# Patient Record
Sex: MALE | Race: WHITE | NOT HISPANIC OR LATINO
[De-identification: names, ages, dates, MRNs, and addresses within clinical notes are randomized per-mention and may not be internally consistent; named-entity substitution may affect disease eponyms.]

---

## 2017-03-15 ENCOUNTER — APPOINTMENT (OUTPATIENT)
Dept: HEMATOLOGY ONCOLOGY | Facility: CLINIC | Age: 67
End: 2017-03-15

## 2017-03-15 LAB
APPEARANCE UR: CLEAR
BILIRUB UR QL STRIP: NEGATIVE
COLOR UR: YELLOW
GLUCOSE UR STRIP-MCNC: 250 MG/DL
HGB UR QL STRIP: NEGATIVE
KETONES UR STRIP-MCNC: NEGATIVE MG/DL
NITRITE UR QL STRIP: NEGATIVE
PH UR STRIP: 6
PROT UR STRIP-MCNC: NEGATIVE MG/DL
SP GR UR STRIP: 1.02
UROBILINOGEN UR STRIP-MCNC: 1 MG/DL
WBC URNS QL MICRO: NEGATIVE

## 2017-03-16 ENCOUNTER — RESULT REVIEW (OUTPATIENT)
Age: 67
End: 2017-03-16

## 2017-03-20 LAB — BACTERIA UR CULT: NORMAL

## 2017-03-29 ENCOUNTER — OUTPATIENT (OUTPATIENT)
Dept: OUTPATIENT SERVICES | Facility: HOSPITAL | Age: 67
LOS: 1 days | Discharge: HOME | End: 2017-03-29

## 2017-03-29 ENCOUNTER — APPOINTMENT (OUTPATIENT)
Dept: HEMATOLOGY ONCOLOGY | Facility: CLINIC | Age: 67
End: 2017-03-29

## 2017-03-29 VITALS
HEIGHT: 69 IN | BODY MASS INDEX: 26.36 KG/M2 | SYSTOLIC BLOOD PRESSURE: 154 MMHG | WEIGHT: 178 LBS | DIASTOLIC BLOOD PRESSURE: 82 MMHG | RESPIRATION RATE: 12 BRPM | HEART RATE: 103 BPM | TEMPERATURE: 95.6 F

## 2017-06-27 DIAGNOSIS — C67.9 MALIGNANT NEOPLASM OF BLADDER, UNSPECIFIED: ICD-10-CM

## 2017-10-04 ENCOUNTER — OUTPATIENT (OUTPATIENT)
Dept: OUTPATIENT SERVICES | Facility: HOSPITAL | Age: 67
LOS: 1 days | Discharge: HOME | End: 2017-10-04

## 2017-10-04 ENCOUNTER — APPOINTMENT (OUTPATIENT)
Dept: HEMATOLOGY ONCOLOGY | Facility: CLINIC | Age: 67
End: 2017-10-04

## 2017-10-05 ENCOUNTER — RESULT REVIEW (OUTPATIENT)
Age: 67
End: 2017-10-05

## 2017-10-05 DIAGNOSIS — C67.9 MALIGNANT NEOPLASM OF BLADDER, UNSPECIFIED: ICD-10-CM

## 2017-10-05 LAB
APPEARANCE UR: CLEAR
BILIRUB UR QL STRIP: NEGATIVE
COLOR UR: YELLOW
GLUCOSE UR STRIP-MCNC: NEGATIVE MG/DL
HGB UR QL STRIP: NEGATIVE
KETONES UR STRIP-MCNC: NEGATIVE MG/DL
NITRITE UR QL STRIP: NEGATIVE
PH UR STRIP: 6.5
PROT UR STRIP-MCNC: NEGATIVE MG/DL
PSA FREE MFR FLD: 0.94 NG/ML
SP GR UR STRIP: 1.01
UROBILINOGEN UR STRIP-MCNC: 1 MG/DL
WBC URNS QL MICRO: NEGATIVE

## 2017-10-11 LAB — BACTERIA UR CULT: NORMAL

## 2017-10-18 ENCOUNTER — OUTPATIENT (OUTPATIENT)
Dept: OUTPATIENT SERVICES | Facility: HOSPITAL | Age: 67
LOS: 1 days | Discharge: HOME | End: 2017-10-18

## 2017-10-18 ENCOUNTER — APPOINTMENT (OUTPATIENT)
Dept: HEMATOLOGY ONCOLOGY | Facility: CLINIC | Age: 67
End: 2017-10-18
Payer: MEDICARE

## 2017-10-18 VITALS
DIASTOLIC BLOOD PRESSURE: 74 MMHG | RESPIRATION RATE: 12 BRPM | TEMPERATURE: 96.6 F | HEART RATE: 94 BPM | HEIGHT: 69 IN | BODY MASS INDEX: 26.36 KG/M2 | SYSTOLIC BLOOD PRESSURE: 160 MMHG | WEIGHT: 178 LBS

## 2017-10-18 PROCEDURE — 99213 OFFICE O/P EST LOW 20 MIN: CPT

## 2018-04-04 ENCOUNTER — OUTPATIENT (OUTPATIENT)
Dept: OUTPATIENT SERVICES | Facility: HOSPITAL | Age: 68
LOS: 1 days | Discharge: HOME | End: 2018-04-04

## 2018-04-04 ENCOUNTER — APPOINTMENT (OUTPATIENT)
Dept: HEMATOLOGY ONCOLOGY | Facility: CLINIC | Age: 68
End: 2018-04-04

## 2018-04-04 ENCOUNTER — LABORATORY RESULT (OUTPATIENT)
Age: 68
End: 2018-04-04

## 2018-04-05 DIAGNOSIS — C67.9 MALIGNANT NEOPLASM OF BLADDER, UNSPECIFIED: ICD-10-CM

## 2018-04-11 LAB
APPEARANCE: CLEAR
BACTERIA UR CULT: NORMAL
BILIRUBIN URINE: NEGATIVE
BLOOD URINE: NEGATIVE
COLOR: YELLOW
GLUCOSE QUALITATIVE U: 100 MG/DL
KETONES URINE: NEGATIVE
LEUKOCYTE ESTERASE URINE: NEGATIVE
NITRITE URINE: NEGATIVE
PH URINE: 6.5
PROTEIN URINE: NEGATIVE MG/DL
SPECIFIC GRAVITY URINE: 1.01
UROBILINOGEN URINE: 1 MG/DL (ref 0.2–?)

## 2018-04-18 ENCOUNTER — RESULT REVIEW (OUTPATIENT)
Age: 68
End: 2018-04-18

## 2018-04-18 ENCOUNTER — APPOINTMENT (OUTPATIENT)
Dept: HEMATOLOGY ONCOLOGY | Facility: CLINIC | Age: 68
End: 2018-04-18
Payer: MEDICARE

## 2018-04-18 ENCOUNTER — OUTPATIENT (OUTPATIENT)
Dept: OUTPATIENT SERVICES | Facility: HOSPITAL | Age: 68
LOS: 1 days | Discharge: HOME | End: 2018-04-18

## 2018-04-18 VITALS
RESPIRATION RATE: 16 BRPM | HEART RATE: 81 BPM | TEMPERATURE: 95.7 F | SYSTOLIC BLOOD PRESSURE: 129 MMHG | DIASTOLIC BLOOD PRESSURE: 74 MMHG

## 2018-04-18 DIAGNOSIS — C67.9 MALIGNANT NEOPLASM OF BLADDER, UNSPECIFIED: ICD-10-CM

## 2018-04-18 PROCEDURE — 52000 CYSTOURETHROSCOPY: CPT

## 2018-04-18 PROCEDURE — 99213 OFFICE O/P EST LOW 20 MIN: CPT | Mod: 25

## 2018-04-19 PROBLEM — C67.9 BLADDER CANCER: Status: ACTIVE | Noted: 2017-03-29

## 2018-04-19 LAB
APPEARANCE UR: CLEAR — SIGNIFICANT CHANGE UP
BACTERIA # UR AUTO: NEGATIVE — SIGNIFICANT CHANGE UP
BILIRUB UR-MCNC: NEGATIVE — SIGNIFICANT CHANGE UP
COLOR SPEC: YELLOW — SIGNIFICANT CHANGE UP
CULTURE RESULTS: NO GROWTH — SIGNIFICANT CHANGE UP
DIFF PNL FLD: (no result)
EPI CELLS # UR: 2 /HPF — SIGNIFICANT CHANGE UP (ref 0–5)
GLUCOSE UR QL: 1000 MG/DL
HYALINE CASTS # UR AUTO: 2 /LPF — SIGNIFICANT CHANGE UP (ref 0–7)
KETONES UR-MCNC: NEGATIVE — SIGNIFICANT CHANGE UP
LEUKOCYTE ESTERASE UR-ACNC: NEGATIVE — SIGNIFICANT CHANGE UP
NITRITE UR-MCNC: NEGATIVE — SIGNIFICANT CHANGE UP
PH UR: 6 — SIGNIFICANT CHANGE UP (ref 5–8)
PROT UR-MCNC: NEGATIVE MG/DL — SIGNIFICANT CHANGE UP
RBC CASTS # UR COMP ASSIST: 1 /HPF — SIGNIFICANT CHANGE UP (ref 0–4)
SP GR SPEC: 1.01 — SIGNIFICANT CHANGE UP (ref 1.01–1.02)
SPECIMEN SOURCE: SIGNIFICANT CHANGE UP
UROBILINOGEN FLD QL: NEGATIVE MG/DL — SIGNIFICANT CHANGE UP
WBC UR QL: 3 /HPF — SIGNIFICANT CHANGE UP (ref 0–5)

## 2018-04-20 DIAGNOSIS — C67.9 MALIGNANT NEOPLASM OF BLADDER, UNSPECIFIED: ICD-10-CM

## 2018-04-20 LAB — NON-GYNECOLOGICAL CYTOLOGY STUDY: SIGNIFICANT CHANGE UP

## 2018-10-03 ENCOUNTER — LABORATORY RESULT (OUTPATIENT)
Age: 68
End: 2018-10-03

## 2018-10-03 ENCOUNTER — OUTPATIENT (OUTPATIENT)
Dept: OUTPATIENT SERVICES | Facility: HOSPITAL | Age: 68
LOS: 1 days | Discharge: HOME | End: 2018-10-03

## 2018-10-03 ENCOUNTER — APPOINTMENT (OUTPATIENT)
Dept: HEMATOLOGY ONCOLOGY | Facility: CLINIC | Age: 68
End: 2018-10-03

## 2018-10-09 LAB
APPEARANCE: CLEAR
BACTERIA UR CULT: NORMAL
BILIRUBIN URINE: NEGATIVE
BLOOD URINE: NEGATIVE
COLOR: YELLOW
GLUCOSE QUALITATIVE U: 100 MG/DL
KETONES URINE: NEGATIVE
LEUKOCYTE ESTERASE URINE: NEGATIVE
NITRITE URINE: NEGATIVE
PH URINE: 6
PROTEIN URINE: NEGATIVE
SPECIFIC GRAVITY URINE: 1.01
UROBILINOGEN URINE: 0.2 (ref 0.2–?)

## 2018-10-17 ENCOUNTER — APPOINTMENT (OUTPATIENT)
Dept: HEMATOLOGY ONCOLOGY | Facility: CLINIC | Age: 68
End: 2018-10-17

## 2018-10-24 DIAGNOSIS — C67.9 MALIGNANT NEOPLASM OF BLADDER, UNSPECIFIED: ICD-10-CM

## 2018-10-29 ENCOUNTER — APPOINTMENT (OUTPATIENT)
Dept: UROLOGY | Facility: CLINIC | Age: 68
End: 2018-10-29

## 2019-04-29 ENCOUNTER — APPOINTMENT (OUTPATIENT)
Dept: CARDIOLOGY | Facility: CLINIC | Age: 69
End: 2019-04-29
Payer: MEDICARE

## 2019-04-29 PROCEDURE — 99204 OFFICE O/P NEW MOD 45 MIN: CPT

## 2019-04-29 PROCEDURE — 93000 ELECTROCARDIOGRAM COMPLETE: CPT

## 2019-05-28 ENCOUNTER — APPOINTMENT (OUTPATIENT)
Dept: CARDIOLOGY | Facility: CLINIC | Age: 69
End: 2019-05-28

## 2019-05-29 ENCOUNTER — APPOINTMENT (OUTPATIENT)
Dept: CARDIOLOGY | Facility: CLINIC | Age: 69
End: 2019-05-29
Payer: MEDICARE

## 2019-05-29 PROCEDURE — 93351 STRESS TTE COMPLETE: CPT

## 2019-05-29 PROCEDURE — 93320 DOPPLER ECHO COMPLETE: CPT

## 2019-05-29 PROCEDURE — 93325 DOPPLER ECHO COLOR FLOW MAPG: CPT | Mod: 59

## 2019-06-06 ENCOUNTER — APPOINTMENT (OUTPATIENT)
Dept: CARDIOLOGY | Facility: CLINIC | Age: 69
End: 2019-06-06
Payer: MEDICARE

## 2019-06-06 PROCEDURE — 93880 EXTRACRANIAL BILAT STUDY: CPT

## 2019-06-06 PROCEDURE — 93000 ELECTROCARDIOGRAM COMPLETE: CPT | Mod: 59

## 2019-06-06 PROCEDURE — 99214 OFFICE O/P EST MOD 30 MIN: CPT | Mod: 25

## 2019-06-07 ENCOUNTER — OUTPATIENT (OUTPATIENT)
Dept: OUTPATIENT SERVICES | Facility: HOSPITAL | Age: 69
LOS: 1 days | Discharge: HOME | End: 2019-06-07

## 2019-06-07 ENCOUNTER — APPOINTMENT (OUTPATIENT)
Dept: VASCULAR SURGERY | Facility: CLINIC | Age: 69
End: 2019-06-07
Payer: MEDICARE

## 2019-06-07 VITALS
BODY MASS INDEX: 25.05 KG/M2 | WEIGHT: 175 LBS | DIASTOLIC BLOOD PRESSURE: 80 MMHG | HEIGHT: 70 IN | SYSTOLIC BLOOD PRESSURE: 120 MMHG

## 2019-06-07 DIAGNOSIS — I65.23 OCCLUSION AND STENOSIS OF BILATERAL CAROTID ARTERIES: ICD-10-CM

## 2019-06-07 DIAGNOSIS — E11.9 TYPE 2 DIABETES MELLITUS W/OUT COMPLICATIONS: ICD-10-CM

## 2019-06-07 DIAGNOSIS — Z72.0 TOBACCO USE: ICD-10-CM

## 2019-06-07 PROCEDURE — 99203 OFFICE O/P NEW LOW 30 MIN: CPT

## 2019-06-07 PROCEDURE — 93880 EXTRACRANIAL BILAT STUDY: CPT

## 2019-06-07 NOTE — DATA REVIEWED
[FreeTextEntry1] : I performed a carotid duplex which was medically necessary to evaluate for critical ICA stenosis. It showed right ICA occlusion and left ICA with 90% stenosis with PSV of 612 c/s and EDV of 220 cm/s and ICA CCA ratio of 9.2.\par

## 2019-06-07 NOTE — HISTORY OF PRESENT ILLNESS
[FreeTextEntry1] : 67 y/o gentleman sent in by Cardiology for carotid artery disease. He has h/o smoking, was recently diagnosed with atrial fibrillation and is on Xarelto, was found to have right ICA occlusion and left ICA with >70% stenosis, denies any h/o CVA or TIA, c/o left buttock pain on ambulation which he was told was due to sciatica, can ambulate half a mile before any lower extremity discomfort.

## 2019-06-07 NOTE — CONSULT LETTER
[Dear  ___] : Dear  [unfilled], [Consult Letter:] : I had the pleasure of evaluating your patient, [unfilled]. [Please see my note below.] : Please see my note below. [FreeTextEntry2] : Dear Dr. Antelmo Baltazar,

## 2019-06-07 NOTE — ASSESSMENT
[FreeTextEntry1] : 67 y/o gentleman sent in by Cardiology for carotid artery disease. He has h/o smoking, was recently diagnosed with atrial fibrillation and is on Xarelto, was found to have right ICA occlusion and left ICA with >70% stenosis, denies any h/o CVA or TIA, c/o left buttock pain on ambulation which he was told was due to sciatica, can ambulate half a mile before any lower extremity discomfort. He has pulse deficit on physical exam on the left lower extremity which may be related to PVD from smoking.\par I performed a carotid duplex which was medically necessary to evaluate for critical ICA stenosis. It showed right ICA occlusion and left ICA with 90% stenosis with PSV of 612 c/s and EDV of 220 cm/s and ICA CCA ratio of 9.2.\par I have offered him left carotid endarterectomy for stroke prevention and explained the surgical procedure with him, including the risks and benefits. He should continue on Aspirin and stop Xarelto 2 days prior to surgery. I will obtain a CT angiogram of the neck prior to surgery.\par He was strongly urged to stop smoking and is scheduled for left carotid endarterectomy on 6/19/19.

## 2019-06-09 ENCOUNTER — FORM ENCOUNTER (OUTPATIENT)
Age: 69
End: 2019-06-09

## 2019-06-10 ENCOUNTER — OUTPATIENT (OUTPATIENT)
Dept: OUTPATIENT SERVICES | Facility: HOSPITAL | Age: 69
LOS: 1 days | Discharge: HOME | End: 2019-06-10
Payer: COMMERCIAL

## 2019-06-10 ENCOUNTER — OUTPATIENT (OUTPATIENT)
Dept: OUTPATIENT SERVICES | Facility: HOSPITAL | Age: 69
LOS: 1 days | Discharge: HOME | End: 2019-06-10
Payer: MEDICARE

## 2019-06-10 VITALS
DIASTOLIC BLOOD PRESSURE: 86 MMHG | RESPIRATION RATE: 16 BRPM | OXYGEN SATURATION: 96 % | TEMPERATURE: 96 F | HEART RATE: 68 BPM | HEIGHT: 70 IN | SYSTOLIC BLOOD PRESSURE: 140 MMHG | WEIGHT: 170.86 LBS

## 2019-06-10 DIAGNOSIS — I65.23 OCCLUSION AND STENOSIS OF BILATERAL CAROTID ARTERIES: ICD-10-CM

## 2019-06-10 DIAGNOSIS — Z01.818 ENCOUNTER FOR OTHER PREPROCEDURAL EXAMINATION: ICD-10-CM

## 2019-06-10 DIAGNOSIS — Z98.890 OTHER SPECIFIED POSTPROCEDURAL STATES: Chronic | ICD-10-CM

## 2019-06-10 LAB
ALBUMIN SERPL ELPH-MCNC: 4.5 G/DL — SIGNIFICANT CHANGE UP (ref 3.5–5.2)
ALP SERPL-CCNC: 103 U/L — SIGNIFICANT CHANGE UP (ref 30–115)
ALT FLD-CCNC: 82 U/L — HIGH (ref 0–41)
ANION GAP SERPL CALC-SCNC: 15 MMOL/L — HIGH (ref 7–14)
APPEARANCE UR: CLEAR — SIGNIFICANT CHANGE UP
APTT BLD: 48.1 SEC — HIGH (ref 27–39.2)
AST SERPL-CCNC: 32 U/L — SIGNIFICANT CHANGE UP (ref 0–41)
BASOPHILS # BLD AUTO: 0.07 K/UL — SIGNIFICANT CHANGE UP (ref 0–0.2)
BASOPHILS NFR BLD AUTO: 0.8 % — SIGNIFICANT CHANGE UP (ref 0–1)
BILIRUB SERPL-MCNC: 0.6 MG/DL — SIGNIFICANT CHANGE UP (ref 0.2–1.2)
BILIRUB UR-MCNC: NEGATIVE — SIGNIFICANT CHANGE UP
BLD GP AB SCN SERPL QL: SIGNIFICANT CHANGE UP
BUN SERPL-MCNC: 15 MG/DL
BUN SERPL-MCNC: 21 MG/DL — HIGH (ref 10–20)
CALCIUM SERPL-MCNC: 9.8 MG/DL — SIGNIFICANT CHANGE UP (ref 8.5–10.1)
CHLORIDE SERPL-SCNC: 101 MMOL/L — SIGNIFICANT CHANGE UP (ref 98–110)
CO2 SERPL-SCNC: 25 MMOL/L — SIGNIFICANT CHANGE UP (ref 17–32)
COLOR SPEC: YELLOW — SIGNIFICANT CHANGE UP
CREAT SERPL-MCNC: 0.8 MG/DL
CREAT SERPL-MCNC: 0.8 MG/DL — SIGNIFICANT CHANGE UP (ref 0.7–1.5)
DIFF PNL FLD: NEGATIVE — SIGNIFICANT CHANGE UP
EOSINOPHIL # BLD AUTO: 0.21 K/UL — SIGNIFICANT CHANGE UP (ref 0–0.7)
EOSINOPHIL NFR BLD AUTO: 2.5 % — SIGNIFICANT CHANGE UP (ref 0–8)
GLUCOSE SERPL-MCNC: 104 MG/DL — HIGH (ref 70–99)
GLUCOSE UR QL: NEGATIVE MG/DL — SIGNIFICANT CHANGE UP
HCT VFR BLD CALC: 47.4 % — SIGNIFICANT CHANGE UP (ref 42–52)
HGB BLD-MCNC: 15.9 G/DL — SIGNIFICANT CHANGE UP (ref 14–18)
IMM GRANULOCYTES NFR BLD AUTO: 0.2 % — SIGNIFICANT CHANGE UP (ref 0.1–0.3)
INR BLD: 1.64 RATIO — HIGH (ref 0.65–1.3)
KETONES UR-MCNC: NEGATIVE — SIGNIFICANT CHANGE UP
LEUKOCYTE ESTERASE UR-ACNC: NEGATIVE — SIGNIFICANT CHANGE UP
LYMPHOCYTES # BLD AUTO: 2.52 K/UL — SIGNIFICANT CHANGE UP (ref 1.2–3.4)
LYMPHOCYTES # BLD AUTO: 30.5 % — SIGNIFICANT CHANGE UP (ref 20.5–51.1)
MCHC RBC-ENTMCNC: 29.8 PG — SIGNIFICANT CHANGE UP (ref 27–31)
MCHC RBC-ENTMCNC: 33.5 G/DL — SIGNIFICANT CHANGE UP (ref 32–37)
MCV RBC AUTO: 88.8 FL — SIGNIFICANT CHANGE UP (ref 80–94)
MONOCYTES # BLD AUTO: 0.71 K/UL — HIGH (ref 0.1–0.6)
MONOCYTES NFR BLD AUTO: 8.6 % — SIGNIFICANT CHANGE UP (ref 1.7–9.3)
NEUTROPHILS # BLD AUTO: 4.74 K/UL — SIGNIFICANT CHANGE UP (ref 1.4–6.5)
NEUTROPHILS NFR BLD AUTO: 57.4 % — SIGNIFICANT CHANGE UP (ref 42.2–75.2)
NITRITE UR-MCNC: NEGATIVE — SIGNIFICANT CHANGE UP
NRBC # BLD: 0 /100 WBCS — SIGNIFICANT CHANGE UP (ref 0–0)
PH UR: 5.5 — SIGNIFICANT CHANGE UP (ref 5–8)
PLATELET # BLD AUTO: 226 K/UL — SIGNIFICANT CHANGE UP (ref 130–400)
POTASSIUM SERPL-MCNC: 4.3 MMOL/L — SIGNIFICANT CHANGE UP (ref 3.5–5)
POTASSIUM SERPL-SCNC: 4.3 MMOL/L — SIGNIFICANT CHANGE UP (ref 3.5–5)
PROT SERPL-MCNC: 7.4 G/DL — SIGNIFICANT CHANGE UP (ref 6–8)
PROT UR-MCNC: NEGATIVE MG/DL — SIGNIFICANT CHANGE UP
PROTHROM AB SERPL-ACNC: 18.8 SEC — HIGH (ref 9.95–12.87)
RBC # BLD: 5.34 M/UL — SIGNIFICANT CHANGE UP (ref 4.7–6.1)
RBC # FLD: 12.6 % — SIGNIFICANT CHANGE UP (ref 11.5–14.5)
SODIUM SERPL-SCNC: 141 MMOL/L — SIGNIFICANT CHANGE UP (ref 135–146)
SP GR SPEC: 1.01 — SIGNIFICANT CHANGE UP (ref 1.01–1.03)
UROBILINOGEN FLD QL: 0.2 MG/DL — SIGNIFICANT CHANGE UP (ref 0.2–0.2)
WBC # BLD: 8.27 K/UL — SIGNIFICANT CHANGE UP (ref 4.8–10.8)
WBC # FLD AUTO: 8.27 K/UL — SIGNIFICANT CHANGE UP (ref 4.8–10.8)

## 2019-06-10 PROCEDURE — 93010 ELECTROCARDIOGRAM REPORT: CPT

## 2019-06-10 PROCEDURE — 71046 X-RAY EXAM CHEST 2 VIEWS: CPT | Mod: 26

## 2019-06-10 PROCEDURE — 70498 CT ANGIOGRAPHY NECK: CPT | Mod: 26

## 2019-06-10 NOTE — H&P PST ADULT - NSANTHOSAYNRD_GEN_A_CORE
No. MARILUZ screening performed.  STOP BANG Legend: 0-2 = LOW Risk; 3-4 = INTERMEDIATE Risk; 5-8 = HIGH Risk

## 2019-06-10 NOTE — H&P PST ADULT - NSICDXPASTMEDICALHX_GEN_ALL_CORE_FT
PAST MEDICAL HISTORY:  Afib     Bladder cancer     DM (diabetes mellitus)     H/O cystoscopy resection of bladder tumor    H/O sciatica CHR PAIN LT LE    HTN (hypertension)     Hypercholesteremia

## 2019-06-10 NOTE — H&P PST ADULT - HISTORY OF PRESENT ILLNESS
68yr old male states " the DR found blockage in my left neck caotid"- presents for LEFT CAROTID ENDARTERECTOMY WITH PATCH. Denies c/o CP, PALP, SOB, URI, FEVER, RASH OR UTI SYMPTOMS. Exercise nacho 2 FOS.

## 2019-06-11 PROBLEM — E78.00 PURE HYPERCHOLESTEROLEMIA, UNSPECIFIED: Chronic | Status: ACTIVE | Noted: 2019-06-10

## 2019-06-11 PROBLEM — I48.91 UNSPECIFIED ATRIAL FIBRILLATION: Chronic | Status: ACTIVE | Noted: 2019-06-10

## 2019-06-11 PROBLEM — I10 ESSENTIAL (PRIMARY) HYPERTENSION: Chronic | Status: ACTIVE | Noted: 2019-06-10

## 2019-06-11 PROBLEM — Z98.890 OTHER SPECIFIED POSTPROCEDURAL STATES: Chronic | Status: ACTIVE | Noted: 2019-06-10

## 2019-06-11 PROBLEM — E11.9 TYPE 2 DIABETES MELLITUS WITHOUT COMPLICATIONS: Chronic | Status: ACTIVE | Noted: 2019-06-10

## 2019-06-11 PROBLEM — C67.9 MALIGNANT NEOPLASM OF BLADDER, UNSPECIFIED: Chronic | Status: ACTIVE | Noted: 2019-06-10

## 2019-06-11 PROBLEM — Z86.69 PERSONAL HISTORY OF OTHER DISEASES OF THE NERVOUS SYSTEM AND SENSE ORGANS: Chronic | Status: ACTIVE | Noted: 2019-06-10

## 2019-06-13 ENCOUNTER — INPATIENT (INPATIENT)
Facility: HOSPITAL | Age: 69
LOS: 0 days | Discharge: HOME HEALTH | End: 2019-06-14
Attending: SURGERY | Admitting: SURGERY
Payer: MEDICARE

## 2019-06-13 ENCOUNTER — APPOINTMENT (OUTPATIENT)
Dept: VASCULAR SURGERY | Facility: HOSPITAL | Age: 69
End: 2019-06-13

## 2019-06-13 ENCOUNTER — RESULT REVIEW (OUTPATIENT)
Age: 69
End: 2019-06-13

## 2019-06-13 VITALS
SYSTOLIC BLOOD PRESSURE: 180 MMHG | HEIGHT: 70 IN | RESPIRATION RATE: 18 BRPM | TEMPERATURE: 98 F | DIASTOLIC BLOOD PRESSURE: 86 MMHG | HEART RATE: 66 BPM | WEIGHT: 175.05 LBS

## 2019-06-13 DIAGNOSIS — Z98.890 OTHER SPECIFIED POSTPROCEDURAL STATES: Chronic | ICD-10-CM

## 2019-06-13 LAB
ANION GAP SERPL CALC-SCNC: 11 MMOL/L — SIGNIFICANT CHANGE UP (ref 7–14)
ANION GAP SERPL CALC-SCNC: 12 MMOL/L — SIGNIFICANT CHANGE UP (ref 7–14)
BUN SERPL-MCNC: 15 MG/DL — SIGNIFICANT CHANGE UP (ref 10–20)
BUN SERPL-MCNC: 19 MG/DL — SIGNIFICANT CHANGE UP (ref 10–20)
CALCIUM SERPL-MCNC: 8.6 MG/DL — SIGNIFICANT CHANGE UP (ref 8.5–10.1)
CALCIUM SERPL-MCNC: 8.9 MG/DL — SIGNIFICANT CHANGE UP (ref 8.5–10.1)
CHLORIDE SERPL-SCNC: 102 MMOL/L — SIGNIFICANT CHANGE UP (ref 98–110)
CHLORIDE SERPL-SCNC: 105 MMOL/L — SIGNIFICANT CHANGE UP (ref 98–110)
CK MB CFR SERPL CALC: <1 NG/ML — SIGNIFICANT CHANGE UP (ref 0.6–6.3)
CK SERPL-CCNC: 47 U/L — SIGNIFICANT CHANGE UP (ref 0–225)
CO2 SERPL-SCNC: 23 MMOL/L — SIGNIFICANT CHANGE UP (ref 17–32)
CO2 SERPL-SCNC: 23 MMOL/L — SIGNIFICANT CHANGE UP (ref 17–32)
CREAT SERPL-MCNC: 0.7 MG/DL — SIGNIFICANT CHANGE UP (ref 0.7–1.5)
CREAT SERPL-MCNC: 0.8 MG/DL — SIGNIFICANT CHANGE UP (ref 0.7–1.5)
GLUCOSE BLDC GLUCOMTR-MCNC: 124 MG/DL — HIGH (ref 70–99)
GLUCOSE BLDC GLUCOMTR-MCNC: 142 MG/DL — HIGH (ref 70–99)
GLUCOSE SERPL-MCNC: 134 MG/DL — HIGH (ref 70–99)
GLUCOSE SERPL-MCNC: 158 MG/DL — HIGH (ref 70–99)
HCT VFR BLD CALC: 41.3 % — LOW (ref 42–52)
HCT VFR BLD CALC: 41.9 % — LOW (ref 42–52)
HGB BLD-MCNC: 14.1 G/DL — SIGNIFICANT CHANGE UP (ref 14–18)
HGB BLD-MCNC: 14.4 G/DL — SIGNIFICANT CHANGE UP (ref 14–18)
MAGNESIUM SERPL-MCNC: 1.9 MG/DL — SIGNIFICANT CHANGE UP (ref 1.8–2.4)
MAGNESIUM SERPL-MCNC: 2 MG/DL — SIGNIFICANT CHANGE UP (ref 1.8–2.4)
MCHC RBC-ENTMCNC: 29.7 PG — SIGNIFICANT CHANGE UP (ref 27–31)
MCHC RBC-ENTMCNC: 29.8 PG — SIGNIFICANT CHANGE UP (ref 27–31)
MCHC RBC-ENTMCNC: 34.1 G/DL — SIGNIFICANT CHANGE UP (ref 32–37)
MCHC RBC-ENTMCNC: 34.4 G/DL — SIGNIFICANT CHANGE UP (ref 32–37)
MCV RBC AUTO: 86.6 FL — SIGNIFICANT CHANGE UP (ref 80–94)
MCV RBC AUTO: 86.9 FL — SIGNIFICANT CHANGE UP (ref 80–94)
NRBC # BLD: 0 /100 WBCS — SIGNIFICANT CHANGE UP (ref 0–0)
NRBC # BLD: 0 /100 WBCS — SIGNIFICANT CHANGE UP (ref 0–0)
PHOSPHATE SERPL-MCNC: 3.3 MG/DL — SIGNIFICANT CHANGE UP (ref 2.1–4.9)
PHOSPHATE SERPL-MCNC: 3.4 MG/DL — SIGNIFICANT CHANGE UP (ref 2.1–4.9)
PLATELET # BLD AUTO: 174 K/UL — SIGNIFICANT CHANGE UP (ref 130–400)
PLATELET # BLD AUTO: 178 K/UL — SIGNIFICANT CHANGE UP (ref 130–400)
POTASSIUM SERPL-MCNC: 4.3 MMOL/L — SIGNIFICANT CHANGE UP (ref 3.5–5)
POTASSIUM SERPL-MCNC: 4.4 MMOL/L — SIGNIFICANT CHANGE UP (ref 3.5–5)
POTASSIUM SERPL-SCNC: 4.3 MMOL/L — SIGNIFICANT CHANGE UP (ref 3.5–5)
POTASSIUM SERPL-SCNC: 4.4 MMOL/L — SIGNIFICANT CHANGE UP (ref 3.5–5)
RBC # BLD: 4.75 M/UL — SIGNIFICANT CHANGE UP (ref 4.7–6.1)
RBC # BLD: 4.84 M/UL — SIGNIFICANT CHANGE UP (ref 4.7–6.1)
RBC # FLD: 12.5 % — SIGNIFICANT CHANGE UP (ref 11.5–14.5)
RBC # FLD: 12.6 % — SIGNIFICANT CHANGE UP (ref 11.5–14.5)
SODIUM SERPL-SCNC: 136 MMOL/L — SIGNIFICANT CHANGE UP (ref 135–146)
SODIUM SERPL-SCNC: 140 MMOL/L — SIGNIFICANT CHANGE UP (ref 135–146)
TROPONIN T SERPL-MCNC: <0.01 NG/ML — SIGNIFICANT CHANGE UP
TROPONIN T SERPL-MCNC: <0.01 NG/ML — SIGNIFICANT CHANGE UP
WBC # BLD: 12.54 K/UL — HIGH (ref 4.8–10.8)
WBC # BLD: 8.66 K/UL — SIGNIFICANT CHANGE UP (ref 4.8–10.8)
WBC # FLD AUTO: 12.54 K/UL — HIGH (ref 4.8–10.8)
WBC # FLD AUTO: 8.66 K/UL — SIGNIFICANT CHANGE UP (ref 4.8–10.8)

## 2019-06-13 PROCEDURE — 99291 CRITICAL CARE FIRST HOUR: CPT

## 2019-06-13 PROCEDURE — 93010 ELECTROCARDIOGRAM REPORT: CPT

## 2019-06-13 PROCEDURE — 71045 X-RAY EXAM CHEST 1 VIEW: CPT | Mod: 26

## 2019-06-13 PROCEDURE — 35301 RECHANNELING OF ARTERY: CPT

## 2019-06-13 PROCEDURE — 88311 DECALCIFY TISSUE: CPT | Mod: 26

## 2019-06-13 PROCEDURE — 88304 TISSUE EXAM BY PATHOLOGIST: CPT | Mod: 26

## 2019-06-13 RX ORDER — INSULIN LISPRO 100/ML
VIAL (ML) SUBCUTANEOUS
Refills: 0 | Status: DISCONTINUED | OUTPATIENT
Start: 2019-06-13 | End: 2019-06-14

## 2019-06-13 RX ORDER — ASPIRIN/CALCIUM CARB/MAGNESIUM 324 MG
81 TABLET ORAL DAILY
Refills: 0 | Status: DISCONTINUED | OUTPATIENT
Start: 2019-06-14 | End: 2019-06-14

## 2019-06-13 RX ORDER — SODIUM CHLORIDE 9 MG/ML
1000 INJECTION INTRAMUSCULAR; INTRAVENOUS; SUBCUTANEOUS
Refills: 0 | Status: DISCONTINUED | OUTPATIENT
Start: 2019-06-13 | End: 2019-06-13

## 2019-06-13 RX ORDER — MORPHINE SULFATE 50 MG/1
2 CAPSULE, EXTENDED RELEASE ORAL
Refills: 0 | Status: DISCONTINUED | OUTPATIENT
Start: 2019-06-13 | End: 2019-06-13

## 2019-06-13 RX ORDER — PANTOPRAZOLE SODIUM 20 MG/1
40 TABLET, DELAYED RELEASE ORAL
Refills: 0 | Status: DISCONTINUED | OUTPATIENT
Start: 2019-06-13 | End: 2019-06-14

## 2019-06-13 RX ORDER — CARVEDILOL PHOSPHATE 80 MG/1
6.25 CAPSULE, EXTENDED RELEASE ORAL ONCE
Refills: 0 | Status: COMPLETED | OUTPATIENT
Start: 2019-06-13 | End: 2019-06-13

## 2019-06-13 RX ORDER — MEPERIDINE HYDROCHLORIDE 50 MG/ML
12.5 INJECTION INTRAMUSCULAR; INTRAVENOUS; SUBCUTANEOUS
Refills: 0 | Status: DISCONTINUED | OUTPATIENT
Start: 2019-06-13 | End: 2019-06-13

## 2019-06-13 RX ORDER — ONDANSETRON 8 MG/1
4 TABLET, FILM COATED ORAL ONCE
Refills: 0 | Status: DISCONTINUED | OUTPATIENT
Start: 2019-06-13 | End: 2019-06-13

## 2019-06-13 RX ORDER — OXYCODONE HYDROCHLORIDE 5 MG/1
5 TABLET ORAL EVERY 6 HOURS
Refills: 0 | Status: DISCONTINUED | OUTPATIENT
Start: 2019-06-13 | End: 2019-06-14

## 2019-06-13 RX ORDER — DEXTROSE 50 % IN WATER 50 %
15 SYRINGE (ML) INTRAVENOUS ONCE
Refills: 0 | Status: DISCONTINUED | OUTPATIENT
Start: 2019-06-13 | End: 2019-06-14

## 2019-06-13 RX ORDER — SIMVASTATIN 20 MG/1
20 TABLET, FILM COATED ORAL AT BEDTIME
Refills: 0 | Status: DISCONTINUED | OUTPATIENT
Start: 2019-06-13 | End: 2019-06-14

## 2019-06-13 RX ORDER — CLEVIDIPINE BUTYRATE 50MG/100ML
0.5 VIAL (ML) INTRAVENOUS
Qty: 25 | Refills: 0 | Status: DISCONTINUED | OUTPATIENT
Start: 2019-06-13 | End: 2019-06-13

## 2019-06-13 RX ORDER — SENNA PLUS 8.6 MG/1
2 TABLET ORAL DAILY
Refills: 0 | Status: DISCONTINUED | OUTPATIENT
Start: 2019-06-13 | End: 2019-06-14

## 2019-06-13 RX ORDER — CARVEDILOL PHOSPHATE 80 MG/1
6.25 CAPSULE, EXTENDED RELEASE ORAL EVERY 12 HOURS
Refills: 0 | Status: DISCONTINUED | OUTPATIENT
Start: 2019-06-13 | End: 2019-06-14

## 2019-06-13 RX ORDER — DEXTROSE 50 % IN WATER 50 %
25 SYRINGE (ML) INTRAVENOUS ONCE
Refills: 0 | Status: DISCONTINUED | OUTPATIENT
Start: 2019-06-13 | End: 2019-06-13

## 2019-06-13 RX ORDER — HEPARIN SODIUM 5000 [USP'U]/ML
5000 INJECTION INTRAVENOUS; SUBCUTANEOUS EVERY 8 HOURS
Refills: 0 | Status: DISCONTINUED | OUTPATIENT
Start: 2019-06-14 | End: 2019-06-14

## 2019-06-13 RX ORDER — ACETAMINOPHEN 500 MG
650 TABLET ORAL EVERY 6 HOURS
Refills: 0 | Status: DISCONTINUED | OUTPATIENT
Start: 2019-06-13 | End: 2019-06-14

## 2019-06-13 RX ORDER — SODIUM CHLORIDE 9 MG/ML
1000 INJECTION, SOLUTION INTRAVENOUS
Refills: 0 | Status: DISCONTINUED | OUTPATIENT
Start: 2019-06-13 | End: 2019-06-13

## 2019-06-13 RX ORDER — LOSARTAN POTASSIUM 100 MG/1
50 TABLET, FILM COATED ORAL DAILY
Refills: 0 | Status: DISCONTINUED | OUTPATIENT
Start: 2019-06-13 | End: 2019-06-14

## 2019-06-13 RX ORDER — SODIUM CHLORIDE 9 MG/ML
1000 INJECTION, SOLUTION INTRAVENOUS
Refills: 0 | Status: DISCONTINUED | OUTPATIENT
Start: 2019-06-13 | End: 2019-06-14

## 2019-06-13 RX ORDER — METOPROLOL TARTRATE 50 MG
5 TABLET ORAL ONCE
Refills: 0 | Status: COMPLETED | OUTPATIENT
Start: 2019-06-13 | End: 2019-06-13

## 2019-06-13 RX ORDER — GLUCAGON INJECTION, SOLUTION 0.5 MG/.1ML
1 INJECTION, SOLUTION SUBCUTANEOUS ONCE
Refills: 0 | Status: DISCONTINUED | OUTPATIENT
Start: 2019-06-13 | End: 2019-06-14

## 2019-06-13 RX ORDER — DEXTROSE 50 % IN WATER 50 %
12.5 SYRINGE (ML) INTRAVENOUS ONCE
Refills: 0 | Status: DISCONTINUED | OUTPATIENT
Start: 2019-06-13 | End: 2019-06-13

## 2019-06-13 RX ORDER — MORPHINE SULFATE 50 MG/1
4 CAPSULE, EXTENDED RELEASE ORAL
Refills: 0 | Status: DISCONTINUED | OUTPATIENT
Start: 2019-06-13 | End: 2019-06-13

## 2019-06-13 RX ORDER — OXYCODONE AND ACETAMINOPHEN 5; 325 MG/1; MG/1
1 TABLET ORAL EVERY 4 HOURS
Refills: 0 | Status: DISCONTINUED | OUTPATIENT
Start: 2019-06-13 | End: 2019-06-13

## 2019-06-13 RX ORDER — CLEVIDIPINE BUTYRATE 50MG/100ML
0.5 VIAL (ML) INTRAVENOUS
Qty: 25 | Refills: 0 | Status: DISCONTINUED | OUTPATIENT
Start: 2019-06-13 | End: 2019-06-14

## 2019-06-13 RX ORDER — DOCUSATE SODIUM 100 MG
100 CAPSULE ORAL THREE TIMES A DAY
Refills: 0 | Status: DISCONTINUED | OUTPATIENT
Start: 2019-06-13 | End: 2019-06-14

## 2019-06-13 RX ORDER — LOSARTAN POTASSIUM 100 MG/1
50 TABLET, FILM COATED ORAL DAILY
Refills: 0 | Status: DISCONTINUED | OUTPATIENT
Start: 2019-06-13 | End: 2019-06-13

## 2019-06-13 RX ORDER — DEXTROSE 50 % IN WATER 50 %
25 SYRINGE (ML) INTRAVENOUS ONCE
Refills: 0 | Status: DISCONTINUED | OUTPATIENT
Start: 2019-06-13 | End: 2019-06-14

## 2019-06-13 RX ORDER — CARVEDILOL PHOSPHATE 80 MG/1
6.25 CAPSULE, EXTENDED RELEASE ORAL EVERY 12 HOURS
Refills: 0 | Status: DISCONTINUED | OUTPATIENT
Start: 2019-06-13 | End: 2019-06-13

## 2019-06-13 RX ADMIN — Medication 650 MILLIGRAM(S): at 18:07

## 2019-06-13 RX ADMIN — Medication 0: at 16:55

## 2019-06-13 RX ADMIN — SIMVASTATIN 20 MILLIGRAM(S): 20 TABLET, FILM COATED ORAL at 23:47

## 2019-06-13 RX ADMIN — Medication 5 MILLIGRAM(S): at 16:49

## 2019-06-13 RX ADMIN — SODIUM CHLORIDE 120 MILLILITER(S): 9 INJECTION, SOLUTION INTRAVENOUS at 11:52

## 2019-06-13 RX ADMIN — CARVEDILOL PHOSPHATE 6.25 MILLIGRAM(S): 80 CAPSULE, EXTENDED RELEASE ORAL at 12:56

## 2019-06-13 RX ADMIN — LOSARTAN POTASSIUM 50 MILLIGRAM(S): 100 TABLET, FILM COATED ORAL at 12:24

## 2019-06-13 RX ADMIN — SODIUM CHLORIDE 75 MILLILITER(S): 9 INJECTION, SOLUTION INTRAVENOUS at 13:00

## 2019-06-13 RX ADMIN — Medication 100 MILLIGRAM(S): at 23:47

## 2019-06-13 RX ADMIN — CARVEDILOL PHOSPHATE 6.25 MILLIGRAM(S): 80 CAPSULE, EXTENDED RELEASE ORAL at 19:45

## 2019-06-13 RX ADMIN — Medication 650 MILLIGRAM(S): at 17:34

## 2019-06-13 RX ADMIN — Medication 1 MG/HR: at 19:15

## 2019-06-13 RX ADMIN — Medication 1 MG/HR: at 13:15

## 2019-06-13 RX ADMIN — OXYCODONE HYDROCHLORIDE 5 MILLIGRAM(S): 5 TABLET ORAL at 13:26

## 2019-06-13 NOTE — CONSULT NOTE ADULT - ASSESSMENT
ASSESSMENT/PLAN: 68yMale POD 0 s/p asymptomatic LCEA      Neurologic:  Pain control:   - Tylenol/oxy prn        Respiratory:  - No acute issues  - NC as needed, maintain o2 sat >94%  - IS/PT    Cardiovascular: Hx of HTN, HLD, Afib  - Restarted on home coreg, losartan, Statin   - Continue to hold Xarelto, restart per team recs  - F/u EKG    S/p L CEA:   - neurovascular checks, monitor surgical site for hematoma  - Maintain SBP <140  - F/u CE  - ASA/Hep starting 6/14  - SCD    Gastrointestinal/Nutrition:  Diet:  - Clears     Prophylaxis:  - PPI  - SCD    Genitourinary/Renal:  - No acute issues  - No santana, monitor UO   - IVF, will IVL once tolerating diet   - F/u labs     Hematologic:  - Hep SQ/ASA 6/14  - Hold Xarelto  - SCD  - F/u labs    Infectious Disease:  - No acute issues    Endocrine:  - ISS, f/u FS    MSK:  - Bedrest     Disposition: SICU ASSESSMENT/PLAN: 68yMale POD 0 s/p asymptomatic LCEA      Neurologic:  Pain control:   - Tylenol/oxy prn    Respiratory:  - No acute issues  - NC as needed, maintain o2 sat >94%  - IS/PT    Cardiovascular: Hx of HTN, HLD, Afib  - Restarted on home coreg, losartan, Statin   - Continue to hold Xarelto, restart per team recs  - F/u EKG    S/p L CEA:   - neurovascular checks, monitor surgical site for hematoma  - Maintain SBP <140  - F/u CE  - ASA/Hep starting 6/14  - SCD    Gastrointestinal/Nutrition:  Diet:  - Clears     Prophylaxis:  - PPI  - SCD    Genitourinary/Renal:  - No acute issues  - No santana, monitor UO   - IVF, will IVL once tolerating diet   - F/u labs     Hematologic:  - Hep SQ/ASA 6/14  - Hold Xarelto  - SCD  - F/u labs    Infectious Disease:  - No acute issues    Endocrine:  - ISS, f/u FS    MSK:  - Bedrest     Disposition: SICU

## 2019-06-13 NOTE — CHART NOTE - NSCHARTNOTEFT_GEN_A_CORE
Post Operative Note  Patient: MAGGIE SAHNI 68y (1950) Male   MRN: [id number]  Location: [place] [address] [address]  Visit: 06-13-19 Inpatient  Date: 06-13-19 @ 21:58    Procedure: S/P L CEA    Subjective:   Nausea: no, Vomiting: no, Ambulating: no, Flatus: no  Pain Assessment: yes, Location: L neck, Pain Intensity: 2/10 , Quality: Sore    Objective:  Vitals: T(F): 98.3 (06-13-19 @ 19:35), Max: 98.3 (06-13-19 @ 16:40)  HR: 74 (06-13-19 @ 21:15)  BP: 123/79 (06-13-19 @ 19:35) (90/64 - 180/86)  RR: 19 (06-13-19 @ 21:15)  SpO2: 98% (06-13-19 @ 21:15)  Vent Settings:     In:   06-13-19 @ 07:01  -  06-13-19 @ 21:58  --------------------------------------------------------  IN: 590.6 mL      IV Fluids: dextrose 5%. 1000 milliLiter(s) (50 mL/Hr) IV Continuous <Continuous>      Out:   06-13-19 @ 07:01  -  06-13-19 @ 21:58  --------------------------------------------------------  OUT: 475 mL        Voided Urine:   06-13-19 @ 07:01  -  06-13-19 @ 21:58  --------------------------------------------------------  OUT: 475 mL        Physical Examination:  General Appearance: NAD  HEENT: EOMI, sclera non-icteric. Face symmetric. Dressing c/d/i  Heart: RRR  Lungs: CTABL  Abdomen:  Soft, nontender, nondistended.   MSK/Extremities: Warm & well-perfused. Peripheral pulses intact.  Skin: Warm, dry. No jaundice.   Incisions/Wounds: Dressings in place, clean, dry and intact, no signs of infection/active bleeding/drainage    Medications: [Standing]  acetaminophen   Tablet .. 650 milliGRAM(s) Oral every 6 hours PRN  carvedilol 6.25 milliGRAM(s) Oral every 12 hours  clevidipine Infusion 0.5 mG/Hr IV Continuous <Continuous>  dextrose 40% Gel 15 Gram(s) Oral once PRN  dextrose 5%. 1000 milliLiter(s) IV Continuous <Continuous>  dextrose 50% Injectable 25 Gram(s) IV Push once  docusate sodium 100 milliGRAM(s) Oral three times a day  glucagon  Injectable 1 milliGRAM(s) IntraMuscular once PRN  insulin lispro (HumaLOG) corrective regimen sliding scale   SubCutaneous three times a day before meals  losartan 50 milliGRAM(s) Oral daily  oxyCODONE    IR 5 milliGRAM(s) Oral every 6 hours PRN  pantoprazole    Tablet 40 milliGRAM(s) Oral before breakfast  senna 2 Tablet(s) Oral daily PRN  simvastatin 20 milliGRAM(s) Oral at bedtime    Medications: [PRN]  acetaminophen   Tablet .. 650 milliGRAM(s) Oral every 6 hours PRN  carvedilol 6.25 milliGRAM(s) Oral every 12 hours  clevidipine Infusion 0.5 mG/Hr IV Continuous <Continuous>  dextrose 40% Gel 15 Gram(s) Oral once PRN  dextrose 5%. 1000 milliLiter(s) IV Continuous <Continuous>  dextrose 50% Injectable 25 Gram(s) IV Push once  docusate sodium 100 milliGRAM(s) Oral three times a day  glucagon  Injectable 1 milliGRAM(s) IntraMuscular once PRN  insulin lispro (HumaLOG) corrective regimen sliding scale   SubCutaneous three times a day before meals  losartan 50 milliGRAM(s) Oral daily  oxyCODONE    IR 5 milliGRAM(s) Oral every 6 hours PRN  pantoprazole    Tablet 40 milliGRAM(s) Oral before breakfast  senna 2 Tablet(s) Oral daily PRN  simvastatin 20 milliGRAM(s) Oral at bedtime    Labs:                        14.1   8.66  )-----------( 178      ( 13 Jun 2019 12:37 )             41.3     06-13    140  |  105  |  19  ----------------------------<  134<H>  4.3   |  23  |  0.8    Ca    8.6      13 Jun 2019 12:37  Phos  3.4     06-13  Mg     2.0     06-13        CARDIAC MARKERS ( 13 Jun 2019 12:37 )  x     / <0.01 ng/mL / 47 U/L / x     / <1.0 ng/mL        Assessment:  68yMale patient S/P L CEA    Plan:    100-140 SBP goal  Neuro checks  Pain control  CLD  SSI    Date/Time: 06-13-19 @ 21:58

## 2019-06-13 NOTE — CHART NOTE - NSCHARTNOTEFT_GEN_A_CORE
PACU ANESTHESIA ADMISSION NOTE      Procedure: CEA (carotid endarterectomy)    Post op diagnosis:  Left carotid stenosis      ____  Intubated  TV:______       Rate: ______      FiO2: ______    _x___  Patent Airway    _x___  Full return of protective reflexes    _x___  Full recovery from anesthesia / back to baseline status    Vitals  SPO2:-98% on 2l nc  HR:-84  RR:-12  B.P:-177/78  TEMP:-98.6    Mental Status:  _x___ Awake   ___x_ Alert   _____ Drowsy   _____ Sedated    Nausea/Vomiting:  _x___  NO       ______Yes,   See Post - Op Orders         Pain Scale (0-10):  __0___    Treatment: _x___ None    __x__ See Post - Op/PCA Orders    Post - Operative Fluids:   ___ Oral   ____x See Post - Op Orders    Plan: Discharge:   ____Home       ___Floor     __x___Critical Care    _____  Other:_________________    Comments:  Report endorsed to RN in PACU  Vitals stable  No anesthesia issues or complications noted.  Discharge to patient to ICU when criteria met. ICU report endorsed to Lelo Estevez

## 2019-06-13 NOTE — ASU PATIENT PROFILE, ADULT - PMH
Afib    Bladder cancer    DM (diabetes mellitus)    H/O cystoscopy  resection of bladder tumor  H/O sciatica  CHR PAIN LT LE  HTN (hypertension)    Hypercholesteremia

## 2019-06-13 NOTE — CONSULT NOTE ADULT - SUBJECTIVE AND OBJECTIVE BOX
MAGGIE SAHNI  [id number]  68y Male with a pertinent pmhx of smoking, HTN, HLD, Afib (home xarelto which was stopped 3 days prior to procedure), presents s/p L CEA for asymptomatic high grade stenosis.   Patient was seeing his cardiologist for a routine visit and upon workup was found to have complete occlusion of THUAN and 90% occlusion of LICA,  which prompted todays procedure. Intraop course was c/b intermittent episodes of afib which resolved with no pharmacological intervention. Intra-op, he received 5000 u heparin extubated and transferred to PACU in stable but guarded condition. SICU consulted for close neurological/HD monitoring.     OR time: 1.5 hr  EBL: 50  IVF: 2L  UO: N/A    Indication for ICU admission: Neurological/HD monitoring.   Admit Date: 6/13  ICU Date: 6/13  OR Date: 6/13    No Known Allergies    PAST MEDICAL & SURGICAL HISTORY:  H/O sciatica: CHR PAIN LT LE  H/O cystoscopy: resection of bladder tumor  Bladder cancer  DM (diabetes mellitus)  Hypercholesteremia  Afib  HTN (hypertension)  History of hernia repair    Home Medications:  Aspir 81 oral delayed release tablet: 1 tab(s) orally once a day (13 Jun 2019 07:44)  carvedilol 6.25 mg oral tablet: 1 tab(s) orally 2 times a day (13 Jun 2019 07:44)  Janumet 50 mg-1000 mg oral tablet: orally once a day (13 Jun 2019 07:44)  losartan 50 mg oral tablet: 1 tab(s) orally once a day (13 Jun 2019 07:44)  simvastatin 20 mg oral tablet: 1 tab(s) orally once a day (at bedtime) (13 Jun 2019 07:44)  Xarelto 20 mg oral tablet: 1 tab(s) orally once a day (in the evening) (13 Jun 2019 07:44)    DVT PTX: Hep SQ 6/14    GI PTX: PPI    ***Tubes/Lines/Drains  ***  Peripheral IV  Tiffany        REVIEW OF SYSTEMS    [x ] A ten-point review of systems was otherwise negative except as noted.  [ ] Due to altered mental status/intubation, subjective information were not able to be obtained from the patient. History was obtained, to the extent possible, from review of the chart and collateral sources of information.    Daily Height in cm: 177.8 (13 Jun 2019 08:02)    Daily     Diet, Clear Liquid (06-13-19 @ 12:14)      CURRENT MEDS:  Cardiovascular Medications  carvedilol 6.25 milliGRAM(s) Oral every 12 hours  losartan 50 milliGRAM(s) Oral daily  Gastrointestinal Medications  dextrose 5%. 1000 milliLiter(s) IV Continuous <Continuous>  docusate sodium 100 milliGRAM(s) Oral three times a day  lactated ringers. 1000 milliLiter(s) IV Continuous <Continuous>  pantoprazole    Tablet 40 milliGRAM(s) Oral before breakfast  senna 2 Tablet(s) Oral daily PRN Constipation  Endocrine/Metabolic Medications  dextrose 40% Gel 15 Gram(s) Oral once PRN Blood Glucose LESS THAN 70 milliGRAM(s)/deciliter  dextrose 50% Injectable 12.5 Gram(s) IV Push once  glucagon  Injectable 1 milliGRAM(s) IntraMuscular once PRN Glucose LESS THAN 70 milligrams/deciliter  insulin lispro (HumaLOG) corrective regimen sliding scale   SubCutaneous three times a day before meals  simvastatin 20 milliGRAM(s) Oral at bedtime    Topical/Other Medications      ICU Vital Signs Last 24 Hrs  T(C): 36.4 (13 Jun 2019 08:02), Max: 36.4 (13 Jun 2019 07:45)  T(F): 97.5 (13 Jun 2019 07:45), Max: 97.5 (13 Jun 2019 07:45)  HR: 66 (13 Jun 2019 08:02) (66 - 66)  BP: 180/86 (13 Jun 2019 08:02) (180/86 - 180/86)    I&O's Summary    I&O's Detail      PHYSICAL EXAM:    General/Neuro  RASS:             GCS:     = E   / V   / M      Deficits:                             alert & oriented x 3, no focal deficits  Pupils:    Lungs:      clear to auscultation, Normal expansion/effort.     Cardiovascular : S1, S2.  Regular rate and rhythm.  Peripheral edema   Cardiac Rhythm: Normal Sinus Rhythm    GI: Abdomen soft, Non-tender, Non-distended.    Gastrostomy / Jejunostomy tube in place.  Nasogastric tube in place.  Colostomy / Ileostomy.    Wound:    Extremities: Extremities warm, pink, well-perfused. Pulses:Rt     Lt    Derm: Good skin turgor, no skin breakdown.      :       Araujo catheter in place.      CXR:     LABS:  CAPILLARY BLOOD GLUCOSE      POCT Blood Glucose.: 142 mg/dL (13 Jun 2019 07:46) MAGGIE SAHNI  [id number]  68y Male with a pertinent pmhx of smoking (25 pack year), HTN, HLD, Afib (home Xarelto which was stopped 3 days prior to procedure), presents s/p L CEA for asymptomatic high grade stenosis.   Patient was seeing his cardiologist for a routine visit and upon workup was found to have complete occlusion of THUAN and 90% occlusion of LICA, which prompted todays procedure. Intraop course was c/b intermittent episodes of afib which resolved with no pharmacological intervention. 5000u heparin was given during the case, pt extubated and transferred to PACU in stable but guarded condition. SICU consulted for close neurological/HD monitoring.     OR time: 1.5 hr  EBL: 50  IVF: 2L  UO: N/A    Indication for ICU admission: Neurological/HD monitoring.   Admit Date: 6/13  ICU Date: 6/13  OR Date: 6/13    No Known Allergies    PAST MEDICAL & SURGICAL HISTORY:  H/O sciatica: CHR PAIN LT LE  H/O cystoscopy: resection of bladder tumor  Bladder cancer  DM (diabetes mellitus)  Hypercholesteremia  Afib  HTN (hypertension)  History of hernia repair    Home Medications:  Aspir 81 oral delayed release tablet: 1 tab(s) orally once a day (13 Jun 2019 07:44)  carvedilol 6.25 mg oral tablet: 1 tab(s) orally 2 times a day (13 Jun 2019 07:44)  Janumet 50 mg-1000 mg oral tablet: orally once a day (13 Jun 2019 07:44)  losartan 50 mg oral tablet: 1 tab(s) orally once a day (13 Jun 2019 07:44)  simvastatin 20 mg oral tablet: 1 tab(s) orally once a day (at bedtime) (13 Jun 2019 07:44)  Xarelto 20 mg oral tablet: 1 tab(s) orally once a day (in the evening) (13 Jun 2019 07:44)    DVT PTX: Hep SQ 6/14    GI PTX: PPI    ***Tubes/Lines/Drains  ***  Peripheral IV  [place]        REVIEW OF SYSTEMS    [x ] A ten-point review of systems was otherwise negative except as noted.  [ ] Due to altered mental status/intubation, subjective information were not able to be obtained from the patient. History was obtained, to the extent possible, from review of the chart and collateral sources of information.    Daily Height in cm: 177.8 (13 Jun 2019 08:02)    Daily     Diet, Clear Liquid (06-13-19 @ 12:14)      CURRENT MEDS:  Cardiovascular Medications  carvedilol 6.25 milliGRAM(s) Oral every 12 hours  losartan 50 milliGRAM(s) Oral daily  Gastrointestinal Medications  dextrose 5%. 1000 milliLiter(s) IV Continuous <Continuous>  docusate sodium 100 milliGRAM(s) Oral three times a day  lactated ringers. 1000 milliLiter(s) IV Continuous <Continuous>  pantoprazole    Tablet 40 milliGRAM(s) Oral before breakfast  senna 2 Tablet(s) Oral daily PRN Constipation  Endocrine/Metabolic Medications  dextrose 40% Gel 15 Gram(s) Oral once PRN Blood Glucose LESS THAN 70 milliGRAM(s)/deciliter  dextrose 50% Injectable 12.5 Gram(s) IV Push once  glucagon  Injectable 1 milliGRAM(s) IntraMuscular once PRN Glucose LESS THAN 70 milligrams/deciliter  insulin lispro (HumaLOG) corrective regimen sliding scale   SubCutaneous three times a day before meals  simvastatin 20 milliGRAM(s) Oral at bedtime    Topical/Other Medications      ICU Vital Signs Last 24 Hrs  T(C): 36.4 (13 Jun 2019 08:02), Max: 36.4 (13 Jun 2019 07:45)  T(F): 97.5 (13 Jun 2019 07:45), Max: 97.5 (13 Jun 2019 07:45)  HR: 66 (13 Jun 2019 08:02) (66 - 66)  BP: 180/86 (13 Jun 2019 08:02) (180/86 - 180/86)    I&O's Summary    I&O's Detail      PHYSICAL EXAM:    General/Neuro  No neurological deficits, CN intact, surgical site w/ dressing in place c/d/i     Lungs:      clear to auscultation, Normal expansion/effort.     Cardiovascular : S1, S2.  Regular rate and rhythm.    Cardiac Rhythm: Normal Sinus Rhythm    GI: Abdomen soft, Non-tender, Non-distended.      Extremities: Extremities warm, pink, well-perfused. Pulses palpable    Derm: Good skin turgor, no skin breakdown.        LABS:  CAPILLARY BLOOD GLUCOSE      POCT Blood Glucose.: 142 mg/dL (13 Jun 2019 07:46)

## 2019-06-14 ENCOUNTER — TRANSCRIPTION ENCOUNTER (OUTPATIENT)
Age: 69
End: 2019-06-14

## 2019-06-14 VITALS — RESPIRATION RATE: 11 BRPM | TEMPERATURE: 98 F | HEART RATE: 90 BPM | OXYGEN SATURATION: 97 %

## 2019-06-14 LAB
CK MB CFR SERPL CALC: <1 NG/ML — SIGNIFICANT CHANGE UP (ref 0.6–6.3)
CK SERPL-CCNC: 70 U/L — SIGNIFICANT CHANGE UP (ref 0–225)
ESTIMATED AVERAGE GLUCOSE: 148 MG/DL — HIGH (ref 68–114)
GLUCOSE BLDC GLUCOMTR-MCNC: 180 MG/DL — HIGH (ref 70–99)
GLUCOSE BLDC GLUCOMTR-MCNC: 186 MG/DL — HIGH (ref 70–99)
HBA1C BLD-MCNC: 6.8 % — HIGH (ref 4–5.6)
TROPONIN T SERPL-MCNC: <0.01 NG/ML — SIGNIFICANT CHANGE UP

## 2019-06-14 PROCEDURE — 71045 X-RAY EXAM CHEST 1 VIEW: CPT | Mod: 26

## 2019-06-14 PROCEDURE — 99233 SBSQ HOSP IP/OBS HIGH 50: CPT

## 2019-06-14 RX ORDER — SODIUM CHLORIDE 9 MG/ML
250 INJECTION, SOLUTION INTRAVENOUS ONCE
Refills: 0 | Status: COMPLETED | OUTPATIENT
Start: 2019-06-14 | End: 2019-06-14

## 2019-06-14 RX ORDER — INSULIN LISPRO 100/ML
VIAL (ML) SUBCUTANEOUS
Refills: 0 | Status: DISCONTINUED | OUTPATIENT
Start: 2019-06-14 | End: 2019-06-14

## 2019-06-14 RX ADMIN — Medication 4: at 11:27

## 2019-06-14 RX ADMIN — SODIUM CHLORIDE 500 MILLILITER(S): 9 INJECTION, SOLUTION INTRAVENOUS at 08:45

## 2019-06-14 RX ADMIN — HEPARIN SODIUM 5000 UNIT(S): 5000 INJECTION INTRAVENOUS; SUBCUTANEOUS at 05:09

## 2019-06-14 RX ADMIN — LOSARTAN POTASSIUM 50 MILLIGRAM(S): 100 TABLET, FILM COATED ORAL at 05:08

## 2019-06-14 RX ADMIN — SODIUM CHLORIDE 750 MILLILITER(S): 9 INJECTION, SOLUTION INTRAVENOUS at 08:15

## 2019-06-14 RX ADMIN — Medication 81 MILLIGRAM(S): at 11:29

## 2019-06-14 RX ADMIN — Medication 2: at 07:24

## 2019-06-14 RX ADMIN — CARVEDILOL PHOSPHATE 6.25 MILLIGRAM(S): 80 CAPSULE, EXTENDED RELEASE ORAL at 05:09

## 2019-06-14 RX ADMIN — PANTOPRAZOLE SODIUM 40 MILLIGRAM(S): 20 TABLET, DELAYED RELEASE ORAL at 07:23

## 2019-06-14 RX ADMIN — Medication 100 MILLIGRAM(S): at 05:09

## 2019-06-14 NOTE — PROGRESS NOTE ADULT - SUBJECTIVE AND OBJECTIVE BOX
GENERAL SURGERY PROGRESS NOTE     MAGGIE SAHNI  68y  Male  Hospital day :1d  POD: 1  Procedure: CEA (carotid endarterectomy)    OVERNIGHT EVENTS:  No acute events overnight. Patient is neurovascularly intact. bp controlled 120-130. Voiding.    T(F): 98.4 (06-14-19 @ 04:00), Max: 98.4 (06-13-19 @ 20:30)  HR: 80 (06-14-19 @ 05:30) (62 - 146)  BP: 123/79 (06-13-19 @ 19:35) (90/64 - 180/86)  ABP: 122/46 (06-14-19 @ 05:30) (104/52 - 180/74)  ABP(mean): 70 (06-14-19 @ 05:30) (60 - 116)  RR: 13 (06-14-19 @ 05:30) (9 - 20)  SpO2: 97% (06-14-19 @ 05:30) (94% - 99%)    DIET/FLUIDS: dextrose 5%. 1000 milliLiter(s) IV Continuous <Continuous>    GI proph:  pantoprazole    Tablet 40 milliGRAM(s) Oral before breakfast    AC/ proph: aspirin enteric coated 81 milliGRAM(s) Oral daily  heparin  Injectable 5000 Unit(s) SubCutaneous every 8 hours    ABx:     PHYSICAL EXAM:  GENERAL: NAD, well-appearing  NECK: L neck with sterile bandage in place c/d/i, no swelling or erythema noted.   CHEST/LUNG: on NC  ABDOMEN: Soft, Nontender, Nondistended;   EXTREMITIES:  No clubbing, cyanosis, or edema, palpable pules  CN: II-XII grossly intact       LABS  POCT Blood Glucose.: 180 mg/dL (14 Jun 2019 05:23)  POCT Blood Glucose.: 124 mg/dL (13 Jun 2019 16:44)  POCT Blood Glucose.: 142 mg/dL (13 Jun 2019 07:46)                          14.4   12.54 )-----------( 174      ( 13 Jun 2019 23:10 )             41.9         06-13    136  |  102  |  15  ----------------------------<  158<H>  4.4   |  23  |  0.7      Calcium, Total Serum: 8.9 mg/dL (06-13-19 @ 23:10)      LFTs:         Coags:    CARDIAC MARKERS ( 13 Jun 2019 23:10 )  x     / <0.01 ng/mL / x     / x     / x      CARDIAC MARKERS ( 13 Jun 2019 12:37 )  x     / <0.01 ng/mL / 47 U/L / x     / <1.0 ng/mL

## 2019-06-14 NOTE — PROGRESS NOTE ADULT - ASSESSMENT
POD 1 L CEA    PLAN:    - pain control    - Ambulate today    - wean from NC    - blood pressure control (currently good in 120-130's)    - dispo: home

## 2019-06-14 NOTE — PATIENT PROFILE ADULT - NSASFUNCLEVELADLTRANSFER_GEN_A_NUR
0 = independent
no abdominal pain, no bloating, no constipation, no diarrhea, no nausea and no vomiting.

## 2019-06-14 NOTE — DISCHARGE NOTE PROVIDER - HOSPITAL COURSE
68y Male with a pertinent pmhx of smoking (25 pack year), HTN, HLD, Afib (home Xarelto which was stopped 3 days prior to procedure), presents s/p L CEA for asymptomatic high grade stenosis. Patient was seeing his cardiologist for a routine visit and upon workup was found to have complete occlusion of THUAN and 90% occlusion of LICA, which prompted todays procedure. Intraop course was c/b intermittent episodes of afib which resolved with no pharmacological intervention. 5000u heparin was given during the case, pt extubated and transferred to PACU in stable but guarded condition. SICU consulted for close neurological/HD monitoring. 68y Male with a pertinent pmhx of smoking (25 pack year), HTN, HLD, Afib (home Xarelto which was stopped 3 days prior to procedure), presents s/p L CEA for asymptomatic high grade stenosis. Patient was seeing his cardiologist for a routine visit and upon workup was found to have complete occlusion of THUAN and 90% occlusion of LICA, which prompted todays procedure. Intraop course was c/b intermittent episodes of afib which resolved with no pharmacological intervention. 5000u heparin was given during the case, pt extubated and transferred to PACU in stable but guarded condition. Admitted to SICU for neurovascular monitoring. POD1, patient doing well postoperatively with no active complaints, stable and ready for discharge.

## 2019-06-14 NOTE — DISCHARGE NOTE NURSING/CASE MANAGEMENT/SOCIAL WORK - NSDCDPATPORTLINK_GEN_ALL_CORE
You can access the [facility] Patient Portal, offered by [facility], by registering with the following website: [email or web address]

## 2019-06-14 NOTE — DISCHARGE NOTE PROVIDER - CARE PROVIDER_API CALL
Ash Anand)  Vascular Surgery  [address] [place] [address]  Phone: [phone]  Fax: [phone]  Follow Up Time:

## 2019-06-14 NOTE — PROGRESS NOTE ADULT - ASSESSMENT
Neurologic:    Pain control-Tylenol/oxy prn    s/p left CEA-no hematoma, no cranial defecits appreciated    Respiratory:    No acute diagnosis, maintain oxygen sat >95%, NC prn, wean to RA    Cardiovascular: Hx of HTN, HLD, Afib    HTN-restarted on home coreg, losartan, Statin     Afib-continue to hold Xarelto, restart per team recs    s/p left CEA-q1 neurochecks, maintain SBP<140    CE neg x2,f/u final 3rd set    Gastrointestinal/Nutrition:    Diet-clear, IVL, PPI    Genitourinary/Renal:    Cr 0.7, no santana, freely voiding    Lytes replete PRN    Hematologic:   DVT prophylaxis-Hep SQ/ASA 6/14 restart   Holding xarelto, restart as per primary team recs    Infectious Disease:    No acute issues    Endocrine:  - ISS, fsg achs once tolerating diet    MSK:  - Bedrest as per primary team    Disposition:Downgrade vs discharge, d/w team in AM rounds

## 2019-06-14 NOTE — PROGRESS NOTE ADULT - ATTENDING COMMENTS
Assessment and plan above were modified and discussed with residents, physician assistants, and nurses.  I have provided 28  min of Critical Care to this patient.

## 2019-06-14 NOTE — PROGRESS NOTE ADULT - SUBJECTIVE AND OBJECTIVE BOX
MAGGIE SAHNI  [id number]  68y Male    Indication for ICU admission: s/p L CEA     Admit Date:06-13-19  ICU Date: 6/13/19  OR Date: 6/13/19    No Known Allergies    PAST MEDICAL & SURGICAL HISTORY:  H/O sciatica: CHR PAIN LT LE  H/O cystoscopy: resection of bladder tumor  Bladder cancer  DM (diabetes mellitus)  Hypercholesteremia  Afib  HTN (hypertension)  History of hernia repair    Home Medications:  Aspir 81 oral delayed release tablet: 1 tab(s) orally once a day (13 Jun 2019 07:44)  carvedilol 6.25 mg oral tablet: 1 tab(s) orally 2 times a day (13 Jun 2019 07:44)  Janumet 50 mg-1000 mg oral tablet: orally once a day (13 Jun 2019 07:44)  losartan 50 mg oral tablet: 1 tab(s) orally once a day (13 Jun 2019 07:44)  simvastatin 20 mg oral tablet: 1 tab(s) orally once a day (at bedtime) (13 Jun 2019 07:44)  Xarelto 20 mg oral tablet: 1 tab(s) orally once a day (in the evening) (13 Jun 2019 07:44)        24HRS EVENT:    No acute events overnight, off cleviprex gtt at 2000      Neurologic:  Pain control:   - Tylenol/oxy prn    Respiratory:  - No acute issues  - NC as needed, maintain o2 sat >94%  - IS/PT    Cardiovascular: Hx of HTN, HLD, Afib  - Restarted on home coreg, losartan, Statin   - Continue to hold Xarelto, restart per team recs  -EKG sinus colt HR 59  -S/p L CEA- q1 neurochecks, SBP <140  -CE neg x2    Gastrointestinal/Nutrition:  Diet- Clears  PPI    Genitourinary/Renal:  - IVL,, voiding  - F/u labs     Hematologic:  - Hep SQ/ASA 6/14  -Hold xarelto as per primary team    Infectious Disease:  - No acute dx    Endocrine:  - ISS, f/u FS    MSK:  - Bedrest     Disposition: SICU      DVT PTX: Hold hep sub q and ASA as per vasuclar team. Restart 6/14     GI PTX: PPI     ***Tubes/Lines/Drains  ***  Peripheral IV  Arterial Line		                       REVIEW OF SYSTEMS    [ x] A ten-point review of systems was otherwise negative except as noted.  [ ] Due to altered mental status/intubation, subjective information were not able to be obtained from the patient. History was obtained, to the extent possible, from review of the chart and collateral sources of information. MAGGIE SAHNI  [id number]  68y Male    Indication for ICU admission: s/p L CEA     Admit Date:19  ICU Date: 19  OR Date: 19    No Known Allergies    PAST MEDICAL & SURGICAL HISTORY:  H/O sciatica: CHR PAIN LT LE  H/O cystoscopy: resection of bladder tumor  Bladder cancer  DM (diabetes mellitus)  Hypercholesteremia  Afib  HTN (hypertension)  History of hernia repair    Home Medications:  Aspir 81 oral delayed release tablet: 1 tab(s) orally once a day (2019 07:44)  carvedilol 6.25 mg oral tablet: 1 tab(s) orally 2 times a day (2019 07:44)  Janumet 50 mg-1000 mg oral tablet: orally once a day (2019 07:44)  losartan 50 mg oral tablet: 1 tab(s) orally once a day (2019 07:44)  simvastatin 20 mg oral tablet: 1 tab(s) orally once a day (at bedtime) (2019 07:44)  Xarelto 20 mg oral tablet: 1 tab(s) orally once a day (in the evening) (2019 07:44)        24HRS EVENT:    No acute events overnight, off cleviprex gtt at 2000      Neurologic:  Pain control:   - Tylenol/oxy prn    Respiratory:  - No acute issues  - NC as needed, maintain o2 sat >94%  - IS/PT    Cardiovascular: Hx of HTN, HLD, Afib  - Restarted on home coreg, losartan, Statin   - Continue to hold Xarelto, restart per team recs  -EKG sinus colt HR 59  -S/p L CEA- q1 neurochecks, SBP <140  -CE neg x2    Gastrointestinal/Nutrition:  Diet- Clears  PPI    Genitourinary/Renal:  - IVL,, voiding  - F/u labs     Hematologic:  - Hep SQ/ASA   -Hold xarelto as per primary team    Infectious Disease:  - No acute dx    Endocrine:  - ISS, f/u FS    MSK:  - Bedrest     Disposition: SICU      DVT PTX: Hold hep sub q and ASA as per vasuclar team. Restart      GI PTX: PPI     ***Tubes/Lines/Drains  ***  Peripheral IV  Arterial Line		                       REVIEW OF SYSTEMS    [ x] A ten-point review of systems was otherwise negative except as noted.  [ ] Due to altered mental status/intubation, subjective information were not able to be obtained from the patient. History was obtained, to the extent possible, from review of the chart and collateral sources of information.    Daily Height in cm: 177.8 (2019 20:30)    Daily Weight in k.6 (2019 20:30)    Diet, Clear Liquid (19 @ 12:14)      CURRENT MEDS:  Neurologic Medications  acetaminophen   Tablet .. 650 milliGRAM(s) Oral every 6 hours PRN Mild Pain (1 - 3)  oxyCODONE    IR 5 milliGRAM(s) Oral every 6 hours PRN Moderate Pain (4 - 6)    Respiratory Medications    Cardiovascular Medications  carvedilol 6.25 milliGRAM(s) Oral every 12 hours  clevidipine Infusion 0.5 mG/Hr IV Continuous <Continuous>  losartan 50 milliGRAM(s) Oral daily    Gastrointestinal Medications  dextrose 5%. 1000 milliLiter(s) IV Continuous <Continuous>  docusate sodium 100 milliGRAM(s) Oral three times a day  pantoprazole    Tablet 40 milliGRAM(s) Oral before breakfast  senna 2 Tablet(s) Oral daily PRN Constipation    Genitourinary Medications    Hematologic/Oncologic Medications  aspirin enteric coated 81 milliGRAM(s) Oral daily  heparin  Injectable 5000 Unit(s) SubCutaneous every 8 hours    Antimicrobial/Immunologic Medications    Endocrine/Metabolic Medications  dextrose 40% Gel 15 Gram(s) Oral once PRN Blood Glucose LESS THAN 70 milliGRAM(s)/deciliter  dextrose 50% Injectable 25 Gram(s) IV Push once  glucagon  Injectable 1 milliGRAM(s) IntraMuscular once PRN Glucose LESS THAN 70 milligrams/deciliter  insulin lispro (HumaLOG) corrective regimen sliding scale   SubCutaneous three times a day before meals  insulin lispro (HumaLOG) corrective regimen sliding scale   SubCutaneous three times a day before meals  simvastatin 20 milliGRAM(s) Oral at bedtime    Topical/Other Medications      ICU Vital Signs Last 24 Hrs  T(C): 36.9 (2019 04:00), Max: 36.9 (2019 20:30)  T(F): 98.4 (2019 04:00), Max: 98.4 (2019 20:30)  HR: 82 (2019 07:00) (62 - 146)  BP: 123/79 (2019 19:35) (90/64 - 180/86)  BP(mean): 96 (2019 14:45) (96 - 103)  ABP: 102/46 (2019 07:00) (98/42 - 180/74)  ABP(mean): 64 (2019 07:00) (60 - 116)  RR: 9 (2019 07:00) (9 - 20)  SpO2: 96% (2019 07:00) (94% - 99%)      Adult Advanced Hemodynamics Last 24 Hrs  CVP(mm Hg): --  CVP(cm H2O): --  CO: --  CI: --  PA: --  PA(mean): --  PCWP: --  SVR: --  SVRI: --  PVR: --  PVRI: --          I&O's Summary    2019 07:  -  2019 07:00  --------------------------------------------------------  IN: 590.6 mL / OUT: 1175 mL / NET: -584.4 mL      I&O's Detail    2019 07:  -  2019 07:00  --------------------------------------------------------  IN:    clevidipine Infusion: 10.6 mL    lactated ringers.: 300 mL    lactated ringers.: 120 mL    Oral Fluid: 160 mL  Total IN: 590.6 mL    OUT:    Voided: 1175 mL  Total OUT: 1175 mL    Total NET: -584.4 mL          PHYSICAL EXAM:    General/Neuro  Neurologically intact     Lungs:      clear to auscultation, Normal expansion/effort.     Cardiovascular : S1, S2.  Regular rate and rhythm.  Peripheral edema   Cardiac Rhythm: Normal Sinus Rhythm    GI: Abdomen soft, Non-tender, Non-distended.      Extremities: Extremities warm, pink, well-perfused. Pulses: palpable     Derm: Good skin turgor, no skin breakdown.      :          CXR:     LABS:  CAPILLARY BLOOD GLUCOSE      POCT Blood Glucose.: 180 mg/dL (2019 05:23)  POCT Blood Glucose.: 124 mg/dL (2019 16:44)                          14.4   12.54 )-----------( 174      ( 2019 23:10 )             41.9       06-13    136  |  102  |  15  ----------------------------<  158<H>  4.4   |  23  |  0.7    Ca    8.9      2019 23:10  Phos  3.3     06-13  Mg     1.9     06-13          CARDIAC MARKERS ( 2019 23:10 )  x     / <0.01 ng/mL / x     / x     / x      CARDIAC MARKERS ( 2019 12:37 )  x     / <0.01 ng/mL / 47 U/L / x     / <1.0 ng/mL

## 2019-06-14 NOTE — DISCHARGE NOTE PROVIDER - NSDCFUADDINST_GEN_ALL_CORE_FT
Please follow up with Dr. Anand in clinic, call office for appointment [phone]. Can remove dressing on Tuesday, do not remove steri strips. Resume Xarelto on Sunday.

## 2019-06-16 ENCOUNTER — EMERGENCY (EMERGENCY)
Facility: HOSPITAL | Age: 69
LOS: 0 days | Discharge: HOME | End: 2019-06-16
Attending: EMERGENCY MEDICINE | Admitting: EMERGENCY MEDICINE

## 2019-06-16 VITALS
DIASTOLIC BLOOD PRESSURE: 72 MMHG | SYSTOLIC BLOOD PRESSURE: 149 MMHG | OXYGEN SATURATION: 99 % | WEIGHT: 175.05 LBS | HEART RATE: 89 BPM | RESPIRATION RATE: 18 BRPM | TEMPERATURE: 98 F | HEIGHT: 70 IN

## 2019-06-16 DIAGNOSIS — I10 ESSENTIAL (PRIMARY) HYPERTENSION: ICD-10-CM

## 2019-06-16 DIAGNOSIS — Z48.89 ENCOUNTER FOR OTHER SPECIFIED SURGICAL AFTERCARE: ICD-10-CM

## 2019-06-16 DIAGNOSIS — E78.5 HYPERLIPIDEMIA, UNSPECIFIED: ICD-10-CM

## 2019-06-16 DIAGNOSIS — Z79.82 LONG TERM (CURRENT) USE OF ASPIRIN: ICD-10-CM

## 2019-06-16 DIAGNOSIS — Z79.899 OTHER LONG TERM (CURRENT) DRUG THERAPY: ICD-10-CM

## 2019-06-16 DIAGNOSIS — Z98.890 OTHER SPECIFIED POSTPROCEDURAL STATES: Chronic | ICD-10-CM

## 2019-06-16 NOTE — ED PROVIDER NOTE - NS ED ROS FT
Review of Systems    Constitutional: (-) fever  Musculoskeletal: (-) neck pain, (-) back pain, (-) joint pain  Integumentary: see hpi  Neurological: (-) headache,

## 2019-06-16 NOTE — ED PROVIDER NOTE - PRO INTERPRETER NEED 2
English
Breathing spontaneous and unlabored. Breath sounds clear and equal bilaterally with regular rhythm.

## 2019-06-16 NOTE — ED ADULT NURSE NOTE - NSIMPLEMENTINTERV_GEN_ALL_ED
Implemented All Universal Safety Interventions:  [place] to call system. Call bell, personal items and telephone within reach. Instruct patient to call for assistance. Room bathroom lighting operational. Non-slip footwear when patient is off stretcher. Physically safe environment: no spills, clutter or unnecessary equipment. Stretcher in lowest position, wheels locked, appropriate side rails in place.

## 2019-06-16 NOTE — ED PROVIDER NOTE - OBJECTIVE STATEMENT
pt here with bleeding from site of left carotid endarectomy done by Dr. Anand on Jun 13th (4 days ago). pt today started asa, and xarelto and today noticed bleeding.pt feels bleeding has stopped. pt has no feeling of dizzyness or lightheadness.

## 2019-06-16 NOTE — ED PROVIDER NOTE - NSFOLLOWUPINSTRUCTIONS_ED_ALL_ED_FT
follow up with your surgeon as regularly scheduled. return to ED or vascular surgeon immediately if bleeding returns.

## 2019-06-16 NOTE — CONSULT NOTE ADULT - SUBJECTIVE AND OBJECTIVE BOX
Surgery Consultation Note  =====================================================  HPI: 68y Male  HPI: 68M POD 3 s/p L. CEA presents to the ED with concerns about his dressing which was found to be stained.   No active bleeding noted. No fresh blood. No hematoma noted. No difficulty swallowing or voice changes.           PAST MEDICAL & SURGICAL HISTORY:  H/O sciatica: CHR PAIN LT LE  H/O cystoscopy: resection of bladder tumor  Bladder cancer  DM (diabetes mellitus)  Hypercholesteremia  Afib  HTN (hypertension)  History of hernia repair    Home Meds: Home Medications:  Aspir 81 oral delayed release tablet: 1 tab(s) orally once a day (13 Jun 2019 07:44)  carvedilol 6.25 mg oral tablet: 1 tab(s) orally 2 times a day (13 Jun 2019 07:44)  Janumet 50 mg-1000 mg oral tablet: orally once a day (13 Jun 2019 07:44)  losartan 50 mg oral tablet: 1 tab(s) orally once a day (13 Jun 2019 07:44)  simvastatin 20 mg oral tablet: 1 tab(s) orally once a day (at bedtime) (13 Jun 2019 07:44)  Xarelto 20 mg oral tablet: 1 tab(s) orally once a day (in the evening) (13 Jun 2019 07:44)    Allergies: Allergies    No Known Allergies    Intolerances      Soc:   Advanced Directives: Presumed Full Code     ROS:    REVIEW OF SYSTEMS    [x] A ten-point review of systems was otherwise negative except as noted.  [ ] Due to altered mental status/intubation, subjective information were not able to be obtained from the patient. History was obtained, to the extent possible, from review of the chart and collateral sources of information.      CURRENT MEDICATIONS:   --------------------------------------------------------------------------------------  Neurologic Medications    Respiratory Medications    Cardiovascular Medications    Gastrointestinal Medications    Genitourinary Medications    Hematologic/Oncologic Medications    Antimicrobial/Immunologic Medications    Endocrine/Metabolic Medications    Topical/Other Medications    --------------------------------------------------------------------------------------    VITAL SIGNS, INS/OUTS (last 24 hours):  --------------------------------------------------------------------------------------  ICU Vital Signs Last 24 Hrs  T(C): 36.7 (16 Jun 2019 09:58), Max: 36.7 (16 Jun 2019 09:58)  T(F): 98 (16 Jun 2019 09:58), Max: 98 (16 Jun 2019 09:58)  HR: 89 (16 Jun 2019 09:58) (89 - 89)  BP: 149/72 (16 Jun 2019 09:58) (149/72 - 149/72)  BP(mean): --  ABP: --  ABP(mean): --  RR: 18 (16 Jun 2019 09:58) (18 - 18)  SpO2: 99% (16 Jun 2019 09:58) (99% - 99%)    I&O's Summary    --------------------------------------------------------------------------------------    EXAM:  General/Neuro    Exam: Normal, NAD, alert, oriented x 3, no focal deficits. PERRLA     Respiratory  Exam: Lungs clear to auscultation, Normal expansion/effort.    [] Tracheostomy   [] Intubated  Mechanical Ventilation:     Neck: left CEA incision noted with blood stained dressing. No hematoma noted. Slight tenderness.       Cardiovascular  Exam: S1, S2.  Regular rate and rhythm.  Peripheral edema    Cardiac Rhythm: Normal Sinus Rhythm  ECHO:     GI  Exam: Abdomen soft, Non-tender, Non-distended.          Tubes/Lines/Drains    [x] Peripheral IV  [] Central Venous Line     	[] R	[] L	[] IJ	[] Fem	[] SC        Type:	    Date Placed:   [] Arterial Line		[] R	[] L	[] Fem	[] Rad	[] Ax	Date Placed:   [] PICC:         	[] Midline		[] Mediport           [] Urinary Catheter		Date Placed:     Extremities  Exam: Extremities warm, pink, well-perfused.        Derm:  Exam: Good skin turgor, no skin breakdown.        LABS  --------------------------------------------------------------------------------------  Labs:  CAPILLARY BLOOD GLUCOSE  LFTs:    Coags:    --------------------------------------------------------------------------------------    OTHER LABS    IMAGING RESULTS      ASSESSMENT:  68y Male with POD 3 s/p L. CEA.     PLAN:   - Dressing changed  - Follow post op care instructions and follow up in clinic as originally scheduled  - Return to ED if condition worsens in any way

## 2019-06-16 NOTE — ED PROVIDER NOTE - PHYSICAL EXAMINATION
HENMT:  neck supple,  CV: rrr, equal pulses b/l, RESP: cta b/l,  NEURO: no gross motor or sensory deficit Skin: guaze filled dressing, no active bleeding from left neck/CEA site.

## 2019-06-16 NOTE — ED ADULT NURSE NOTE - OBJECTIVE STATEMENT
pt presents s/p left carotid endarterectomy thursday 6/13/19 with minimal bleeding at site which started yesterday 6/15. pt states he started aspirin and plavix today. pt denies bleeding from anywhere else, VSS, pt able to VERGARA, no acute distress noted, a&ox4.

## 2019-06-16 NOTE — ED PROVIDER NOTE - CARE PROVIDER_API CALL
Ash Anand)  Vascular Surgery  [address] [place] [address]  Phone: [phone]  Fax: [phone]  Follow Up Time: 1-3 Days

## 2019-06-18 LAB — SURGICAL PATHOLOGY STUDY: SIGNIFICANT CHANGE UP

## 2019-06-19 DIAGNOSIS — E11.9 TYPE 2 DIABETES MELLITUS WITHOUT COMPLICATIONS: ICD-10-CM

## 2019-06-19 DIAGNOSIS — I10 ESSENTIAL (PRIMARY) HYPERTENSION: ICD-10-CM

## 2019-06-19 DIAGNOSIS — F17.210 NICOTINE DEPENDENCE, CIGARETTES, UNCOMPLICATED: ICD-10-CM

## 2019-06-19 DIAGNOSIS — I48.91 UNSPECIFIED ATRIAL FIBRILLATION: ICD-10-CM

## 2019-06-19 DIAGNOSIS — Z85.51 PERSONAL HISTORY OF MALIGNANT NEOPLASM OF BLADDER: ICD-10-CM

## 2019-06-19 DIAGNOSIS — I65.22 OCCLUSION AND STENOSIS OF LEFT CAROTID ARTERY: ICD-10-CM

## 2019-06-19 DIAGNOSIS — E78.5 HYPERLIPIDEMIA, UNSPECIFIED: ICD-10-CM

## 2019-06-19 DIAGNOSIS — Z79.01 LONG TERM (CURRENT) USE OF ANTICOAGULANTS: ICD-10-CM

## 2019-06-28 ENCOUNTER — APPOINTMENT (OUTPATIENT)
Dept: VASCULAR SURGERY | Facility: CLINIC | Age: 69
End: 2019-06-28
Payer: MEDICARE

## 2019-06-28 VITALS
WEIGHT: 175 LBS | BODY MASS INDEX: 25.05 KG/M2 | HEIGHT: 70 IN | SYSTOLIC BLOOD PRESSURE: 118 MMHG | DIASTOLIC BLOOD PRESSURE: 78 MMHG

## 2019-06-28 PROCEDURE — 99024 POSTOP FOLLOW-UP VISIT: CPT

## 2019-06-28 NOTE — HISTORY OF PRESENT ILLNESS
[FreeTextEntry1] : 69 y/o gentleman with asymptomatic high-grade left carotid artery disease and right ICA occlusion and underwent left carotid endarterectomy on 6/13/19, presents for post-operative follow up, denies any complaints.

## 2019-06-28 NOTE — ASSESSMENT
[FreeTextEntry1] : 67 y/o gentleman with asymptomatic high-grade left carotid artery disease and right ICA occlusion and underwent left carotid endarterectomy on 6/13/19, presents for post-operative follow up, denies any complaints.\par The surgical incision is healing well, no hematoma or drainage noted. He is on Xarelto. I will see him back in 4 weeks for first post-operative duplex.

## 2019-06-28 NOTE — CONSULT LETTER
[Dear  ___] : Dear  [unfilled], [Please see my note below.] : Please see my note below. [Courtesy Letter:] : I had the pleasure of seeing your patient, [unfilled], in my office today. [FreeTextEntry2] : Dear Dr. Antelmo Baltazar,

## 2019-07-05 ENCOUNTER — APPOINTMENT (OUTPATIENT)
Dept: CARDIOLOGY | Facility: CLINIC | Age: 69
End: 2019-07-05
Payer: MEDICARE

## 2019-07-05 PROCEDURE — 93000 ELECTROCARDIOGRAM COMPLETE: CPT

## 2019-07-05 PROCEDURE — 99214 OFFICE O/P EST MOD 30 MIN: CPT | Mod: 25

## 2019-07-11 ENCOUNTER — APPOINTMENT (OUTPATIENT)
Dept: CARDIOLOGY | Facility: CLINIC | Age: 69
End: 2019-07-11
Payer: MEDICARE

## 2019-07-11 PROCEDURE — 93978 VASCULAR STUDY: CPT

## 2019-07-26 ENCOUNTER — OTHER (OUTPATIENT)
Age: 69
End: 2019-07-26

## 2019-07-26 ENCOUNTER — APPOINTMENT (OUTPATIENT)
Dept: VASCULAR SURGERY | Facility: CLINIC | Age: 69
End: 2019-07-26
Payer: MEDICARE

## 2019-07-26 VITALS
DIASTOLIC BLOOD PRESSURE: 80 MMHG | BODY MASS INDEX: 25.05 KG/M2 | SYSTOLIC BLOOD PRESSURE: 126 MMHG | HEIGHT: 70 IN | WEIGHT: 175 LBS

## 2019-07-26 PROCEDURE — 93880 EXTRACRANIAL BILAT STUDY: CPT

## 2019-07-26 PROCEDURE — 99024 POSTOP FOLLOW-UP VISIT: CPT

## 2019-07-26 NOTE — ADDENDUM
[FreeTextEntry1] : Patient is doing well 6 weeks status post left carotid endarterectomy. He reports no symptoms or problems. He is back to his normal level of activity. He is taking daily aspirin. His incision is clean and healing nicely. He is no erythema or abnormal pulsations. His first postoperative duplex shows elevated velocities at 200 cm/s at the endarterectomy site. We will see him back in 3 months time for repeat duplex.

## 2019-07-26 NOTE — DISCUSSION/SUMMARY
[FreeTextEntry1] : Duplex ultrasound today shows elevated velocity is 200 cm/s at endarterectomy site.

## 2019-10-18 ENCOUNTER — APPOINTMENT (OUTPATIENT)
Dept: CARDIOLOGY | Facility: CLINIC | Age: 69
End: 2019-10-18
Payer: MEDICARE

## 2019-10-18 PROCEDURE — 99214 OFFICE O/P EST MOD 30 MIN: CPT

## 2019-10-18 PROCEDURE — 93000 ELECTROCARDIOGRAM COMPLETE: CPT

## 2019-10-25 ENCOUNTER — OTHER (OUTPATIENT)
Age: 69
End: 2019-10-25

## 2019-10-25 ENCOUNTER — APPOINTMENT (OUTPATIENT)
Dept: VASCULAR SURGERY | Facility: CLINIC | Age: 69
End: 2019-10-25
Payer: MEDICARE

## 2019-10-25 PROCEDURE — 93880 EXTRACRANIAL BILAT STUDY: CPT

## 2019-10-25 PROCEDURE — 99213 OFFICE O/P EST LOW 20 MIN: CPT

## 2019-10-25 NOTE — DATA REVIEWED
[FreeTextEntry1] : Duplex ultrasound today shows right carotid occlusion. Left carotid widely patent status post CEA. Moderate distal endpoint stenosis with peak systolic velocity is 147 cm/s improved from prior exam.

## 2019-10-25 NOTE — ASSESSMENT
[FreeTextEntry1] : Patient doing well status post left CEA. Offers no new complaints. His incision is healed nicely. His duplex shows that his left carotid has mild to moderate restenosis at its distal one point which has improved since prior exam. His right carotid artery is occluded. We will repeat his test in 6 months time.

## 2019-10-25 NOTE — CONSULT LETTER
[Dear  ___] : Dear  [unfilled], [Courtesy Letter:] : I had the pleasure of seeing your patient, [unfilled], in my office today. [Please see my note below.] : Please see my note below. [Consult Closing:] : Thank you very much for allowing me to participate in the care of this patient.  If you have any questions, please do not hesitate to contact me. [Sincerely,] : Sincerely, [FreeTextEntry2] : Dr Radha Mcrae\par Dr Antelmo Baltazar

## 2020-02-20 ENCOUNTER — APPOINTMENT (OUTPATIENT)
Dept: CARDIOLOGY | Facility: CLINIC | Age: 70
End: 2020-02-20
Payer: MEDICARE

## 2020-02-20 PROCEDURE — 99214 OFFICE O/P EST MOD 30 MIN: CPT

## 2020-02-20 PROCEDURE — 93000 ELECTROCARDIOGRAM COMPLETE: CPT

## 2020-06-17 ENCOUNTER — APPOINTMENT (OUTPATIENT)
Dept: CARDIOLOGY | Facility: CLINIC | Age: 70
End: 2020-06-17
Payer: MEDICARE

## 2020-06-17 PROCEDURE — 93306 TTE W/DOPPLER COMPLETE: CPT

## 2020-08-18 ENCOUNTER — RECORD ABSTRACTING (OUTPATIENT)
Age: 70
End: 2020-08-18

## 2020-08-18 DIAGNOSIS — Z86.39 PERSONAL HISTORY OF OTHER ENDOCRINE, NUTRITIONAL AND METABOLIC DISEASE: ICD-10-CM

## 2020-08-18 DIAGNOSIS — Z83.3 FAMILY HISTORY OF DIABETES MELLITUS: ICD-10-CM

## 2020-08-18 DIAGNOSIS — Z81.1 FAMILY HISTORY OF ALCOHOL ABUSE AND DEPENDENCE: ICD-10-CM

## 2020-08-18 DIAGNOSIS — Z87.898 PERSONAL HISTORY OF OTHER SPECIFIED CONDITIONS: ICD-10-CM

## 2020-08-18 DIAGNOSIS — Z86.79 PERSONAL HISTORY OF OTHER DISEASES OF THE CIRCULATORY SYSTEM: ICD-10-CM

## 2020-09-01 ENCOUNTER — APPOINTMENT (OUTPATIENT)
Dept: CARDIOLOGY | Facility: CLINIC | Age: 70
End: 2020-09-01
Payer: MEDICARE

## 2020-09-01 VITALS
WEIGHT: 183 LBS | HEIGHT: 70 IN | DIASTOLIC BLOOD PRESSURE: 66 MMHG | SYSTOLIC BLOOD PRESSURE: 120 MMHG | BODY MASS INDEX: 26.2 KG/M2

## 2020-09-01 PROCEDURE — 93000 ELECTROCARDIOGRAM COMPLETE: CPT

## 2020-09-01 PROCEDURE — 99213 OFFICE O/P EST LOW 20 MIN: CPT

## 2020-09-01 NOTE — ASSESSMENT
[FreeTextEntry1] : ECG: SR 76/min, no ST changes.\par \par 2D ECHO 06/17/20:\par Mild LV hypokinesis, EF 55%.

## 2020-09-01 NOTE — REASON FOR VISIT
[Follow-Up - Clinic] : a clinic follow-up of [Atrial Fibrillation] : atrial fibrillation [Carotid Artery Stenosis] : carotid stenosis

## 2020-09-01 NOTE — REVIEW OF SYSTEMS
[Blurry Vision] : no blurred vision [Seeing Double (Diplopia)] : no diplopia [Skin: A Rash] : no rash: [Anxiety] : no anxiety [Easy Bleeding] : no tendency for easy bleeding [Easy Bruising] : no tendency for easy bruising [Negative] : Heme/Lymph

## 2020-09-01 NOTE — PHYSICAL EXAM
[General Appearance - Well Developed] : well developed [Normal Appearance] : normal appearance [Well Groomed] : well groomed [General Appearance - Well Nourished] : well nourished [No Deformities] : no deformities [General Appearance - In No Acute Distress] : no acute distress [Normal Conjunctiva] : the conjunctiva exhibited no abnormalities [Eyelids - No Xanthelasma] : the eyelids demonstrated no xanthelasmas [] : no respiratory distress [Respiration, Rhythm And Depth] : normal respiratory rhythm and effort [Exaggerated Use Of Accessory Muscles For Inspiration] : no accessory muscle use [Auscultation Breath Sounds / Voice Sounds] : lungs were clear to auscultation bilaterally [Normal Rate] : normal [Rhythm Regular] : regular [Normal S1] : normal S1 [Normal S2] : normal S2 [S3] : no S3 [S4] : an S4 was heard [No Murmur] : no murmurs heard [2+] : left 2+ [No Pitting Edema] : no pitting edema present

## 2020-09-01 NOTE — DISCUSSION/SUMMARY
[FreeTextEntry1] : HTN well controlled.\par S/p left CEA.\par PAF, [id number] Vasc score 2.\par \par No cardiac contraindications for colonoscopy.\par Can stop ASA for 7 days, Xarelto for 3 days before colonoscopy, resume both after the procedure (unless contraindicated).\par Blood work scheduled by PMD.\par F/u in 4 months.\par \par Antelmo Baltazar MD\par

## 2020-09-01 NOTE — HISTORY OF PRESENT ILLNESS
[FreeTextEntry1] : 70-yo male with h/o DM, HTN, hyperlipidemia. Patient is referred for evaluation of PAF. He was found to have severe carotid disease (occluded right ICA and severe left ICA stenosis). S/p left CEA, uncomplicated. Quit smoking.\par \par Denies CP, SOB, palpitations.

## 2020-11-27 ENCOUNTER — INPATIENT (INPATIENT)
Facility: HOSPITAL | Age: 70
LOS: 11 days | Discharge: ANOTHER HOSPITAL | End: 2020-12-09
Attending: INTERNAL MEDICINE | Admitting: INTERNAL MEDICINE
Payer: MEDICARE

## 2020-11-27 VITALS
DIASTOLIC BLOOD PRESSURE: 55 MMHG | HEART RATE: 150 BPM | OXYGEN SATURATION: 87 % | TEMPERATURE: 100 F | RESPIRATION RATE: 24 BRPM | SYSTOLIC BLOOD PRESSURE: 95 MMHG | HEIGHT: 70 IN

## 2020-11-27 DIAGNOSIS — Z98.890 OTHER SPECIFIED POSTPROCEDURAL STATES: Chronic | ICD-10-CM

## 2020-11-27 LAB
ALBUMIN SERPL ELPH-MCNC: 3.8 G/DL — SIGNIFICANT CHANGE UP (ref 3.5–5.2)
ALP SERPL-CCNC: 71 U/L — SIGNIFICANT CHANGE UP (ref 30–115)
ALT FLD-CCNC: 45 U/L — HIGH (ref 0–41)
ANION GAP SERPL CALC-SCNC: 18 MMOL/L — HIGH (ref 7–14)
AST SERPL-CCNC: 65 U/L — HIGH (ref 0–41)
BASE EXCESS BLDV CALC-SCNC: 0.4 MMOL/L — SIGNIFICANT CHANGE UP (ref -2–2)
BASOPHILS # BLD AUTO: 0.01 K/UL — SIGNIFICANT CHANGE UP (ref 0–0.2)
BASOPHILS NFR BLD AUTO: 0.2 % — SIGNIFICANT CHANGE UP (ref 0–1)
BILIRUB SERPL-MCNC: 0.5 MG/DL — SIGNIFICANT CHANGE UP (ref 0.2–1.2)
BUN SERPL-MCNC: 57 MG/DL — HIGH (ref 10–20)
CA-I SERPL-SCNC: 1.07 MMOL/L — LOW (ref 1.12–1.3)
CALCIUM SERPL-MCNC: 8.2 MG/DL — LOW (ref 8.5–10.1)
CHLORIDE SERPL-SCNC: 93 MMOL/L — LOW (ref 98–110)
CO2 SERPL-SCNC: 21 MMOL/L — SIGNIFICANT CHANGE UP (ref 17–32)
CREAT SERPL-MCNC: 1.6 MG/DL — HIGH (ref 0.7–1.5)
D DIMER BLD IA.RAPID-MCNC: 105 NG/ML DDU — SIGNIFICANT CHANGE UP (ref 0–230)
EOSINOPHIL # BLD AUTO: 0 K/UL — SIGNIFICANT CHANGE UP (ref 0–0.7)
EOSINOPHIL NFR BLD AUTO: 0 % — SIGNIFICANT CHANGE UP (ref 0–8)
GAS PNL BLDV: 133 MMOL/L — LOW (ref 136–145)
GAS PNL BLDV: SIGNIFICANT CHANGE UP
GLUCOSE SERPL-MCNC: 146 MG/DL — HIGH (ref 70–99)
HCO3 BLDV-SCNC: 25 MMOL/L — SIGNIFICANT CHANGE UP (ref 22–29)
HCT VFR BLD CALC: 42.3 % — SIGNIFICANT CHANGE UP (ref 42–52)
HCT VFR BLDA CALC: 40.2 % — SIGNIFICANT CHANGE UP (ref 34–44)
HGB BLD CALC-MCNC: 13.1 G/DL — LOW (ref 14–18)
HGB BLD-MCNC: 12.9 G/DL — LOW (ref 14–18)
IMM GRANULOCYTES NFR BLD AUTO: 0.6 % — HIGH (ref 0.1–0.3)
LACTATE BLDV-MCNC: 2.1 MMOL/L — HIGH (ref 0.5–1.6)
LYMPHOCYTES # BLD AUTO: 0.75 K/UL — LOW (ref 1.2–3.4)
LYMPHOCYTES # BLD AUTO: 14.6 % — LOW (ref 20.5–51.1)
MCHC RBC-ENTMCNC: 22.4 PG — LOW (ref 27–31)
MCHC RBC-ENTMCNC: 30.5 G/DL — LOW (ref 32–37)
MCV RBC AUTO: 73.4 FL — LOW (ref 80–94)
MONOCYTES # BLD AUTO: 0.49 K/UL — SIGNIFICANT CHANGE UP (ref 0.1–0.6)
MONOCYTES NFR BLD AUTO: 9.5 % — HIGH (ref 1.7–9.3)
NEUTROPHILS # BLD AUTO: 3.87 K/UL — SIGNIFICANT CHANGE UP (ref 1.4–6.5)
NEUTROPHILS NFR BLD AUTO: 75.1 % — SIGNIFICANT CHANGE UP (ref 42.2–75.2)
NRBC # BLD: 0 /100 WBCS — SIGNIFICANT CHANGE UP (ref 0–0)
PCO2 BLDV: 40 MMHG — LOW (ref 41–51)
PH BLDV: 7.41 — SIGNIFICANT CHANGE UP (ref 7.26–7.43)
PLATELET # BLD AUTO: 157 K/UL — SIGNIFICANT CHANGE UP (ref 130–400)
PO2 BLDV: 24 MMHG — SIGNIFICANT CHANGE UP (ref 20–40)
POTASSIUM BLDV-SCNC: 3.3 MMOL/L — SIGNIFICANT CHANGE UP (ref 3.3–5.6)
POTASSIUM SERPL-MCNC: 3.5 MMOL/L — SIGNIFICANT CHANGE UP (ref 3.5–5)
POTASSIUM SERPL-SCNC: 3.5 MMOL/L — SIGNIFICANT CHANGE UP (ref 3.5–5)
PROT SERPL-MCNC: 6.9 G/DL — SIGNIFICANT CHANGE UP (ref 6–8)
RAPID RVP RESULT: DETECTED
RBC # BLD: 5.76 M/UL — SIGNIFICANT CHANGE UP (ref 4.7–6.1)
RBC # FLD: 17 % — HIGH (ref 11.5–14.5)
SAO2 % BLDV: 36 % — SIGNIFICANT CHANGE UP
SARS-COV-2 RNA SPEC QL NAA+PROBE: DETECTED
SODIUM SERPL-SCNC: 132 MMOL/L — LOW (ref 135–146)
WBC # BLD: 5.15 K/UL — SIGNIFICANT CHANGE UP (ref 4.8–10.8)
WBC # FLD AUTO: 5.15 K/UL — SIGNIFICANT CHANGE UP (ref 4.8–10.8)

## 2020-11-27 PROCEDURE — 93010 ELECTROCARDIOGRAM REPORT: CPT

## 2020-11-27 PROCEDURE — 71045 X-RAY EXAM CHEST 1 VIEW: CPT | Mod: 26

## 2020-11-27 PROCEDURE — 99285 EMERGENCY DEPT VISIT HI MDM: CPT

## 2020-11-27 RX ORDER — ACETAMINOPHEN 500 MG
975 TABLET ORAL ONCE
Refills: 0 | Status: COMPLETED | OUTPATIENT
Start: 2020-11-27 | End: 2020-11-27

## 2020-11-27 RX ADMIN — Medication 975 MILLIGRAM(S): at 23:37

## 2020-11-27 RX ADMIN — Medication 975 MILLIGRAM(S): at 22:37

## 2020-11-27 NOTE — ED PROVIDER NOTE - PROGRESS NOTE DETAILS
pt's vitals and overall status improved, sts he is feeling better, discussed case with ICU Fellow Boy Conway, will admit to stepdown unit

## 2020-11-27 NOTE — ED PROVIDER NOTE - CLINICAL SUMMARY MEDICAL DECISION MAKING FREE TEXT BOX
I personally evaluated the patient. I reviewed the Resident’s or Physician Assistant’s note (as assigned above), and agree with the findings and plan except as documented in my note. Patient evaluated for sob. Started on high flow NC upon arrival with improvement in respiratory status and oxygen saturation. Labs, ekg, cxr performed in ED. Consulted ICU/pulm fellow. Admitted to stepdown unit for further evaluation and treatment.

## 2020-11-27 NOTE — ED ADULT NURSE NOTE - OBJECTIVE STATEMENT
Pt is a 70 yr old male c/o worsening sob after being dx with covid 19 5 days ago. Pt BIBA desating to 87% on nonrebreather. NKDA.

## 2020-11-27 NOTE — ED PROVIDER NOTE - NS ED ROS FT
Eyes:  No visual changes, eye pain or discharge.  ENMT:  No hearing changes, pain, discharge or infections. No neck pain or stiffness.  Cardiac:  + SOB No chest pain, edema. No chest pain with exertion.  Respiratory:  No cough or respiratory distress. No hemoptysis. No history of asthma or RAD.  GI:  No nausea, vomiting, diarrhea or abdominal pain.  :  No dysuria, frequency or burning.  MS:  No myalgia, muscle weakness, joint pain or back pain.  Neuro:  No headache or weakness.  No LOC.  Skin:  No skin rash.   Endocrine: + dm No history of thyroid disease   Except as documented in the HPI,  all other systems are negative.

## 2020-11-27 NOTE — ED PROVIDER NOTE - PHYSICAL EXAMINATION
VITAL SIGNS: I have reviewed nursing notes and confirm.  CONSTITUTIONAL: tachypneic  SKIN: skin exam is warm and dry, no acute rash.    HEAD: Normocephalic; atraumatic.  EYES:  conjunctiva and sclera clear.  ENT: No nasal discharge; airway clear.  NECK: Supple; non tender.  CARD: S1, S2 normal; no murmurs, gallops, or rubs. tachycardic  RESP: No wheezes, rales or rhonchi.  ABD: Normal bowel sounds; soft; non-distended; non-tender  EXT: Normal ROM.  No clubbing, cyanosis or edema.   LYMPH: No acute cervical adenopathy.  NEURO: Alert, oriented, grossly unremarkable  PSYCH: Cooperative, appropriate.

## 2020-11-27 NOTE — ED PROVIDER NOTE - OBJECTIVE STATEMENT
Pt is a 71y/o male with a pmhx of DM, Paroxysmal Afib on xarelto, HTN, HLD, Presents today for eval of SOB that has been progressively worsening since being dx with COVID 5 days ago. Pt sts SOB has been constant, progressively worsening, worsened on exertion. Pt admits to associated subjective fever and myalgias. Pt denies CP, N/V/D, Abd pain,

## 2020-11-27 NOTE — ED PROVIDER NOTE - ATTENDING CONTRIBUTION TO CARE
69 yo M pmh dm, htn, hld, afib on xarelto pw sob. SOB progressively worsening for the past few days. Tested + for COVID 5 days ago. Associated with subjective fever, body aches. No cp, no abd pain, no back pain, no palpitations, no n/v, no diarrhea, no rash.     Patient o2 saturation low 90's on nonrebreather, started on high flow nasal cannula upon arrival with improvement.     CONSTITUTIONAL: Mild respiratory distress. Sitting up and providing appropriate history and physical examination  SKIN: skin exam is warm and dry, no acute rash.  HEAD: Normocephalic; atraumatic.  EYES: PERRL, 3 mm bilateral, no nystagmus, EOM intact; conjunctiva and sclera clear.  ENT: No nasal discharge; airway clear.  NECK: Supple; non tender. + full passive ROM in all directions. No JVD  CARD: S1, S2 normal; no murmurs, gallops, or rubs. Regular rate and rhythm. + Symmetric Strong Pulses  RESP: Crackles bilaterally, hypoxic on nonrebreather. Started on high flow NC with improvement.   ABD: soft; non-distended; non-tender. No Rebound, No Guarding, No signs of peritonitis, No CVA tenderness. No pulsatile abdominal mass. + Strong and Symmetric Pulses  EXT: Normal ROM. No clubbing, cyanosis or edema. Dp and Pt Pulses intact. Cap refill less than 3 seconds  NEURO: CN 2-12 intact, normal finger to nose, normal romberg, no sensory or motor deficits, Alert, oriented, grossly unremarkable. No Focal deficits. GCS 15. NIH 0  PSYCH: Cooperative, appropriate.

## 2020-11-28 LAB
ALBUMIN SERPL ELPH-MCNC: 3.5 G/DL — SIGNIFICANT CHANGE UP (ref 3.5–5.2)
ALP SERPL-CCNC: 69 U/L — SIGNIFICANT CHANGE UP (ref 30–115)
ALT FLD-CCNC: 41 U/L — SIGNIFICANT CHANGE UP (ref 0–41)
ANION GAP SERPL CALC-SCNC: 20 MMOL/L — HIGH (ref 7–14)
APTT BLD: 44.1 SEC — HIGH (ref 27–39.2)
AST SERPL-CCNC: 62 U/L — HIGH (ref 0–41)
BASOPHILS # BLD AUTO: 0.02 K/UL — SIGNIFICANT CHANGE UP (ref 0–0.2)
BASOPHILS NFR BLD AUTO: 0.5 % — SIGNIFICANT CHANGE UP (ref 0–1)
BILIRUB SERPL-MCNC: 0.6 MG/DL — SIGNIFICANT CHANGE UP (ref 0.2–1.2)
BLD GP AB SCN SERPL QL: SIGNIFICANT CHANGE UP
BUN SERPL-MCNC: 61 MG/DL — CRITICAL HIGH (ref 10–20)
CALCIUM SERPL-MCNC: 8.1 MG/DL — LOW (ref 8.5–10.1)
CHLORIDE SERPL-SCNC: 95 MMOL/L — LOW (ref 98–110)
CK SERPL-CCNC: 166 U/L — SIGNIFICANT CHANGE UP (ref 0–225)
CO2 SERPL-SCNC: 18 MMOL/L — SIGNIFICANT CHANGE UP (ref 17–32)
CREAT SERPL-MCNC: 1.5 MG/DL — SIGNIFICANT CHANGE UP (ref 0.7–1.5)
CRP SERPL-MCNC: 3.57 MG/DL — HIGH (ref 0–0.4)
EOSINOPHIL # BLD AUTO: 0.01 K/UL — SIGNIFICANT CHANGE UP (ref 0–0.7)
EOSINOPHIL NFR BLD AUTO: 0.3 % — SIGNIFICANT CHANGE UP (ref 0–8)
FERRITIN SERPL-MCNC: 719 NG/ML — HIGH (ref 30–400)
GGT SERPL-CCNC: 56 U/L — HIGH (ref 1–40)
GLUCOSE SERPL-MCNC: 129 MG/DL — HIGH (ref 70–99)
HCT VFR BLD CALC: 42.1 % — SIGNIFICANT CHANGE UP (ref 42–52)
HCV AB S/CO SERPL IA: 0.04 COI — SIGNIFICANT CHANGE UP
HCV AB SERPL-IMP: SIGNIFICANT CHANGE UP
HGB BLD-MCNC: 12.9 G/DL — LOW (ref 14–18)
IMM GRANULOCYTES NFR BLD AUTO: 0.3 % — SIGNIFICANT CHANGE UP (ref 0.1–0.3)
INR BLD: 1.43 RATIO — HIGH (ref 0.65–1.3)
LDH SERPL L TO P-CCNC: 553 U/L — HIGH (ref 50–242)
LYMPHOCYTES # BLD AUTO: 0.88 K/UL — LOW (ref 1.2–3.4)
LYMPHOCYTES # BLD AUTO: 23 % — SIGNIFICANT CHANGE UP (ref 20.5–51.1)
MCHC RBC-ENTMCNC: 22.2 PG — LOW (ref 27–31)
MCHC RBC-ENTMCNC: 30.6 G/DL — LOW (ref 32–37)
MCV RBC AUTO: 72.6 FL — LOW (ref 80–94)
MONOCYTES # BLD AUTO: 0.29 K/UL — SIGNIFICANT CHANGE UP (ref 0.1–0.6)
MONOCYTES NFR BLD AUTO: 7.6 % — SIGNIFICANT CHANGE UP (ref 1.7–9.3)
NEUTROPHILS # BLD AUTO: 2.61 K/UL — SIGNIFICANT CHANGE UP (ref 1.4–6.5)
NEUTROPHILS NFR BLD AUTO: 68.3 % — SIGNIFICANT CHANGE UP (ref 42.2–75.2)
NRBC # BLD: 0 /100 WBCS — SIGNIFICANT CHANGE UP (ref 0–0)
PLATELET # BLD AUTO: 144 K/UL — SIGNIFICANT CHANGE UP (ref 130–400)
POTASSIUM SERPL-MCNC: 3.5 MMOL/L — SIGNIFICANT CHANGE UP (ref 3.5–5)
POTASSIUM SERPL-SCNC: 3.5 MMOL/L — SIGNIFICANT CHANGE UP (ref 3.5–5)
PROCALCITONIN SERPL-MCNC: 0.59 NG/ML — HIGH (ref 0.02–0.1)
PROT SERPL-MCNC: 6.6 G/DL — SIGNIFICANT CHANGE UP (ref 6–8)
PROTHROM AB SERPL-ACNC: 16.4 SEC — HIGH (ref 9.95–12.87)
RBC # BLD: 5.8 M/UL — SIGNIFICANT CHANGE UP (ref 4.7–6.1)
RBC # FLD: 17.2 % — HIGH (ref 11.5–14.5)
SODIUM SERPL-SCNC: 133 MMOL/L — LOW (ref 135–146)
TROPONIN T SERPL-MCNC: <0.01 NG/ML — SIGNIFICANT CHANGE UP
WBC # BLD: 3.82 K/UL — LOW (ref 4.8–10.8)
WBC # FLD AUTO: 3.82 K/UL — LOW (ref 4.8–10.8)

## 2020-11-28 PROCEDURE — 76705 ECHO EXAM OF ABDOMEN: CPT | Mod: 26

## 2020-11-28 PROCEDURE — 99223 1ST HOSP IP/OBS HIGH 75: CPT

## 2020-11-28 RX ORDER — CARVEDILOL PHOSPHATE 80 MG/1
6.25 CAPSULE, EXTENDED RELEASE ORAL
Refills: 0 | Status: DISCONTINUED | OUTPATIENT
Start: 2020-11-28 | End: 2020-11-30

## 2020-11-28 RX ORDER — ASPIRIN/CALCIUM CARB/MAGNESIUM 324 MG
81 TABLET ORAL DAILY
Refills: 0 | Status: DISCONTINUED | OUTPATIENT
Start: 2020-11-28 | End: 2020-12-02

## 2020-11-28 RX ORDER — SIMVASTATIN 20 MG/1
20 TABLET, FILM COATED ORAL AT BEDTIME
Refills: 0 | Status: DISCONTINUED | OUTPATIENT
Start: 2020-11-28 | End: 2020-12-09

## 2020-11-28 RX ORDER — ASCORBIC ACID 60 MG
1000 TABLET,CHEWABLE ORAL DAILY
Refills: 0 | Status: DISCONTINUED | OUTPATIENT
Start: 2020-11-28 | End: 2020-11-30

## 2020-11-28 RX ORDER — REMDESIVIR 5 MG/ML
INJECTION INTRAVENOUS
Refills: 0 | Status: COMPLETED | OUTPATIENT
Start: 2020-11-28 | End: 2020-12-02

## 2020-11-28 RX ORDER — CHOLECALCIFEROL (VITAMIN D3) 125 MCG
2000 CAPSULE ORAL EVERY 24 HOURS
Refills: 0 | Status: DISCONTINUED | OUTPATIENT
Start: 2020-11-28 | End: 2020-11-30

## 2020-11-28 RX ORDER — RIVAROXABAN 15 MG-20MG
15 KIT ORAL
Refills: 0 | Status: DISCONTINUED | OUTPATIENT
Start: 2020-11-28 | End: 2020-11-29

## 2020-11-28 RX ORDER — PANTOPRAZOLE SODIUM 20 MG/1
40 TABLET, DELAYED RELEASE ORAL
Refills: 0 | Status: DISCONTINUED | OUTPATIENT
Start: 2020-11-28 | End: 2020-11-28

## 2020-11-28 RX ORDER — ZINC SULFATE TAB 220 MG (50 MG ZINC EQUIVALENT) 220 (50 ZN) MG
220 TAB ORAL EVERY 24 HOURS
Refills: 0 | Status: DISCONTINUED | OUTPATIENT
Start: 2020-11-28 | End: 2020-11-30

## 2020-11-28 RX ORDER — CHLORHEXIDINE GLUCONATE 213 G/1000ML
1 SOLUTION TOPICAL
Refills: 0 | Status: DISCONTINUED | OUTPATIENT
Start: 2020-11-28 | End: 2020-12-09

## 2020-11-28 RX ORDER — REMDESIVIR 5 MG/ML
100 INJECTION INTRAVENOUS EVERY 24 HOURS
Refills: 0 | Status: COMPLETED | OUTPATIENT
Start: 2020-11-29 | End: 2020-12-02

## 2020-11-28 RX ORDER — LOSARTAN POTASSIUM 100 MG/1
50 TABLET, FILM COATED ORAL DAILY
Refills: 0 | Status: DISCONTINUED | OUTPATIENT
Start: 2020-11-28 | End: 2020-11-28

## 2020-11-28 RX ORDER — FAMOTIDINE 10 MG/ML
40 INJECTION INTRAVENOUS
Refills: 0 | Status: DISCONTINUED | OUTPATIENT
Start: 2020-11-28 | End: 2020-11-30

## 2020-11-28 RX ORDER — DEXAMETHASONE 0.5 MG/5ML
6 ELIXIR ORAL DAILY
Refills: 0 | Status: DISCONTINUED | OUTPATIENT
Start: 2020-11-28 | End: 2020-12-09

## 2020-11-28 RX ORDER — REMDESIVIR 5 MG/ML
200 INJECTION INTRAVENOUS EVERY 24 HOURS
Refills: 0 | Status: COMPLETED | OUTPATIENT
Start: 2020-11-28 | End: 2020-11-28

## 2020-11-28 RX ORDER — MONTELUKAST 4 MG/1
10 TABLET, CHEWABLE ORAL EVERY 24 HOURS
Refills: 0 | Status: DISCONTINUED | OUTPATIENT
Start: 2020-11-28 | End: 2020-11-30

## 2020-11-28 RX ADMIN — Medication 6 MILLIGRAM(S): at 06:04

## 2020-11-28 RX ADMIN — Medication 81 MILLIGRAM(S): at 12:38

## 2020-11-28 RX ADMIN — PANTOPRAZOLE SODIUM 40 MILLIGRAM(S): 20 TABLET, DELAYED RELEASE ORAL at 06:04

## 2020-11-28 RX ADMIN — REMDESIVIR 200 MILLIGRAM(S): 5 INJECTION INTRAVENOUS at 04:40

## 2020-11-28 RX ADMIN — CARVEDILOL PHOSPHATE 6.25 MILLIGRAM(S): 80 CAPSULE, EXTENDED RELEASE ORAL at 18:11

## 2020-11-28 RX ADMIN — SIMVASTATIN 20 MILLIGRAM(S): 20 TABLET, FILM COATED ORAL at 22:47

## 2020-11-28 RX ADMIN — ZINC SULFATE TAB 220 MG (50 MG ZINC EQUIVALENT) 220 MILLIGRAM(S): 220 (50 ZN) TAB at 17:58

## 2020-11-28 RX ADMIN — Medication 2000 UNIT(S): at 17:57

## 2020-11-28 RX ADMIN — Medication 1000 MILLIGRAM(S): at 18:01

## 2020-11-28 RX ADMIN — FAMOTIDINE 40 MILLIGRAM(S): 10 INJECTION INTRAVENOUS at 15:48

## 2020-11-28 RX ADMIN — RIVAROXABAN 15 MILLIGRAM(S): KIT at 18:19

## 2020-11-28 RX ADMIN — MONTELUKAST 10 MILLIGRAM(S): 4 TABLET, CHEWABLE ORAL at 15:48

## 2020-11-28 RX ADMIN — CARVEDILOL PHOSPHATE 6.25 MILLIGRAM(S): 80 CAPSULE, EXTENDED RELEASE ORAL at 06:04

## 2020-11-28 NOTE — CONSULT NOTE ADULT - ASSESSMENT
Patient is a 70y old  Male with pmh of DM, Paroxysmal Afib on Xarelto , HTN, L carotid endarterectomy, presents to the ER for evaluation of  progressively worsening shortness of breath more on exertion than at rest after testing positive for covid19 outpatient 5 days ago. The patient notes that he has been having symptoms of weakness, decreased po intake, and feeling sleepy more than usual. Upon arrival to the ER.  he found to have fever, tachycardia. tachypnea and  desaturated on 15 L non rebreather to 87%  and was placed on high flow oxygen with improvement in saturation to 97% and subjective improvement in quality of breathing. The CXR shows Diffuse bilateral pulmonary opacities, right greater than left. He has started on Dexamethasone, Remdesivir, and the  ID consult requested to assist with further evaluation and antibiotic management.     # Sepsis ( Fever + tachycardia + Tachypnea)   # COVID pneumonia  # Cytokine Release Syndrome    would recommend:    1. Continue Remdesivir and  Dexamethasone  2. Please change Protonix to Famotidine ( Pepcid) and add Montelucast   3. Add Vit- D, Vit-C, ASA  and Zinc sulfate'  4. Supplemental oxygenation and bronchodilator as needed  5. COVID precaution    will follow the patient with you and make further recommendation based on the clinical course and Lab results  Thank you for the opportunity to participate in Mr. SANHI's care      Attending Attestation:    Spent more than 65 minutes on total encounter, more than 50 % of the visit was spent counseling and/or coordinating care by the Attending physician.

## 2020-11-28 NOTE — H&P ADULT - HISTORY OF PRESENT ILLNESS
69 yo male with pmh of DM, Paroxysmal Afib on xarelto, HTN, L carotid endarterectomy presents to the hospital with progressively worsening shortness of berath more on exertion than at rest after testing positive for covid19 outpatient.    Vital Signs (24 Hrs): T(C): 39.2,  HR: 95 , BP: 130/80 (11-27-20 @ 22:19) (95/55 - 130/80), RR: 20 , SpO2: 92%   Labs: Lymphopenia , Cr 1.6 , Transaminitis (AST65, ALT 45)  CXR: Bilateral lower lobe opacities, more prominent when compared with the prior study.       69 yo male with pmh of DM, Paroxysmal Afib on Xarelto , HTN, L carotid endarterectomy presents to the hospital with progressively worsening shortness of breath more on exertion than at rest after testing positive for covid19 outpatient 5 days ago. The patient notes that he has been having symptoms of weakness, decreased po intake, and feeling sleepy more than usual. Upon arrival to the ED the patient desaturated on 15 L non rebreather to 87% with tachypnea and was placed on high flow with improvement in saturation to 97% and subjective improvement in quality of breathing. The patient denies symptoms of dizziness/ chest pain/ short of breath/ abdominal pain/constipation/ diarrhea/loss of taste/ loss of smell. All other ROS negative except as noted above.     Vital Signs (24 Hrs): T(C): 39.2,  HR: 95 , BP: 130/80 (11-27-20 @ 22:19) (95/55 - 130/80), RR: 20 , SpO2: 92%   Labs: Lymphopenia , Cr 1.6 , Transaminitis (AST65, ALT 45) , D- DIMER 105  CXR: b/l patchy opacities

## 2020-11-28 NOTE — H&P ADULT - NSHPLABSRESULTS_GEN_ALL_CORE
12.9   5.15  )-----------( 157      ( 27 Nov 2020 19:15 )             42.3     11-27-20 @ 19:15    132<L>  |  93<L>  |  57<H>             --------------------------< 146<H>     3.5  |  21  | 1.6<H>    eGFR AA: 51<L>  eGFR N-AA: 44<L>    Calcium: 8.2<L>  Phosphorus: --  Magnesium: --    AST: 65<H>    ALT: 45<H>  AlkPhos: 71  Protein: 6.9  Albumin: 3.8    D-Dimer Assay, Quantitative: 105: Manufacturers recommended Cut off for VTE is 230 ng/ml D-DU ng/mL DDU (11.27.20 @ 19:15) 12.9   5.15  )-----------( 157      ( 27 Nov 2020 19:15 )             42.3     11-27-20 @ 19:15    132<L>  |  93<L>  |  57<H>             --------------------------< 146<H>     3.5  |  21  | 1.6<H>    eGFR AA: 51<L>  eGFR N-AA: 44<L>    Calcium: 8.2<L>  Phosphorus: --  Magnesium: --    AST: 65<H>    ALT: 45<H>  AlkPhos: 71  Protein: 6.9  Albumin: 3.8    D-Dimer Assay, Quantitative: 105: Manufacturers recommended Cut off for VTE is 230 ng/ml D-DU ng/mL DDU (11.27.20 @ 19:15)    < from: 12 Lead ECG (11.27.20 @ 18:43) >    Atrial flutter with variable A-V block  Nonspecific T wave abnormality  Abnormal ECG    CXR: Bilateral opacities (wet read)

## 2020-11-28 NOTE — PATIENT PROFILE ADULT - NSPROHMDIABETMGMTSTRAT_GEN_A_NUR
blood glucose testing none/diet modification/exercise/insulin therapy/routine screenings/blood glucose testing

## 2020-11-28 NOTE — GOALS OF CARE CONVERSATION - ADVANCED CARE PLANNING - CONVERSATION DETAILS
Goals of care conversation were discussed with the patient and the patient desires to remain full code.

## 2020-11-28 NOTE — H&P ADULT - ATTENDING COMMENTS
I have personally seen and examined this patient.  I have fully participated in the care of this patient.  I have reviewed pertinent clinical information, including history, physical exam, plan and note.   I have reviewed relevant imaging and diagnostic studies personally. I agree with resident note above and plan of care, edited and corrected where applicable.     Patient remains with poor prognosis overall despite all care.

## 2020-11-28 NOTE — H&P ADULT - ASSESSMENT
69 yo male with pmh of DM, Paroxysmal Afib on Xarelto , HTN, L carotid endarterectomy presents to the hospital with progressively worsening shortness of breath more on exertion than at rest after testing positive for covid19 outpatient 5 days ago. The patient notes that he has been having symptoms of weakness, decreased po intake, and feeling sleepy more than usual. Upon arrival to the ED the patient desaturated on 15 L non rebreather to 87% with tachypnea and was placed on high flow with improvement in saturation to 97% and subjective improvement in quality of breathing. The patient denies symptoms of dizziness/ chest pain/ short of breath/ abdominal pain/constipation/ diarrhea/loss of taste/ loss of smell. All other ROS negative except as noted above.     `#Acute hypoxemic respiratory failure secondary to COVID-19 pneumonia  - COVID-19 PCR positive  - Isolation precautions (contact, droplet, airborne)  - Chest X-ray: b/l patchy opacities, f/u official read  - Patient is saturating 97% on high flow nasal canula  - Baseline CBC with diff, CMP, LDH, procalcitonin, creatine kinase, troponin, CRP, ferritin, D-dimer, PTT/PT/INR, ECG, and chest X-ray  - Repeat inflammatory markers (D-dimer, CRP, ferritin) Q48-72H if clinically worsening  - ID evaluation for possible plasma  - Active type and screen in case of plasma  - Start remdesivir taper   - Start dexamethasone 6mg IV QD for 10 days  - DVT prophylaxis  - Titrate supplemental O2 to maintain SpO2 92-96%    # Transaminitis   - likely related to covid19 r/o liver disease  - AST 65, ALT 45  - patient asymptomatic, physical exam normal  - f/u GGT   - RUQ U/S     # h/o A-fib on xarelto , rate controlled now   - EKG on admission shows A flutter , f/u AM EKG  - c/w Xarelto 20 mg qd    # h/o HTN   - c/w carvedilol   - hold losartan due to impaired renal function for now     # h/o DM   - hold po home medications  - fs ac qhs , IF  > 180 , start insulin protocol  - carb consistent diet   -f/u HbA1c level     # DVT PPX: Xarelto  # GI PPX: PPI  # Activity: as tolerated  # Diet: carb consistent    full code    71 yo male with pmh of DM, Paroxysmal Afib on Xarelto , HTN, L carotid endarterectomy presents to the hospital with progressively worsening shortness of breath more on exertion than at rest after testing positive for covid19 outpatient 5 days ago. The patient notes that he has been having symptoms of weakness, decreased po intake, and feeling sleepy more than usual. Upon arrival to the ED the patient desaturated on 15 L non rebreather to 87% with tachypnea and was placed on high flow with improvement in saturation to 97% and subjective improvement in quality of breathing. The patient denies symptoms of dizziness/ chest pain/ short of breath/ abdominal pain/constipation/ diarrhea/loss of taste/ loss of smell. All other ROS negative except as noted above.     `#Acute hypoxemic respiratory failure secondary to COVID-19 pneumonia  - COVID-19 PCR positive  - Isolation precautions (contact, droplet, airborne)  - Chest X-ray: b/l patchy opacities, f/u official read  - Patient is saturating 97% on high flow nasal canula  - Baseline CBC with diff, CMP, LDH, procalcitonin, creatine kinase, troponin, CRP, ferritin, D-dimer, PTT/PT/INR, ECG, and chest X-ray  - Repeat inflammatory markers (D-dimer, CRP, ferritin) Q48-72H if clinically worsening  - ID evaluation for possible plasma  - Active type and screen in case of plasma  - Start remdesivir taper   - Start dexamethasone 6mg IV QD for 10 days  - DVT prophylaxis  - Titrate supplemental O2 to maintain SpO2 92-96%    # Transaminitis   - likely related to covid19 r/o liver disease  - AST 65, ALT 45  - patient asymptomatic, physical exam normal  - f/u GGT   - RUQ U/S     # PARAM , likely secondary to decreased po intake  - Cr 1.6 on admission , monitor  - if not improving obtain ruq u/s and urine lytes  - will hold off on ivf for now    # h/o A-fib on xarelto , rate controlled now   - EKG on admission shows A flutter , f/u AM EKG  - c/w Xarelto 20 mg qd    # h/o HTN   - c/w carvedilol   - hold losartan due to impaired renal function for now     # h/o DM   - hold po home medications  - fs ac qhs , IF  > 180 , start insulin protocol  - carb consistent diet   -f/u HbA1c level     # DVT PPX: Xarelto  # GI PPX: PPI  # Activity: as tolerated  # Diet: carb consistent    full code

## 2020-11-28 NOTE — CONSULT NOTE ADULT - SUBJECTIVE AND OBJECTIVE BOX
Patient is a 70y old  Male with pmh of DM, Paroxysmal Afib on Xarelto , HTN, L carotid endarterectomy, presents to the ER for evaluation of  progressively worsening shortness of breath more on exertion than at rest after testing positive for covid19 outpatient 5 days ago. The patient notes that he has been having symptoms of weakness, decreased po intake, and feeling sleepy more than usual. Upon arrival to the ER.  he found to have fever, tachycardia. tachypnea and  desaturated on 15 L non rebreather to 87%  and was placed on high flow oxygen with improvement in saturation to 97% and subjective improvement in quality of breathing. The CXR shows Diffuse bilateral pulmonary opacities, right greater than left. He has started on Dexamethasone, Remdesivir, and the  ID consult requested to assist with further evaluation and antibiotic management.       REVIEW OF SYSTEMS: Total of twelve systems have been reviewed and found to be negative unless mentioned in HPI      PAST MEDICAL & SURGICAL HISTORY:  H/O sciatica  CHR PAIN LT LE  H/O cystoscopy  resection of bladder tumor  Bladder cancer  DM (diabetes mellitus)  Hypercholesteremia  Afib  HTN (hypertension)  History of hernia repair        SOCIAL HISTORY  Alcohol: Does not drink  Tobacco: Does not smoke  Illicit substance use: None      FAMILY HISTORY: Non contributory to the present illness      ALLERGIES: No Known Allergies      Physical Exam:   Vital Signs Last 24 Hrs  T(C): 37 (28 Nov 2020 12:00), Max: 39.2 (27 Nov 2020 18:48)  T(F): 98.6 (28 Nov 2020 12:00), Max: 102.6 (27 Nov 2020 18:48)  HR: 85 (28 Nov 2020 12:00) (82 - 150)  BP: 115/62 (28 Nov 2020 12:00) (95/55 - 130/80)  BP(mean): 83 (28 Nov 2020 12:00) (74 - 83)  RR: 22 (28 Nov 2020 08:45) (20 - 24)  SpO2: 94% (28 Nov 2020 12:00) (87% - 100%)        LABS:                        12.9   3.82  )-----------( 144      ( 28 Nov 2020 05:33 )             42.1         11-28    133<L>  |  95<L>  |  61<HH>  ----------------------------<  129<H>  3.5   |  18  |  1.5    Ca    8.1<L>      28 Nov 2020 05:33    TPro  6.6  /  Alb  3.5  /  TBili  0.6  /  DBili  x   /  AST  62<H>  /  ALT  41  /  AlkPhos  69  11-28  PT/INR - ( 28 Nov 2020 05:33 )   PT: 16.40 sec;   INR: 1.43 ratio    PTT - ( 28 Nov 2020 05:33 )  PTT:44.1 sec        MEDICATIONS  (STANDING):  aspirin enteric coated 81 milliGRAM(s) Oral daily  carvedilol Oral Tab/Cap - Peds 6.25 milliGRAM(s) Oral two times a day  chlorhexidine 4% Liquid 1 Application(s) Topical <User Schedule>  dexAMETHasone  Injectable 6 milliGRAM(s) IV Push daily  pantoprazole    Tablet 40 milliGRAM(s) Oral before breakfast  remdesivir  IVPB   IV Intermittent   rivaroxaban 15 milliGRAM(s) Oral with dinner  simvastatin 20 milliGRAM(s) Oral at bedtime    MEDICATIONS  (PRN):      RADIOLOGY & ADDITIONAL TESTS:    rad< from: Xray Chest 1 View-PORTABLE IMMEDIATE (11.27.20 @ 22:10) >  Diffuse bilateral pulmonary opacities, right greater than left. Possible bilateral pleural effusions.      MICROBIOLOGY DATA:    Respiratory Viral Panel with COVID-19 by KENDRICK (11.27.20 @ 19:30)   Rapid RVP Result: Detected   SARS-CoV-2: Detected:

## 2020-11-29 LAB
ANION GAP SERPL CALC-SCNC: 14 MMOL/L — SIGNIFICANT CHANGE UP (ref 7–14)
BUN SERPL-MCNC: 45 MG/DL — HIGH (ref 10–20)
CALCIUM SERPL-MCNC: 8.4 MG/DL — LOW (ref 8.5–10.1)
CHLORIDE SERPL-SCNC: 104 MMOL/L — SIGNIFICANT CHANGE UP (ref 98–110)
CO2 SERPL-SCNC: 23 MMOL/L — SIGNIFICANT CHANGE UP (ref 17–32)
CREAT SERPL-MCNC: 0.9 MG/DL — SIGNIFICANT CHANGE UP (ref 0.7–1.5)
GLUCOSE SERPL-MCNC: 171 MG/DL — HIGH (ref 70–99)
POTASSIUM SERPL-MCNC: 3.8 MMOL/L — SIGNIFICANT CHANGE UP (ref 3.5–5)
POTASSIUM SERPL-SCNC: 3.8 MMOL/L — SIGNIFICANT CHANGE UP (ref 3.5–5)
SODIUM SERPL-SCNC: 141 MMOL/L — SIGNIFICANT CHANGE UP (ref 135–146)

## 2020-11-29 PROCEDURE — 99291 CRITICAL CARE FIRST HOUR: CPT

## 2020-11-29 PROCEDURE — 71045 X-RAY EXAM CHEST 1 VIEW: CPT | Mod: 26

## 2020-11-29 PROCEDURE — 99233 SBSQ HOSP IP/OBS HIGH 50: CPT

## 2020-11-29 RX ORDER — SODIUM CHLORIDE 9 MG/ML
1000 INJECTION INTRAMUSCULAR; INTRAVENOUS; SUBCUTANEOUS
Refills: 0 | Status: DISCONTINUED | OUTPATIENT
Start: 2020-11-29 | End: 2020-12-02

## 2020-11-29 RX ORDER — RIVAROXABAN 15 MG-20MG
20 KIT ORAL
Refills: 0 | Status: DISCONTINUED | OUTPATIENT
Start: 2020-11-29 | End: 2020-12-02

## 2020-11-29 RX ORDER — CEFTRIAXONE 500 MG/1
1000 INJECTION, POWDER, FOR SOLUTION INTRAMUSCULAR; INTRAVENOUS EVERY 24 HOURS
Refills: 0 | Status: DISCONTINUED | OUTPATIENT
Start: 2020-11-29 | End: 2020-12-02

## 2020-11-29 RX ADMIN — CARVEDILOL PHOSPHATE 6.25 MILLIGRAM(S): 80 CAPSULE, EXTENDED RELEASE ORAL at 17:05

## 2020-11-29 RX ADMIN — CEFTRIAXONE 100 MILLIGRAM(S): 500 INJECTION, POWDER, FOR SOLUTION INTRAMUSCULAR; INTRAVENOUS at 10:19

## 2020-11-29 RX ADMIN — REMDESIVIR 500 MILLIGRAM(S): 5 INJECTION INTRAVENOUS at 03:02

## 2020-11-29 RX ADMIN — RIVAROXABAN 20 MILLIGRAM(S): KIT at 17:05

## 2020-11-29 RX ADMIN — Medication 6 MILLIGRAM(S): at 06:56

## 2020-11-29 RX ADMIN — SODIUM CHLORIDE 50 MILLILITER(S): 9 INJECTION INTRAMUSCULAR; INTRAVENOUS; SUBCUTANEOUS at 10:35

## 2020-11-29 RX ADMIN — CHLORHEXIDINE GLUCONATE 1 APPLICATION(S): 213 SOLUTION TOPICAL at 06:55

## 2020-11-29 NOTE — PROGRESS NOTE ADULT - SUBJECTIVE AND OBJECTIVE BOX
SUBJECTIVE:    Patient is a 70y old Male who presents with a chief complaint of   Currently admitted to medicine with the primary diagnosis of:    Today is hospital day 2d.     Overnight Events:     No acute overnight events.     PAST MEDICAL & SURGICAL HISTORY  H/O sciatica  CHR PAIN LT LE    H/O cystoscopy  resection of bladder tumor    Bladder cancer    DM (diabetes mellitus)    Hypercholesteremia    Afib    HTN (hypertension)    History of hernia repair      ALLERGIES:  No Known Allergies    MEDICATIONS:  STANDING MEDICATIONS  ascorbic acid 1000 milliGRAM(s) Oral daily  aspirin enteric coated 81 milliGRAM(s) Oral daily  carvedilol Oral Tab/Cap - Peds 6.25 milliGRAM(s) Oral two times a day  cefTRIAXone   IVPB 1000 milliGRAM(s) IV Intermittent every 24 hours  chlorhexidine 4% Liquid 1 Application(s) Topical <User Schedule>  cholecalciferol 2000 Unit(s) Oral every 24 hours  dexAMETHasone  Injectable 6 milliGRAM(s) IV Push daily  famotidine    Tablet 40 milliGRAM(s) Oral two times a day  montelukast 10 milliGRAM(s) Oral every 24 hours  remdesivir  IVPB   IV Intermittent   remdesivir  IVPB 100 milliGRAM(s) IV Intermittent every 24 hours  rivaroxaban 20 milliGRAM(s) Oral with dinner  simvastatin 20 milliGRAM(s) Oral at bedtime  sodium chloride 0.9%. 1000 milliLiter(s) IV Continuous <Continuous>  zinc sulfate 220 milliGRAM(s) Oral every 24 hours    PRN MEDICATIONS    VITALS:   ICU Vital Signs Last 24 Hrs  T(C): 35.8 (29 Nov 2020 08:06), Max: 37.1 (28 Nov 2020 23:54)  T(F): 96.5 (29 Nov 2020 08:06), Max: 98.7 (28 Nov 2020 23:54)  HR: 101 (29 Nov 2020 08:06) (74 - 110)  BP: 119/68 (29 Nov 2020 08:06) (100/56 - 149/73)  BP(mean): 87 (29 Nov 2020 08:06) (70 - 104)  ABP: --  ABP(mean): --  RR: 20 (29 Nov 2020 08:06) (20 - 39)  SpO2: 91% (29 Nov 2020 08:05) (82% - 96%)      LABS:                        12.9   3.82  )-----------( 144      ( 28 Nov 2020 05:33 )             42.1     11-28    133<L>  |  95<L>  |  61<HH>  ----------------------------<  129<H>  3.5   |  18  |  1.5    Ca    8.1<L>      28 Nov 2020 05:33    TPro  6.6  /  Alb  3.5  /  TBili  0.6  /  DBili  x   /  AST  62<H>  /  ALT  41  /  AlkPhos  69  11-28    PT/INR - ( 28 Nov 2020 05:33 )   PT: 16.40 sec;   INR: 1.43 ratio         PTT - ( 28 Nov 2020 05:33 )  PTT:44.1 sec    CARDIAC MARKERS ( 28 Nov 2020 05:33 )  x     / <0.01 ng/mL / 166 U/L / x     / x          Procalcitonin, Serum: 0.59 (11-27)    C-Reactive Protein, Serum: 3.57 (11-27)    Ferritin, Serum: 719 (11-27)      D-Dimer Assay, Quantitative: 105 (11-27)      RADIOLOGY:    < from: Xray Chest 1 View-PORTABLE IMMEDIATE (11.27.20 @ 22:10) >  Impression:    Diffuse bilateral pulmonary opacities, right greater than left. Possible bilateral pleural effusions.    < end of copied text >      PHYSICAL EXAM:    GENERAL: NAD, lying in bed comfortably  CHEST/LUNG: b/l air entry. b/l crackles  HEART: tachycardic iregular  ABDOMEN: Bowel sounds present; Soft, Nontender, Nondistended.   EXTREMITIES:  no edema  NERVOUS SYSTEM:  Alert & Oriented X3, speech clear.

## 2020-11-29 NOTE — CONSULT NOTE ADULT - ASSESSMENT
IMPRESSION:    Acute hypoxemic resp failure  Covid pneumonia  P A FIB on xarelto  Renal failure  possible bacterial superinfection      PLAN:    CNS: avoid CNS depressant    HEENT:  Oral care    PULMONARY:  HOB @ 45 degrees, incentive spirometer, alternate BIPAP/ HHFNC keep Sao2 92 to 96%, repeat CXR    CARDIOVASCULAR: Gentle hydration    GI: GI prophylaxis                                          Feeding po    RENAL:  F/u  lytes.  Correct as needed. accurate I/O    INFECTIOUS DISEASE: per ID, trend infl markers    HEMATOLOGICAL:  DVT prophylaxis. xarelto    ENDOCRINE:  Follow up FS.  Insulin protocol if needed.    MUSCULOSKELETAL:    CODE STATUS: FULL CODE  CEU  poor prognosis     IMPRESSION:    Acute hypoxemic resp failure  Covid pneumonia  P A FIB on xarelto  Renal failure  possible bacterial superinfection      PLAN:    CNS: avoid CNS depressant    HEENT:  Oral care    PULMONARY:  HOB @ 45 degrees, incentive spirometer, alternate BIPAP/ HHFNC keep Sao2 92 to 96%, repeat CXR    CARDIOVASCULAR: Gentle hydration/ LR 60 cc/h    GI: GI prophylaxis                                          Feeding po    RENAL:  F/u  lytes.  Correct as needed. accurate I/O    INFECTIOUS DISEASE: per ID, trend infl markers    HEMATOLOGICAL:  DVT prophylaxis. xarelto    ENDOCRINE:  Follow up FS.  Insulin protocol if needed.    MUSCULOSKELETAL:    CODE STATUS: FULL CODE  CEU  poor prognosis

## 2020-11-29 NOTE — PROGRESS NOTE ADULT - CONVERSATION DETAILS
Patient informed the team that he would like that his nephew is a physician and he would like us to give him updates. His name is Dr. Menkes #[phone]. I called this number and updated them on his uncles status. He is aware of the patient high oxygen demand and was informed that the next step would be intubation if needed. Patient at this time is still full code.

## 2020-11-29 NOTE — CONSULT NOTE ADULT - SUBJECTIVE AND OBJECTIVE BOX
Patient is a 70y old  Male who presents with a chief complaint of SOB    HPI:  71 yo male with pmh of DM, Paroxysmal Afib on Xarelto , HTN, L carotid endarterectomy presents to the hospital with progressively worsening shortness of breath more on exertion than at rest after testing positive for covid19 outpatient 5 days ago. The patient notes that he has been having symptoms of weakness, decreased po intake, and feeling sleepy more than usual. Upon arrival to the ED the patient desaturated on 15 L non rebreather to 87% with tachypnea and was placed on high flow with improvement in saturation to 97% and subjective improvement in quality of breathing. The patient denies symptoms of dizziness/ chest pain/ short of breath/ abdominal pain/constipation/ diarrhea/loss of taste/ loss of smell. All other ROS negative except as noted above.     Vital Signs (24 Hrs): T(C): 39.2,  HR: 95 , BP: 130/80 (11-27-20 @ 22:19) (95/55 - 130/80), RR: 20 , SpO2: 92%   Labs: Lymphopenia , Cr 1.6 , Transaminitis (AST65, ALT 45) , D- DIMER 105  CXR: b/l patchy opacities admitted to CEU, started on RZD, decadron     (28 Nov 2020 00:13)      PAST MEDICAL & SURGICAL HISTORY:  H/O sciatica  [facility] PAIN LT LE    H/O cystoscopy  resection of bladder tumor    Bladder cancer    DM (diabetes mellitus)    Hypercholesteremia    Afib    HTN (hypertension)    History of hernia repair        SOCIAL HX:   Smoking       -    FAMILY HISTORY: uto      REVIEW OF SYSTEMS uto    Allergies    No Known Allergies    Intolerances        ascorbic acid 1000 milliGRAM(s) Oral daily  aspirin enteric coated 81 milliGRAM(s) Oral daily  carvedilol Oral Tab/Cap - Peds 6.25 milliGRAM(s) Oral two times a day  chlorhexidine 4% Liquid 1 Application(s) Topical <User Schedule>  cholecalciferol 2000 Unit(s) Oral every 24 hours  dexAMETHasone  Injectable 6 milliGRAM(s) IV Push daily  famotidine    Tablet 40 milliGRAM(s) Oral two times a day  montelukast 10 milliGRAM(s) Oral every 24 hours  remdesivir  IVPB   IV Intermittent   remdesivir  IVPB 100 milliGRAM(s) IV Intermittent every 24 hours  rivaroxaban 15 milliGRAM(s) Oral with dinner  simvastatin 20 milliGRAM(s) Oral at bedtime  zinc sulfate 220 milliGRAM(s) Oral every 24 hours  : Home Meds:      PHYSICAL EXAM    ICU Vital Signs Last 24 Hrs  T(C): 37.1 (28 Nov 2020 23:54), Max: 37.1 (28 Nov 2020 23:54)  T(F): 98.7 (28 Nov 2020 23:54), Max: 98.7 (28 Nov 2020 23:54)  HR: 99 (29 Nov 2020 07:00) (74 - 110)  BP: 116/65 (29 Nov 2020 07:00) (100/56 - 149/73)  BP(mean): 82 (29 Nov 2020 07:00) (70 - 104)  RR: 23 (29 Nov 2020 07:00) (20 - 39)  SpO2: 90% (29 Nov 2020 07:00) (82% - 100%)      General: Ill looking  HEENT:  JL              Lymph Nodes: No cervical LN   Lungs: Bilateral BS, bl crackles  Cardiovascular: HILDA 3/6  Abdomen: Soft, Positive BS  Extremities: No clubbing  Skin: Warm  Neurological: Non focal       11-28-20 @ 07:01  -  11-29-20 @ 07:00  --------------------------------------------------------  IN:    IV PiggyBack: 250 mL  Total IN: 250 mL    OUT:    Voided (mL): 1400 mL  Total OUT: 1400 mL    Total NET: -1150 mL          LABS:                          12.9   3.82  )-----------( 144      ( 28 Nov 2020 05:33 )             42.1                                               11-28    133<L>  |  95<L>  |  61<HH>  ----------------------------<  129<H>  3.5   |  18  |  1.5    Ca    8.1<L>      28 Nov 2020 05:33    TPro  6.6  /  Alb  3.5  /  TBili  0.6  /  DBili  x   /  AST  62<H>  /  ALT  41  /  AlkPhos  69  11-28      PT/INR - ( 28 Nov 2020 05:33 )   PT: 16.40 sec;   INR: 1.43 ratio         PTT - ( 28 Nov 2020 05:33 )  PTT:44.1 sec                                           CARDIAC MARKERS ( 28 Nov 2020 05:33 )  x     / <0.01 ng/mL / 166 U/L / x     / x                                                LIVER FUNCTIONS - ( 28 Nov 2020 05:33 )  Alb: 3.5 g/dL / Pro: 6.6 g/dL / ALK PHOS: 69 U/L / ALT: 41 U/L / AST: 62 U/L / GGT: 56 U/L                                                                                                                                   X-Rays   REVIEWED    MEDICATIONS  (STANDING):  ascorbic acid 1000 milliGRAM(s) Oral daily  aspirin enteric coated 81 milliGRAM(s) Oral daily  carvedilol Oral Tab/Cap - Peds 6.25 milliGRAM(s) Oral two times a day  chlorhexidine 4% Liquid 1 Application(s) Topical <User Schedule>  cholecalciferol 2000 Unit(s) Oral every 24 hours  dexAMETHasone  Injectable 6 milliGRAM(s) IV Push daily  famotidine    Tablet 40 milliGRAM(s) Oral two times a day  montelukast 10 milliGRAM(s) Oral every 24 hours  remdesivir  IVPB   IV Intermittent   remdesivir  IVPB 100 milliGRAM(s) IV Intermittent every 24 hours  rivaroxaban 15 milliGRAM(s) Oral with dinner  simvastatin 20 milliGRAM(s) Oral at bedtime  zinc sulfate 220 milliGRAM(s) Oral every 24 hours    MEDICATIONS  (PRN):         Patient is a 70y old  Male who presents with a chief complaint of SOB    HPI:  69 yo male with pmh of DM, Paroxysmal Afib on Xarelto , HTN, L carotid endarterectomy presents to the hospital with progressively worsening shortness of breath more on exertion than at rest after testing positive for covid19 outpatient 5 days ago. The patient notes that he has been having symptoms of weakness, decreased po intake, and feeling sleepy more than usual. Upon arrival to the ED the patient desaturated on 15 L non rebreather to 87% with tachypnea and was placed on high flow with improvement in saturation to 97% and subjective improvement in quality of breathing. The patient denies symptoms of dizziness/ chest pain/ short of breath/ abdominal pain/constipation/ diarrhea/loss of taste/ loss of smell. All other ROS negative except as noted above.     Vital Signs (24 Hrs): T(C): 39.2,  HR: 95 , BP: 130/80 (11-27-20 @ 22:19) (95/55 - 130/80), RR: 20 , SpO2: 92%   Labs: Lymphopenia , Cr 1.6 , Transaminitis (AST65, ALT 45) , D- DIMER 105  CXR: b/l patchy opacities admitted to CEU, started on RZD, decadron, seen this am on 100 % BIPAP 14/8     (28 Nov 2020 00:13)      PAST MEDICAL & SURGICAL HISTORY:  H/O sciatica  [facility] PAIN LT LE    H/O cystoscopy  resection of bladder tumor    Bladder cancer    DM (diabetes mellitus)    Hypercholesteremia    Afib    HTN (hypertension)    History of hernia repair        SOCIAL HX:   Smoking       -    FAMILY HISTORY: uto      REVIEW OF SYSTEMS uto    Allergies    No Known Allergies    Intolerances        ascorbic acid 1000 milliGRAM(s) Oral daily  aspirin enteric coated 81 milliGRAM(s) Oral daily  carvedilol Oral Tab/Cap - Peds 6.25 milliGRAM(s) Oral two times a day  chlorhexidine 4% Liquid 1 Application(s) Topical <User Schedule>  cholecalciferol 2000 Unit(s) Oral every 24 hours  dexAMETHasone  Injectable 6 milliGRAM(s) IV Push daily  famotidine    Tablet 40 milliGRAM(s) Oral two times a day  montelukast 10 milliGRAM(s) Oral every 24 hours  remdesivir  IVPB   IV Intermittent   remdesivir  IVPB 100 milliGRAM(s) IV Intermittent every 24 hours  rivaroxaban 15 milliGRAM(s) Oral with dinner  simvastatin 20 milliGRAM(s) Oral at bedtime  zinc sulfate 220 milliGRAM(s) Oral every 24 hours  : Home Meds:      PHYSICAL EXAM    ICU Vital Signs Last 24 Hrs  T(C): 37.1 (28 Nov 2020 23:54), Max: 37.1 (28 Nov 2020 23:54)  T(F): 98.7 (28 Nov 2020 23:54), Max: 98.7 (28 Nov 2020 23:54)  HR: 99 (29 Nov 2020 07:00) (74 - 110)  BP: 116/65 (29 Nov 2020 07:00) (100/56 - 149/73)  BP(mean): 82 (29 Nov 2020 07:00) (70 - 104)  RR: 23 (29 Nov 2020 07:00) (20 - 39)  SpO2: 90% (29 Nov 2020 07:00) (82% - 100%)      General: Ill looking  HEENT:  JL              Lymph Nodes: No cervical LN   Lungs: Bilateral BS, bl crackles  Cardiovascular: HILDA 3/6  Abdomen: Soft, Positive BS  Extremities: No clubbing  Skin: Warm  Neurological: Non focal       11-28-20 @ 07:01  -  11-29-20 @ 07:00  --------------------------------------------------------  IN:    IV PiggyBack: 250 mL  Total IN: 250 mL    OUT:    Voided (mL): 1400 mL  Total OUT: 1400 mL    Total NET: -1150 mL          LABS:                          12.9   3.82  )-----------( 144      ( 28 Nov 2020 05:33 )             42.1                                               11-28    133<L>  |  95<L>  |  61<HH>  ----------------------------<  129<H>  3.5   |  18  |  1.5    Ca    8.1<L>      28 Nov 2020 05:33    TPro  6.6  /  Alb  3.5  /  TBili  0.6  /  DBili  x   /  AST  62<H>  /  ALT  41  /  AlkPhos  69  11-28      PT/INR - ( 28 Nov 2020 05:33 )   PT: 16.40 sec;   INR: 1.43 ratio         PTT - ( 28 Nov 2020 05:33 )  PTT:44.1 sec                                           CARDIAC MARKERS ( 28 Nov 2020 05:33 )  x     / <0.01 ng/mL / 166 U/L / x     / x                                                LIVER FUNCTIONS - ( 28 Nov 2020 05:33 )  Alb: 3.5 g/dL / Pro: 6.6 g/dL / ALK PHOS: 69 U/L / ALT: 41 U/L / AST: 62 U/L / GGT: 56 U/L                                                                                                                                   X-Rays   REVIEWED    MEDICATIONS  (STANDING):  ascorbic acid 1000 milliGRAM(s) Oral daily  aspirin enteric coated 81 milliGRAM(s) Oral daily  carvedilol Oral Tab/Cap - Peds 6.25 milliGRAM(s) Oral two times a day  chlorhexidine 4% Liquid 1 Application(s) Topical <User Schedule>  cholecalciferol 2000 Unit(s) Oral every 24 hours  dexAMETHasone  Injectable 6 milliGRAM(s) IV Push daily  famotidine    Tablet 40 milliGRAM(s) Oral two times a day  montelukast 10 milliGRAM(s) Oral every 24 hours  remdesivir  IVPB   IV Intermittent   remdesivir  IVPB 100 milliGRAM(s) IV Intermittent every 24 hours  rivaroxaban 15 milliGRAM(s) Oral with dinner  simvastatin 20 milliGRAM(s) Oral at bedtime  zinc sulfate 220 milliGRAM(s) Oral every 24 hours    MEDICATIONS  (PRN):

## 2020-11-29 NOTE — PROGRESS NOTE ADULT - ASSESSMENT
71 yo male with pmh of DM, Paroxysmal Afib on Xarelto , HTN, L carotid endarterectomy presents to the hospital with progressively worsening shortness of breath more on exertion than at rest after testing positive for covid19 outpatient 5 days ago.     `#Acute hypoxemic respiratory failure secondary to COVID-19 pneumonia  - HFNC 60L 100% with intermittant Bipap 100%- low threshold for intubation  - Chest X-ray: b/l patchy opacities  - LDH- 553 11/28  - Procal- .59 11/27  - CRP - 3.57 11/27  - d-dimer- 105 11/27  - ferritin - 719 11/27  - FU ID - > follow up on plasma   - continue remdesivir taper   - continue dexamethasone 6mg IV QD for 10 days`    # Transaminitis - stable  - likely related to covid19 r/o liver disease  - AST 65, ALT 45, GGT 56  - RUQ U/S     # PARAM , likely secondary to decreased po intake  - Cr 1.6 on admission , monitor  - started on gentle hydration NS @ 50cc/hr  - follow CMP    # h/o A-fib on xarelto , rate controlled now   - EKG on admission shows A flutter , f/u AM EKG  - c/w Xarelto 20 mg qd    # h/o HTN   - c/w carvedilol   - hold losartan due to impaired renal function for now     # h/o DM   - hold po home medications  - fs ac qhs , IF  > 180 , start insulin protocol  - carb consistent diet   -f/u HbA1c level     # DVT PPX: Xarelto  # GI PPX: famotadine  # Activity: as tolerated  # Diet: carb consistent     69 yo male with pmh of DM, Paroxysmal Afib on Xarelto , HTN, L carotid endarterectomy presents to the hospital with progressively worsening shortness of breath more on exertion than at rest after testing positive for covid19 outpatient 5 days ago.     `#Acute hypoxemic respiratory failure secondary to COVID-19 pneumonia  - HFNC 60L 100% with intermittant Bipap 100%- low threshold for intubation  - Chest X-ray: b/l patchy opacities  - LDH- 553 11/28  - Procal- .59 11/27  - CRP - 3.57 11/27  - d-dimer- 105 11/27  - ferritin - 719 11/27  - FU ID - > follow up on plasma   - Started on ceftriaxone   - continue remdesivir taper   - continue dexamethasone 6mg IV QD for 10 days`    # Transaminitis - stable  - likely related to covid19 r/o liver disease  - AST 65, ALT 45, GGT 56  - RUQ U/S     # PARAM , likely secondary to decreased po intake  - Cr 1.6 on admission , monitor  - started on gentle hydration NS @ 50cc/hr  - follow CMP    # h/o A-fib on xarelto , rate controlled now   - EKG on admission shows A flutter , f/u AM EKG  - c/w Xarelto 20 mg qd    # h/o HTN   - c/w carvedilol   - hold losartan due to impaired renal function for now     # h/o DM   - hold po home medications  - fs ac qhs , IF  > 180 , start insulin protocol  - carb consistent diet   -f/u HbA1c level     # DVT PPX: Xarelto  # GI PPX: famotadine  # Activity: as tolerated  # Diet: carb consistent

## 2020-11-30 LAB
ALBUMIN SERPL ELPH-MCNC: 3.6 G/DL — SIGNIFICANT CHANGE UP (ref 3.5–5.2)
ALP SERPL-CCNC: 77 U/L — SIGNIFICANT CHANGE UP (ref 30–115)
ALT FLD-CCNC: 33 U/L — SIGNIFICANT CHANGE UP (ref 0–41)
ANION GAP SERPL CALC-SCNC: 15 MMOL/L — HIGH (ref 7–14)
AST SERPL-CCNC: 45 U/L — HIGH (ref 0–41)
BASOPHILS # BLD AUTO: 0.01 K/UL — SIGNIFICANT CHANGE UP (ref 0–0.2)
BASOPHILS NFR BLD AUTO: 0.1 % — SIGNIFICANT CHANGE UP (ref 0–1)
BILIRUB SERPL-MCNC: 0.8 MG/DL — SIGNIFICANT CHANGE UP (ref 0.2–1.2)
BUN SERPL-MCNC: 37 MG/DL — HIGH (ref 10–20)
CALCIUM SERPL-MCNC: 8.1 MG/DL — LOW (ref 8.5–10.1)
CHLORIDE SERPL-SCNC: 106 MMOL/L — SIGNIFICANT CHANGE UP (ref 98–110)
CO2 SERPL-SCNC: 22 MMOL/L — SIGNIFICANT CHANGE UP (ref 17–32)
CREAT SERPL-MCNC: 0.9 MG/DL — SIGNIFICANT CHANGE UP (ref 0.7–1.5)
CRP SERPL-MCNC: 4.68 MG/DL — HIGH (ref 0–0.4)
D DIMER BLD IA.RAPID-MCNC: 170 NG/ML DDU — SIGNIFICANT CHANGE UP (ref 0–230)
D DIMER BLD IA.RAPID-MCNC: 210 NG/ML DDU — SIGNIFICANT CHANGE UP (ref 0–230)
EOSINOPHIL # BLD AUTO: 0 K/UL — SIGNIFICANT CHANGE UP (ref 0–0.7)
EOSINOPHIL NFR BLD AUTO: 0 % — SIGNIFICANT CHANGE UP (ref 0–8)
GLUCOSE SERPL-MCNC: 205 MG/DL — HIGH (ref 70–99)
HCT VFR BLD CALC: 40.8 % — LOW (ref 42–52)
HGB BLD-MCNC: 12.7 G/DL — LOW (ref 14–18)
IMM GRANULOCYTES NFR BLD AUTO: 0.6 % — HIGH (ref 0.1–0.3)
LYMPHOCYTES # BLD AUTO: 0.42 K/UL — LOW (ref 1.2–3.4)
LYMPHOCYTES # BLD AUTO: 5 % — LOW (ref 20.5–51.1)
MCHC RBC-ENTMCNC: 22.9 PG — LOW (ref 27–31)
MCHC RBC-ENTMCNC: 31.1 G/DL — LOW (ref 32–37)
MCV RBC AUTO: 73.5 FL — LOW (ref 80–94)
MONOCYTES # BLD AUTO: 0.23 K/UL — SIGNIFICANT CHANGE UP (ref 0.1–0.6)
MONOCYTES NFR BLD AUTO: 2.7 % — SIGNIFICANT CHANGE UP (ref 1.7–9.3)
NEUTROPHILS # BLD AUTO: 7.69 K/UL — HIGH (ref 1.4–6.5)
NEUTROPHILS NFR BLD AUTO: 91.6 % — HIGH (ref 42.2–75.2)
NRBC # BLD: 0 /100 WBCS — SIGNIFICANT CHANGE UP (ref 0–0)
PLATELET # BLD AUTO: 213 K/UL — SIGNIFICANT CHANGE UP (ref 130–400)
POTASSIUM SERPL-MCNC: 3.8 MMOL/L — SIGNIFICANT CHANGE UP (ref 3.5–5)
POTASSIUM SERPL-SCNC: 3.8 MMOL/L — SIGNIFICANT CHANGE UP (ref 3.5–5)
PROCALCITONIN SERPL-MCNC: 0.61 NG/ML — HIGH (ref 0.02–0.1)
PROT SERPL-MCNC: 6.5 G/DL — SIGNIFICANT CHANGE UP (ref 6–8)
RBC # BLD: 5.55 M/UL — SIGNIFICANT CHANGE UP (ref 4.7–6.1)
RBC # FLD: 17.8 % — HIGH (ref 11.5–14.5)
SODIUM SERPL-SCNC: 143 MMOL/L — SIGNIFICANT CHANGE UP (ref 135–146)
WBC # BLD: 8.4 K/UL — SIGNIFICANT CHANGE UP (ref 4.8–10.8)
WBC # FLD AUTO: 8.4 K/UL — SIGNIFICANT CHANGE UP (ref 4.8–10.8)

## 2020-11-30 PROCEDURE — 99232 SBSQ HOSP IP/OBS MODERATE 35: CPT

## 2020-11-30 PROCEDURE — 99233 SBSQ HOSP IP/OBS HIGH 50: CPT

## 2020-11-30 PROCEDURE — 71045 X-RAY EXAM CHEST 1 VIEW: CPT | Mod: 26

## 2020-11-30 RX ORDER — DILTIAZEM HCL 120 MG
5 CAPSULE, EXT RELEASE 24 HR ORAL
Qty: 125 | Refills: 0 | Status: DISCONTINUED | OUTPATIENT
Start: 2020-11-30 | End: 2020-11-30

## 2020-11-30 RX ORDER — ALPRAZOLAM 0.25 MG
0.25 TABLET ORAL EVERY 8 HOURS
Refills: 0 | Status: DISCONTINUED | OUTPATIENT
Start: 2020-11-30 | End: 2020-12-01

## 2020-11-30 RX ORDER — METOPROLOL TARTRATE 50 MG
5 TABLET ORAL ONCE
Refills: 0 | Status: COMPLETED | OUTPATIENT
Start: 2020-11-30 | End: 2020-11-30

## 2020-11-30 RX ORDER — DILTIAZEM HCL 120 MG
10 CAPSULE, EXT RELEASE 24 HR ORAL ONCE
Refills: 0 | Status: COMPLETED | OUTPATIENT
Start: 2020-11-30 | End: 2020-11-30

## 2020-11-30 RX ORDER — DILTIAZEM HCL 120 MG
60 CAPSULE, EXT RELEASE 24 HR ORAL THREE TIMES A DAY
Refills: 0 | Status: DISCONTINUED | OUTPATIENT
Start: 2020-11-30 | End: 2020-12-02

## 2020-11-30 RX ADMIN — Medication 0.25 MILLIGRAM(S): at 09:30

## 2020-11-30 RX ADMIN — CEFTRIAXONE 100 MILLIGRAM(S): 500 INJECTION, POWDER, FOR SOLUTION INTRAMUSCULAR; INTRAVENOUS at 09:31

## 2020-11-30 RX ADMIN — SIMVASTATIN 20 MILLIGRAM(S): 20 TABLET, FILM COATED ORAL at 21:18

## 2020-11-30 RX ADMIN — Medication 5 MG/HR: at 01:52

## 2020-11-30 RX ADMIN — Medication 10 MILLIGRAM(S): at 01:53

## 2020-11-30 RX ADMIN — Medication 81 MILLIGRAM(S): at 11:58

## 2020-11-30 RX ADMIN — Medication 5 MILLIGRAM(S): at 00:49

## 2020-11-30 RX ADMIN — SODIUM CHLORIDE 50 MILLILITER(S): 9 INJECTION INTRAMUSCULAR; INTRAVENOUS; SUBCUTANEOUS at 19:41

## 2020-11-30 RX ADMIN — RIVAROXABAN 20 MILLIGRAM(S): KIT at 16:25

## 2020-11-30 RX ADMIN — CHLORHEXIDINE GLUCONATE 1 APPLICATION(S): 213 SOLUTION TOPICAL at 05:53

## 2020-11-30 RX ADMIN — REMDESIVIR 500 MILLIGRAM(S): 5 INJECTION INTRAVENOUS at 02:07

## 2020-11-30 RX ADMIN — Medication 1000 MILLIGRAM(S): at 11:59

## 2020-11-30 RX ADMIN — Medication 60 MILLIGRAM(S): at 11:58

## 2020-11-30 RX ADMIN — Medication 6 MILLIGRAM(S): at 05:54

## 2020-11-30 RX ADMIN — Medication 0.25 MILLIGRAM(S): at 23:56

## 2020-11-30 RX ADMIN — Medication 60 MILLIGRAM(S): at 21:18

## 2020-11-30 NOTE — PROGRESS NOTE ADULT - ASSESSMENT
70y old  Male with pmh of DM, Paroxysmal Afib on Xarelto , HTN, L carotid endarterectomy, presents to the ER for evaluation of  progressively worsening shortness of breath more on exertion than at rest after testing positive for covid19 outpatient 5 days ago. The patient notes that he has been having symptoms of weakness, decreased po intake, and feeling sleepy more than usual. Upon arrival to the ER.  he found to have fever, tachycardia. tachypnea and  desaturated on 15 L non rebreather to 87%  and was placed on high flow oxygen with improvement in saturation to 97%    IMPRESSION;  COVID 19 with severe illness. SpO2 < 94% on RA and need for supplemental O2.  Steroids beneficial.  Pt is in the early viral replicative phase based on the timeline/onset of symptoms.  Inflammatory markers are elevated which is unusual this early on in the replicative phase  procalcitonin 0.59  ,  suggestive of a bacterial coinfection  Ferritiin 719  CRP 3.57  Ddimers 105    RECOMMENDATIONS;  BCx  Urine for strep pneumonia antigen  Target SpO2 92 % to 96 %  Rocephin 2 gm iv q24h  Type and screen.  Convalescent plasma 2 units. Each unit over 2h  Adverse events to watch out for generally within 4 hours of completion of infusion  TACO: transfusion associated circulatory overload  TRALI :Transfusion related acute lung injury  Severe allergic transfusion reactions  Day 1 11/29 : Single  mg IV loading dose  Day 2-5 : single  mg IV once daily maintenance dose  Daily CBC,CMP.  Dexamethasone 6 mg iv q24h for 10 days 11/29( or until discharge ) or equivalent glucocorticoid dose may be substituted. Equivalent total dosis of alternative steroids are Methylprednisolone 32 mg and prednisone 40 mg q24h.  Monitor for side effects: hyperglycemia, neurological ( agitation/confusion), adrenal suppression, bacterial and fungal infections  Anticoagulation as per team  Proning for 16h/day if pt can tolerate it.

## 2020-11-30 NOTE — CDI QUERY NOTE - NSCDIOTHERTXTBX_GEN_ALL_CORE_HH
Documentation:  70 YOM presented with SOB. Found to have acute respiratory failure 2/2 COVID pneumonia  ** ID consult 11/28: Sepsis ( Fever + tachycardia + Tachypnea)     Vital Signs on Admission: T 102.6, , RR 24, BP 95/55    Lab shows:  WBC Count: 5.15 K/uL (11-27-20 @ 19:15)  Blood Gas Venous - Lactate: 2.1 mmoL/L (11-27-20 @ 19:18)  Procalcitonin: 0.59 (11-27-20 @ 19:15)    Treatment:   cefTRIAXone   IVPB  100 mL/Hr IV Intermittent (11-30-20 @ 09:31)  remdesivir  IVPB  200 milliGRAM(s) IV Intermittent (11-28-20 @ 04:40)    Based on your clinical evaluation of the patient, can you please clarify:   (  ) After study, sepsis is ruled out  (  ) Patient is been treated for sepsis during this admission. (Please indicate POA status)  (  ) Other (please specify)  (  ) Unable to determine.

## 2020-11-30 NOTE — PROGRESS NOTE ADULT - SUBJECTIVE AND OBJECTIVE BOX
MAGGIE SAHNI  70y, Male    All available historical data reviewed    OVERNIGHT EVENTS:  no fevers  93% HFNC 60Lit, 100%    ROS:  General: Denies rigors, nightsweats  HEENT: Denies headache, rhinorrhea, sore throat, eye pain  CV: Denies CP, palpitations  PULM: Denies wheezing, hemoptysis  GI: Denies hematemesis, hematochezia, melena  : Denies discharge, hematuria  MSK: Denies arthralgias, myalgias  SKIN: Denies rash, lesions  NEURO: Denies paresthesias, weakness  PSYCH: Denies depression, anxiety    VITALS:  T(F): 97.4, Max: 98.4 (11-30-20 @ 06:00)  HR: 98  BP: 122/79  RR: 20Vital Signs Last 24 Hrs  T(C): 36.3 (30 Nov 2020 09:44), Max: 36.9 (30 Nov 2020 06:00)  T(F): 97.4 (30 Nov 2020 09:44), Max: 98.4 (30 Nov 2020 06:00)  HR: 98 (30 Nov 2020 12:00) (88 - 151)  BP: 122/79 (30 Nov 2020 12:00) (90/68 - 157/84)  BP(mean): 95 (30 Nov 2020 12:00) (75 - 114)  RR: 20 (30 Nov 2020 09:44) (20 - 42)  SpO2: 91% (30 Nov 2020 12:00) (90% - 99%)    TESTS & MEASUREMENTS:                        12.7   8.40  )-----------( 213      ( 30 Nov 2020 04:30 )             40.8     11-30    143  |  106  |  37<H>  ----------------------------<  205<H>  3.8   |  22  |  0.9    Ca    8.1<L>      30 Nov 2020 04:30    TPro  6.5  /  Alb  3.6  /  TBili  0.8  /  DBili  x   /  AST  45<H>  /  ALT  33  /  AlkPhos  77  11-30    LIVER FUNCTIONS - ( 30 Nov 2020 04:30 )  Alb: 3.6 g/dL / Pro: 6.5 g/dL / ALK PHOS: 77 U/L / ALT: 33 U/L / AST: 45 U/L / GGT: x                   RADIOLOGY & ADDITIONAL TESTS:  Personal review of radiological diagnostics performed  Echo and EKG results noted when applicable.     MEDICATIONS:  ALPRAZolam 0.25 milliGRAM(s) Oral every 8 hours PRN  aspirin enteric coated 81 milliGRAM(s) Oral daily  cefTRIAXone   IVPB 1000 milliGRAM(s) IV Intermittent every 24 hours  chlorhexidine 4% Liquid 1 Application(s) Topical <User Schedule>  dexAMETHasone  Injectable 6 milliGRAM(s) IV Push daily  diltiazem    Tablet 60 milliGRAM(s) Oral three times a day  remdesivir  IVPB   IV Intermittent   remdesivir  IVPB 100 milliGRAM(s) IV Intermittent every 24 hours  rivaroxaban 20 milliGRAM(s) Oral with dinner  simvastatin 20 milliGRAM(s) Oral at bedtime  sodium chloride 0.9%. 1000 milliLiter(s) IV Continuous <Continuous>      ANTIBIOTICS:  cefTRIAXone   IVPB 1000 milliGRAM(s) IV Intermittent every 24 hours  remdesivir  IVPB   IV Intermittent   remdesivir  IVPB 100 milliGRAM(s) IV Intermittent every 24 hours

## 2020-11-30 NOTE — PROGRESS NOTE ADULT - SUBJECTIVE AND OBJECTIVE BOX
MAGGIE SAHNI  70y, Male  Allergy: No Known Allergies    Hospital Day: 3d    Patient seen and examined earlier today.     LAST 24-Hr EVENTS:  Less anxious when off the BiPAP    VITALS:  T(F): 97.5 (11-30-20 @ 16:00), Max: 98.4 (11-30-20 @ 06:00)  HR: 84 (11-30-20 @ 16:00)  BP: 123/64 (11-30-20 @ 16:00) (90/68 - 157/84)  RR: 20 (11-30-20 @ 09:44)  SpO2: 86% (11-30-20 @ 16:00)         TESTS & MEASUREMENTS:  Weight (Kg):   BMI (kg/m2): 24.6 (11-28)    11-28-20 @ 07:01  -  11-29-20 @ 07:00  --------------------------------------------------------  IN: 250 mL / OUT: 1400 mL / NET: -1150 mL    11-29-20 @ 07:01  -  11-30-20 @ 07:00  --------------------------------------------------------  IN: 730 mL / OUT: 950 mL / NET: -220 mL    11-30-20 @ 07:01  -  11-30-20 @ 17:11  --------------------------------------------------------  IN: 740 mL / OUT: 825 mL / NET: -85 mL                            12.7   8.40  )-----------( 213      ( 30 Nov 2020 04:30 )             40.8       11-30    143  |  106  |  37<H>  ----------------------------<  205<H>  3.8   |  22  |  0.9    Ca    8.1<L>      30 Nov 2020 04:30    TPro  6.5  /  Alb  3.6  /  TBili  0.8  /  DBili  x   /  AST  45<H>  /  ALT  33  /  AlkPhos  77  11-30    LIVER FUNCTIONS - ( 30 Nov 2020 04:30 )  Alb: 3.6 g/dL / Pro: 6.5 g/dL / ALK PHOS: 77 U/L / ALT: 33 U/L / AST: 45 U/L / GGT: x                   Procalcitonin, Serum: 0.59 ng/mL (11-27-20 @ 19:15)    D-Dimer Assay, Quantitative: 170 ng/mL DDU (11-30-20 @ 04:30)  D-Dimer Assay, Quantitative: 105 ng/mL DDU (11-27-20 @ 19:15)    Ferritin, Serum: 719 ng/mL (11-27-20 @ 19:15)          RADIOLOGY, ECG, & ADDITIONAL TESTS:      RECENT DIAGNOSTIC ORDERS:  D-Dimer Assay, Quantitative:  Start Date:30-Nov-2020. 16:00 (11-30-20 @ 11:17)  Creatinine, Serum: Routine  Addl Info: Repeat Order: Daily for 10 occurences (11-30-20 @ 00:02)  Hepatic Function Panel: Routine  Addl Info: Repeat Order: Daily for 10 occurences (11-30-20 @ 00:01)      MEDICATIONS:  MEDICATIONS  (STANDING):  aspirin enteric coated 81 milliGRAM(s) Oral daily  cefTRIAXone   IVPB 1000 milliGRAM(s) IV Intermittent every 24 hours  chlorhexidine 4% Liquid 1 Application(s) Topical <User Schedule>  dexAMETHasone  Injectable 6 milliGRAM(s) IV Push daily  diltiazem    Tablet 60 milliGRAM(s) Oral three times a day  remdesivir  IVPB   IV Intermittent   remdesivir  IVPB 100 milliGRAM(s) IV Intermittent every 24 hours  rivaroxaban 20 milliGRAM(s) Oral with dinner  simvastatin 20 milliGRAM(s) Oral at bedtime  sodium chloride 0.9%. 1000 milliLiter(s) (50 mL/Hr) IV Continuous <Continuous>    MEDICATIONS  (PRN):  ALPRAZolam 0.25 milliGRAM(s) Oral every 8 hours PRN anxiety      HOME MEDICATIONS:  Aspir 81 oral delayed release tablet (11-28)  carvedilol 6.25 mg oral tablet (11-28)  Janumet 50 mg-1000 mg oral tablet (11-28)  losartan 50 mg oral tablet (11-28)  simvastatin 20 mg oral tablet (11-28)  Xarelto 20 mg oral tablet (11-28)      PHYSICAL EXAM:  GENERAL:   NECK:   CHEST/LUNG:   HEART:   ABDOMEN:   EXTREMITIES:           MAGGIE SAHNI  70y, Male  Allergy: No Known Allergies    Hospital Day: 3d    Patient seen and examined earlier today.     LAST 24-Hr EVENTS:  Less anxious when off the BiPAP    VITALS:  T(F): 97.5 (11-30-20 @ 16:00), Max: 98.4 (11-30-20 @ 06:00)  HR: 84 (11-30-20 @ 16:00)  BP: 123/64 (11-30-20 @ 16:00) (90/68 - 157/84)  RR: 20 (11-30-20 @ 09:44)  SpO2: 86% (11-30-20 @ 16:00) on Fio2 100% via Humidified High Flow Nasal Cannula (HHFNC)         TESTS & MEASUREMENTS:  Weight (Kg):   BMI (kg/m2): 24.6 (11-28)    11-28-20 @ 07:01  -  11-29-20 @ 07:00  --------------------------------------------------------  IN: 250 mL / OUT: 1400 mL / NET: -1150 mL    11-29-20 @ 07:01  -  11-30-20 @ 07:00  --------------------------------------------------------  IN: 730 mL / OUT: 950 mL / NET: -220 mL    11-30-20 @ 07:01  -  11-30-20 @ 17:11  --------------------------------------------------------  IN: 740 mL / OUT: 825 mL / NET: -85 mL                            12.7   8.40  )-----------( 213      ( 30 Nov 2020 04:30 )             40.8       11-30    143  |  106  |  37<H>  ----------------------------<  205<H>  3.8   |  22  |  0.9    Ca    8.1<L>      30 Nov 2020 04:30    TPro  6.5  /  Alb  3.6  /  TBili  0.8  /  DBili  x   /  AST  45<H>  /  ALT  33  /  AlkPhos  77  11-30    LIVER FUNCTIONS - ( 30 Nov 2020 04:30 )  Alb: 3.6 g/dL / Pro: 6.5 g/dL / ALK PHOS: 77 U/L / ALT: 33 U/L / AST: 45 U/L / GGT: x                   Procalcitonin, Serum: 0.59 ng/mL (11-27-20 @ 19:15)    D-Dimer Assay, Quantitative: 170 ng/mL DDU (11-30-20 @ 04:30)  D-Dimer Assay, Quantitative: 105 ng/mL DDU (11-27-20 @ 19:15)    Ferritin, Serum: 719 ng/mL (11-27-20 @ 19:15)      RADIOLOGY, ECG, & ADDITIONAL TESTS:  < from: Xray Chest 1 View- PORTABLE-Routine (Xray Chest 1 View- PORTABLE-Routine in AM.) (11.30.20 @ 06:30) >  IMPRESSION:    Slightly increased bibasilar opacities.    MEDICATIONS:  MEDICATIONS  (STANDING):  aspirin enteric coated 81 milliGRAM(s) Oral daily  cefTRIAXone   IVPB 1000 milliGRAM(s) IV Intermittent every 24 hours  chlorhexidine 4% Liquid 1 Application(s) Topical <User Schedule>  dexAMETHasone  Injectable 6 milliGRAM(s) IV Push daily  diltiazem    Tablet 60 milliGRAM(s) Oral three times a day  remdesivir  IVPB   IV Intermittent   remdesivir  IVPB 100 milliGRAM(s) IV Intermittent every 24 hours  rivaroxaban 20 milliGRAM(s) Oral with dinner  simvastatin 20 milliGRAM(s) Oral at bedtime  sodium chloride 0.9%. 1000 milliLiter(s) (50 mL/Hr) IV Continuous <Continuous>    MEDICATIONS  (PRN):  ALPRAZolam 0.25 milliGRAM(s) Oral every 8 hours PRN anxiety      HOME MEDICATIONS:  Aspir 81 oral delayed release tablet (11-28)  carvedilol 6.25 mg oral tablet (11-28)  Janumet 50 mg-1000 mg oral tablet (11-28)  losartan 50 mg oral tablet (11-28)  simvastatin 20 mg oral tablet (11-28)  Xarelto 20 mg oral tablet (11-28)      PHYSICAL EXAM:  GENERAL: in NAD/P while on Humidified High Flow Nasal Cannula (HHFNC) at rest   CHEST/LUNG: Rales (ant ausc)  HEART: S1S2  ABDOMEN: Soft  EXTREMITIES:  No edema/ chronic skin changes

## 2020-11-30 NOTE — PROGRESS NOTE ADULT - ASSESSMENT
.. Acute Hypoxic Respiratory Failure secondary to COVID19 pneumonia   .. Severe COVID19 infection with evidence of underlying inflammatory cytokine storm   .. Uncontrolled anxiety; worse while on Non-invasive positive pressure ventilation (NIPPV)   .. AFib (chronic) on Novel Oral Anti-Coagulant (NOAC) - Xarelto  .. PARAM likely prerenal but at high risk for ATN; on intravenous volume repletion  .. Possible baterial superimposed infection; ID team following up    PLAN  ·	Alternate Non-invasive positive pressure ventilation (NIPPV) with Humidified High Flow Nasal Cannula (HHFNC) opten  ·	Low dose BZD on PRN basis only  ·	Continue with broad-spectrum intravenous antibiotics as per ID tea,  ·	Remains on Remdesivir and Dexamethasone intravenously  ·	on Novel Oral Anti-Coagulant (NOAC) as scheduled   ·	Will DC intravenous fuid after improved PO intake    Patient remains with poor prognosis overall despite all care and at high risk for intubation and cardiopulmonary complications.  Case discussed at length with multidisciplinary team on rounds.

## 2020-11-30 NOTE — PROGRESS NOTE ADULT - SUBJECTIVE AND OBJECTIVE BOX
Patient is a 70y old  Male who presents with a chief complaint of       Over Night Events:  On BiPAP 14/8 100% O2.  On Cardizem drip 5 mg / hour         ROS:     All ROS are negative except HPI         PHYSICAL EXAM    ICU Vital Signs Last 24 Hrs  T(C): 36.4 (30 Nov 2020 16:00), Max: 36.9 (30 Nov 2020 06:00)  T(F): 97.5 (30 Nov 2020 16:00), Max: 98.4 (30 Nov 2020 06:00)  HR: 84 (30 Nov 2020 16:00) (84 - 151)  BP: 123/64 (30 Nov 2020 16:00) (90/68 - 157/84)  BP(mean): 89 (30 Nov 2020 16:00) (74 - 114)  ABP: --  ABP(mean): --  RR: 20 (30 Nov 2020 09:44) (20 - 42)  SpO2: 86% (30 Nov 2020 16:00) (85% - 99%)      CONSTITUTIONAL:  Well nourished.  NAD    ENT:   Airway patent,   Mouth with normal mucosa.   No thrush    EYES:   Pupils equal,   Round and reactive to light.    CARDIAC:   Normal rate,   Irregular rhythm.     edema      Vascular:  Normal systolic impulse  No Carotid bruits    RESPIRATORY:   No wheezing  Bilateral BS  Normal chest expansion  Not tachypneic,  No use of accessory muscles    GASTROINTESTINAL:  Abdomen soft,   Non-tender,   No guarding,   + BS    MUSCULOSKELETAL:   Range of motion is not limited,  No clubbing, cyanosis    NEUROLOGICAL:   Alert and oriented   No motor  deficits.    SKIN:   Skin normal color for race,   Warm and dry  No evidence of rash.    PSYCHIATRIC:   Normal mood and affect.   No apparent risk to self or others.    HEMATOLOGICAL:  No cervical  lymphadenopathy.  no inguinal lymphadenopathy      11-29-20 @ 07:01  -  11-30-20 @ 07:00  --------------------------------------------------------  IN:    Diltiazem: 30 mL    IV PiggyBack: 50 mL    sodium chloride 0.9%: 650 mL  Total IN: 730 mL    OUT:    Voided (mL): 950 mL  Total OUT: 950 mL    Total NET: -220 mL      11-30-20 @ 07:01  -  11-30-20 @ 19:58  --------------------------------------------------------  IN:    IV PiggyBack: 50 mL    Oral Fluid: 480 mL    sodium chloride 0.9%: 600 mL  Total IN: 1130 mL    OUT:    Voided (mL): 825 mL  Total OUT: 825 mL    Total NET: 305 mL          LABS:                            12.7   8.40  )-----------( 213      ( 30 Nov 2020 04:30 )             40.8                                               11-30    143  |  106  |  37<H>  ----------------------------<  205<H>  3.8   |  22  |  0.9    Ca    8.1<L>      30 Nov 2020 04:30    TPro  6.5  /  Alb  3.6  /  TBili  0.8  /  DBili  x   /  AST  45<H>  /  ALT  33  /  AlkPhos  77  11-30                                                                                           LIVER FUNCTIONS - ( 30 Nov 2020 04:30 )  Alb: 3.6 g/dL / Pro: 6.5 g/dL / ALK PHOS: 77 U/L / ALT: 33 U/L / AST: 45 U/L / GGT: x                                                                                                                                       MEDICATIONS  (STANDING):  aspirin enteric coated 81 milliGRAM(s) Oral daily  cefTRIAXone   IVPB 1000 milliGRAM(s) IV Intermittent every 24 hours  chlorhexidine 4% Liquid 1 Application(s) Topical <User Schedule>  dexAMETHasone  Injectable 6 milliGRAM(s) IV Push daily  diltiazem    Tablet 60 milliGRAM(s) Oral three times a day  remdesivir  IVPB   IV Intermittent   remdesivir  IVPB 100 milliGRAM(s) IV Intermittent every 24 hours  rivaroxaban 20 milliGRAM(s) Oral with dinner  simvastatin 20 milliGRAM(s) Oral at bedtime  sodium chloride 0.9%. 1000 milliLiter(s) (50 mL/Hr) IV Continuous <Continuous>    MEDICATIONS  (PRN):  ALPRAZolam 0.25 milliGRAM(s) Oral every 8 hours PRN anxiety      New X-rays reviewed:                                                                                  ECHO    CXR interpreted by me:  Bilateral infiltrates

## 2020-11-30 NOTE — PROGRESS NOTE ADULT - ASSESSMENT
IMPRESSION:    Acute hypoxemic resp failure  Covid pneumonia  P A FIB on xarelto  Renal failure  possible bacterial superinfection      PLAN:    CNS: avoid CNS depressant    HEENT:  Oral care    PULMONARY:  HOB @ 45 degrees, incentive spirometer, alternate BIPAP/ HHFNC keep Sao2 92 to 96%,     CARDIOVASCULAR: Gentle hydration while NPO>  Rate control.  Transition to oral Cardizem     GI: GI prophylaxis                                          Feeding po if tolerates off BiPAP     RENAL:  F/u  lytes.  Correct as needed. accurate I/O    INFECTIOUS DISEASE: per ID, trend infl markers    HEMATOLOGICAL:  DVT prophylaxis. xarelto    ENDOCRINE:  Follow up FS.  Insulin protocol if needed.    MUSCULOSKELETAL:    CODE STATUS: FULL CODE  CEU  poor prognosis

## 2020-12-01 LAB
ALBUMIN SERPL ELPH-MCNC: 3.2 G/DL — LOW (ref 3.5–5.2)
ALP SERPL-CCNC: 73 U/L — SIGNIFICANT CHANGE UP (ref 30–115)
ALT FLD-CCNC: 27 U/L — SIGNIFICANT CHANGE UP (ref 0–41)
ANION GAP SERPL CALC-SCNC: 16 MMOL/L — HIGH (ref 7–14)
AST SERPL-CCNC: 45 U/L — HIGH (ref 0–41)
BASOPHILS # BLD AUTO: 0.01 K/UL — SIGNIFICANT CHANGE UP (ref 0–0.2)
BASOPHILS NFR BLD AUTO: 0.2 % — SIGNIFICANT CHANGE UP (ref 0–1)
BILIRUB SERPL-MCNC: 1 MG/DL — SIGNIFICANT CHANGE UP (ref 0.2–1.2)
BUN SERPL-MCNC: 31 MG/DL — HIGH (ref 10–20)
CALCIUM SERPL-MCNC: 8.1 MG/DL — LOW (ref 8.5–10.1)
CHLORIDE SERPL-SCNC: 106 MMOL/L — SIGNIFICANT CHANGE UP (ref 98–110)
CO2 SERPL-SCNC: 18 MMOL/L — SIGNIFICANT CHANGE UP (ref 17–32)
CREAT SERPL-MCNC: 0.8 MG/DL — SIGNIFICANT CHANGE UP (ref 0.7–1.5)
EOSINOPHIL # BLD AUTO: 0 K/UL — SIGNIFICANT CHANGE UP (ref 0–0.7)
EOSINOPHIL NFR BLD AUTO: 0 % — SIGNIFICANT CHANGE UP (ref 0–8)
GLUCOSE SERPL-MCNC: 220 MG/DL — HIGH (ref 70–99)
HCT VFR BLD CALC: 39.5 % — LOW (ref 42–52)
HGB BLD-MCNC: 12 G/DL — LOW (ref 14–18)
IMM GRANULOCYTES NFR BLD AUTO: 0.5 % — HIGH (ref 0.1–0.3)
LYMPHOCYTES # BLD AUTO: 0.3 K/UL — LOW (ref 1.2–3.4)
LYMPHOCYTES # BLD AUTO: 5.2 % — LOW (ref 20.5–51.1)
MAGNESIUM SERPL-MCNC: 2.6 MG/DL — HIGH (ref 1.8–2.4)
MCHC RBC-ENTMCNC: 22.6 PG — LOW (ref 27–31)
MCHC RBC-ENTMCNC: 30.4 G/DL — LOW (ref 32–37)
MCV RBC AUTO: 74.2 FL — LOW (ref 80–94)
MONOCYTES # BLD AUTO: 0.13 K/UL — SIGNIFICANT CHANGE UP (ref 0.1–0.6)
MONOCYTES NFR BLD AUTO: 2.2 % — SIGNIFICANT CHANGE UP (ref 1.7–9.3)
MRSA PCR RESULT.: NEGATIVE — SIGNIFICANT CHANGE UP
NEUTROPHILS # BLD AUTO: 5.35 K/UL — SIGNIFICANT CHANGE UP (ref 1.4–6.5)
NEUTROPHILS NFR BLD AUTO: 91.9 % — HIGH (ref 42.2–75.2)
NRBC # BLD: 0 /100 WBCS — SIGNIFICANT CHANGE UP (ref 0–0)
PLATELET # BLD AUTO: 253 K/UL — SIGNIFICANT CHANGE UP (ref 130–400)
POTASSIUM SERPL-MCNC: 4.4 MMOL/L — SIGNIFICANT CHANGE UP (ref 3.5–5)
POTASSIUM SERPL-SCNC: 4.4 MMOL/L — SIGNIFICANT CHANGE UP (ref 3.5–5)
PROT SERPL-MCNC: 6 G/DL — SIGNIFICANT CHANGE UP (ref 6–8)
RBC # BLD: 5.32 M/UL — SIGNIFICANT CHANGE UP (ref 4.7–6.1)
RBC # FLD: 18.1 % — HIGH (ref 11.5–14.5)
SODIUM SERPL-SCNC: 140 MMOL/L — SIGNIFICANT CHANGE UP (ref 135–146)
WBC # BLD: 5.82 K/UL — SIGNIFICANT CHANGE UP (ref 4.8–10.8)
WBC # FLD AUTO: 5.82 K/UL — SIGNIFICANT CHANGE UP (ref 4.8–10.8)

## 2020-12-01 PROCEDURE — 99232 SBSQ HOSP IP/OBS MODERATE 35: CPT

## 2020-12-01 PROCEDURE — 99233 SBSQ HOSP IP/OBS HIGH 50: CPT

## 2020-12-01 RX ORDER — ALPRAZOLAM 0.25 MG
0.25 TABLET ORAL EVERY 8 HOURS
Refills: 0 | Status: DISCONTINUED | OUTPATIENT
Start: 2020-12-01 | End: 2020-12-04

## 2020-12-01 RX ADMIN — Medication 60 MILLIGRAM(S): at 06:30

## 2020-12-01 RX ADMIN — SIMVASTATIN 20 MILLIGRAM(S): 20 TABLET, FILM COATED ORAL at 21:48

## 2020-12-01 RX ADMIN — Medication 60 MILLIGRAM(S): at 21:49

## 2020-12-01 RX ADMIN — Medication 60 MILLIGRAM(S): at 13:38

## 2020-12-01 RX ADMIN — Medication 0.25 MILLIGRAM(S): at 16:42

## 2020-12-01 RX ADMIN — Medication 81 MILLIGRAM(S): at 11:17

## 2020-12-01 RX ADMIN — CHLORHEXIDINE GLUCONATE 1 APPLICATION(S): 213 SOLUTION TOPICAL at 05:13

## 2020-12-01 RX ADMIN — CEFTRIAXONE 100 MILLIGRAM(S): 500 INJECTION, POWDER, FOR SOLUTION INTRAMUSCULAR; INTRAVENOUS at 06:29

## 2020-12-01 RX ADMIN — REMDESIVIR 500 MILLIGRAM(S): 5 INJECTION INTRAVENOUS at 02:37

## 2020-12-01 RX ADMIN — SODIUM CHLORIDE 50 MILLILITER(S): 9 INJECTION INTRAMUSCULAR; INTRAVENOUS; SUBCUTANEOUS at 11:19

## 2020-12-01 RX ADMIN — Medication 6 MILLIGRAM(S): at 06:29

## 2020-12-01 RX ADMIN — RIVAROXABAN 20 MILLIGRAM(S): KIT at 16:42

## 2020-12-01 NOTE — PROGRESS NOTE ADULT - ASSESSMENT
69 yo male with pmh of DM, Paroxysmal Afib on Xarelto , HTN, L carotid endarterectomy presents to the hospital with progressively worsening shortness of breath more on exertion than at rest after testing positive for covid19 outpatient 5 days ago.     #Acute hypoxemic respiratory failure secondary to COVID-19 pneumonia  - HFNC 60L 100% with intermittent BiPAP 100% - low threshold for intubation  - Chest X-ray: Slightly increased bibasilar opacities (11/30)  - LDH - 553, Procal - 0.61, CRP - 4.68, d-dimer - 210, Ferritin - 719, Rocephin 2g IV q24   - s/p Convalescent plasma 2 U  - c/w Remdesivir taper   - c/w Dexamethasone 6mg IV QD for 10 days  - ID consult appreciated  - f/u Urine for Strep pneumo, BCx  - MRSA Nares    # Transaminitis - stable  - likely related to COVID-19 r/o liver disease  - AST 77, ALT 33, GGT 56  - RUQ U/S; Hepatomegaly with diffuse hepatic steatosis. Nodular liver contour suggesting early cirrhotic changes. Gallbladder wall thickening, nonspecific.    # PARAM , likely secondary to decreased po intake - Resolved  - Cr 1.6 on admission , monitor, currently 0.8    # h/o A-fib on xarelto , rate controlled now   - rate control w/ Cardizam  - EKG on admission shows A flutter , f/u AM EKG  - c/w Xarelto 20 mg qd    # h/o HTN   - c/w carvedilol   - hold losartan due to impaired renal function for now     # h/o DM   - hold po home medications  - fs ac qhs , IF  > 180 , start insulin protocol  - carb consistent diet   -f/u HbA1c level     # DVT ppx: Xarelto  # GI ppx: Famotidine  # Activity: As Tolerated  # Diet: Carb Consistent

## 2020-12-01 NOTE — PROGRESS NOTE ADULT - SUBJECTIVE AND OBJECTIVE BOX
SUBJECTIVE:      PAST MEDICAL & SURGICAL HISTORY  PAST MEDICAL & SURGICAL HISTORY:  H/O sciatica  [facility] PAIN LT LE    H/O cystoscopy  resection of bladder tumor    Bladder cancer    DM (diabetes mellitus)    Hypercholesteremia    Afib    HTN (hypertension)    History of hernia repair      SOCIAL HISTORY:    ALLERGIES:  No Known Allergies    MEDICATIONS:  STANDING MEDICATIONS  aspirin enteric coated 81 milliGRAM(s) Oral daily  cefTRIAXone   IVPB 1000 milliGRAM(s) IV Intermittent every 24 hours  chlorhexidine 4% Liquid 1 Application(s) Topical <User Schedule>  dexAMETHasone  Injectable 6 milliGRAM(s) IV Push daily  diltiazem    Tablet 60 milliGRAM(s) Oral three times a day  LORazepam   Injectable 0.25 milliGRAM(s) IV Push once  remdesivir  IVPB   IV Intermittent   remdesivir  IVPB 100 milliGRAM(s) IV Intermittent every 24 hours  rivaroxaban 20 milliGRAM(s) Oral with dinner  simvastatin 20 milliGRAM(s) Oral at bedtime  sodium chloride 0.9%. 1000 milliLiter(s) IV Continuous <Continuous>    PRN MEDICATIONS  ALPRAZolam 0.25 milliGRAM(s) Oral every 8 hours PRN    VITALS:   T(F): 96  HR: 90  BP: 139/69  RR: 30  SpO2: 89%    LABS:                        12.0   5.82  )-----------( 253      ( 01 Dec 2020 11:54 )             39.5     12-01    140  |  106  |  31<H>  ----------------------------<  220<H>  4.4   |  18  |  0.8    Ca    8.1<L>      01 Dec 2020 11:54  Mg     2.6     12-01    TPro  6.0  /  Alb  3.2<L>  /  TBili  1.0  /  DBili  x   /  AST  45<H>  /  ALT  27  /  AlkPhos  73  12-01                  RADIOLOGY:    PHYSICAL EXAM:  GEN: No acute distress  HEENT: AT/NC PEERLA, EOMI  LUNGS: Clear to auscultation bilaterally,   HEART: S1/S2 present. RRR. no rubs, murmurs or gallops  ABD: Soft, non-tender, non-distended. Bowel sounds present in all 4 quadrants  EXT: No edema, no rashes, no cyanosis  NEURO: AAOX3, CN 2-12 intact       ALLERGIES:  No Known Allergies    MEDICATIONS:  STANDING MEDICATIONS  aspirin enteric coated 81 milliGRAM(s) Oral daily  cefTRIAXone   IVPB 1000 milliGRAM(s) IV Intermittent every 24 hours  chlorhexidine 4% Liquid 1 Application(s) Topical <User Schedule>  dexAMETHasone  Injectable 6 milliGRAM(s) IV Push daily  diltiazem    Tablet 60 milliGRAM(s) Oral three times a day  LORazepam   Injectable 0.25 milliGRAM(s) IV Push once  remdesivir  IVPB   IV Intermittent   remdesivir  IVPB 100 milliGRAM(s) IV Intermittent every 24 hours  rivaroxaban 20 milliGRAM(s) Oral with dinner  simvastatin 20 milliGRAM(s) Oral at bedtime  sodium chloride 0.9%. 1000 milliLiter(s) IV Continuous <Continuous>    PRN MEDICATIONS  ALPRAZolam 0.25 milliGRAM(s) Oral every 8 hours PRN    VITALS:   T(F): 96  HR: 90  BP: 139/69  RR: 30  SpO2: 89%    LABS:                        12.0   5.82  )-----------( 253      ( 01 Dec 2020 11:54 )             39.5     12-01    140  |  106  |  31<H>  ----------------------------<  220<H>  4.4   |  18  |  0.8    Ca    8.1<L>      01 Dec 2020 11:54  Mg     2.6     12-01    TPro  6.0  /  Alb  3.2<L>  /  TBili  1.0  /  DBili  x   /  AST  45<H>  /  ALT  27  /  AlkPhos  73  12-01      RADIOLOGY:  < from: Xray Chest 1 View- PORTABLE-Routine (Xray Chest 1 View- PORTABLE-Routine in AM.) (11.30.20 @ 06:30) >  IMPRESSION:    Slightly increased bibasilar opacities.    < end of copied text >    PHYSICAL EXAM:  GEN: No acute distress  HEENT: AT/NC PEERLA, EOMI  LUNGS: Clear to auscultation bilaterally,   HEART: S1/S2 present. RRR. no rubs, murmurs or gallops  ABD: Soft, non-tender, non-distended. Bowel sounds present in all 4 quadrants  EXT: No edema, no rashes, no cyanosis  NEURO: AAOX3, CN 2-12 intact

## 2020-12-01 NOTE — PROGRESS NOTE ADULT - ASSESSMENT
IMPRESSION:    Acute hypoxemic resp failure  Covid pneumonia  P A FIB on xarelto  Possible bacterial superinfection      PLAN:    CNS: avoid CNS depressant    HEENT:  Oral care    PULMONARY:  HOB @ 45 degrees, incentive spirometer, alternate BIPAP/ HHFNC keep Sao2 92 to 96%,     CARDIOVASCULAR:  Continue Rate control.  DC IVF     GI: GI prophylaxis                                          Feeding po     RENAL:  F/u  lytes.  Correct as needed. accurate I/O    INFECTIOUS DISEASE: per ID, trend infl markers.  Nasal MRSA.  Dexa D# 4    HEMATOLOGICAL:  DVT prophylaxis. Xarelto    ENDOCRINE:  Follow up FS.  Insulin protocol if needed.    MUSCULOSKELETAL:    CODE STATUS: FULL CODE  CEU  poor prognosis

## 2020-12-01 NOTE — CHART NOTE - NSCHARTNOTEFT_GEN_A_CORE
Patient desaturating to 75% on HFNC. Patient anxious about using the Bipap and was counselling on the consequences. Agrees to alternate between Bipap and HFNC to correct the oxygenation.

## 2020-12-01 NOTE — PROGRESS NOTE ADULT - SUBJECTIVE AND OBJECTIVE BOX
SUBJECTIVE:    69 yo male with pmh of DM, Paroxysmal Afib on Xarelto , HTN, L carotid endarterectomy presents to the hospital with progressively worsening shortness of breath more on exertion than at rest after testing positive for covid19 outpatient 5 days ago. The patient notes that he has been having symptoms of weakness, decreased po intake, and feeling sleepy more than usual. Upon arrival to the ED the patient desaturated on 15 L non rebreather to 87% with tachypnea and was placed on high flow with improvement in saturation to 97% and subjective improvement in quality of breathing. The patient denies symptoms of dizziness/ chest pain/ short of breath/ abdominal pain/constipation/ diarrhea/loss of taste/ loss of smell. All other ROS negative except as noted above.     Vital Signs (24 Hrs): T(C): 39.2,  HR: 95 , BP: 130/80 (11-27-20 @ 22:19) (95/55 - 130/80), RR: 20 , SpO2: 92%   Labs: Lymphopenia , Cr 1.6 , Transaminitis (AST65, ALT 45) , D- DIMER 105  CXR: b/l patchy opacities      (28 Nov 2020 00:13)    PAST MEDICAL & SURGICAL HISTORY  PAST MEDICAL & SURGICAL HISTORY:  H/O sciatica  [facility] PAIN LT LE    H/O cystoscopy  resection of bladder tumor    Bladder cancer    DM (diabetes mellitus)    Hypercholesteremia    Afib    HTN (hypertension)    History of hernia repair      SOCIAL HISTORY:    ALLERGIES:  No Known Allergies    MEDICATIONS:  STANDING MEDICATIONS  aspirin enteric coated 81 milliGRAM(s) Oral daily  cefTRIAXone   IVPB 1000 milliGRAM(s) IV Intermittent every 24 hours  chlorhexidine 4% Liquid 1 Application(s) Topical <User Schedule>  dexAMETHasone  Injectable 6 milliGRAM(s) IV Push daily  diltiazem    Tablet 60 milliGRAM(s) Oral three times a day  remdesivir  IVPB   IV Intermittent   remdesivir  IVPB 100 milliGRAM(s) IV Intermittent every 24 hours  rivaroxaban 20 milliGRAM(s) Oral with dinner  simvastatin 20 milliGRAM(s) Oral at bedtime  sodium chloride 0.9%. 1000 milliLiter(s) IV Continuous <Continuous>    PRN MEDICATIONS  ALPRAZolam 0.25 milliGRAM(s) Oral every 8 hours PRN    VITALS:   T(F): 97  HR: 79  BP: 116/62  RR: 33  SpO2: 84%    LABS:                        12.7   8.40  )-----------( 213      ( 30 Nov 2020 04:30 )             40.8     11-30    143  |  106  |  37<H>  ----------------------------<  205<H>  3.8   |  22  |  0.9    Ca    8.1<L>      30 Nov 2020 04:30    TPro  6.5  /  Alb  3.6  /  TBili  0.8  /  DBili  x   /  AST  45<H>  /  ALT  33  /  AlkPhos  77  11-30                  RADIOLOGY:    PHYSICAL EXAM:  GEN: No acute distress  HEENT: AT/NC PEERLA, EOMI  LUNGS: Rales  HEART: S1/S2 present  ABD: Soft, non-tender, non-distended. Bowel sounds present in all 4 quadrants  EXT: No edema, no rashes, no cyanosis  NEURO: AAOX3, CN 2-12 intact

## 2020-12-01 NOTE — PROGRESS NOTE ADULT - ASSESSMENT
70y old  Male with pmh of DM, Paroxysmal Afib on Xarelto , HTN, L carotid endarterectomy, presents to the ER for evaluation of  progressively worsening shortness of breath more on exertion than at rest after testing positive for covid19 outpatient 5 days ago. The patient notes that he has been having symptoms of weakness, decreased po intake, and feeling sleepy more than usual. Upon arrival to the ER.  he found to have fever, tachycardia. tachypnea and  desaturated on 15 L non rebreather to 87%  and was placed on high flow oxygen with improvement in saturation to 97%    IMPRESSION;  Clinically much improved and wants to go home  COVID 19 with severe illness. SpO2 < 94% on RA and need for supplemental O2. HFNC  Pt is in the early viral replicative phase based on the timeline/onset of symptoms.  Inflammatory markers are elevated which is unusual this early on in the replicative phase  procalcitonin 0.59    Ferritiin 719  CRP 3.57  Ddimers 105    RECOMMENDATIONS;  BCx  Urine for strep pneumonia antigen  Target SpO2 92 % to 96 %  Rocephin 2 gm iv q24h  Type and screen.  S/p Convalescent plasma 2 units. Each unit over 2h 11/30  Day 1 11/29 : Single  mg IV loading dose  Day 2-5 : single  mg IV once daily maintenance dose  Daily CBC,CMP.  Dexamethasone 6 mg iv q24h for 10 days 11/29( or until discharge ) or equivalent glucocorticoid dose may be substituted. Equivalent total dosis of alternative steroids are Methylprednisolone 32 mg and prednisone 40 mg q24h.  Monitor for side effects: hyperglycemia, neurological ( agitation/confusion), adrenal suppression, bacterial and fungal infections  Anticoagulation as per team  Proning for 16h/day if pt can tolerate it. 56 M PMH HTN, CKD, gout, here w/ newly dx DM2. hBA1C 15.2

## 2020-12-01 NOTE — PROGRESS NOTE ADULT - SUBJECTIVE AND OBJECTIVE BOX
MAGGIE SAHNI  70y, Male    All available historical data reviewed    OVERNIGHT EVENTS:  no fevers  feels well and has no complaints  HFNC    ROS:  General: Denies rigors, nightsweats  HEENT: Denies headache, rhinorrhea, sore throat, eye pain  CV: Denies CP, palpitations  PULM: Denies wheezing, hemoptysis  GI: Denies hematemesis, hematochezia, melena  : Denies discharge, hematuria  MSK: Denies arthralgias, myalgias  SKIN: Denies rash, lesions  NEURO: Denies paresthesias, weakness  PSYCH: Denies depression, anxiety    VITALS:  T(F): 96, Max: 97.5 (11-30-20 @ 16:00)  HR: 90  BP: 139/69  RR: 30Vital Signs Last 24 Hrs  T(C): 35.6 (01 Dec 2020 12:27), Max: 36.4 (30 Nov 2020 16:00)  T(F): 96 (01 Dec 2020 12:27), Max: 97.5 (30 Nov 2020 16:00)  HR: 90 (01 Dec 2020 12:27) (73 - 111)  BP: 139/69 (01 Dec 2020 12:27) (101/58 - 166/68)  BP(mean): 89 (01 Dec 2020 06:00) (60 - 89)  RR: 30 (01 Dec 2020 12:27) (22 - 38)  SpO2: 89% (01 Dec 2020 12:27) (79% - 92%)    TESTS & MEASUREMENTS:                        12.0   5.82  )-----------( 253      ( 01 Dec 2020 11:54 )             39.5     11-30    143  |  106  |  37<H>  ----------------------------<  205<H>  3.8   |  22  |  0.9    Ca    8.1<L>      30 Nov 2020 04:30    TPro  6.5  /  Alb  3.6  /  TBili  0.8  /  DBili  x   /  AST  45<H>  /  ALT  33  /  AlkPhos  77  11-30    LIVER FUNCTIONS - ( 30 Nov 2020 04:30 )  Alb: 3.6 g/dL / Pro: 6.5 g/dL / ALK PHOS: 77 U/L / ALT: 33 U/L / AST: 45 U/L / GGT: x                   RADIOLOGY & ADDITIONAL TESTS:  Personal review of radiological diagnostics performed  Echo and EKG results noted when applicable.     MEDICATIONS:  ALPRAZolam 0.25 milliGRAM(s) Oral every 8 hours PRN  aspirin enteric coated 81 milliGRAM(s) Oral daily  cefTRIAXone   IVPB 1000 milliGRAM(s) IV Intermittent every 24 hours  chlorhexidine 4% Liquid 1 Application(s) Topical <User Schedule>  dexAMETHasone  Injectable 6 milliGRAM(s) IV Push daily  diltiazem    Tablet 60 milliGRAM(s) Oral three times a day  remdesivir  IVPB   IV Intermittent   remdesivir  IVPB 100 milliGRAM(s) IV Intermittent every 24 hours  rivaroxaban 20 milliGRAM(s) Oral with dinner  simvastatin 20 milliGRAM(s) Oral at bedtime  sodium chloride 0.9%. 1000 milliLiter(s) IV Continuous <Continuous>      ANTIBIOTICS:  cefTRIAXone   IVPB 1000 milliGRAM(s) IV Intermittent every 24 hours  remdesivir  IVPB   IV Intermittent   remdesivir  IVPB 100 milliGRAM(s) IV Intermittent every 24 hours

## 2020-12-01 NOTE — PROGRESS NOTE ADULT - ATTENDING COMMENTS
I have personally seen and examined this patient.  I have fully participated in the care of this patient.  I have reviewed pertinent clinical information, including history, physical exam, plan and note.   I have reviewed relevant imaging and diagnostic studies personally. I agree with resident note above and plan of care, edited and corrected where applicable.     .. Acute Hypoxic Respiratory Failure secondary to COVID19 pneumonia   .. Severe COVID19 infection with evidence of underlying inflammatory cytokine storm   .. Uncontrolled anxiety; better today   .. AFib (chronic) on Novel Oral Anti-Coagulant (NOAC) - Xarelto  .. PARAM likely prerenal but at high risk for ATN; on intravenous volume repletion  .. Possible baterial superimposed infection; ID team following up    PLAN  ·Alternate Non-invasive positive pressure ventilation (NIPPV) with Humidified High Flow Nasal Cannula (HHFNC) opten  ·Low dose BZD on PRN basis only  ·Continue with broad-spectrum intravenous antibiotics as per ID tea,  ·Remains on Remdesivir and Dexamethasone intravenously  ·on Novel Oral Anti-Coagulant (NOAC) as scheduled   ·    Patient remains with poor prognosis overall despite all care and at high risk for intubation and cardiopulmonary complications.  Case discussed at length with multidisciplinary team on rounds.
I have personally seen and examined this patient.  I have fully participated in the care of this patient.  I have reviewed pertinent clinical information, including history, physical exam, plan and note.   I have reviewed relevant imaging and diagnostic studies personally. I agree with resident note above and plan of care, edited and corrected where applicable.     .. Acute Hypoxic Respiratory Failure secondary to COVID19 pneumonia   .. Severe COVID19 infection with evidence of underlying inflammatory cytokine storm   .. AFib (chronic) on Novel Oral Anti-Coagulant (NOAC) - Xarelto  .. PARAM likely prerenal but at high risk for ATN; on intravenous volume repletion  .. Possible baterial superimposed infection; ID team following up    Patient remains with poor prognosis overall despite all care and at high risk for intubation and cardiopulm complications.  Please refer to Goals of Care Conversation updates above.

## 2020-12-01 NOTE — PROGRESS NOTE ADULT - ASSESSMENT
71 yo male with pmh of DM, Paroxysmal Afib on Xarelto , HTN, L carotid endarterectomy presents to the hospital with progressively worsening shortness of breath more on exertion than at rest after testing positive for covid19 outpatient 5 days ago.     #Acute hypoxemic respiratory failure secondary to COVID-19 pneumonia  - HFNC 60L 100% with intermittent BiPAP 100% - low threshold for intubation  - Chest X-ray: b/l patchy opacities  - LDH - 553, Procal - 0.61, CRP - 4.68, d-dimer - 210, Ferritin - 719, Rocephin 2g IV q24 , Convalescent plasma 2 J  - c/w Remdesivir taper   - c/w Dexamethasone 6mg IV QD for 10 days    # Transaminitis - stable  - likely related to covid19 r/o liver disease  - AST 77, ALT 33, GGT 56  - RUQ U/S; Hepatomegaly with diffuse hepatic steatosis. Nodular liver contour suggesting early cirrhotic changes. Gallbladder wall thickening, nonspecific.    # PARAM , likely secondary to decreased po intake - Resolved  - Cr 1.6 on admission , monitor, currently 0/9/11    # h/o A-fib on xarelto , rate controlled now   - rate control w/ Cardizam  - EKG on admission shows A flutter , f/u AM EKG  - c/w Xarelto 20 mg qd    # h/o HTN   - c/w carvedilol   - hold losartan due to impaired renal function for now     # h/o DM   - hold po home medications  - fs ac qhs , IF  > 180 , start insulin protocol  - carb consistent diet   -f/u HbA1c level     # DVT ppx: Xarelto  # GI ppx: Famotidine  # Activity: As Tolerated  # Diet: Carb Consistent

## 2020-12-01 NOTE — PROGRESS NOTE ADULT - SUBJECTIVE AND OBJECTIVE BOX
Patient is a 70y old  Male who presents with a chief complaint of       Over Night Events:  On HFNCo2.  Off pressors         ROS:     All ROS are negative except HPI         PHYSICAL EXAM    ICU Vital Signs Last 24 Hrs  T(C): 36.1 (01 Dec 2020 00:00), Max: 36.4 (30 Nov 2020 16:00)  T(F): 97 (01 Dec 2020 00:00), Max: 97.5 (30 Nov 2020 16:00)  HR: 99 (01 Dec 2020 06:00) (73 - 130)  BP: 122/72 (01 Dec 2020 06:00) (101/58 - 157/84)  BP(mean): 89 (01 Dec 2020 06:00) (60 - 114)  ABP: --  ABP(mean): --  RR: 30 (01 Dec 2020 06:00) (20 - 38)  SpO2: 88% (01 Dec 2020 06:00) (79% - 93%)      CONSTITUTIONAL:  Well nourished.  NAD    ENT:   Airway patent,   Mouth with normal mucosa.   No thrush    EYES:   Pupils equal,   Round and reactive to light.    CARDIAC:   Normal rate,   Irregular rhythm.    No edema      Vascular:  Normal systolic impulse  No Carotid bruits    RESPIRATORY:   No wheezing  Bilateral crackles   Normal chest expansion  Not tachypneic,  No use of accessory muscles    GASTROINTESTINAL:  Abdomen soft,   Non-tender,   No guarding,   + BS    MUSCULOSKELETAL:   Range of motion is not limited,  No clubbing, cyanosis    NEUROLOGICAL:   Alert and oriented   No motor  deficits.    SKIN:   Skin normal color for race,   Warm and dry and intact.   No evidence of rash.    PSYCHIATRIC:   Normal mood and affect.   No apparent risk to self or others.    HEMATOLOGICAL:  No cervical  lymphadenopathy.  no inguinal lymphadenopathy      11-30-20 @ 07:01  -  12-01-20 @ 07:00  --------------------------------------------------------  IN:    IV PiggyBack: 300 mL    Oral Fluid: 540 mL    sodium chloride 0.9%: 1150 mL  Total IN: 1990 mL    OUT:    Voided (mL): 1875 mL  Total OUT: 1875 mL    Total NET: 115 mL          LABS:                            12.7   8.40  )-----------( 213      ( 30 Nov 2020 04:30 )             40.8                                               11-30    143  |  106  |  37<H>  ----------------------------<  205<H>  3.8   |  22  |  0.9    Ca    8.1<L>      30 Nov 2020 04:30    TPro  6.5  /  Alb  3.6  /  TBili  0.8  /  DBili  x   /  AST  45<H>  /  ALT  33  /  AlkPhos  77  11-30                                                                                           LIVER FUNCTIONS - ( 30 Nov 2020 04:30 )  Alb: 3.6 g/dL / Pro: 6.5 g/dL / ALK PHOS: 77 U/L / ALT: 33 U/L / AST: 45 U/L / GGT: x                                                                                                                                       MEDICATIONS  (STANDING):  aspirin enteric coated 81 milliGRAM(s) Oral daily  cefTRIAXone   IVPB 1000 milliGRAM(s) IV Intermittent every 24 hours  chlorhexidine 4% Liquid 1 Application(s) Topical <User Schedule>  dexAMETHasone  Injectable 6 milliGRAM(s) IV Push daily  diltiazem    Tablet 60 milliGRAM(s) Oral three times a day  remdesivir  IVPB   IV Intermittent   remdesivir  IVPB 100 milliGRAM(s) IV Intermittent every 24 hours  rivaroxaban 20 milliGRAM(s) Oral with dinner  simvastatin 20 milliGRAM(s) Oral at bedtime  sodium chloride 0.9%. 1000 milliLiter(s) (50 mL/Hr) IV Continuous <Continuous>    MEDICATIONS  (PRN):  ALPRAZolam 0.25 milliGRAM(s) Oral every 8 hours PRN anxiety      New X-rays reviewed:                                                                                  ECHO    CXR interpreted by me:

## 2020-12-02 LAB
ALBUMIN SERPL ELPH-MCNC: 3.3 G/DL — LOW (ref 3.5–5.2)
ALP SERPL-CCNC: 85 U/L — SIGNIFICANT CHANGE UP (ref 30–115)
ALT FLD-CCNC: 29 U/L — SIGNIFICANT CHANGE UP (ref 0–41)
ANION GAP SERPL CALC-SCNC: 18 MMOL/L — HIGH (ref 7–14)
AST SERPL-CCNC: 41 U/L — SIGNIFICANT CHANGE UP (ref 0–41)
BASOPHILS # BLD AUTO: 0.01 K/UL — SIGNIFICANT CHANGE UP (ref 0–0.2)
BASOPHILS NFR BLD AUTO: 0.1 % — SIGNIFICANT CHANGE UP (ref 0–1)
BILIRUB SERPL-MCNC: 1.6 MG/DL — HIGH (ref 0.2–1.2)
BUN SERPL-MCNC: 29 MG/DL — HIGH (ref 10–20)
CALCIUM SERPL-MCNC: 7.8 MG/DL — LOW (ref 8.5–10.1)
CHLORIDE SERPL-SCNC: 106 MMOL/L — SIGNIFICANT CHANGE UP (ref 98–110)
CO2 SERPL-SCNC: 17 MMOL/L — SIGNIFICANT CHANGE UP (ref 17–32)
CREAT SERPL-MCNC: 0.7 MG/DL — SIGNIFICANT CHANGE UP (ref 0.7–1.5)
D DIMER BLD IA.RAPID-MCNC: 1103 NG/ML DDU — HIGH (ref 0–230)
D DIMER BLD IA.RAPID-MCNC: 1897 NG/ML DDU — HIGH (ref 0–230)
EOSINOPHIL # BLD AUTO: 0.01 K/UL — SIGNIFICANT CHANGE UP (ref 0–0.7)
EOSINOPHIL NFR BLD AUTO: 0.1 % — SIGNIFICANT CHANGE UP (ref 0–8)
ERYTHROCYTE [SEDIMENTATION RATE] IN BLOOD: 29 MM/HR — HIGH (ref 0–10)
GAS PNL BLDA: SIGNIFICANT CHANGE UP
GAS PNL BLDA: SIGNIFICANT CHANGE UP
GLUCOSE SERPL-MCNC: 258 MG/DL — HIGH (ref 70–99)
HCT VFR BLD CALC: 41.3 % — LOW (ref 42–52)
HGB BLD-MCNC: 12.4 G/DL — LOW (ref 14–18)
IMM GRANULOCYTES NFR BLD AUTO: 0.9 % — HIGH (ref 0.1–0.3)
LYMPHOCYTES # BLD AUTO: 0.41 K/UL — LOW (ref 1.2–3.4)
LYMPHOCYTES # BLD AUTO: 3.1 % — LOW (ref 20.5–51.1)
MAGNESIUM SERPL-MCNC: 2.2 MG/DL — SIGNIFICANT CHANGE UP (ref 1.8–2.4)
MCHC RBC-ENTMCNC: 22.8 PG — LOW (ref 27–31)
MCHC RBC-ENTMCNC: 30 G/DL — LOW (ref 32–37)
MCV RBC AUTO: 76.1 FL — LOW (ref 80–94)
MONOCYTES # BLD AUTO: 0.43 K/UL — SIGNIFICANT CHANGE UP (ref 0.1–0.6)
MONOCYTES NFR BLD AUTO: 3.3 % — SIGNIFICANT CHANGE UP (ref 1.7–9.3)
NEUTROPHILS # BLD AUTO: 12.09 K/UL — HIGH (ref 1.4–6.5)
NEUTROPHILS NFR BLD AUTO: 92.5 % — HIGH (ref 42.2–75.2)
NRBC # BLD: 0 /100 WBCS — SIGNIFICANT CHANGE UP (ref 0–0)
PLATELET # BLD AUTO: 377 K/UL — SIGNIFICANT CHANGE UP (ref 130–400)
POTASSIUM SERPL-MCNC: 4.3 MMOL/L — SIGNIFICANT CHANGE UP (ref 3.5–5)
POTASSIUM SERPL-SCNC: 4.3 MMOL/L — SIGNIFICANT CHANGE UP (ref 3.5–5)
PROT SERPL-MCNC: 6.3 G/DL — SIGNIFICANT CHANGE UP (ref 6–8)
RBC # BLD: 5.43 M/UL — SIGNIFICANT CHANGE UP (ref 4.7–6.1)
RBC # FLD: 19.2 % — HIGH (ref 11.5–14.5)
SODIUM SERPL-SCNC: 141 MMOL/L — SIGNIFICANT CHANGE UP (ref 135–146)
WBC # BLD: 13.07 K/UL — HIGH (ref 4.8–10.8)
WBC # FLD AUTO: 13.07 K/UL — HIGH (ref 4.8–10.8)

## 2020-12-02 PROCEDURE — 71045 X-RAY EXAM CHEST 1 VIEW: CPT | Mod: 26

## 2020-12-02 PROCEDURE — 99233 SBSQ HOSP IP/OBS HIGH 50: CPT

## 2020-12-02 PROCEDURE — 99291 CRITICAL CARE FIRST HOUR: CPT

## 2020-12-02 RX ORDER — FENTANYL CITRATE 50 UG/ML
0.5 INJECTION INTRAVENOUS
Qty: 2500 | Refills: 0 | Status: DISCONTINUED | OUTPATIENT
Start: 2020-12-02 | End: 2020-12-07

## 2020-12-02 RX ORDER — ASPIRIN/CALCIUM CARB/MAGNESIUM 324 MG
81 TABLET ORAL DAILY
Refills: 0 | Status: DISCONTINUED | OUTPATIENT
Start: 2020-12-02 | End: 2020-12-09

## 2020-12-02 RX ORDER — PROPOFOL 10 MG/ML
10 INJECTION, EMULSION INTRAVENOUS ONCE
Refills: 0 | Status: COMPLETED | OUTPATIENT
Start: 2020-12-02 | End: 2020-12-02

## 2020-12-02 RX ORDER — ENOXAPARIN SODIUM 100 MG/ML
80 INJECTION SUBCUTANEOUS
Refills: 0 | Status: DISCONTINUED | OUTPATIENT
Start: 2020-12-03 | End: 2020-12-09

## 2020-12-02 RX ORDER — CISATRACURIUM BESYLATE 2 MG/ML
10 INJECTION INTRAVENOUS ONCE
Refills: 0 | Status: COMPLETED | OUTPATIENT
Start: 2020-12-02 | End: 2020-12-02

## 2020-12-02 RX ORDER — AMPICILLIN SODIUM AND SULBACTAM SODIUM 250; 125 MG/ML; MG/ML
3 INJECTION, POWDER, FOR SUSPENSION INTRAMUSCULAR; INTRAVENOUS EVERY 6 HOURS
Refills: 0 | Status: DISCONTINUED | OUTPATIENT
Start: 2020-12-02 | End: 2020-12-02

## 2020-12-02 RX ORDER — SODIUM CHLORIDE 9 MG/ML
500 INJECTION INTRAMUSCULAR; INTRAVENOUS; SUBCUTANEOUS ONCE
Refills: 0 | Status: COMPLETED | OUTPATIENT
Start: 2020-12-02 | End: 2020-12-02

## 2020-12-02 RX ORDER — FUROSEMIDE 40 MG
60 TABLET ORAL ONCE
Refills: 0 | Status: COMPLETED | OUTPATIENT
Start: 2020-12-02 | End: 2020-12-02

## 2020-12-02 RX ORDER — DILTIAZEM HCL 120 MG
5 CAPSULE, EXT RELEASE 24 HR ORAL
Qty: 125 | Refills: 0 | Status: DISCONTINUED | OUTPATIENT
Start: 2020-12-02 | End: 2020-12-02

## 2020-12-02 RX ORDER — AMPICILLIN SODIUM AND SULBACTAM SODIUM 250; 125 MG/ML; MG/ML
INJECTION, POWDER, FOR SUSPENSION INTRAMUSCULAR; INTRAVENOUS
Refills: 0 | Status: DISCONTINUED | OUTPATIENT
Start: 2020-12-02 | End: 2020-12-02

## 2020-12-02 RX ORDER — SODIUM CHLORIDE 9 MG/ML
1000 INJECTION INTRAMUSCULAR; INTRAVENOUS; SUBCUTANEOUS
Refills: 0 | Status: DISCONTINUED | OUTPATIENT
Start: 2020-12-02 | End: 2020-12-04

## 2020-12-02 RX ORDER — TOCILIZUMAB 20 MG/ML
400 INJECTION, SOLUTION, CONCENTRATE INTRAVENOUS ONCE
Refills: 0 | Status: DISCONTINUED | OUTPATIENT
Start: 2020-12-02 | End: 2020-12-02

## 2020-12-02 RX ORDER — DILTIAZEM HCL 120 MG
10 CAPSULE, EXT RELEASE 24 HR ORAL ONCE
Refills: 0 | Status: DISCONTINUED | OUTPATIENT
Start: 2020-12-02 | End: 2020-12-02

## 2020-12-02 RX ORDER — MIDAZOLAM HYDROCHLORIDE 1 MG/ML
0.02 INJECTION, SOLUTION INTRAMUSCULAR; INTRAVENOUS
Qty: 100 | Refills: 0 | Status: DISCONTINUED | OUTPATIENT
Start: 2020-12-02 | End: 2020-12-05

## 2020-12-02 RX ORDER — FENTANYL CITRATE 50 UG/ML
4 INJECTION INTRAVENOUS ONCE
Refills: 0 | Status: DISCONTINUED | OUTPATIENT
Start: 2020-12-02 | End: 2020-12-02

## 2020-12-02 RX ORDER — NOREPINEPHRINE BITARTRATE/D5W 8 MG/250ML
0.05 PLASTIC BAG, INJECTION (ML) INTRAVENOUS
Qty: 8 | Refills: 0 | Status: DISCONTINUED | OUTPATIENT
Start: 2020-12-02 | End: 2020-12-03

## 2020-12-02 RX ORDER — AMPICILLIN SODIUM AND SULBACTAM SODIUM 250; 125 MG/ML; MG/ML
3 INJECTION, POWDER, FOR SUSPENSION INTRAMUSCULAR; INTRAVENOUS ONCE
Refills: 0 | Status: DISCONTINUED | OUTPATIENT
Start: 2020-12-02 | End: 2020-12-02

## 2020-12-02 RX ORDER — PIPERACILLIN AND TAZOBACTAM 4; .5 G/20ML; G/20ML
4.5 INJECTION, POWDER, LYOPHILIZED, FOR SOLUTION INTRAVENOUS EVERY 6 HOURS
Refills: 0 | Status: DISCONTINUED | OUTPATIENT
Start: 2020-12-02 | End: 2020-12-09

## 2020-12-02 RX ORDER — CHLORHEXIDINE GLUCONATE 213 G/1000ML
15 SOLUTION TOPICAL EVERY 12 HOURS
Refills: 0 | Status: DISCONTINUED | OUTPATIENT
Start: 2020-12-02 | End: 2020-12-09

## 2020-12-02 RX ORDER — TOCILIZUMAB 20 MG/ML
400 INJECTION, SOLUTION, CONCENTRATE INTRAVENOUS ONCE
Refills: 0 | Status: COMPLETED | OUTPATIENT
Start: 2020-12-02 | End: 2020-12-02

## 2020-12-02 RX ADMIN — Medication 6 MILLIGRAM(S): at 05:04

## 2020-12-02 RX ADMIN — REMDESIVIR 500 MILLIGRAM(S): 5 INJECTION INTRAVENOUS at 05:01

## 2020-12-02 RX ADMIN — PIPERACILLIN AND TAZOBACTAM 25 GRAM(S): 4; .5 INJECTION, POWDER, LYOPHILIZED, FOR SOLUTION INTRAVENOUS at 18:18

## 2020-12-02 RX ADMIN — TOCILIZUMAB 100 MILLIGRAM(S): 20 INJECTION, SOLUTION, CONCENTRATE INTRAVENOUS at 14:31

## 2020-12-02 RX ADMIN — Medication 81 MILLIGRAM(S): at 21:01

## 2020-12-02 RX ADMIN — PIPERACILLIN AND TAZOBACTAM 25 GRAM(S): 4; .5 INJECTION, POWDER, LYOPHILIZED, FOR SOLUTION INTRAVENOUS at 23:05

## 2020-12-02 RX ADMIN — CHLORHEXIDINE GLUCONATE 15 MILLILITER(S): 213 SOLUTION TOPICAL at 18:19

## 2020-12-02 RX ADMIN — SODIUM CHLORIDE 50 MILLILITER(S): 9 INJECTION INTRAMUSCULAR; INTRAVENOUS; SUBCUTANEOUS at 05:05

## 2020-12-02 RX ADMIN — CISATRACURIUM BESYLATE 10 MILLIGRAM(S): 2 INJECTION INTRAVENOUS at 09:30

## 2020-12-02 RX ADMIN — SODIUM CHLORIDE 70 MILLILITER(S): 9 INJECTION INTRAMUSCULAR; INTRAVENOUS; SUBCUTANEOUS at 17:13

## 2020-12-02 RX ADMIN — FENTANYL CITRATE 4 MICROGRAM(S): 50 INJECTION INTRAVENOUS at 09:05

## 2020-12-02 RX ADMIN — FENTANYL CITRATE 4 MICROGRAM(S): 50 INJECTION INTRAVENOUS at 09:53

## 2020-12-02 RX ADMIN — CHLORHEXIDINE GLUCONATE 1 APPLICATION(S): 213 SOLUTION TOPICAL at 05:04

## 2020-12-02 RX ADMIN — Medication 60 MILLIGRAM(S): at 05:04

## 2020-12-02 RX ADMIN — Medication 60 MILLIGRAM(S): at 07:50

## 2020-12-02 RX ADMIN — SODIUM CHLORIDE 500 MILLILITER(S): 9 INJECTION INTRAMUSCULAR; INTRAVENOUS; SUBCUTANEOUS at 09:40

## 2020-12-02 RX ADMIN — SIMVASTATIN 20 MILLIGRAM(S): 20 TABLET, FILM COATED ORAL at 21:00

## 2020-12-02 RX ADMIN — Medication 7.29 MICROGRAM(S)/KG/MIN: at 11:37

## 2020-12-02 RX ADMIN — PROPOFOL 10 MILLIGRAM(S): 10 INJECTION, EMULSION INTRAVENOUS at 09:15

## 2020-12-02 NOTE — PROGRESS NOTE ADULT - ASSESSMENT
71 yo male with pmh of DM, Paroxysmal Afib on Xarelto , HTN, L carotid endarterectomy presents to the hospital with progressively worsening shortness of breath more on exertion than at rest after testing positive for covid19 outpatient 5 days ago.     #Acute hypoxemic respiratory failure secondary to COVID-19 pneumonia  - intubated 2/12  - Chest X-ray: Airspace and interstitial opacifications, stable.procal  - LDH - 553, Procal - 0.61, CRP - 4.68, d-dimer - 210, Ferritin - 719, Rocephin 2g IV q24   - s/p Convalescent plasma 2 U  - c/w Remdesivir taper   - c/w Dexamethasone 6mg IV QD for 10 days  - ID consult appreciated  - f/u Urine for Strep pneumo, BCx  - MRSA Nares (-)    # Transaminitis - stable  - likely related to COVID-19 r/o liver disease  - AST 77, ALT 33, GGT 56  - RUQ U/S; Hepatomegaly with diffuse hepatic steatosis. Nodular liver contour suggesting early cirrhotic changes. Gallbladder wall thickening, nonspecific.    # PARAM , likely secondary to decreased po intake - Resolved  - Cr 1.6 on admission , monitor, currently 0.7    # h/o A-fib on xarelto , rate controlled now   - rate control w/ Cardizem  - EKG on admission shows A flutter, AM EKG: AFib w/ variable AV block  - c/w Xarelto 20 mg qd    # h/o HTN   - c/w carvedilol   - hold losartan due to impaired renal function for now     # h/o DM   - hold po home medications  - fs ac qhs , IF  > 180 , start insulin protocol  - carb consistent diet   - f/u HbA1c level     # DVT ppx: Xarelto  # GI ppx: Famotidine  # Activity: As Tolerated  # Diet: Carb Consistent    Called family and updated on patient condition

## 2020-12-02 NOTE — PROGRESS NOTE ADULT - SUBJECTIVE AND OBJECTIVE BOX
SUBJECTIVE:    HPI:  71 yo male with pmh of DM, Paroxysmal Afib on Xarelto , HTN, L carotid endarterectomy presents to the hospital with progressively worsening shortness of breath more on exertion than at rest after testing positive for covid19 outpatient 5 days ago. The patient notes that he has been having symptoms of weakness, decreased po intake, and feeling sleepy more than usual. Upon arrival to the ED the patient desaturated on 15 L non rebreather to 87% with tachypnea and was placed on high flow with improvement in saturation to 97% and subjective improvement in quality of breathing. The patient denies symptoms of dizziness/ chest pain/ short of breath/ abdominal pain/constipation/ diarrhea/loss of taste/ loss of smell. All other ROS negative except as noted above.     Vital Signs (24 Hrs): T(C): 39.2,  HR: 95 , BP: 130/80 (11-27-20 @ 22:19) (95/55 - 130/80), RR: 20 , SpO2: 92%   Labs: Lymphopenia , Cr 1.6 , Transaminitis (AST65, ALT 45) , D- DIMER 105  CXR: b/l patchy opacities      (28 Nov 2020 00:13)      PAST MEDICAL & SURGICAL HISTORY  PAST MEDICAL & SURGICAL HISTORY:  H/O sciatica  [facility] PAIN LT LE    H/O cystoscopy  resection of bladder tumor    Bladder cancer    DM (diabetes mellitus)    Hypercholesteremia    Afib    HTN (hypertension)    History of hernia repair      SOCIAL HISTORY:    ALLERGIES:  No Known Allergies    MEDICATIONS:  STANDING MEDICATIONS  aspirin  chewable 81 milliGRAM(s) Oral daily  chlorhexidine 0.12% Liquid 15 milliLiter(s) Oral Mucosa every 12 hours  chlorhexidine 4% Liquid 1 Application(s) Topical <User Schedule>  dexAMETHasone  Injectable 6 milliGRAM(s) IV Push daily  fentaNYL   Infusion. 0.5 MICROgram(s)/kG/Hr IV Continuous <Continuous>  levoFLOXacin IVPB      midazolam Infusion 0.02 mG/kG/Hr IV Continuous <Continuous>  norepinephrine Infusion 0.05 MICROgram(s)/kG/Min IV Continuous <Continuous>  piperacillin/tazobactam IVPB.. 4.5 Gram(s) IV Intermittent every 6 hours  simvastatin 20 milliGRAM(s) Oral at bedtime  sodium chloride 0.9%. 1000 milliLiter(s) IV Continuous <Continuous>    PRN MEDICATIONS  ALPRAZolam 0.25 milliGRAM(s) Oral every 8 hours PRN    VITALS:   T(F): 96.5  HR: 75  BP: 121/56  RR: 20  SpO2: 94%    LABS:                        12.4   13.07 )-----------( 377      ( 02 Dec 2020 09:00 )             41.3     12-02    141  |  106  |  29<H>  ----------------------------<  258<H>  4.3   |  17  |  0.7    Ca    7.8<L>      02 Dec 2020 09:00  Mg     2.2     12-02    TPro  6.3  /  Alb  3.3<L>  /  TBili  1.6<H>  /  DBili  x   /  AST  41  /  ALT  29  /  AlkPhos  85  12-02        ABG - ( 02 Dec 2020 16:51 )  pH, Arterial: 7.29  pH, Blood: x     /  pCO2: 46    /  pO2: 61    / HCO3: 22    / Base Excess: ?-4.6 /  SaO2: ?88.7             Sedimentation Rate, Erythrocyte: 29 mm/Hr <H> (12-02-20 @ 11:40)          RADIOLOGY:    PHYSICAL EXAM:  GEN: No acute distress, sedated, intubated  LUNGS: dec BS b/l  HEART: S1/S2 present  ABD: Soft, non-tender, non-distended  EXT: No edema, no rashes, no cyanosis

## 2020-12-02 NOTE — PROGRESS NOTE ADULT - SUBJECTIVE AND OBJECTIVE BOX
Patient is a 70y old  Male who presents with a chief complaint of   HPI:  71 yo male with pmh of DM, Paroxysmal Afib on Xarelto , HTN, L carotid endarterectomy presents to the hospital with progressively worsening shortness of breath more on exertion than at rest after testing positive for covid19 outpatient 5 days ago. The patient notes that he has been having symptoms of weakness, decreased po intake, and feeling sleepy more than usual. Upon arrival to the ED the patient desaturated on 15 L non rebreather to 87% with tachypnea and was placed on high flow with improvement in saturation to 97% and subjective improvement in quality of breathing. The patient denies symptoms of dizziness/ chest pain/ short of breath/ abdominal pain/constipation/ diarrhea/loss of taste/ loss of smell. All other ROS negative except as noted above.   Vital Signs (24 Hrs): T(C): 39.2,  HR: 95 , BP: 130/80 (11-27-20 @ 22:19) (95/55 - 130/80), RR: 20 , SpO2: 92%   Labs: Lymphopenia , Cr 1.6 , Transaminitis (AST65, ALT 45) , D- DIMER 105  CXR: b/l patchy opacities    (28 Nov 2020 00:13)      PAST MEDICAL & SURGICAL HISTORY:  H/O sciatica  [facility] PAIN LT LE  H/O cystoscopy  resection of bladder tumor  Bladder cancer  DM (diabetes mellitus)  Hypercholesteremia  Afib  HTN (hypertension)  History of hernia repair      Vital Signs Last 24 Hrs  T(C): 36.4 (02 Dec 2020 12:00), Max: 36.4 (02 Dec 2020 00:15)  T(F): 97.6 (02 Dec 2020 12:00), Max: 97.6 (02 Dec 2020 12:00)  HR: 76 (02 Dec 2020 12:00) (69 - 138)  BP: 92/59 (02 Dec 2020 12:00) (90/58 - 178/79)  BP(mean): 72 (02 Dec 2020 12:00) (69 - 125)  RR: 18 (02 Dec 2020 12:00) (18 - 44)  SpO2: 95% (02 Dec 2020 12:00) (84% - 97%)                        12.4   13.07 )-----------( 377      ( 02 Dec 2020 09:00 )             41.3     12-02    141  |  106  |  29<H>  ----------------------------<  258<H>  4.3   |  17  |  0.7    Ca    7.8<L>      02 Dec 2020 09:00  Mg     2.2     12-02    TPro  6.3  /  Alb  3.3<L>  /  TBili  1.6<H>  /  DBili  x   /  AST  41  /  ALT  29  /  AlkPhos  85  12-02              MEDICATIONS  (STANDING):  aspirin enteric coated 81 milliGRAM(s) Oral daily  chlorhexidine 0.12% Liquid 15 milliLiter(s) Oral Mucosa every 12 hours  chlorhexidine 4% Liquid 1 Application(s) Topical <User Schedule>  dexAMETHasone  Injectable 6 milliGRAM(s) IV Push daily  fentaNYL   Infusion. 0.5 MICROgram(s)/kG/Hr (3.89 mL/Hr) IV Continuous <Continuous>  levoFLOXacin IVPB      levoFLOXacin IVPB 750 milliGRAM(s) IV Intermittent once  midazolam Infusion 0.02 mG/kG/Hr (1.56 mL/Hr) IV Continuous <Continuous>  norepinephrine Infusion 0.05 MICROgram(s)/kG/Min (7.29 mL/Hr) IV Continuous <Continuous>  piperacillin/tazobactam IVPB.. 4.5 Gram(s) IV Intermittent every 6 hours  simvastatin 20 milliGRAM(s) Oral at bedtime  sodium chloride 0.9%. 1000 milliLiter(s) (50 mL/Hr) IV Continuous <Continuous>  tocilizumab IVPB 400 milliGRAM(s) IV Intermittent once     Patient is a 70y old  Male who presents with a chief complaint of   HPI:  69 yo male with pmh of DM, Paroxysmal Afib on Xarelto , HTN, L carotid endarterectomy presents to the hospital with progressively worsening shortness of breath more on exertion than at rest after testing positive for covid19 outpatient 5 days ago. The patient notes that he has been having symptoms of weakness, decreased po intake, and feeling sleepy more than usual. Upon arrival to the ED the patient desaturated on 15 L non rebreather to 87% with tachypnea and was placed on high flow with improvement in saturation to 97% and subjective improvement in quality of breathing. The patient denies symptoms of dizziness/ chest pain/ short of breath/ abdominal pain/constipation/ diarrhea/loss of taste/ loss of smell. All other ROS negative except as noted above.   Vital Signs (24 Hrs): T(C): 39.2,  HR: 95 , BP: 130/80 (11-27-20 @ 22:19) (95/55 - 130/80), RR: 20 , SpO2: 92%   Labs: Lymphopenia , Cr 1.6 , Transaminitis (AST65, ALT 45) , D- DIMER 105  CXR: b/l patchy opacities   (28 Nov 2020 00:13)      PAST MEDICAL & SURGICAL HISTORY:  H/O sciatica  [facility] PAIN LT LE  H/O cystoscopy  resection of bladder tumor  Bladder cancer  DM (diabetes mellitus)  Hypercholesteremia  Afib  HTN (hypertension)  History of hernia repair    patient seen and examined independently on morning rounds for the first time in CEU today, chart reviewed and discussed with the medicine resident and on interdisciplinary rounds    overnight events noted--intubated this morning for acute hypoxic respiratory distress- [facility]fent[facility]/versed) and on dose pressors (levophed)  spoke with nephew Leo Menkes ([phone]) who is ED MD in [place]    Vital Signs Last 24 Hrs  T(C): 36.4 (02 Dec 2020 12:00), Max: 36.4 (02 Dec 2020 00:15)  T(F): 97.6 (02 Dec 2020 12:00), Max: 97.6 (02 Dec 2020 12:00)  HR: 76 (02 Dec 2020 12:00) (69 - 138)  BP: 92/59 (02 Dec 2020 12:00) (90/58 - 178/79)  BP(mean): 72 (02 Dec 2020 12:00) (69 - 125)  RR: 18 (02 Dec 2020 12:00) (18 - 44)  SpO2: 95% (02 Dec 2020 12:00) (84% - 97%)            PE:  GEN-NAD, sedated, intubated on MV (Fi02 100%)  PULM- fair air entry decreased bs bilat  CVS- +s1/s2 RRR  GI- soft NT ND +bs, no rebound, no guarding  EXT- no edema               12.4   13.07 )-----------( 377      ( 02 Dec 2020 09:00 )             41.3     12-02    141  |  106  |  29<H>  ----------------------------<  258<H>  4.3   |  17  |  0.7    Ca    7.8<L>      02 Dec 2020 09:00  Mg     2.2     12-02    TPro  6.3  /  Alb  3.3<L>  /  TBili  1.6<H>  /  DBili  x   /  AST  41  /  ALT  29  /  AlkPhos  85  12-02              MEDICATIONS  (STANDING):  aspirin enteric coated 81 milliGRAM(s) Oral daily  chlorhexidine 0.12% Liquid 15 milliLiter(s) Oral Mucosa every 12 hours  chlorhexidine 4% Liquid 1 Application(s) Topical <User Schedule>  dexAMETHasone  Injectable 6 milliGRAM(s) IV Push daily  fentaNYL   Infusion. 0.5 MICROgram(s)/kG/Hr (3.89 mL/Hr) IV Continuous <Continuous>  levoFLOXacin IVPB      levoFLOXacin IVPB 750 milliGRAM(s) IV Intermittent once  midazolam Infusion 0.02 mG/kG/Hr (1.56 mL/Hr) IV Continuous <Continuous>  norepinephrine Infusion 0.05 MICROgram(s)/kG/Min (7.29 mL/Hr) IV Continuous <Continuous>  piperacillin/tazobactam IVPB.. 4.5 Gram(s) IV Intermittent every 6 hours  simvastatin 20 milliGRAM(s) Oral at bedtime  sodium chloride 0.9%. 1000 milliLiter(s) (50 mL/Hr) IV Continuous <Continuous>  tocilizumab IVPB 400 milliGRAM(s) IV Intermittent once

## 2020-12-02 NOTE — AIRWAY PLACEMENT NOTE ADULT - POST AIRWAY PLACEMENT ASSESSMENT:
CXR pending/chest excursion noted/breath sounds bilateral/breath sounds equal/skin color improved/positive end tidal CO2 noted

## 2020-12-02 NOTE — PROGRESS NOTE ADULT - SUBJECTIVE AND OBJECTIVE BOX
MAGGIE SAHNI  70y, Male    All available historical data reviewed    OVERNIGHT EVENTS:  no fevers  intubated   Fio2 100 %    ROS:  unable to obtain history secondary to patient's mental status and/or sedation    VITALS:  T(F): 97.5, Max: 97.5 (12-02-20 @ 00:15)  HR: 97  BP: 100/59  RR: 22Vital Signs Last 24 Hrs  T(C): 36.4 (02 Dec 2020 00:15), Max: 36.4 (02 Dec 2020 00:15)  T(F): 97.5 (02 Dec 2020 00:15), Max: 97.5 (02 Dec 2020 00:15)  HR: 97 (02 Dec 2020 10:00) (69 - 138)  BP: 100/59 (02 Dec 2020 10:00) (99/57 - 178/79)  BP(mean): 76 (02 Dec 2020 10:00) (76 - 125)  RR: 22 (02 Dec 2020 10:00) (20 - 44)  SpO2: 97% (02 Dec 2020 10:00) (84% - 97%)    TESTS & MEASUREMENTS:                        12.4   13.07 )-----------( 377      ( 02 Dec 2020 09:00 )             41.3     12-01    140  |  106  |  31<H>  ----------------------------<  220<H>  4.4   |  18  |  0.8    Ca    8.1<L>      01 Dec 2020 11:54  Mg     2.6     12-01    TPro  6.0  /  Alb  3.2<L>  /  TBili  1.0  /  DBili  x   /  AST  45<H>  /  ALT  27  /  AlkPhos  73  12-01    LIVER FUNCTIONS - ( 01 Dec 2020 11:54 )  Alb: 3.2 g/dL / Pro: 6.0 g/dL / ALK PHOS: 73 U/L / ALT: 27 U/L / AST: 45 U/L / GGT: x                   RADIOLOGY & ADDITIONAL TESTS:  Personal review of radiological diagnostics performed  Echo and EKG results noted when applicable.     MEDICATIONS:  ALPRAZolam 0.25 milliGRAM(s) Oral every 8 hours PRN  ampicillin/sulbactam  IVPB      aspirin enteric coated 81 milliGRAM(s) Oral daily  chlorhexidine 4% Liquid 1 Application(s) Topical <User Schedule>  dexAMETHasone  Injectable 6 milliGRAM(s) IV Push daily  fentaNYL   Infusion. 0.5 MICROgram(s)/kG/Hr IV Continuous <Continuous>  levoFLOXacin IVPB      midazolam Infusion 0.02 mG/kG/Hr IV Continuous <Continuous>  rivaroxaban 20 milliGRAM(s) Oral with dinner  simvastatin 20 milliGRAM(s) Oral at bedtime  sodium chloride 0.9%. 1000 milliLiter(s) IV Continuous <Continuous>      ANTIBIOTICS:  ampicillin/sulbactam  IVPB      levoFLOXacin IVPB

## 2020-12-02 NOTE — PROGRESS NOTE ADULT - SUBJECTIVE AND OBJECTIVE BOX
Patient is a 70y old  Male who presents with a chief complaint of       Over Night Events:  Worsening respiratory status this am.  Rapid Afib.          ROS:     All ROS are negative except HPI         PHYSICAL EXAM    ICU Vital Signs Last 24 Hrs  T(C): 36.4 (02 Dec 2020 00:15), Max: 36.4 (02 Dec 2020 00:15)  T(F): 97.5 (02 Dec 2020 00:15), Max: 97.5 (02 Dec 2020 00:15)  HR: 88 (02 Dec 2020 07:01) (69 - 96)  BP: 134/64 (02 Dec 2020 07:01) (126/68 - 156/70)  BP(mean): --  ABP: --  ABP(mean): --  RR: 20 (02 Dec 2020 07:01) (20 - 30)  SpO2: 96% (02 Dec 2020 07:01) (86% - 97%)      CONSTITUTIONAL:  Ill appearing  NAD    ENT:   Airway patent,   Mouth with normal mucosa.   No thrush    EYES:   Pupils equal,   Round and reactive to light.    CARDIAC:   Normal rate,   Regular rhythm.    No edema      Vascular:  Normal systolic impulse  No Carotid bruits    RESPIRATORY:   No wheezing  Bilateral crackles   Normal chest expansion  Tachypneic,  Use of accessory muscles    GASTROINTESTINAL:  Abdomen soft,   Non-tender,   No guarding,   + BS    MUSCULOSKELETAL:   Range of motion is not limited,  No clubbing, cyanosis    NEUROLOGICAL:   Alert and oriented   No motor  deficits.    SKIN:   Skin normal color for race,   Warm and dry and intact.   No evidence of rash.    PSYCHIATRIC:   Normal mood and affect.   No apparent risk to self or others.    HEMATOLOGICAL:  No cervical  lymphadenopathy.  no inguinal lymphadenopathy      12-01-20 @ 07:01  -  12-02-20 @ 07:00  --------------------------------------------------------  IN:    Oral Fluid: 480 mL    sodium chloride 0.9%: 1150 mL  Total IN: 1630 mL    OUT:    Voided (mL): 2050 mL  Total OUT: 2050 mL    Total NET: -420 mL          LABS:                            12.0   5.82  )-----------( 253      ( 01 Dec 2020 11:54 )             39.5                                               12-01    140  |  106  |  31<H>  ----------------------------<  220<H>  4.4   |  18  |  0.8    Ca    8.1<L>      01 Dec 2020 11:54  Mg     2.6     12-01    TPro  6.0  /  Alb  3.2<L>  /  TBili  1.0  /  DBili  x   /  AST  45<H>  /  ALT  27  /  AlkPhos  73  12-01                                                                                           LIVER FUNCTIONS - ( 01 Dec 2020 11:54 )  Alb: 3.2 g/dL / Pro: 6.0 g/dL / ALK PHOS: 73 U/L / ALT: 27 U/L / AST: 45 U/L / GGT: x                                                                                                                                       MEDICATIONS  (STANDING):  aspirin enteric coated 81 milliGRAM(s) Oral daily  cefTRIAXone   IVPB 1000 milliGRAM(s) IV Intermittent every 24 hours  chlorhexidine 4% Liquid 1 Application(s) Topical <User Schedule>  dexAMETHasone  Injectable 6 milliGRAM(s) IV Push daily  fentaNYL   Infusion. 0.5 MICROgram(s)/kG/Hr (3.89 mL/Hr) IV Continuous <Continuous>  midazolam Infusion 0.02 mG/kG/Hr (1.56 mL/Hr) IV Continuous <Continuous>  rivaroxaban 20 milliGRAM(s) Oral with dinner  simvastatin 20 milliGRAM(s) Oral at bedtime  sodium chloride 0.9%. 1000 milliLiter(s) (50 mL/Hr) IV Continuous <Continuous>    MEDICATIONS  (PRN):  ALPRAZolam 0.25 milliGRAM(s) Oral every 8 hours PRN anxiety      New X-rays reviewed:                                                                                  ECHO      CXR interpreted by me:   Worsening LLL infiltrates

## 2020-12-02 NOTE — PROGRESS NOTE ADULT - ASSESSMENT
IMPRESSION:    Acute hypoxemic resp failure  Covid pneumonia  P A FIB on xarelto  Possible bacterial superinfection      PLAN:    CNS: Needs intubation and MV.      HEENT:  Oral care    PULMONARY:  HOB @ 45 degrees, ARDS network MV.  Monitor PPL adn Driving pressures.  DTA,     CARDIOVASCULAR:  Continue Rate control. BNP>  Avoid volume overload      GI: GI prophylaxis                                          OG feeding      RENAL:  F/u  lytes.  Correct as needed. accurate I/O    INFECTIOUS DISEASE: per ID, trend infl markers.  Might need Toci.  DW ID.  Dexa D# 5.  ABX to Unasyn and Levaquin.  Check Urine legionella dn Strep AG.      HEMATOLOGICAL:  DVT prophylaxis. DC Xarelto.  Start LMWH am     ENDOCRINE:  Follow up FS.  Insulin protocol if needed.    MUSCULOSKELETAL:  bed rest     CODE STATUS: FULL CODE  CEU / ICU   poor prognosis

## 2020-12-02 NOTE — PROGRESS NOTE ADULT - ASSESSMENT
#A/P:  #Acute hypoxic respiratory failure 2/2 severe SARS-COV2 viral pneumonia/Sepsis with underlying inflammatory cytokine storm  -HD#5  -worsening respiratory status---requiring intubation and MV today (12/2/20)  -sedation and paralysis  -continue close monitoring in CEU SDU  -cont covid contact/airbourne isolation precautions  -pulmonary/crit care following  -ID following  -s/p toci x1 (second dose today) Remdesevir and conv plasma x 2units  -cont iv abx (levaquin/zosyn)---esr up to 29--monitor wbc and fever curve  -monitor inflamm markers (crp/ferritin/procal)  -cont dexamethasone 6 mg iv [id number] days or until discharge  -daily CXR  -monitor wbc and fever curve  -ABG and CXR post intubation  -low dose pressors (levophed)--taper off as tolerated for MAP >65    #DVT/GI ppx    FULL CODE    critical condition---continue monitoring in SDU  team spoke with son Jim and I also spoke with cortez who is MD in [place]

## 2020-12-02 NOTE — PROGRESS NOTE ADULT - ASSESSMENT
70y old  Male with pmh of DM, Paroxysmal Afib on Xarelto , HTN, L carotid endarterectomy, presents to the ER for evaluation of  progressively worsening shortness of breath more on exertion than at rest after testing positive for covid19 outpatient 5 days ago. The patient notes that he has been having symptoms of weakness, decreased po intake, and feeling sleepy more than usual. Upon arrival to the ER.  he found to have fever, tachycardia. tachypnea and  desaturated on 15 L non rebreather to 87%  and was placed on high flow oxygen with improvement in saturation to 97%    IMPRESSION;  COVID 19 with severe illness requiring MV 12/2 with Fio2 100%  ARDS secondary to cytokine storm ?  Doubt progression of bacterial PNA but will recommend expanding coverage  Pt was in the early viral replicative phase based on the timeline/onset of symptoms.  procalcitonin 0.59    Ferritiin 719  CRP 3.57  Ddimers 105  Marin MCCOY    RECOMMENDATIONS;  BCx  Urine for strep pneumonia antigen  D/c Rocephin 2 gm iv q24h  Zosyn 4.5 gm iv q6h  Repeat Ferritin/CRP/ddimers/CRP  Tocilizumab 400 mg iv x once.   S/p Convalescent plasma 2 units. 11/30  Day 1 11/ : Single  mg IV loading dose  Day 2-10 : single  mg IV once daily maintenance dose  Daily CBC,CMP.  Dexamethasone 6 mg iv q24h for 10 days 11/29 ( or until discharge ) or equivalent glucocorticoid dose may be substituted. Equivalent total dosis of alternative steroids are Methylprednisolone 32 mg and prednisone 40 mg q24h.  Monitor for side effects: hyperglycemia, neurological ( agitation/confusion), adrenal suppression, bacterial and fungal infections  Anticoagulation as per team  Proning for 16h/day if pt can tolerate it.

## 2020-12-03 LAB
ALBUMIN SERPL ELPH-MCNC: 2.9 G/DL — LOW (ref 3.5–5.2)
ALP SERPL-CCNC: 71 U/L — SIGNIFICANT CHANGE UP (ref 30–115)
ALT FLD-CCNC: 23 U/L — SIGNIFICANT CHANGE UP (ref 0–41)
ANION GAP SERPL CALC-SCNC: 12 MMOL/L — SIGNIFICANT CHANGE UP (ref 7–14)
AST SERPL-CCNC: 25 U/L — SIGNIFICANT CHANGE UP (ref 0–41)
BASE EXCESS BLDA CALC-SCNC: -2 MMOL/L — SIGNIFICANT CHANGE UP (ref -2–2)
BASE EXCESS BLDA CALC-SCNC: 0.5 MMOL/L — SIGNIFICANT CHANGE UP (ref -2–2)
BASOPHILS # BLD AUTO: 0 K/UL — SIGNIFICANT CHANGE UP (ref 0–0.2)
BASOPHILS NFR BLD AUTO: 0 % — SIGNIFICANT CHANGE UP (ref 0–1)
BILIRUB SERPL-MCNC: 1.5 MG/DL — HIGH (ref 0.2–1.2)
BUN SERPL-MCNC: 41 MG/DL — HIGH (ref 10–20)
CALCIUM SERPL-MCNC: 8.1 MG/DL — LOW (ref 8.5–10.1)
CHLORIDE SERPL-SCNC: 111 MMOL/L — HIGH (ref 98–110)
CO2 SERPL-SCNC: 21 MMOL/L — SIGNIFICANT CHANGE UP (ref 17–32)
CREAT SERPL-MCNC: 1 MG/DL — SIGNIFICANT CHANGE UP (ref 0.7–1.5)
CRP SERPL-MCNC: 2.16 MG/DL — HIGH (ref 0–0.4)
CRP SERPL-MCNC: 2.85 MG/DL — HIGH (ref 0–0.4)
CRP SERPL-MCNC: 3.07 MG/DL — HIGH (ref 0–0.4)
D DIMER BLD IA.RAPID-MCNC: 2126 NG/ML DDU — HIGH (ref 0–230)
EOSINOPHIL # BLD AUTO: 0 K/UL — SIGNIFICANT CHANGE UP (ref 0–0.7)
EOSINOPHIL NFR BLD AUTO: 0 % — SIGNIFICANT CHANGE UP (ref 0–8)
FERRITIN SERPL-MCNC: 325 NG/ML — SIGNIFICANT CHANGE UP (ref 30–400)
FERRITIN SERPL-MCNC: 368 NG/ML — SIGNIFICANT CHANGE UP (ref 30–400)
GAS PNL BLDA: SIGNIFICANT CHANGE UP
GLUCOSE BLDC GLUCOMTR-MCNC: 323 MG/DL — HIGH (ref 70–99)
GLUCOSE BLDC GLUCOMTR-MCNC: 339 MG/DL — HIGH (ref 70–99)
GLUCOSE BLDC GLUCOMTR-MCNC: 344 MG/DL — HIGH (ref 70–99)
GLUCOSE BLDC GLUCOMTR-MCNC: 368 MG/DL — HIGH (ref 70–99)
GLUCOSE SERPL-MCNC: 372 MG/DL — HIGH (ref 70–99)
HCO3 BLDA-SCNC: 24 MMOL/L — SIGNIFICANT CHANGE UP (ref 23–27)
HCO3 BLDA-SCNC: 26 MMOL/L — SIGNIFICANT CHANGE UP (ref 23–27)
HCT VFR BLD CALC: 35.5 % — LOW (ref 42–52)
HGB BLD-MCNC: 10.4 G/DL — LOW (ref 14–18)
HOROWITZ INDEX BLDA+IHG-RTO: 70 — SIGNIFICANT CHANGE UP
IMM GRANULOCYTES NFR BLD AUTO: 0.5 % — HIGH (ref 0.1–0.3)
LYMPHOCYTES # BLD AUTO: 0.28 K/UL — LOW (ref 1.2–3.4)
LYMPHOCYTES # BLD AUTO: 3 % — LOW (ref 20.5–51.1)
MAGNESIUM SERPL-MCNC: 2.8 MG/DL — HIGH (ref 1.8–2.4)
MCHC RBC-ENTMCNC: 22.4 PG — LOW (ref 27–31)
MCHC RBC-ENTMCNC: 29.3 G/DL — LOW (ref 32–37)
MCV RBC AUTO: 76.3 FL — LOW (ref 80–94)
MONOCYTES # BLD AUTO: 0.4 K/UL — SIGNIFICANT CHANGE UP (ref 0.1–0.6)
MONOCYTES NFR BLD AUTO: 4.3 % — SIGNIFICANT CHANGE UP (ref 1.7–9.3)
NEUTROPHILS # BLD AUTO: 8.5 K/UL — HIGH (ref 1.4–6.5)
NEUTROPHILS NFR BLD AUTO: 92.2 % — HIGH (ref 42.2–75.2)
NRBC # BLD: 0 /100 WBCS — SIGNIFICANT CHANGE UP (ref 0–0)
NT-PROBNP SERPL-SCNC: 452 PG/ML — HIGH (ref 0–300)
PCO2 BLDA: 42 MMHG — SIGNIFICANT CHANGE UP (ref 38–42)
PCO2 BLDA: 43 MMHG — HIGH (ref 38–42)
PH BLDA: 7.36 — LOW (ref 7.38–7.42)
PH BLDA: 7.39 — SIGNIFICANT CHANGE UP (ref 7.38–7.42)
PLATELET # BLD AUTO: 332 K/UL — SIGNIFICANT CHANGE UP (ref 130–400)
PO2 BLDA: 66 MMHG — LOW (ref 78–95)
PO2 BLDA: 97 MMHG — HIGH (ref 78–95)
POTASSIUM SERPL-MCNC: 5 MMOL/L — SIGNIFICANT CHANGE UP (ref 3.5–5)
POTASSIUM SERPL-SCNC: 5 MMOL/L — SIGNIFICANT CHANGE UP (ref 3.5–5)
PROCALCITONIN SERPL-MCNC: 0.26 NG/ML — HIGH (ref 0.02–0.1)
PROCALCITONIN SERPL-MCNC: 0.52 NG/ML — HIGH (ref 0.02–0.1)
PROT SERPL-MCNC: 5.8 G/DL — LOW (ref 6–8)
RBC # BLD: 4.65 M/UL — LOW (ref 4.7–6.1)
RBC # FLD: 18.9 % — HIGH (ref 11.5–14.5)
SAO2 % BLDA: 92 % — LOW (ref 94–98)
SAO2 % BLDA: 96 % — SIGNIFICANT CHANGE UP (ref 94–98)
SODIUM SERPL-SCNC: 144 MMOL/L — SIGNIFICANT CHANGE UP (ref 135–146)
WBC # BLD: 9.23 K/UL — SIGNIFICANT CHANGE UP (ref 4.8–10.8)
WBC # FLD AUTO: 9.23 K/UL — SIGNIFICANT CHANGE UP (ref 4.8–10.8)

## 2020-12-03 PROCEDURE — 71045 X-RAY EXAM CHEST 1 VIEW: CPT | Mod: 26

## 2020-12-03 PROCEDURE — 71045 X-RAY EXAM CHEST 1 VIEW: CPT | Mod: 26,77

## 2020-12-03 PROCEDURE — 99233 SBSQ HOSP IP/OBS HIGH 50: CPT

## 2020-12-03 RX ORDER — SODIUM CHLORIDE 9 MG/ML
1000 INJECTION, SOLUTION INTRAVENOUS
Refills: 0 | Status: DISCONTINUED | OUTPATIENT
Start: 2020-12-03 | End: 2020-12-09

## 2020-12-03 RX ORDER — DEXTROSE 50 % IN WATER 50 %
25 SYRINGE (ML) INTRAVENOUS ONCE
Refills: 0 | Status: DISCONTINUED | OUTPATIENT
Start: 2020-12-03 | End: 2020-12-06

## 2020-12-03 RX ORDER — GLUCAGON INJECTION, SOLUTION 0.5 MG/.1ML
1 INJECTION, SOLUTION SUBCUTANEOUS ONCE
Refills: 0 | Status: DISCONTINUED | OUTPATIENT
Start: 2020-12-03 | End: 2020-12-09

## 2020-12-03 RX ORDER — SENNA PLUS 8.6 MG/1
2 TABLET ORAL AT BEDTIME
Refills: 0 | Status: DISCONTINUED | OUTPATIENT
Start: 2020-12-03 | End: 2020-12-09

## 2020-12-03 RX ORDER — NOREPINEPHRINE BITARTRATE/D5W 8 MG/250ML
0.05 PLASTIC BAG, INJECTION (ML) INTRAVENOUS
Qty: 8 | Refills: 0 | Status: DISCONTINUED | OUTPATIENT
Start: 2020-12-03 | End: 2020-12-06

## 2020-12-03 RX ORDER — POLYETHYLENE GLYCOL 3350 17 G/17G
17 POWDER, FOR SOLUTION ORAL AT BEDTIME
Refills: 0 | Status: DISCONTINUED | OUTPATIENT
Start: 2020-12-03 | End: 2020-12-09

## 2020-12-03 RX ORDER — INSULIN LISPRO 100/ML
VIAL (ML) SUBCUTANEOUS
Refills: 0 | Status: DISCONTINUED | OUTPATIENT
Start: 2020-12-03 | End: 2020-12-04

## 2020-12-03 RX ORDER — DEXTROSE 50 % IN WATER 50 %
15 SYRINGE (ML) INTRAVENOUS ONCE
Refills: 0 | Status: DISCONTINUED | OUTPATIENT
Start: 2020-12-03 | End: 2020-12-06

## 2020-12-03 RX ORDER — DEXTROSE 50 % IN WATER 50 %
12.5 SYRINGE (ML) INTRAVENOUS ONCE
Refills: 0 | Status: DISCONTINUED | OUTPATIENT
Start: 2020-12-03 | End: 2020-12-06

## 2020-12-03 RX ADMIN — PIPERACILLIN AND TAZOBACTAM 25 GRAM(S): 4; .5 INJECTION, POWDER, LYOPHILIZED, FOR SOLUTION INTRAVENOUS at 05:25

## 2020-12-03 RX ADMIN — MIDAZOLAM HYDROCHLORIDE 1.56 MG/KG/HR: 1 INJECTION, SOLUTION INTRAMUSCULAR; INTRAVENOUS at 00:20

## 2020-12-03 RX ADMIN — Medication 8: at 15:12

## 2020-12-03 RX ADMIN — Medication 7.29 MICROGRAM(S)/KG/MIN: at 08:30

## 2020-12-03 RX ADMIN — CHLORHEXIDINE GLUCONATE 15 MILLILITER(S): 213 SOLUTION TOPICAL at 05:14

## 2020-12-03 RX ADMIN — Medication 6 MILLIGRAM(S): at 05:14

## 2020-12-03 RX ADMIN — ENOXAPARIN SODIUM 80 MILLIGRAM(S): 100 INJECTION SUBCUTANEOUS at 17:30

## 2020-12-03 RX ADMIN — CHLORHEXIDINE GLUCONATE 1 APPLICATION(S): 213 SOLUTION TOPICAL at 05:14

## 2020-12-03 RX ADMIN — PIPERACILLIN AND TAZOBACTAM 25 GRAM(S): 4; .5 INJECTION, POWDER, LYOPHILIZED, FOR SOLUTION INTRAVENOUS at 11:16

## 2020-12-03 RX ADMIN — Medication 7.29 MICROGRAM(S)/KG/MIN: at 09:02

## 2020-12-03 RX ADMIN — PIPERACILLIN AND TAZOBACTAM 25 GRAM(S): 4; .5 INJECTION, POWDER, LYOPHILIZED, FOR SOLUTION INTRAVENOUS at 23:41

## 2020-12-03 RX ADMIN — POLYETHYLENE GLYCOL 3350 17 GRAM(S): 17 POWDER, FOR SOLUTION ORAL at 21:01

## 2020-12-03 RX ADMIN — SODIUM CHLORIDE 70 MILLILITER(S): 9 INJECTION INTRAMUSCULAR; INTRAVENOUS; SUBCUTANEOUS at 09:01

## 2020-12-03 RX ADMIN — ENOXAPARIN SODIUM 80 MILLIGRAM(S): 100 INJECTION SUBCUTANEOUS at 05:14

## 2020-12-03 RX ADMIN — MIDAZOLAM HYDROCHLORIDE 1.56 MG/KG/HR: 1 INJECTION, SOLUTION INTRAMUSCULAR; INTRAVENOUS at 11:20

## 2020-12-03 RX ADMIN — Medication 81 MILLIGRAM(S): at 11:16

## 2020-12-03 RX ADMIN — SENNA PLUS 2 TABLET(S): 8.6 TABLET ORAL at 21:01

## 2020-12-03 RX ADMIN — SIMVASTATIN 20 MILLIGRAM(S): 20 TABLET, FILM COATED ORAL at 21:01

## 2020-12-03 RX ADMIN — FENTANYL CITRATE 3.89 MICROGRAM(S)/KG/HR: 50 INJECTION INTRAVENOUS at 09:02

## 2020-12-03 RX ADMIN — PIPERACILLIN AND TAZOBACTAM 25 GRAM(S): 4; .5 INJECTION, POWDER, LYOPHILIZED, FOR SOLUTION INTRAVENOUS at 17:51

## 2020-12-03 RX ADMIN — CHLORHEXIDINE GLUCONATE 15 MILLILITER(S): 213 SOLUTION TOPICAL at 17:29

## 2020-12-03 NOTE — PROGRESS NOTE ADULT - SUBJECTIVE AND OBJECTIVE BOX
SUBJECTIVE:    HPI:  71 yo male with pmh of DM, Paroxysmal Afib on Xarelto , HTN, L carotid endarterectomy presents to the hospital with progressively worsening shortness of breath more on exertion than at rest after testing positive for covid19 outpatient 5 days ago. The patient notes that he has been having symptoms of weakness, decreased po intake, and feeling sleepy more than usual. Upon arrival to the ED the patient desaturated on 15 L non rebreather to 87% with tachypnea and was placed on high flow with improvement in saturation to 97% and subjective improvement in quality of breathing. The patient denies symptoms of dizziness/ chest pain/ short of breath/ abdominal pain/constipation/ diarrhea/loss of taste/ loss of smell. All other ROS negative except as noted above.     Vital Signs (24 Hrs): T(C): 39.2,  HR: 95 , BP: 130/80 (11-27-20 @ 22:19) (95/55 - 130/80), RR: 20 , SpO2: 92%   Labs: Lymphopenia , Cr 1.6 , Transaminitis (AST65, ALT 45) , D- DIMER 105  CXR: b/l patchy opacities      (28 Nov 2020 00:13)      PAST MEDICAL & SURGICAL HISTORY  PAST MEDICAL & SURGICAL HISTORY:  H/O sciatica  [facility] PAIN LT LE    H/O cystoscopy  resection of bladder tumor    Bladder cancer    DM (diabetes mellitus)    Hypercholesteremia    Afib    HTN (hypertension)    History of hernia repair      SOCIAL HISTORY:    ALLERGIES:  No Known Allergies    MEDICATIONS:  STANDING MEDICATIONS  aspirin  chewable 81 milliGRAM(s) Oral daily  chlorhexidine 0.12% Liquid 15 milliLiter(s) Oral Mucosa every 12 hours  chlorhexidine 4% Liquid 1 Application(s) Topical <User Schedule>  dexAMETHasone  Injectable 6 milliGRAM(s) IV Push daily  dextrose 40% Gel 15 Gram(s) Oral once  dextrose 5%. 1000 milliLiter(s) IV Continuous <Continuous>  dextrose 5%. 1000 milliLiter(s) IV Continuous <Continuous>  dextrose 50% Injectable 25 Gram(s) IV Push once  dextrose 50% Injectable 12.5 Gram(s) IV Push once  dextrose 50% Injectable 25 Gram(s) IV Push once  enoxaparin Injectable 80 milliGRAM(s) SubCutaneous two times a day  fentaNYL   Infusion. 0.5 MICROgram(s)/kG/Hr IV Continuous <Continuous>  glucagon  Injectable 1 milliGRAM(s) IntraMuscular once  insulin lispro (ADMELOG) corrective regimen sliding scale   SubCutaneous three times a day before meals  levoFLOXacin IVPB      levoFLOXacin IVPB 750 milliGRAM(s) IV Intermittent every 24 hours  midazolam Infusion 0.02 mG/kG/Hr IV Continuous <Continuous>  norepinephrine Infusion 0.05 MICROgram(s)/kG/Min IV Continuous <Continuous>  piperacillin/tazobactam IVPB.. 4.5 Gram(s) IV Intermittent every 6 hours  polyethylene glycol 3350 17 Gram(s) Oral at bedtime  senna 2 Tablet(s) Oral at bedtime  simvastatin 20 milliGRAM(s) Oral at bedtime  sodium chloride 0.9%. 1000 milliLiter(s) IV Continuous <Continuous>    PRN MEDICATIONS  ALPRAZolam 0.25 milliGRAM(s) Oral every 8 hours PRN    VITALS:   T(F): 98  HR: 82  BP: 119/60  RR: 19  SpO2: 95%    LABS:                        10.4   9.23  )-----------( 332      ( 03 Dec 2020 07:32 )             35.5     12-03    144  |  111<H>  |  41<H>  ----------------------------<  372<H>  5.0   |  21  |  1.0    Ca    8.1<L>      03 Dec 2020 07:32  Mg     2.8     12-03    TPro  5.8<L>  /  Alb  2.9<L>  /  TBili  1.5<H>  /  DBili  x   /  AST  25  /  ALT  23  /  AlkPhos  71  12-03        ABG - ( 03 Dec 2020 17:12 )  pH, Arterial: 7.39  pH, Blood: x     /  pCO2: 43    /  pO2: 66    / HCO3: 26    / Base Excess: 0.5   /  SaO2: 92                    Culture - Blood (collected 01 Dec 2020 23:15)  Source: .Blood Blood-Peripheral  Preliminary Report (03 Dec 2020 07:01):    No growth to date.          RADIOLOGY:    PHYSICAL EXAM:  GEN: No acute distress, intubated, sedated  HEENT: AT/NC PEERLA, EOMI  LUNGS: dec bs b/l  HEART: S1/S2 present.   ABD: Soft, non-tender, non-distended.  EXT: No edema, no rashes, no cyanosis

## 2020-12-03 NOTE — DIETITIAN INITIAL EVALUATION ADULT. - ENERGY INTAKE
PO diet DASH/TLC CHO Consistent provided from 11/28-12/2 w.0-75% intake per EMR. Receiving tube feeding and tolerating @ goal rate

## 2020-12-03 NOTE — DIETITIAN INITIAL EVALUATION ADULT. - OTHER INFO
Pt p/w worsening SOB after testing +COVID x5d PTA. Pt w. episode of desaturation on HFNC alt w. BiPAP on 12/1 requiring intubation morning of 12/2. Acute hypoxic respiratory failure 2/2 COVID PNA; ARDS--intubated; sedated & low dose levophed in place. Cytokine Release Syndrome. Possible bacterial superinfection. s/p toci x2 Remdesevir and conv plasma x 2units.

## 2020-12-03 NOTE — PROGRESS NOTE ADULT - SUBJECTIVE AND OBJECTIVE BOX
Patient is a 70y old  Male who presents with a chief complaint of acute hypoxic respiratory failure- COVID-19 pneumonia (02 Dec 2020 13:55)        Over Night Events:  intubated yesterday.  On Low dose levophed.  Sedated.          ROS:     All ROS are negative except HPI         PHYSICAL EXAM    ICU Vital Signs Last 24 Hrs  T(C): 37.2 (03 Dec 2020 06:00), Max: 37.2 (03 Dec 2020 06:00)  T(F): 99 (03 Dec 2020 06:00), Max: 99 (03 Dec 2020 06:00)  HR: 86 (03 Dec 2020 06:00) (73 - 138)  BP: 134/60 (03 Dec 2020 06:00) (89/54 - 178/79)  BP(mean): 87 (03 Dec 2020 06:00) (69 - 125)  ABP: --  ABP(mean): --  RR: 13 (03 Dec 2020 06:00) (11 - 44)  SpO2: 98% (03 Dec 2020 06:00) (84% - 99%)      CONSTITUTIONAL:  Well nourished.  NAD    ENT:   Airway patent,   Mouth with normal mucosa.   No thrush    EYES:   Pupils equal,   Round and reactive to light.    CARDIAC:   Normal rate,   Regular rhythm.    No edema      Vascular:  Normal systolic impulse  No Carotid bruits    RESPIRATORY:   No wheezing  Bilateral BS  Normal chest expansion  Not tachypneic,  No use of accessory muscles    GASTROINTESTINAL:  Abdomen soft,   Non-tender,   No guarding,   + BS    MUSCULOSKELETAL:   Range of motion is not limited,  No clubbing, cyanosis    NEUROLOGICAL:   Sedated   No motor  deficits.    SKIN:   Skin normal color for race,   Warm and dry and intact.   No evidence of rash.    PSYCHIATRIC:   Sedated   No apparent risk to self or others.    HEMATOLOGICAL:  No cervical  lymphadenopathy.  no inguinal lymphadenopathy      12-02-20 @ 07:01  -  12-03-20 @ 07:00  --------------------------------------------------------  IN:    FentaNYL: 405.5 mL    IV PiggyBack: 350 mL    Midazolam: 71.6 mL    Norepinephrine: 124.1 mL    Peptamen A.F.: 540 mL    sodium chloride 0.9%: 910 mL    Sodium Chloride 0.9% Bolus: 500 mL  Total IN: 2901.2 mL    OUT:    Indwelling Catheter - Urethral (mL): 1517 mL    Voided (mL): 550 mL  Total OUT: 2067 mL    Total NET: 834.2 mL          LABS:                            12.4   13.07 )-----------( 377      ( 02 Dec 2020 09:00 )             41.3                                               12-02    141  |  106  |  29<H>  ----------------------------<  258<H>  4.3   |  17  |  0.7    Ca    7.8<L>      02 Dec 2020 09:00  Mg     2.2     12-02    TPro  6.3  /  Alb  3.3<L>  /  TBili  1.6<H>  /  DBili  x   /  AST  41  /  ALT  29  /  AlkPhos  85  12-02                                                                                           LIVER FUNCTIONS - ( 02 Dec 2020 09:00 )  Alb: 3.3 g/dL / Pro: 6.3 g/dL / ALK PHOS: 85 U/L / ALT: 29 U/L / AST: 41 U/L / GGT: x                                                  Culture - Blood (collected 01 Dec 2020 23:15)  Source: .Blood Blood-Peripheral  Preliminary Report (03 Dec 2020 07:01):    No growth to date.                                                   Mode: AC/ CMV (Assist Control/ Continuous Mandatory Ventilation)  RR (machine): 20  TV (machine): 450  FiO2: 100  PEEP: 12  ITime: 1  MAP: 17  PIP: 30                                      ABG - ( 03 Dec 2020 04:35 )  pH, Arterial: 7.36  pH, Blood: x     /  pCO2: 42    /  pO2: 97    / HCO3: 24    / Base Excess: -2.0  /  SaO2: 96                  MEDICATIONS  (STANDING):  aspirin  chewable 81 milliGRAM(s) Oral daily  chlorhexidine 0.12% Liquid 15 milliLiter(s) Oral Mucosa every 12 hours  chlorhexidine 4% Liquid 1 Application(s) Topical <User Schedule>  dexAMETHasone  Injectable 6 milliGRAM(s) IV Push daily  enoxaparin Injectable 80 milliGRAM(s) SubCutaneous two times a day  fentaNYL   Infusion. 0.5 MICROgram(s)/kG/Hr (3.89 mL/Hr) IV Continuous <Continuous>  levoFLOXacin IVPB      levoFLOXacin IVPB 750 milliGRAM(s) IV Intermittent every 24 hours  midazolam Infusion 0.02 mG/kG/Hr (1.56 mL/Hr) IV Continuous <Continuous>  norepinephrine Infusion 0.05 MICROgram(s)/kG/Min (7.29 mL/Hr) IV Continuous <Continuous>  piperacillin/tazobactam IVPB.. 4.5 Gram(s) IV Intermittent every 6 hours  simvastatin 20 milliGRAM(s) Oral at bedtime  sodium chloride 0.9%. 1000 milliLiter(s) (70 mL/Hr) IV Continuous <Continuous>    MEDICATIONS  (PRN):  ALPRAZolam 0.25 milliGRAM(s) Oral every 8 hours PRN anxiety      New X-rays reviewed:                                                                                  ECHO    CXR interpreted by me:  ET high.  OG OK.  Improved infiltrates

## 2020-12-03 NOTE — DIETITIAN INITIAL EVALUATION ADULT. - ENTERAL
3. Add Beneprotein 3packs/day. Can bundle & provide all 3 at once. Mix w. 75-100cc NS and provide via syringe. Continue Peptamen AF @50mL/hr x24H. TF w. Beneprotein will provide 1515kcal 108g protein 972mL free H2O & 96% RDIs. Flushes per CEU. 4. Consider stronger bowel regimen as no BM x7 days.

## 2020-12-03 NOTE — DIETITIAN INITIAL EVALUATION ADULT. - ORAL INTAKE PTA/DIET HISTORY
Unable to conduct face to face assessment d/t limited clinical contact precautions a/w COVID-19. Unable to obtain nutrition hx as pt intubated. Decreased PO intake PTA noted in h&p.

## 2020-12-03 NOTE — PROGRESS NOTE ADULT - ASSESSMENT
#A/P:  #Acute hypoxic respiratory failure 2/2 severe SARS-COV2 viral pneumonia/Sepsis with underlying inflammatory cytokine storm  -HD#6  -worsening respiratory status---requiring intubation and MV 12/2/20  -continue with sedation and paralysis  -continue close monitoring in CEU SDU  -covid contact/air[place] isolation precautions  -pulmonary/crit care following  -ID following  -s/p toci x2, conv plasma x 2 and remdesivir   -cont iv abx (levaquin/zosyn) for secondary bacterial infection---esr up to 29--monitor wbc and fever curve  -monitor inflamm markers (crp/ferritin/procal)  -cont dexamethasone 6 mg iv daily   -daily CXR  -monitor wbc and fever curve  -ABG   -cont low dose pressors (levophed)--taper off as tolerated for MAP >60  -hyperglycemia 2/2 steroids--monitor fs and insulin coverage  -NGT in place    #DVT/GI ppx    FULL CODE    critical condition---continue monitoring in CEU/SDU

## 2020-12-03 NOTE — PROGRESS NOTE ADULT - SUBJECTIVE AND OBJECTIVE BOX
Patient is a 70y old  Male who presents with a chief complaint of   HPI:  71 yo male with pmh of DM, Paroxysmal Afib on Xarelto , HTN, L carotid endarterectomy presents to the hospital with progressively worsening shortness of breath more on exertion than at rest after testing positive for covid19 outpatient 5 days ago. The patient notes that he has been having symptoms of weakness, decreased po intake, and feeling sleepy more than usual. Upon arrival to the ED the patient desaturated on 15 L non rebreather to 87% with tachypnea and was placed on high flow with improvement in saturation to 97% and subjective improvement in quality of breathing. The patient denies symptoms of dizziness/ chest pain/ short of breath/ abdominal pain/constipation/ diarrhea/loss of taste/ loss of smell. All other ROS negative except as noted above.   Vital Signs (24 Hrs): T(C): 39.2,  HR: 95 , BP: 130/80 (11-27-20 @ 22:19) (95/55 - 130/80), RR: 20 , SpO2: 92%   Labs: Lymphopenia , Cr 1.6 , Transaminitis (AST65, ALT 45) , D- DIMER 105  CXR: b/l patchy opacities   (28 Nov 2020 00:13)      PAST MEDICAL & SURGICAL HISTORY:  H/O sciatica  [facility] PAIN LT LE  H/O cystoscopy  resection of bladder tumor  Bladder cancer  DM (diabetes mellitus)  Hypercholesteremia  Afib  HTN (hypertension)  History of hernia repair    patient seen and examined independently on morning rounds, chart reviewed and discussed with the medicine resident and on interdisciplinary rounds    no overnight events- remains intubated, sedated and on low dose pressors  on MV 20/450/100% PEEP 14            PE:  GEN-NAD, sedated, intubated on MV (Fi02 100%)  PULM- fair air entry decreased bs bilat  CVS- +s1/s2 RRR  GI- soft NT ND +bs, no rebound, no guarding  EXT- no edema        Labs:                        10.4   9.23  )-----------( 332      ( 03 Dec 2020 07:32 )             35.5     CBC Full  -  ( 03 Dec 2020 07:32 )  WBC Count : 9.23 K/uL  RBC Count : 4.65 M/uL  Hemoglobin : 10.4 g/dL  Hematocrit : 35.5 %  Platelet Count - Automated : 332 K/uL  Mean Cell Volume : 76.3 fL  Mean Cell Hemoglobin : 22.4 pg  Mean Cell Hemoglobin Concentration : 29.3 g/dL  Auto Neutrophil # : 8.50 K/uL  Auto Lymphocyte # : 0.28 K/uL  Auto Monocyte # : 0.40 K/uL  Auto Eosinophil # : 0.00 K/uL  Auto Basophil # : 0.00 K/uL  Auto Neutrophil % : 92.2 %  Auto Lymphocyte % : 3.0 %  Auto Monocyte % : 4.3 %  Auto Eosinophil % : 0.0 %  Auto Basophil % : 0.0 %      12-03    144  |  111<H>  |  41<H>  ----------------------------<  372<H>  5.0   |  21  |  1.0    Ca    8.1<L>      03 Dec 2020 07:32  Mg     2.8     12-03    TPro  5.8<L>  /  Alb  2.9<L>  /  TBili  1.5<H>  /  DBili  x   /  AST  25  /  ALT  23  /  AlkPhos  71  12-03      Microbiology:    Culture - Blood (collected 01 Dec 2020 23:15)  Source: .Blood Blood-Peripheral  Preliminary Report (03 Dec 2020 07:01):    No growth to date.        Vital Signs Last 24 Hrs  T(C): 37.3 (03 Dec 2020 10:00), Max: 37.9 (03 Dec 2020 08:00)  T(F): 99.1 (03 Dec 2020 10:00), Max: 100.3 (03 Dec 2020 08:00)  HR: 82 (03 Dec 2020 11:00) (73 - 87)  BP: 114/63 (03 Dec 2020 11:00) (89/54 - 145/69)  BP(mean): 82 (03 Dec 2020 11:00) (69 - 99)  RR: 24 (03 Dec 2020 11:00) (11 - 24)  SpO2: 97% (03 Dec 2020 11:00) (94% - 99%)    I&O's Summary    02 Dec 2020 07:01  -  03 Dec 2020 07:00  --------------------------------------------------------  IN: 3021.2 mL / OUT: 2067 mL / NET: 954.2 mL    03 Dec 2020 07:01  -  03 Dec 2020 13:00  --------------------------------------------------------  IN: 772.2 mL / OUT: 500 mL / NET: 272.2 mL

## 2020-12-03 NOTE — PROGRESS NOTE ADULT - ASSESSMENT
IMPRESSION:    Acute hypoxemic resp failure  Covid pneumonia  CRS   P A FIB on xarelto  Possible bacterial superinfection      PLAN:    CNS: SAT.  Continue adequate sedation     HEENT:  Oral care    PULMONARY:  HOB @ 45 degrees, ARDS network MV.  Monitor PPL andDriving pressures.      CARDIOVASCULAR:  Continue Rate control. BNP. Avoid volume overload      GI: GI prophylaxis                                          OG feeding.  Bowel regimen     RENAL:  F/u  lytes.  Correct as needed. accurate I/O    INFECTIOUS DISEASE: per ID, trend infl markers.  Dexa D# 6  Finish ABX course.     HEMATOLOGICAL:  DVT prophylaxis. Start LMWH therapeutic     ENDOCRINE:  Follow up FS.  Insulin protocol if needed.    MUSCULOSKELETAL:  bed rest     CODE STATUS: FULL CODE  CEU / ICU   poor prognosis

## 2020-12-03 NOTE — DIETITIAN INITIAL EVALUATION ADULT. - ADD RECOMMEND
RD Will monitor diet order energy intake nutrition related labs body composition NFPF (TF tolerance).

## 2020-12-03 NOTE — DIETITIAN INITIAL EVALUATION ADULT. - OTHER CALCULATIONS
est calorie needs = 1320-1509kcal/day (70-80% PSE 2003b (1886) Per ASPEN guidelines for critically ill COVID pts  Ve: 8.8 Tmax x24H: 37.9); est protein needs = 93-117g/day (1.2-1.5 g/kg CBW); est flude needs = per CEU

## 2020-12-03 NOTE — PROGRESS NOTE ADULT - ASSESSMENT
71 yo male with pmh of DM, Paroxysmal Afib on Xarelto , HTN, L carotid endarterectomy presents to the hospital with progressively worsening shortness of breath more on exertion than at rest after testing positive for covid19 outpatient 5 days ago.     #Acute hypoxemic respiratory failure secondary to COVID-19 pneumonia  - intubated 2/12 450/20/100/14  - Chest X-ray: Airspace and interstitial opacifications, stable.procal  - LDH - 553, Procal - 0.61, CRP - 4.68, d-dimer - 210, Ferritin - 719, Rocephin 2g IV q24   - s/p Convalescent plasma 2 U  - c/w Remdesivir taper   - c/w Dexamethasone 6mg IV QD for 10 days  - ID consult appreciated  - f/u Urine for Strep pneumo, BCx  - MRSA Nares (-)  - Levophed titrated to MAP of 60    # Transaminitis - stable  - likely related to COVID-19 r/o liver disease  - AST 77, ALT 33, GGT 56  - RUQ U/S; Hepatomegaly with diffuse hepatic steatosis. Nodular liver contour suggesting early cirrhotic changes. Gallbladder wall thickening, nonspecific.    # PARAM , likely secondary to decreased po intake - Resolved  - Cr 1.6 on admission , monitor    # h/o A-fib on xarelto , rate controlled now   - rate control w/ Cardizem  - EKG on admission shows A flutter, AM EKG: AFib w/ variable AV block  - c/w Xarelto 20 mg qd    # h/o HTN   - c/w carvedilol   - hold losartan due to impaired renal function for now     # h/o DM   - hold po home medications  - fs ac qhs , IF  > 180 , start insulin protocol  - carb consistent diet   - f/u HbA1c level     # DVT ppx: Xarelto  # GI ppx: Famotidine  # Activity: As Tolerated  # Diet: Carb Consistent    Called family and updated on patient condition 71 yo male with pmh of DM, Paroxysmal Afib on Xarelto , HTN, L carotid endarterectomy presents to the hospital with progressively worsening shortness of breath more on exertion than at rest after testing positive for covid19 outpatient 5 days ago.     #Acute hypoxemic respiratory failure secondary to COVID-19 pneumonia  - intubated 2/12 450/20/100/14  - Chest X-ray: Airspace and interstitial opacifications, stable  - LDH - 553, Procal - 0.61, CRP - 4.68, d-dimer - 210, Ferritin - 719  - Levaquin 750 mg IV q24  - s/p Convalescent plasma 2 U  - c/w Remdesivir taper   - c/w Dexamethasone 6mg IV QD for 10 days  - ID consult appreciated  - f/u Urine for Strep pneumo, BCx  - MRSA Nares (-)  - Levophed titrated to MAP of 60    # Transaminitis - stable  - likely related to COVID-19 r/o liver disease  - AST 77, ALT 33, GGT 56  - RUQ U/S; Hepatomegaly with diffuse hepatic steatosis. Nodular liver contour suggesting early cirrhotic changes. Gallbladder wall thickening, nonspecific.    # PARAM , likely secondary to decreased po intake - Resolved  - Cr 1.6 on admission , monitor    # h/o A-fib on xarelto , rate controlled now   - rate control w/ Cardizem  - EKG on admission shows A flutter, AM EKG: AFib w/ variable AV block  - c/w Xarelto 20 mg qd    # h/o HTN   - c/w carvedilol   - hold losartan due to impaired renal function for now     # h/o DM   - hold po home medications  - fs ac qhs , IF  > 180 , start insulin protocol  - carb consistent diet   - f/u HbA1c level     # DVT ppx: Xarelto  # GI ppx: Famotidine  # Activity: As Tolerated  # Diet: Carb Consistent    Called family and updated on patient condition 69 yo male with pmh of DM, Paroxysmal Afib on Xarelto , HTN, L carotid endarterectomy presents to the hospital with progressively worsening shortness of breath more on exertion than at rest after testing positive for covid19 outpatient 5 days ago.     #Acute hypoxemic respiratory failure secondary to COVID-19 pneumonia  - intubated 2/12 450/20/100/14  - Chest X-ray: Airspace and interstitial opacifications, stable  - LDH - 553, Procal - 0.61, CRP - 4.68, d-dimer - 210, Ferritin - 719  - Levaquin 750 mg IV q24, Zusyn 4.5 G IB q6  - s/p Convalescent plasma 2 U  - off Remdesivir   - c/w Dexamethasone 6mg IV QD for 10 days  - ID consult appreciated  - f/u Urine for Strep pneumo, BCx  - MRSA Nares (-)  - Levophed titrated to MAP of 60    # Transaminitis - stable  - likely related to COVID-19 r/o liver disease  - AST 77, ALT 33, GGT 56  - RUQ U/S; Hepatomegaly with diffuse hepatic steatosis. Nodular liver contour suggesting early cirrhotic changes. Gallbladder wall thickening, nonspecific.    # PARAM , likely secondary to decreased po intake - Resolved  - Cr 1.6 on admission , monitor    # h/o A-fib on xarelto , rate controlled now   - rate control w/ Cardizem  - EKG on admission shows A flutter, AM EKG: AFib w/ variable AV block  - c/w Xarelto 20 mg qd    # h/o HTN   - c/w carvedilol   - hold losartan due to impaired renal function for now     # h/o DM   - hold po home medications  - fs ac qhs , IF  > 180 , start insulin protocol  - carb consistent diet   - f/u HbA1c level     # DVT ppx: Xarelto  # GI ppx: Famotidine  # Activity: As Tolerated  # Diet: Carb Consistent    Called family and updated on patient condition

## 2020-12-04 LAB
A1C WITH ESTIMATED AVERAGE GLUCOSE RESULT: 8.1 % — HIGH (ref 4–5.6)
ALBUMIN SERPL ELPH-MCNC: 3.1 G/DL — LOW (ref 3.5–5.2)
ALP SERPL-CCNC: 60 U/L — SIGNIFICANT CHANGE UP (ref 30–115)
ALT FLD-CCNC: 21 U/L — SIGNIFICANT CHANGE UP (ref 0–41)
ANION GAP SERPL CALC-SCNC: 7 MMOL/L — SIGNIFICANT CHANGE UP (ref 7–14)
AST SERPL-CCNC: 26 U/L — SIGNIFICANT CHANGE UP (ref 0–41)
BASE EXCESS BLDA CALC-SCNC: 2 MMOL/L — SIGNIFICANT CHANGE UP (ref -2–2)
BASOPHILS # BLD AUTO: 0 K/UL — SIGNIFICANT CHANGE UP (ref 0–0.2)
BASOPHILS NFR BLD AUTO: 0 % — SIGNIFICANT CHANGE UP (ref 0–1)
BILIRUB SERPL-MCNC: 0.6 MG/DL — SIGNIFICANT CHANGE UP (ref 0.2–1.2)
BUN SERPL-MCNC: 41 MG/DL — HIGH (ref 10–20)
CALCIUM SERPL-MCNC: 8.2 MG/DL — LOW (ref 8.5–10.1)
CHLORIDE SERPL-SCNC: 119 MMOL/L — HIGH (ref 98–110)
CO2 SERPL-SCNC: 26 MMOL/L — SIGNIFICANT CHANGE UP (ref 17–32)
CREAT SERPL-MCNC: 0.7 MG/DL — SIGNIFICANT CHANGE UP (ref 0.7–1.5)
EOSINOPHIL # BLD AUTO: 0 K/UL — SIGNIFICANT CHANGE UP (ref 0–0.7)
EOSINOPHIL NFR BLD AUTO: 0 % — SIGNIFICANT CHANGE UP (ref 0–8)
ESTIMATED AVERAGE GLUCOSE: 186 MG/DL — HIGH (ref 68–114)
GLUCOSE BLDC GLUCOMTR-MCNC: 133 MG/DL — HIGH (ref 70–99)
GLUCOSE BLDC GLUCOMTR-MCNC: 181 MG/DL — HIGH (ref 70–99)
GLUCOSE BLDC GLUCOMTR-MCNC: 185 MG/DL — HIGH (ref 70–99)
GLUCOSE BLDC GLUCOMTR-MCNC: 197 MG/DL — HIGH (ref 70–99)
GLUCOSE BLDC GLUCOMTR-MCNC: 211 MG/DL — HIGH (ref 70–99)
GLUCOSE BLDC GLUCOMTR-MCNC: 222 MG/DL — HIGH (ref 70–99)
GLUCOSE BLDC GLUCOMTR-MCNC: 243 MG/DL — HIGH (ref 70–99)
GLUCOSE BLDC GLUCOMTR-MCNC: 264 MG/DL — HIGH (ref 70–99)
GLUCOSE BLDC GLUCOMTR-MCNC: 273 MG/DL — HIGH (ref 70–99)
GLUCOSE BLDC GLUCOMTR-MCNC: 305 MG/DL — HIGH (ref 70–99)
GLUCOSE BLDC GLUCOMTR-MCNC: 306 MG/DL — HIGH (ref 70–99)
GLUCOSE BLDC GLUCOMTR-MCNC: 93 MG/DL — SIGNIFICANT CHANGE UP (ref 70–99)
GLUCOSE SERPL-MCNC: 273 MG/DL — HIGH (ref 70–99)
HCO3 BLDA-SCNC: 28 MMOL/L — HIGH (ref 23–27)
HCT VFR BLD CALC: 35.6 % — LOW (ref 42–52)
HGB BLD-MCNC: 10.3 G/DL — LOW (ref 14–18)
IMM GRANULOCYTES NFR BLD AUTO: 0.5 % — HIGH (ref 0.1–0.3)
LYMPHOCYTES # BLD AUTO: 0.3 K/UL — LOW (ref 1.2–3.4)
LYMPHOCYTES # BLD AUTO: 3.8 % — LOW (ref 20.5–51.1)
MAGNESIUM SERPL-MCNC: 3 MG/DL — HIGH (ref 1.8–2.4)
MCHC RBC-ENTMCNC: 22.8 PG — LOW (ref 27–31)
MCHC RBC-ENTMCNC: 28.9 G/DL — LOW (ref 32–37)
MCV RBC AUTO: 78.8 FL — LOW (ref 80–94)
MONOCYTES # BLD AUTO: 0.43 K/UL — SIGNIFICANT CHANGE UP (ref 0.1–0.6)
MONOCYTES NFR BLD AUTO: 5.4 % — SIGNIFICANT CHANGE UP (ref 1.7–9.3)
NEUTROPHILS # BLD AUTO: 7.22 K/UL — HIGH (ref 1.4–6.5)
NEUTROPHILS NFR BLD AUTO: 90.3 % — HIGH (ref 42.2–75.2)
NRBC # BLD: 0 /100 WBCS — SIGNIFICANT CHANGE UP (ref 0–0)
PCO2 BLDA: 50 MMHG — HIGH (ref 38–42)
PH BLDA: 7.36 — LOW (ref 7.38–7.42)
PLATELET # BLD AUTO: 349 K/UL — SIGNIFICANT CHANGE UP (ref 130–400)
PO2 BLDA: 93 MMHG — SIGNIFICANT CHANGE UP (ref 78–95)
POTASSIUM SERPL-MCNC: 5.2 MMOL/L — HIGH (ref 3.5–5)
POTASSIUM SERPL-SCNC: 5.2 MMOL/L — HIGH (ref 3.5–5)
PROT SERPL-MCNC: 5.7 G/DL — LOW (ref 6–8)
RBC # BLD: 4.52 M/UL — LOW (ref 4.7–6.1)
RBC # FLD: 19.5 % — HIGH (ref 11.5–14.5)
SAO2 % BLDA: 97 % — SIGNIFICANT CHANGE UP (ref 94–98)
SODIUM SERPL-SCNC: 152 MMOL/L — HIGH (ref 135–146)
WBC # BLD: 7.99 K/UL — SIGNIFICANT CHANGE UP (ref 4.8–10.8)
WBC # FLD AUTO: 7.99 K/UL — SIGNIFICANT CHANGE UP (ref 4.8–10.8)

## 2020-12-04 PROCEDURE — 71045 X-RAY EXAM CHEST 1 VIEW: CPT | Mod: 26

## 2020-12-04 PROCEDURE — 93010 ELECTROCARDIOGRAM REPORT: CPT

## 2020-12-04 PROCEDURE — 99232 SBSQ HOSP IP/OBS MODERATE 35: CPT

## 2020-12-04 PROCEDURE — 99233 SBSQ HOSP IP/OBS HIGH 50: CPT

## 2020-12-04 RX ORDER — TOCILIZUMAB 20 MG/ML
400 INJECTION, SOLUTION, CONCENTRATE INTRAVENOUS ONCE
Refills: 0 | Status: COMPLETED | OUTPATIENT
Start: 2020-12-04 | End: 2020-12-04

## 2020-12-04 RX ORDER — INSULIN HUMAN 100 [IU]/ML
4 INJECTION, SOLUTION SUBCUTANEOUS
Qty: 100 | Refills: 0 | Status: DISCONTINUED | OUTPATIENT
Start: 2020-12-04 | End: 2020-12-06

## 2020-12-04 RX ORDER — INSULIN HUMAN 100 [IU]/ML
18 INJECTION, SOLUTION SUBCUTANEOUS
Qty: 100 | Refills: 0 | Status: DISCONTINUED | OUTPATIENT
Start: 2020-12-04 | End: 2020-12-04

## 2020-12-04 RX ADMIN — CHLORHEXIDINE GLUCONATE 1 APPLICATION(S): 213 SOLUTION TOPICAL at 05:42

## 2020-12-04 RX ADMIN — ENOXAPARIN SODIUM 80 MILLIGRAM(S): 100 INJECTION SUBCUTANEOUS at 17:17

## 2020-12-04 RX ADMIN — CHLORHEXIDINE GLUCONATE 15 MILLILITER(S): 213 SOLUTION TOPICAL at 05:42

## 2020-12-04 RX ADMIN — TOCILIZUMAB 100 MILLIGRAM(S): 20 INJECTION, SOLUTION, CONCENTRATE INTRAVENOUS at 14:45

## 2020-12-04 RX ADMIN — PIPERACILLIN AND TAZOBACTAM 25 GRAM(S): 4; .5 INJECTION, POWDER, LYOPHILIZED, FOR SOLUTION INTRAVENOUS at 11:34

## 2020-12-04 RX ADMIN — FENTANYL CITRATE 3.89 MICROGRAM(S)/KG/HR: 50 INJECTION INTRAVENOUS at 04:50

## 2020-12-04 RX ADMIN — CHLORHEXIDINE GLUCONATE 15 MILLILITER(S): 213 SOLUTION TOPICAL at 17:17

## 2020-12-04 RX ADMIN — PIPERACILLIN AND TAZOBACTAM 25 GRAM(S): 4; .5 INJECTION, POWDER, LYOPHILIZED, FOR SOLUTION INTRAVENOUS at 17:18

## 2020-12-04 RX ADMIN — FENTANYL CITRATE 3.89 MICROGRAM(S)/KG/HR: 50 INJECTION INTRAVENOUS at 11:36

## 2020-12-04 RX ADMIN — SODIUM CHLORIDE 70 MILLILITER(S): 9 INJECTION INTRAMUSCULAR; INTRAVENOUS; SUBCUTANEOUS at 08:00

## 2020-12-04 RX ADMIN — Medication 81 MILLIGRAM(S): at 11:32

## 2020-12-04 RX ADMIN — ENOXAPARIN SODIUM 80 MILLIGRAM(S): 100 INJECTION SUBCUTANEOUS at 05:44

## 2020-12-04 RX ADMIN — INSULIN HUMAN 4 UNIT(S)/HR: 100 INJECTION, SOLUTION SUBCUTANEOUS at 09:16

## 2020-12-04 RX ADMIN — INSULIN HUMAN 18 UNIT(S)/HR: 100 INJECTION, SOLUTION SUBCUTANEOUS at 15:15

## 2020-12-04 RX ADMIN — Medication 6: at 08:30

## 2020-12-04 RX ADMIN — Medication 6 MILLIGRAM(S): at 05:44

## 2020-12-04 RX ADMIN — PIPERACILLIN AND TAZOBACTAM 25 GRAM(S): 4; .5 INJECTION, POWDER, LYOPHILIZED, FOR SOLUTION INTRAVENOUS at 07:10

## 2020-12-04 RX ADMIN — MIDAZOLAM HYDROCHLORIDE 1.56 MG/KG/HR: 1 INJECTION, SOLUTION INTRAMUSCULAR; INTRAVENOUS at 11:36

## 2020-12-04 NOTE — PROGRESS NOTE ADULT - ASSESSMENT
· Assessment	   70y old  Male with pmh of DM, Paroxysmal Afib on Xarelto , HTN, L carotid endarterectomy, presents to the ER for evaluation of  progressively worsening shortness of breath more on exertion than at rest after testing positive for covid19 outpatient 5 days ago. The patient notes that he has been having symptoms of weakness, decreased po intake, and feeling sleepy more than usual. Upon arrival to the ER.  he found to have fever, tachycardia. tachypnea and  desaturated on 15 L non rebreather to 87%  and was placed on high flow oxygen with improvement in saturation to 97%    IMPRESSION;  COVID 19 with severe illness requiring MV 12/2 with Fio2 100%  ARDS secondary to cytokine storm ?  Doubt progression of bacterial PNA but will recommend expanding coverage  Pt was in the early viral replicative phase based on the timeline/onset of symptoms.  procalcitonin 0.59    Ferritiin 719  CRP 3.57  Ddimers 105>2126  Nares ORSA NG  BCx 12/1,2 NGTD    RECOMMENDATIONS;  Urine for strep pneumonia antigen  Zosyn 4.5 gm iv q6h  Tocilizumab 400 mg iv x once.12/2  repeat Tocilizumab 400 mg iv x once. 12/4 ( given the significant increase in Ddimers )  S/p Convalescent plasma 2 units. 11/30  Day 1 11/ : Single  mg IV loading dose  Day 2-10 : single  mg IV once daily maintenance dose  Daily CBC,CMP.  Dexamethasone 6 mg iv q24h for 10 days 11/29 ( or until discharge ) or equivalent glucocorticoid dose may be substituted. Equivalent total dosis of alternative steroids are Methylprednisolone 32 mg and prednisone 40 mg q24h.  Monitor for side effects: hyperglycemia, neurological ( agitation/confusion), adrenal suppression, bacterial and fungal infections  Anticoagulation as per team  Proning for 16h/day if pt can tolerate it.

## 2020-12-04 NOTE — PROGRESS NOTE ADULT - REASON FOR ADMISSION
acute hypoxic respiratory failure- COVID-19 pneumonia

## 2020-12-04 NOTE — PROGRESS NOTE ADULT - ASSESSMENT
#A/P:  #Acute hypoxic respiratory failure 2/2 severe SARS-COV2 viral pneumonia/Sepsis with underlying inflammatory cytokine storm  -HD#7  -s./p intubation and MV 12/2/20  -continue with sedation and paralysis--off pressors  -continue close monitoring in CEU SDU  -covid contact/air[place] isolation precautions  -pulmonary/crit care following  -ID following  -s/p toci x2, conv plasma x 2 and remdesivir   -cont iv abx (levaquin/zosyn) for secondary bacterial infection---esr up to 29--monitor wbc and fever curve  -monitor inflamm markers (crp/ferritin/procal)  -cont dexamethasone 6 mg iv daily   -daily CXR  -monitor wbc and fever curve  -ABG   -hyperglycemia 2/2 steroids--monitor fs and insulin coverage  -NGT in place    #DVT/GI ppx    FULL CODE    critical condition---continue monitoring in CEU/SDU

## 2020-12-04 NOTE — PROGRESS NOTE ADULT - SUBJECTIVE AND OBJECTIVE BOX
Patient is a 70y old  Male who presents with a chief complaint of   HPI:  71 yo male with pmh of DM, Paroxysmal Afib on Xarelto , HTN, L carotid endarterectomy presents to the hospital with progressively worsening shortness of breath more on exertion than at rest after testing positive for covid19 outpatient 5 days ago. The patient notes that he has been having symptoms of weakness, decreased po intake, and feeling sleepy more than usual. Upon arrival to the ED the patient desaturated on 15 L non rebreather to 87% with tachypnea and was placed on high flow with improvement in saturation to 97% and subjective improvement in quality of breathing. The patient denies symptoms of dizziness/ chest pain/ short of breath/ abdominal pain/constipation/ diarrhea/loss of taste/ loss of smell. All other ROS negative except as noted above.   Vital Signs (24 Hrs): T(C): 39.2,  HR: 95 , BP: 130/80 (11-27-20 @ 22:19) (95/55 - 130/80), RR: 20 , SpO2: 92%   Labs: Lymphopenia , Cr 1.6 , Transaminitis (AST65, ALT 45) , D- DIMER 105  CXR: b/l patchy opacities   (28 Nov 2020 00:13)      PAST MEDICAL & SURGICAL HISTORY:  H/O sciatica  [facility] PAIN LT LE  H/O cystoscopy  resection of bladder tumor  Bladder cancer  DM (diabetes mellitus)  Hypercholesteremia  Afib  HTN (hypertension)  History of hernia repair    patient seen and examined independently on morning rounds, chart reviewed and discussed with the medicine resident and on interdisciplinary rounds    no overnight events- remains intubated, sedated --off pressors   on MV 20/450/60% PEEP 14            PE:  GEN-NAD, sedated, intubated on MV (Fi02 60%)  PULM- fair air entry decreased bs bilat  CVS- +s1/s2 RRR  GI- soft NT ND +bs, no rebound, no guarding  EXT- no edema        Labs:                        10.3   7.99  )-----------( 349      ( 04 Dec 2020 07:38 )             35.6     CBC Full  -  ( 04 Dec 2020 07:38 )  WBC Count : 7.99 K/uL  RBC Count : 4.52 M/uL  Hemoglobin : 10.3 g/dL  Hematocrit : 35.6 %  Platelet Count - Automated : 349 K/uL  Mean Cell Volume : 78.8 fL  Mean Cell Hemoglobin : 22.8 pg  Mean Cell Hemoglobin Concentration : 28.9 g/dL  Auto Neutrophil # : 7.22 K/uL  Auto Lymphocyte # : 0.30 K/uL  Auto Monocyte # : 0.43 K/uL  Auto Eosinophil # : 0.00 K/uL  Auto Basophil # : 0.00 K/uL  Auto Neutrophil % : 90.3 %  Auto Lymphocyte % : 3.8 %  Auto Monocyte % : 5.4 %  Auto Eosinophil % : 0.0 %  Auto Basophil % : 0.0 %      12-04    152<H>  |  119<H>  |  41<H>  ----------------------------<  273<H>  5.2<H>   |  26  |  0.7    Ca    8.2<L>      04 Dec 2020 07:38  Mg     3.0     12-04    TPro  5.7<L>  /  Alb  3.1<L>  /  TBili  0.6  /  DBili  x   /  AST  26  /  ALT  21  /  AlkPhos  60  12-04      Microbiology:    Culture - Blood (collected 02 Dec 2020 17:35)  Source: .Blood None  Preliminary Report (04 Dec 2020 02:16):    No growth to date.    Culture - Blood (collected 02 Dec 2020 17:35)  Source: .Blood None  Preliminary Report (04 Dec 2020 02:16):    No growth to date.    Culture - Blood (collected 01 Dec 2020 23:15)  Source: .Blood Blood-Peripheral  Preliminary Report (03 Dec 2020 07:01):    No growth to date.        Vital Signs Last 24 Hrs  T(C): 37.9 (04 Dec 2020 08:00), Max: 37.9 (04 Dec 2020 08:00)  T(F): 100.2 (04 Dec 2020 08:00), Max: 100.2 (04 Dec 2020 08:00)  HR: 88 (04 Dec 2020 11:00) (80 - 93)  BP: 123/63 (04 Dec 2020 11:00) (116/64 - 149/70)  BP(mean): 87 (04 Dec 2020 11:00) (84 - 101)  RR: 24 (04 Dec 2020 11:00) (13 - 25)  SpO2: 98% (04 Dec 2020 11:00) (94% - 100%)    I&O's Summary    03 Dec 2020 07:01  -  04 Dec 2020 07:00  --------------------------------------------------------  IN: 3297.9 mL / OUT: 2370 mL / NET: 927.9 mL    04 Dec 2020 07:01  -  04 Dec 2020 12:07  --------------------------------------------------------  IN: 204.4 mL / OUT: 80 mL / NET: 124.4 mL

## 2020-12-04 NOTE — PROGRESS NOTE ADULT - SUBJECTIVE AND OBJECTIVE BOX
MAGGIE SAHNI  70y, Male    All available historical data reviewed    OVERNIGHT EVENTS:  low grade fevers  Fio2 70%  no pressors  opens eyes    ROS:  unable to obtain history secondary to patient's mental status and/or sedation    VITALS:  T(F): 100.2, Max: 100.2 (12-04-20 @ 08:00)  HR: 86  BP: 146/65  RR: 24Vital Signs Last 24 Hrs  T(C): 37.9 (04 Dec 2020 08:00), Max: 37.9 (04 Dec 2020 08:00)  T(F): 100.2 (04 Dec 2020 08:00), Max: 100.2 (04 Dec 2020 08:00)  HR: 86 (04 Dec 2020 09:00) (80 - 93)  BP: 146/65 (04 Dec 2020 09:00) (114/63 - 149/70)  BP(mean): 93 (04 Dec 2020 09:00) (82 - 109)  RR: 24 (04 Dec 2020 09:00) (13 - 25)  SpO2: 98% (04 Dec 2020 09:00) (94% - 100%)    TESTS & MEASUREMENTS:                        10.3   7.99  )-----------( 349      ( 04 Dec 2020 07:38 )             35.6     12-04    152<H>  |  119<H>  |  41<H>  ----------------------------<  273<H>  5.2<H>   |  26  |  0.7    Ca    8.2<L>      04 Dec 2020 07:38  Mg     3.0     12-04    TPro  5.7<L>  /  Alb  3.1<L>  /  TBili  0.6  /  DBili  x   /  AST  26  /  ALT  21  /  AlkPhos  60  12-04    LIVER FUNCTIONS - ( 04 Dec 2020 07:38 )  Alb: 3.1 g/dL / Pro: 5.7 g/dL / ALK PHOS: 60 U/L / ALT: 21 U/L / AST: 26 U/L / GGT: x             Culture - Blood (collected 12-02-20 @ 17:35)  Source: .Blood None  Preliminary Report (12-04-20 @ 02:16):    No growth to date.    Culture - Blood (collected 12-02-20 @ 17:35)  Source: .Blood None  Preliminary Report (12-04-20 @ 02:16):    No growth to date.    Culture - Blood (collected 12-01-20 @ 23:15)  Source: .Blood Blood-Peripheral  Preliminary Report (12-03-20 @ 07:01):    No growth to date.            RADIOLOGY & ADDITIONAL TESTS:  Personal review of radiological diagnostics performed  Echo and EKG results noted when applicable.     MEDICATIONS:  aspirin  chewable 81 milliGRAM(s) Oral daily  chlorhexidine 0.12% Liquid 15 milliLiter(s) Oral Mucosa every 12 hours  chlorhexidine 4% Liquid 1 Application(s) Topical <User Schedule>  dexAMETHasone  Injectable 6 milliGRAM(s) IV Push daily  dextrose 40% Gel 15 Gram(s) Oral once  dextrose 5%. 1000 milliLiter(s) IV Continuous <Continuous>  dextrose 5%. 1000 milliLiter(s) IV Continuous <Continuous>  dextrose 50% Injectable 25 Gram(s) IV Push once  dextrose 50% Injectable 12.5 Gram(s) IV Push once  dextrose 50% Injectable 25 Gram(s) IV Push once  enoxaparin Injectable 80 milliGRAM(s) SubCutaneous two times a day  fentaNYL   Infusion. 0.5 MICROgram(s)/kG/Hr IV Continuous <Continuous>  glucagon  Injectable 1 milliGRAM(s) IntraMuscular once  insulin regular Infusion 4 Unit(s)/Hr IV Continuous <Continuous>  levoFLOXacin IVPB      levoFLOXacin IVPB 750 milliGRAM(s) IV Intermittent every 24 hours  midazolam Infusion 0.02 mG/kG/Hr IV Continuous <Continuous>  norepinephrine Infusion 0.05 MICROgram(s)/kG/Min IV Continuous <Continuous>  piperacillin/tazobactam IVPB.. 4.5 Gram(s) IV Intermittent every 6 hours  polyethylene glycol 3350 17 Gram(s) Oral at bedtime  senna 2 Tablet(s) Oral at bedtime  simvastatin 20 milliGRAM(s) Oral at bedtime      ANTIBIOTICS:  levoFLOXacin IVPB      levoFLOXacin IVPB 750 milliGRAM(s) IV Intermittent every 24 hours  piperacillin/tazobactam IVPB.. 4.5 Gram(s) IV Intermittent every 6 hours

## 2020-12-04 NOTE — PROGRESS NOTE ADULT - SUBJECTIVE AND OBJECTIVE BOX
SUBJECTIVE:    HPI:  69 yo male with pmh of DM, Paroxysmal Afib on Xarelto , HTN, L carotid endarterectomy presents to the hospital with progressively worsening shortness of breath more on exertion than at rest after testing positive for covid19 outpatient 5 days ago. The patient notes that he has been having symptoms of weakness, decreased po intake, and feeling sleepy more than usual. Upon arrival to the ED the patient desaturated on 15 L non rebreather to 87% with tachypnea and was placed on high flow with improvement in saturation to 97% and subjective improvement in quality of breathing. The patient denies symptoms of dizziness/ chest pain/ short of breath/ abdominal pain/constipation/ diarrhea/loss of taste/ loss of smell. All other ROS negative except as noted above.     Vital Signs (24 Hrs): T(C): 39.2,  HR: 95 , BP: 130/80 (11-27-20 @ 22:19) (95/55 - 130/80), RR: 20 , SpO2: 92%   Labs: Lymphopenia , Cr 1.6 , Transaminitis (AST65, ALT 45) , D- DIMER 105  CXR: b/l patchy opacities      (28 Nov 2020 00:13)    PAST MEDICAL & SURGICAL HISTORY  PAST MEDICAL & SURGICAL HISTORY:  H/O sciatica  [facility] PAIN LT LE    H/O cystoscopy  resection of bladder tumor    Bladder cancer    DM (diabetes mellitus)    Hypercholesteremia    Afib    HTN (hypertension)    History of hernia repair      SOCIAL HISTORY:    ALLERGIES:  No Known Allergies    MEDICATIONS:  STANDING MEDICATIONS  aspirin  chewable 81 milliGRAM(s) Oral daily  chlorhexidine 0.12% Liquid 15 milliLiter(s) Oral Mucosa every 12 hours  chlorhexidine 4% Liquid 1 Application(s) Topical <User Schedule>  dexAMETHasone  Injectable 6 milliGRAM(s) IV Push daily  dextrose 40% Gel 15 Gram(s) Oral once  dextrose 5%. 1000 milliLiter(s) IV Continuous <Continuous>  dextrose 5%. 1000 milliLiter(s) IV Continuous <Continuous>  dextrose 50% Injectable 25 Gram(s) IV Push once  dextrose 50% Injectable 12.5 Gram(s) IV Push once  dextrose 50% Injectable 25 Gram(s) IV Push once  enoxaparin Injectable 80 milliGRAM(s) SubCutaneous two times a day  fentaNYL   Infusion. 0.5 MICROgram(s)/kG/Hr IV Continuous <Continuous>  glucagon  Injectable 1 milliGRAM(s) IntraMuscular once  insulin regular Infusion 4 Unit(s)/Hr IV Continuous <Continuous>  levoFLOXacin IVPB      levoFLOXacin IVPB 750 milliGRAM(s) IV Intermittent every 24 hours  midazolam Infusion 0.02 mG/kG/Hr IV Continuous   norepinephrine Infusion 0.05 MICROgram(s)/kG/Min IV Continuous <Continuous>  piperacillin/tazobactam IVPB.. 4.5 Gram(s) IV Intermittent every 6 hours  polyethylene glycol 3350 17 Gram(s) Oral at bedtime  senna 2 Tablet(s) Oral at bedtime  simvastatin 20 milliGRAM(s) Oral at bedtime    PRN MEDICATIONS    VITALS:   T(F): 100.2  HR: 86  BP: 146/65  RR: 24  SpO2: 98%    LABS:                        10.3   7.99  )-----------( 349      ( 04 Dec 2020 07:38 )             35.6     12-04    152<H>  |  119<H>  |  41<H>  ----------------------------<  273<H>  5.2<H>   |  26  |  0.7    Ca    8.2<L>      04 Dec 2020 07:38  Mg     3.0     12-04    TPro  5.7<L>  /  Alb  3.1<L>  /  TBili  0.6  /  DBili  x   /  AST  26  /  ALT  21  /  AlkPhos  60  12-04        ABG - ( 04 Dec 2020 03:35 )  pH, Arterial: 7.36  pH, Blood: x     /  pCO2: 50    /  pO2: 93    / HCO3: 28    / Base Excess: 2.0   /  SaO2: 97                    Culture - Blood (collected 02 Dec 2020 17:35)  Source: .Blood None  Preliminary Report (04 Dec 2020 02:16):    No growth to date.    Culture - Blood (collected 02 Dec 2020 17:35)  Source: .Blood None  Preliminary Report (04 Dec 2020 02:16):    No growth to date.    Culture - Blood (collected 01 Dec 2020 23:15)  Source: .Blood Blood-Peripheral  Preliminary Report (03 Dec 2020 07:01):    No growth to date.          RADIOLOGY:    PHYSICAL EXAM:  GEN: No acute distress, intubated, sedated  LUNGS: Clear to auscultation bilaterally  HEART: S1/S2 present.   ABD: Soft, non-tender, non-distended.   EXT: No edema, no rashes, no cyanosis

## 2020-12-04 NOTE — PROGRESS NOTE ADULT - ASSESSMENT
69 yo male with pmh of DM, Paroxysmal Afib on Xarelto , HTN, L carotid endarterectomy presents to the hospital with progressively worsening shortness of breath more on exertion than at rest after testing positive for covid19 outpatient 5 days ago.     #Acute hypoxemic respiratory failure secondary to COVID-19 pneumonia  - intubated 2/12 450/20/50/14 at time, continue to ween FiO2, dropped from 100 to 50  - Chest X-ray: Airspace and interstitial opacifications, stable  - LDH - 478, Procal - 0.61, CRP - 7.69, d-dimer - 4972, Ferritin - 2823  - Levaquin 750 mg IV q24, Zosyn 4.5 G IB q6  - s/p Convalescent plasma 2 U  - off Remdesivir   - c/w Dexamethasone 6mg IV QD for 10 days  - ID consult appreciated  - f/u Urine for Strep pneumo, BCx  - MRSA Nares (-)  - Levophed titrated to MAP of 60  - 1 dose of Toci 400 mg IV x 1    # Transaminitis - stable  - likely related to COVID-19 r/o liver disease  - AST 77, ALT 33, GGT 56  - RUQ U/S; Hepatomegaly with diffuse hepatic steatosis. Nodular liver contour suggesting early cirrhotic changes. Gallbladder wall thickening, nonspecific.    # PARAM , likely secondary to decreased po intake - Resolved  - Cr 1.6 on admission , monitor    # h/o A-fib on xarelto , rate controlled now   - rate control w/ Cardizem  - EKG on admission shows A flutter, AM EKG: AFib w/ variable AV block  - c/w Xarelto 20 mg qd    # h/o HTN   - c/w carvedilol   - hold losartan due to impaired renal function for now     # h/o DM   - hold po home medications  - fs ac qhs , IF  > 180 , start insulin protocol  - carb consistent diet   - f/u HbA1c level     # DVT ppx: Xarelto  # GI ppx: Famotidine  # Activity: As Tolerated  # Diet: Carb Consistent    Called family and updated on patient condition

## 2020-12-04 NOTE — PROGRESS NOTE ADULT - ASSESSMENT
IMPRESSION:    Acute hypoxemic resp failure  Covid pneumonia  CRS   P A FIB Was on xarelto  Possible bacterial superinfection      PLAN:    CNS: SAT.  Continue adequate sedation     HEENT:  Oral care    PULMONARY:  HOB @ 45 degrees, ARDS network MV.  Monitor PPL andDriving pressures.  Wean o2 as tolerated     CARDIOVASCULAR:  Continue Rate control.  Avoid volume overload      GI: GI prophylaxis                                          OG feeding.  Bowel regimen     RENAL:  F/u  lytes.  Correct as needed. accurate I/O    INFECTIOUS DISEASE: per ID, trend infl markers.  Dexa D# 7  Finish ABX course.     HEMATOLOGICAL:  DVT prophylaxis.  LMWH therapeutic     ENDOCRINE:  Follow up FS.  Insulin protocol if needed.    MUSCULOSKELETAL:  bed rest     CODE STATUS: FULL CODE  CEU / ICU   poor prognosis

## 2020-12-04 NOTE — PROGRESS NOTE ADULT - SUBJECTIVE AND OBJECTIVE BOX
Patient is a 70y old  Male who presents with a chief complaint of acute hypoxic respiratory failure- COVID-19 pneumonia (03 Dec 2020 12:39)        Over Night Events:  On MV.  Sedated         ROS:     All ROS are negative except HPI         PHYSICAL EXAM    ICU Vital Signs Last 24 Hrs  T(C): 36.7 (04 Dec 2020 03:00), Max: 37.9 (03 Dec 2020 08:00)  T(F): 98 (04 Dec 2020 03:00), Max: 100.3 (03 Dec 2020 08:00)  HR: 86 (04 Dec 2020 06:00) (80 - 93)  BP: 130/62 (04 Dec 2020 06:00) (114/63 - 149/70)  BP(mean): 89 (04 Dec 2020 06:00) (82 - 109)  ABP: --  ABP(mean): --  RR: 25 (04 Dec 2020 06:00) (13 - 25)  SpO2: 99% (04 Dec 2020 06:00) (94% - 100%)      CONSTITUTIONAL:  Well nourished.  NAD    ENT:   Airway patent,   Mouth with normal mucosa.   No thrush    EYES:   Pupils equal,   Round and reactive to light.    CARDIAC:   Normal rate,   Regular rhythm.    No edema      Vascular:  Normal systolic impulse  No Carotid bruits    RESPIRATORY:   No wheezing  Bilateral BS  Normal chest expansion  Not tachypneic,  No use of accessory muscles    GASTROINTESTINAL:  Abdomen soft,   Non-tender,   No guarding,   + BS    MUSCULOSKELETAL:   Range of motion is not limited,  No clubbing, cyanosis    NEUROLOGICAL:   Sedated     SKIN:   Skin normal color for race,   Warm and dry and intact.   No evidence of rash.    PSYCHIATRIC:   Sedated   No apparent risk to self or others.    HEMATOLOGICAL:  No cervical  lymphadenopathy.  no inguinal lymphadenopathy      12-03-20 @ 07:01  -  12-04-20 @ 07:00  --------------------------------------------------------  IN:    Enteral Tube Flush: 30 mL    FentaNYL: 310.4 mL    IV PiggyBack: 500 mL    Midazolam: 29.6 mL    Norepinephrine: 7.3 mL    Norepinephrine: 19 mL    Peptamen A.F.: 950 mL    sodium chloride 0.9%: 1330 mL  Total IN: 3176.3 mL    OUT:    Indwelling Catheter - Urethral (mL): 2290 mL  Total OUT: 2290 mL    Total NET: 886.3 mL          LABS:                            10.4   9.23  )-----------( 332      ( 03 Dec 2020 07:32 )             35.5                                               12-03    144  |  111<H>  |  41<H>  ----------------------------<  372<H>  5.0   |  21  |  1.0    Ca    8.1<L>      03 Dec 2020 07:32  Mg     2.8     12-03    TPro  5.8<L>  /  Alb  2.9<L>  /  TBili  1.5<H>  /  DBili  x   /  AST  25  /  ALT  23  /  AlkPhos  71  12-03                                                                                           LIVER FUNCTIONS - ( 03 Dec 2020 07:32 )  Alb: 2.9 g/dL / Pro: 5.8 g/dL / ALK PHOS: 71 U/L / ALT: 23 U/L / AST: 25 U/L / GGT: x                                                  Culture - Blood (collected 02 Dec 2020 17:35)  Source: .Blood None  Preliminary Report (04 Dec 2020 02:16):    No growth to date.    Culture - Blood (collected 02 Dec 2020 17:35)  Source: .Blood None  Preliminary Report (04 Dec 2020 02:16):    No growth to date.    Culture - Blood (collected 01 Dec 2020 23:15)  Source: .Blood Blood-Peripheral  Preliminary Report (03 Dec 2020 07:01):    No growth to date.                                                   Mode: AC/ CMV (Assist Control/ Continuous Mandatory Ventilation)  RR (machine): 20  TV (machine): 450  FiO2: 70  PEEP: 14  ITime: 1  MAP: 19  PIP: 27                                      ABG - ( 04 Dec 2020 03:35 )  pH, Arterial: 7.36  pH, Blood: x     /  pCO2: 50    /  pO2: 93    / HCO3: 28    / Base Excess: 2.0   /  SaO2: 97                  MEDICATIONS  (STANDING):  aspirin  chewable 81 milliGRAM(s) Oral daily  chlorhexidine 0.12% Liquid 15 milliLiter(s) Oral Mucosa every 12 hours  chlorhexidine 4% Liquid 1 Application(s) Topical <User Schedule>  dexAMETHasone  Injectable 6 milliGRAM(s) IV Push daily  dextrose 40% Gel 15 Gram(s) Oral once  dextrose 5%. 1000 milliLiter(s) (50 mL/Hr) IV Continuous <Continuous>  dextrose 5%. 1000 milliLiter(s) (100 mL/Hr) IV Continuous <Continuous>  dextrose 50% Injectable 25 Gram(s) IV Push once  dextrose 50% Injectable 12.5 Gram(s) IV Push once  dextrose 50% Injectable 25 Gram(s) IV Push once  enoxaparin Injectable 80 milliGRAM(s) SubCutaneous two times a day  fentaNYL   Infusion. 0.5 MICROgram(s)/kG/Hr (3.89 mL/Hr) IV Continuous <Continuous>  glucagon  Injectable 1 milliGRAM(s) IntraMuscular once  insulin lispro (ADMELOG) corrective regimen sliding scale   SubCutaneous three times a day before meals  levoFLOXacin IVPB      levoFLOXacin IVPB 750 milliGRAM(s) IV Intermittent every 24 hours  midazolam Infusion 0.02 mG/kG/Hr (1.56 mL/Hr) IV Continuous <Continuous>  norepinephrine Infusion 0.05 MICROgram(s)/kG/Min (7.29 mL/Hr) IV Continuous <Continuous>  piperacillin/tazobactam IVPB.. 4.5 Gram(s) IV Intermittent every 6 hours  polyethylene glycol 3350 17 Gram(s) Oral at bedtime  senna 2 Tablet(s) Oral at bedtime  simvastatin 20 milliGRAM(s) Oral at bedtime  sodium chloride 0.9%. 1000 milliLiter(s) (70 mL/Hr) IV Continuous <Continuous>    MEDICATIONS  (PRN):  ALPRAZolam 0.25 milliGRAM(s) Oral every 8 hours PRN anxiety      New X-rays reviewed:                                                                                  ECHO    CXR interpreted by me:  Not done

## 2020-12-05 LAB
ALBUMIN SERPL ELPH-MCNC: 3.1 G/DL — LOW (ref 3.5–5.2)
ALP SERPL-CCNC: 54 U/L — SIGNIFICANT CHANGE UP (ref 30–115)
ALT FLD-CCNC: 30 U/L — SIGNIFICANT CHANGE UP (ref 0–41)
ANION GAP SERPL CALC-SCNC: 10 MMOL/L — SIGNIFICANT CHANGE UP (ref 7–14)
ANION GAP SERPL CALC-SCNC: 7 MMOL/L — SIGNIFICANT CHANGE UP (ref 7–14)
AST SERPL-CCNC: 35 U/L — SIGNIFICANT CHANGE UP (ref 0–41)
BASOPHILS # BLD AUTO: 0 K/UL — SIGNIFICANT CHANGE UP (ref 0–0.2)
BASOPHILS NFR BLD AUTO: 0 % — SIGNIFICANT CHANGE UP (ref 0–1)
BILIRUB SERPL-MCNC: 1 MG/DL — SIGNIFICANT CHANGE UP (ref 0.2–1.2)
BUN SERPL-MCNC: 47 MG/DL — HIGH (ref 10–20)
BUN SERPL-MCNC: 50 MG/DL — HIGH (ref 10–20)
CALCIUM SERPL-MCNC: 8.6 MG/DL — SIGNIFICANT CHANGE UP (ref 8.5–10.1)
CALCIUM SERPL-MCNC: 8.7 MG/DL — SIGNIFICANT CHANGE UP (ref 8.5–10.1)
CHLORIDE SERPL-SCNC: 116 MMOL/L — HIGH (ref 98–110)
CHLORIDE SERPL-SCNC: 119 MMOL/L — HIGH (ref 98–110)
CO2 SERPL-SCNC: 22 MMOL/L — SIGNIFICANT CHANGE UP (ref 17–32)
CO2 SERPL-SCNC: 25 MMOL/L — SIGNIFICANT CHANGE UP (ref 17–32)
CREAT SERPL-MCNC: 0.8 MG/DL — SIGNIFICANT CHANGE UP (ref 0.7–1.5)
CREAT SERPL-MCNC: 0.9 MG/DL — SIGNIFICANT CHANGE UP (ref 0.7–1.5)
CRP SERPL-MCNC: 0.46 MG/DL — HIGH (ref 0–0.4)
EOSINOPHIL # BLD AUTO: 0.01 K/UL — SIGNIFICANT CHANGE UP (ref 0–0.7)
EOSINOPHIL NFR BLD AUTO: 0.1 % — SIGNIFICANT CHANGE UP (ref 0–8)
GLUCOSE BLDC GLUCOMTR-MCNC: 110 MG/DL — HIGH (ref 70–99)
GLUCOSE BLDC GLUCOMTR-MCNC: 185 MG/DL — HIGH (ref 70–99)
GLUCOSE BLDC GLUCOMTR-MCNC: 198 MG/DL — HIGH (ref 70–99)
GLUCOSE BLDC GLUCOMTR-MCNC: 210 MG/DL — HIGH (ref 70–99)
GLUCOSE BLDC GLUCOMTR-MCNC: 213 MG/DL — HIGH (ref 70–99)
GLUCOSE BLDC GLUCOMTR-MCNC: 239 MG/DL — HIGH (ref 70–99)
GLUCOSE BLDC GLUCOMTR-MCNC: 243 MG/DL — HIGH (ref 70–99)
GLUCOSE BLDC GLUCOMTR-MCNC: 249 MG/DL — HIGH (ref 70–99)
GLUCOSE BLDC GLUCOMTR-MCNC: 256 MG/DL — HIGH (ref 70–99)
GLUCOSE BLDC GLUCOMTR-MCNC: 257 MG/DL — HIGH (ref 70–99)
GLUCOSE BLDC GLUCOMTR-MCNC: 270 MG/DL — HIGH (ref 70–99)
GLUCOSE BLDC GLUCOMTR-MCNC: 275 MG/DL — HIGH (ref 70–99)
GLUCOSE BLDC GLUCOMTR-MCNC: 300 MG/DL — HIGH (ref 70–99)
GLUCOSE SERPL-MCNC: 274 MG/DL — HIGH (ref 70–99)
GLUCOSE SERPL-MCNC: 279 MG/DL — HIGH (ref 70–99)
HCT VFR BLD CALC: 36.4 % — LOW (ref 42–52)
HGB BLD-MCNC: 10.1 G/DL — LOW (ref 14–18)
IMM GRANULOCYTES NFR BLD AUTO: 0.6 % — HIGH (ref 0.1–0.3)
LDH SERPL L TO P-CCNC: 560 U/L — HIGH (ref 50–242)
LYMPHOCYTES # BLD AUTO: 0.46 K/UL — LOW (ref 1.2–3.4)
LYMPHOCYTES # BLD AUTO: 5.7 % — LOW (ref 20.5–51.1)
MAGNESIUM SERPL-MCNC: 2.8 MG/DL — HIGH (ref 1.8–2.4)
MCHC RBC-ENTMCNC: 22.6 PG — LOW (ref 27–31)
MCHC RBC-ENTMCNC: 27.7 G/DL — LOW (ref 32–37)
MCV RBC AUTO: 81.6 FL — SIGNIFICANT CHANGE UP (ref 80–94)
MONOCYTES # BLD AUTO: 0.47 K/UL — SIGNIFICANT CHANGE UP (ref 0.1–0.6)
MONOCYTES NFR BLD AUTO: 5.8 % — SIGNIFICANT CHANGE UP (ref 1.7–9.3)
NEUTROPHILS # BLD AUTO: 7.07 K/UL — HIGH (ref 1.4–6.5)
NEUTROPHILS NFR BLD AUTO: 87.8 % — HIGH (ref 42.2–75.2)
NRBC # BLD: 0 /100 WBCS — SIGNIFICANT CHANGE UP (ref 0–0)
PLATELET # BLD AUTO: 299 K/UL — SIGNIFICANT CHANGE UP (ref 130–400)
POTASSIUM SERPL-MCNC: 5.5 MMOL/L — HIGH (ref 3.5–5)
POTASSIUM SERPL-MCNC: 5.5 MMOL/L — HIGH (ref 3.5–5)
POTASSIUM SERPL-SCNC: 5.5 MMOL/L — HIGH (ref 3.5–5)
POTASSIUM SERPL-SCNC: 5.5 MMOL/L — HIGH (ref 3.5–5)
PROCALCITONIN SERPL-MCNC: 0.07 NG/ML — SIGNIFICANT CHANGE UP (ref 0.02–0.1)
PROT SERPL-MCNC: 5.4 G/DL — LOW (ref 6–8)
RBC # BLD: 4.46 M/UL — LOW (ref 4.7–6.1)
RBC # FLD: 19.8 % — HIGH (ref 11.5–14.5)
SODIUM SERPL-SCNC: 148 MMOL/L — HIGH (ref 135–146)
SODIUM SERPL-SCNC: 151 MMOL/L — HIGH (ref 135–146)
WBC # BLD: 8.06 K/UL — SIGNIFICANT CHANGE UP (ref 4.8–10.8)
WBC # FLD AUTO: 8.06 K/UL — SIGNIFICANT CHANGE UP (ref 4.8–10.8)

## 2020-12-05 PROCEDURE — 71045 X-RAY EXAM CHEST 1 VIEW: CPT | Mod: 26,76

## 2020-12-05 PROCEDURE — 93010 ELECTROCARDIOGRAM REPORT: CPT

## 2020-12-05 RX ORDER — FENTANYL CITRATE 50 UG/ML
50 INJECTION INTRAVENOUS ONCE
Refills: 0 | Status: DISCONTINUED | OUTPATIENT
Start: 2020-12-05 | End: 2020-12-05

## 2020-12-05 RX ORDER — SODIUM ZIRCONIUM CYCLOSILICATE 10 G/10G
10 POWDER, FOR SUSPENSION ORAL EVERY 8 HOURS
Refills: 0 | Status: COMPLETED | OUTPATIENT
Start: 2020-12-05 | End: 2020-12-06

## 2020-12-05 RX ORDER — PANTOPRAZOLE SODIUM 20 MG/1
40 TABLET, DELAYED RELEASE ORAL DAILY
Refills: 0 | Status: DISCONTINUED | OUTPATIENT
Start: 2020-12-05 | End: 2020-12-05

## 2020-12-05 RX ORDER — METOPROLOL TARTRATE 50 MG
5 TABLET ORAL ONCE
Refills: 0 | Status: COMPLETED | OUTPATIENT
Start: 2020-12-05 | End: 2020-12-05

## 2020-12-05 RX ORDER — ACETAMINOPHEN 500 MG
650 TABLET ORAL EVERY 6 HOURS
Refills: 0 | Status: DISCONTINUED | OUTPATIENT
Start: 2020-12-05 | End: 2020-12-08

## 2020-12-05 RX ORDER — SODIUM CHLORIDE 9 MG/ML
1000 INJECTION, SOLUTION INTRAVENOUS
Refills: 0 | Status: DISCONTINUED | OUTPATIENT
Start: 2020-12-05 | End: 2020-12-06

## 2020-12-05 RX ORDER — DEXMEDETOMIDINE HYDROCHLORIDE IN 0.9% SODIUM CHLORIDE 4 UG/ML
0.6 INJECTION INTRAVENOUS
Qty: 400 | Refills: 0 | Status: DISCONTINUED | OUTPATIENT
Start: 2020-12-05 | End: 2020-12-07

## 2020-12-05 RX ORDER — PANTOPRAZOLE SODIUM 20 MG/1
40 TABLET, DELAYED RELEASE ORAL DAILY
Refills: 0 | Status: DISCONTINUED | OUTPATIENT
Start: 2020-12-06 | End: 2020-12-09

## 2020-12-05 RX ADMIN — POLYETHYLENE GLYCOL 3350 17 GRAM(S): 17 POWDER, FOR SOLUTION ORAL at 00:05

## 2020-12-05 RX ADMIN — Medication 6 MILLIGRAM(S): at 06:05

## 2020-12-05 RX ADMIN — DEXMEDETOMIDINE HYDROCHLORIDE IN 0.9% SODIUM CHLORIDE 11.7 MICROGRAM(S)/KG/HR: 4 INJECTION INTRAVENOUS at 12:52

## 2020-12-05 RX ADMIN — PIPERACILLIN AND TAZOBACTAM 25 GRAM(S): 4; .5 INJECTION, POWDER, LYOPHILIZED, FOR SOLUTION INTRAVENOUS at 00:05

## 2020-12-05 RX ADMIN — SIMVASTATIN 20 MILLIGRAM(S): 20 TABLET, FILM COATED ORAL at 00:05

## 2020-12-05 RX ADMIN — CHLORHEXIDINE GLUCONATE 15 MILLILITER(S): 213 SOLUTION TOPICAL at 17:40

## 2020-12-05 RX ADMIN — SODIUM ZIRCONIUM CYCLOSILICATE 10 GRAM(S): 10 POWDER, FOR SUSPENSION ORAL at 18:31

## 2020-12-05 RX ADMIN — PANTOPRAZOLE SODIUM 40 MILLIGRAM(S): 20 TABLET, DELAYED RELEASE ORAL at 11:57

## 2020-12-05 RX ADMIN — CHLORHEXIDINE GLUCONATE 1 APPLICATION(S): 213 SOLUTION TOPICAL at 06:05

## 2020-12-05 RX ADMIN — ENOXAPARIN SODIUM 80 MILLIGRAM(S): 100 INJECTION SUBCUTANEOUS at 06:05

## 2020-12-05 RX ADMIN — PIPERACILLIN AND TAZOBACTAM 25 GRAM(S): 4; .5 INJECTION, POWDER, LYOPHILIZED, FOR SOLUTION INTRAVENOUS at 13:40

## 2020-12-05 RX ADMIN — CHLORHEXIDINE GLUCONATE 15 MILLILITER(S): 213 SOLUTION TOPICAL at 06:05

## 2020-12-05 RX ADMIN — SIMVASTATIN 20 MILLIGRAM(S): 20 TABLET, FILM COATED ORAL at 21:45

## 2020-12-05 RX ADMIN — Medication 81 MILLIGRAM(S): at 11:56

## 2020-12-05 RX ADMIN — Medication 650 MILLIGRAM(S): at 16:44

## 2020-12-05 RX ADMIN — POLYETHYLENE GLYCOL 3350 17 GRAM(S): 17 POWDER, FOR SOLUTION ORAL at 21:45

## 2020-12-05 RX ADMIN — Medication 650 MILLIGRAM(S): at 15:14

## 2020-12-05 RX ADMIN — PIPERACILLIN AND TAZOBACTAM 25 GRAM(S): 4; .5 INJECTION, POWDER, LYOPHILIZED, FOR SOLUTION INTRAVENOUS at 17:40

## 2020-12-05 RX ADMIN — ENOXAPARIN SODIUM 80 MILLIGRAM(S): 100 INJECTION SUBCUTANEOUS at 19:25

## 2020-12-05 RX ADMIN — PIPERACILLIN AND TAZOBACTAM 25 GRAM(S): 4; .5 INJECTION, POWDER, LYOPHILIZED, FOR SOLUTION INTRAVENOUS at 06:05

## 2020-12-05 RX ADMIN — SENNA PLUS 2 TABLET(S): 8.6 TABLET ORAL at 00:05

## 2020-12-05 RX ADMIN — SENNA PLUS 2 TABLET(S): 8.6 TABLET ORAL at 21:45

## 2020-12-05 RX ADMIN — SODIUM ZIRCONIUM CYCLOSILICATE 10 GRAM(S): 10 POWDER, FOR SUSPENSION ORAL at 21:45

## 2020-12-05 NOTE — PROGRESS NOTE ADULT - SUBJECTIVE AND OBJECTIVE BOX
Today is hospital day 8d.     INTERVAL HPI / OVERNIGHT EVENTS  Patient was examined and seen at bedside.   No OVNE      PAST MEDICAL & SURGICAL HISTORY  H/O sciatica  [facility] PAIN LT LE    H/O cystoscopy  resection of bladder tumor    Bladder cancer    DM (diabetes mellitus)    Hypercholesteremia    Afib    HTN (hypertension)    History of hernia repair      ALLERGIES  No Known Allergies    MEDICATIONS  STANDING MEDICATIONS  aspirin  chewable 81 milliGRAM(s) Oral daily  chlorhexidine 0.12% Liquid 15 milliLiter(s) Oral Mucosa every 12 hours  chlorhexidine 4% Liquid 1 Application(s) Topical <User Schedule>  dexAMETHasone  Injectable 6 milliGRAM(s) IV Push daily  dextrose 40% Gel 15 Gram(s) Oral once  dextrose 5%. 1000 milliLiter(s) IV Continuous <Continuous>  dextrose 5%. 1000 milliLiter(s) IV Continuous <Continuous>  dextrose 5%. 1000 milliLiter(s) IV Continuous <Continuous>  dextrose 50% Injectable 25 Gram(s) IV Push once  dextrose 50% Injectable 12.5 Gram(s) IV Push once  dextrose 50% Injectable 25 Gram(s) IV Push once  enoxaparin Injectable 80 milliGRAM(s) SubCutaneous two times a day  fentaNYL   Infusion. 0.5 MICROgram(s)/kG/Hr IV Continuous <Continuous>  glucagon  Injectable 1 milliGRAM(s) IntraMuscular once  insulin regular Infusion 4 Unit(s)/Hr IV Continuous <Continuous>  levoFLOXacin IVPB      levoFLOXacin IVPB 750 milliGRAM(s) IV Intermittent every 24 hours  norepinephrine Infusion 0.05 MICROgram(s)/kG/Min IV Continuous <Continuous>  pantoprazole  Injectable 40 milliGRAM(s) IV Push daily  piperacillin/tazobactam IVPB.. 4.5 Gram(s) IV Intermittent every 6 hours  polyethylene glycol 3350 17 Gram(s) Oral at bedtime  senna 2 Tablet(s) Oral at bedtime  simvastatin 20 milliGRAM(s) Oral at bedtime    PRN MEDICATIONS    VITALS:  T(F): 99.7  HR: 86  BP: 110/60  RR: 18  SpO2: 98%    PHYSICAL EXAM  GEN: No acute distress, intubated, sedated  LUNGS: Clear to auscultation bilaterally  HEART: S1/S2 present.   ABD: Soft, non-tender, non-distended.   EXT: No edema, no rashes, no cyanosis      LABS                        10.1   8.06  )-----------( 299      ( 05 Dec 2020 04:58 )             36.4     12-05    151<H>  |  119<H>  |  50<H>  ----------------------------<  279<H>  5.5<H>   |  22  |  0.9    Ca    8.7      05 Dec 2020 04:58  Mg     2.8     12-05    TPro  5.4<L>  /  Alb  3.1<L>  /  TBili  1.0  /  DBili  x   /  AST  35  /  ALT  30  /  AlkPhos  54  12-05        ABG - ( 05 Dec 2020 02:41 )  pH, Arterial: 7.39  pH, Blood: x     /  pCO2: 48    /  pO2: 64    / HCO3: 29    / Base Excess: 3.2   /  SaO2: 92          Culture - Blood (collected 02 Dec 2020 17:35)  Source: .Blood None  Preliminary Report (04 Dec 2020 02:16):    No growth to date.    Culture - Blood (collected 02 Dec 2020 17:35)  Source: .Blood None  Preliminary Report (04 Dec 2020 02:16):    No growth to date.      RADIOLOGY    < from: Xray Chest 1 View- PORTABLE-Routine (Xray Chest 1 View- PORTABLE-Routine in AM.) (12.05.20 @ 04:40) >  Right opacity,decreased. Left opacity, stable.    < end of copied text >

## 2020-12-05 NOTE — PROGRESS NOTE ADULT - SUBJECTIVE AND OBJECTIVE BOX
Patient is a 70y old  Male who presents with a chief complaint of acute hypoxic respiratory failure- COVID-19 pneumonia (04 Dec 2020 12:02)        Over Night Events:  on MV.  Off pressors.  Sedated.         ROS:     All ROS are negative except HPI         PHYSICAL EXAM    ICU Vital Signs Last 24 Hrs  T(C): 37.6 (05 Dec 2020 08:00), Max: 37.9 (04 Dec 2020 20:00)  T(F): 99.7 (05 Dec 2020 08:00), Max: 100.3 (04 Dec 2020 20:00)  HR: 86 (05 Dec 2020 08:00) (84 - 168)  BP: 110/60 (05 Dec 2020 08:00) (83/54 - 147/67)  BP(mean): 79 (05 Dec 2020 08:00) (62 - 101)  ABP: --  ABP(mean): --  RR: 18 (05 Dec 2020 08:00) (18 - 30)  SpO2: 98% (05 Dec 2020 08:00) (94% - 99%)      CONSTITUTIONAL:  Well nourished.  NAD    ENT:   Airway patent,   Mouth with normal mucosa.   No thrush    EYES:   Pupils equal,   Round and reactive to light.    CARDIAC:   Normal rate,   Regular rhythm.    No edema      Vascular:  Normal systolic impulse  No Carotid bruits    RESPIRATORY:   No wheezing  Bilateral BS  Normal chest expansion  Not tachypneic,  No use of accessory muscles    GASTROINTESTINAL:  Abdomen soft,   Non-tender,   No guarding,   + BS    MUSCULOSKELETAL:   Range of motion is not limited,  No clubbing, cyanosis    NEUROLOGICAL:   Sedated   No motor  deficits.    SKIN:   Skin normal color for race,   Warm and dry  No evidence of rash.    PSYCHIATRIC:   Sedated   No apparent risk to self or others.    HEMATOLOGICAL:  No cervical  lymphadenopathy.  no inguinal lymphadenopathy      12-04-20 @ 07:01  -  12-05-20 @ 07:00  --------------------------------------------------------  IN:    Enteral Tube Flush: 115 mL    FentaNYL: 370.5 mL    Insulin: 106 mL    IV PiggyBack: 250 mL    IV PiggyBack: 325 mL    Midazolam: 59.4 mL    Peptamen A.F.: 1000 mL    sodium chloride 0.9%: 70 mL  Total IN: 2295.9 mL    OUT:    Indwelling Catheter - Urethral (mL): 1600 mL  Total OUT: 1600 mL    Total NET: 695.9 mL      12-05-20 @ 07:01  -  12-05-20 @ 08:52  --------------------------------------------------------  IN:    FentaNYL: 15.6 mL    Insulin: 8 mL    Midazolam: 3.1 mL    Peptamen A.F.: 50 mL  Total IN: 76.7 mL    OUT:    Indwelling Catheter - Urethral (mL): 50 mL    Norepinephrine: 0 mL  Total OUT: 50 mL    Total NET: 26.7 mL          LABS:                            10.1   8.06  )-----------( 299      ( 05 Dec 2020 04:58 )             36.4                                               12-05    151<H>  |  119<H>  |  50<H>  ----------------------------<  279<H>  5.5<H>   |  22  |  0.9    Ca    8.7      05 Dec 2020 04:58  Mg     2.8     12-05    TPro  5.4<L>  /  Alb  3.1<L>  /  TBili  1.0  /  DBili  x   /  AST  35  /  ALT  30  /  AlkPhos  54  12-05                                                                                           LIVER FUNCTIONS - ( 05 Dec 2020 04:58 )  Alb: 3.1 g/dL / Pro: 5.4 g/dL / ALK PHOS: 54 U/L / ALT: 30 U/L / AST: 35 U/L / GGT: x                                                  Culture - Blood (collected 02 Dec 2020 17:35)  Source: .Blood None  Preliminary Report (04 Dec 2020 02:16):    No growth to date.    Culture - Blood (collected 02 Dec 2020 17:35)  Source: .Blood None  Preliminary Report (04 Dec 2020 02:16):    No growth to date.                                                   Mode: AC/ CMV (Assist Control/ Continuous Mandatory Ventilation)  RR (machine): 20  TV (machine): 450  FiO2: 70  PEEP: 14  MAP: 19  PIP: 38                                      ABG - ( 05 Dec 2020 02:41 )  pH, Arterial: 7.39  pH, Blood: x     /  pCO2: 48    /  pO2: 64    / HCO3: 29    / Base Excess: 3.2   /  SaO2: 92                  MEDICATIONS  (STANDING):  aspirin  chewable 81 milliGRAM(s) Oral daily  chlorhexidine 0.12% Liquid 15 milliLiter(s) Oral Mucosa every 12 hours  chlorhexidine 4% Liquid 1 Application(s) Topical <User Schedule>  dexAMETHasone  Injectable 6 milliGRAM(s) IV Push daily  dextrose 40% Gel 15 Gram(s) Oral once  dextrose 5%. 1000 milliLiter(s) (50 mL/Hr) IV Continuous <Continuous>  dextrose 5%. 1000 milliLiter(s) (100 mL/Hr) IV Continuous <Continuous>  dextrose 50% Injectable 25 Gram(s) IV Push once  dextrose 50% Injectable 12.5 Gram(s) IV Push once  dextrose 50% Injectable 25 Gram(s) IV Push once  enoxaparin Injectable 80 milliGRAM(s) SubCutaneous two times a day  fentaNYL   Infusion. 0.5 MICROgram(s)/kG/Hr (3.89 mL/Hr) IV Continuous <Continuous>  glucagon  Injectable 1 milliGRAM(s) IntraMuscular once  insulin regular Infusion 4 Unit(s)/Hr (4 mL/Hr) IV Continuous <Continuous>  levoFLOXacin IVPB      levoFLOXacin IVPB 750 milliGRAM(s) IV Intermittent every 24 hours  midazolam Infusion 0.02 mG/kG/Hr (1.56 mL/Hr) IV Continuous <Continuous>  norepinephrine Infusion 0.05 MICROgram(s)/kG/Min (7.29 mL/Hr) IV Continuous <Continuous>  pantoprazole  Injectable 40 milliGRAM(s) IV Push daily  piperacillin/tazobactam IVPB.. 4.5 Gram(s) IV Intermittent every 6 hours  polyethylene glycol 3350 17 Gram(s) Oral at bedtime  senna 2 Tablet(s) Oral at bedtime  simvastatin 20 milliGRAM(s) Oral at bedtime    MEDICATIONS  (PRN):      New X-rays reviewed:                                                                                  ECHO    CXR interpreted by me:

## 2020-12-05 NOTE — PROGRESS NOTE ADULT - ASSESSMENT
69 yo male with pmh of DM, Paroxysmal Afib on Xarelto , HTN, L carotid endarterectomy presents to the hospital with progressively worsening shortness of breath more on exertion than at rest after testing positive for covid19 outpatient 5 days ago.     #Acute hypoxemic respiratory failure secondary to COVID-19 pneumonia  - Intubated 12/2;   - Chest X-ray: Airspace and interstitial opacifications, stable  - LDH - 560 -> 553, Procal - 0.59->0.26, CRP - 3.57->2.16, d-dimer - 105->2126, Ferritin - 719->325  - s/p Convalescent plasma 2 U; off Remdesivir; S/p 1 dose of Toci 400 mg IV x 1 (12/5)  - BCx NGTD (12/2)  - MRSA Nares (-)  > Vent Settings: 20/450/70%/14   > Advance ET tube 4 cm   > Change sedation to fentanyl 1 and precedex  > Wean O2, monitor PPL and driving pressure  > C/w Levaquin 750 mg IV q24, Zosyn 4.5 G IB q6 (day 4)  > c/w Dexamethasone 6mg IV QD for 10 days (Day 8/10)  > Trend inflammatory markers  > ID Following  > F/u BCx  > Levophed titrated to MAP of 60    # Transaminitis - stable  - likely related to COVID-19 r/o liver disease  - Liver enzymes stable  - RUQ U/S; Hepatomegaly with diffuse hepatic steatosis. Nodular liver contour suggesting early cirrhotic changes. Gallbladder wall thickening, nonspecific.    # PARAM , likely secondary to decreased po intake - Resolved  - Cr 1.6 on admission   - Currently 0.7  > I/Os  > F/u Lytes and correct    # Hypernatremia  - Na 151  > D5W @ 75cc  > Free Water    # h/o A-fib on xarelto , rate controlled now   - rate control w/ Cardizem  - EKG on admission shows A flutter, AM EKG: AFib w/ variable AV block  > c/w Xarelto 20 mg qd    # h/o HTN (controlled)  > c/w carvedilol   > Patient borderline hypotensive, holding losartan for now  > BP monitoring    # h/o DM   - A1c 8.1%  > hold po home medications  > Insulin gtt  > OG tube feeds    # DVT ppx: Lovenox  # GI ppx: Pantoprazole  # Activity: Bedrest  # Diet: OGT feeds

## 2020-12-05 NOTE — PROGRESS NOTE ADULT - ASSESSMENT
IMPRESSION:    Acute hypoxemic resp failure  Covid pneumonia  CRS   P A FIB Was on xarelto, Now on LMWH   Possible bacterial superinfection o nABX       PLAN:    CNS: SAT.  Continue adequate sedation.  Precedex and Fentanyl      HEENT:  Oral care    PULMONARY:  HOB @ 45 degrees, ARDS network MV.  Monitor PPL and Driving pressures.  Wean o2 as tolerated.  Advance ET 4 cms     CARDIOVASCULAR:  Continue Rate control.  Avoid volume overload .  D5W 75 cc     GI: GI prophylaxis                                          OG feeding.  Bowel regimen.  Free h2O      RENAL:  F/u  lytes.  Correct as needed. accurate I/O    INFECTIOUS DISEASE: per ID, trend infl markers.  Dexa D# 8  Finish ABX course.     HEMATOLOGICAL:  DVT prophylaxis.  LMWH therapeutic     ENDOCRINE:  Follow up FS.  Insulin protocol if needed.    MUSCULOSKELETAL:  bed rest     CODE STATUS: FULL CODE  CEU / ICU   poor prognosis

## 2020-12-06 LAB
ALBUMIN SERPL ELPH-MCNC: 3 G/DL — LOW (ref 3.5–5.2)
ALP SERPL-CCNC: 49 U/L — SIGNIFICANT CHANGE UP (ref 30–115)
ALT FLD-CCNC: 58 U/L — HIGH (ref 0–41)
ANION GAP SERPL CALC-SCNC: 12 MMOL/L — SIGNIFICANT CHANGE UP (ref 7–14)
AST SERPL-CCNC: 47 U/L — HIGH (ref 0–41)
BASE EXCESS BLDA CALC-SCNC: 1.9 MMOL/L — SIGNIFICANT CHANGE UP (ref -2–2)
BASOPHILS # BLD AUTO: 0.01 K/UL — SIGNIFICANT CHANGE UP (ref 0–0.2)
BASOPHILS NFR BLD AUTO: 0.2 % — SIGNIFICANT CHANGE UP (ref 0–1)
BILIRUB SERPL-MCNC: 0.8 MG/DL — SIGNIFICANT CHANGE UP (ref 0.2–1.2)
BUN SERPL-MCNC: 33 MG/DL — HIGH (ref 10–20)
CALCIUM SERPL-MCNC: 8.8 MG/DL — SIGNIFICANT CHANGE UP (ref 8.5–10.1)
CHLORIDE SERPL-SCNC: 115 MMOL/L — HIGH (ref 98–110)
CO2 SERPL-SCNC: 22 MMOL/L — SIGNIFICANT CHANGE UP (ref 17–32)
CREAT SERPL-MCNC: 0.7 MG/DL — SIGNIFICANT CHANGE UP (ref 0.7–1.5)
D DIMER BLD IA.RAPID-MCNC: 584 NG/ML DDU — HIGH (ref 0–230)
EOSINOPHIL # BLD AUTO: 0.08 K/UL — SIGNIFICANT CHANGE UP (ref 0–0.7)
EOSINOPHIL NFR BLD AUTO: 1.4 % — SIGNIFICANT CHANGE UP (ref 0–8)
GLUCOSE BLDC GLUCOMTR-MCNC: 106 MG/DL — HIGH (ref 70–99)
GLUCOSE BLDC GLUCOMTR-MCNC: 127 MG/DL — HIGH (ref 70–99)
GLUCOSE BLDC GLUCOMTR-MCNC: 168 MG/DL — HIGH (ref 70–99)
GLUCOSE BLDC GLUCOMTR-MCNC: 169 MG/DL — HIGH (ref 70–99)
GLUCOSE BLDC GLUCOMTR-MCNC: 171 MG/DL — HIGH (ref 70–99)
GLUCOSE BLDC GLUCOMTR-MCNC: 184 MG/DL — HIGH (ref 70–99)
GLUCOSE BLDC GLUCOMTR-MCNC: 202 MG/DL — HIGH (ref 70–99)
GLUCOSE BLDC GLUCOMTR-MCNC: 211 MG/DL — HIGH (ref 70–99)
GLUCOSE BLDC GLUCOMTR-MCNC: 235 MG/DL — HIGH (ref 70–99)
GLUCOSE BLDC GLUCOMTR-MCNC: 272 MG/DL — HIGH (ref 70–99)
GLUCOSE BLDC GLUCOMTR-MCNC: 320 MG/DL — HIGH (ref 70–99)
GLUCOSE SERPL-MCNC: 108 MG/DL — HIGH (ref 70–99)
HCO3 BLDA-SCNC: 27 MMOL/L — SIGNIFICANT CHANGE UP (ref 21–29)
HCT VFR BLD CALC: 36.5 % — LOW (ref 42–52)
HGB BLD-MCNC: 10.5 G/DL — LOW (ref 14–18)
IMM GRANULOCYTES NFR BLD AUTO: 1 % — HIGH (ref 0.1–0.3)
LDH SERPL L TO P-CCNC: 455 U/L — HIGH (ref 50–242)
LYMPHOCYTES # BLD AUTO: 0.6 K/UL — LOW (ref 1.2–3.4)
LYMPHOCYTES # BLD AUTO: 10.5 % — LOW (ref 20.5–51.1)
MAGNESIUM SERPL-MCNC: 2.1 MG/DL — SIGNIFICANT CHANGE UP (ref 1.8–2.4)
MCHC RBC-ENTMCNC: 22.9 PG — LOW (ref 27–31)
MCHC RBC-ENTMCNC: 28.8 G/DL — LOW (ref 32–37)
MCV RBC AUTO: 79.7 FL — LOW (ref 80–94)
MONOCYTES # BLD AUTO: 0.39 K/UL — SIGNIFICANT CHANGE UP (ref 0.1–0.6)
MONOCYTES NFR BLD AUTO: 6.8 % — SIGNIFICANT CHANGE UP (ref 1.7–9.3)
NEUTROPHILS # BLD AUTO: 4.6 K/UL — SIGNIFICANT CHANGE UP (ref 1.4–6.5)
NEUTROPHILS NFR BLD AUTO: 80.1 % — HIGH (ref 42.2–75.2)
NRBC # BLD: 0 /100 WBCS — SIGNIFICANT CHANGE UP (ref 0–0)
PCO2 BLDA: 42 MMHG — SIGNIFICANT CHANGE UP (ref 38–42)
PH BLDA: 7.41 — SIGNIFICANT CHANGE UP (ref 7.38–7.42)
PHOSPHATE SERPL-MCNC: 3.1 MG/DL — SIGNIFICANT CHANGE UP (ref 2.1–4.9)
PLATELET # BLD AUTO: 308 K/UL — SIGNIFICANT CHANGE UP (ref 130–400)
PO2 BLDA: 85 MMHG — SIGNIFICANT CHANGE UP (ref 78–95)
POTASSIUM SERPL-MCNC: 4.5 MMOL/L — SIGNIFICANT CHANGE UP (ref 3.5–5)
POTASSIUM SERPL-SCNC: 4.5 MMOL/L — SIGNIFICANT CHANGE UP (ref 3.5–5)
PROT SERPL-MCNC: 5.3 G/DL — LOW (ref 6–8)
RBC # BLD: 4.58 M/UL — LOW (ref 4.7–6.1)
RBC # FLD: 19.7 % — HIGH (ref 11.5–14.5)
SAO2 % BLDA: 96 % — SIGNIFICANT CHANGE UP (ref 92–96)
SODIUM SERPL-SCNC: 149 MMOL/L — HIGH (ref 135–146)
WBC # BLD: 5.74 K/UL — SIGNIFICANT CHANGE UP (ref 4.8–10.8)
WBC # FLD AUTO: 5.74 K/UL — SIGNIFICANT CHANGE UP (ref 4.8–10.8)

## 2020-12-06 PROCEDURE — 71045 X-RAY EXAM CHEST 1 VIEW: CPT | Mod: 26,77

## 2020-12-06 PROCEDURE — 71045 X-RAY EXAM CHEST 1 VIEW: CPT | Mod: 26

## 2020-12-06 RX ORDER — INSULIN LISPRO 100/ML
VIAL (ML) SUBCUTANEOUS
Refills: 0 | Status: DISCONTINUED | OUTPATIENT
Start: 2020-12-06 | End: 2020-12-09

## 2020-12-06 RX ORDER — DEXTROSE 50 % IN WATER 50 %
25 SYRINGE (ML) INTRAVENOUS ONCE
Refills: 0 | Status: DISCONTINUED | OUTPATIENT
Start: 2020-12-06 | End: 2020-12-09

## 2020-12-06 RX ORDER — DEXTROSE 50 % IN WATER 50 %
12.5 SYRINGE (ML) INTRAVENOUS ONCE
Refills: 0 | Status: DISCONTINUED | OUTPATIENT
Start: 2020-12-06 | End: 2020-12-09

## 2020-12-06 RX ORDER — QUETIAPINE FUMARATE 200 MG/1
50 TABLET, FILM COATED ORAL ONCE
Refills: 0 | Status: COMPLETED | OUTPATIENT
Start: 2020-12-06 | End: 2020-12-06

## 2020-12-06 RX ORDER — INSULIN GLARGINE 100 [IU]/ML
15 INJECTION, SOLUTION SUBCUTANEOUS AT BEDTIME
Refills: 0 | Status: DISCONTINUED | OUTPATIENT
Start: 2020-12-06 | End: 2020-12-09

## 2020-12-06 RX ORDER — METOPROLOL TARTRATE 50 MG
5 TABLET ORAL ONCE
Refills: 0 | Status: COMPLETED | OUTPATIENT
Start: 2020-12-06 | End: 2020-12-06

## 2020-12-06 RX ORDER — DEXTROSE 50 % IN WATER 50 %
15 SYRINGE (ML) INTRAVENOUS ONCE
Refills: 0 | Status: DISCONTINUED | OUTPATIENT
Start: 2020-12-06 | End: 2020-12-09

## 2020-12-06 RX ORDER — INSULIN LISPRO 100/ML
5 VIAL (ML) SUBCUTANEOUS
Refills: 0 | Status: DISCONTINUED | OUTPATIENT
Start: 2020-12-06 | End: 2020-12-09

## 2020-12-06 RX ADMIN — POLYETHYLENE GLYCOL 3350 17 GRAM(S): 17 POWDER, FOR SOLUTION ORAL at 21:24

## 2020-12-06 RX ADMIN — Medication 5 UNIT(S): at 12:03

## 2020-12-06 RX ADMIN — Medication 8: at 12:04

## 2020-12-06 RX ADMIN — CHLORHEXIDINE GLUCONATE 15 MILLILITER(S): 213 SOLUTION TOPICAL at 05:00

## 2020-12-06 RX ADMIN — SIMVASTATIN 20 MILLIGRAM(S): 20 TABLET, FILM COATED ORAL at 21:24

## 2020-12-06 RX ADMIN — SENNA PLUS 2 TABLET(S): 8.6 TABLET ORAL at 21:24

## 2020-12-06 RX ADMIN — ENOXAPARIN SODIUM 80 MILLIGRAM(S): 100 INJECTION SUBCUTANEOUS at 17:00

## 2020-12-06 RX ADMIN — CHLORHEXIDINE GLUCONATE 15 MILLILITER(S): 213 SOLUTION TOPICAL at 17:00

## 2020-12-06 RX ADMIN — QUETIAPINE FUMARATE 50 MILLIGRAM(S): 200 TABLET, FILM COATED ORAL at 17:00

## 2020-12-06 RX ADMIN — Medication 5 UNIT(S): at 15:10

## 2020-12-06 RX ADMIN — SODIUM ZIRCONIUM CYCLOSILICATE 10 GRAM(S): 10 POWDER, FOR SUSPENSION ORAL at 05:01

## 2020-12-06 RX ADMIN — INSULIN GLARGINE 15 UNIT(S): 100 INJECTION, SOLUTION SUBCUTANEOUS at 21:24

## 2020-12-06 RX ADMIN — PIPERACILLIN AND TAZOBACTAM 25 GRAM(S): 4; .5 INJECTION, POWDER, LYOPHILIZED, FOR SOLUTION INTRAVENOUS at 00:10

## 2020-12-06 RX ADMIN — PANTOPRAZOLE SODIUM 40 MILLIGRAM(S): 20 TABLET, DELAYED RELEASE ORAL at 11:30

## 2020-12-06 RX ADMIN — ENOXAPARIN SODIUM 80 MILLIGRAM(S): 100 INJECTION SUBCUTANEOUS at 05:02

## 2020-12-06 RX ADMIN — Medication 6: at 15:10

## 2020-12-06 RX ADMIN — PIPERACILLIN AND TAZOBACTAM 25 GRAM(S): 4; .5 INJECTION, POWDER, LYOPHILIZED, FOR SOLUTION INTRAVENOUS at 17:00

## 2020-12-06 RX ADMIN — CHLORHEXIDINE GLUCONATE 1 APPLICATION(S): 213 SOLUTION TOPICAL at 05:00

## 2020-12-06 RX ADMIN — Medication 5 MILLIGRAM(S): at 12:18

## 2020-12-06 RX ADMIN — Medication 6 MILLIGRAM(S): at 05:01

## 2020-12-06 RX ADMIN — PIPERACILLIN AND TAZOBACTAM 25 GRAM(S): 4; .5 INJECTION, POWDER, LYOPHILIZED, FOR SOLUTION INTRAVENOUS at 05:01

## 2020-12-06 RX ADMIN — Medication 81 MILLIGRAM(S): at 11:30

## 2020-12-06 RX ADMIN — PIPERACILLIN AND TAZOBACTAM 25 GRAM(S): 4; .5 INJECTION, POWDER, LYOPHILIZED, FOR SOLUTION INTRAVENOUS at 11:30

## 2020-12-06 NOTE — PROGRESS NOTE ADULT - ASSESSMENT
IMPRESSION:    Acute hypoxemic resp failure  Covid pneumonia  CRS   P A FIB Was on xarelto, Now on LMWH   Possible bacterial superinfection on ABX       PLAN:    CNS: SAT.  Continue adequate sedation.  Precedex and Fentanyl.  Seroquel 50 mg daily     HEENT:  Oral care    PULMONARY:  HOB @ 45 degrees, ARDS network MV.  Monitor PPL and Driving pressures.  Wean o2 as tolerated.   RR 16.  PEEP 12.  FiO2 40     CARDIOVASCULAR:  Continue Rate control.  Avoid volume overload .      GI: GI prophylaxis                                          OG feeding.  Bowel regimen.  Free h2O      RENAL:  F/u  lytes.  Correct as needed. accurate I/O    INFECTIOUS DISEASE: per ID, trend infl markers.  Dexa D# 9  Finish ABX course.     HEMATOLOGICAL:  DVT prophylaxis.  LMWH therapeutic     ENDOCRINE:  Follow up FS.  Insulin protocol if needed.    MUSCULOSKELETAL:  bed rest     CODE STATUS: FULL CODE  CEU / ICU   poor prognosis

## 2020-12-06 NOTE — CHART NOTE - NSCHARTNOTEFT_GEN_A_CORE
Spoke to Dr.Mekes Jarrett(niece of the patient), updated regarding patient's vent settings, lab work. Answered all questions and concerns adrressed.    Phone num: [phone]

## 2020-12-06 NOTE — PROGRESS NOTE ADULT - SUBJECTIVE AND OBJECTIVE BOX
Patient is a 70y old  Male who presents with a chief complaint of acute hypoxic respiratory failure- COVID-19 pneumonia (04 Dec 2020 12:02)        Over Night Events:  On MV.  Sedated.  off pressors.          ROS:     All ROS are negative except HPI         PHYSICAL EXAM    ICU Vital Signs Last 24 Hrs  T(C): 36.6 (06 Dec 2020 08:00), Max: 38 (05 Dec 2020 14:00)  T(F): 97.9 (06 Dec 2020 08:00), Max: 100.4 (05 Dec 2020 14:00)  HR: 108 (06 Dec 2020 09:00) (90 - 156)  BP: 118/63 (06 Dec 2020 09:00) (94/67 - 147/78)  BP(mean): 76 (06 Dec 2020 09:00) (68 - 108)  ABP: --  ABP(mean): --  RR: 18 (06 Dec 2020 09:00) (16 - 23)  SpO2: 100% (06 Dec 2020 09:00) (98% - 100%)      CONSTITUTIONAL:  Well nourished.  NAD    ENT:   Airway patent,   Mouth with normal mucosa.   No thrush    EYES:   Pupils equal,   Round and reactive to light.    CARDIAC:   Normal rate,   Regular rhythm.    No edema      Vascular:  Normal systolic impulse  No Carotid bruits    RESPIRATORY:   No wheezing  Bilateral BS  Normal chest expansion  Not tachypneic,  No use of accessory muscles    GASTROINTESTINAL:  Abdomen soft,   Non-tender,   No guarding,   + BS    MUSCULOSKELETAL:   Range of motion is not limited,  No clubbing, cyanosis    NEUROLOGICAL:   Sedated   No motor  deficits.    SKIN:   Skin normal color for race,   Warm and dry and intact.   No evidence of rash.    PSYCHIATRIC:   Sedated   No apparent risk to self or others.    HEMATOLOGICAL:  No cervical  lymphadenopathy.  no inguinal lymphadenopathy      12-05-20 @ 07:01  -  12-06-20 @ 07:00  --------------------------------------------------------  IN:    Dexmedetomidine: 306 mL    dextrose 5%: 1275 mL    Enteral Tube Flush: 550 mL    FentaNYL: 501 mL    Free Water: 750 mL    Insulin: 8 mL    Insulin: 28 mL    Insulin: 176 mL    IV PiggyBack: 100 mL    IV PiggyBack: 450 mL    Midazolam: 3.1 mL    Peptamen A.F.: 1200 mL  Total IN: 5347.1 mL    OUT:    Indwelling Catheter - Urethral (mL): 1505 mL    Norepinephrine: 0 mL  Total OUT: 1505 mL    Total NET: 3842.1 mL      12-06-20 @ 07:01  -  12-06-20 @ 09:55  --------------------------------------------------------  IN:    Dexmedetomidine: 32.8 mL    FentaNYL: 32.8 mL    Peptamen A.F.: 100 mL  Total IN: 165.6 mL    OUT:    Indwelling Catheter - Urethral (mL): 165 mL    Insulin: 0 mL  Total OUT: 165 mL    Total NET: 0.6 mL          LABS:                            10.5   5.74  )-----------( 308      ( 06 Dec 2020 05:01 )             36.5                                               12-06    149<H>  |  115<H>  |  33<H>  ----------------------------<  108<H>  4.5   |  22  |  0.7    Ca    8.8      06 Dec 2020 05:01  Phos  3.1     12-06  Mg     2.1     12-06    TPro  5.3<L>  /  Alb  3.0<L>  /  TBili  0.8  /  DBili  x   /  AST  47<H>  /  ALT  58<H>  /  AlkPhos  49  12-06                                                                                           LIVER FUNCTIONS - ( 06 Dec 2020 05:01 )  Alb: 3.0 g/dL / Pro: 5.3 g/dL / ALK PHOS: 49 U/L / ALT: 58 U/L / AST: 47 U/L / GGT: x                                                                                               Mode: AC/ CMV (Assist Control/ Continuous Mandatory Ventilation)  RR (machine): 20  TV (machine): 450  FiO2: 50  PEEP: 14  MAP: 19  PIP: 30                                      ABG - ( 06 Dec 2020 01:12 )  pH, Arterial: 7.41  pH, Blood: x     /  pCO2: 43    /  pO2: 121   / HCO3: 27    / Base Excess: 2.3   /  SaO2: 98                  MEDICATIONS  (STANDING):  aspirin  chewable 81 milliGRAM(s) Oral daily  chlorhexidine 0.12% Liquid 15 milliLiter(s) Oral Mucosa every 12 hours  chlorhexidine 4% Liquid 1 Application(s) Topical <User Schedule>  dexAMETHasone  Injectable 6 milliGRAM(s) IV Push daily  dexMEDEtomidine Infusion 0.6 MICROgram(s)/kG/Hr (11.7 mL/Hr) IV Continuous <Continuous>  dextrose 40% Gel 15 Gram(s) Oral once  dextrose 5%. 1000 milliLiter(s) (50 mL/Hr) IV Continuous <Continuous>  dextrose 5%. 1000 milliLiter(s) (100 mL/Hr) IV Continuous <Continuous>  dextrose 50% Injectable 25 Gram(s) IV Push once  dextrose 50% Injectable 12.5 Gram(s) IV Push once  dextrose 50% Injectable 25 Gram(s) IV Push once  enoxaparin Injectable 80 milliGRAM(s) SubCutaneous two times a day  fentaNYL   Infusion. 0.5 MICROgram(s)/kG/Hr (3.89 mL/Hr) IV Continuous <Continuous>  glucagon  Injectable 1 milliGRAM(s) IntraMuscular once  insulin glargine Injectable (LANTUS) 15 Unit(s) SubCutaneous at bedtime  insulin lispro (ADMELOG) corrective regimen sliding scale   SubCutaneous three times a day before meals  insulin lispro Injectable (ADMELOG) 5 Unit(s) SubCutaneous three times a day before meals  levoFLOXacin IVPB 750 milliGRAM(s) IV Intermittent every 24 hours  levoFLOXacin IVPB      pantoprazole   Suspension 40 milliGRAM(s) Oral daily  piperacillin/tazobactam IVPB.. 4.5 Gram(s) IV Intermittent every 6 hours  polyethylene glycol 3350 17 Gram(s) Oral at bedtime  senna 2 Tablet(s) Oral at bedtime  simvastatin 20 milliGRAM(s) Oral at bedtime    MEDICATIONS  (PRN):  acetaminophen   Tablet .. 650 milliGRAM(s) Oral every 6 hours PRN Temp greater or equal to 38C (100.4F)      New X-rays reviewed:                                                                                  ECHO    CXR interpreted by me:

## 2020-12-07 LAB
ALBUMIN SERPL ELPH-MCNC: 3 G/DL — LOW (ref 3.5–5.2)
ALP SERPL-CCNC: 44 U/L — SIGNIFICANT CHANGE UP (ref 30–115)
ALT FLD-CCNC: 90 U/L — HIGH (ref 0–41)
ANION GAP SERPL CALC-SCNC: 10 MMOL/L — SIGNIFICANT CHANGE UP (ref 7–14)
AST SERPL-CCNC: 52 U/L — HIGH (ref 0–41)
BASOPHILS # BLD AUTO: 0.01 K/UL — SIGNIFICANT CHANGE UP (ref 0–0.2)
BASOPHILS NFR BLD AUTO: 0.1 % — SIGNIFICANT CHANGE UP (ref 0–1)
BILIRUB SERPL-MCNC: 1 MG/DL — SIGNIFICANT CHANGE UP (ref 0.2–1.2)
BUN SERPL-MCNC: 28 MG/DL — HIGH (ref 10–20)
CALCIUM SERPL-MCNC: 8.4 MG/DL — LOW (ref 8.5–10.1)
CHLORIDE SERPL-SCNC: 106 MMOL/L — SIGNIFICANT CHANGE UP (ref 98–110)
CO2 SERPL-SCNC: 23 MMOL/L — SIGNIFICANT CHANGE UP (ref 17–32)
CREAT SERPL-MCNC: 0.6 MG/DL — LOW (ref 0.7–1.5)
CRP SERPL-MCNC: 0.13 MG/DL — SIGNIFICANT CHANGE UP (ref 0–0.4)
CRP SERPL-MCNC: 0.22 MG/DL — SIGNIFICANT CHANGE UP (ref 0–0.4)
CULTURE RESULTS: SIGNIFICANT CHANGE UP
D DIMER BLD IA.RAPID-MCNC: 839 NG/ML DDU — HIGH (ref 0–230)
EOSINOPHIL # BLD AUTO: 0.13 K/UL — SIGNIFICANT CHANGE UP (ref 0–0.7)
EOSINOPHIL NFR BLD AUTO: 1.8 % — SIGNIFICANT CHANGE UP (ref 0–8)
GLUCOSE BLDC GLUCOMTR-MCNC: 236 MG/DL — HIGH (ref 70–99)
GLUCOSE BLDC GLUCOMTR-MCNC: 275 MG/DL — HIGH (ref 70–99)
GLUCOSE BLDC GLUCOMTR-MCNC: 314 MG/DL — HIGH (ref 70–99)
GLUCOSE BLDC GLUCOMTR-MCNC: 335 MG/DL — HIGH (ref 70–99)
GLUCOSE SERPL-MCNC: 299 MG/DL — HIGH (ref 70–99)
HCT VFR BLD CALC: 37.4 % — LOW (ref 42–52)
HGB BLD-MCNC: 10.9 G/DL — LOW (ref 14–18)
IMM GRANULOCYTES NFR BLD AUTO: 1.5 % — HIGH (ref 0.1–0.3)
LYMPHOCYTES # BLD AUTO: 0.9 K/UL — LOW (ref 1.2–3.4)
LYMPHOCYTES # BLD AUTO: 12.4 % — LOW (ref 20.5–51.1)
MAGNESIUM SERPL-MCNC: 1.7 MG/DL — LOW (ref 1.8–2.4)
MCHC RBC-ENTMCNC: 22.9 PG — LOW (ref 27–31)
MCHC RBC-ENTMCNC: 29.1 G/DL — LOW (ref 32–37)
MCV RBC AUTO: 78.4 FL — LOW (ref 80–94)
MONOCYTES # BLD AUTO: 0.5 K/UL — SIGNIFICANT CHANGE UP (ref 0.1–0.6)
MONOCYTES NFR BLD AUTO: 6.9 % — SIGNIFICANT CHANGE UP (ref 1.7–9.3)
NEUTROPHILS # BLD AUTO: 5.58 K/UL — SIGNIFICANT CHANGE UP (ref 1.4–6.5)
NEUTROPHILS NFR BLD AUTO: 77.3 % — HIGH (ref 42.2–75.2)
NRBC # BLD: 0 /100 WBCS — SIGNIFICANT CHANGE UP (ref 0–0)
PLATELET # BLD AUTO: 285 K/UL — SIGNIFICANT CHANGE UP (ref 130–400)
POTASSIUM SERPL-MCNC: 4.9 MMOL/L — SIGNIFICANT CHANGE UP (ref 3.5–5)
POTASSIUM SERPL-SCNC: 4.9 MMOL/L — SIGNIFICANT CHANGE UP (ref 3.5–5)
PROCALCITONIN SERPL-MCNC: 0.06 NG/ML — SIGNIFICANT CHANGE UP (ref 0.02–0.1)
PROCALCITONIN SERPL-MCNC: 0.07 NG/ML — SIGNIFICANT CHANGE UP (ref 0.02–0.1)
PROT SERPL-MCNC: 5.2 G/DL — LOW (ref 6–8)
RBC # BLD: 4.77 M/UL — SIGNIFICANT CHANGE UP (ref 4.7–6.1)
RBC # FLD: 19.6 % — HIGH (ref 11.5–14.5)
SODIUM SERPL-SCNC: 139 MMOL/L — SIGNIFICANT CHANGE UP (ref 135–146)
SPECIMEN SOURCE: SIGNIFICANT CHANGE UP
WBC # BLD: 7.23 K/UL — SIGNIFICANT CHANGE UP (ref 4.8–10.8)
WBC # FLD AUTO: 7.23 K/UL — SIGNIFICANT CHANGE UP (ref 4.8–10.8)

## 2020-12-07 PROCEDURE — 93010 ELECTROCARDIOGRAM REPORT: CPT

## 2020-12-07 PROCEDURE — 71045 X-RAY EXAM CHEST 1 VIEW: CPT | Mod: 26

## 2020-12-07 PROCEDURE — 70450 CT HEAD/BRAIN W/O DYE: CPT | Mod: 26

## 2020-12-07 RX ORDER — DEXMEDETOMIDINE HYDROCHLORIDE IN 0.9% SODIUM CHLORIDE 4 UG/ML
0.57 INJECTION INTRAVENOUS
Qty: 400 | Refills: 0 | Status: DISCONTINUED | OUTPATIENT
Start: 2020-12-07 | End: 2020-12-09

## 2020-12-07 RX ORDER — QUETIAPINE FUMARATE 200 MG/1
50 TABLET, FILM COATED ORAL EVERY 12 HOURS
Refills: 0 | Status: DISCONTINUED | OUTPATIENT
Start: 2020-12-07 | End: 2020-12-09

## 2020-12-07 RX ORDER — FENTANYL CITRATE 50 UG/ML
0.48 INJECTION INTRAVENOUS
Qty: 2500 | Refills: 0 | Status: DISCONTINUED | OUTPATIENT
Start: 2020-12-07 | End: 2020-12-09

## 2020-12-07 RX ORDER — ACETAMINOPHEN 500 MG
650 TABLET ORAL ONCE
Refills: 0 | Status: COMPLETED | OUTPATIENT
Start: 2020-12-07 | End: 2020-12-07

## 2020-12-07 RX ADMIN — PIPERACILLIN AND TAZOBACTAM 25 GRAM(S): 4; .5 INJECTION, POWDER, LYOPHILIZED, FOR SOLUTION INTRAVENOUS at 00:13

## 2020-12-07 RX ADMIN — QUETIAPINE FUMARATE 50 MILLIGRAM(S): 200 TABLET, FILM COATED ORAL at 09:22

## 2020-12-07 RX ADMIN — Medication 650 MILLIGRAM(S): at 12:24

## 2020-12-07 RX ADMIN — PIPERACILLIN AND TAZOBACTAM 25 GRAM(S): 4; .5 INJECTION, POWDER, LYOPHILIZED, FOR SOLUTION INTRAVENOUS at 12:14

## 2020-12-07 RX ADMIN — CHLORHEXIDINE GLUCONATE 1 APPLICATION(S): 213 SOLUTION TOPICAL at 05:17

## 2020-12-07 RX ADMIN — Medication 8: at 06:13

## 2020-12-07 RX ADMIN — POLYETHYLENE GLYCOL 3350 17 GRAM(S): 17 POWDER, FOR SOLUTION ORAL at 21:22

## 2020-12-07 RX ADMIN — SIMVASTATIN 20 MILLIGRAM(S): 20 TABLET, FILM COATED ORAL at 21:10

## 2020-12-07 RX ADMIN — Medication 6 MILLIGRAM(S): at 06:14

## 2020-12-07 RX ADMIN — QUETIAPINE FUMARATE 50 MILLIGRAM(S): 200 TABLET, FILM COATED ORAL at 17:52

## 2020-12-07 RX ADMIN — Medication 8: at 16:30

## 2020-12-07 RX ADMIN — CHLORHEXIDINE GLUCONATE 15 MILLILITER(S): 213 SOLUTION TOPICAL at 17:52

## 2020-12-07 RX ADMIN — Medication 5 UNIT(S): at 16:30

## 2020-12-07 RX ADMIN — Medication 650 MILLIGRAM(S): at 05:17

## 2020-12-07 RX ADMIN — INSULIN GLARGINE 15 UNIT(S): 100 INJECTION, SOLUTION SUBCUTANEOUS at 21:22

## 2020-12-07 RX ADMIN — Medication 6: at 11:13

## 2020-12-07 RX ADMIN — ENOXAPARIN SODIUM 80 MILLIGRAM(S): 100 INJECTION SUBCUTANEOUS at 17:52

## 2020-12-07 RX ADMIN — Medication 5 UNIT(S): at 06:13

## 2020-12-07 RX ADMIN — SENNA PLUS 2 TABLET(S): 8.6 TABLET ORAL at 21:10

## 2020-12-07 RX ADMIN — PIPERACILLIN AND TAZOBACTAM 25 GRAM(S): 4; .5 INJECTION, POWDER, LYOPHILIZED, FOR SOLUTION INTRAVENOUS at 05:16

## 2020-12-07 RX ADMIN — Medication 650 MILLIGRAM(S): at 05:42

## 2020-12-07 RX ADMIN — PIPERACILLIN AND TAZOBACTAM 25 GRAM(S): 4; .5 INJECTION, POWDER, LYOPHILIZED, FOR SOLUTION INTRAVENOUS at 17:52

## 2020-12-07 RX ADMIN — Medication 650 MILLIGRAM(S): at 20:54

## 2020-12-07 RX ADMIN — Medication 5 UNIT(S): at 11:12

## 2020-12-07 RX ADMIN — Medication 650 MILLIGRAM(S): at 21:24

## 2020-12-07 RX ADMIN — CHLORHEXIDINE GLUCONATE 15 MILLILITER(S): 213 SOLUTION TOPICAL at 05:17

## 2020-12-07 RX ADMIN — ENOXAPARIN SODIUM 80 MILLIGRAM(S): 100 INJECTION SUBCUTANEOUS at 05:17

## 2020-12-07 RX ADMIN — PANTOPRAZOLE SODIUM 40 MILLIGRAM(S): 20 TABLET, DELAYED RELEASE ORAL at 12:24

## 2020-12-07 RX ADMIN — Medication 81 MILLIGRAM(S): at 12:24

## 2020-12-07 NOTE — PROGRESS NOTE ADULT - SUBJECTIVE AND OBJECTIVE BOX
Patient is a 70y old  Male who presents with a chief complaint of acute hypoxic respiratory failure- COVID-19 pneumonia (04 Dec 2020 12:02)        Over Night Events:  On MV.  Off pressors.  Sedated.          ROS:     All ROS are negative except HPI         PHYSICAL EXAM    ICU Vital Signs Last 24 Hrs  T(C): 38 (07 Dec 2020 04:00), Max: 38 (07 Dec 2020 04:00)  T(F): 100.4 (07 Dec 2020 04:00), Max: 100.4 (07 Dec 2020 04:00)  HR: 76 (07 Dec 2020 07:00) (70 - 114)  BP: 99/54 (07 Dec 2020 07:00) (91/52 - 155/77)  BP(mean): 82 (07 Dec 2020 07:00) (64 - 118)  ABP: --  ABP(mean): --  RR: 14 (07 Dec 2020 07:00) (13 - 18)  SpO2: 94% (07 Dec 2020 07:00) (90% - 100%)      CONSTITUTIONAL:  Well nourished. Ill appearing.  NAD    ENT:   Airway patent,   Mouth with normal mucosa.   No thrush    EYES:   Pupils equal,   Round and reactive to light.    CARDIAC:   Normal rate,   Regular rhythm.    No edema      Vascular:  Normal systolic impulse  No Carotid bruits    RESPIRATORY:   No wheezing  Bilateral BS  Normal chest expansion  Not tachypneic,  No use of accessory muscles    GASTROINTESTINAL:  Abdomen soft,   Non-tender,   No guarding,   + BS    MUSCULOSKELETAL:   Range of motion is not limited,  No clubbing, cyanosis    NEUROLOGICAL:   Sedated   No motor  deficits.    SKIN:   Skin normal color for race,   Warm and dry   No evidence of rash.    PSYCHIATRIC:   Sedated   No apparent risk to self or others.    HEMATOLOGICAL:  No cervical  lymphadenopathy.  no inguinal lymphadenopathy      12-06-20 @ 07:01  -  12-07-20 @ 07:00  --------------------------------------------------------  IN:    Dexmedetomidine: 479.7 mL    Dexmedetomidine: 49.2 mL    Enteral Tube Flush: 50 mL    FentaNYL: 307.5 mL    FentaNYL: 41 mL    Free Water: 1800 mL    IV PiggyBack: 550 mL    Peptamen A.F.: 1200 mL  Total IN: 4477.4 mL    OUT:    Indwelling Catheter - Urethral (mL): 505 mL    Insulin: 0 mL    Voided (mL): 1100 mL  Total OUT: 1605 mL    Total NET: 2872.4 mL          LABS:                            10.9   7.23  )-----------( 285      ( 07 Dec 2020 04:53 )             37.4                                               12-07    139  |  106  |  28<H>  ----------------------------<  299<H>  4.9   |  23  |  0.6<L>    Ca    8.4<L>      07 Dec 2020 04:53  Phos  3.1     12-06  Mg     1.7     12-07    TPro  5.2<L>  /  Alb  3.0<L>  /  TBili  1.0  /  DBili  x   /  AST  52<H>  /  ALT  90<H>  /  AlkPhos  44  12-07                                                                                           LIVER FUNCTIONS - ( 07 Dec 2020 04:53 )  Alb: 3.0 g/dL / Pro: 5.2 g/dL / ALK PHOS: 44 U/L / ALT: 90 U/L / AST: 52 U/L / GGT: x                                                                                               Mode: AC/ CMV (Assist Control/ Continuous Mandatory Ventilation)  RR (machine): 16  TV (machine): 400  FiO2: 40  PEEP: 12  ITime: 1  MAP: 15  PIP: 23                                      ABG - ( 07 Dec 2020 01:17 )  pH, Arterial: 7.44  pH, Blood: x     /  pCO2: 42    /  pO2: 75    / HCO3: 28    / Base Excess: 3.4   /  SaO2: 95                  MEDICATIONS  (STANDING):  aspirin  chewable 81 milliGRAM(s) Oral daily  chlorhexidine 0.12% Liquid 15 milliLiter(s) Oral Mucosa every 12 hours  chlorhexidine 4% Liquid 1 Application(s) Topical <User Schedule>  dexAMETHasone  Injectable 6 milliGRAM(s) IV Push daily  dexMEDEtomidine Infusion 0.572 MICROgram(s)/kG/Hr (11.7 mL/Hr) IV Continuous <Continuous>  dextrose 40% Gel 15 Gram(s) Oral once  dextrose 5%. 1000 milliLiter(s) (50 mL/Hr) IV Continuous <Continuous>  dextrose 5%. 1000 milliLiter(s) (100 mL/Hr) IV Continuous <Continuous>  dextrose 50% Injectable 25 Gram(s) IV Push once  dextrose 50% Injectable 12.5 Gram(s) IV Push once  dextrose 50% Injectable 25 Gram(s) IV Push once  enoxaparin Injectable 80 milliGRAM(s) SubCutaneous two times a day  fentaNYL   Infusion. 0.476 MICROgram(s)/kG/Hr (3.89 mL/Hr) IV Continuous <Continuous>  glucagon  Injectable 1 milliGRAM(s) IntraMuscular once  insulin glargine Injectable (LANTUS) 15 Unit(s) SubCutaneous at bedtime  insulin lispro (ADMELOG) corrective regimen sliding scale   SubCutaneous three times a day before meals  insulin lispro Injectable (ADMELOG) 5 Unit(s) SubCutaneous three times a day before meals  levoFLOXacin IVPB 750 milliGRAM(s) IV Intermittent every 24 hours  levoFLOXacin IVPB      pantoprazole   Suspension 40 milliGRAM(s) Oral daily  piperacillin/tazobactam IVPB.. 4.5 Gram(s) IV Intermittent every 6 hours  polyethylene glycol 3350 17 Gram(s) Oral at bedtime  senna 2 Tablet(s) Oral at bedtime  simvastatin 20 milliGRAM(s) Oral at bedtime    MEDICATIONS  (PRN):  acetaminophen   Tablet .. 650 milliGRAM(s) Oral every 6 hours PRN Temp greater or equal to 38C (100.4F)      New X-rays reviewed:                                                                                  ECHO    CXR interpreted by me:  ET OG OK.  Improving infiltrates

## 2020-12-07 NOTE — PROGRESS NOTE ADULT - SUBJECTIVE AND OBJECTIVE BOX
MAGGIE SAHNI  70y, Male    All available historical data reviewed    OVERNIGHT EVENTS:  fevers  on vent  Fio2/peep 40% / 8  no pressors  no diarrhea  does not follow commands    ROS:  unable to obtain history secondary to patient's mental status and/or sedation    VITALS:  T(F): 101.2, Max: 101.2 (12-07-20 @ 12:00)  HR: 84  BP: 107/58  RR: 8Vital Signs Last 24 Hrs  T(C): 38.4 (07 Dec 2020 12:00), Max: 38.4 (07 Dec 2020 12:00)  T(F): 101.2 (07 Dec 2020 12:00), Max: 101.2 (07 Dec 2020 12:00)  HR: 84 (07 Dec 2020 13:00) (70 - 110)  BP: 107/58 (07 Dec 2020 13:00) (91/52 - 155/77)  BP(mean): 74 (07 Dec 2020 13:00) (64 - 118)  RR: 8 (07 Dec 2020 13:00) (7 - 18)  SpO2: 97% (07 Dec 2020 13:00) (90% - 100%)    TESTS & MEASUREMENTS:                        10.9   7.23  )-----------( 285      ( 07 Dec 2020 04:53 )             37.4     12-07    139  |  106  |  28<H>  ----------------------------<  299<H>  4.9   |  23  |  0.6<L>    Ca    8.4<L>      07 Dec 2020 04:53  Phos  3.1     12-06  Mg     1.7     12-07    TPro  5.2<L>  /  Alb  3.0<L>  /  TBili  1.0  /  DBili  x   /  AST  52<H>  /  ALT  90<H>  /  AlkPhos  44  12-07    LIVER FUNCTIONS - ( 07 Dec 2020 04:53 )  Alb: 3.0 g/dL / Pro: 5.2 g/dL / ALK PHOS: 44 U/L / ALT: 90 U/L / AST: 52 U/L / GGT: x             Culture - Blood (collected 12-02-20 @ 17:35)  Source: .Blood None  Preliminary Report (12-04-20 @ 02:16):    No growth to date.    Culture - Blood (collected 12-02-20 @ 17:35)  Source: .Blood None  Preliminary Report (12-04-20 @ 02:16):    No growth to date.    Culture - Blood (collected 12-01-20 @ 23:15)  Source: .Blood Blood-Peripheral  Final Report (12-07-20 @ 07:00):    No Growth Final            RADIOLOGY & ADDITIONAL TESTS:  Personal review of radiological diagnostics performed  Echo and EKG results noted when applicable.     MEDICATIONS:  acetaminophen   Tablet .. 650 milliGRAM(s) Oral every 6 hours PRN  aspirin  chewable 81 milliGRAM(s) Oral daily  chlorhexidine 0.12% Liquid 15 milliLiter(s) Oral Mucosa every 12 hours  chlorhexidine 4% Liquid 1 Application(s) Topical <User Schedule>  dexAMETHasone  Injectable 6 milliGRAM(s) IV Push daily  dexMEDEtomidine Infusion 0.572 MICROgram(s)/kG/Hr IV Continuous <Continuous>  dextrose 40% Gel 15 Gram(s) Oral once  dextrose 5%. 1000 milliLiter(s) IV Continuous <Continuous>  dextrose 5%. 1000 milliLiter(s) IV Continuous <Continuous>  dextrose 50% Injectable 25 Gram(s) IV Push once  dextrose 50% Injectable 12.5 Gram(s) IV Push once  dextrose 50% Injectable 25 Gram(s) IV Push once  enoxaparin Injectable 80 milliGRAM(s) SubCutaneous two times a day  fentaNYL   Infusion. 0.476 MICROgram(s)/kG/Hr IV Continuous <Continuous>  glucagon  Injectable 1 milliGRAM(s) IntraMuscular once  insulin glargine Injectable (LANTUS) 15 Unit(s) SubCutaneous at bedtime  insulin lispro (ADMELOG) corrective regimen sliding scale   SubCutaneous three times a day before meals  insulin lispro Injectable (ADMELOG) 5 Unit(s) SubCutaneous three times a day before meals  levoFLOXacin IVPB      levoFLOXacin IVPB 750 milliGRAM(s) IV Intermittent every 24 hours  pantoprazole   Suspension 40 milliGRAM(s) Oral daily  piperacillin/tazobactam IVPB.. 4.5 Gram(s) IV Intermittent every 6 hours  polyethylene glycol 3350 17 Gram(s) Oral at bedtime  QUEtiapine 50 milliGRAM(s) Oral every 12 hours  senna 2 Tablet(s) Oral at bedtime  simvastatin 20 milliGRAM(s) Oral at bedtime      ANTIBIOTICS:  levoFLOXacin IVPB 750 milliGRAM(s) IV Intermittent every 24 hours  levoFLOXacin IVPB      piperacillin/tazobactam IVPB.. 4.5 Gram(s) IV Intermittent every 6 hours

## 2020-12-07 NOTE — PROGRESS NOTE ADULT - ASSESSMENT
IMPRESSION:    Acute hypoxemic resp failure  Covid pneumonia  CRS   P A FIB Was on xarelto, Now on LMWH   Possible bacterial superinfection on ABX       PLAN:    CNS: SAT.  Continue adequate sedation.  Precedex and Fentanyl.  Seroquel 100 mg daily.  CTH NC     HEENT:  Oral care    PULMONARY:  HOB @ 45 degrees, ARDS network MV.  Monitor PPL and Driving pressures.  PEEP 10.       CARDIOVASCULAR:  Continue Rate control.  Avoid volume overload .      GI: GI prophylaxis                                          OG feeding.  Bowel regimen. DC free h2O      RENAL:  F/u  lytes.  Correct as needed. accurate I/O    INFECTIOUS DISEASE: per ID, trend infl markers.  Dexa D# 10.   Finish ABX course 1 more day .     HEMATOLOGICAL:  DVT prophylaxis.  LMWH therapeutic     ENDOCRINE:  Follow up FS.  Insulin protocol if needed.    MUSCULOSKELETAL:  bed rest     CODE STATUS: FULL CODE  CEU / ICU   poor prognosis

## 2020-12-07 NOTE — PROGRESS NOTE ADULT - ASSESSMENT
71 yo male with pmh of DM, Paroxysmal Afib on Xarelto , HTN, L carotid endarterectomy presents to the hospital with progressively worsening shortness of breath more on exertion than at rest after testing positive for covid19 outpatient 5 days ago.     #Acute hypoxemic respiratory failure secondary to COVID-19 pneumonia  #Cytokine release syndrome  # Possible bacterial superinfection  - Intubated 12/2;   - Chest X-ray: Increased bilateral lower lung predominant opacities. No pneumothorax.  -  -> 455, Procal 0.59->0.07, CRP - 3.57->0.46, d-dimer - 105 -> 839, Ferritin - 719->325  - s/p Convalescent plasma 2 U; off Remdesivir; S/p 1 dose of Toci 400 mg IV x 1 (12/5)  - BCx NGTD (12/2), Ucx neg  - MRSA Nares (-)  - Dexa D#10  - Off pressors  - Vent settings: RR 16, , Fio2 40, PEEP 12  > Vent changes: PEEP 10  > SAT, c/w fentanyl and precedex  > Wean O2, monitor PPL and driving pressure  > CTH NC  > 1 more day of abx: Levofloxacin 750 mg IV q24, Zosyn 4.5 G IB q6 (day 4)  > c/w Dexamethasone 6mg IV QD for 10 days (Day 8/10)  > Trend inflammatory markers  > ID Following    # Transaminitis - stable  - likely related to COVID-19 r/o liver disease  - Liver enzymes stable  - RUQ U/S; Hepatomegaly with diffuse hepatic steatosis. Nodular liver contour suggesting early cirrhotic changes. Gallbladder wall thickening, nonspecific.    # PARAM , likely secondary to decreased po intake - Resolved  - Cr 1.6 on admission   - Currently 0.6  > I/Os  > F/u Lytes and correct    # Hypernatremia (resolved)  - Na 139  > D/C Free Water  # h/o A-fib on xarelto , rate controlled now   - rate control w/ Cardizem  - EKG on admission shows A flutter, AM EKG: AFib w/ variable AV block  > c/w Xarelto 20 mg qd    # h/o HTN (controlled)  > c/w carvedilol   > Patient borderline hypotensive, holding losartan for now  > BP monitoring    # h/o DM   - A1c 8.1%  > hold po home medications  > Insulin gtt  > OG tube feeds    # DVT ppx: Lovenox  # GI ppx: Pantoprazole  # Activity: Bedrest  # Diet: OGT feeds

## 2020-12-07 NOTE — PROGRESS NOTE ADULT - ASSESSMENT
· Assessment	   70y old  Male with pmh of DM, Paroxysmal Afib on Xarelto , HTN, L carotid endarterectomy, presents to the ER for evaluation of  progressively worsening shortness of breath more on exertion than at rest after testing positive for covid19 outpatient 5 days ago. The patient notes that he has been having symptoms of weakness, decreased po intake, and feeling sleepy more than usual. Upon arrival to the ER.  he found to have fever, tachycardia. tachypnea and  desaturated on 15 L non rebreather to 87%  and was placed on high flow oxygen with improvement in saturation to 97%    IMPRESSION;  COVID 19 with severe illness requiring MV 12/2 with Fio2 100%>40%  ARDS secondary to cytokine storm although markers not that significantly elevated 9 besides Ddimers )  Inflammatory markers decreasing  CXR with reduced opacities  procalcitonin 0.59  >0/07  Ferritiin 719>325  CRP 3.57>0.46  Ddimers 105>2126  Marin ALBARADOA NG  BCx 12/1,2 NGTD    RECOMMENDATIONS;  Zosyn 4.5 gm iv q6h  Tocilizumab 400 mg iv x once.12/2,12/4  S/p Convalescent plasma 2 units. 11/30  Day 1 11/29-12/10 : Single  mg IV loading dose  Day 2-10 : single  mg IV once daily maintenance dose  Daily CBC,CMP.  Dexamethasone 6 mg iv q24h for 10 days 11/29- ( or until discharge ) or equivalent glucocorticoid dose may be substituted. Equivalent total dosis of alternative steroids are Methylprednisolone 32 mg and prednisone 40 mg q24h.  Monitor for side effects: hyperglycemia, neurological ( agitation/confusion), adrenal suppression, bacterial and fungal infections  Anticoagulation as per team  Proning for 16h/day if pt can tolerate it.

## 2020-12-07 NOTE — CHART NOTE - NSCHARTNOTEFT_GEN_A_CORE
Registered Dietitian Follow-Up    ***Scroll to the bottom for RD recommendation***    Patient Profile Reviewed                           Yes [x]   No []  Nutrition History Previously Obtained        Yes []  No [x]          PERTINENT SUBJECTIVE INFORMATION:  - pt is intubated to ventilator,  - on IV precedex and fentanyl.  - feeding with Peptamen AF at 50cc/hr (OG) - running, and RN reported good tolerance.   - Ve 7.9, Temp 38c, BP 99/54, MAP 82.   - pt transferred from [facility]U.           PERTINENT MEDICAL INFORMATIONS:  (1) P/w acute hypoxic resp failure COVID19. CRS  (2) A fib on meds. Possible bacterial superinfection on ABX per IOD.  (3) Avoid volume overload. f/u electrolytes.           DIET ORDER:   PEPTAMEN AF at 50cc/hr (OG) = 1500 kcal/ 96g protein        ANTHROPOMETRICS:  - Ht.  177.8cm  - Wt.   (12/5): 81.8kg  (12/7): 84.4kg  - pt has 1+ edema, will monitor trend.   - BMI. 25.8  - IBW       PERTINENT LAB DATA: 12/7: h/h 10.9/37.4, BUN 28, Cr 0.6, Glucose 299, Ca 8.4, Albumin 3.0, LFT elevated, Mag 1.7, HgbA1c 8.1,   PERTINENT MEDS:  enoxaparin, aspirin, d5w, insulin, precedex, fentanyl, miralax, senna, decadron, simvastatin      PHYSICAL FINDINGS  - APPEARANCE:        intubated to ventilator, 1+ generalized edema. sedated  - GI FUNCTION:        LBM 12/6 per EMR  - TUBES:                     OG  - ORAL/MOUTH:      NPO  - SKIN:                        Ecchymosis         NUTRITION REQUIREMENTS  WEIGHT USED:                          ABW: 81.8kg from 12/5.  ESTIMATED ENERGY NEEDS:       CONTINUE [  ]      ADJUST [ x ] - as of 12/7    ESTIMATED ENERGY NEEDS:         1909 kcal/day ([id number])  ESTIMATED PROTEIN NEEDS:        98-115g/day (1.2-1.4 g/kg of ABW)  ESTIMATED FLUID NEEDS:             Fluid per ICU team.     CURRENT NUTRIENT NEEDS:     1500 kcal/ 96g protein  = MEETING PROTEIN, not meeting kcal.          [x  ] PREVIOUS NUTRITION DIAGNOSIS:   (1) Inadequate protein energy intake            [x  ] ONGOING        [  ] RESOLVED          PATIENT INTERVENTION:    [  ] ORAL        [ x] EN/TF     GOAL/EXPECTED OUTCOME:     pt to meet and tolerate >85% of estimated kcal/protein via TF upon f/u in 3 days.   INDICATOR/MONITORING:       RD to monitor diet order, energy intake, body composition, nutrition focused physical findings (EN tolerance, glucose profile)  NUTRITION INTERVENTION:        Enteral nutrition.       RECS: (1) If medically feasible, with consistent MAP>65, please change current tube feed to GLUCERNA 1.2 at 40cc/hr to start, increase by 10cc q4hr to GOAL of 60cc/hr with BENEPROTEIN x4/day. THis regimen at GOAL gives a total of 1820 kcal/ 109g protein/ 1166mL free water, additional flushes per ICU team. (2) Continue insulin prn.

## 2020-12-07 NOTE — PROGRESS NOTE ADULT - SUBJECTIVE AND OBJECTIVE BOX
Currently admitted to medicine with the primary diagnosis of COVID-19    Today is hospital day 10d.     INTERVAL HPI / OVERNIGHT EVENTS  Patient was examined and seen at bedside.   Desatting to 40%, precedex increased.       PAST MEDICAL & SURGICAL HISTORY  H/O sciatica  [facility] PAIN LT LE    H/O cystoscopy  resection of bladder tumor    Bladder cancer    DM (diabetes mellitus)    Hypercholesteremia    Afib    HTN (hypertension)    History of hernia repair      ALLERGIES  No Known Allergies    MEDICATIONS  STANDING MEDICATIONS  aspirin  chewable 81 milliGRAM(s) Oral daily  chlorhexidine 0.12% Liquid 15 milliLiter(s) Oral Mucosa every 12 hours  chlorhexidine 4% Liquid 1 Application(s) Topical <User Schedule>  dexAMETHasone  Injectable 6 milliGRAM(s) IV Push daily  dexMEDEtomidine Infusion 0.572 MICROgram(s)/kG/Hr IV Continuous <Continuous>  dextrose 40% Gel 15 Gram(s) Oral once  dextrose 5%. 1000 milliLiter(s) IV Continuous <Continuous>  dextrose 5%. 1000 milliLiter(s) IV Continuous <Continuous>  dextrose 50% Injectable 25 Gram(s) IV Push once  dextrose 50% Injectable 12.5 Gram(s) IV Push once  dextrose 50% Injectable 25 Gram(s) IV Push once  enoxaparin Injectable 80 milliGRAM(s) SubCutaneous two times a day  fentaNYL   Infusion. 0.476 MICROgram(s)/kG/Hr IV Continuous <Continuous>  glucagon  Injectable 1 milliGRAM(s) IntraMuscular once  insulin glargine Injectable (LANTUS) 15 Unit(s) SubCutaneous at bedtime  insulin lispro (ADMELOG) corrective regimen sliding scale   SubCutaneous three times a day before meals  insulin lispro Injectable (ADMELOG) 5 Unit(s) SubCutaneous three times a day before meals  levoFLOXacin IVPB      levoFLOXacin IVPB 750 milliGRAM(s) IV Intermittent every 24 hours  pantoprazole   Suspension 40 milliGRAM(s) Oral daily  piperacillin/tazobactam IVPB.. 4.5 Gram(s) IV Intermittent every 6 hours  polyethylene glycol 3350 17 Gram(s) Oral at bedtime  QUEtiapine 50 milliGRAM(s) Oral every 12 hours  senna 2 Tablet(s) Oral at bedtime  simvastatin 20 milliGRAM(s) Oral at bedtime    PRN MEDICATIONS  acetaminophen   Tablet .. 650 milliGRAM(s) Oral every 6 hours PRN    VITALS:  T(F): 101.2  HR: 84  BP: 107/58  RR: 8  SpO2: 97%    PHYSICAL EXAM  GEN: sedated/intubated  PULM: Clear to auscultation bilaterally, No wheezes/rales/rhonchi  CVS: Regular rate and rhythm, S1-S2, no murmurs/rubs/gallops, +2 pulses  ABD: Soft, distended/tense, no guarding  EXT: No edema    LABS                        10.9   7.23  )-----------( 285      ( 07 Dec 2020 04:53 )             37.4     12-07    139  |  106  |  28<H>  ----------------------------<  299<H>  4.9   |  23  |  0.6<L>    Ca    8.4<L>      07 Dec 2020 04:53  Phos  3.1     12-06  Mg     1.7     12-07    TPro  5.2<L>  /  Alb  3.0<L>  /  TBili  1.0  /  DBili  x   /  AST  52<H>  /  ALT  90<H>  /  AlkPhos  44  12-07        ABG - ( 07 Dec 2020 01:17 )  pH, Arterial: 7.44  pH, Blood: x     /  pCO2: 42    /  pO2: 75    / HCO3: 28    / Base Excess: 3.4   /  SaO2: 95          RADIOLOGY    < from: Xray Chest 1 View- PORTABLE-Routine (Xray Chest 1 View- PORTABLE-Routine in AM.) (12.07.20 @ 06:19) >  Increased bilateral lower lung predominant opacities. No pneumothorax.    < end of copied text >

## 2020-12-07 NOTE — PROGRESS NOTE ADULT - SUBJECTIVE AND OBJECTIVE BOX
HPI:  69 yo male with pmh of DM, Paroxysmal Afib on Xarelto , HTN, L carotid endarterectomy presents to the hospital with progressively worsening shortness of breath more on exertion than at rest after testing positive for covid19 outpatient 5 days ago. The patient notes that he has been having symptoms of weakness, decreased po intake, and feeling sleepy more than usual. Upon arrival to the ED the patient desaturated on 15 L non rebreather to 87% with tachypnea and was placed on high flow with improvement in saturation to 97% and subjective improvement in quality of breathing. The patient denies symptoms of dizziness/ chest pain/ short of breath/ abdominal pain/constipation/ diarrhea/loss of taste/ loss of smell. All other ROS negative except as noted above.     Vital Signs (24 Hrs): T(C): 39.2,  HR: 95 , BP: 130/80 (11-27-20 @ 22:19) (95/55 - 130/80), RR: 20 , SpO2: 92%   Labs: Lymphopenia , Cr 1.6 , Transaminitis (AST65, ALT 45) , D- DIMER 105  CXR: b/l patchy opacities      (28 Nov 2020 00:13)    Currently admitted to medicine with the primary diagnosis of COVID-19         Today is hospital day 10d.     INTERVAL HPI / OVERNIGHT EVENTS  Patient was examined and seen at bedside.   OVNE:  Eating:  BM  Urination:  Ambulating:  Sleep:  +ROS:  -ROS:      PAST MEDICAL & SURGICAL HISTORY  H/O sciatica  [facility] PAIN LT LE    H/O cystoscopy  resection of bladder tumor    Bladder cancer    DM (diabetes mellitus)    Hypercholesteremia    Afib    HTN (hypertension)    History of hernia repair      ALLERGIES  No Known Allergies    MEDICATIONS  STANDING MEDICATIONS  aspirin  chewable 81 milliGRAM(s) Oral daily  chlorhexidine 0.12% Liquid 15 milliLiter(s) Oral Mucosa every 12 hours  chlorhexidine 4% Liquid 1 Application(s) Topical <User Schedule>  dexAMETHasone  Injectable 6 milliGRAM(s) IV Push daily  dexMEDEtomidine Infusion 0.572 MICROgram(s)/kG/Hr IV Continuous <Continuous>  dextrose 40% Gel 15 Gram(s) Oral once  dextrose 5%. 1000 milliLiter(s) IV Continuous <Continuous>  dextrose 5%. 1000 milliLiter(s) IV Continuous <Continuous>  dextrose 50% Injectable 25 Gram(s) IV Push once  dextrose 50% Injectable 12.5 Gram(s) IV Push once  dextrose 50% Injectable 25 Gram(s) IV Push once  enoxaparin Injectable 80 milliGRAM(s) SubCutaneous two times a day  fentaNYL   Infusion. 0.476 MICROgram(s)/kG/Hr IV Continuous <Continuous>  glucagon  Injectable 1 milliGRAM(s) IntraMuscular once  insulin glargine Injectable (LANTUS) 15 Unit(s) SubCutaneous at bedtime  insulin lispro (ADMELOG) corrective regimen sliding scale   SubCutaneous three times a day before meals  insulin lispro Injectable (ADMELOG) 5 Unit(s) SubCutaneous three times a day before meals  levoFLOXacin IVPB      levoFLOXacin IVPB 750 milliGRAM(s) IV Intermittent every 24 hours  pantoprazole   Suspension 40 milliGRAM(s) Oral daily  piperacillin/tazobactam IVPB.. 4.5 Gram(s) IV Intermittent every 6 hours  polyethylene glycol 3350 17 Gram(s) Oral at bedtime  QUEtiapine 50 milliGRAM(s) Oral every 12 hours  senna 2 Tablet(s) Oral at bedtime  simvastatin 20 milliGRAM(s) Oral at bedtime    PRN MEDICATIONS  acetaminophen   Tablet .. 650 milliGRAM(s) Oral every 6 hours PRN    VITALS:  T(F): 101.2  HR: 84  BP: 107/58  RR: 8  SpO2: 97%    PHYSICAL EXAM  GEN: NAD, Resting comfortably in bed  PULM: Clear to auscultation bilaterally, No wheezes/rales/rhonchi  CVS: Regular rate and rhythm, S1-S2, no murmurs/rubs/gallops, +2 pulses  ABD: Soft, non-tender, non-distended, no guarding  EXT: No edema  NEURO: A&Ox3, no focal deficits    LABS                        10.9   7.23  )-----------( 285      ( 07 Dec 2020 04:53 )             37.4     12-07    139  |  106  |  28<H>  ----------------------------<  299<H>  4.9   |  23  |  0.6<L>    Ca    8.4<L>      07 Dec 2020 04:53  Phos  3.1     12-06  Mg     1.7     12-07    TPro  5.2<L>  /  Alb  3.0<L>  /  TBili  1.0  /  DBili  x   /  AST  52<H>  /  ALT  90<H>  /  AlkPhos  44  12-07        ABG - ( 07 Dec 2020 01:17 )  pH, Arterial: 7.44  pH, Blood: x     /  pCO2: 42    /  pO2: 75    / HCO3: 28    / Base Excess: 3.4   /  SaO2: 95                        RADIOLOGY

## 2020-12-08 VITALS
SYSTOLIC BLOOD PRESSURE: 146 MMHG | RESPIRATION RATE: 15 BRPM | DIASTOLIC BLOOD PRESSURE: 63 MMHG | HEART RATE: 86 BPM | OXYGEN SATURATION: 92 %

## 2020-12-08 LAB
ALBUMIN SERPL ELPH-MCNC: 2.8 G/DL — LOW (ref 3.5–5.2)
ALBUMIN SERPL ELPH-MCNC: 3 G/DL — LOW (ref 3.5–5.2)
ALBUMIN SERPL ELPH-MCNC: 3.5 G/DL — SIGNIFICANT CHANGE UP (ref 3.5–5.2)
ALP SERPL-CCNC: 40 U/L — SIGNIFICANT CHANGE UP (ref 30–115)
ALP SERPL-CCNC: 40 U/L — SIGNIFICANT CHANGE UP (ref 30–115)
ALP SERPL-CCNC: 47 U/L — SIGNIFICANT CHANGE UP (ref 30–115)
ALT FLD-CCNC: 71 U/L — HIGH (ref 0–41)
ALT FLD-CCNC: 72 U/L — HIGH (ref 0–41)
ALT FLD-CCNC: 78 U/L — HIGH (ref 0–41)
AMYLASE P1 CFR SERPL: 55 U/L — SIGNIFICANT CHANGE UP (ref 25–115)
AMYLASE P1 CFR SERPL: 70 U/L — SIGNIFICANT CHANGE UP (ref 25–115)
ANION GAP SERPL CALC-SCNC: 8 MMOL/L — SIGNIFICANT CHANGE UP (ref 7–14)
AST SERPL-CCNC: 27 U/L — SIGNIFICANT CHANGE UP (ref 0–41)
AST SERPL-CCNC: 29 U/L — SIGNIFICANT CHANGE UP (ref 0–41)
AST SERPL-CCNC: 33 U/L — SIGNIFICANT CHANGE UP (ref 0–41)
BASOPHILS # BLD AUTO: 0.01 K/UL — SIGNIFICANT CHANGE UP (ref 0–0.2)
BASOPHILS NFR BLD AUTO: 0.1 % — SIGNIFICANT CHANGE UP (ref 0–1)
BILIRUB DIRECT SERPL-MCNC: 0.3 MG/DL — HIGH (ref 0–0.2)
BILIRUB DIRECT SERPL-MCNC: 0.3 MG/DL — HIGH (ref 0–0.2)
BILIRUB INDIRECT FLD-MCNC: 0.7 MG/DL — SIGNIFICANT CHANGE UP (ref 0.2–1.2)
BILIRUB INDIRECT FLD-MCNC: 0.8 MG/DL — SIGNIFICANT CHANGE UP (ref 0.2–1.2)
BILIRUB SERPL-MCNC: 1 MG/DL — SIGNIFICANT CHANGE UP (ref 0.2–1.2)
BILIRUB SERPL-MCNC: 1.1 MG/DL — SIGNIFICANT CHANGE UP (ref 0.2–1.2)
BILIRUB SERPL-MCNC: 1.6 MG/DL — HIGH (ref 0.2–1.2)
BUN SERPL-MCNC: 27 MG/DL — HIGH (ref 10–20)
CALCIUM SERPL-MCNC: 8.7 MG/DL — SIGNIFICANT CHANGE UP (ref 8.5–10.1)
CHLORIDE SERPL-SCNC: 103 MMOL/L — SIGNIFICANT CHANGE UP (ref 98–110)
CO2 SERPL-SCNC: 26 MMOL/L — SIGNIFICANT CHANGE UP (ref 17–32)
CREAT SERPL-MCNC: 0.7 MG/DL — SIGNIFICANT CHANGE UP (ref 0.7–1.5)
CRP SERPL-MCNC: <0.1 MG/DL — SIGNIFICANT CHANGE UP (ref 0–0.4)
CULTURE RESULTS: SIGNIFICANT CHANGE UP
CULTURE RESULTS: SIGNIFICANT CHANGE UP
D DIMER BLD IA.RAPID-MCNC: 587 NG/ML DDU — HIGH (ref 0–230)
D DIMER BLD IA.RAPID-MCNC: 736 NG/ML DDU — HIGH (ref 0–230)
EOSINOPHIL # BLD AUTO: 0.1 K/UL — SIGNIFICANT CHANGE UP (ref 0–0.7)
EOSINOPHIL NFR BLD AUTO: 1.3 % — SIGNIFICANT CHANGE UP (ref 0–8)
ERYTHROCYTE [SEDIMENTATION RATE] IN BLOOD: 9 MM/HR — SIGNIFICANT CHANGE UP (ref 0–10)
GLUCOSE BLDC GLUCOMTR-MCNC: 250 MG/DL — HIGH (ref 70–99)
GLUCOSE BLDC GLUCOMTR-MCNC: 284 MG/DL — HIGH (ref 70–99)
GLUCOSE BLDC GLUCOMTR-MCNC: 301 MG/DL — HIGH (ref 70–99)
GLUCOSE BLDC GLUCOMTR-MCNC: 303 MG/DL — HIGH (ref 70–99)
GLUCOSE SERPL-MCNC: 234 MG/DL — HIGH (ref 70–99)
HCT VFR BLD CALC: 36.1 % — LOW (ref 42–52)
HGB BLD-MCNC: 10.8 G/DL — LOW (ref 14–18)
IMM GRANULOCYTES NFR BLD AUTO: 0.9 % — HIGH (ref 0.1–0.3)
LDH SERPL L TO P-CCNC: 440 U/L — HIGH (ref 50–242)
LIDOCAIN IGE QN: 30 U/L — SIGNIFICANT CHANGE UP (ref 7–60)
LYMPHOCYTES # BLD AUTO: 0.98 K/UL — LOW (ref 1.2–3.4)
LYMPHOCYTES # BLD AUTO: 13.1 % — LOW (ref 20.5–51.1)
MAGNESIUM SERPL-MCNC: 1.9 MG/DL — SIGNIFICANT CHANGE UP (ref 1.8–2.4)
MCHC RBC-ENTMCNC: 23.2 PG — LOW (ref 27–31)
MCHC RBC-ENTMCNC: 29.9 G/DL — LOW (ref 32–37)
MCV RBC AUTO: 77.5 FL — LOW (ref 80–94)
MONOCYTES # BLD AUTO: 0.44 K/UL — SIGNIFICANT CHANGE UP (ref 0.1–0.6)
MONOCYTES NFR BLD AUTO: 5.9 % — SIGNIFICANT CHANGE UP (ref 1.7–9.3)
MRSA PCR RESULT.: NEGATIVE — SIGNIFICANT CHANGE UP
NEUTROPHILS # BLD AUTO: 5.89 K/UL — SIGNIFICANT CHANGE UP (ref 1.4–6.5)
NEUTROPHILS NFR BLD AUTO: 78.7 % — HIGH (ref 42.2–75.2)
NRBC # BLD: 0 /100 WBCS — SIGNIFICANT CHANGE UP (ref 0–0)
PHOSPHATE SERPL-MCNC: 3.7 MG/DL — SIGNIFICANT CHANGE UP (ref 2.1–4.9)
PLATELET # BLD AUTO: 253 K/UL — SIGNIFICANT CHANGE UP (ref 130–400)
POTASSIUM SERPL-MCNC: 5.3 MMOL/L — HIGH (ref 3.5–5)
POTASSIUM SERPL-SCNC: 5.3 MMOL/L — HIGH (ref 3.5–5)
PROCALCITONIN SERPL-MCNC: 0.09 NG/ML — SIGNIFICANT CHANGE UP (ref 0.02–0.1)
PROT SERPL-MCNC: 5.1 G/DL — LOW (ref 6–8)
PROT SERPL-MCNC: 5.3 G/DL — LOW (ref 6–8)
PROT SERPL-MCNC: 5.7 G/DL — LOW (ref 6–8)
RBC # BLD: 4.66 M/UL — LOW (ref 4.7–6.1)
RBC # FLD: 20 % — HIGH (ref 11.5–14.5)
SODIUM SERPL-SCNC: 137 MMOL/L — SIGNIFICANT CHANGE UP (ref 135–146)
SPECIMEN SOURCE: SIGNIFICANT CHANGE UP
SPECIMEN SOURCE: SIGNIFICANT CHANGE UP
WBC # BLD: 7.49 K/UL — SIGNIFICANT CHANGE UP (ref 4.8–10.8)
WBC # FLD AUTO: 7.49 K/UL — SIGNIFICANT CHANGE UP (ref 4.8–10.8)

## 2020-12-08 PROCEDURE — 76705 ECHO EXAM OF ABDOMEN: CPT | Mod: 26

## 2020-12-08 PROCEDURE — 71045 X-RAY EXAM CHEST 1 VIEW: CPT | Mod: 26

## 2020-12-08 RX ORDER — CASPOFUNGIN ACETATE 7 MG/ML
70 INJECTION, POWDER, LYOPHILIZED, FOR SOLUTION INTRAVENOUS ONCE
Refills: 0 | Status: COMPLETED | OUTPATIENT
Start: 2020-12-08 | End: 2020-12-08

## 2020-12-08 RX ORDER — INSULIN LISPRO 100/ML
5 VIAL (ML) SUBCUTANEOUS ONCE
Refills: 0 | Status: COMPLETED | OUTPATIENT
Start: 2020-12-08 | End: 2020-12-08

## 2020-12-08 RX ORDER — LINEZOLID 600 MG/300ML
600 INJECTION, SOLUTION INTRAVENOUS EVERY 12 HOURS
Refills: 0 | Status: DISCONTINUED | OUTPATIENT
Start: 2020-12-08 | End: 2020-12-08

## 2020-12-08 RX ORDER — LINEZOLID 600 MG/300ML
600 INJECTION, SOLUTION INTRAVENOUS EVERY 12 HOURS
Refills: 0 | Status: DISCONTINUED | OUTPATIENT
Start: 2020-12-08 | End: 2020-12-09

## 2020-12-08 RX ORDER — CASPOFUNGIN ACETATE 7 MG/ML
50 INJECTION, POWDER, LYOPHILIZED, FOR SOLUTION INTRAVENOUS EVERY 24 HOURS
Refills: 0 | Status: DISCONTINUED | OUTPATIENT
Start: 2020-12-09 | End: 2020-12-09

## 2020-12-08 RX ORDER — SODIUM CHLORIDE 9 MG/ML
500 INJECTION INTRAMUSCULAR; INTRAVENOUS; SUBCUTANEOUS ONCE
Refills: 0 | Status: COMPLETED | OUTPATIENT
Start: 2020-12-08 | End: 2020-12-08

## 2020-12-08 RX ORDER — ACETAMINOPHEN 500 MG
650 TABLET ORAL EVERY 4 HOURS
Refills: 0 | Status: DISCONTINUED | OUTPATIENT
Start: 2020-12-08 | End: 2020-12-09

## 2020-12-08 RX ORDER — CASPOFUNGIN ACETATE 7 MG/ML
INJECTION, POWDER, LYOPHILIZED, FOR SOLUTION INTRAVENOUS
Refills: 0 | Status: DISCONTINUED | OUTPATIENT
Start: 2020-12-08 | End: 2020-12-09

## 2020-12-08 RX ADMIN — POLYETHYLENE GLYCOL 3350 17 GRAM(S): 17 POWDER, FOR SOLUTION ORAL at 21:57

## 2020-12-08 RX ADMIN — Medication 6 MILLIGRAM(S): at 06:57

## 2020-12-08 RX ADMIN — LINEZOLID 300 MILLIGRAM(S): 600 INJECTION, SOLUTION INTRAVENOUS at 12:51

## 2020-12-08 RX ADMIN — CASPOFUNGIN ACETATE 260 MILLIGRAM(S): 7 INJECTION, POWDER, LYOPHILIZED, FOR SOLUTION INTRAVENOUS at 09:49

## 2020-12-08 RX ADMIN — ENOXAPARIN SODIUM 80 MILLIGRAM(S): 100 INJECTION SUBCUTANEOUS at 06:57

## 2020-12-08 RX ADMIN — CHLORHEXIDINE GLUCONATE 15 MILLILITER(S): 213 SOLUTION TOPICAL at 05:38

## 2020-12-08 RX ADMIN — Medication 5 UNIT(S): at 10:34

## 2020-12-08 RX ADMIN — CHLORHEXIDINE GLUCONATE 15 MILLILITER(S): 213 SOLUTION TOPICAL at 18:16

## 2020-12-08 RX ADMIN — CHLORHEXIDINE GLUCONATE 1 APPLICATION(S): 213 SOLUTION TOPICAL at 05:38

## 2020-12-08 RX ADMIN — LINEZOLID 300 MILLIGRAM(S): 600 INJECTION, SOLUTION INTRAVENOUS at 19:00

## 2020-12-08 RX ADMIN — SIMVASTATIN 20 MILLIGRAM(S): 20 TABLET, FILM COATED ORAL at 21:58

## 2020-12-08 RX ADMIN — SENNA PLUS 2 TABLET(S): 8.6 TABLET ORAL at 21:57

## 2020-12-08 RX ADMIN — Medication 650 MILLIGRAM(S): at 19:51

## 2020-12-08 RX ADMIN — Medication 6: at 10:33

## 2020-12-08 RX ADMIN — QUETIAPINE FUMARATE 50 MILLIGRAM(S): 200 TABLET, FILM COATED ORAL at 18:16

## 2020-12-08 RX ADMIN — ENOXAPARIN SODIUM 80 MILLIGRAM(S): 100 INJECTION SUBCUTANEOUS at 18:17

## 2020-12-08 RX ADMIN — Medication 5 UNIT(S): at 23:03

## 2020-12-08 RX ADMIN — Medication 650 MILLIGRAM(S): at 08:24

## 2020-12-08 RX ADMIN — PANTOPRAZOLE SODIUM 40 MILLIGRAM(S): 20 TABLET, DELAYED RELEASE ORAL at 12:51

## 2020-12-08 RX ADMIN — Medication 650 MILLIGRAM(S): at 18:19

## 2020-12-08 RX ADMIN — PIPERACILLIN AND TAZOBACTAM 25 GRAM(S): 4; .5 INJECTION, POWDER, LYOPHILIZED, FOR SOLUTION INTRAVENOUS at 18:16

## 2020-12-08 RX ADMIN — QUETIAPINE FUMARATE 50 MILLIGRAM(S): 200 TABLET, FILM COATED ORAL at 06:58

## 2020-12-08 RX ADMIN — PIPERACILLIN AND TAZOBACTAM 25 GRAM(S): 4; .5 INJECTION, POWDER, LYOPHILIZED, FOR SOLUTION INTRAVENOUS at 12:51

## 2020-12-08 RX ADMIN — PIPERACILLIN AND TAZOBACTAM 25 GRAM(S): 4; .5 INJECTION, POWDER, LYOPHILIZED, FOR SOLUTION INTRAVENOUS at 06:58

## 2020-12-08 RX ADMIN — PIPERACILLIN AND TAZOBACTAM 25 GRAM(S): 4; .5 INJECTION, POWDER, LYOPHILIZED, FOR SOLUTION INTRAVENOUS at 00:39

## 2020-12-08 RX ADMIN — Medication 650 MILLIGRAM(S): at 22:00

## 2020-12-08 RX ADMIN — Medication 81 MILLIGRAM(S): at 12:51

## 2020-12-08 RX ADMIN — Medication 4: at 06:57

## 2020-12-08 RX ADMIN — Medication 5 UNIT(S): at 06:58

## 2020-12-08 RX ADMIN — Medication 650 MILLIGRAM(S): at 15:11

## 2020-12-08 RX ADMIN — Medication 8: at 17:10

## 2020-12-08 RX ADMIN — INSULIN GLARGINE 15 UNIT(S): 100 INJECTION, SOLUTION SUBCUTANEOUS at 22:25

## 2020-12-08 RX ADMIN — Medication 650 MILLIGRAM(S): at 14:45

## 2020-12-08 RX ADMIN — Medication 5 UNIT(S): at 17:10

## 2020-12-08 RX ADMIN — SODIUM CHLORIDE 500 MILLILITER(S): 9 INJECTION INTRAMUSCULAR; INTRAVENOUS; SUBCUTANEOUS at 23:03

## 2020-12-08 NOTE — PROGRESS NOTE ADULT - ASSESSMENT
69 yo male with pmh of DM, Paroxysmal Afib on Xarelto , HTN, L carotid endarterectomy presents to the hospital with progressively worsening shortness of breath more on exertion than at rest after testing positive for covid19 outpatient 5 days ago.     #Acute hypoxemic respiratory failure secondary to COVID-19 pneumonia  #Cytokine release syndrome  - Chest X-ray: Increased bilateral lower lung predominant opacities. No pneumothorax.  - D-Dimer increasing 2126...839 >736>587  -  -> 455 > 440  - CRP 3.57 -> 0.46 >0.13  - Ferritin 719 -> 325  - s/p Convalescent plasma 2 U; off Remdesivir; S/p 1 dose of Toci 400 mg IV x 1 (12/5)  - Dexa D#11  - On precedex and fentanyl   - Vent settings: RR 16, , Fio2 50, PEEP 10  - CTH: Opacification of mastoid air cells and middle ear canals consistent with inflammation or infection.  > Vent changes: No changes  > SAT, c/w fentanyl and precedex  > Wean O2, monitor PPL and driving pressure  > c/w Dexamethasone 6mg IV QD  > Trend inflammatory markers  > ID Following    # Possible bacterial superinfection  # Febrile (infectious vs non-infectious)  - Procal .59 -> .07  - BCx NGTD (12/2), Ucx neg, MRSA Nares (-)  - Febrile (12/8)  - LFTs, amylase, lipase wnl   > C/w Zosyn  > Start Zyvox and Caspofungin  > D/C levaquin  > Send Fungitell, galactomanan    # Transaminitis - stable  - likely related to COVID-19 r/o liver disease  - RUQ U/S; Hepatomegaly with diffuse hepatic steatosis. Nodular liver contour suggesting early cirrhotic changes. Gallbladder wall thickening, nonspecific.  - LFTs, amylase, lipase wnl -> no need for repeat RUQ U/S    # PARAM , likely secondary to decreased po intake - Resolved  - Cr 1.6 on admission   - Currently 0.7  > I/Os  > F/u Lytes and correct    # Hypernatremia (resolved)  - Na 137    # h/o A-fib on xarelto , rate controlled now   - rate control w/ Cardizem  - EKG on admission shows A flutter, AM EKG: AFib w/ variable AV block  > c/w Xarelto 20 mg qd    # h/o HTN (controlled)  > c/w carvedilol   > BP monitoring    # h/o DM   - A1c 8.1%  > hold po home medications  > Insulin B/B: 15/5/5/5  > OG tube feeds    # DVT ppx: Lovenox  # GI ppx: Pantoprazole  # Activity: Bedrest  # Diet: OGT feeds     71 yo male with pmh of DM, Paroxysmal Afib on Xarelto , HTN, L carotid endarterectomy presents to the hospital with progressively worsening shortness of breath more on exertion than at rest after testing positive for covid19 outpatient 5 days ago.     #Acute hypoxemic respiratory failure secondary to COVID-19 pneumonia  #Cytokine release syndrome  - Chest X-ray: Increased bilateral lower lung predominant opacities. No pneumothorax.  - D-Dimer increasing 2126...839 >736>587  -  -> 455 > 440  - CRP 3.57 -> 0.46 >0.13  - Ferritin 719 -> 325  - s/p Convalescent plasma 2 U; off Remdesivir; S/p 1 dose of Toci 400 mg IV x 1 (12/5)  - Dexa D#11  - On precedex and fentanyl   - Vent settings: RR 16, , Fio2 50, PEEP 10  - CTH: Opacification of mastoid air cells and middle ear canals consistent with inflammation or infection.  > Vent changes: No changes  > SAT, c/w fentanyl and precedex  > Wean O2, monitor PPL and driving pressure  > c/w Dexamethasone 6mg IV QD  > Trend inflammatory markers  > ID Following    # Possible bacterial superinfection  # Febrile (infectious vs non-infectious)  - Procal .59 -> .07  - BCx NGTD (12/2), Ucx neg, MRSA Nares (-)  - Febrile (12/8)  - LFTs, amylase, lipase wnl   > RUQ U/S  > C/w Zosyn  > Start Zyvox and Caspofungin  > D/C levaquin  > Send Fungitell, galactomanan    # Transaminitis - stable  - likely related to COVID-19 r/o liver disease  - RUQ U/S; Hepatomegaly with diffuse hepatic steatosis. Nodular liver contour suggesting early cirrhotic changes. Gallbladder wall thickening, nonspecific.  - LFTs, amylase, lipase wnl     # PARAM , likely secondary to decreased po intake - Resolved  - Cr 1.6 on admission   - Currently 0.7  > I/Os  > F/u Lytes and correct    # Hypernatremia (resolved)  - Na 137    # h/o A-fib on xarelto , rate controlled now   - rate control w/ Cardizem  - EKG on admission shows A flutter, AM EKG: AFib w/ variable AV block  > c/w Xarelto 20 mg qd    # h/o HTN (controlled)  > c/w carvedilol   > BP monitoring    # h/o DM   - A1c 8.1%  > hold po home medications  > Insulin B/B: 15/5/5/5  > OG tube feeds    # DVT ppx: Lovenox  # GI ppx: Pantoprazole  # Activity: Bedrest  # Diet: OGT feeds

## 2020-12-08 NOTE — PHARMACOTHERAPY INTERVENTION NOTE - COMMENTS
s/w dr ro [id number] rate of insulin drip.. as per md she agrees it is very high but they are titrating it. pt's glucose is still currently very high but this rate will only be temporary- she said to confirm order for now but rate will be lowered shortly
-K 5.5, recommended Lokelma 10g q8h x3 doses
-change pantoprazole IV to 40mg suspension daily (12/6/20)
critical care team plan:   -start caspofungin, recommended 70mg IV x1 then 50mg IV q24h  -d/c levofloxacin, order still active, d/w team, d/c

## 2020-12-08 NOTE — CHART NOTE - NSCHARTNOTEFT_GEN_A_CORE
69 yo M; CC: Worsening SOB (more on exertion than at rest), COVID+ (5 days prior to admission)   PMH: DM, Paroxysmal Afib on Xarelto , HTN, L carotid endarterectomy   HPI: weakness, decreased po intake, and feeling sleepy more than usual. Upon arrival to the ED the patient desaturated on 15 L non rebreather to 87% with tachypnea and was placed on high flow with improvement in saturation to 97% and subjective improvement in quality of breathing. The patient denies symptoms of dizziness/ chest pain/ short of breath/ abdominal pain/constipation/ diarrhea/loss of taste/ loss of smell. All other ROS negative except as noted above.   Vital Signs (24 Hrs): T(C): 39.2,  HR: 95 , BP: 130/80 (11-27-20 @ 22:19) (95/55 - 130/80), RR: 20 , SpO2: 92%   Labs: Lymphopenia , Cr 1.6 , Transaminitis (AST65, ALT 45) , D- DIMER 105  CXR: b/l patchy opacities 71 yo M; CC: Worsening SOB (more on exertion than at rest), COVID+ (5 days prior to admission)   PMH: DM, Paroxysmal Afib on Xarelto , HTN, L carotid endarterectomy   HPI: weakness, decreased po intake, and feeling sleepy more than usual. Upon arrival to the ED the patient desaturated on 15 L non rebreather to 87% with tachypnea and was placed on high flow with improvement in saturation to 97% and subjective improvement in quality of breathing. The patient denies symptoms of dizziness/ chest pain/ short of breath/ abdominal pain/constipation/ diarrhea/loss of taste/ loss of smell. All other ROS negative except as noted above. Patient was put on BIPAP/HFNC (alternating).  12/1/20: Pt desaturated to 75% on HFNC, anxious on BIPAP.   12/2/20: Worsening respiratory status, intubated.     Assessment and Plan (12/8):    71 yo male with pmh of DM, Paroxysmal Afib on Xarelto , HTN, L carotid endarterectomy presents to the hospital with progressively worsening shortness of breath more on exertion than at rest after testing positive for covid19 outpatient 5 days ago.     #Acute hypoxemic respiratory failure secondary to COVID-19 pneumonia  #Cytokine release syndrome  - Chest X-ray: Increased bilateral lower lung predominant opacities. No pneumothorax.  - D-Dimer increasing 2126...839 >736>587  -  -> 455 > 440  - CRP 3.57 -> 0.46 >0.13  - Ferritin 719 -> 325  - s/p Convalescent plasma 2 U; off Remdesivir; S/p 1 dose of Toci 400 mg IV x 1 (12/5)  - Dexa D#11  - On precedex and fentanyl   - Vent settings: RR 16, , Fio2 50, PEEP 10  - CTH: Opacification of mastoid air cells and middle ear canals consistent with inflammation or infection.  > Vent changes: No changes  > SAT, c/w fentanyl and precedex  > Wean O2, monitor PPL and driving pressure  > c/w Dexamethasone 6mg IV QD  > Trend inflammatory markers  > ID Following    # Possible bacterial superinfection  # Febrile (infectious vs non-infectious)  - Procal .59 -> .07  - BCx NGTD (12/2), Ucx neg, MRSA Nares (-)  - Febrile (12/8)  - LFTs, amylase, lipase wnl   > RUQ U/S  > C/w Zosyn  > Start Zyvox and Caspofungin  > D/C levaquin  > Send Fungitell, galactomanan    # Transaminitis - stable  - likely related to COVID-19 r/o liver disease  - RUQ U/S; Hepatomegaly with diffuse hepatic steatosis. Nodular liver contour suggesting early cirrhotic changes. Gallbladder wall thickening, nonspecific.  - LFTs, amylase, lipase wnl     # PARAM , likely secondary to decreased po intake - Resolved  - Cr 1.6 on admission   - Currently 0.7  > I/Os  > F/u Lytes and correct    # Hypernatremia (resolved)  - Na 137    # h/o A-fib on xarelto , rate controlled now   - rate control w/ Cardizem  - EKG on admission shows A flutter, AM EKG: AFib w/ variable AV block  > c/w Xarelto 20 mg qd    # h/o HTN (controlled)  > c/w carvedilol   > BP monitoring    # h/o DM   - A1c 8.1%  > hold po home medications  > Insulin B/B: 15/5/5/5  > OG tube feeds    # DVT ppx: Lovenox  # GI ppx: Pantoprazole  # Activity: Bedrest  # Diet: OGT feeds

## 2020-12-08 NOTE — PROGRESS NOTE ADULT - ASSESSMENT
· Assessment	   70y old  Male with pmh of DM, Paroxysmal Afib on Xarelto , HTN, L carotid endarterectomy, presents to the ER for evaluation of  progressively worsening shortness of breath more on exertion than at rest after testing positive for covid19 outpatient 5 days ago. The patient notes that he has been having symptoms of weakness, decreased po intake, and feeling sleepy more than usual. Upon arrival to the ER.  he found to have fever, tachycardia. tachypnea and  desaturated on 15 L non rebreather to 87%  and was placed on high flow oxygen with improvement in saturation to 97%    IMPRESSION;  COVID 19 with severe illness requiring MV 12/2 with Fio2 40%  ARDS secondary to cytokine storm although markers not that significantly elevated 9 besides Ddimers )  Inflammatory markers decreasing  CXR with reduced opacities  Fevers with no obvius focus. Drug fevers in DDX  procalcitonin 0.59  >0.07  Ferritiin 719>325  CRP 3.57>0.46  Ddimers 105>2126  Nares ORSA NG  BCx 12/1,2 NGTD    RECOMMENDATIONS;  serum fungitel/ galactomannan assay  repeat BCx  d/c levoquin  Zosyn 4.5 gm iv q6h  Tocilizumab 400 mg iv x once.12/2,12/4  S/p Convalescent plasma 2 units. 11/30  Day 1 11/29-12/10 : Single  mg IV loading dose  Day 2-10 : single  mg IV once daily maintenance dose  Daily CBC,CMP.  Dexamethasone 6 mg iv q24h for 10 days 11/29- ( or until discharge ) or equivalent glucocorticoid dose may be substituted. Equivalent total dosis of alternative steroids are Methylprednisolone 32 mg and prednisone 40 mg q24h.  Monitor for side effects: hyperglycemia, neurological ( agitation/confusion), adrenal suppression, bacterial and fungal infections  Anticoagulation as per team  Proning for 16h/day if pt can tolerate it.

## 2020-12-08 NOTE — PROGRESS NOTE ADULT - SUBJECTIVE AND OBJECTIVE BOX
Today is hospital day 11d.     INTERVAL HPI / OVERNIGHT EVENTS  Patient was examined and seen at bedside.   No OVNE.      PAST MEDICAL & SURGICAL HISTORY  H/O sciatica  CHR PAIN LT LE  H/O cystoscopy  resection of bladder tumor  Bladder cancer  DM (diabetes mellitus)  Hypercholesteremia  Afib  HTN (hypertension)  History of hernia repair      ALLERGIES  No Known Allergies    MEDICATIONS  STANDING MEDICATIONS  aspirin  chewable 81 milliGRAM(s) Oral daily  caspofungin IVPB      chlorhexidine 0.12% Liquid 15 milliLiter(s) Oral Mucosa every 12 hours  chlorhexidine 4% Liquid 1 Application(s) Topical <User Schedule>  dexAMETHasone  Injectable 6 milliGRAM(s) IV Push daily  dexMEDEtomidine Infusion 0.572 MICROgram(s)/kG/Hr IV Continuous <Continuous>  dextrose 40% Gel 15 Gram(s) Oral once  dextrose 5%. 1000 milliLiter(s) IV Continuous <Continuous>  dextrose 5%. 1000 milliLiter(s) IV Continuous <Continuous>  dextrose 50% Injectable 25 Gram(s) IV Push once  dextrose 50% Injectable 12.5 Gram(s) IV Push once  dextrose 50% Injectable 25 Gram(s) IV Push once  enoxaparin Injectable 80 milliGRAM(s) SubCutaneous two times a day  fentaNYL   Infusion. 0.476 MICROgram(s)/kG/Hr IV Continuous <Continuous>  glucagon  Injectable 1 milliGRAM(s) IntraMuscular once  insulin glargine Injectable (LANTUS) 15 Unit(s) SubCutaneous at bedtime  insulin lispro (ADMELOG) corrective regimen sliding scale   SubCutaneous three times a day before meals  insulin lispro Injectable (ADMELOG) 5 Unit(s) SubCutaneous three times a day before meals  linezolid  IVPB 600 milliGRAM(s) IV Intermittent every 12 hours  pantoprazole   Suspension 40 milliGRAM(s) Oral daily  piperacillin/tazobactam IVPB.. 4.5 Gram(s) IV Intermittent every 6 hours  polyethylene glycol 3350 17 Gram(s) Oral at bedtime  QUEtiapine 50 milliGRAM(s) Oral every 12 hours  senna 2 Tablet(s) Oral at bedtime  simvastatin 20 milliGRAM(s) Oral at bedtime    PRN MEDICATIONS  acetaminophen   Tablet .. 650 milliGRAM(s) Oral every 6 hours PRN    VITALS:  T(F): 100.1  HR: 98  BP: 127/64  RR: 13  SpO2: 93%    PHYSICAL EXAM  GEN: sedated/intubated  PULM: Clear to auscultation bilaterally, No wheezes/rales/rhonchi  CVS: Regular rate and rhythm, S1-S2, no murmurs/rubs/gallops, +2 pulses  ABD: Soft, distended/tense, no guarding  EXT: No edema    LABS                        10.8   7.49  )-----------( 253      ( 08 Dec 2020 04:41 )             36.1     12-08    137  |  103  |  27<H>  ----------------------------<  234<H>  5.3<H>   |  26  |  0.7    Ca    8.7      08 Dec 2020 04:41  Phos  3.7     12-08  Mg     1.9     12-08    TPro  5.7<L>  /  Alb  3.5  /  TBili  1.1  /  DBili  0.3<H>  /  AST  33  /  ALT  78<H>  /  AlkPhos  47  12-08        ABG - ( 08 Dec 2020 02:44 )  pH, Arterial: 7.46  pH, Blood: x     /  pCO2: 43    /  pO2: 63    / HCO3: 30    / Base Excess: 5.6   /  SaO2: 92          Sedimentation Rate, Erythrocyte: 9 mm/Hr (12-08-20 @ 12:18)    RADIOLOGY  < from: Xray Chest 1 View- PORTABLE-Routine (Xray Chest 1 View- PORTABLE-Routine in AM.) (12.08.20 @ 05:38) >  Bilateral pulmonary opacities similar to prior.  < end of copied text >    CTH:   1.  Mild atrophic changes.    2.  Opacification of mastoid air cells and middle ear canals consistent with inflammation or infection.

## 2020-12-08 NOTE — PROGRESS NOTE ADULT - ASSESSMENT
IMPRESSION:    Acute hypoxemic resp failure  Covid pneumonia  CRS   P A FIB Was on xarelto, Now on LMWH   Possible bacterial superinfection on ABX       PLAN:    CNS: SAT.  Continue adequate sedation.  Precedex and Fentanyl.  Seroquel 100 mg daily.      HEENT:  Oral care    PULMONARY:  HOB @ 45 degrees, ARDS network MV.  Monitor PPL and Driving pressures.  Wean O2 as tolerated      CARDIOVASCULAR:  Continue Rate control.  Avoid volume overload .      GI: GI prophylaxis                                          OG feeding.  Bowel regimen.   LFT amylase, Lipase.  RUQ sono     RENAL:  F/u  lytes.  Correct as needed. accurate I/O    INFECTIOUS DISEASE: per ID, trend infl markers.  Dexa D# 12.   Continue ABX.  Add Zyvox, and Caspo.  DC Levaquin.  Repeat Procal, Fungitel, Galctomanan     HEMATOLOGICAL:  DVT prophylaxis.  LMWH therapeutic     ENDOCRINE:  Follow up FS.  Insulin protocol if needed.    MUSCULOSKELETAL:  bed rest     CODE STATUS: FULL CODE  CEU / ICU   poor prognosis

## 2020-12-08 NOTE — PHARMACOTHERAPY INTERVENTION NOTE - OUTCOME
Notified Dr. Sneed that pt's IV infiltrated during her Flagyl infusion. Pt only received half of dose. If pt is to be discharged today, can Flagyl IV be changed to PO? MD states he will enter in orders.   
accepted

## 2020-12-08 NOTE — CHART NOTE - NSCHARTNOTEFT_GEN_A_CORE
Kraig Fermin updated about patient's condition.  All questions answered.  OK to transfer to [facility].

## 2020-12-08 NOTE — CHART NOTE - NSCHARTNOTEFT_GEN_A_CORE
Spoke with Nephew, Dr. Kolby White at [phone] and updated him with patient's current condition.

## 2020-12-08 NOTE — PROGRESS NOTE ADULT - SUBJECTIVE AND OBJECTIVE BOX
Patient is a 70y old  Male who presents with a chief complaint of acute hypoxic respiratory failure- COVID-19 pneumonia (04 Dec 2020 12:02)        Over Night Events:  Remains on MV.  Sedated.  SP CTH         ROS:     All ROS are negative except HPI         PHYSICAL EXAM    ICU Vital Signs Last 24 Hrs  T(C): 39 (08 Dec 2020 08:00), Max: 39.7 (07 Dec 2020 20:00)  T(F): 102.2 (08 Dec 2020 08:00), Max: 103.5 (07 Dec 2020 20:00)  HR: 88 (08 Dec 2020 09:00) (76 - 108)  BP: 91/52 (08 Dec 2020 09:00) (91/52 - 204/100)  BP(mean): 68 (08 Dec 2020 09:00) (68 - 149)  ABP: --  ABP(mean): --  RR: 13 (08 Dec 2020 09:00) (7 - 16)  SpO2: 96% (08 Dec 2020 09:00) (92% - 100%)      CONSTITUTIONAL:  Well nourished.  Ill appearing.  NAD    ENT:   Airway patent,   Mouth with normal mucosa.   No thrush    EYES:   Pupils equal,   Round and reactive to light.    CARDIAC:   Normal rate,   Regular rhythm.    No edema      Vascular:  Normal systolic impulse  No Carotid bruits    RESPIRATORY:   No wheezing  Bilateral BS  Normal chest expansion  Not tachypneic,  No use of accessory muscles    GASTROINTESTINAL:  Abdomen soft,   Non-tender,   No guarding,   + BS    MUSCULOSKELETAL:   Range of motion is not limited,  No clubbing, cyanosis    NEUROLOGICAL:   Sedated     SKIN:   Skin normal color for race,   Warm and dry  No evidence of rash.    PSYCHIATRIC:   Sedated   No apparent risk to self or others.    HEMATOLOGICAL:  No cervical  lymphadenopathy.  no inguinal lymphadenopathy      12-07-20 @ 07:01  -  12-08-20 @ 07:00  --------------------------------------------------------  IN:    Dexmedetomidine: 590.4 mL    FentaNYL: 391.2 mL    IV PiggyBack: 450 mL    Peptamen A.F.: 1200 mL  Total IN: 2631.6 mL    OUT:    Voided (mL): 3100 mL  Total OUT: 3100 mL    Total NET: -468.4 mL      12-08-20 @ 07:01  -  12-08-20 @ 09:43  --------------------------------------------------------  IN:    Dexmedetomidine: 49.2 mL    FentaNYL: 32.6 mL    Peptamen A.F.: 100 mL  Total IN: 181.8 mL    OUT:    Voided (mL): 300 mL  Total OUT: 300 mL    Total NET: -118.2 mL          LABS:                            10.8   7.49  )-----------( 253      ( 08 Dec 2020 04:41 )             36.1                                               12-08    137  |  103  |  27<H>  ----------------------------<  234<H>  5.3<H>   |  26  |  0.7    Ca    8.7      08 Dec 2020 04:41  Phos  3.7     12-08  Mg     1.9     12-08    TPro  5.3<L>  /  Alb  2.8<L>  /  TBili  1.6<H>  /  DBili  x   /  AST  27  /  ALT  72<H>  /  AlkPhos  40  12-08                                                                                           LIVER FUNCTIONS - ( 08 Dec 2020 04:41 )  Alb: 2.8 g/dL / Pro: 5.3 g/dL / ALK PHOS: 40 U/L / ALT: 72 U/L / AST: 27 U/L / GGT: x                                                                                               Mode: AC/ CMV (Assist Control/ Continuous Mandatory Ventilation)  RR (machine): 16  TV (machine): 400  FiO2: 50  PEEP: 10  ITime: 1  MAP: 13  PIP: 29                                      ABG - ( 08 Dec 2020 02:44 )  pH, Arterial: 7.46  pH, Blood: x     /  pCO2: 43    /  pO2: 63    / HCO3: 30    / Base Excess: 5.6   /  SaO2: 92    PPL 19               MEDICATIONS  (STANDING):  aspirin  chewable 81 milliGRAM(s) Oral daily  chlorhexidine 0.12% Liquid 15 milliLiter(s) Oral Mucosa every 12 hours  chlorhexidine 4% Liquid 1 Application(s) Topical <User Schedule>  dexAMETHasone  Injectable 6 milliGRAM(s) IV Push daily  dexMEDEtomidine Infusion 0.572 MICROgram(s)/kG/Hr (11.7 mL/Hr) IV Continuous <Continuous>  dextrose 40% Gel 15 Gram(s) Oral once  dextrose 5%. 1000 milliLiter(s) (50 mL/Hr) IV Continuous <Continuous>  dextrose 5%. 1000 milliLiter(s) (100 mL/Hr) IV Continuous <Continuous>  dextrose 50% Injectable 25 Gram(s) IV Push once  dextrose 50% Injectable 12.5 Gram(s) IV Push once  dextrose 50% Injectable 25 Gram(s) IV Push once  enoxaparin Injectable 80 milliGRAM(s) SubCutaneous two times a day  fentaNYL   Infusion. 0.476 MICROgram(s)/kG/Hr (3.89 mL/Hr) IV Continuous <Continuous>  glucagon  Injectable 1 milliGRAM(s) IntraMuscular once  insulin glargine Injectable (LANTUS) 15 Unit(s) SubCutaneous at bedtime  insulin lispro (ADMELOG) corrective regimen sliding scale   SubCutaneous three times a day before meals  insulin lispro Injectable (ADMELOG) 5 Unit(s) SubCutaneous three times a day before meals  levoFLOXacin IVPB      levoFLOXacin IVPB 750 milliGRAM(s) IV Intermittent every 24 hours  pantoprazole   Suspension 40 milliGRAM(s) Oral daily  piperacillin/tazobactam IVPB.. 4.5 Gram(s) IV Intermittent every 6 hours  polyethylene glycol 3350 17 Gram(s) Oral at bedtime  QUEtiapine 50 milliGRAM(s) Oral every 12 hours  senna 2 Tablet(s) Oral at bedtime  simvastatin 20 milliGRAM(s) Oral at bedtime    MEDICATIONS  (PRN):  acetaminophen   Tablet .. 650 milliGRAM(s) Oral every 6 hours PRN Temp greater or equal to 38C (100.4F)      New X-rays reviewed:                                                                                  ECHO    CXR interpreted by me:  ET OG OK>  Improved infiltrates

## 2020-12-08 NOTE — PROGRESS NOTE ADULT - SUBJECTIVE AND OBJECTIVE BOX
MAGGIE SAHNI  70y, Male    All available historical data reviewed    OVERNIGHT EVENTS:  fevers  fio2 40%  no pressors  does not follow commands  no diarrhea    ROS:  unable to obtain history secondary to patient's mental status and/or sedation    VITALS:  T(F): 100, Max: 103.5 (12-07-20 @ 20:00)  HR: 80  BP: 91/54  RR: 15Vital Signs Last 24 Hrs  T(C): 37.8 (08 Dec 2020 04:00), Max: 39.7 (07 Dec 2020 20:00)  T(F): 100 (08 Dec 2020 04:00), Max: 103.5 (07 Dec 2020 20:00)  HR: 80 (08 Dec 2020 06:00) (76 - 108)  BP: 91/54 (08 Dec 2020 06:00) (91/54 - 204/100)  BP(mean): 72 (08 Dec 2020 06:00) (72 - 149)  RR: 15 (08 Dec 2020 06:00) (7 - 16)  SpO2: 92% (08 Dec 2020 06:00) (92% - 100%)    TESTS & MEASUREMENTS:                        10.8   7.49  )-----------( 253      ( 08 Dec 2020 04:41 )             36.1     12-08    137  |  103  |  27<H>  ----------------------------<  234<H>  5.3<H>   |  26  |  0.7    Ca    8.7      08 Dec 2020 04:41  Phos  3.7     12-08  Mg     1.9     12-08    TPro  5.3<L>  /  Alb  2.8<L>  /  TBili  1.6<H>  /  DBili  x   /  AST  27  /  ALT  72<H>  /  AlkPhos  40  12-08    LIVER FUNCTIONS - ( 08 Dec 2020 04:41 )  Alb: 2.8 g/dL / Pro: 5.3 g/dL / ALK PHOS: 40 U/L / ALT: 72 U/L / AST: 27 U/L / GGT: x             Culture - Blood (collected 12-02-20 @ 17:35)  Source: .Blood None  Final Report (12-08-20 @ 02:55):    No Growth Final    Culture - Blood (collected 12-02-20 @ 17:35)  Source: .Blood None  Final Report (12-08-20 @ 02:55):    No Growth Final    Culture - Blood (collected 12-01-20 @ 23:15)  Source: .Blood Blood-Peripheral  Final Report (12-07-20 @ 07:00):    No Growth Final            RADIOLOGY & ADDITIONAL TESTS:  Personal review of radiological diagnostics performed  Echo and EKG results noted when applicable.     MEDICATIONS:  acetaminophen   Tablet .. 650 milliGRAM(s) Oral every 6 hours PRN  aspirin  chewable 81 milliGRAM(s) Oral daily  chlorhexidine 0.12% Liquid 15 milliLiter(s) Oral Mucosa every 12 hours  chlorhexidine 4% Liquid 1 Application(s) Topical <User Schedule>  dexAMETHasone  Injectable 6 milliGRAM(s) IV Push daily  dexMEDEtomidine Infusion 0.572 MICROgram(s)/kG/Hr IV Continuous <Continuous>  dextrose 40% Gel 15 Gram(s) Oral once  dextrose 5%. 1000 milliLiter(s) IV Continuous <Continuous>  dextrose 5%. 1000 milliLiter(s) IV Continuous <Continuous>  dextrose 50% Injectable 25 Gram(s) IV Push once  dextrose 50% Injectable 12.5 Gram(s) IV Push once  dextrose 50% Injectable 25 Gram(s) IV Push once  enoxaparin Injectable 80 milliGRAM(s) SubCutaneous two times a day  fentaNYL   Infusion. 0.476 MICROgram(s)/kG/Hr IV Continuous <Continuous>  glucagon  Injectable 1 milliGRAM(s) IntraMuscular once  insulin glargine Injectable (LANTUS) 15 Unit(s) SubCutaneous at bedtime  insulin lispro (ADMELOG) corrective regimen sliding scale   SubCutaneous three times a day before meals  insulin lispro Injectable (ADMELOG) 5 Unit(s) SubCutaneous three times a day before meals  levoFLOXacin IVPB      levoFLOXacin IVPB 750 milliGRAM(s) IV Intermittent every 24 hours  pantoprazole   Suspension 40 milliGRAM(s) Oral daily  piperacillin/tazobactam IVPB.. 4.5 Gram(s) IV Intermittent every 6 hours  polyethylene glycol 3350 17 Gram(s) Oral at bedtime  QUEtiapine 50 milliGRAM(s) Oral every 12 hours  senna 2 Tablet(s) Oral at bedtime  simvastatin 20 milliGRAM(s) Oral at bedtime      ANTIBIOTICS:  levoFLOXacin IVPB 750 milliGRAM(s) IV Intermittent every 24 hours  levoFLOXacin IVPB      piperacillin/tazobactam IVPB.. 4.5 Gram(s) IV Intermittent every 6 hours

## 2020-12-09 ENCOUNTER — INPATIENT (INPATIENT)
Facility: HOSPITAL | Age: 70
LOS: 28 days | Discharge: NURSING, REHAB OR LONG-TERM CARE | DRG: 4 | End: 2021-01-07
Attending: HOSPITALIST | Admitting: INTERNAL MEDICINE
Payer: MEDICARE

## 2020-12-09 VITALS — OXYGEN SATURATION: 93 % | TEMPERATURE: 101 F | RESPIRATION RATE: 20 BRPM

## 2020-12-09 DIAGNOSIS — R09.89 OTHER SPECIFIED SYMPTOMS AND SIGNS INVOLVING THE CIRCULATORY AND RESPIRATORY SYSTEMS: ICD-10-CM

## 2020-12-09 DIAGNOSIS — Z98.890 OTHER SPECIFIED POSTPROCEDURAL STATES: Chronic | ICD-10-CM

## 2020-12-09 LAB
ALBUMIN SERPL ELPH-MCNC: 3.2 G/DL — LOW (ref 3.3–5)
ALBUMIN SERPL ELPH-MCNC: 3.3 G/DL — SIGNIFICANT CHANGE UP (ref 3.3–5)
ALP SERPL-CCNC: 37 U/L — LOW (ref 40–120)
ALP SERPL-CCNC: 45 U/L — SIGNIFICANT CHANGE UP (ref 40–120)
ALT FLD-CCNC: 51 U/L — HIGH (ref 10–45)
ALT FLD-CCNC: 56 U/L — HIGH (ref 10–45)
ANION GAP SERPL CALC-SCNC: 11 MMOL/L — SIGNIFICANT CHANGE UP (ref 5–17)
ANION GAP SERPL CALC-SCNC: 9 MMOL/L — SIGNIFICANT CHANGE UP (ref 5–17)
APTT BLD: 35.1 SEC — SIGNIFICANT CHANGE UP (ref 27.5–35.5)
AST SERPL-CCNC: 18 U/L — SIGNIFICANT CHANGE UP (ref 10–40)
AST SERPL-CCNC: 28 U/L — SIGNIFICANT CHANGE UP (ref 10–40)
BASE EXCESS BLDA CALC-SCNC: 2.3 MMOL/L — SIGNIFICANT CHANGE UP (ref -2–3)
BASE EXCESS BLDA CALC-SCNC: 2.5 MMOL/L — SIGNIFICANT CHANGE UP (ref -2–3)
BASE EXCESS BLDA CALC-SCNC: 4 MMOL/L — HIGH (ref -2–3)
BASOPHILS # BLD AUTO: 0 K/UL — SIGNIFICANT CHANGE UP (ref 0–0.2)
BASOPHILS # BLD AUTO: 0 K/UL — SIGNIFICANT CHANGE UP (ref 0–0.2)
BASOPHILS NFR BLD AUTO: 0 % — SIGNIFICANT CHANGE UP (ref 0–2)
BASOPHILS NFR BLD AUTO: 0 % — SIGNIFICANT CHANGE UP (ref 0–2)
BILIRUB SERPL-MCNC: 0.8 MG/DL — SIGNIFICANT CHANGE UP (ref 0.2–1.2)
BILIRUB SERPL-MCNC: 0.9 MG/DL — SIGNIFICANT CHANGE UP (ref 0.2–1.2)
BUN SERPL-MCNC: 23 MG/DL — SIGNIFICANT CHANGE UP (ref 7–23)
BUN SERPL-MCNC: 32 MG/DL — HIGH (ref 7–23)
CALCIUM SERPL-MCNC: 8.4 MG/DL — SIGNIFICANT CHANGE UP (ref 8.4–10.5)
CALCIUM SERPL-MCNC: 8.8 MG/DL — SIGNIFICANT CHANGE UP (ref 8.4–10.5)
CHLORIDE SERPL-SCNC: 99 MMOL/L — SIGNIFICANT CHANGE UP (ref 96–108)
CHLORIDE SERPL-SCNC: 99 MMOL/L — SIGNIFICANT CHANGE UP (ref 96–108)
CO2 SERPL-SCNC: 27 MMOL/L — SIGNIFICANT CHANGE UP (ref 22–31)
CO2 SERPL-SCNC: 27 MMOL/L — SIGNIFICANT CHANGE UP (ref 22–31)
CREAT SERPL-MCNC: 0.68 MG/DL — SIGNIFICANT CHANGE UP (ref 0.5–1.3)
CREAT SERPL-MCNC: 0.9 MG/DL — SIGNIFICANT CHANGE UP (ref 0.5–1.3)
CRP SERPL-MCNC: 0.04 MG/DL — SIGNIFICANT CHANGE UP (ref 0–0.4)
CRP SERPL-MCNC: <0.1 MG/DL — SIGNIFICANT CHANGE UP (ref 0–0.4)
D DIMER BLD IA.RAPID-MCNC: 445 NG/ML DDU — HIGH
EOSINOPHIL # BLD AUTO: 0.11 K/UL — SIGNIFICANT CHANGE UP (ref 0–0.5)
EOSINOPHIL # BLD AUTO: 0.21 K/UL — SIGNIFICANT CHANGE UP (ref 0–0.5)
EOSINOPHIL NFR BLD AUTO: 0.9 % — SIGNIFICANT CHANGE UP (ref 0–6)
EOSINOPHIL NFR BLD AUTO: 1.2 % — SIGNIFICANT CHANGE UP (ref 0–6)
FERRITIN SERPL-MCNC: 180 NG/ML — SIGNIFICANT CHANGE UP (ref 30–400)
GIANT PLATELETS BLD QL SMEAR: PRESENT — SIGNIFICANT CHANGE UP
GLUCOSE BLDC GLUCOMTR-MCNC: 181 MG/DL — HIGH (ref 70–99)
GLUCOSE BLDC GLUCOMTR-MCNC: 205 MG/DL — HIGH (ref 70–99)
GLUCOSE BLDC GLUCOMTR-MCNC: 251 MG/DL — HIGH (ref 70–99)
GLUCOSE SERPL-MCNC: 201 MG/DL — HIGH (ref 70–99)
GLUCOSE SERPL-MCNC: 233 MG/DL — HIGH (ref 70–99)
GRAM STN FLD: SIGNIFICANT CHANGE UP
HCO3 BLDA-SCNC: 30 MMOL/L — HIGH (ref 21–28)
HCT VFR BLD CALC: 34 % — LOW (ref 39–50)
HCT VFR BLD CALC: 39.6 % — SIGNIFICANT CHANGE UP (ref 39–50)
HGB BLD-MCNC: 10.2 G/DL — LOW (ref 13–17)
HGB BLD-MCNC: 11.8 G/DL — LOW (ref 13–17)
IMM GRANULOCYTES NFR BLD AUTO: 0.7 % — SIGNIFICANT CHANGE UP (ref 0–1.5)
INR BLD: 1.11 — SIGNIFICANT CHANGE UP (ref 0.88–1.16)
LACTATE SERPL-SCNC: 1.7 MMOL/L — SIGNIFICANT CHANGE UP (ref 0.5–2)
LYMPHOCYTES # BLD AUTO: 1.13 K/UL — SIGNIFICANT CHANGE UP (ref 1–3.3)
LYMPHOCYTES # BLD AUTO: 12 % — LOW (ref 13–44)
LYMPHOCYTES # BLD AUTO: 2.3 K/UL — SIGNIFICANT CHANGE UP (ref 1–3.3)
LYMPHOCYTES # BLD AUTO: 9.9 % — LOW (ref 13–44)
MAGNESIUM SERPL-MCNC: 2 MG/DL — SIGNIFICANT CHANGE UP (ref 1.6–2.6)
MAGNESIUM SERPL-MCNC: 2.2 MG/DL — SIGNIFICANT CHANGE UP (ref 1.6–2.6)
MANUAL SMEAR VERIFICATION: SIGNIFICANT CHANGE UP
MCHC RBC-ENTMCNC: 23 PG — LOW (ref 27–34)
MCHC RBC-ENTMCNC: 23.6 PG — LOW (ref 27–34)
MCHC RBC-ENTMCNC: 29.8 GM/DL — LOW (ref 32–36)
MCHC RBC-ENTMCNC: 30 GM/DL — LOW (ref 32–36)
MCV RBC AUTO: 76.7 FL — LOW (ref 80–100)
MCV RBC AUTO: 79.4 FL — LOW (ref 80–100)
MONOCYTES # BLD AUTO: 0.75 K/UL — SIGNIFICANT CHANGE UP (ref 0–0.9)
MONOCYTES # BLD AUTO: 1.46 K/UL — HIGH (ref 0–0.9)
MONOCYTES NFR BLD AUTO: 6.3 % — SIGNIFICANT CHANGE UP (ref 2–14)
MONOCYTES NFR BLD AUTO: 8 % — SIGNIFICANT CHANGE UP (ref 2–14)
NEUTROPHILS # BLD AUTO: 19.27 K/UL — HIGH (ref 1.8–7.4)
NEUTROPHILS # BLD AUTO: 7.35 K/UL — SIGNIFICANT CHANGE UP (ref 1.8–7.4)
NEUTROPHILS NFR BLD AUTO: 78.1 % — HIGH (ref 43–77)
NEUTROPHILS NFR BLD AUTO: 82.9 % — HIGH (ref 43–77)
NRBC # BLD: 0 /100 WBCS — SIGNIFICANT CHANGE UP (ref 0–0)
OVALOCYTES BLD QL SMEAR: SLIGHT — SIGNIFICANT CHANGE UP
PCO2 BLDA: 51 MMHG — HIGH (ref 35–48)
PCO2 BLDA: 57 MMHG — HIGH (ref 35–48)
PCO2 BLDA: 59 MMHG — HIGH (ref 35–48)
PH BLDA: 7.32 — LOW (ref 7.35–7.45)
PH BLDA: 7.33 — LOW (ref 7.35–7.45)
PH BLDA: 7.39 — SIGNIFICANT CHANGE UP (ref 7.35–7.45)
PHOSPHATE SERPL-MCNC: 3.7 MG/DL — SIGNIFICANT CHANGE UP (ref 2.5–4.5)
PLAT MORPH BLD: ABNORMAL
PLATELET # BLD AUTO: 210 K/UL — SIGNIFICANT CHANGE UP (ref 150–400)
PLATELET # BLD AUTO: 297 K/UL — SIGNIFICANT CHANGE UP (ref 150–400)
PO2 BLDA: 101 MMHG — SIGNIFICANT CHANGE UP (ref 83–108)
PO2 BLDA: 71 MMHG — LOW (ref 83–108)
PO2 BLDA: 92 MMHG — SIGNIFICANT CHANGE UP (ref 83–108)
POIKILOCYTOSIS BLD QL AUTO: SLIGHT — SIGNIFICANT CHANGE UP
POLYCHROMASIA BLD QL SMEAR: SLIGHT — SIGNIFICANT CHANGE UP
POTASSIUM SERPL-MCNC: 4.4 MMOL/L — SIGNIFICANT CHANGE UP (ref 3.5–5.3)
POTASSIUM SERPL-MCNC: 4.4 MMOL/L — SIGNIFICANT CHANGE UP (ref 3.5–5.3)
POTASSIUM SERPL-SCNC: 4.4 MMOL/L — SIGNIFICANT CHANGE UP (ref 3.5–5.3)
POTASSIUM SERPL-SCNC: 4.4 MMOL/L — SIGNIFICANT CHANGE UP (ref 3.5–5.3)
PROCALCITONIN SERPL-MCNC: 0.08 NG/ML — SIGNIFICANT CHANGE UP (ref 0.02–0.1)
PROCALCITONIN SERPL-MCNC: 0.12 NG/ML — HIGH (ref 0.02–0.1)
PROCALCITONIN SERPL-MCNC: 0.16 NG/ML — HIGH (ref 0.02–0.1)
PROT SERPL-MCNC: 5.6 G/DL — LOW (ref 6–8.3)
PROT SERPL-MCNC: 5.9 G/DL — LOW (ref 6–8.3)
PROTHROM AB SERPL-ACNC: 13.3 SEC — SIGNIFICANT CHANGE UP (ref 10.6–13.6)
RBC # BLD: 4.43 M/UL — SIGNIFICANT CHANGE UP (ref 4.2–5.8)
RBC # BLD: 4.99 M/UL — SIGNIFICANT CHANGE UP (ref 4.2–5.8)
RBC # FLD: 20.1 % — HIGH (ref 10.3–14.5)
RBC # FLD: 20.8 % — HIGH (ref 10.3–14.5)
RBC BLD AUTO: ABNORMAL
SAO2 % BLDA: 91 % — LOW (ref 95–100)
SAO2 % BLDA: 97 % — SIGNIFICANT CHANGE UP (ref 95–100)
SAO2 % BLDA: 97 % — SIGNIFICANT CHANGE UP (ref 95–100)
SMUDGE CELLS # BLD: PRESENT — SIGNIFICANT CHANGE UP
SODIUM SERPL-SCNC: 135 MMOL/L — SIGNIFICANT CHANGE UP (ref 135–145)
SODIUM SERPL-SCNC: 137 MMOL/L — SIGNIFICANT CHANGE UP (ref 135–145)
SPECIMEN SOURCE: SIGNIFICANT CHANGE UP
SPHEROCYTES BLD QL SMEAR: SLIGHT — SIGNIFICANT CHANGE UP
TRIGL SERPL-MCNC: 152 MG/DL — HIGH
WBC # BLD: 23.24 K/UL — HIGH (ref 3.8–10.5)
WBC # BLD: 9.41 K/UL — SIGNIFICANT CHANGE UP (ref 3.8–10.5)
WBC # FLD AUTO: 23.24 K/UL — HIGH (ref 3.8–10.5)
WBC # FLD AUTO: 9.41 K/UL — SIGNIFICANT CHANGE UP (ref 3.8–10.5)

## 2020-12-09 PROCEDURE — 36556 INSERT NON-TUNNEL CV CATH: CPT

## 2020-12-09 PROCEDURE — 36620 INSERTION CATHETER ARTERY: CPT

## 2020-12-09 PROCEDURE — 99291 CRITICAL CARE FIRST HOUR: CPT | Mod: 25

## 2020-12-09 PROCEDURE — 71045 X-RAY EXAM CHEST 1 VIEW: CPT | Mod: 26

## 2020-12-09 RX ORDER — INSULIN LISPRO 100/ML
VIAL (ML) SUBCUTANEOUS
Refills: 0 | Status: DISCONTINUED | OUTPATIENT
Start: 2020-12-09 | End: 2020-12-09

## 2020-12-09 RX ORDER — AMIODARONE HYDROCHLORIDE 400 MG/1
1 TABLET ORAL
Qty: 900 | Refills: 0 | Status: DISCONTINUED | OUTPATIENT
Start: 2020-12-09 | End: 2020-12-09

## 2020-12-09 RX ORDER — NOREPINEPHRINE BITARTRATE/D5W 8 MG/250ML
0.05 PLASTIC BAG, INJECTION (ML) INTRAVENOUS
Qty: 8 | Refills: 0 | Status: DISCONTINUED | OUTPATIENT
Start: 2020-12-09 | End: 2020-12-10

## 2020-12-09 RX ORDER — PANTOPRAZOLE SODIUM 20 MG/1
40 TABLET, DELAYED RELEASE ORAL DAILY
Refills: 0 | Status: DISCONTINUED | OUTPATIENT
Start: 2020-12-09 | End: 2020-12-25

## 2020-12-09 RX ORDER — CHLORHEXIDINE GLUCONATE 213 G/1000ML
1 SOLUTION TOPICAL
Refills: 0 | Status: DISCONTINUED | OUTPATIENT
Start: 2020-12-09 | End: 2021-01-05

## 2020-12-09 RX ORDER — CHLORHEXIDINE GLUCONATE 213 G/1000ML
15 SOLUTION TOPICAL EVERY 12 HOURS
Refills: 0 | Status: DISCONTINUED | OUTPATIENT
Start: 2020-12-09 | End: 2020-12-13

## 2020-12-09 RX ORDER — AMIODARONE HYDROCHLORIDE 400 MG/1
1 TABLET ORAL
Qty: 900 | Refills: 0 | Status: DISCONTINUED | OUTPATIENT
Start: 2020-12-09 | End: 2020-12-10

## 2020-12-09 RX ORDER — METOPROLOL TARTRATE 50 MG
5 TABLET ORAL ONCE
Refills: 0 | Status: COMPLETED | OUTPATIENT
Start: 2020-12-09 | End: 2020-12-09

## 2020-12-09 RX ORDER — FENTANYL CITRATE 50 UG/ML
0.5 INJECTION INTRAVENOUS
Qty: 5000 | Refills: 0 | Status: DISCONTINUED | OUTPATIENT
Start: 2020-12-09 | End: 2020-12-09

## 2020-12-09 RX ORDER — PANTOPRAZOLE SODIUM 20 MG/1
40 TABLET, DELAYED RELEASE ORAL EVERY 24 HOURS
Refills: 0 | Status: DISCONTINUED | OUTPATIENT
Start: 2020-12-09 | End: 2020-12-09

## 2020-12-09 RX ORDER — GLUCAGON INJECTION, SOLUTION 0.5 MG/.1ML
1 INJECTION, SOLUTION SUBCUTANEOUS ONCE
Refills: 0 | Status: DISCONTINUED | OUTPATIENT
Start: 2020-12-09 | End: 2021-01-07

## 2020-12-09 RX ORDER — HUMAN INSULIN 100 [IU]/ML
7 INJECTION, SUSPENSION SUBCUTANEOUS ONCE
Refills: 0 | Status: DISCONTINUED | OUTPATIENT
Start: 2020-12-09 | End: 2020-12-09

## 2020-12-09 RX ORDER — INSULIN GLARGINE 100 [IU]/ML
15 INJECTION, SOLUTION SUBCUTANEOUS AT BEDTIME
Refills: 0 | Status: DISCONTINUED | OUTPATIENT
Start: 2020-12-09 | End: 2020-12-14

## 2020-12-09 RX ORDER — POLYETHYLENE GLYCOL 3350 17 G/17G
17 POWDER, FOR SOLUTION ORAL DAILY
Refills: 0 | Status: DISCONTINUED | OUTPATIENT
Start: 2020-12-09 | End: 2020-12-20

## 2020-12-09 RX ORDER — DEXTROSE 50 % IN WATER 50 %
25 SYRINGE (ML) INTRAVENOUS ONCE
Refills: 0 | Status: DISCONTINUED | OUTPATIENT
Start: 2020-12-09 | End: 2021-01-07

## 2020-12-09 RX ORDER — AMIODARONE HYDROCHLORIDE 400 MG/1
0.5 TABLET ORAL
Qty: 900 | Refills: 0 | Status: DISCONTINUED | OUTPATIENT
Start: 2020-12-09 | End: 2020-12-11

## 2020-12-09 RX ORDER — FUROSEMIDE 40 MG
40 TABLET ORAL ONCE
Refills: 0 | Status: COMPLETED | OUTPATIENT
Start: 2020-12-09 | End: 2020-12-09

## 2020-12-09 RX ORDER — INSULIN LISPRO 100/ML
VIAL (ML) SUBCUTANEOUS EVERY 6 HOURS
Refills: 0 | Status: DISCONTINUED | OUTPATIENT
Start: 2020-12-09 | End: 2020-12-09

## 2020-12-09 RX ORDER — ACETAMINOPHEN 500 MG
650 TABLET ORAL ONCE
Refills: 0 | Status: COMPLETED | OUTPATIENT
Start: 2020-12-09 | End: 2020-12-09

## 2020-12-09 RX ORDER — FENTANYL CITRATE 50 UG/ML
0.5 INJECTION INTRAVENOUS
Qty: 5000 | Refills: 0 | Status: DISCONTINUED | OUTPATIENT
Start: 2020-12-09 | End: 2020-12-11

## 2020-12-09 RX ORDER — DEXTROSE 50 % IN WATER 50 %
15 SYRINGE (ML) INTRAVENOUS ONCE
Refills: 0 | Status: DISCONTINUED | OUTPATIENT
Start: 2020-12-09 | End: 2021-01-07

## 2020-12-09 RX ORDER — PHENYLEPHRINE HYDROCHLORIDE 10 MG/ML
0.1 INJECTION INTRAVENOUS
Qty: 160 | Refills: 0 | Status: DISCONTINUED | OUTPATIENT
Start: 2020-12-09 | End: 2020-12-21

## 2020-12-09 RX ORDER — SODIUM CHLORIDE 9 MG/ML
1000 INJECTION, SOLUTION INTRAVENOUS
Refills: 0 | Status: DISCONTINUED | OUTPATIENT
Start: 2020-12-09 | End: 2021-01-07

## 2020-12-09 RX ORDER — FENTANYL CITRATE 50 UG/ML
0.5 INJECTION INTRAVENOUS
Qty: 2500 | Refills: 0 | Status: DISCONTINUED | OUTPATIENT
Start: 2020-12-09 | End: 2020-12-09

## 2020-12-09 RX ORDER — ENOXAPARIN SODIUM 100 MG/ML
90 INJECTION SUBCUTANEOUS EVERY 12 HOURS
Refills: 0 | Status: DISCONTINUED | OUTPATIENT
Start: 2020-12-09 | End: 2020-12-17

## 2020-12-09 RX ORDER — SENNA PLUS 8.6 MG/1
2 TABLET ORAL AT BEDTIME
Refills: 0 | Status: DISCONTINUED | OUTPATIENT
Start: 2020-12-09 | End: 2020-12-20

## 2020-12-09 RX ORDER — AMIODARONE HYDROCHLORIDE 400 MG/1
150 TABLET ORAL ONCE
Refills: 0 | Status: COMPLETED | OUTPATIENT
Start: 2020-12-09 | End: 2020-12-09

## 2020-12-09 RX ORDER — AMIODARONE HYDROCHLORIDE 400 MG/1
0.5 TABLET ORAL
Qty: 900 | Refills: 0 | Status: DISCONTINUED | OUTPATIENT
Start: 2020-12-09 | End: 2020-12-09

## 2020-12-09 RX ORDER — DEXTROSE 50 % IN WATER 50 %
12.5 SYRINGE (ML) INTRAVENOUS ONCE
Refills: 0 | Status: DISCONTINUED | OUTPATIENT
Start: 2020-12-09 | End: 2021-01-07

## 2020-12-09 RX ORDER — DILTIAZEM HCL 120 MG
10 CAPSULE, EXT RELEASE 24 HR ORAL ONCE
Refills: 0 | Status: DISCONTINUED | OUTPATIENT
Start: 2020-12-09 | End: 2020-12-09

## 2020-12-09 RX ORDER — PROPOFOL 10 MG/ML
5 INJECTION, EMULSION INTRAVENOUS
Qty: 1000 | Refills: 0 | Status: DISCONTINUED | OUTPATIENT
Start: 2020-12-09 | End: 2020-12-09

## 2020-12-09 RX ORDER — CISATRACURIUM BESYLATE 2 MG/ML
3 INJECTION INTRAVENOUS
Qty: 200 | Refills: 0 | Status: DISCONTINUED | OUTPATIENT
Start: 2020-12-09 | End: 2020-12-11

## 2020-12-09 RX ORDER — QUETIAPINE FUMARATE 200 MG/1
50 TABLET, FILM COATED ORAL AT BEDTIME
Refills: 0 | Status: DISCONTINUED | OUTPATIENT
Start: 2020-12-09 | End: 2020-12-10

## 2020-12-09 RX ORDER — CISATRACURIUM BESYLATE 2 MG/ML
15 INJECTION INTRAVENOUS ONCE
Refills: 0 | Status: COMPLETED | OUTPATIENT
Start: 2020-12-09 | End: 2020-12-09

## 2020-12-09 RX ORDER — PROPOFOL 10 MG/ML
5 INJECTION, EMULSION INTRAVENOUS
Qty: 1000 | Refills: 0 | Status: DISCONTINUED | OUTPATIENT
Start: 2020-12-09 | End: 2020-12-11

## 2020-12-09 RX ADMIN — FENTANYL CITRATE 4.45 MICROGRAM(S)/KG/HR: 50 INJECTION INTRAVENOUS at 05:59

## 2020-12-09 RX ADMIN — Medication 1 APPLICATION(S): at 11:29

## 2020-12-09 RX ADMIN — CISATRACURIUM BESYLATE 15 MILLIGRAM(S): 2 INJECTION INTRAVENOUS at 11:20

## 2020-12-09 RX ADMIN — Medication 4: at 13:05

## 2020-12-09 RX ADMIN — SENNA PLUS 2 TABLET(S): 8.6 TABLET ORAL at 21:44

## 2020-12-09 RX ADMIN — Medication 1 APPLICATION(S): at 18:13

## 2020-12-09 RX ADMIN — PROPOFOL 2.67 MICROGRAM(S)/KG/MIN: 10 INJECTION, EMULSION INTRAVENOUS at 22:45

## 2020-12-09 RX ADMIN — INSULIN GLARGINE 15 UNIT(S): 100 INJECTION, SOLUTION SUBCUTANEOUS at 23:04

## 2020-12-09 RX ADMIN — AMIODARONE HYDROCHLORIDE 600 MILLIGRAM(S): 400 TABLET ORAL at 15:55

## 2020-12-09 RX ADMIN — ENOXAPARIN SODIUM 90 MILLIGRAM(S): 100 INJECTION SUBCUTANEOUS at 11:32

## 2020-12-09 RX ADMIN — QUETIAPINE FUMARATE 50 MILLIGRAM(S): 200 TABLET, FILM COATED ORAL at 21:44

## 2020-12-09 RX ADMIN — CHLORHEXIDINE GLUCONATE 15 MILLILITER(S): 213 SOLUTION TOPICAL at 07:34

## 2020-12-09 RX ADMIN — Medication 650 MILLIGRAM(S): at 02:00

## 2020-12-09 RX ADMIN — PANTOPRAZOLE SODIUM 40 MILLIGRAM(S): 20 TABLET, DELAYED RELEASE ORAL at 11:34

## 2020-12-09 RX ADMIN — AMIODARONE HYDROCHLORIDE 600 MILLIGRAM(S): 400 TABLET ORAL at 08:44

## 2020-12-09 RX ADMIN — POLYETHYLENE GLYCOL 3350 17 GRAM(S): 17 POWDER, FOR SOLUTION ORAL at 11:34

## 2020-12-09 RX ADMIN — ENOXAPARIN SODIUM 90 MILLIGRAM(S): 100 INJECTION SUBCUTANEOUS at 21:44

## 2020-12-09 RX ADMIN — Medication 650 MILLIGRAM(S): at 01:30

## 2020-12-09 RX ADMIN — PROPOFOL 2.67 MICROGRAM(S)/KG/MIN: 10 INJECTION, EMULSION INTRAVENOUS at 03:40

## 2020-12-09 RX ADMIN — Medication 40 MILLIGRAM(S): at 07:34

## 2020-12-09 RX ADMIN — CHLORHEXIDINE GLUCONATE 15 MILLILITER(S): 213 SOLUTION TOPICAL at 18:12

## 2020-12-09 RX ADMIN — Medication 5 MILLIGRAM(S): at 08:30

## 2020-12-09 RX ADMIN — Medication 1 APPLICATION(S): at 23:05

## 2020-12-09 RX ADMIN — FENTANYL CITRATE 4.45 MICROGRAM(S)/KG/HR: 50 INJECTION INTRAVENOUS at 08:36

## 2020-12-09 RX ADMIN — AMIODARONE HYDROCHLORIDE 33.3 MG/MIN: 400 TABLET ORAL at 16:56

## 2020-12-09 NOTE — H&P ADULT - ASSESSMENT
Addendum:  Overall, the patient is a 70 yr old male with PMhx of DMT2, Afib on xeralto, HTN, HLD who was admitted to [facility] on 11/27 and started on remdisivir and decadron on 11/28. Convalescent plasma on 11/30.  however was intubated on 12/2 due to worsening O2 sats. Also recieved toci on 12/2. He has been on zosyn since 12/2 and completed 7 days of levaquin (12/2- 12/8). Also he was started on zyvox and caspofungin on 12/8.     On my evaluation, the patient in well sedated and in sync with ventilator.     PLAN:   - cont propofol/fentanyl.   - cont current vent setting (370cc)6cc/kg TV and PEEP 10.   - agree with lovenox 1mg/kg BID for paroxysmal Afib, not on pressors.   - resume TF, SSI  - moniter I/Os. diurese with 40 IV lasix.   - hold off on Abx for now in absence of fever/leucocytosis/shock. panculture.  DV Px: lovenox  GI Px: PPI   MAGGIE SAHNI is a 70 yr old male with PMhx of DMT2, Afib on xeralto, HTN, HLD who was admitted to [facility] on 11/27 and started on remdisivir and decadron on 11/28, received convalexcent plasma on 11/30, as well as Toci on 12/2, but was intubated on 12/2 due to worsening O2 sats. Also treated with Zosyn since 12/2 and 7 days of Levoquin (12/2- 12/8), along with  zyvox and caspofungin on 12/8. Transferred to [facility] 12/8 overnight.       NEUROLOGY  - c/w sedation propofol & fentanyl    CARDIOVASCULAR  h/o Afib  - c/w Amiodarone  - ekg daily     - if continues to require pressor support, change from levophed to phenylephrine      RESPIRATORY   f/u sputum cx  - ABG daily      RENAL   - goal I/O net even, can tolerate 100-200 positive  - reassess need for lasix  - double concentrate fentanyl infusion to reduce fluid volume  - strict I/O tracking    ENDOCRINE  - goal 140-180 blood glucose  - consider long acting insulin if blood glucose remains elevated    GASTROINTESTINAL  - glucerna tube feeds  - protonix 40  - bowel regimen    INFECTIOUS DISEASE  - f/u cultures: sputum, blood, urine    HEMATOLOGY/ONCOLOGY  - lovenox full anticoagulation for Afib - 90 q12    ACCESS/LINES/DRAINS  - will need central line  - has condom cath    ETHICS/GOALS OF CARE  - update family daily    PROPHYLACTIC  ELECTROLYTES: Mg<2, K<4  DIET: regular  GI PPX: protonix  VTE PPX: SCD, lovenox  CODE: FULL  DISPO: MICU      Addendum:  Overall, the patient is a 70 yr old male with PMhx of DMT2, Afib on xeralto, HTN, HLD who was admitted to [facility] on 11/27 and started on remdisivir and decadron on 11/28. Convalescent plasma on 11/30.  however was intubated on 12/2 due to worsening O2 sats. Also recieved toci on 12/2. He has been on zosyn since 12/2 and completed 7 days of levaquin (12/2- 12/8). Also he was started on zyvox and caspofungin on 12/8.     On my evaluation, the patient in well sedated and in sync with ventilator.     PLAN:   - cont propofol/fentanyl.   - cont current vent setting (370cc)6cc/kg TV and PEEP 10.   - agree with lovenox 1mg/kg BID for paroxysmal Afib, not on pressors.   - resume TF, SSI  - moniter I/Os. diurese with 40 IV lasix.   - hold off on Abx for now in absence of fever/leucocytosis/shock. panculture.  DV Px: lovenox  GI Px: PPI

## 2020-12-09 NOTE — H&P ADULT - NSHPPHYSICALEXAM_GEN_ALL_CORE
details… PHYSICAL EXAM:    General: intubated and sedated, overweight male.   HEENT: NC/AT; PERRL, clear conjunctiva  Neck: supple  Cardiovascular: +S1/S2; RRR  Abdomen: obese, slightly distended  Extremities: 2+ peripheral pulses b/l; edematous upper and lower extremities.   Skin: normal color and turgor; no rash  Neurological: sedated detailed exam

## 2020-12-09 NOTE — PROGRESS NOTE ADULT - ASSESSMENT
MAGGIE SAHNI is a 70 yr old male with PMhx of DMT2, Afib on xeralto, HTN, HLD who was admitted to [facility] on 11/27 and started on remdisivir and decadron on 11/28, received convalexcent plasma on 11/30, as well as Toci on 12/2, but was intubated on 12/2 due to worsening O2 sats. Also treated with Zosyn since 12/2 and 7 days of Levoquin (12/2- 12/8), along with  zyvox and caspofungin on 12/8. Transferred to [facility] 12/8 overnight.     NEUROLOGY  - weened off propofol  - c/w fentanyl  - on seroquel 50    CARDIOVASCULAR  h/o Afib  - c/w Amiodarone  - ekg daily     - if continues to require pressor support, change from levophed to phenylephrine    RESPIRATORY   f/u sputum cx  - ABG daily  - Prone  - cont current vent setting (370cc)6cc/kg TV and PEEP 10.     RENAL   - goal I/O net even, can tolerate 100-200 positive  - reassess need for lasix  - double concentrate fentanyl infusion to reduce fluid volume  - strict I/O tracking    ENDOCRINE  - goal 140-180 blood glucose  - SSI  - consider long acting insulin if blood glucose remains elevated    GASTROINTESTINAL  - glucerna tube feeds  - protonix 40  - bowel regimen    INFECTIOUS DISEASE  - f/u cultures: sputum, blood, urine  - consider starting mati if febrile    HEMATOLOGY/ONCOLOGY  - lovenox full anticoagulation for Afib - 90 q12    ACCESS/LINES/DRAINS  - placed central line and A line today  - has condom cath    ETHICS/GOALS OF CARE  - update family daily    PROPHYLACTIC  ELECTROLYTES: Mg<2, K<4  DIET: regular  GI PPX: protonix  VTE PPX: SCD, lovenox  CODE: FULL  DISPO: MICU MAGGIE SAHNI is a 70 yr old male with PMhx of DMT2, Afib on xeralto, HTN, HLD who was admitted to [facility] on 11/27 and started on remdisivir and decadron on 11/28, received convalexcent plasma on 11/30, as well as Toci on 12/2, but was intubated on 12/2 due to worsening O2 sats. Also treated with Zosyn since 12/2 and 7 days of Levoquin (12/2- 12/8), along with  zyvox and caspofungin on 12/8. Transferred to [facility] 12/8 overnight.     NEUROLOGY  - weened off propofol  - c/w fentanyl  - on seroquel 50  - on nimbex    CARDIOVASCULAR  H/o A-fib  - c/w Amiodarone gtt  - lovenox BID  - continued to have episodes of RVR   - switched levophed to phenylephrine    RESPIRATORY   f/u sputum cx  - ABG daily  - Prone  - cont current vent setting (370cc)6cc/kg TV and PEEP 10.     RENAL   - goal I/O net even, can tolerate 100-200 positive  - reassess need for lasix  - double concentrate fentanyl infusion to reduce fluid volume  - strict I/O tracking    ENDOCRINE  - goal 140-180 blood glucose  - SSI  - consider long acting insulin if blood glucose remains elevated    GASTROINTESTINAL  - glucerna tube feeds  - protonix 40  - bowel regimen    INFECTIOUS DISEASE  - f/u cultures: sputum, blood, urine  - consider starting mati if febrile    HEMATOLOGY/ONCOLOGY  - lovenox full anticoagulation for Afib - 90 q12    ACCESS/LINES/DRAINS  - placed central line and A line today  - has condom cath    ETHICS/GOALS OF CARE  - update family daily    PROPHYLACTIC  ELECTROLYTES: Mg<2, K<4  DIET: regular  GI PPX: protonix  VTE PPX: SCD, lovenox  CODE: FULL  DISPO: MICU

## 2020-12-09 NOTE — PROGRESS NOTE ADULT - SUBJECTIVE AND OBJECTIVE BOX
SUBJECTIVE / INTERVAL HPI: Patient seen and examined at bedside. Intubated and sedated. Patient had an episode of RVR today and rapid was called. Give 150 bolus amio and amio load. Suction removed lots of mucous.    VITAL SIGNS:  Vital Signs Last 24 Hrs  T(C): 37.1 (09 Dec 2020 13:24), Max: 39.4 (08 Dec 2020 20:00)  T(F): 98.8 (09 Dec 2020 13:24), Max: 102.9 (08 Dec 2020 20:00)  HR: 92 (09 Dec 2020 16:58) (70 - 116)  BP: 159/74 (09 Dec 2020 12:00) (93/52 - 178/80)  BP(mean): 106 (09 Dec 2020 12:00) (67 - 115)  RR: 20 (09 Dec 2020 15:00) (13 - 20)  SpO2: 94% (09 Dec 2020 16:58) (88% - 96%)  I&O's Summary    08 Dec 2020 07:01  -  09 Dec 2020 07:00  --------------------------------------------------------  IN: 88 mL / OUT: 500 mL / NET: -412 mL    09 Dec 2020 07:01  -  09 Dec 2020 17:48  --------------------------------------------------------  IN: 807.4 mL / OUT: 1250 mL / NET: -442.6 mL        PHYSICAL EXAM:    General: Intubated and sedated, overweight male; intubated, sedated, prone  HEENT: NC/AT; PERRL, anicteric sclera; MMM  Neck: supple  Cardiovascular: +S1/S2; RRR  Respiratory: CTA B/L; no W/R/R  Gastrointestinal: soft, NT/ND; +BSx4  Extremities: WWP; no edema, clubbing or cyanosis  Vascular: 2+ radial, DP/PT pulses B/L  Neurological: AAOx3; no focal deficits    MEDICATIONS:  MEDICATIONS  (STANDING):  aMIOdarone Infusion 1 mG/Min (33.3 mL/Hr) IV Continuous <Continuous>  aMIOdarone Infusion 0.5 mG/Min (16.7 mL/Hr) IV Continuous <Continuous>  aMIOdarone IVPB 150 milliGRAM(s) IV Intermittent once  chlorhexidine 0.12% Liquid 15 milliLiter(s) Oral Mucosa every 12 hours  chlorhexidine 2% Cloths 1 Application(s) Topical <User Schedule>  cisatracurium Infusion 3 MICROgram(s)/kG/Min (16 mL/Hr) IV Continuous <Continuous>  dextrose 40% Gel 15 Gram(s) Oral once  dextrose 5%. 1000 milliLiter(s) (50 mL/Hr) IV Continuous <Continuous>  dextrose 5%. 1000 milliLiter(s) (100 mL/Hr) IV Continuous <Continuous>  dextrose 50% Injectable 25 Gram(s) IV Push once  dextrose 50% Injectable 12.5 Gram(s) IV Push once  dextrose 50% Injectable 25 Gram(s) IV Push once  enoxaparin Injectable 90 milliGRAM(s) SubCutaneous every 12 hours  fentaNYL   Infusion... 0.5 MICROgram(s)/kG/Hr (2.22 mL/Hr) IV Continuous <Continuous>  glucagon  Injectable 1 milliGRAM(s) IntraMuscular once  insulin glargine Injectable (LANTUS) 15 Unit(s) SubCutaneous at bedtime  norepinephrine Infusion 0.05 MICROgram(s)/kG/Min (8.33 mL/Hr) IV Continuous <Continuous>  pantoprazole   Suspension 40 milliGRAM(s) Enteral Tube daily  petrolatum Ophthalmic Ointment 1 Application(s) Both EYES every 6 hours  polyethylene glycol 3350 17 Gram(s) Oral daily  propofol Infusion 5 MICROgram(s)/kG/Min (2.67 mL/Hr) IV Continuous <Continuous>  QUEtiapine 50 milliGRAM(s) Oral at bedtime  senna 2 Tablet(s) Oral at bedtime    MEDICATIONS  (PRN):      ALLERGIES:  Allergies    No Known Allergies    Intolerances        LABS:                        11.8   23.24 )-----------( 297      ( 09 Dec 2020 13:00 )             39.6     12-09    137  |  99  |  32<H>  ----------------------------<  233<H>  4.4   |  27  |  0.90    Ca    8.8      09 Dec 2020 13:00  Phos  3.7     12-09  Mg     2.2     12-09    TPro  5.9<L>  /  Alb  3.3  /  TBili  0.9  /  DBili  x   /  AST  28  /  ALT  56<H>  /  AlkPhos  45  12-09    PT/INR - ( 09 Dec 2020 06:00 )   PT: 13.3 sec;   INR: 1.11          PTT - ( 09 Dec 2020 06:00 )  PTT:35.1 sec    CAPILLARY BLOOD GLUCOSE      POCT Blood Glucose.: 205 mg/dL (09 Dec 2020 12:11)      RADIOLOGY & ADDITIONAL TESTS: Reviewed.

## 2020-12-09 NOTE — H&P ADULT - NSHPLABSRESULTS_GEN_ALL_CORE
LABS:                        10.2   9.41  )-----------( 210      ( 09 Dec 2020 06:00 )             34.0     12-09    135  |  99  |  23  ----------------------------<  201<H>  4.4   |  27  |  0.68    Ca    8.4      09 Dec 2020 06:00  Phos  3.7     12-09  Mg     2.0     12-09    TPro  5.6<L>  /  Alb  3.2<L>  /  TBili  0.8  /  DBili  x   /  AST  18  /  ALT  51<H>  /  AlkPhos  37<L>  12-09    PT/INR - ( 09 Dec 2020 06:00 )   PT: 13.3 sec;   INR: 1.11          PTT - ( 09 Dec 2020 06:00 )  PTT:35.1 sec    LIVER FUNCTIONS - ( 09 Dec 2020 06:00 )  Alb: 3.2 g/dL / Pro: 5.6 g/dL / ALK PHOS: 37 U/L / ALT: 51 U/L / AST: 18 U/L / GGT: x

## 2020-12-10 LAB
ALBUMIN SERPL ELPH-MCNC: 3 G/DL — LOW (ref 3.3–5)
ALP SERPL-CCNC: 44 U/L — SIGNIFICANT CHANGE UP (ref 40–120)
ALT FLD-CCNC: 42 U/L — SIGNIFICANT CHANGE UP (ref 10–45)
ANION GAP SERPL CALC-SCNC: 11 MMOL/L — SIGNIFICANT CHANGE UP (ref 5–17)
AST SERPL-CCNC: 21 U/L — SIGNIFICANT CHANGE UP (ref 10–40)
BASE EXCESS BLDA CALC-SCNC: 3 MMOL/L — SIGNIFICANT CHANGE UP (ref -2–3)
BASE EXCESS BLDA CALC-SCNC: 4.6 MMOL/L — HIGH (ref -2–3)
BASOPHILS # BLD AUTO: 0.01 K/UL — SIGNIFICANT CHANGE UP (ref 0–0.2)
BASOPHILS NFR BLD AUTO: 0.1 % — SIGNIFICANT CHANGE UP (ref 0–2)
BILIRUB SERPL-MCNC: 0.6 MG/DL — SIGNIFICANT CHANGE UP (ref 0.2–1.2)
BUN SERPL-MCNC: 23 MG/DL — SIGNIFICANT CHANGE UP (ref 7–23)
CALCIUM SERPL-MCNC: 8.3 MG/DL — LOW (ref 8.4–10.5)
CHLORIDE SERPL-SCNC: 103 MMOL/L — SIGNIFICANT CHANGE UP (ref 96–108)
CK SERPL-CCNC: 24 U/L — LOW (ref 30–200)
CO2 SERPL-SCNC: 27 MMOL/L — SIGNIFICANT CHANGE UP (ref 22–31)
CREAT SERPL-MCNC: 0.61 MG/DL — SIGNIFICANT CHANGE UP (ref 0.5–1.3)
CRP SERPL-MCNC: 0.05 MG/DL — SIGNIFICANT CHANGE UP (ref 0–0.4)
D DIMER BLD IA.RAPID-MCNC: 391 NG/ML DDU — HIGH
EOSINOPHIL # BLD AUTO: 0.36 K/UL — SIGNIFICANT CHANGE UP (ref 0–0.5)
EOSINOPHIL NFR BLD AUTO: 2.7 % — SIGNIFICANT CHANGE UP (ref 0–6)
FERRITIN SERPL-MCNC: 156 NG/ML — SIGNIFICANT CHANGE UP (ref 30–400)
FUNGITELL: 55 PG/ML — SIGNIFICANT CHANGE UP
GALACTOMANNAN AG SERPL-ACNC: <0.5 INDEX — SIGNIFICANT CHANGE UP
GAS PNL BLDA: SIGNIFICANT CHANGE UP
GLUCOSE BLDC GLUCOMTR-MCNC: 146 MG/DL — HIGH (ref 70–99)
GLUCOSE BLDC GLUCOMTR-MCNC: 147 MG/DL — HIGH (ref 70–99)
GLUCOSE SERPL-MCNC: 126 MG/DL — HIGH (ref 70–99)
HCO3 BLDA-SCNC: 28 MMOL/L — SIGNIFICANT CHANGE UP (ref 21–28)
HCO3 BLDA-SCNC: 31 MMOL/L — HIGH (ref 21–28)
HCT VFR BLD CALC: 35.5 % — LOW (ref 39–50)
HGB BLD-MCNC: 10.5 G/DL — LOW (ref 13–17)
IMM GRANULOCYTES NFR BLD AUTO: 0.4 % — SIGNIFICANT CHANGE UP (ref 0–1.5)
LYMPHOCYTES # BLD AUTO: 1.77 K/UL — SIGNIFICANT CHANGE UP (ref 1–3.3)
LYMPHOCYTES # BLD AUTO: 13.4 % — SIGNIFICANT CHANGE UP (ref 13–44)
MAGNESIUM SERPL-MCNC: 2.2 MG/DL — SIGNIFICANT CHANGE UP (ref 1.6–2.6)
MCHC RBC-ENTMCNC: 23.3 PG — LOW (ref 27–34)
MCHC RBC-ENTMCNC: 29.6 GM/DL — LOW (ref 32–36)
MCV RBC AUTO: 78.7 FL — LOW (ref 80–100)
MONOCYTES # BLD AUTO: 0.7 K/UL — SIGNIFICANT CHANGE UP (ref 0–0.9)
MONOCYTES NFR BLD AUTO: 5.3 % — SIGNIFICANT CHANGE UP (ref 2–14)
NEUTROPHILS # BLD AUTO: 10.27 K/UL — HIGH (ref 1.8–7.4)
NEUTROPHILS NFR BLD AUTO: 78.1 % — HIGH (ref 43–77)
NRBC # BLD: 0 /100 WBCS — SIGNIFICANT CHANGE UP (ref 0–0)
PCO2 BLDA: 46 MMHG — SIGNIFICANT CHANGE UP (ref 35–48)
PCO2 BLDA: 52 MMHG — HIGH (ref 35–48)
PH BLDA: 7.38 — SIGNIFICANT CHANGE UP (ref 7.35–7.45)
PH BLDA: 7.41 — SIGNIFICANT CHANGE UP (ref 7.35–7.45)
PHOSPHATE SERPL-MCNC: 3.3 MG/DL — SIGNIFICANT CHANGE UP (ref 2.5–4.5)
PLATELET # BLD AUTO: 232 K/UL — SIGNIFICANT CHANGE UP (ref 150–400)
PO2 BLDA: 129 MMHG — HIGH (ref 83–108)
PO2 BLDA: 84 MMHG — SIGNIFICANT CHANGE UP (ref 83–108)
POTASSIUM SERPL-MCNC: 4 MMOL/L — SIGNIFICANT CHANGE UP (ref 3.5–5.3)
POTASSIUM SERPL-SCNC: 4 MMOL/L — SIGNIFICANT CHANGE UP (ref 3.5–5.3)
PROCALCITONIN SERPL-MCNC: 0.24 NG/ML — HIGH (ref 0.02–0.1)
PROT SERPL-MCNC: 5.3 G/DL — LOW (ref 6–8.3)
RBC # BLD: 4.51 M/UL — SIGNIFICANT CHANGE UP (ref 4.2–5.8)
RBC # FLD: 20.5 % — HIGH (ref 10.3–14.5)
SAO2 % BLDA: 96 % — SIGNIFICANT CHANGE UP (ref 95–100)
SAO2 % BLDA: 98 % — SIGNIFICANT CHANGE UP (ref 95–100)
SODIUM SERPL-SCNC: 141 MMOL/L — SIGNIFICANT CHANGE UP (ref 135–145)
WBC # BLD: 13.16 K/UL — HIGH (ref 3.8–10.5)
WBC # FLD AUTO: 13.16 K/UL — HIGH (ref 3.8–10.5)

## 2020-12-10 PROCEDURE — 71045 X-RAY EXAM CHEST 1 VIEW: CPT | Mod: 26

## 2020-12-10 PROCEDURE — 99291 CRITICAL CARE FIRST HOUR: CPT

## 2020-12-10 RX ORDER — AMIODARONE HYDROCHLORIDE 400 MG/1
400 TABLET ORAL EVERY 12 HOURS
Refills: 0 | Status: DISCONTINUED | OUTPATIENT
Start: 2020-12-10 | End: 2020-12-20

## 2020-12-10 RX ADMIN — Medication 1 APPLICATION(S): at 11:37

## 2020-12-10 RX ADMIN — Medication 1 APPLICATION(S): at 05:33

## 2020-12-10 RX ADMIN — POLYETHYLENE GLYCOL 3350 17 GRAM(S): 17 POWDER, FOR SOLUTION ORAL at 11:37

## 2020-12-10 RX ADMIN — INSULIN GLARGINE 15 UNIT(S): 100 INJECTION, SOLUTION SUBCUTANEOUS at 23:22

## 2020-12-10 RX ADMIN — CISATRACURIUM BESYLATE 16 MICROGRAM(S)/KG/MIN: 2 INJECTION INTRAVENOUS at 00:11

## 2020-12-10 RX ADMIN — SENNA PLUS 2 TABLET(S): 8.6 TABLET ORAL at 23:22

## 2020-12-10 RX ADMIN — PROPOFOL 2.67 MICROGRAM(S)/KG/MIN: 10 INJECTION, EMULSION INTRAVENOUS at 23:26

## 2020-12-10 RX ADMIN — ENOXAPARIN SODIUM 90 MILLIGRAM(S): 100 INJECTION SUBCUTANEOUS at 10:08

## 2020-12-10 RX ADMIN — AMIODARONE HYDROCHLORIDE 400 MILLIGRAM(S): 400 TABLET ORAL at 15:30

## 2020-12-10 RX ADMIN — CHLORHEXIDINE GLUCONATE 15 MILLILITER(S): 213 SOLUTION TOPICAL at 17:32

## 2020-12-10 RX ADMIN — CHLORHEXIDINE GLUCONATE 15 MILLILITER(S): 213 SOLUTION TOPICAL at 05:36

## 2020-12-10 RX ADMIN — PROPOFOL 2.67 MICROGRAM(S)/KG/MIN: 10 INJECTION, EMULSION INTRAVENOUS at 05:33

## 2020-12-10 RX ADMIN — PROPOFOL 2.67 MICROGRAM(S)/KG/MIN: 10 INJECTION, EMULSION INTRAVENOUS at 01:49

## 2020-12-10 RX ADMIN — FENTANYL CITRATE 2.22 MICROGRAM(S)/KG/HR: 50 INJECTION INTRAVENOUS at 01:18

## 2020-12-10 RX ADMIN — Medication 1 APPLICATION(S): at 17:32

## 2020-12-10 RX ADMIN — ENOXAPARIN SODIUM 90 MILLIGRAM(S): 100 INJECTION SUBCUTANEOUS at 23:22

## 2020-12-10 RX ADMIN — CHLORHEXIDINE GLUCONATE 1 APPLICATION(S): 213 SOLUTION TOPICAL at 05:35

## 2020-12-10 RX ADMIN — PANTOPRAZOLE SODIUM 40 MILLIGRAM(S): 20 TABLET, DELAYED RELEASE ORAL at 11:37

## 2020-12-10 RX ADMIN — PHENYLEPHRINE HYDROCHLORIDE 1.67 MICROGRAM(S)/KG/MIN: 10 INJECTION INTRAVENOUS at 23:24

## 2020-12-10 RX ADMIN — FENTANYL CITRATE 2.22 MICROGRAM(S)/KG/HR: 50 INJECTION INTRAVENOUS at 15:27

## 2020-12-10 NOTE — PROGRESS NOTE ADULT - ASSESSMENT
MAGGIE SAHNI is a 70 yr old male with PMhx of DMT2, Afib on xeralto, HTN, HLD who was admitted to [facility] on 11/27 and started on remdisivir and decadron on 11/28, received convalexcent plasma on 11/30, as well as Toci on 12/2, but was intubated on 12/2 due to worsening O2 sats. Also treated with Zosyn since 12/2 and 7 days of Levoquin (12/2- 12/8), along with  zyvox and caspofungin on 12/8. Transferred to [facility] 12/8 overnight.     NEUROLOGY  - fentanyl and propofol  - on nimbex    RESPIRATORY   f/u sputum cx  - ABG good today  - cont current vent setting (370cc)6cc/kg TV and PEEP 10    CARDIOVASCULAR  H/o A-fib  - c/w Amiodarone gtt 0.5  - on [place]    RENAL   - goal I/O net even  - double concentrate fentanyl infusion to reduce fluid volume  - strict I/O tracking    ENDOCRINE  - goal 140-180 blood glucose  - SSI  - On lantus 15 qd    GASTROINTESTINAL  - glucerna tube feeds  - protonix 40  - bowel regimen    INFECTIOUS DISEASE  - f/u cultures: sputum, blood, urine    HEMATOLOGY/ONCOLOGY  - lovenox full anticoagulation for Afib - 90 q12    ACCESS/LINES/DRAINS  - central line and A line  - condom cath    ETHICS/GOALS OF CARE  - update family daily    PROPHYLACTIC  ELECTROLYTES: Mg<2, K<4  DIET: regular  GI PPX: protonix  VTE PPX: SCD, lovenox  CODE: FULL  DISPO: MICU   MAGGIE SAHNI is a 70 yr old male with PMhx of DMT2, Afib on xarelto, HTN, HLD who was admitted to [facility] on 11/27 and started on remdisivir and decadron on 11/28, received convalexcent plasma on 11/30, as well as Toci on 12/2, but was intubated on 12/2 due to worsening O2 sats. Also treated with Zosyn since 12/2 and 7 days of Levoquin (12/2- 12/8), along with  zyvox and caspofungin on 12/8. Transferred to [facility] 12/8 overnight.     NEUROLOGY  - fentanyl and propofol  - on nimbex    RESPIRATORY   f/u sputum cx  - ABG good today  - cont current vent setting (370cc)6cc/kg TV and PEEP 10    CARDIOVASCULAR  H/o A-fib  - c/w Amiodarone gtt 0.5  - on [place]    RENAL   - goal I/O net even  - double concentrate fentanyl infusion to reduce fluid volume  - strict I/O tracking    ENDOCRINE  - goal 140-180 blood glucose  - SSI  - On lantus 15 qd    GASTROINTESTINAL  - glucerna tube feeds  - protonix 40  - bowel regimen    INFECTIOUS DISEASE  - f/u cultures: sputum, blood, urine    HEMATOLOGY/ONCOLOGY  - lovenox full anticoagulation for Afib - 90 q12    ACCESS/LINES/DRAINS  - central line and A line  - condom cath    ETHICS/GOALS OF CARE  - update family daily    PROPHYLACTIC  ELECTROLYTES: Mg<2, K<4  DIET: regular  GI PPX: protonix  VTE PPX: SCD, lovenox  CODE: FULL  DISPO: MICU

## 2020-12-10 NOTE — PROGRESS NOTE ADULT - SUBJECTIVE AND OBJECTIVE BOX
Medicine Progress Note    Patient is a 70y old  Male who presents with a chief complaint of COVID-19 pna (10 Dec 2020 07:56)      SUBJECTIVE / OVERNIGHT EVENTS: MIKI o/n    ADDITIONAL REVIEW OF SYSTEMS:    MEDICATIONS  (STANDING):  aMIOdarone    Tablet 400 milliGRAM(s) Oral every 12 hours  aMIOdarone Infusion 0.5 mG/Min (16.7 mL/Hr) IV Continuous <Continuous>  chlorhexidine 0.12% Liquid 15 milliLiter(s) Oral Mucosa every 12 hours  chlorhexidine 2% Cloths 1 Application(s) Topical <User Schedule>  cisatracurium Infusion 3 MICROgram(s)/kG/Min (16 mL/Hr) IV Continuous <Continuous>  dextrose 40% Gel 15 Gram(s) Oral once  dextrose 5%. 1000 milliLiter(s) (50 mL/Hr) IV Continuous <Continuous>  dextrose 5%. 1000 milliLiter(s) (100 mL/Hr) IV Continuous <Continuous>  dextrose 50% Injectable 25 Gram(s) IV Push once  dextrose 50% Injectable 12.5 Gram(s) IV Push once  dextrose 50% Injectable 25 Gram(s) IV Push once  enoxaparin Injectable 90 milliGRAM(s) SubCutaneous every 12 hours  fentaNYL   Infusion... 0.5 MICROgram(s)/kG/Hr (2.22 mL/Hr) IV Continuous <Continuous>  glucagon  Injectable 1 milliGRAM(s) IntraMuscular once  insulin glargine Injectable (LANTUS) 15 Unit(s) SubCutaneous at bedtime  pantoprazole   Suspension 40 milliGRAM(s) Enteral Tube daily  petrolatum Ophthalmic Ointment 1 Application(s) Both EYES every 6 hours  phenylephrine    Infusion 0.1 MICROgram(s)/kG/Min (1.67 mL/Hr) IV Continuous <Continuous>  polyethylene glycol 3350 17 Gram(s) Oral daily  propofol Infusion 5 MICROgram(s)/kG/Min (2.67 mL/Hr) IV Continuous <Continuous>  senna 2 Tablet(s) Oral at bedtime    MEDICATIONS  (PRN):    CAPILLARY BLOOD GLUCOSE      POCT Blood Glucose.: 147 mg/dL (10 Dec 2020 13:09)  POCT Blood Glucose.: 181 mg/dL (09 Dec 2020 22:58)  POCT Blood Glucose.: 251 mg/dL (09 Dec 2020 17:56)    I&O's Summary    09 Dec 2020 07:01  -  10 Dec 2020 07:00  --------------------------------------------------------  IN: 2127.9 mL / OUT: 2850 mL / NET: -722.1 mL    10 Dec 2020 07:01  -  10 Dec 2020 17:01  --------------------------------------------------------  IN: 425.4 mL / OUT: 550 mL / NET: -124.6 mL        PHYSICAL EXAM:  Vital Signs Last 24 Hrs  T(C): 36.6 (10 Dec 2020 14:08), Max: 37.6 (09 Dec 2020 21:00)  T(F): 97.9 (10 Dec 2020 14:08), Max: 99.6 (09 Dec 2020 21:00)  HR: 70 (10 Dec 2020 15:00) (70 - 155)  BP: 96/51 (10 Dec 2020 08:00) (96/51 - 125/60)  BP(mean): 68 (10 Dec 2020 08:00) (68 - 85)  RR: 20 (10 Dec 2020 16:00) (20 - 20)  SpO2: 93% (10 Dec 2020 16:00) (89% - 97%)  CONSTITUTIONAL: NAD, well-developed, well-groomed  ENMT: Moist oral mucosa, no pharyngeal injection or exudates; normal dentition  RESPIRATORY: Normal respiratory effort; lungs are clear to auscultation bilaterally  CARDIOVASCULAR: Regular rate and rhythm, normal S1 and S2, no murmur/rub/gallop; No lower extremity edema; Peripheral pulses are 2+ bilaterally  ABDOMEN: Nontender to palpation, normoactive bowel sounds, no rebound/guarding; No hepatosplenomegaly  PSYCH: A+O to person, place, and time; affect appropriate  NEUROLOGY: CN 2-12 are intact and symmetric; no gross sensory deficits   SKIN: No rashes; no palpable lesions    LABS:                        10.5   13.16 )-----------( 232      ( 10 Dec 2020 04:48 )             35.5     12-10    141  |  103  |  23  ----------------------------<  126<H>  4.0   |  27  |  0.61    Ca    8.3<L>      10 Dec 2020 04:48  Phos  3.3     12-10  Mg     2.2     12-10    TPro  5.3<L>  /  Alb  3.0<L>  /  TBili  0.6  /  DBili  x   /  AST  21  /  ALT  42  /  AlkPhos  44  12-10    PT/INR - ( 09 Dec 2020 06:00 )   PT: 13.3 sec;   INR: 1.11          PTT - ( 09 Dec 2020 06:00 )  PTT:35.1 sec  CARDIAC MARKERS ( 10 Dec 2020 04:48 )  x     / x     / 24 U/L / x     / x              Culture - Sputum (collected 09 Dec 2020 19:22)  Source: .Sputum Sputum  Gram Stain (09 Dec 2020 19:36):    Rare epithelial cells    Moderate WBC's    Few Gram Positive Cocci in Clusters    Rare Gram Variable Rods    Rare Yeast  Preliminary Report (10 Dec 2020 08:36):    Normal Respiratory Delmy present to date    Culture - Blood (collected 09 Dec 2020 05:30)  Source: .Blood Blood  Preliminary Report (10 Dec 2020 06:00):    No growth at 1 day.    Culture - Blood (collected 09 Dec 2020 05:30)  Source: .Blood Blood  Preliminary Report (10 Dec 2020 06:00):    No growth at 1 day.    Culture - Blood (collected 08 Dec 2020 12:18)  Source: .Blood None  Preliminary Report (09 Dec 2020 23:02):    No growth to date.    Culture - Fungal, Blood (collected 08 Dec 2020 12:18)  Source: .Blood Blood-Peripheral  Preliminary Report (09 Dec 2020 09:21):    Testing in progress      SARS-CoV-2: Detected (27 Nov 2020 19:30)      RADIOLOGY & ADDITIONAL TESTS:  Imaging from Last 24 Hours:    Electrocardiogram/QTc Interval:    COORDINATION OF CARE:  Care Discussed with Consultants/Other Providers:   Medicine Progress Note    Patient is a 70y old  Male who presents with a chief complaint of COVID-19 pna (10 Dec 2020 07:56)      SUBJECTIVE / OVERNIGHT EVENTS: MIKI o/n. Intubated and sedated.    ADDITIONAL REVIEW OF SYSTEMS:    MEDICATIONS  (STANDING):  aMIOdarone    Tablet 400 milliGRAM(s) Oral every 12 hours  aMIOdarone Infusion 0.5 mG/Min (16.7 mL/Hr) IV Continuous <Continuous>  chlorhexidine 0.12% Liquid 15 milliLiter(s) Oral Mucosa every 12 hours  chlorhexidine 2% Cloths 1 Application(s) Topical <User Schedule>  cisatracurium Infusion 3 MICROgram(s)/kG/Min (16 mL/Hr) IV Continuous <Continuous>  dextrose 40% Gel 15 Gram(s) Oral once  dextrose 5%. 1000 milliLiter(s) (50 mL/Hr) IV Continuous <Continuous>  dextrose 5%. 1000 milliLiter(s) (100 mL/Hr) IV Continuous <Continuous>  dextrose 50% Injectable 25 Gram(s) IV Push once  dextrose 50% Injectable 12.5 Gram(s) IV Push once  dextrose 50% Injectable 25 Gram(s) IV Push once  enoxaparin Injectable 90 milliGRAM(s) SubCutaneous every 12 hours  fentaNYL   Infusion... 0.5 MICROgram(s)/kG/Hr (2.22 mL/Hr) IV Continuous <Continuous>  glucagon  Injectable 1 milliGRAM(s) IntraMuscular once  insulin glargine Injectable (LANTUS) 15 Unit(s) SubCutaneous at bedtime  pantoprazole   Suspension 40 milliGRAM(s) Enteral Tube daily  petrolatum Ophthalmic Ointment 1 Application(s) Both EYES every 6 hours  phenylephrine    Infusion 0.1 MICROgram(s)/kG/Min (1.67 mL/Hr) IV Continuous <Continuous>  polyethylene glycol 3350 17 Gram(s) Oral daily  propofol Infusion 5 MICROgram(s)/kG/Min (2.67 mL/Hr) IV Continuous <Continuous>  senna 2 Tablet(s) Oral at bedtime    MEDICATIONS  (PRN):    CAPILLARY BLOOD GLUCOSE      POCT Blood Glucose.: 147 mg/dL (10 Dec 2020 13:09)  POCT Blood Glucose.: 181 mg/dL (09 Dec 2020 22:58)  POCT Blood Glucose.: 251 mg/dL (09 Dec 2020 17:56)    I&O's Summary    09 Dec 2020 07:01  -  10 Dec 2020 07:00  --------------------------------------------------------  IN: 2127.9 mL / OUT: 2850 mL / NET: -722.1 mL    10 Dec 2020 07:01  -  10 Dec 2020 17:01  --------------------------------------------------------  IN: 425.4 mL / OUT: 550 mL / NET: -124.6 mL        PHYSICAL EXAM:  Vital Signs Last 24 Hrs  T(C): 36.6 (10 Dec 2020 14:08), Max: 37.6 (09 Dec 2020 21:00)  T(F): 97.9 (10 Dec 2020 14:08), Max: 99.6 (09 Dec 2020 21:00)  HR: 70 (10 Dec 2020 15:00) (70 - 155)  BP: 96/51 (10 Dec 2020 08:00) (96/51 - 125/60)  BP(mean): 68 (10 Dec 2020 08:00) (68 - 85)  RR: 20 (10 Dec 2020 16:00) (20 - 20)  SpO2: 93% (10 Dec 2020 16:00) (89% - 97%)  CONSTITUTIONAL: NAD, well-developed, well-groomed  ENMT: Moist oral mucosa, no pharyngeal injection or exudates; normal dentition  RESPIRATORY: Normal respiratory effort; lungs are clear to auscultation bilaterally  CARDIOVASCULAR: Regular rate and rhythm, normal S1 and S2, no murmur/rub/gallop; No lower extremity edema; Peripheral pulses are 2+ bilaterally  ABDOMEN: Nontender to palpation, normoactive bowel sounds, no rebound/guarding; No hepatosplenomegaly  PSYCH: A+O to person, place, and time; affect appropriate  NEUROLOGY: CN 2-12 are intact and symmetric; no gross sensory deficits   SKIN: No rashes; no palpable lesions    LABS:                        10.5   13.16 )-----------( 232      ( 10 Dec 2020 04:48 )             35.5     12-10    141  |  103  |  23  ----------------------------<  126<H>  4.0   |  27  |  0.61    Ca    8.3<L>      10 Dec 2020 04:48  Phos  3.3     12-10  Mg     2.2     12-10    TPro  5.3<L>  /  Alb  3.0<L>  /  TBili  0.6  /  DBili  x   /  AST  21  /  ALT  42  /  AlkPhos  44  12-10    PT/INR - ( 09 Dec 2020 06:00 )   PT: 13.3 sec;   INR: 1.11          PTT - ( 09 Dec 2020 06:00 )  PTT:35.1 sec  CARDIAC MARKERS ( 10 Dec 2020 04:48 )  x     / x     / 24 U/L / x     / x              Culture - Sputum (collected 09 Dec 2020 19:22)  Source: .Sputum Sputum  Gram Stain (09 Dec 2020 19:36):    Rare epithelial cells    Moderate WBC's    Few Gram Positive Cocci in Clusters    Rare Gram Variable Rods    Rare Yeast  Preliminary Report (10 Dec 2020 08:36):    Normal Respiratory Delmy present to date    Culture - Blood (collected 09 Dec 2020 05:30)  Source: .Blood Blood  Preliminary Report (10 Dec 2020 06:00):    No growth at 1 day.    Culture - Blood (collected 09 Dec 2020 05:30)  Source: .Blood Blood  Preliminary Report (10 Dec 2020 06:00):    No growth at 1 day.    Culture - Blood (collected 08 Dec 2020 12:18)  Source: .Blood None  Preliminary Report (09 Dec 2020 23:02):    No growth to date.    Culture - Fungal, Blood (collected 08 Dec 2020 12:18)  Source: .Blood Blood-Peripheral  Preliminary Report (09 Dec 2020 09:21):    Testing in progress      SARS-CoV-2: Detected (27 Nov 2020 19:30)      RADIOLOGY & ADDITIONAL TESTS:  Imaging from Last 24 Hours:    Electrocardiogram/QTc Interval:    COORDINATION OF CARE:  Care Discussed with Consultants/Other Providers:

## 2020-12-11 LAB
ALBUMIN SERPL ELPH-MCNC: 2.9 G/DL — LOW (ref 3.3–5)
ALP SERPL-CCNC: 58 U/L — SIGNIFICANT CHANGE UP (ref 40–120)
ALT FLD-CCNC: 37 U/L — SIGNIFICANT CHANGE UP (ref 10–45)
ANION GAP SERPL CALC-SCNC: 7 MMOL/L — SIGNIFICANT CHANGE UP (ref 5–17)
AST SERPL-CCNC: 29 U/L — SIGNIFICANT CHANGE UP (ref 10–40)
BASE EXCESS BLDA CALC-SCNC: 5.5 MMOL/L — HIGH (ref -2–3)
BASOPHILS # BLD AUTO: 0.01 K/UL — SIGNIFICANT CHANGE UP (ref 0–0.2)
BASOPHILS NFR BLD AUTO: 0.1 % — SIGNIFICANT CHANGE UP (ref 0–2)
BILIRUB SERPL-MCNC: 0.6 MG/DL — SIGNIFICANT CHANGE UP (ref 0.2–1.2)
BUN SERPL-MCNC: 18 MG/DL — SIGNIFICANT CHANGE UP (ref 7–23)
CALCIUM SERPL-MCNC: 8.4 MG/DL — SIGNIFICANT CHANGE UP (ref 8.4–10.5)
CHLORIDE SERPL-SCNC: 103 MMOL/L — SIGNIFICANT CHANGE UP (ref 96–108)
CK SERPL-CCNC: 228 U/L — HIGH (ref 30–200)
CO2 SERPL-SCNC: 30 MMOL/L — SIGNIFICANT CHANGE UP (ref 22–31)
CREAT SERPL-MCNC: 0.54 MG/DL — SIGNIFICANT CHANGE UP (ref 0.5–1.3)
CRP SERPL-MCNC: 0.15 MG/DL — SIGNIFICANT CHANGE UP (ref 0–0.4)
CULTURE RESULTS: SIGNIFICANT CHANGE UP
D DIMER BLD IA.RAPID-MCNC: 258 NG/ML DDU — HIGH
EOSINOPHIL # BLD AUTO: 0.25 K/UL — SIGNIFICANT CHANGE UP (ref 0–0.5)
EOSINOPHIL NFR BLD AUTO: 2.4 % — SIGNIFICANT CHANGE UP (ref 0–6)
FERRITIN SERPL-MCNC: 116 NG/ML — SIGNIFICANT CHANGE UP (ref 30–400)
GAS PNL BLDA: SIGNIFICANT CHANGE UP
GLUCOSE BLDC GLUCOMTR-MCNC: 123 MG/DL — HIGH (ref 70–99)
GLUCOSE BLDC GLUCOMTR-MCNC: 146 MG/DL — HIGH (ref 70–99)
GLUCOSE BLDC GLUCOMTR-MCNC: 157 MG/DL — HIGH (ref 70–99)
GLUCOSE BLDC GLUCOMTR-MCNC: 199 MG/DL — HIGH (ref 70–99)
GLUCOSE SERPL-MCNC: 158 MG/DL — HIGH (ref 70–99)
HCO3 BLDA-SCNC: 31 MMOL/L — HIGH (ref 21–28)
HCT VFR BLD CALC: 34 % — LOW (ref 39–50)
HGB BLD-MCNC: 10.2 G/DL — LOW (ref 13–17)
IMM GRANULOCYTES NFR BLD AUTO: 0.6 % — SIGNIFICANT CHANGE UP (ref 0–1.5)
LYMPHOCYTES # BLD AUTO: 1.56 K/UL — SIGNIFICANT CHANGE UP (ref 1–3.3)
LYMPHOCYTES # BLD AUTO: 15.2 % — SIGNIFICANT CHANGE UP (ref 13–44)
MAGNESIUM SERPL-MCNC: 2.2 MG/DL — SIGNIFICANT CHANGE UP (ref 1.6–2.6)
MCHC RBC-ENTMCNC: 24.3 PG — LOW (ref 27–34)
MCHC RBC-ENTMCNC: 30 GM/DL — LOW (ref 32–36)
MCV RBC AUTO: 81.1 FL — SIGNIFICANT CHANGE UP (ref 80–100)
MONOCYTES # BLD AUTO: 0.69 K/UL — SIGNIFICANT CHANGE UP (ref 0–0.9)
MONOCYTES NFR BLD AUTO: 6.7 % — SIGNIFICANT CHANGE UP (ref 2–14)
NEUTROPHILS # BLD AUTO: 7.7 K/UL — HIGH (ref 1.8–7.4)
NEUTROPHILS NFR BLD AUTO: 75 % — SIGNIFICANT CHANGE UP (ref 43–77)
NRBC # BLD: 0 /100 WBCS — SIGNIFICANT CHANGE UP (ref 0–0)
PCO2 BLDA: 49 MMHG — HIGH (ref 35–48)
PH BLDA: 7.41 — SIGNIFICANT CHANGE UP (ref 7.35–7.45)
PHOSPHATE SERPL-MCNC: 2.5 MG/DL — SIGNIFICANT CHANGE UP (ref 2.5–4.5)
PLATELET # BLD AUTO: 205 K/UL — SIGNIFICANT CHANGE UP (ref 150–400)
PO2 BLDA: 114 MMHG — HIGH (ref 83–108)
POTASSIUM SERPL-MCNC: 4.1 MMOL/L — SIGNIFICANT CHANGE UP (ref 3.5–5.3)
POTASSIUM SERPL-SCNC: 4.1 MMOL/L — SIGNIFICANT CHANGE UP (ref 3.5–5.3)
PROT SERPL-MCNC: 5 G/DL — LOW (ref 6–8.3)
RBC # BLD: 4.19 M/UL — LOW (ref 4.2–5.8)
RBC # FLD: 21.2 % — HIGH (ref 10.3–14.5)
SAO2 % BLDA: 98 % — SIGNIFICANT CHANGE UP (ref 95–100)
SODIUM SERPL-SCNC: 140 MMOL/L — SIGNIFICANT CHANGE UP (ref 135–145)
SPECIMEN SOURCE: SIGNIFICANT CHANGE UP
TRIGL SERPL-MCNC: 496 MG/DL — HIGH
TSH SERPL-MCNC: 1.81 UIU/ML — SIGNIFICANT CHANGE UP (ref 0.35–4.94)
WBC # BLD: 10.27 K/UL — SIGNIFICANT CHANGE UP (ref 3.8–10.5)
WBC # FLD AUTO: 10.27 K/UL — SIGNIFICANT CHANGE UP (ref 3.8–10.5)

## 2020-12-11 PROCEDURE — 99291 CRITICAL CARE FIRST HOUR: CPT

## 2020-12-11 RX ORDER — ACETAMINOPHEN 500 MG
650 TABLET ORAL EVERY 6 HOURS
Refills: 0 | Status: DISCONTINUED | OUTPATIENT
Start: 2020-12-11 | End: 2021-01-07

## 2020-12-11 RX ORDER — FENTANYL CITRATE 50 UG/ML
0.5 INJECTION INTRAVENOUS
Qty: 5000 | Refills: 0 | Status: DISCONTINUED | OUTPATIENT
Start: 2020-12-11 | End: 2020-12-13

## 2020-12-11 RX ORDER — INSULIN HUMAN 100 [IU]/ML
INJECTION, SOLUTION SUBCUTANEOUS EVERY 6 HOURS
Refills: 0 | Status: DISCONTINUED | OUTPATIENT
Start: 2020-12-11 | End: 2020-12-25

## 2020-12-11 RX ORDER — PROPOFOL 10 MG/ML
5 INJECTION, EMULSION INTRAVENOUS
Qty: 1000 | Refills: 0 | Status: DISCONTINUED | OUTPATIENT
Start: 2020-12-11 | End: 2020-12-23

## 2020-12-11 RX ORDER — DEXMEDETOMIDINE HYDROCHLORIDE IN 0.9% SODIUM CHLORIDE 4 UG/ML
0.2 INJECTION INTRAVENOUS
Qty: 400 | Refills: 0 | Status: DISCONTINUED | OUTPATIENT
Start: 2020-12-11 | End: 2020-12-13

## 2020-12-11 RX ADMIN — PROPOFOL 2.67 MICROGRAM(S)/KG/MIN: 10 INJECTION, EMULSION INTRAVENOUS at 22:05

## 2020-12-11 RX ADMIN — PROPOFOL 2.67 MICROGRAM(S)/KG/MIN: 10 INJECTION, EMULSION INTRAVENOUS at 09:24

## 2020-12-11 RX ADMIN — AMIODARONE HYDROCHLORIDE 400 MILLIGRAM(S): 400 TABLET ORAL at 03:58

## 2020-12-11 RX ADMIN — Medication 650 MILLIGRAM(S): at 23:00

## 2020-12-11 RX ADMIN — PANTOPRAZOLE SODIUM 40 MILLIGRAM(S): 20 TABLET, DELAYED RELEASE ORAL at 12:00

## 2020-12-11 RX ADMIN — Medication 1 APPLICATION(S): at 12:00

## 2020-12-11 RX ADMIN — Medication 1 APPLICATION(S): at 17:12

## 2020-12-11 RX ADMIN — DEXMEDETOMIDINE HYDROCHLORIDE IN 0.9% SODIUM CHLORIDE 4.45 MICROGRAM(S)/KG/HR: 4 INJECTION INTRAVENOUS at 12:44

## 2020-12-11 RX ADMIN — Medication 10 MILLIGRAM(S): at 10:49

## 2020-12-11 RX ADMIN — Medication 650 MILLIGRAM(S): at 22:06

## 2020-12-11 RX ADMIN — ENOXAPARIN SODIUM 90 MILLIGRAM(S): 100 INJECTION SUBCUTANEOUS at 22:05

## 2020-12-11 RX ADMIN — FENTANYL CITRATE 2.22 MICROGRAM(S)/KG/HR: 50 INJECTION INTRAVENOUS at 03:58

## 2020-12-11 RX ADMIN — POLYETHYLENE GLYCOL 3350 17 GRAM(S): 17 POWDER, FOR SOLUTION ORAL at 12:00

## 2020-12-11 RX ADMIN — INSULIN HUMAN 2: 100 INJECTION, SOLUTION SUBCUTANEOUS at 17:11

## 2020-12-11 RX ADMIN — CHLORHEXIDINE GLUCONATE 15 MILLILITER(S): 213 SOLUTION TOPICAL at 17:11

## 2020-12-11 RX ADMIN — Medication 1 APPLICATION(S): at 05:15

## 2020-12-11 RX ADMIN — SENNA PLUS 2 TABLET(S): 8.6 TABLET ORAL at 22:06

## 2020-12-11 RX ADMIN — DEXMEDETOMIDINE HYDROCHLORIDE IN 0.9% SODIUM CHLORIDE 4.45 MICROGRAM(S)/KG/HR: 4 INJECTION INTRAVENOUS at 22:06

## 2020-12-11 RX ADMIN — ENOXAPARIN SODIUM 90 MILLIGRAM(S): 100 INJECTION SUBCUTANEOUS at 09:30

## 2020-12-11 RX ADMIN — INSULIN GLARGINE 15 UNIT(S): 100 INJECTION, SOLUTION SUBCUTANEOUS at 22:05

## 2020-12-11 RX ADMIN — CHLORHEXIDINE GLUCONATE 1 APPLICATION(S): 213 SOLUTION TOPICAL at 05:13

## 2020-12-11 RX ADMIN — AMIODARONE HYDROCHLORIDE 400 MILLIGRAM(S): 400 TABLET ORAL at 17:11

## 2020-12-11 RX ADMIN — CHLORHEXIDINE GLUCONATE 15 MILLILITER(S): 213 SOLUTION TOPICAL at 05:13

## 2020-12-11 NOTE — DIETITIAN INITIAL EVALUATION ADULT. - ADD RECOMMEND
1. Continue w/current EN order as propofol is titrated. Will provide formal EN recs over next 24-48hrs 2. Monitor lytes and replete prn. POC BG q6hrs (goal 140-180mg/dL) 3. Please ensure BM 1x/day 4. Consider addition of multivitamin

## 2020-12-11 NOTE — DIETITIAN INITIAL EVALUATION ADULT. - OTHER CALCULATIONS
Based on [facility] critical care guidelines for the treatment of critically ill patients with COVID-19, recommend EER as listed above (143% IBW).

## 2020-12-11 NOTE — PROGRESS NOTE ADULT - SUBJECTIVE AND OBJECTIVE BOX
OVERNIGHT EVENTS: None.    SUBJECTIVE / INTERVAL HPI: Patient seen and examined at bedside. intubated and sedated. NAD.    VITAL SIGNS:  Vital Signs Last 24 Hrs  T(C): 37.4 (13 Dec 2020 21:00), Max: 37.4 (13 Dec 2020 21:00)  T(F): 99.4 (13 Dec 2020 21:00), Max: 99.4 (13 Dec 2020 21:00)  HR: 64 (14 Dec 2020 07:00) (61 - 105)  BP: 93/52 (14 Dec 2020 07:00) (93/52 - 131/54)  BP(mean): 67 (14 Dec 2020 07:00) (61 - 90)  RR: 26 (14 Dec 2020 07:00) (15 - 37)  SpO2: 97% (14 Dec 2020 07:00) (82% - 99%)  I&O's Summary    13 Dec 2020 07:01  -  14 Dec 2020 07:00  --------------------------------------------------------  IN: 2286.4 mL / OUT: 2050 mL / NET: 236.4 mL        PHYSICAL EXAM:    General: Sedated, NAD  HEENT: NC/AT; PERRL, anicteric sclera; MMM  Neck: supple  Cardiovascular: +S1/S2; RRR  Respiratory: CTA B/L; no W/R/R  Gastrointestinal: soft, NT/ND; +BSx4  Extremities: WWP; no edema, clubbing or cyanosis  Vascular: 2+ radial, DP/PT pulses B/L  Neurological: AAOx0; sedated    MEDICATIONS:  MEDICATIONS  (STANDING):  aMIOdarone    Tablet 400 milliGRAM(s) Oral every 12 hours  chlorhexidine 2% Cloths 1 Application(s) Topical <User Schedule>  dextrose 40% Gel 15 Gram(s) Oral once  dextrose 5%. 1000 milliLiter(s) (50 mL/Hr) IV Continuous <Continuous>  dextrose 5%. 1000 milliLiter(s) (100 mL/Hr) IV Continuous <Continuous>  dextrose 50% Injectable 25 Gram(s) IV Push once  dextrose 50% Injectable 12.5 Gram(s) IV Push once  dextrose 50% Injectable 25 Gram(s) IV Push once  enoxaparin Injectable 90 milliGRAM(s) SubCutaneous every 12 hours  glucagon  Injectable 1 milliGRAM(s) IntraMuscular once  insulin glargine Injectable (LANTUS) 15 Unit(s) SubCutaneous at bedtime  insulin regular  human corrective regimen sliding scale   SubCutaneous every 6 hours  pantoprazole   Suspension 40 milliGRAM(s) Enteral Tube daily  petrolatum Ophthalmic Ointment 1 Application(s) Both EYES every 6 hours  phenylephrine    Infusion 0.1 MICROgram(s)/kG/Min (1.67 mL/Hr) IV Continuous <Continuous>  piperacillin/tazobactam IVPB.. 4.5 Gram(s) IV Intermittent every 6 hours  polyethylene glycol 3350 17 Gram(s) Oral daily  propofol Infusion 5 MICROgram(s)/kG/Min (2.67 mL/Hr) IV Continuous <Continuous>  senna 2 Tablet(s) Oral at bedtime    MEDICATIONS  (PRN):  acetaminophen    Suspension .. 650 milliGRAM(s) Oral every 6 hours PRN Temp greater or equal to 38C (100.4F), Mild Pain (1 - 3)      ALLERGIES:  Allergies    No Known Allergies    Intolerances        LABS:                        9.4    7.89  )-----------( 168      ( 14 Dec 2020 05:40 )             30.6     12-14    140  |  106  |  8   ----------------------------<  91  3.5   |  24  |  0.47<L>    Ca    7.8<L>      14 Dec 2020 05:40  Phos  2.3     12-  Mg     2.2     12-14    TPro  5.0<L>  /  Alb  2.7<L>  /  TBili  0.9  /  DBili  x   /  AST  44<H>  /  ALT  53<H>  /  AlkPhos  55  12-14    PT/INR - ( 13 Dec 2020 05:59 )   PT: 14.7 sec;   INR: 1.24          PTT - ( 13 Dec 2020 05:59 )  PTT:42.0 sec  Urinalysis Basic - ( 12 Dec 2020 11:01 )    Color: Yellow / Appearance: Clear / S.025 / pH: x  Gluc: x / Ketone: 15 mg/dL  / Bili: Negative / Urobili: 1.0 E.U./dL   Blood: x / Protein: Trace mg/dL / Nitrite: NEGATIVE   Leuk Esterase: NEGATIVE / RBC: < 5 /HPF / WBC < 5 /HPF   Sq Epi: x / Non Sq Epi: x / Bacteria: Present /HPF      CAPILLARY BLOOD GLUCOSE      POCT Blood Glucose.: 82 mg/dL (14 Dec 2020 06:40)      RADIOLOGY & ADDITIONAL TESTS: Reviewed.

## 2020-12-11 NOTE — PROVIDER CONTACT NOTE (CHANGE IN STATUS NOTIFICATION) - SITUATION
After turning patient, HR changed from SR in 80s to Afib in 160s. Hypertensive into the 180s systollically. After turning patient, HR changed from ST in the 100s to Afib in 160s. Hypertensive into the 180s systollically.

## 2020-12-11 NOTE — PROGRESS NOTE ADULT - ASSESSMENT
MAGGIE SAHNI is a 70 yr old male with PMhx of DMT2, Afib on xarelto, HTN, HLD who was admitted to [facility] on 11/27 and started on remdisivir and decadron on 11/28, received convalexcent plasma on 11/30, as well as Toci on 12/2, but was intubated on 12/2 due to worsening O2 sats. Also treated with Zosyn since 12/2 and 7 days of Levoquin (12/2- 12/8), along with  zyvox and caspofungin on 12/8. Transferred to [facility] 12/8 overnight.     NEUROLOGY  - fentanyl and propofol  - d/c nimbex    RESPIRATORY   f/u sputum cx  - ABG good today  - cont current vent setting (370cc)6cc/kg TV and PEEP 10    CARDIOVASCULAR  H/o A-fib  - c/w Amiodarone gtt 0.5  Septic shock  - on phenylephrine    RENAL   - goal I/O net even  - double concentrate fentanyl infusion to reduce fluid volume  - strict I/O tracking    ENDOCRINE  - goal 140-180 blood glucose  - SSI  - On lantus 15 qd    GASTROINTESTINAL  - glucerna tube feeds  - protonix 40  - bowel regimen  - last BM 3 days ago- give suppository and enema    INFECTIOUS DISEASE  - f/u cultures: sputum, blood, urine    HEMATOLOGY/ONCOLOGY  - lovenox full anticoagulation for Afib - 90 q12    ACCESS/LINES/DRAINS  - central line and A line  - condom cath    ETHICS/GOALS OF CARE  - update family daily    PROPHYLACTIC  ELECTROLYTES: Mg<2, K<4  DIET: regular  GI PPX: protonix  VTE PPX: SCD, lovenox  CODE: FULL  DISPO: MICU

## 2020-12-11 NOTE — DIETITIAN INITIAL EVALUATION ADULT. - NAME AND PHONE
Samantha Gitlin, RD, CDN, [facility], [id number] or available on [email or web address]

## 2020-12-11 NOTE — PROVIDER CONTACT NOTE (CHANGE IN STATUS NOTIFICATION) - ACTION/TREATMENT ORDERED:
Restart fentanyl drip and increase precedex. Place pt back on AC. Will order further medication if HR does not improve.

## 2020-12-12 LAB
ALBUMIN SERPL ELPH-MCNC: 2.9 G/DL — LOW (ref 3.3–5)
ALP SERPL-CCNC: 56 U/L — SIGNIFICANT CHANGE UP (ref 40–120)
ALT FLD-CCNC: 39 U/L — SIGNIFICANT CHANGE UP (ref 10–45)
ANION GAP SERPL CALC-SCNC: 8 MMOL/L — SIGNIFICANT CHANGE UP (ref 5–17)
APPEARANCE UR: CLEAR — SIGNIFICANT CHANGE UP
AST SERPL-CCNC: 39 U/L — SIGNIFICANT CHANGE UP (ref 10–40)
BACTERIA # UR AUTO: PRESENT /HPF
BASE EXCESS BLDA CALC-SCNC: 7.7 MMOL/L — HIGH (ref -2–3)
BASOPHILS # BLD AUTO: 0.01 K/UL — SIGNIFICANT CHANGE UP (ref 0–0.2)
BASOPHILS NFR BLD AUTO: 0.2 % — SIGNIFICANT CHANGE UP (ref 0–2)
BILIRUB SERPL-MCNC: 0.8 MG/DL — SIGNIFICANT CHANGE UP (ref 0.2–1.2)
BILIRUB UR-MCNC: NEGATIVE — SIGNIFICANT CHANGE UP
BUN SERPL-MCNC: 12 MG/DL — SIGNIFICANT CHANGE UP (ref 7–23)
CALCIUM SERPL-MCNC: 8 MG/DL — LOW (ref 8.4–10.5)
CHLORIDE SERPL-SCNC: 102 MMOL/L — SIGNIFICANT CHANGE UP (ref 96–108)
CO2 SERPL-SCNC: 31 MMOL/L — SIGNIFICANT CHANGE UP (ref 22–31)
COLOR SPEC: YELLOW — SIGNIFICANT CHANGE UP
CREAT SERPL-MCNC: 0.58 MG/DL — SIGNIFICANT CHANGE UP (ref 0.5–1.3)
CRP SERPL-MCNC: 0.17 MG/DL — SIGNIFICANT CHANGE UP (ref 0–0.4)
D DIMER BLD IA.RAPID-MCNC: 538 NG/ML DDU — HIGH
DIFF PNL FLD: ABNORMAL
EOSINOPHIL # BLD AUTO: 0.16 K/UL — SIGNIFICANT CHANGE UP (ref 0–0.5)
EOSINOPHIL NFR BLD AUTO: 2.5 % — SIGNIFICANT CHANGE UP (ref 0–6)
FERRITIN SERPL-MCNC: 118 NG/ML — SIGNIFICANT CHANGE UP (ref 30–400)
FUNGITELL: 104 PG/ML — HIGH
GLUCOSE BLDC GLUCOMTR-MCNC: 114 MG/DL — HIGH (ref 70–99)
GLUCOSE BLDC GLUCOMTR-MCNC: 135 MG/DL — HIGH (ref 70–99)
GLUCOSE BLDC GLUCOMTR-MCNC: 140 MG/DL — HIGH (ref 70–99)
GLUCOSE BLDC GLUCOMTR-MCNC: 170 MG/DL — HIGH (ref 70–99)
GLUCOSE SERPL-MCNC: 115 MG/DL — HIGH (ref 70–99)
GLUCOSE UR QL: 100
HCO3 BLDA-SCNC: 32 MMOL/L — HIGH (ref 21–28)
HCT VFR BLD CALC: 31.5 % — LOW (ref 39–50)
HGB BLD-MCNC: 9.4 G/DL — LOW (ref 13–17)
IMM GRANULOCYTES NFR BLD AUTO: 0.5 % — SIGNIFICANT CHANGE UP (ref 0–1.5)
KETONES UR-MCNC: 15 MG/DL
LEUKOCYTE ESTERASE UR-ACNC: NEGATIVE — SIGNIFICANT CHANGE UP
LYMPHOCYTES # BLD AUTO: 1.49 K/UL — SIGNIFICANT CHANGE UP (ref 1–3.3)
LYMPHOCYTES # BLD AUTO: 23.5 % — SIGNIFICANT CHANGE UP (ref 13–44)
MAGNESIUM SERPL-MCNC: 2.1 MG/DL — SIGNIFICANT CHANGE UP (ref 1.6–2.6)
MCHC RBC-ENTMCNC: 24 PG — LOW (ref 27–34)
MCHC RBC-ENTMCNC: 29.8 GM/DL — LOW (ref 32–36)
MCV RBC AUTO: 80.4 FL — SIGNIFICANT CHANGE UP (ref 80–100)
MONOCYTES # BLD AUTO: 0.5 K/UL — SIGNIFICANT CHANGE UP (ref 0–0.9)
MONOCYTES NFR BLD AUTO: 7.9 % — SIGNIFICANT CHANGE UP (ref 2–14)
NEUTROPHILS # BLD AUTO: 4.15 K/UL — SIGNIFICANT CHANGE UP (ref 1.8–7.4)
NEUTROPHILS NFR BLD AUTO: 65.4 % — SIGNIFICANT CHANGE UP (ref 43–77)
NITRITE UR-MCNC: NEGATIVE — SIGNIFICANT CHANGE UP
NRBC # BLD: 0 /100 WBCS — SIGNIFICANT CHANGE UP (ref 0–0)
PCO2 BLDA: 42 MMHG — SIGNIFICANT CHANGE UP (ref 35–48)
PH BLDA: 7.5 — HIGH (ref 7.35–7.45)
PH UR: 6.5 — SIGNIFICANT CHANGE UP (ref 5–8)
PHOSPHATE SERPL-MCNC: 1.4 MG/DL — LOW (ref 2.5–4.5)
PLATELET # BLD AUTO: 159 K/UL — SIGNIFICANT CHANGE UP (ref 150–400)
PO2 BLDA: 90 MMHG — SIGNIFICANT CHANGE UP (ref 83–108)
POTASSIUM SERPL-MCNC: 3.6 MMOL/L — SIGNIFICANT CHANGE UP (ref 3.5–5.3)
POTASSIUM SERPL-SCNC: 3.6 MMOL/L — SIGNIFICANT CHANGE UP (ref 3.5–5.3)
PROCALCITONIN SERPL-MCNC: 0.26 NG/ML — HIGH (ref 0.02–0.1)
PROT SERPL-MCNC: 5 G/DL — LOW (ref 6–8.3)
PROT UR-MCNC: ABNORMAL MG/DL
RBC # BLD: 3.92 M/UL — LOW (ref 4.2–5.8)
RBC # FLD: 21.8 % — HIGH (ref 10.3–14.5)
RBC CASTS # UR COMP ASSIST: < 5 /HPF — SIGNIFICANT CHANGE UP
SAO2 % BLDA: 98 % — SIGNIFICANT CHANGE UP (ref 95–100)
SODIUM SERPL-SCNC: 141 MMOL/L — SIGNIFICANT CHANGE UP (ref 135–145)
SP GR SPEC: 1.02 — SIGNIFICANT CHANGE UP (ref 1–1.03)
TRIGL SERPL-MCNC: 202 MG/DL — HIGH
UROBILINOGEN FLD QL: 1 E.U./DL — SIGNIFICANT CHANGE UP
WBC # BLD: 6.34 K/UL — SIGNIFICANT CHANGE UP (ref 3.8–10.5)
WBC # FLD AUTO: 6.34 K/UL — SIGNIFICANT CHANGE UP (ref 3.8–10.5)
WBC UR QL: < 5 /HPF — SIGNIFICANT CHANGE UP

## 2020-12-12 PROCEDURE — 99291 CRITICAL CARE FIRST HOUR: CPT

## 2020-12-12 PROCEDURE — 71045 X-RAY EXAM CHEST 1 VIEW: CPT | Mod: 26

## 2020-12-12 RX ORDER — POTASSIUM PHOSPHATE, MONOBASIC POTASSIUM PHOSPHATE, DIBASIC 236; 224 MG/ML; MG/ML
15 INJECTION, SOLUTION INTRAVENOUS ONCE
Refills: 0 | Status: COMPLETED | OUTPATIENT
Start: 2020-12-12 | End: 2020-12-12

## 2020-12-12 RX ORDER — LABETALOL HCL 100 MG
10 TABLET ORAL ONCE
Refills: 0 | Status: COMPLETED | OUTPATIENT
Start: 2020-12-12 | End: 2020-12-12

## 2020-12-12 RX ORDER — ACETAMINOPHEN 500 MG
650 TABLET ORAL ONCE
Refills: 0 | Status: COMPLETED | OUTPATIENT
Start: 2020-12-12 | End: 2020-12-12

## 2020-12-12 RX ADMIN — PROPOFOL 2.67 MICROGRAM(S)/KG/MIN: 10 INJECTION, EMULSION INTRAVENOUS at 11:01

## 2020-12-12 RX ADMIN — ENOXAPARIN SODIUM 90 MILLIGRAM(S): 100 INJECTION SUBCUTANEOUS at 10:41

## 2020-12-12 RX ADMIN — INSULIN HUMAN 2: 100 INJECTION, SOLUTION SUBCUTANEOUS at 18:38

## 2020-12-12 RX ADMIN — SENNA PLUS 2 TABLET(S): 8.6 TABLET ORAL at 21:08

## 2020-12-12 RX ADMIN — CHLORHEXIDINE GLUCONATE 15 MILLILITER(S): 213 SOLUTION TOPICAL at 05:09

## 2020-12-12 RX ADMIN — CHLORHEXIDINE GLUCONATE 1 APPLICATION(S): 213 SOLUTION TOPICAL at 05:09

## 2020-12-12 RX ADMIN — Medication 1 APPLICATION(S): at 23:32

## 2020-12-12 RX ADMIN — Medication 650 MILLIGRAM(S): at 14:35

## 2020-12-12 RX ADMIN — CHLORHEXIDINE GLUCONATE 15 MILLILITER(S): 213 SOLUTION TOPICAL at 18:45

## 2020-12-12 RX ADMIN — DEXMEDETOMIDINE HYDROCHLORIDE IN 0.9% SODIUM CHLORIDE 4.45 MICROGRAM(S)/KG/HR: 4 INJECTION INTRAVENOUS at 23:45

## 2020-12-12 RX ADMIN — Medication 1 APPLICATION(S): at 05:08

## 2020-12-12 RX ADMIN — DEXMEDETOMIDINE HYDROCHLORIDE IN 0.9% SODIUM CHLORIDE 4.45 MICROGRAM(S)/KG/HR: 4 INJECTION INTRAVENOUS at 06:38

## 2020-12-12 RX ADMIN — INSULIN GLARGINE 15 UNIT(S): 100 INJECTION, SOLUTION SUBCUTANEOUS at 23:34

## 2020-12-12 RX ADMIN — ENOXAPARIN SODIUM 90 MILLIGRAM(S): 100 INJECTION SUBCUTANEOUS at 21:08

## 2020-12-12 RX ADMIN — Medication 1 APPLICATION(S): at 11:41

## 2020-12-12 RX ADMIN — Medication 650 MILLIGRAM(S): at 23:47

## 2020-12-12 RX ADMIN — Medication 1 APPLICATION(S): at 00:08

## 2020-12-12 RX ADMIN — POLYETHYLENE GLYCOL 3350 17 GRAM(S): 17 POWDER, FOR SOLUTION ORAL at 11:45

## 2020-12-12 RX ADMIN — Medication 1 APPLICATION(S): at 18:41

## 2020-12-12 RX ADMIN — AMIODARONE HYDROCHLORIDE 400 MILLIGRAM(S): 400 TABLET ORAL at 05:08

## 2020-12-12 RX ADMIN — DEXMEDETOMIDINE HYDROCHLORIDE IN 0.9% SODIUM CHLORIDE 4.45 MICROGRAM(S)/KG/HR: 4 INJECTION INTRAVENOUS at 16:58

## 2020-12-12 RX ADMIN — Medication 650 MILLIGRAM(S): at 05:08

## 2020-12-12 RX ADMIN — Medication 10 MILLIGRAM(S): at 10:40

## 2020-12-12 RX ADMIN — AMIODARONE HYDROCHLORIDE 400 MILLIGRAM(S): 400 TABLET ORAL at 18:44

## 2020-12-12 RX ADMIN — Medication 650 MILLIGRAM(S): at 16:58

## 2020-12-12 RX ADMIN — PANTOPRAZOLE SODIUM 40 MILLIGRAM(S): 20 TABLET, DELAYED RELEASE ORAL at 11:45

## 2020-12-12 RX ADMIN — DEXMEDETOMIDINE HYDROCHLORIDE IN 0.9% SODIUM CHLORIDE 4.45 MICROGRAM(S)/KG/HR: 4 INJECTION INTRAVENOUS at 20:58

## 2020-12-12 RX ADMIN — POTASSIUM PHOSPHATE, MONOBASIC POTASSIUM PHOSPHATE, DIBASIC 63.75 MILLIMOLE(S): 236; 224 INJECTION, SOLUTION INTRAVENOUS at 07:07

## 2020-12-12 NOTE — PROGRESS NOTE ADULT - SUBJECTIVE AND OBJECTIVE BOX
Patient Name: MAGGIE SAHNI  Age: 70y  Gender: Male    INTERVAL HPI/OVERNIGHT EVENTS: Had a fever to 102 overnight. Blood cultures were obtain, and tylenol given. No other acute events    SUBJECTIVE: Patient seen and examined at bedside.    OBJECTIVE:    VITAL SIGNS:  ICU Vital Signs Last 24 Hrs  T(C): 37.1 (12 Dec 2020 10:00), Max: 39 (12 Dec 2020 05:23)  T(F): 98.8 (12 Dec 2020 10:00), Max: 102.2 (12 Dec 2020 05:23)  HR: 62 (12 Dec 2020 10:00) (62 - 167)  BP: 100/58 (12 Dec 2020 09:00) (100/58 - 100/58)  BP(mean): 74 (12 Dec 2020 09:00) (74 - 74)  ABP: 144/56 (12 Dec 2020 10:00) (61/34 - 189/90)  ABP(mean): 91 (12 Dec 2020 10:00) (51 - 101)  RR: 26 (12 Dec 2020 10:00) (16 - 37)  SpO2: 96% (12 Dec 2020 10:00) (92% - 98%)    Mode: AC/ CMV (Assist Control/ Continuous Mandatory Ventilation), RR (machine): 20, TV (machine): 420, FiO2: 45, PEEP: 8, MAP: 11, PIP: 15    12-11 @ 07:01  -  12-12 @ 07:00  --------------------------------------------------------  IN: 1762.5 mL / OUT: 2880 mL / NET: -1117.5 mL    12-12 @ 07:01  -  12-12 @ 10:29  --------------------------------------------------------  IN: 306.9 mL / OUT: 390 mL / NET: -83.1 mL      CAPILLARY BLOOD GLUCOSE      POCT Blood Glucose.: 114 mg/dL (12 Dec 2020 06:16)      PHYSICAL EXAM:  General: NAD  HEENT: NC/AT, MMM  Eyes: PERRL, no scleral icterus, EOMI   Neck: supple, no LAD, no JVD   Cardiovascular: +S1/S2, RRR, No Murmurs  Respiratory: CTAB, no wheezes/crackles, no increased WOB/ accessory muscle use  Gastrointestinal: soft, NT/ND; +BSx4  Vascular: Radial and DP pulses present B/L  Neurological: AAOx3; no focal deficits  Extremities: WWP, no LE edema  Skin: Warm, appropriate turgor  Psych: Appropriate affect  MSK: No joint swelling    MEDICATIONS:  MEDICATIONS  (STANDING):  aMIOdarone    Tablet 400 milliGRAM(s) Oral every 12 hours  bisacodyl Suppository 10 milliGRAM(s) Rectal once  chlorhexidine 0.12% Liquid 15 milliLiter(s) Oral Mucosa every 12 hours  chlorhexidine 2% Cloths 1 Application(s) Topical <User Schedule>  dexMEDEtomidine Infusion 0.2 MICROgram(s)/kG/Hr (4.45 mL/Hr) IV Continuous <Continuous>  dextrose 40% Gel 15 Gram(s) Oral once  dextrose 5%. 1000 milliLiter(s) (50 mL/Hr) IV Continuous <Continuous>  dextrose 5%. 1000 milliLiter(s) (100 mL/Hr) IV Continuous <Continuous>  dextrose 50% Injectable 25 Gram(s) IV Push once  dextrose 50% Injectable 12.5 Gram(s) IV Push once  dextrose 50% Injectable 25 Gram(s) IV Push once  enoxaparin Injectable 90 milliGRAM(s) SubCutaneous every 12 hours  fentaNYL   Infusion... 0.5 MICROgram(s)/kG/Hr (2.22 mL/Hr) IV Continuous <Continuous>  glucagon  Injectable 1 milliGRAM(s) IntraMuscular once  insulin glargine Injectable (LANTUS) 15 Unit(s) SubCutaneous at bedtime  insulin regular  human corrective regimen sliding scale   SubCutaneous every 6 hours  pantoprazole   Suspension 40 milliGRAM(s) Enteral Tube daily  petrolatum Ophthalmic Ointment 1 Application(s) Both EYES every 6 hours  phenylephrine    Infusion 0.1 MICROgram(s)/kG/Min (1.67 mL/Hr) IV Continuous <Continuous>  polyethylene glycol 3350 17 Gram(s) Oral daily  propofol Infusion 5 MICROgram(s)/kG/Min (2.67 mL/Hr) IV Continuous <Continuous>  senna 2 Tablet(s) Oral at bedtime    MEDICATIONS  (PRN):  acetaminophen    Suspension .. 650 milliGRAM(s) Oral every 6 hours PRN Temp greater or equal to 38C (100.4F), Mild Pain (1 - 3)      ALLERGIES:  Allergies    No Known Allergies    Intolerances        LABS:                        9.4    6.34  )-----------( 159      ( 12 Dec 2020 04:59 )             31.5     12-12    141  |  102  |  12  ----------------------------<  115<H>  3.6   |  31  |  0.58    Ca    8.0<L>      12 Dec 2020 04:59  Phos  1.4     12-12  Mg     2.1     12-12    TPro  5.0<L>  /  Alb  2.9<L>  /  TBili  0.8  /  DBili  x   /  AST  39  /  ALT  39  /  AlkPhos  56  12-12            RADIOLOGY & ADDITIONAL TESTS: Reviewed.

## 2020-12-12 NOTE — PROGRESS NOTE ADULT - ASSESSMENT
MAGGIE SAHNI is a 70 yr old male with PMhx of DMT2, Afib on xarelto, HTN, HLD who was admitted to [facility] on 11/27 and started on remdisivir and decadron on 11/28, received convalexcent plasma on 11/30, as well as Toci on 12/2, but was intubated on 12/2 due to worsening O2 sats. Also treated with Zosyn since 12/2 and 7 days of Levoquin (12/2- 12/8), along with  zyvox and caspofungin on 12/8. Transferred to [facility] 12/8 overnight.     NEUROLOGY  - continue with sedation fentanyl, propofol, and precedex  - d/c nimbex  -although triglycerides in high 400s 12/11, today improved to 200s therefore can continue sedation with propofol  -on CPAP trial 12/11 patient did not tolerate well, went into afib with RVR, therefore will continue with sedation    RESPIRATORY   #Acute hypoxic respiratory failure 2/2 COVID vs. overlying pneumonia (pt febrile with leukocytosis to 10 12/11)  -breathing comfortably on the vent with sats 96%  -completed decadron course  -did not tolerate CPAP trial 12/11 as per above  -given fever overnight as well as 12/11 leukocytosis, ordered procal, if positive, will treat empirically for VAP. Holding off on abx for now given white count relatively stable and phenylephrine requirements decreasing  -f/u procal  -Sputum growing gram positive cocci in clusters; holding off on vanc for now unless patient continues to be febrile or shows signs hemodynamic instability    CARDIOVASCULAR  #Septic shock, present on admission, was improving however last night febrile. Fever work up done, tylenol given, blood cultures sent.  -will f/u BCx  -ordered procal, UA, UCx (santana was present since admission)  - on phenylephrine instead of levo for pressure support afib with RVR, however phenylephrine requirements decreasing    #H/o A-fib with RVR  - c/w Amiodarone gtt  -on anticoagulation with 90 lovenox BID    #Severe carotid disease  -Patient with occluded right ICA and severe left ICA stenosis, s/p left CEA, uncomplicated  -f/u with cardiology outpatient    RENAL   - goal I/O net even  - strict I/O tracking    ENDOCRINE  - goal 140-180 blood glucose  - mSSI  - On lantus 15     GASTROINTESTINAL  - glucerna tube feeds  - protonix 40  - bowel regimen  - still without BM therefore giving suppository and enema    INFECTIOUS DISEASE  #VAP vs. UTI (as discussed above)  -f/u procal  -f/u blood cultures  -f/u UA, UCx    HEMATOLOGY/ONCOLOGY  - lovenox full anticoagulation for Afib - 90 q12    ACCESS/LINES/DRAINS  -[place] and Left IJ placed 12/9  -OG tube  -Santana (which patient came in with from [place])    ETHICS/GOALS OF CARE  - update family daily    PROPHYLACTIC  ELECTROLYTES: Mg<2, K<4  DIET: regular  GI PPX: protonix  VTE PPX: SCD, lovenox  CODE: FULL  DISPO: MICU

## 2020-12-13 LAB
ALBUMIN SERPL ELPH-MCNC: 3.2 G/DL — LOW (ref 3.3–5)
ALP SERPL-CCNC: 59 U/L — SIGNIFICANT CHANGE UP (ref 40–120)
ALT FLD-CCNC: 64 U/L — HIGH (ref 10–45)
ANION GAP SERPL CALC-SCNC: 13 MMOL/L — SIGNIFICANT CHANGE UP (ref 5–17)
APTT BLD: 42 SEC — HIGH (ref 27.5–35.5)
AST SERPL-CCNC: 69 U/L — HIGH (ref 10–40)
BASE EXCESS BLDA CALC-SCNC: -2.2 MMOL/L — LOW (ref -2–3)
BASE EXCESS BLDA CALC-SCNC: 2.5 MMOL/L — SIGNIFICANT CHANGE UP (ref -2–3)
BASOPHILS # BLD AUTO: 0.01 K/UL — SIGNIFICANT CHANGE UP (ref 0–0.2)
BASOPHILS NFR BLD AUTO: 0.2 % — SIGNIFICANT CHANGE UP (ref 0–2)
BILIRUB SERPL-MCNC: 1.2 MG/DL — SIGNIFICANT CHANGE UP (ref 0.2–1.2)
BLD GP AB SCN SERPL QL: NEGATIVE — SIGNIFICANT CHANGE UP
BUN SERPL-MCNC: 10 MG/DL — SIGNIFICANT CHANGE UP (ref 7–23)
CALCIUM SERPL-MCNC: 8 MG/DL — LOW (ref 8.4–10.5)
CHLORIDE SERPL-SCNC: 98 MMOL/L — SIGNIFICANT CHANGE UP (ref 96–108)
CO2 SERPL-SCNC: 25 MMOL/L — SIGNIFICANT CHANGE UP (ref 22–31)
CREAT SERPL-MCNC: 0.47 MG/DL — LOW (ref 0.5–1.3)
CRP SERPL-MCNC: 0.11 MG/DL — SIGNIFICANT CHANGE UP (ref 0–0.4)
CULTURE RESULTS: SIGNIFICANT CHANGE UP
D DIMER BLD IA.RAPID-MCNC: 561 NG/ML DDU — HIGH
EOSINOPHIL # BLD AUTO: 0.11 K/UL — SIGNIFICANT CHANGE UP (ref 0–0.5)
EOSINOPHIL NFR BLD AUTO: 1.7 % — SIGNIFICANT CHANGE UP (ref 0–6)
FERRITIN SERPL-MCNC: 161 NG/ML — SIGNIFICANT CHANGE UP (ref 30–400)
GAS PNL BLDA: SIGNIFICANT CHANGE UP
GLUCOSE BLDC GLUCOMTR-MCNC: 112 MG/DL — HIGH (ref 70–99)
GLUCOSE BLDC GLUCOMTR-MCNC: 117 MG/DL — HIGH (ref 70–99)
GLUCOSE BLDC GLUCOMTR-MCNC: 124 MG/DL — HIGH (ref 70–99)
GLUCOSE BLDC GLUCOMTR-MCNC: 249 MG/DL — HIGH (ref 70–99)
GLUCOSE SERPL-MCNC: 118 MG/DL — HIGH (ref 70–99)
HCO3 BLDA-SCNC: 23 MMOL/L — SIGNIFICANT CHANGE UP (ref 21–28)
HCO3 BLDA-SCNC: 25 MMOL/L — SIGNIFICANT CHANGE UP (ref 21–28)
HCT VFR BLD CALC: 31.2 % — LOW (ref 39–50)
HGB BLD-MCNC: 9.7 G/DL — LOW (ref 13–17)
IMM GRANULOCYTES NFR BLD AUTO: 0.5 % — SIGNIFICANT CHANGE UP (ref 0–1.5)
INR BLD: 1.24 — HIGH (ref 0.88–1.16)
LYMPHOCYTES # BLD AUTO: 1.03 K/UL — SIGNIFICANT CHANGE UP (ref 1–3.3)
LYMPHOCYTES # BLD AUTO: 16.2 % — SIGNIFICANT CHANGE UP (ref 13–44)
MAGNESIUM SERPL-MCNC: 1.8 MG/DL — SIGNIFICANT CHANGE UP (ref 1.6–2.6)
MCHC RBC-ENTMCNC: 24.6 PG — LOW (ref 27–34)
MCHC RBC-ENTMCNC: 31.1 GM/DL — LOW (ref 32–36)
MCV RBC AUTO: 79.2 FL — LOW (ref 80–100)
MONOCYTES # BLD AUTO: 0.51 K/UL — SIGNIFICANT CHANGE UP (ref 0–0.9)
MONOCYTES NFR BLD AUTO: 8 % — SIGNIFICANT CHANGE UP (ref 2–14)
MRSA PCR RESULT.: NEGATIVE — SIGNIFICANT CHANGE UP
NEUTROPHILS # BLD AUTO: 4.67 K/UL — SIGNIFICANT CHANGE UP (ref 1.8–7.4)
NEUTROPHILS NFR BLD AUTO: 73.4 % — SIGNIFICANT CHANGE UP (ref 43–77)
NRBC # BLD: 0 /100 WBCS — SIGNIFICANT CHANGE UP (ref 0–0)
PCO2 BLDA: 31 MMHG — LOW (ref 35–48)
PCO2 BLDA: 44 MMHG — SIGNIFICANT CHANGE UP (ref 35–48)
PH BLDA: 7.35 — SIGNIFICANT CHANGE UP (ref 7.35–7.45)
PH BLDA: 7.52 — HIGH (ref 7.35–7.45)
PHOSPHATE SERPL-MCNC: 1.5 MG/DL — LOW (ref 2.5–4.5)
PLATELET # BLD AUTO: 152 K/UL — SIGNIFICANT CHANGE UP (ref 150–400)
PO2 BLDA: 45 MMHG — CRITICAL LOW (ref 83–108)
PO2 BLDA: 85 MMHG — SIGNIFICANT CHANGE UP (ref 83–108)
POTASSIUM SERPL-MCNC: 3.6 MMOL/L — SIGNIFICANT CHANGE UP (ref 3.5–5.3)
POTASSIUM SERPL-SCNC: 3.6 MMOL/L — SIGNIFICANT CHANGE UP (ref 3.5–5.3)
PROCALCITONIN SERPL-MCNC: 0.14 NG/ML — HIGH (ref 0.02–0.1)
PROT SERPL-MCNC: 5.2 G/DL — LOW (ref 6–8.3)
PROTHROM AB SERPL-ACNC: 14.7 SEC — HIGH (ref 10.6–13.6)
RBC # BLD: 3.94 M/UL — LOW (ref 4.2–5.8)
RBC # FLD: 21.6 % — HIGH (ref 10.3–14.5)
RH IG SCN BLD-IMP: NEGATIVE — SIGNIFICANT CHANGE UP
S AUREUS DNA NOSE QL NAA+PROBE: NEGATIVE — SIGNIFICANT CHANGE UP
SAO2 % BLDA: 74 % — LOW (ref 95–100)
SAO2 % BLDA: 97 % — SIGNIFICANT CHANGE UP (ref 95–100)
SODIUM SERPL-SCNC: 136 MMOL/L — SIGNIFICANT CHANGE UP (ref 135–145)
SPECIMEN SOURCE: SIGNIFICANT CHANGE UP
TRIGL SERPL-MCNC: 176 MG/DL — HIGH
WBC # BLD: 6.36 K/UL — SIGNIFICANT CHANGE UP (ref 3.8–10.5)
WBC # FLD AUTO: 6.36 K/UL — SIGNIFICANT CHANGE UP (ref 3.8–10.5)

## 2020-12-13 PROCEDURE — 99291 CRITICAL CARE FIRST HOUR: CPT

## 2020-12-13 PROCEDURE — 71045 X-RAY EXAM CHEST 1 VIEW: CPT | Mod: 26

## 2020-12-13 PROCEDURE — 99223 1ST HOSP IP/OBS HIGH 75: CPT

## 2020-12-13 RX ORDER — POTASSIUM CHLORIDE 20 MEQ
20 PACKET (EA) ORAL ONCE
Refills: 0 | Status: COMPLETED | OUTPATIENT
Start: 2020-12-13 | End: 2020-12-13

## 2020-12-13 RX ORDER — CISATRACURIUM BESYLATE 2 MG/ML
10 INJECTION INTRAVENOUS ONCE
Refills: 0 | Status: COMPLETED | OUTPATIENT
Start: 2020-12-13 | End: 2020-12-13

## 2020-12-13 RX ORDER — VANCOMYCIN HCL 1 G
1500 VIAL (EA) INTRAVENOUS EVERY 12 HOURS
Refills: 0 | Status: DISCONTINUED | OUTPATIENT
Start: 2020-12-13 | End: 2020-12-13

## 2020-12-13 RX ORDER — ACETAMINOPHEN 500 MG
1000 TABLET ORAL ONCE
Refills: 0 | Status: COMPLETED | OUTPATIENT
Start: 2020-12-13 | End: 2020-12-13

## 2020-12-13 RX ORDER — MAGNESIUM SULFATE 500 MG/ML
1 VIAL (ML) INJECTION ONCE
Refills: 0 | Status: COMPLETED | OUTPATIENT
Start: 2020-12-13 | End: 2020-12-13

## 2020-12-13 RX ORDER — POTASSIUM PHOSPHATE, MONOBASIC POTASSIUM PHOSPHATE, DIBASIC 236; 224 MG/ML; MG/ML
30 INJECTION, SOLUTION INTRAVENOUS ONCE
Refills: 0 | Status: COMPLETED | OUTPATIENT
Start: 2020-12-13 | End: 2020-12-13

## 2020-12-13 RX ORDER — PIPERACILLIN AND TAZOBACTAM 4; .5 G/20ML; G/20ML
4.5 INJECTION, POWDER, LYOPHILIZED, FOR SOLUTION INTRAVENOUS EVERY 6 HOURS
Refills: 0 | Status: DISCONTINUED | OUTPATIENT
Start: 2020-12-13 | End: 2020-12-14

## 2020-12-13 RX ORDER — SODIUM CHLORIDE 9 MG/ML
500 INJECTION INTRAMUSCULAR; INTRAVENOUS; SUBCUTANEOUS ONCE
Refills: 0 | Status: COMPLETED | OUTPATIENT
Start: 2020-12-13 | End: 2020-12-13

## 2020-12-13 RX ORDER — CISATRACURIUM BESYLATE 2 MG/ML
3 INJECTION INTRAVENOUS
Qty: 200 | Refills: 0 | Status: DISCONTINUED | OUTPATIENT
Start: 2020-12-13 | End: 2020-12-13

## 2020-12-13 RX ADMIN — Medication 400 MILLIGRAM(S): at 06:42

## 2020-12-13 RX ADMIN — PIPERACILLIN AND TAZOBACTAM 200 GRAM(S): 4; .5 INJECTION, POWDER, LYOPHILIZED, FOR SOLUTION INTRAVENOUS at 07:39

## 2020-12-13 RX ADMIN — CHLORHEXIDINE GLUCONATE 15 MILLILITER(S): 213 SOLUTION TOPICAL at 05:57

## 2020-12-13 RX ADMIN — Medication 100 GRAM(S): at 08:25

## 2020-12-13 RX ADMIN — PIPERACILLIN AND TAZOBACTAM 200 GRAM(S): 4; .5 INJECTION, POWDER, LYOPHILIZED, FOR SOLUTION INTRAVENOUS at 18:19

## 2020-12-13 RX ADMIN — Medication 1 APPLICATION(S): at 23:41

## 2020-12-13 RX ADMIN — ENOXAPARIN SODIUM 90 MILLIGRAM(S): 100 INJECTION SUBCUTANEOUS at 22:21

## 2020-12-13 RX ADMIN — CISATRACURIUM BESYLATE 10 MILLIGRAM(S): 2 INJECTION INTRAVENOUS at 09:10

## 2020-12-13 RX ADMIN — PHENYLEPHRINE HYDROCHLORIDE 1.67 MICROGRAM(S)/KG/MIN: 10 INJECTION INTRAVENOUS at 10:23

## 2020-12-13 RX ADMIN — INSULIN HUMAN 6: 100 INJECTION, SOLUTION SUBCUTANEOUS at 12:00

## 2020-12-13 RX ADMIN — CHLORHEXIDINE GLUCONATE 1 APPLICATION(S): 213 SOLUTION TOPICAL at 05:57

## 2020-12-13 RX ADMIN — Medication 250 MILLIGRAM(S): at 10:15

## 2020-12-13 RX ADMIN — PIPERACILLIN AND TAZOBACTAM 200 GRAM(S): 4; .5 INJECTION, POWDER, LYOPHILIZED, FOR SOLUTION INTRAVENOUS at 12:58

## 2020-12-13 RX ADMIN — AMIODARONE HYDROCHLORIDE 400 MILLIGRAM(S): 400 TABLET ORAL at 18:18

## 2020-12-13 RX ADMIN — ENOXAPARIN SODIUM 90 MILLIGRAM(S): 100 INJECTION SUBCUTANEOUS at 10:13

## 2020-12-13 RX ADMIN — INSULIN GLARGINE 15 UNIT(S): 100 INJECTION, SOLUTION SUBCUTANEOUS at 22:20

## 2020-12-13 RX ADMIN — AMIODARONE HYDROCHLORIDE 400 MILLIGRAM(S): 400 TABLET ORAL at 05:57

## 2020-12-13 RX ADMIN — Medication 20 MILLIEQUIVALENT(S): at 10:14

## 2020-12-13 RX ADMIN — POLYETHYLENE GLYCOL 3350 17 GRAM(S): 17 POWDER, FOR SOLUTION ORAL at 12:58

## 2020-12-13 RX ADMIN — Medication 1 APPLICATION(S): at 12:58

## 2020-12-13 RX ADMIN — DEXMEDETOMIDINE HYDROCHLORIDE IN 0.9% SODIUM CHLORIDE 4.45 MICROGRAM(S)/KG/HR: 4 INJECTION INTRAVENOUS at 07:05

## 2020-12-13 RX ADMIN — Medication 250 MILLIGRAM(S): at 17:55

## 2020-12-13 RX ADMIN — Medication 10 MILLIGRAM(S): at 00:07

## 2020-12-13 RX ADMIN — PANTOPRAZOLE SODIUM 40 MILLIGRAM(S): 20 TABLET, DELAYED RELEASE ORAL at 12:58

## 2020-12-13 RX ADMIN — PIPERACILLIN AND TAZOBACTAM 200 GRAM(S): 4; .5 INJECTION, POWDER, LYOPHILIZED, FOR SOLUTION INTRAVENOUS at 23:41

## 2020-12-13 RX ADMIN — Medication 1000 MILLIGRAM(S): at 07:00

## 2020-12-13 RX ADMIN — FENTANYL CITRATE 2.22 MICROGRAM(S)/KG/HR: 50 INJECTION INTRAVENOUS at 10:23

## 2020-12-13 RX ADMIN — CISATRACURIUM BESYLATE 16 MICROGRAM(S)/KG/MIN: 2 INJECTION INTRAVENOUS at 10:23

## 2020-12-13 RX ADMIN — POTASSIUM PHOSPHATE, MONOBASIC POTASSIUM PHOSPHATE, DIBASIC 83.33 MILLIMOLE(S): 236; 224 INJECTION, SOLUTION INTRAVENOUS at 10:14

## 2020-12-13 RX ADMIN — Medication 1 APPLICATION(S): at 18:19

## 2020-12-13 RX ADMIN — SODIUM CHLORIDE 500 MILLILITER(S): 9 INJECTION INTRAMUSCULAR; INTRAVENOUS; SUBCUTANEOUS at 07:05

## 2020-12-13 RX ADMIN — Medication 1 APPLICATION(S): at 05:58

## 2020-12-13 RX ADMIN — Medication 650 MILLIGRAM(S): at 00:30

## 2020-12-13 RX ADMIN — SENNA PLUS 2 TABLET(S): 8.6 TABLET ORAL at 22:21

## 2020-12-13 NOTE — CONSULT NOTE ADULT - SUBJECTIVE AND OBJECTIVE BOX
HPI:  70 yr old male with PMhx of DMT2, Afib on xeralto, HTN, HLD who was admitted to [facility] on  and started on remdisivir and decadron on , received convalexcent plasma on , as well as Toci on , but was intubated on  due to worsening O2 sats. Also treated with Zosyn since  and 7 days of Levoquin (- ), along with  zyvox and caspofungin on . Transferred to [facility]  overnight.  Hospital course complicated by A-fib with RVR.  He has also had intermittent fevers.  ID consulted for recommendations.  Now on pressors.     PAST MEDICAL & SURGICAL HISTORY:  H/O sciatica  CHR PAIN LT LE    H/O cystoscopy  resection of bladder tumor    Bladder cancer    DM (diabetes mellitus)    Hypercholesteremia    Afib    HTN (hypertension)    History of hernia repair          REVIEW OF SYSTEMS:    unable to obtain       ANTIBIOTICS:  MEDICATIONS  (STANDING):  aMIOdarone    Tablet 400 milliGRAM(s) Oral every 12 hours  chlorhexidine 2% Cloths 1 Application(s) Topical <User Schedule>  dextrose 40% Gel 15 Gram(s) Oral once  dextrose 5%. 1000 milliLiter(s) (50 mL/Hr) IV Continuous <Continuous>  dextrose 5%. 1000 milliLiter(s) (100 mL/Hr) IV Continuous <Continuous>  dextrose 50% Injectable 25 Gram(s) IV Push once  dextrose 50% Injectable 12.5 Gram(s) IV Push once  dextrose 50% Injectable 25 Gram(s) IV Push once  enoxaparin Injectable 90 milliGRAM(s) SubCutaneous every 12 hours  glucagon  Injectable 1 milliGRAM(s) IntraMuscular once  insulin glargine Injectable (LANTUS) 15 Unit(s) SubCutaneous at bedtime  insulin regular  human corrective regimen sliding scale   SubCutaneous every 6 hours  pantoprazole   Suspension 40 milliGRAM(s) Enteral Tube daily  petrolatum Ophthalmic Ointment 1 Application(s) Both EYES every 6 hours  phenylephrine    Infusion 0.1 MICROgram(s)/kG/Min (1.67 mL/Hr) IV Continuous <Continuous>  piperacillin/tazobactam IVPB.. 4.5 Gram(s) IV Intermittent every 6 hours  polyethylene glycol 3350 17 Gram(s) Oral daily  propofol Infusion 5 MICROgram(s)/kG/Min (2.67 mL/Hr) IV Continuous <Continuous>  senna 2 Tablet(s) Oral at bedtime  vancomycin  IVPB 1500 milliGRAM(s) IV Intermittent every 12 hours    MEDICATIONS  (PRN):  acetaminophen    Suspension .. 650 milliGRAM(s) Oral every 6 hours PRN Temp greater or equal to 38C (100.4F), Mild Pain (1 - 3)      Allergies    No Known Allergies    Intolerances        SOCIAL HISTORY:    FAMILY HISTORY:      Vital Signs Last 24 Hrs  T(C): 36.6 (13 Dec 2020 14:23), Max: 38 (13 Dec 2020 00:00)  T(F): 97.8 (13 Dec 2020 14:23), Max: 100.4 (13 Dec 2020 00:00)  HR: 61 (13 Dec 2020 15:00) (61 - 103)  BP: 169/82 (13 Dec 2020 05:00) (163/75 - 169/82)  BP(mean): 108 (13 Dec 2020 04:00) (108 - 108)  RR: 18 (13 Dec 2020 15:00) (18 - 37)  SpO2: 93% (13 Dec 2020 15:00) (68% - 99%)    PHYSICAL EXAM:  see primary team           LABS:                        9.7    6.36  )-----------( 152      ( 13 Dec 2020 05:59 )             31.2         136  |  98  |  10  ----------------------------<  118<H>  3.6   |  25  |  0.47<L>    Ca    8.0<L>      13 Dec 2020 05:59  Phos  1.5       Mg     1.8         TPro  5.2<L>  /  Alb  3.2<L>  /  TBili  1.2  /  DBili  x   /  AST  69<H>  /  ALT  64<H>  /  AlkPhos  59  1213    PT/INR - ( 13 Dec 2020 05:59 )   PT: 14.7 sec;   INR: 1.24          PTT - ( 13 Dec 2020 05:59 )  PTT:42.0 sec  Urinalysis Basic - ( 12 Dec 2020 11:01 )    Color: Yellow / Appearance: Clear / S.025 / pH: x  Gluc: x / Ketone: 15 mg/dL  / Bili: Negative / Urobili: 1.0 E.U./dL   Blood: x / Protein: Trace mg/dL / Nitrite: NEGATIVE   Leuk Esterase: NEGATIVE / RBC: < 5 /HPF / WBC < 5 /HPF   Sq Epi: x / Non Sq Epi: x / Bacteria: Present /HPF    procalcitonin:  0.14    MICROBIOLOGY:    Culture - Sputum . (20 @ 19:22)    Gram Stain:   Rare epithelial cells  Moderate WBC's  Few Gram Positive Cocci in Clusters  Rare Gram Variable Rods  Rare Yeast    Specimen Source: .Sputum Sputum    Culture Results:   Normal Respiratory Delmy present    Culture - Blood (20 @ 07:01)    Specimen Source: .Blood Blood-Peripheral    Culture Results:   No growth at 1 day.      RADIOLOGY & ADDITIONAL STUDIES:    < from: Xray Chest 1 View- PORTABLE-Urgent (Xray Chest 1 View- PORTABLE-Urgent .) (20 @ 13:33) >    Frontal examination of the chest demonstrates Dobbhoff feeding tube overlying course of esophagus and left upper abdomen. No interval change lung infiltrates in comparison to prior examination of the chest 2020. No interval change this remaining support devices.    IMPRESSION: No interval change lung pathology

## 2020-12-13 NOTE — CONSULT NOTE ADULT - ASSESSMENT
IMPRESSION:  Recurrent fevers  - likely due to COVID-19.  Unclear if there is a ventilator associated pneumonia.  Procalcitonin is slightly elevated but WBC is normal and all cultures are negative    Recommend:  1.  Can stop Vancomycin given no MRSA in sputum culture and negative MRSA swab  2.  Continue zosyn 4.5 grams IV q6hrs for now  3.  Follow up repeat blood cultures  4.  Recheck serum procalcitonin with next blood draw    ID team 1 will follow

## 2020-12-13 NOTE — PROGRESS NOTE ADULT - ASSESSMENT
MAGGIE SAHNI is a 70 yr old male with PMhx of DMT2, Afib on xarelto, HTN, HLD who was admitted to [facility] on 11/27 and started on remdisivir and decadron on 11/28, received convalexcent plasma on 11/30, as well as Toci on 12/2, but was intubated on 12/2 due to worsening O2 sats. Also treated with Zosyn since 12/2 and 7 days of Levoquin (12/2- 12/8), along with  zyvox and caspofungin on 12/8. Transferred to [facility] 12/8 overnight.     NEUROLOGY  -continue with sedation fentanyl and precedex  -on CPAP trial 12/11 patient did not tolerate well, went into afib with RVR, therefore will continue with sedation    RESPIRATORY   #Acute hypoxic respiratory failure 2/2 COVID vs. overlying pneumonia. completed decadron course  -Desatted on CPAP this morning, reintubated.  -CXR after intubation showed worsening infiltrates  -Febrile o/n; Sputum growing gram positive cocci in clusters; given vanc and zosyn o/n  -ID consult today for Abx recs    CARDIOVASCULAR  #Septic shock, present on admission, was improving however last night febrile. Fever work up done, tylenol given, blood cultures sent.  -will f/u BCx  -ordered procal, UA, UCx (santana was present since admission)  - on phenylephrine instead of levo for pressure support afib with RVR, however phenylephrine requirements decreasing    #H/o A-fib with RVR  - c/w Amiodarone gtt  - Lovenox 90 BID    #Severe carotid disease  -Patient with occluded right ICA and severe left ICA stenosis, s/p left CEA, uncomplicated  -f/u with cardiology outpatient    RENAL   - goal I/O net even  - strict I/O tracking    ENDOCRINE  - goal 140-180 blood glucose  - mSSI  - On lantus 15     GASTROINTESTINAL  - glucerna tube feeds  - protonix 40  - bowel regimen  - still without BM therefore giving suppository and enema    INFECTIOUS DISEASE  #VAP vs. UTI (as discussed above)  - got Vanc and Zosyn o/n for fever  - sputum GPC in clusters  - ID consulted for Abx recs    HEMATOLOGY/ONCOLOGY  - lovenox full anticoagulation for Afib - 90 q12    ACCESS/LINES/DRAINS  -[place] and Left IJ placed 12/9  -OG tube  -Santana (which patient came in with from [place])    ETHICS/GOALS OF CARE  - update family daily    PROPHYLACTIC  ELECTROLYTES: Mg<2, K<4  DIET: regular  GI PPX: protonix  VTE PPX: SCD, lovenox  CODE: FULL  DISPO: MICU MAGGIE SAHNI is a 70 yr old male with PMhx of DMT2, Afib on xarelto, HTN, HLD who was admitted to [facility] on 11/27 and started on remdisivir and decadron on 11/28, received convalexcent plasma on 11/30, as well as Toci on 12/2, but was intubated on 12/2 due to worsening O2 sats. Also treated with Zosyn since 12/2 and 7 days of Levoquin (12/2- 12/8), along with  zyvox and caspofungin on 12/8. Transferred to [facility] 12/8 overnight.     NEUROLOGY  -continue with sedation fentanyl and precedex  -on CPAP trial 12/11 patient did not tolerate well, went into afib with RVR, therefore will continue with sedation    RESPIRATORY   #Acute hypoxic respiratory failure 2/2 COVID vs. overlying pneumonia. completed decadron course  -Desatted on CPAP this morning, reintubated.  -CXR after intubation showed worsening infiltrates  -Febrile o/n; Sputum growing gram positive cocci in clusters; given vanc and zosyn o/n  -ID consult  -c/w Zosyn    CARDIOVASCULAR  #Septic shock, present on admission, was improving however last night febrile. Fever work up done, tylenol given, blood cultures sent.  -will f/u BCx  -ordered procal, UA, UCx (santana was present since admission)  - on phenylephrine instead of levo for pressure support afib with RVR, however phenylephrine requirements decreasing    #H/o A-fib with RVR  - c/w Amiodarone gtt  - Lovenox 90 BID    #Severe carotid disease  -Patient with occluded right ICA and severe left ICA stenosis, s/p left CEA, uncomplicated  -f/u with cardiology outpatient    RENAL   - goal I/O net even  - strict I/O tracking    ENDOCRINE  - goal 140-180 blood glucose  - mSSI  - On lantus 15     GASTROINTESTINAL  - glucerna tube feeds  - protonix 40  - bowel regimen  - still without BM therefore giving suppository and enema    INFECTIOUS DISEASE  #VAP vs. COVID-19 Associated fevers  - got Vanc and Zosyn o/n for fever  - sputum GPC in clusters  - ID consulted  - c/w Zosyn  - f/u repeat procal  - f/u repeat blood cultures    HEMATOLOGY/ONCOLOGY  - lovenox full anticoagulation for Afib - 90 q12    ACCESS/LINES/DRAINS  -[place] and Left IJ placed 12/9  -OG tube  -Santana (which patient came in with from [place])    ETHICS/GOALS OF CARE  - update family daily    PROPHYLACTIC  ELECTROLYTES: Mg<2, K<4  DIET: regular  GI PPX: protonix  VTE PPX: SCD, lovenox  CODE: FULL  DISPO: MICU

## 2020-12-13 NOTE — PROGRESS NOTE ADULT - SUBJECTIVE AND OBJECTIVE BOX
Medicine Progress Note    Patient is a 70y old  Male who presents with a chief complaint of COVID-19 pna (13 Dec 2020 16:06)      SUBJECTIVE / OVERNIGHT EVENTS: Overnight sbp 180s, got labetalol, became hypotensive and febrile to 102 this morning, gave dose of vanc and zosyn, and gave 500 cc bolus. Desatted on CPAP this morning and needed to be reintubated. Had copious secretions.    ADDITIONAL REVIEW OF SYSTEMS:    MEDICATIONS  (STANDING):  aMIOdarone    Tablet 400 milliGRAM(s) Oral every 12 hours  chlorhexidine 2% Cloths 1 Application(s) Topical <User Schedule>  dextrose 40% Gel 15 Gram(s) Oral once  dextrose 5%. 1000 milliLiter(s) (50 mL/Hr) IV Continuous <Continuous>  dextrose 5%. 1000 milliLiter(s) (100 mL/Hr) IV Continuous <Continuous>  dextrose 50% Injectable 25 Gram(s) IV Push once  dextrose 50% Injectable 12.5 Gram(s) IV Push once  dextrose 50% Injectable 25 Gram(s) IV Push once  enoxaparin Injectable 90 milliGRAM(s) SubCutaneous every 12 hours  glucagon  Injectable 1 milliGRAM(s) IntraMuscular once  insulin glargine Injectable (LANTUS) 15 Unit(s) SubCutaneous at bedtime  insulin regular  human corrective regimen sliding scale   SubCutaneous every 6 hours  pantoprazole   Suspension 40 milliGRAM(s) Enteral Tube daily  petrolatum Ophthalmic Ointment 1 Application(s) Both EYES every 6 hours  phenylephrine    Infusion 0.1 MICROgram(s)/kG/Min (1.67 mL/Hr) IV Continuous <Continuous>  piperacillin/tazobactam IVPB.. 4.5 Gram(s) IV Intermittent every 6 hours  polyethylene glycol 3350 17 Gram(s) Oral daily  propofol Infusion 5 MICROgram(s)/kG/Min (2.67 mL/Hr) IV Continuous <Continuous>  senna 2 Tablet(s) Oral at bedtime  vancomycin  IVPB 1500 milliGRAM(s) IV Intermittent every 12 hours    MEDICATIONS  (PRN):  acetaminophen    Suspension .. 650 milliGRAM(s) Oral every 6 hours PRN Temp greater or equal to 38C (100.4F), Mild Pain (1 - 3)    CAPILLARY BLOOD GLUCOSE      POCT Blood Glucose.: 249 mg/dL (13 Dec 2020 10:33)  POCT Blood Glucose.: 124 mg/dL (13 Dec 2020 06:40)  POCT Blood Glucose.: 135 mg/dL (12 Dec 2020 23:19)  POCT Blood Glucose.: 170 mg/dL (12 Dec 2020 18:25)    I&O's Summary    12 Dec 2020 07:01  -  13 Dec 2020 07:00  --------------------------------------------------------  IN: 1549.1 mL / OUT: 2045 mL / NET: -495.9 mL    13 Dec 2020 07:01  -  13 Dec 2020 16:49  --------------------------------------------------------  IN: 1555.3 mL / OUT: 1800 mL / NET: -244.7 mL        PHYSICAL EXAM:  Vital Signs Last 24 Hrs  T(C): 36.6 (13 Dec 2020 14:23), Max: 38 (13 Dec 2020 00:00)  T(F): 97.8 (13 Dec 2020 14:23), Max: 100.4 (13 Dec 2020 00:00)  HR: 105 (13 Dec 2020 16:31) (61 - 105)  BP: 122/68 (13 Dec 2020 16:00) (122/68 - 169/82)  BP(mean): 90 (13 Dec 2020 16:00) (90 - 108)  RR: 30 (13 Dec 2020 16:31) (18 - 37)  SpO2: 92% (13 Dec 2020 16:31) (68% - 99%)  CONSTITUTIONAL: NAD, well-developed, well-groomed  ENMT: Moist oral mucosa, no pharyngeal injection or exudates; normal dentition  RESPIRATORY: Normal respiratory effort; lungs are clear to auscultation bilaterally  CARDIOVASCULAR: Regular rate and rhythm, normal S1 and S2, no murmur/rub/gallop; No lower extremity edema; Peripheral pulses are 2+ bilaterally  ABDOMEN: Nontender to palpation, normoactive bowel sounds, no rebound/guarding; No hepatosplenomegaly  PSYCH: A+O to person, place, and time; affect appropriate  NEUROLOGY: CN 2-12 are intact and symmetric; no gross sensory deficits   SKIN: No rashes; no palpable lesions    LABS:                        9.7    6.36  )-----------( 152      ( 13 Dec 2020 05:59 )             31.2         136  |  98  |  10  ----------------------------<  118<H>  3.6   |  25  |  0.47<L>    Ca    8.0<L>      13 Dec 2020 05:59  Phos  1.5       Mg     1.8         TPro  5.2<L>  /  Alb  3.2<L>  /  TBili  1.2  /  DBili  x   /  AST  69<H>  /  ALT  64<H>  /  AlkPhos  59      PT/INR - ( 13 Dec 2020 05:59 )   PT: 14.7 sec;   INR: 1.24          PTT - ( 13 Dec 2020 05:59 )  PTT:42.0 sec      Urinalysis Basic - ( 12 Dec 2020 11:01 )    Color: Yellow / Appearance: Clear / S.025 / pH: x  Gluc: x / Ketone: 15 mg/dL  / Bili: Negative / Urobili: 1.0 E.U./dL   Blood: x / Protein: Trace mg/dL / Nitrite: NEGATIVE   Leuk Esterase: NEGATIVE / RBC: < 5 /HPF / WBC < 5 /HPF   Sq Epi: x / Non Sq Epi: x / Bacteria: Present /HPF        Culture - Blood (collected 12 Dec 2020 07:01)  Source: .Blood Blood-Peripheral  Preliminary Report (13 Dec 2020 08:00):    No growth at 1 day.      SARS-CoV-2: Detected (2020 19:30)      RADIOLOGY & ADDITIONAL TESTS:  Imaging from Last 24 Hours:    Electrocardiogram/QTc Interval:    COORDINATION OF CARE:  Care Discussed with Consultants/Other Providers:   Medicine Progress Note    Patient is a 70y old  Male who presents with a chief complaint of COVID-19 pna (13 Dec 2020 16:06)      SUBJECTIVE / OVERNIGHT EVENTS: Overnight sbp 180s, got labetalol, became hypotensive and febrile to 102 this morning, gave dose of vanc and zosyn, and gave 500 cc bolus. Desatted on CPAP this morning and needed to be reintubated. Had copious secretions.    ADDITIONAL REVIEW OF SYSTEMS:    MEDICATIONS  (STANDING):  aMIOdarone    Tablet 400 milliGRAM(s) Oral every 12 hours  chlorhexidine 2% Cloths 1 Application(s) Topical <User Schedule>  dextrose 40% Gel 15 Gram(s) Oral once  dextrose 5%. 1000 milliLiter(s) (50 mL/Hr) IV Continuous <Continuous>  dextrose 5%. 1000 milliLiter(s) (100 mL/Hr) IV Continuous <Continuous>  dextrose 50% Injectable 25 Gram(s) IV Push once  dextrose 50% Injectable 12.5 Gram(s) IV Push once  dextrose 50% Injectable 25 Gram(s) IV Push once  enoxaparin Injectable 90 milliGRAM(s) SubCutaneous every 12 hours  glucagon  Injectable 1 milliGRAM(s) IntraMuscular once  insulin glargine Injectable (LANTUS) 15 Unit(s) SubCutaneous at bedtime  insulin regular  human corrective regimen sliding scale   SubCutaneous every 6 hours  pantoprazole   Suspension 40 milliGRAM(s) Enteral Tube daily  petrolatum Ophthalmic Ointment 1 Application(s) Both EYES every 6 hours  phenylephrine    Infusion 0.1 MICROgram(s)/kG/Min (1.67 mL/Hr) IV Continuous <Continuous>  piperacillin/tazobactam IVPB.. 4.5 Gram(s) IV Intermittent every 6 hours  polyethylene glycol 3350 17 Gram(s) Oral daily  propofol Infusion 5 MICROgram(s)/kG/Min (2.67 mL/Hr) IV Continuous <Continuous>  senna 2 Tablet(s) Oral at bedtime  vancomycin  IVPB 1500 milliGRAM(s) IV Intermittent every 12 hours    MEDICATIONS  (PRN):  acetaminophen    Suspension .. 650 milliGRAM(s) Oral every 6 hours PRN Temp greater or equal to 38C (100.4F), Mild Pain (1 - 3)    CAPILLARY BLOOD GLUCOSE      POCT Blood Glucose.: 249 mg/dL (13 Dec 2020 10:33)  POCT Blood Glucose.: 124 mg/dL (13 Dec 2020 06:40)  POCT Blood Glucose.: 135 mg/dL (12 Dec 2020 23:19)  POCT Blood Glucose.: 170 mg/dL (12 Dec 2020 18:25)    I&O's Summary    12 Dec 2020 07:01  -  13 Dec 2020 07:00  --------------------------------------------------------  IN: 1549.1 mL / OUT: 2045 mL / NET: -495.9 mL    13 Dec 2020 07:01  -  13 Dec 2020 16:49  --------------------------------------------------------  IN: 1555.3 mL / OUT: 1800 mL / NET: -244.7 mL        PHYSICAL EXAM:  Vital Signs Last 24 Hrs  T(C): 36.6 (13 Dec 2020 14:23), Max: 38 (13 Dec 2020 00:00)  T(F): 97.8 (13 Dec 2020 14:23), Max: 100.4 (13 Dec 2020 00:00)  HR: 105 (13 Dec 2020 16:31) (61 - 105)  BP: 122/68 (13 Dec 2020 16:00) (122/68 - 169/82)  BP(mean): 90 (13 Dec 2020 16:00) (90 - 108)  RR: 30 (13 Dec 2020 16:31) (18 - 37)  SpO2: 92% (13 Dec 2020 16:31) (68% - 99%)  CONSTITUTIONAL: NAD, intubated and sedated  ENMT: Moist oral mucosa, no pharyngeal injection or exudates; normal dentition  RESPIRATORY: Normal respiratory effort; lungs are clear to auscultation bilaterally  CARDIOVASCULAR: Regular rate and rhythm, normal S1 and S2, no murmur/rub/gallop; No lower extremity edema; Peripheral pulses are 2+ bilaterally  ABDOMEN: Nontender to palpation, normoactive bowel sounds, no rebound/guarding; No hepatosplenomegaly  PSYCH: Intubated and sedated  NEUROLOGY: sedated  SKIN: No rashes; no palpable lesions    LABS:                        9.7    6.36  )-----------( 152      ( 13 Dec 2020 05:59 )             31.2         136  |  98  |  10  ----------------------------<  118<H>  3.6   |  25  |  0.47<L>    Ca    8.0<L>      13 Dec 2020 05:59  Phos  1.5       Mg     1.8         TPro  5.2<L>  /  Alb  3.2<L>  /  TBili  1.2  /  DBili  x   /  AST  69<H>  /  ALT  64<H>  /  AlkPhos  59      PT/INR - ( 13 Dec 2020 05:59 )   PT: 14.7 sec;   INR: 1.24          PTT - ( 13 Dec 2020 05:59 )  PTT:42.0 sec      Urinalysis Basic - ( 12 Dec 2020 11:01 )    Color: Yellow / Appearance: Clear / S.025 / pH: x  Gluc: x / Ketone: 15 mg/dL  / Bili: Negative / Urobili: 1.0 E.U./dL   Blood: x / Protein: Trace mg/dL / Nitrite: NEGATIVE   Leuk Esterase: NEGATIVE / RBC: < 5 /HPF / WBC < 5 /HPF   Sq Epi: x / Non Sq Epi: x / Bacteria: Present /HPF        Culture - Blood (collected 12 Dec 2020 07:01)  Source: .Blood Blood-Peripheral  Preliminary Report (13 Dec 2020 08:00):    No growth at 1 day.      SARS-CoV-2: Detected (2020 19:30)      RADIOLOGY & ADDITIONAL TESTS:  Imaging from Last 24 Hours:    Electrocardiogram/QTc Interval:    COORDINATION OF CARE:  Care Discussed with Consultants/Other Providers:

## 2020-12-14 LAB
ALBUMIN SERPL ELPH-MCNC: 2.7 G/DL — LOW (ref 3.3–5)
ALP SERPL-CCNC: 55 U/L — SIGNIFICANT CHANGE UP (ref 40–120)
ALT FLD-CCNC: 53 U/L — HIGH (ref 10–45)
ANION GAP SERPL CALC-SCNC: 10 MMOL/L — SIGNIFICANT CHANGE UP (ref 5–17)
AST SERPL-CCNC: 44 U/L — HIGH (ref 10–40)
BASE EXCESS BLDA CALC-SCNC: 0.1 MMOL/L — SIGNIFICANT CHANGE UP (ref -2–3)
BASOPHILS # BLD AUTO: 0.02 K/UL — SIGNIFICANT CHANGE UP (ref 0–0.2)
BASOPHILS NFR BLD AUTO: 0.3 % — SIGNIFICANT CHANGE UP (ref 0–2)
BILIRUB SERPL-MCNC: 0.9 MG/DL — SIGNIFICANT CHANGE UP (ref 0.2–1.2)
BUN SERPL-MCNC: 8 MG/DL — SIGNIFICANT CHANGE UP (ref 7–23)
CALCIUM SERPL-MCNC: 7.8 MG/DL — LOW (ref 8.4–10.5)
CHLORIDE SERPL-SCNC: 106 MMOL/L — SIGNIFICANT CHANGE UP (ref 96–108)
CO2 SERPL-SCNC: 24 MMOL/L — SIGNIFICANT CHANGE UP (ref 22–31)
CREAT SERPL-MCNC: 0.47 MG/DL — LOW (ref 0.5–1.3)
CRP SERPL-MCNC: 0.09 MG/DL — SIGNIFICANT CHANGE UP (ref 0–0.4)
CULTURE RESULTS: SIGNIFICANT CHANGE UP
CULTURE RESULTS: SIGNIFICANT CHANGE UP
D DIMER BLD IA.RAPID-MCNC: 743 NG/ML DDU — HIGH
EOSINOPHIL # BLD AUTO: 0.35 K/UL — SIGNIFICANT CHANGE UP (ref 0–0.5)
EOSINOPHIL NFR BLD AUTO: 4.4 % — SIGNIFICANT CHANGE UP (ref 0–6)
FERRITIN SERPL-MCNC: 151 NG/ML — SIGNIFICANT CHANGE UP (ref 30–400)
GAS PNL BLDA: SIGNIFICANT CHANGE UP
GLUCOSE BLDC GLUCOMTR-MCNC: 124 MG/DL — HIGH (ref 70–99)
GLUCOSE BLDC GLUCOMTR-MCNC: 169 MG/DL — HIGH (ref 70–99)
GLUCOSE BLDC GLUCOMTR-MCNC: 78 MG/DL — SIGNIFICANT CHANGE UP (ref 70–99)
GLUCOSE BLDC GLUCOMTR-MCNC: 82 MG/DL — SIGNIFICANT CHANGE UP (ref 70–99)
GLUCOSE SERPL-MCNC: 91 MG/DL — SIGNIFICANT CHANGE UP (ref 70–99)
HCO3 BLDA-SCNC: 24 MMOL/L — SIGNIFICANT CHANGE UP (ref 21–28)
HCT VFR BLD CALC: 30.6 % — LOW (ref 39–50)
HGB BLD-MCNC: 9.4 G/DL — LOW (ref 13–17)
IMM GRANULOCYTES NFR BLD AUTO: 0.5 % — SIGNIFICANT CHANGE UP (ref 0–1.5)
LYMPHOCYTES # BLD AUTO: 1.04 K/UL — SIGNIFICANT CHANGE UP (ref 1–3.3)
LYMPHOCYTES # BLD AUTO: 13.2 % — SIGNIFICANT CHANGE UP (ref 13–44)
MAGNESIUM SERPL-MCNC: 2.2 MG/DL — SIGNIFICANT CHANGE UP (ref 1.6–2.6)
MCHC RBC-ENTMCNC: 24.4 PG — LOW (ref 27–34)
MCHC RBC-ENTMCNC: 30.7 GM/DL — LOW (ref 32–36)
MCV RBC AUTO: 79.5 FL — LOW (ref 80–100)
MONOCYTES # BLD AUTO: 0.43 K/UL — SIGNIFICANT CHANGE UP (ref 0–0.9)
MONOCYTES NFR BLD AUTO: 5.4 % — SIGNIFICANT CHANGE UP (ref 2–14)
NEUTROPHILS # BLD AUTO: 6.01 K/UL — SIGNIFICANT CHANGE UP (ref 1.8–7.4)
NEUTROPHILS NFR BLD AUTO: 76.2 % — SIGNIFICANT CHANGE UP (ref 43–77)
NRBC # BLD: 0 /100 WBCS — SIGNIFICANT CHANGE UP (ref 0–0)
PCO2 BLDA: 38 MMHG — SIGNIFICANT CHANGE UP (ref 35–48)
PH BLDA: 7.43 — SIGNIFICANT CHANGE UP (ref 7.35–7.45)
PHOSPHATE SERPL-MCNC: 2.3 MG/DL — LOW (ref 2.5–4.5)
PLATELET # BLD AUTO: 168 K/UL — SIGNIFICANT CHANGE UP (ref 150–400)
PO2 BLDA: 95 MMHG — SIGNIFICANT CHANGE UP (ref 83–108)
POTASSIUM SERPL-MCNC: 3.5 MMOL/L — SIGNIFICANT CHANGE UP (ref 3.5–5.3)
POTASSIUM SERPL-SCNC: 3.5 MMOL/L — SIGNIFICANT CHANGE UP (ref 3.5–5.3)
PROT SERPL-MCNC: 5 G/DL — LOW (ref 6–8.3)
RBC # BLD: 3.85 M/UL — LOW (ref 4.2–5.8)
RBC # FLD: 23.5 % — HIGH (ref 10.3–14.5)
SAO2 % BLDA: 98 % — SIGNIFICANT CHANGE UP (ref 95–100)
SARS-COV-2 RNA SPEC QL NAA+PROBE: SIGNIFICANT CHANGE UP
SODIUM SERPL-SCNC: 140 MMOL/L — SIGNIFICANT CHANGE UP (ref 135–145)
SPECIMEN SOURCE: SIGNIFICANT CHANGE UP
SPECIMEN SOURCE: SIGNIFICANT CHANGE UP
TRIGL SERPL-MCNC: 189 MG/DL — HIGH
WBC # BLD: 7.89 K/UL — SIGNIFICANT CHANGE UP (ref 3.8–10.5)
WBC # FLD AUTO: 7.89 K/UL — SIGNIFICANT CHANGE UP (ref 3.8–10.5)

## 2020-12-14 PROCEDURE — 99233 SBSQ HOSP IP/OBS HIGH 50: CPT | Mod: GC

## 2020-12-14 PROCEDURE — 99291 CRITICAL CARE FIRST HOUR: CPT

## 2020-12-14 RX ORDER — DEXMEDETOMIDINE HYDROCHLORIDE IN 0.9% SODIUM CHLORIDE 4 UG/ML
0.2 INJECTION INTRAVENOUS
Qty: 400 | Refills: 0 | Status: DISCONTINUED | OUTPATIENT
Start: 2020-12-14 | End: 2020-12-20

## 2020-12-14 RX ORDER — PIPERACILLIN AND TAZOBACTAM 4; .5 G/20ML; G/20ML
4.5 INJECTION, POWDER, LYOPHILIZED, FOR SOLUTION INTRAVENOUS EVERY 6 HOURS
Refills: 0 | Status: DISCONTINUED | OUTPATIENT
Start: 2020-12-14 | End: 2020-12-20

## 2020-12-14 RX ORDER — DEXTROSE 50 % IN WATER 50 %
50 SYRINGE (ML) INTRAVENOUS ONCE
Refills: 0 | Status: COMPLETED | OUTPATIENT
Start: 2020-12-14 | End: 2020-12-14

## 2020-12-14 RX ORDER — INSULIN GLARGINE 100 [IU]/ML
8 INJECTION, SOLUTION SUBCUTANEOUS AT BEDTIME
Refills: 0 | Status: DISCONTINUED | OUTPATIENT
Start: 2020-12-14 | End: 2020-12-25

## 2020-12-14 RX ORDER — POTASSIUM PHOSPHATE, MONOBASIC POTASSIUM PHOSPHATE, DIBASIC 236; 224 MG/ML; MG/ML
15 INJECTION, SOLUTION INTRAVENOUS ONCE
Refills: 0 | Status: COMPLETED | OUTPATIENT
Start: 2020-12-14 | End: 2020-12-14

## 2020-12-14 RX ORDER — POTASSIUM PHOSPHATE, MONOBASIC POTASSIUM PHOSPHATE, DIBASIC 236; 224 MG/ML; MG/ML
30 INJECTION, SOLUTION INTRAVENOUS ONCE
Refills: 0 | Status: DISCONTINUED | OUTPATIENT
Start: 2020-12-14 | End: 2020-12-14

## 2020-12-14 RX ORDER — CHLORHEXIDINE GLUCONATE 213 G/1000ML
15 SOLUTION TOPICAL EVERY 12 HOURS
Refills: 0 | Status: DISCONTINUED | OUTPATIENT
Start: 2020-12-14 | End: 2021-01-05

## 2020-12-14 RX ADMIN — Medication 650 MILLIGRAM(S): at 16:56

## 2020-12-14 RX ADMIN — PIPERACILLIN AND TAZOBACTAM 200 GRAM(S): 4; .5 INJECTION, POWDER, LYOPHILIZED, FOR SOLUTION INTRAVENOUS at 11:39

## 2020-12-14 RX ADMIN — PROPOFOL 2.67 MICROGRAM(S)/KG/MIN: 10 INJECTION, EMULSION INTRAVENOUS at 18:22

## 2020-12-14 RX ADMIN — POLYETHYLENE GLYCOL 3350 17 GRAM(S): 17 POWDER, FOR SOLUTION ORAL at 11:39

## 2020-12-14 RX ADMIN — PANTOPRAZOLE SODIUM 40 MILLIGRAM(S): 20 TABLET, DELAYED RELEASE ORAL at 11:39

## 2020-12-14 RX ADMIN — Medication 50 MILLILITER(S): at 12:37

## 2020-12-14 RX ADMIN — PIPERACILLIN AND TAZOBACTAM 200 GRAM(S): 4; .5 INJECTION, POWDER, LYOPHILIZED, FOR SOLUTION INTRAVENOUS at 23:47

## 2020-12-14 RX ADMIN — PIPERACILLIN AND TAZOBACTAM 200 GRAM(S): 4; .5 INJECTION, POWDER, LYOPHILIZED, FOR SOLUTION INTRAVENOUS at 17:02

## 2020-12-14 RX ADMIN — PROPOFOL 2.67 MICROGRAM(S)/KG/MIN: 10 INJECTION, EMULSION INTRAVENOUS at 09:35

## 2020-12-14 RX ADMIN — DEXMEDETOMIDINE HYDROCHLORIDE IN 0.9% SODIUM CHLORIDE 4.45 MICROGRAM(S)/KG/HR: 4 INJECTION INTRAVENOUS at 11:38

## 2020-12-14 RX ADMIN — Medication 1 APPLICATION(S): at 23:36

## 2020-12-14 RX ADMIN — INSULIN GLARGINE 8 UNIT(S): 100 INJECTION, SOLUTION SUBCUTANEOUS at 23:08

## 2020-12-14 RX ADMIN — AMIODARONE HYDROCHLORIDE 400 MILLIGRAM(S): 400 TABLET ORAL at 06:03

## 2020-12-14 RX ADMIN — Medication 1 APPLICATION(S): at 07:48

## 2020-12-14 RX ADMIN — ENOXAPARIN SODIUM 90 MILLIGRAM(S): 100 INJECTION SUBCUTANEOUS at 20:51

## 2020-12-14 RX ADMIN — Medication 1 APPLICATION(S): at 18:30

## 2020-12-14 RX ADMIN — INSULIN HUMAN 0: 100 INJECTION, SOLUTION SUBCUTANEOUS at 07:03

## 2020-12-14 RX ADMIN — INSULIN HUMAN 2: 100 INJECTION, SOLUTION SUBCUTANEOUS at 23:34

## 2020-12-14 RX ADMIN — POTASSIUM PHOSPHATE, MONOBASIC POTASSIUM PHOSPHATE, DIBASIC 62.5 MILLIMOLE(S): 236; 224 INJECTION, SOLUTION INTRAVENOUS at 07:08

## 2020-12-14 RX ADMIN — DEXMEDETOMIDINE HYDROCHLORIDE IN 0.9% SODIUM CHLORIDE 4.45 MICROGRAM(S)/KG/HR: 4 INJECTION INTRAVENOUS at 18:22

## 2020-12-14 RX ADMIN — PIPERACILLIN AND TAZOBACTAM 200 GRAM(S): 4; .5 INJECTION, POWDER, LYOPHILIZED, FOR SOLUTION INTRAVENOUS at 06:12

## 2020-12-14 RX ADMIN — ENOXAPARIN SODIUM 90 MILLIGRAM(S): 100 INJECTION SUBCUTANEOUS at 08:46

## 2020-12-14 RX ADMIN — CHLORHEXIDINE GLUCONATE 15 MILLILITER(S): 213 SOLUTION TOPICAL at 17:02

## 2020-12-14 RX ADMIN — SENNA PLUS 2 TABLET(S): 8.6 TABLET ORAL at 20:53

## 2020-12-14 RX ADMIN — Medication 1 APPLICATION(S): at 11:46

## 2020-12-14 RX ADMIN — Medication 650 MILLIGRAM(S): at 15:16

## 2020-12-14 RX ADMIN — AMIODARONE HYDROCHLORIDE 400 MILLIGRAM(S): 400 TABLET ORAL at 17:03

## 2020-12-14 RX ADMIN — CHLORHEXIDINE GLUCONATE 1 APPLICATION(S): 213 SOLUTION TOPICAL at 06:04

## 2020-12-14 NOTE — PROGRESS NOTE ADULT - ASSESSMENT
MAGGIE SAHNI is a 70 yr old male with PMhx of DMT2, Afib on xarelto, HTN, HLD who was admitted to [facility] on 11/27 and started on remdisivir and decadron on 11/28, received convalescent plasma on 11/30, as well as Toci on 12/2, but was intubated on 12/2 due to worsening O2 sats. Also treated with Zosyn since 12/2 and 7 days of Levoquin (12/2- 12/8), along with  zyvox and caspofungin on 12/8. Transferred to [facility] 12/8 overnight.     NEUROLOGY  #Sedation  - Propofol 40, continue to ween  - Give p  - In restraints for agitation    RESPIRATORY   #Acute hypoxic respiratory failure 2/2 COVID vs. overlying pneumonia. completed decadron course  -Desatted on CPAP this morning, reintubated.  -CXR after intubation showed worsening infiltrates  -Febrile o/n; Sputum growing gram positive cocci in clusters; given vanc and zosyn o/n  -ID consult  -c/w Zosyn    CARDIOVASCULAR  #Septic shock, present on admission, was improving however last night febrile. Fever work up done, tylenol given, blood cultures sent.  -will f/u BCx  -ordered procal, UA, UCx (santana was present since admission)  - on phenylephrine instead of levo for pressure support afib with RVR, however phenylephrine requirements decreasing    #H/o A-fib with RVR  - c/w Amiodarone gtt  - Lovenox 90 BID    #Severe carotid disease  -Patient with occluded right ICA and severe left ICA stenosis, s/p left CEA, uncomplicated  -f/u with cardiology outpatient    RENAL   - goal I/O net even  - strict I/O tracking    ENDOCRINE  - goal 140-180 blood glucose  - mSSI  - On lantus 15     GASTROINTESTINAL  - glucerna tube feeds  - protonix 40  - bowel regimen  - still without BM therefore giving suppository and enema    INFECTIOUS DISEASE  #VAP vs. COVID-19 Associated fevers  - got Vanc and Zosyn o/n for fever  - sputum GPC in clusters  - ID consulted  - c/w Zosyn  - f/u repeat procal  - f/u repeat blood cultures    HEMATOLOGY/ONCOLOGY  - lovenox full anticoagulation for Afib - 90 q12    ACCESS/LINES/DRAINS  -[place] and Left IJ placed 12/9  -OG tube  -Santana (which patient came in with from [place])    ETHICS/GOALS OF CARE  - update family daily    PROPHYLACTIC  ELECTROLYTES: Mg<2, K<4  DIET: regular  GI PPX: protonix  VTE PPX: SCD, lovenox  CODE: FULL  DISPO: MICU MAGGIE SAHNI is a 70 yr old male with PMhx of DMT2, Afib on xarelto, HTN, HLD who was admitted to [facility] on 11/27 and started on remdisivir and decadron on 11/28, received convalescent plasma on 11/30, as well as Toci on 12/2, but was intubated on 12/2 due to worsening O2 sats. Also treated with Zosyn since 12/2 and 7 days of Levoquin (12/2- 12/8), along with  zyvox and caspofungin on 12/8. Transferred to [facility] 12/8 overnight.     NEUROLOGY  #Sedation  - Propofol 40, continue to ween  - Give precedex if agitated  - In restraints    RESPIRATORY   #Acute hypoxic respiratory failure 2/2 COVID vs. overlying pneumonia. completed decadron course  -Desatted on CPAP this morning, reintubated.  -CXR after intubation showed worsening infiltrates  -Febrile o/n; Sputum growing gram positive cocci in clusters; given vanc and zosyn o/n  -ID consult  -c/w Zosyn  - consulted ENT for tracheostomy placement    CARDIOVASCULAR  #Septic shock, present on admission, was improving however last night febrile. Fever work up done, tylenol given, blood cultures sent.  -will f/u BCx  - ordered procal, UA, UCx (santana was present since admission)  - on phenylephrine instead of levo for pressure support afib with RVR, however phenylephrine requirements decreasing    #H/o A-fib with RVR  - c/w Amiodarone gtt  - Lovenox 90 BID    #Severe carotid disease  -Patient with occluded right ICA and severe left ICA stenosis, s/p left CEA, uncomplicated  -f/u with cardiology outpatient    RENAL   - goal I/O net even  - strict I/O tracking    ENDOCRINE  - goal 140-180 blood glucose  - mSSI  - Decreased lantus to 8     GASTROINTESTINAL  - glucerna tube feeds  - protonix 40  - bowel regimen  - still without BM therefore giving suppository and enema  - GI consulted for PEG placement    INFECTIOUS DISEASE  #VAP vs. COVID-19 Associated fevers  - got Vanc and Zosyn o/n for fever  - sputum GPC in clusters  - ID consulted  - c/w Zosyn  - f/u repeat procal  - f/u repeat blood cultures    HEMATOLOGY/ONCOLOGY  - lovenox full anticoagulation for Afib - 90 q12    ACCESS/LINES/DRAINS  -[place] and Left IJ placed 12/9  -OG tube  -Santana (which patient came in with from [place])    ETHICS/GOALS OF CARE  - update family daily    PROPHYLACTIC  ELECTROLYTES: Mg<2, K<4  DIET: regular  GI PPX: protonix  VTE PPX: SCD, lovenox  CODE: FULL  DISPO: MICU

## 2020-12-14 NOTE — PROGRESS NOTE ADULT - SUBJECTIVE AND OBJECTIVE BOX
Medicine Progress Note    Patient is a 70y old  Male who presents with a chief complaint of COVID-19 pna (14 Dec 2020 14:29)      SUBJECTIVE / OVERNIGHT EVENTS: No acute events overnight. No apparent distress. Intubated and sedated.    ADDITIONAL REVIEW OF SYSTEMS:    MEDICATIONS  (STANDING):  aMIOdarone    Tablet 400 milliGRAM(s) Oral every 12 hours  chlorhexidine 0.12% Liquid 15 milliLiter(s) Oral Mucosa every 12 hours  chlorhexidine 2% Cloths 1 Application(s) Topical <User Schedule>  dexMEDEtomidine Infusion 0.2 MICROgram(s)/kG/Hr (4.45 mL/Hr) IV Continuous <Continuous>  dextrose 40% Gel 15 Gram(s) Oral once  dextrose 5%. 1000 milliLiter(s) (50 mL/Hr) IV Continuous <Continuous>  dextrose 5%. 1000 milliLiter(s) (100 mL/Hr) IV Continuous <Continuous>  dextrose 50% Injectable 25 Gram(s) IV Push once  dextrose 50% Injectable 12.5 Gram(s) IV Push once  dextrose 50% Injectable 25 Gram(s) IV Push once  enoxaparin Injectable 90 milliGRAM(s) SubCutaneous every 12 hours  glucagon  Injectable 1 milliGRAM(s) IntraMuscular once  insulin glargine Injectable (LANTUS) 8 Unit(s) SubCutaneous at bedtime  insulin regular  human corrective regimen sliding scale   SubCutaneous every 6 hours  pantoprazole   Suspension 40 milliGRAM(s) Enteral Tube daily  petrolatum Ophthalmic Ointment 1 Application(s) Both EYES every 6 hours  phenylephrine    Infusion 0.1 MICROgram(s)/kG/Min (1.67 mL/Hr) IV Continuous <Continuous>  piperacillin/tazobactam IVPB.. 4.5 Gram(s) IV Intermittent every 6 hours  polyethylene glycol 3350 17 Gram(s) Oral daily  propofol Infusion 5 MICROgram(s)/kG/Min (2.67 mL/Hr) IV Continuous <Continuous>  senna 2 Tablet(s) Oral at bedtime    MEDICATIONS  (PRN):  acetaminophen    Suspension .. 650 milliGRAM(s) Oral every 6 hours PRN Temp greater or equal to 38C (100.4F), Mild Pain (1 - 3)    CAPILLARY BLOOD GLUCOSE      POCT Blood Glucose.: 78 mg/dL (14 Dec 2020 11:37)  POCT Blood Glucose.: 82 mg/dL (14 Dec 2020 06:40)  POCT Blood Glucose.: 117 mg/dL (13 Dec 2020 23:28)  POCT Blood Glucose.: 112 mg/dL (13 Dec 2020 18:32)    I&O's Summary    13 Dec 2020 07:01  -  14 Dec 2020 07:00  --------------------------------------------------------  IN: 2759.4 mL / OUT: 3450 mL / NET: -690.6 mL    14 Dec 2020 07:01  -  14 Dec 2020 15:54  --------------------------------------------------------  IN: 611.4 mL / OUT: 280 mL / NET: 331.4 mL        PHYSICAL EXAM:  Vital Signs Last 24 Hrs  T(C): 36.9 (14 Dec 2020 12:45), Max: 37.4 (13 Dec 2020 21:00)  T(F): 98.4 (14 Dec 2020 12:45), Max: 99.4 (13 Dec 2020 21:00)  HR: 62 (14 Dec 2020 13:00) (62 - 105)  BP: 155/67 (14 Dec 2020 13:00) (93/52 - 158/70)  BP(mean): 97 (14 Dec 2020 13:00) (61 - 100)  RR: 20 (14 Dec 2020 13:00) (15 - 30)  SpO2: 97% (14 Dec 2020 13:00) (82% - 99%)  CONSTITUTIONAL: NAD, intubated and sedated  ENMT: Moist oral mucosa, no pharyngeal injection or exudates; normal dentition  RESPIRATORY: Normal respiratory effort; lungs are clear to auscultation bilaterally  CARDIOVASCULAR: Regular rate and rhythm, normal S1 and S2, no murmur/rub/gallop; No lower extremity edema; Peripheral pulses are 2+ bilaterally  ABDOMEN: Nontender to palpation, normoactive bowel sounds, no rebound/guarding; No hepatosplenomegaly  PSYCH: Intubated and sedated  NEUROLOGY: Sedated  SKIN: No rashes; no palpable lesions    LABS:                        9.4    7.89  )-----------( 168      ( 14 Dec 2020 05:40 )             30.6     12-14    140  |  106  |  8   ----------------------------<  91  3.5   |  24  |  0.47<L>    Ca    7.8<L>      14 Dec 2020 05:40  Phos  2.3     12-14  Mg     2.2     12-14    TPro  5.0<L>  /  Alb  2.7<L>  /  TBili  0.9  /  DBili  x   /  AST  44<H>  /  ALT  53<H>  /  AlkPhos  55  12-14    PT/INR - ( 13 Dec 2020 05:59 )   PT: 14.7 sec;   INR: 1.24          PTT - ( 13 Dec 2020 05:59 )  PTT:42.0 sec          Culture - Blood (collected 13 Dec 2020 20:58)  Source: .Blood Blood  Preliminary Report (14 Dec 2020 09:01):    No growth at 12 hours    Culture - Blood (collected 13 Dec 2020 08:20)  Source: .Blood Blood  Preliminary Report (14 Dec 2020 09:01):    No growth at 1 day.    Culture - Blood (collected 12 Dec 2020 07:01)  Source: .Blood Blood-Peripheral  Preliminary Report (14 Dec 2020 08:01):    No growth at 2 days.      SARS-CoV-2: Detected (27 Nov 2020 19:30)      RADIOLOGY & ADDITIONAL TESTS:  Imaging from Last 24 Hours:    Electrocardiogram/QTc Interval:    COORDINATION OF CARE:  Care Discussed with Consultants/Other Providers:

## 2020-12-14 NOTE — CONSULT NOTE ADULT - SUBJECTIVE AND OBJECTIVE BOX
HPI: 70yoM with PMHx of T2DM, afib, HTN, HLD with COVID pneumonia initially admitted to [facility] on 11/27 now on day 12 of intubation.  Has already received decadron, remdisivir, convalescent plasma, and tocilizumab but had to be intubated due to worsening hypoxia. Currently being treated with Zosyn for fevers and GPC growing in the sputum culture.  Currently on full anticoagulation with lovenox.      PAST MEDICAL & SURGICAL HISTORY:  H/O sciatica  CHR PAIN LT LE  H/O cystoscopy  resection of bladder tumor  Bladder cancer  DM (diabetes mellitus)  Hypercholesteremia  Afib  HTN (hypertension)  History of hernia repair      Allergies: No Known Allergies  Intolerances: none      MEDICATIONS  (STANDING):  aMIOdarone    Tablet 400 milliGRAM(s) Oral every 12 hours  chlorhexidine 0.12% Liquid 15 milliLiter(s) Oral Mucosa every 12 hours  chlorhexidine 2% Cloths 1 Application(s) Topical <User Schedule>  dexMEDEtomidine Infusion 0.2 MICROgram(s)/kG/Hr (4.45 mL/Hr) IV Continuous <Continuous>  dextrose 40% Gel 15 Gram(s) Oral once  dextrose 5%. 1000 milliLiter(s) (50 mL/Hr) IV Continuous <Continuous>  dextrose 5%. 1000 milliLiter(s) (100 mL/Hr) IV Continuous <Continuous>  dextrose 50% Injectable 25 Gram(s) IV Push once  dextrose 50% Injectable 12.5 Gram(s) IV Push once  dextrose 50% Injectable 25 Gram(s) IV Push once  enoxaparin Injectable 90 milliGRAM(s) SubCutaneous every 12 hours  glucagon  Injectable 1 milliGRAM(s) IntraMuscular once  insulin glargine Injectable (LANTUS) 8 Unit(s) SubCutaneous at bedtime  insulin regular  human corrective regimen sliding scale   SubCutaneous every 6 hours  pantoprazole   Suspension 40 milliGRAM(s) Enteral Tube daily  petrolatum Ophthalmic Ointment 1 Application(s) Both EYES every 6 hours  phenylephrine    Infusion 0.1 MICROgram(s)/kG/Min (1.67 mL/Hr) IV Continuous <Continuous>  piperacillin/tazobactam IVPB.. 4.5 Gram(s) IV Intermittent every 6 hours  polyethylene glycol 3350 17 Gram(s) Oral daily  propofol Infusion 5 MICROgram(s)/kG/Min (2.67 mL/Hr) IV Continuous <Continuous>  senna 2 Tablet(s) Oral at bedtime    MEDICATIONS  (PRN):  acetaminophen    Suspension .. 650 milliGRAM(s) Oral every 6 hours PRN Temp greater or equal to 38C (100.4F), Mild Pain (1 - 3)      Vital Signs Last 24 Hrs  T(C): 37.8 (14 Dec 2020 19:00), Max: 38.1 (14 Dec 2020 17:00)  T(F): 100.1 (14 Dec 2020 19:00), Max: 100.5 (14 Dec 2020 17:00)  HR: 66 (14 Dec 2020 19:00) (61 - 81)  BP: 160/69 (14 Dec 2020 19:00) (93/52 - 160/69)  BP(mean): 99 (14 Dec 2020 19:00) (61 - 100)  RR: 26 (14 Dec 2020 19:00) (15 - 27)  SpO2: 99% (14 Dec 2020 19:00) (82% - 99%)                          9.4    7.89  )-----------( 168      ( 14 Dec 2020 05:40 )             30.6    12-14    140  |  106  |  8   ----------------------------<  91  3.5   |  24  |  0.47<L>    Ca    7.8<L>      14 Dec 2020 05:40  Phos  2.3     12-14  Mg     2.2     12-14    TPro  5.0<L>  /  Alb  2.7<L>  /  TBili  0.9  /  DBili  x   /  AST  44<H>  /  ALT  53<H>  /  AlkPhos  55  12-14   PT/INR - ( 13 Dec 2020 05:59 )   PT: 14.7 sec;   INR: 1.24          PTT - ( 13 Dec 2020 05:59 )  PTT:42.0 sec HPI: 70yoM, with PMHx of T2DM, afib, HTN, HLD, severe PVD (occluded right ICA; severe left ICA stenosis, s/p left CEA), was admitted to [facility] with COVID pneumonia on 11/27.  Required intubation 12/02 for worsening hypoxia.  Transferred to [facility] 12/08.  Was extubated desatted on CPAP 12/14 AM, so was reintubated.  CXR after reintubation showed worsening infiltrates  For COVID, treated with decadron, remdisivir, convalescent plasma, and tocilizumab.   Currently being treated with Zosyn for fevers and GPC growing in the sputum culture.  Septic shock treated with phenylephrine instead of levo for pressure support afib with RVR, however phenylephrine requirements.  On full anticoagulation with lovenox and amiodarone for Afib w RVR      PAST MEDICAL & SURGICAL HISTORY:  H/O sciatica  CHR PAIN LT LE  H/O cystoscopy  resection of bladder tumor  Bladder cancer  DM (diabetes mellitus)  Hypercholesteremia  Afib  HTN (hypertension)  History of hernia repair    ALLERGIES: No Known Allergies/Intolerances    MEDICATIONS  (STANDING):  aMIOdarone    Tablet 400 milliGRAM(s) Oral every 12 hours  chlorhexidine 0.12% Liquid 15 milliLiter(s) Oral Mucosa every 12 hours  chlorhexidine 2% Cloths 1 Application(s) Topical <User Schedule>  dexMEDEtomidine Infusion 0.2 MICROgram(s)/kG/Hr (4.45 mL/Hr) IV Continuous <Continuous>  dextrose 40% Gel 15 Gram(s) Oral once  dextrose 5%. 1000 milliLiter(s) (50 mL/Hr) IV Continuous <Continuous>  dextrose 5%. 1000 milliLiter(s) (100 mL/Hr) IV Continuous <Continuous>  dextrose 50% Injectable 25 Gram(s) IV Push once  dextrose 50% Injectable 12.5 Gram(s) IV Push once  dextrose 50% Injectable 25 Gram(s) IV Push once  enoxaparin Injectable 90 milliGRAM(s) SubCutaneous every 12 hours  glucagon  Injectable 1 milliGRAM(s) IntraMuscular once  insulin glargine Injectable (LANTUS) 8 Unit(s) SubCutaneous at bedtime  insulin regular  human corrective regimen sliding scale   SubCutaneous every 6 hours  pantoprazole   Suspension 40 milliGRAM(s) Enteral Tube daily  petrolatum Ophthalmic Ointment 1 Application(s) Both EYES every 6 hours  phenylephrine    Infusion 0.1 MICROgram(s)/kG/Min (1.67 mL/Hr) IV Continuous <Continuous>  piperacillin/tazobactam IVPB.. 4.5 Gram(s) IV Intermittent every 6 hours  polyethylene glycol 3350 17 Gram(s) Oral daily  propofol Infusion 5 MICROgram(s)/kG/Min (2.67 mL/Hr) IV Continuous <Continuous>  senna 2 Tablet(s) Oral at bedtime    MEDICATIONS  (PRN):  acetaminophen    Suspension .. 650 milliGRAM(s) Oral every 6 hours PRN Temp greater or equal to 38C (100.4F), Mild Pain (1 - 3)      Vital Signs Last 24 Hrs  T(C): 37.8 (14 Dec 2020 19:00), Max: 38.1 (14 Dec 2020 17:00)  T(F): 100.1 (14 Dec 2020 19:00), Max: 100.5 (14 Dec 2020 17:00)  HR: 66 (14 Dec 2020 19:00) (61 - 81)  BP: 160/69 (14 Dec 2020 19:00) (93/52 - 160/69)  BP(mean): 99 (14 Dec 2020 19:00) (61 - 100)  RR: 26 (14 Dec 2020 19:00) (15 - 27)  SpO2: 99% (14 Dec 2020 19:00) (82% - 99%)      PHYSICAL EXAM:  Orally intubated and sedated.  On ventilator.  NG feeding tube.  Neck with triple lumen catheter in IJV.  Neck with thyroid cartilage palpable above sternal notch; cricoid just behind sternal notch with neck in slightly flexed position.  No mass or goiter in neck.  Short beard.         LABS                    9.4    7.89  )-----------( 168      ( 14 Dec 2020 05:40 )             30.6    12-14    140  |  106  |  8   ----------------------------<  91  3.5   |  24  |  0.47<L>    Ca    7.8<L>      14 Dec 2020 05:40  Phos  2.3     12-14  Mg     2.2     12-14    TPro  5.0<L>  /  Alb  2.7<L>  /  TBili  0.9  /  DBili  x   /  AST  44<H>  /  ALT  53<H>  /  AlkPhos  55  12-14    PT/INR - ( 13 Dec 2020 05:59 )   PT: 14.7 sec;   INR: 1.24     PTT - ( 13 Dec 2020 05:59 )  PTT:42.0 sec    COVID testing:  (11/27) RVP positive for SARS COV2; (12/14) Sputum PCR negative   HPI: 70yoM, with PMHx of T2DM, afib, HTN, HLD, severe PVD (occluded right ICA; severe left ICA stenosis, s/p left CEA), was admitted to [facility] with COVID pneumonia on 11/27.  Required intubation 12/02 for worsening hypoxia.  Transferred to [facility] 12/08.  Did not tolerate CPAP 12/11 --> went into RVR Afib, so resedated.  On 12/13, he was extubated but desatted to 65% on CPAP, so  was reintubated same day.  CXR after reintubation showed worsening infiltrates  For COVID, treated with decadron, remdisivir, convalescent plasma, and tocilizumab.   Currently being treated with Zosyn for fevers and GPC growing in the sputum culture.  Septic shock treated with phenylephrine instead of levo for pressure support afib with RVR, however phenylephrine requirements.  On full anticoagulation with lovenox and amiodarone for Afib w RVR      PAST MEDICAL & SURGICAL HISTORY:  H/O sciatica  CHR PAIN LT LE  H/O cystoscopy  resection of bladder tumor  Bladder cancer  DM (diabetes mellitus)  Hypercholesteremia  Afib  HTN (hypertension)  History of hernia repair    ALLERGIES: No Known Allergies/Intolerances    MEDICATIONS  (STANDING):  aMIOdarone    Tablet 400 milliGRAM(s) Oral every 12 hours  chlorhexidine 0.12% Liquid 15 milliLiter(s) Oral Mucosa every 12 hours  chlorhexidine 2% Cloths 1 Application(s) Topical <User Schedule>  dexMEDEtomidine Infusion 0.2 MICROgram(s)/kG/Hr (4.45 mL/Hr) IV Continuous <Continuous>  dextrose 40% Gel 15 Gram(s) Oral once  dextrose 5%. 1000 milliLiter(s) (50 mL/Hr) IV Continuous <Continuous>  dextrose 5%. 1000 milliLiter(s) (100 mL/Hr) IV Continuous <Continuous>  dextrose 50% Injectable 25 Gram(s) IV Push once  dextrose 50% Injectable 12.5 Gram(s) IV Push once  dextrose 50% Injectable 25 Gram(s) IV Push once  enoxaparin Injectable 90 milliGRAM(s) SubCutaneous every 12 hours  glucagon  Injectable 1 milliGRAM(s) IntraMuscular once  insulin glargine Injectable (LANTUS) 8 Unit(s) SubCutaneous at bedtime  insulin regular  human corrective regimen sliding scale   SubCutaneous every 6 hours  pantoprazole   Suspension 40 milliGRAM(s) Enteral Tube daily  petrolatum Ophthalmic Ointment 1 Application(s) Both EYES every 6 hours  phenylephrine    Infusion 0.1 MICROgram(s)/kG/Min (1.67 mL/Hr) IV Continuous <Continuous>  piperacillin/tazobactam IVPB.. 4.5 Gram(s) IV Intermittent every 6 hours  polyethylene glycol 3350 17 Gram(s) Oral daily  propofol Infusion 5 MICROgram(s)/kG/Min (2.67 mL/Hr) IV Continuous <Continuous>  senna 2 Tablet(s) Oral at bedtime    MEDICATIONS  (PRN):  acetaminophen    Suspension .. 650 milliGRAM(s) Oral every 6 hours PRN Temp greater or equal to 38C (100.4F), Mild Pain (1 - 3)      Vital Signs Last 24 Hrs  T(C): 37.8 (14 Dec 2020 19:00), Max: 38.1 (14 Dec 2020 17:00)  T(F): 100.1 (14 Dec 2020 19:00), Max: 100.5 (14 Dec 2020 17:00)  HR: 66 (14 Dec 2020 19:00) (61 - 81)  BP: 160/69 (14 Dec 2020 19:00) (93/52 - 160/69)  BP(mean): 99 (14 Dec 2020 19:00) (61 - 100)  RR: 26 (14 Dec 2020 19:00) (15 - 27)  SpO2: 99% (14 Dec 2020 19:00) (82% - 99%)      PHYSICAL EXAM:  Orally intubated and sedated.  On ventilator.  NG feeding tube.  Neck with triple lumen catheter in IJV.  Neck with thyroid cartilage palpable above sternal notch; cricoid just behind sternal notch with neck in slightly flexed position.  No mass or goiter in neck.  Short beard.         LABS                    9.4    7.89  )-----------( 168      ( 14 Dec 2020 05:40 )             30.6    12-14    140  |  106  |  8   ----------------------------<  91  3.5   |  24  |  0.47<L>    Ca    7.8<L>      14 Dec 2020 05:40  Phos  2.3     12-14  Mg     2.2     12-14    TPro  5.0<L>  /  Alb  2.7<L>  /  TBili  0.9  /  DBili  x   /  AST  44<H>  /  ALT  53<H>  /  AlkPhos  55  12-14    PT/INR - ( 13 Dec 2020 05:59 )   PT: 14.7 sec;   INR: 1.24     PTT - ( 13 Dec 2020 05:59 )  PTT:42.0 sec    COVID testing:  (11/27) RVP positive for SARS COV2; (12/14) Sputum PCR negative

## 2020-12-14 NOTE — CONSULT NOTE ADULT - ATTENDING COMMENTS
Mr. Fermin is a 70yoM, with PMH of T2DM, afib, HTN and HLD, who was admitted to [facility] 11/27 with COVID pneumonia.  He required intubation on 12/02 for hypoxic respiratory failure.  He was transferred to [facility] on 12/09.  He has been unable to be weaned from ventilator;  now on day 12.   On full anticoagulation for Afib with Lovenox.  He would benefit creation of tracheotomy.    Will notify Dr. Pastrana and ICU team re schedule for tracheotomy.  Would need to hold two doses of anticoagulation prior to trach.  Will obtain consent from family.

## 2020-12-14 NOTE — CONSULT NOTE ADULT - ASSESSMENT
70yoM with PMHx of T2DM, afib, HTN, HLD with COVID pneumonia initially admitted to [facility] on 11/27 now on day 12 of intubation.  Evaluated for creation of tracheostomy.  - Please obtain sputum COVID PCR  - Pending scheduling per attendings  - Please page ENT with any questions or concerns  - Case seen and discussed with Dr. Ortez 70yoM with PMHx of T2DM, afib, HTN, HLD with COVID pneumonia initially admitted to [facility] on 11/27 now on day 12 of intubation.  Evaluated for creation of tracheostomy.  - Please obtain sputum COVID PCR  - Pending scheduling per discussion with attendings  - Please obtained pre-op labs including coags  - Please page ENT with any questions or concerns  - Case seen and discussed with Dr. Ortez 70yoM with PMHx of T2DM, afib, HTN, HLD with COVID pneumonia initially admitted to [facility] on 11/27 and intubated on 12/02.  Now on day 12 intubation and he is unable to be weaned from ventilator.  On full anticoagulation for Afib with Lovenox.  He would benefit creation of tracheotomy.    - Pending scheduling per discussion with attendings  - Please obtained pre-op labs including coags  - Please page ENT with any questions or concerns  - Case seen and discussed with Dr. Ortze 70yoM with PMHx of T2DM, afib, HTN, HLD with COVID pneumonia initially admitted to [facility] on 11/27 and intubated on 12/02.  He did not tolerate extubation on 12/13; reintubated same day after desat to 65%.  On full anticoagulation for Afib with Lovenox.  He would benefit creation of tracheotomy.    - Pending scheduling per discussion with attendings  - Please obtained pre-op labs including coags  - Please page ENT with any questions or concerns  - Case seen and discussed with Dr. Ortez

## 2020-12-15 DIAGNOSIS — K76.0 FATTY (CHANGE OF) LIVER, NOT ELSEWHERE CLASSIFIED: ICD-10-CM

## 2020-12-15 DIAGNOSIS — A41.9 SEPSIS, UNSPECIFIED ORGANISM: ICD-10-CM

## 2020-12-15 DIAGNOSIS — R06.02 SHORTNESS OF BREATH: ICD-10-CM

## 2020-12-15 DIAGNOSIS — J12.89 OTHER VIRAL PNEUMONIA: ICD-10-CM

## 2020-12-15 DIAGNOSIS — I48.92 UNSPECIFIED ATRIAL FLUTTER: ICD-10-CM

## 2020-12-15 DIAGNOSIS — I48.20 CHRONIC ATRIAL FIBRILLATION, UNSPECIFIED: ICD-10-CM

## 2020-12-15 DIAGNOSIS — E11.65 TYPE 2 DIABETES MELLITUS WITH HYPERGLYCEMIA: ICD-10-CM

## 2020-12-15 DIAGNOSIS — U07.1 COVID-19: ICD-10-CM

## 2020-12-15 DIAGNOSIS — Z87.891 PERSONAL HISTORY OF NICOTINE DEPENDENCE: ICD-10-CM

## 2020-12-15 DIAGNOSIS — Z98.890 OTHER SPECIFIED POSTPROCEDURAL STATES: ICD-10-CM

## 2020-12-15 DIAGNOSIS — K74.60 UNSPECIFIED CIRRHOSIS OF LIVER: ICD-10-CM

## 2020-12-15 DIAGNOSIS — N17.9 ACUTE KIDNEY FAILURE, UNSPECIFIED: ICD-10-CM

## 2020-12-15 DIAGNOSIS — D89.839 CYTOKINE RELEASE SYNDROME, GRADE UNSPECIFIED: ICD-10-CM

## 2020-12-15 DIAGNOSIS — E78.5 HYPERLIPIDEMIA, UNSPECIFIED: ICD-10-CM

## 2020-12-15 DIAGNOSIS — J18.9 PNEUMONIA, UNSPECIFIED ORGANISM: ICD-10-CM

## 2020-12-15 DIAGNOSIS — Z78.1 PHYSICAL RESTRAINT STATUS: ICD-10-CM

## 2020-12-15 DIAGNOSIS — R16.0 HEPATOMEGALY, NOT ELSEWHERE CLASSIFIED: ICD-10-CM

## 2020-12-15 DIAGNOSIS — Z85.51 PERSONAL HISTORY OF MALIGNANT NEOPLASM OF BLADDER: ICD-10-CM

## 2020-12-15 DIAGNOSIS — E87.0 HYPEROSMOLALITY AND HYPERNATREMIA: ICD-10-CM

## 2020-12-15 DIAGNOSIS — J80 ACUTE RESPIRATORY DISTRESS SYNDROME: ICD-10-CM

## 2020-12-15 DIAGNOSIS — I10 ESSENTIAL (PRIMARY) HYPERTENSION: ICD-10-CM

## 2020-12-15 LAB
ALBUMIN SERPL ELPH-MCNC: 2.8 G/DL — LOW (ref 3.3–5)
ALP SERPL-CCNC: 49 U/L — SIGNIFICANT CHANGE UP (ref 40–120)
ALT FLD-CCNC: 44 U/L — SIGNIFICANT CHANGE UP (ref 10–45)
ANION GAP SERPL CALC-SCNC: 9 MMOL/L — SIGNIFICANT CHANGE UP (ref 5–17)
AST SERPL-CCNC: 35 U/L — SIGNIFICANT CHANGE UP (ref 10–40)
BASE EXCESS BLDA CALC-SCNC: 3.3 MMOL/L — HIGH (ref -2–3)
BASOPHILS # BLD AUTO: 0.01 K/UL — SIGNIFICANT CHANGE UP (ref 0–0.2)
BASOPHILS NFR BLD AUTO: 0.3 % — SIGNIFICANT CHANGE UP (ref 0–2)
BILIRUB SERPL-MCNC: 1.1 MG/DL — SIGNIFICANT CHANGE UP (ref 0.2–1.2)
BUN SERPL-MCNC: 11 MG/DL — SIGNIFICANT CHANGE UP (ref 7–23)
CALCIUM SERPL-MCNC: 7.8 MG/DL — LOW (ref 8.4–10.5)
CHLORIDE SERPL-SCNC: 106 MMOL/L — SIGNIFICANT CHANGE UP (ref 96–108)
CO2 SERPL-SCNC: 26 MMOL/L — SIGNIFICANT CHANGE UP (ref 22–31)
CREAT SERPL-MCNC: 0.47 MG/DL — LOW (ref 0.5–1.3)
CRP SERPL-MCNC: 0.12 MG/DL — SIGNIFICANT CHANGE UP (ref 0–0.4)
D DIMER BLD IA.RAPID-MCNC: 711 NG/ML DDU — HIGH
EOSINOPHIL # BLD AUTO: 0.22 K/UL — SIGNIFICANT CHANGE UP (ref 0–0.5)
EOSINOPHIL NFR BLD AUTO: 5.8 % — SIGNIFICANT CHANGE UP (ref 0–6)
FERRITIN SERPL-MCNC: 119 NG/ML — SIGNIFICANT CHANGE UP (ref 30–400)
GLUCOSE BLDC GLUCOMTR-MCNC: 140 MG/DL — HIGH (ref 70–99)
GLUCOSE BLDC GLUCOMTR-MCNC: 142 MG/DL — HIGH (ref 70–99)
GLUCOSE BLDC GLUCOMTR-MCNC: 158 MG/DL — HIGH (ref 70–99)
GLUCOSE BLDC GLUCOMTR-MCNC: 164 MG/DL — HIGH (ref 70–99)
GLUCOSE SERPL-MCNC: 137 MG/DL — HIGH (ref 70–99)
HCO3 BLDA-SCNC: 27 MMOL/L — SIGNIFICANT CHANGE UP (ref 21–28)
HCT VFR BLD CALC: 27.8 % — LOW (ref 39–50)
HGB BLD-MCNC: 8.4 G/DL — LOW (ref 13–17)
IMM GRANULOCYTES NFR BLD AUTO: 0.3 % — SIGNIFICANT CHANGE UP (ref 0–1.5)
LYMPHOCYTES # BLD AUTO: 0.99 K/UL — LOW (ref 1–3.3)
LYMPHOCYTES # BLD AUTO: 26.1 % — SIGNIFICANT CHANGE UP (ref 13–44)
MAGNESIUM SERPL-MCNC: 2.1 MG/DL — SIGNIFICANT CHANGE UP (ref 1.6–2.6)
MCHC RBC-ENTMCNC: 24.4 PG — LOW (ref 27–34)
MCHC RBC-ENTMCNC: 30.2 GM/DL — LOW (ref 32–36)
MCV RBC AUTO: 80.8 FL — SIGNIFICANT CHANGE UP (ref 80–100)
MONOCYTES # BLD AUTO: 0.33 K/UL — SIGNIFICANT CHANGE UP (ref 0–0.9)
MONOCYTES NFR BLD AUTO: 8.7 % — SIGNIFICANT CHANGE UP (ref 2–14)
NEUTROPHILS # BLD AUTO: 2.23 K/UL — SIGNIFICANT CHANGE UP (ref 1.8–7.4)
NEUTROPHILS NFR BLD AUTO: 58.8 % — SIGNIFICANT CHANGE UP (ref 43–77)
NRBC # BLD: 0 /100 WBCS — SIGNIFICANT CHANGE UP (ref 0–0)
PCO2 BLDA: 37 MMHG — SIGNIFICANT CHANGE UP (ref 35–48)
PH BLDA: 7.48 — HIGH (ref 7.35–7.45)
PHOSPHATE SERPL-MCNC: 1.1 MG/DL — LOW (ref 2.5–4.5)
PLATELET # BLD AUTO: 153 K/UL — SIGNIFICANT CHANGE UP (ref 150–400)
PO2 BLDA: 96 MMHG — SIGNIFICANT CHANGE UP (ref 83–108)
POTASSIUM SERPL-MCNC: 3.3 MMOL/L — LOW (ref 3.5–5.3)
POTASSIUM SERPL-SCNC: 3.3 MMOL/L — LOW (ref 3.5–5.3)
PROT SERPL-MCNC: 4.6 G/DL — LOW (ref 6–8.3)
RBC # BLD: 3.44 M/UL — LOW (ref 4.2–5.8)
RBC # FLD: 23.6 % — HIGH (ref 10.3–14.5)
SAO2 % BLDA: 98 % — SIGNIFICANT CHANGE UP (ref 95–100)
SODIUM SERPL-SCNC: 141 MMOL/L — SIGNIFICANT CHANGE UP (ref 135–145)
TRIGL SERPL-MCNC: 251 MG/DL — HIGH
WBC # BLD: 3.79 K/UL — LOW (ref 3.8–10.5)
WBC # FLD AUTO: 3.79 K/UL — LOW (ref 3.8–10.5)

## 2020-12-15 PROCEDURE — 99232 SBSQ HOSP IP/OBS MODERATE 35: CPT | Mod: GC

## 2020-12-15 PROCEDURE — 99233 SBSQ HOSP IP/OBS HIGH 50: CPT | Mod: GC

## 2020-12-15 RX ORDER — POTASSIUM PHOSPHATE, MONOBASIC POTASSIUM PHOSPHATE, DIBASIC 236; 224 MG/ML; MG/ML
30 INJECTION, SOLUTION INTRAVENOUS ONCE
Refills: 0 | Status: COMPLETED | OUTPATIENT
Start: 2020-12-15 | End: 2020-12-15

## 2020-12-15 RX ORDER — POTASSIUM PHOSPHATE, MONOBASIC POTASSIUM PHOSPHATE, DIBASIC 236; 224 MG/ML; MG/ML
15 INJECTION, SOLUTION INTRAVENOUS ONCE
Refills: 0 | Status: DISCONTINUED | OUTPATIENT
Start: 2020-12-15 | End: 2020-12-15

## 2020-12-15 RX ADMIN — DEXMEDETOMIDINE HYDROCHLORIDE IN 0.9% SODIUM CHLORIDE 4.45 MICROGRAM(S)/KG/HR: 4 INJECTION INTRAVENOUS at 23:05

## 2020-12-15 RX ADMIN — Medication 1 APPLICATION(S): at 06:21

## 2020-12-15 RX ADMIN — Medication 1 APPLICATION(S): at 17:08

## 2020-12-15 RX ADMIN — CHLORHEXIDINE GLUCONATE 1 APPLICATION(S): 213 SOLUTION TOPICAL at 06:21

## 2020-12-15 RX ADMIN — Medication 1 APPLICATION(S): at 11:32

## 2020-12-15 RX ADMIN — ENOXAPARIN SODIUM 90 MILLIGRAM(S): 100 INJECTION SUBCUTANEOUS at 23:03

## 2020-12-15 RX ADMIN — PROPOFOL 2.67 MICROGRAM(S)/KG/MIN: 10 INJECTION, EMULSION INTRAVENOUS at 01:23

## 2020-12-15 RX ADMIN — INSULIN HUMAN 2: 100 INJECTION, SOLUTION SUBCUTANEOUS at 11:39

## 2020-12-15 RX ADMIN — INSULIN GLARGINE 8 UNIT(S): 100 INJECTION, SOLUTION SUBCUTANEOUS at 23:04

## 2020-12-15 RX ADMIN — CHLORHEXIDINE GLUCONATE 15 MILLILITER(S): 213 SOLUTION TOPICAL at 17:07

## 2020-12-15 RX ADMIN — PROPOFOL 2.67 MICROGRAM(S)/KG/MIN: 10 INJECTION, EMULSION INTRAVENOUS at 21:57

## 2020-12-15 RX ADMIN — CHLORHEXIDINE GLUCONATE 15 MILLILITER(S): 213 SOLUTION TOPICAL at 05:58

## 2020-12-15 RX ADMIN — PIPERACILLIN AND TAZOBACTAM 200 GRAM(S): 4; .5 INJECTION, POWDER, LYOPHILIZED, FOR SOLUTION INTRAVENOUS at 11:42

## 2020-12-15 RX ADMIN — POLYETHYLENE GLYCOL 3350 17 GRAM(S): 17 POWDER, FOR SOLUTION ORAL at 11:42

## 2020-12-15 RX ADMIN — AMIODARONE HYDROCHLORIDE 400 MILLIGRAM(S): 400 TABLET ORAL at 17:08

## 2020-12-15 RX ADMIN — ENOXAPARIN SODIUM 90 MILLIGRAM(S): 100 INJECTION SUBCUTANEOUS at 08:44

## 2020-12-15 RX ADMIN — PIPERACILLIN AND TAZOBACTAM 200 GRAM(S): 4; .5 INJECTION, POWDER, LYOPHILIZED, FOR SOLUTION INTRAVENOUS at 17:08

## 2020-12-15 RX ADMIN — INSULIN HUMAN 2: 100 INJECTION, SOLUTION SUBCUTANEOUS at 17:32

## 2020-12-15 RX ADMIN — PANTOPRAZOLE SODIUM 40 MILLIGRAM(S): 20 TABLET, DELAYED RELEASE ORAL at 11:42

## 2020-12-15 RX ADMIN — Medication 1 APPLICATION(S): at 23:49

## 2020-12-15 RX ADMIN — AMIODARONE HYDROCHLORIDE 400 MILLIGRAM(S): 400 TABLET ORAL at 05:58

## 2020-12-15 RX ADMIN — POTASSIUM PHOSPHATE, MONOBASIC POTASSIUM PHOSPHATE, DIBASIC 83.33 MILLIMOLE(S): 236; 224 INJECTION, SOLUTION INTRAVENOUS at 07:00

## 2020-12-15 RX ADMIN — PIPERACILLIN AND TAZOBACTAM 200 GRAM(S): 4; .5 INJECTION, POWDER, LYOPHILIZED, FOR SOLUTION INTRAVENOUS at 23:03

## 2020-12-15 RX ADMIN — PIPERACILLIN AND TAZOBACTAM 200 GRAM(S): 4; .5 INJECTION, POWDER, LYOPHILIZED, FOR SOLUTION INTRAVENOUS at 05:59

## 2020-12-15 NOTE — PROGRESS NOTE ADULT - SUBJECTIVE AND OBJECTIVE BOX
Patient: MAGGIE SAHNI   Age: 70y   Gender:Male    SUBJECTIVE / INTERVAL HPI: Tmax 99.3, WBC normalized     VITAL SIGNS:  Vital Signs Last 24 Hrs  T(C): 37 (15 Dec 2020 13:17), Max: 38.1 (14 Dec 2020 17:00)  T(F): 98.6 (15 Dec 2020 13:17), Max: 100.5 (14 Dec 2020 17:00)  HR: 59 (15 Dec 2020 16:00) (58 - 92)  BP: 133/60 (15 Dec 2020 08:00) (100/55 - 160/69)  BP(mean): 91 (15 Dec 2020 08:00) (74 - 99)  RR: 21 (15 Dec 2020 16:00) (21 - 32)  SpO2: 97% (15 Dec 2020 16:00) (95% - 100%)    12-14-20 @ 07:01  -  12-15-20 @ 07:00  --------------------------------------------------------  IN: 2104.4 mL / OUT: 945 mL / NET: 1159.4 mL    12-15-20 @ 07:01  -  12-15-20 @ 16:56  --------------------------------------------------------  IN: 1232.9 mL / OUT: 465 mL / NET: 767.9 mL        PHYSICAL EXAM: refer to primary team physical exam     MEDICATIONS:  MEDICATIONS  (STANDING):  aMIOdarone    Tablet 400 milliGRAM(s) Oral every 12 hours  chlorhexidine 0.12% Liquid 15 milliLiter(s) Oral Mucosa every 12 hours  chlorhexidine 2% Cloths 1 Application(s) Topical <User Schedule>  dexMEDEtomidine Infusion 0.2 MICROgram(s)/kG/Hr (4.45 mL/Hr) IV Continuous <Continuous>  dextrose 40% Gel 15 Gram(s) Oral once  dextrose 5%. 1000 milliLiter(s) (50 mL/Hr) IV Continuous <Continuous>  dextrose 5%. 1000 milliLiter(s) (100 mL/Hr) IV Continuous <Continuous>  dextrose 50% Injectable 25 Gram(s) IV Push once  dextrose 50% Injectable 12.5 Gram(s) IV Push once  dextrose 50% Injectable 25 Gram(s) IV Push once  enoxaparin Injectable 90 milliGRAM(s) SubCutaneous every 12 hours  glucagon  Injectable 1 milliGRAM(s) IntraMuscular once  insulin glargine Injectable (LANTUS) 8 Unit(s) SubCutaneous at bedtime  insulin regular  human corrective regimen sliding scale   SubCutaneous every 6 hours  pantoprazole   Suspension 40 milliGRAM(s) Enteral Tube daily  petrolatum Ophthalmic Ointment 1 Application(s) Both EYES every 6 hours  phenylephrine    Infusion 0.1 MICROgram(s)/kG/Min (1.67 mL/Hr) IV Continuous <Continuous>  piperacillin/tazobactam IVPB.. 4.5 Gram(s) IV Intermittent every 6 hours  polyethylene glycol 3350 17 Gram(s) Oral daily  propofol Infusion 5 MICROgram(s)/kG/Min (2.67 mL/Hr) IV Continuous <Continuous>  senna 2 Tablet(s) Oral at bedtime    MEDICATIONS  (PRN):  acetaminophen    Suspension .. 650 milliGRAM(s) Oral every 6 hours PRN Temp greater or equal to 38C (100.4F), Mild Pain (1 - 3)      ALLERGIES:  Allergies    No Known Allergies    Intolerances        LABS:                        8.4    3.79  )-----------( 153      ( 15 Dec 2020 05:17 )             27.8     12-15    141  |  106  |  11  ----------------------------<  137<H>  3.3<L>   |  26  |  0.47<L>    Ca    7.8<L>      15 Dec 2020 05:17  Phos  1.1     12-15  Mg     2.1     12-15    TPro  4.6<L>  /  Alb  2.8<L>  /  TBili  1.1  /  DBili  x   /  AST  35  /  ALT  44  /  AlkPhos  49  12-15        CAPILLARY BLOOD GLUCOSE      POCT Blood Glucose.: 164 mg/dL (15 Dec 2020 11:27)      RADIOLOGY & ADDITIONAL TESTS: Reviewed.

## 2020-12-15 NOTE — PROGRESS NOTE ADULT - ASSESSMENT
70 yr old male with PMhx of DMT2, Afib on Xarelto HTN, HLD who was admitted to [facility] on 11/27 for covid. Still with recurrent fevers likely due to COVID-19. Afebrile, WBC normalized on zosyn.     Recommend:  - Continue zosyn 4.5 grams IV q6hrs for 7d total course for VAP   - Follow up repeat blood cultures    ID Team 1 will sign off. Please reconsult with new questions. d/w attending and primary team.

## 2020-12-15 NOTE — PROGRESS NOTE ADULT - ASSESSMENT
MAGGIE SAHNI is a 70 yr old male with PMhx of DMT2, Afib on xarelto, HTN, HLD who was admitted to [facility] on 11/27 and started on remdisivir and decadron on 11/28, received convalescent plasma on 11/30, as well as Toci on 12/2, but was intubated on 12/2 due to worsening O2 sats. Also treated with Zosyn since 12/2 and 7 days of Levoquin (12/2- 12/8), along with  zyvox and caspofungin on 12/8. Transferred to [facility] 12/8 overnight.     NEUROLOGY  #Sedation  - Propofol and precedex on  - In restraints  - give Seroquel 12.5 qhs for agitation    RESPIRATORY   #Acute hypoxic respiratory failure 2/2 COVID vs. overlying pneumonia. completed decadron course  -Sputum growing gram positive cocci in clusters; given vanc and zosyn o/n, ID consulted  -c/w Zosyn  - consulted ENT for tracheostomy placement    CARDIOVASCULAR  #Septic shock, present on admission, was improving however last night febrile. Fever work up done, tylenol given, blood cultures sent.  -will f/u BCx  - ordered procal, UA, UCx (santana was present since admission)  - on phenylephrine instead of levo for pressure support afib with RVR, however phenylephrine requirements decreasing    #H/o A-fib with RVR  - c/w Amiodarone gtt  - Lovenox 90 BID    #Severe carotid disease  -Patient with occluded right ICA and severe left ICA stenosis, s/p left CEA, uncomplicated  -f/u with cardiology outpatient    RENAL   - goal I/O net even  - strict I/O tracking    ENDOCRINE  - goal 140-180 blood glucose  - mSSI  - Decreased lantus to 8     GASTROINTESTINAL  - glucerna tube feeds  - protonix 40  - bowel regimen  - still without BM therefore giving suppository and enema  - GI consulted for PEG placement    INFECTIOUS DISEASE  #VAP vs. COVID-19 Associated fevers  - got Vanc and Zosyn o/n for fever  - sputum GPC in clusters  - ID consulted  - c/w Zosyn (12-14-12/21)    HEMATOLOGY/ONCOLOGY  - lovenox full anticoagulation for Afib - 90 q12    ACCESS/LINES/DRAINS  -Tiffany and Left IJ placed 12/9  -OG tube  -Santana (which patient came in with from [place])    ETHICS/GOALS OF CARE  - update family daily    PROPHYLACTIC  ELECTROLYTES: Mg<2, K<4  DIET: regular  GI PPX: protonix  VTE PPX: SCD, lovenox  CODE: FULL  [facility]

## 2020-12-15 NOTE — PROGRESS NOTE ADULT - SUBJECTIVE AND OBJECTIVE BOX
Medicine Progress Note    Patient is a 70y old  Male who presents with a chief complaint of COVID-19 pna (15 Dec 2020 11:13)      SUBJECTIVE / OVERNIGHT EVENTS: Covid negative test came back. No acute events. Patient is resting comfortably, no apparent distress. Intubated and sedated this morning.    ADDITIONAL REVIEW OF SYSTEMS:    MEDICATIONS  (STANDING):  aMIOdarone    Tablet 400 milliGRAM(s) Oral every 12 hours  chlorhexidine 0.12% Liquid 15 milliLiter(s) Oral Mucosa every 12 hours  chlorhexidine 2% Cloths 1 Application(s) Topical <User Schedule>  dexMEDEtomidine Infusion 0.2 MICROgram(s)/kG/Hr (4.45 mL/Hr) IV Continuous <Continuous>  dextrose 40% Gel 15 Gram(s) Oral once  dextrose 5%. 1000 milliLiter(s) (50 mL/Hr) IV Continuous <Continuous>  dextrose 5%. 1000 milliLiter(s) (100 mL/Hr) IV Continuous <Continuous>  dextrose 50% Injectable 25 Gram(s) IV Push once  dextrose 50% Injectable 12.5 Gram(s) IV Push once  dextrose 50% Injectable 25 Gram(s) IV Push once  enoxaparin Injectable 90 milliGRAM(s) SubCutaneous every 12 hours  glucagon  Injectable 1 milliGRAM(s) IntraMuscular once  insulin glargine Injectable (LANTUS) 8 Unit(s) SubCutaneous at bedtime  insulin regular  human corrective regimen sliding scale   SubCutaneous every 6 hours  pantoprazole   Suspension 40 milliGRAM(s) Enteral Tube daily  petrolatum Ophthalmic Ointment 1 Application(s) Both EYES every 6 hours  phenylephrine    Infusion 0.1 MICROgram(s)/kG/Min (1.67 mL/Hr) IV Continuous <Continuous>  piperacillin/tazobactam IVPB.. 4.5 Gram(s) IV Intermittent every 6 hours  polyethylene glycol 3350 17 Gram(s) Oral daily  propofol Infusion 5 MICROgram(s)/kG/Min (2.67 mL/Hr) IV Continuous <Continuous>  senna 2 Tablet(s) Oral at bedtime    MEDICATIONS  (PRN):  acetaminophen    Suspension .. 650 milliGRAM(s) Oral every 6 hours PRN Temp greater or equal to 38C (100.4F), Mild Pain (1 - 3)    CAPILLARY BLOOD GLUCOSE      POCT Blood Glucose.: 164 mg/dL (15 Dec 2020 11:27)  POCT Blood Glucose.: 142 mg/dL (15 Dec 2020 06:01)  POCT Blood Glucose.: 169 mg/dL (14 Dec 2020 22:59)  POCT Blood Glucose.: 124 mg/dL (14 Dec 2020 17:14)    I&O's Summary    14 Dec 2020 07:01  -  15 Dec 2020 07:00  --------------------------------------------------------  IN: 2104.4 mL / OUT: 945 mL / NET: 1159.4 mL    15 Dec 2020 07:01  -  15 Dec 2020 13:40  --------------------------------------------------------  IN: 923.5 mL / OUT: 0 mL / NET: 923.5 mL        PHYSICAL EXAM:  Vital Signs Last 24 Hrs  T(C): 37 (15 Dec 2020 13:17), Max: 38.1 (14 Dec 2020 17:00)  T(F): 98.6 (15 Dec 2020 13:17), Max: 100.5 (14 Dec 2020 17:00)  HR: 59 (15 Dec 2020 13:00) (59 - 92)  BP: 133/60 (15 Dec 2020 08:00) (100/55 - 160/69)  BP(mean): 91 (15 Dec 2020 08:00) (74 - 99)  RR: 22 (15 Dec 2020 13:00) (22 - 32)  SpO2: 98% (15 Dec 2020 13:00) (95% - 100%)  CONSTITUTIONAL: NAD, intubated and sedated  ENMT: Moist oral mucosa, no pharyngeal injection or exudates; normal dentition  RESPIRATORY: Normal respiratory effort; lungs are clear to auscultation bilaterally  CARDIOVASCULAR: Regular rate and rhythm, normal S1 and S2, no murmur/rub/gallop; No lower extremity edema; Peripheral pulses are 2+ bilaterally  ABDOMEN: Nontender to palpation, normoactive bowel sounds, no rebound/guarding; No hepatosplenomegaly  PSYCH: Sedated  NEUROLOGY: Sedated  SKIN: No rashes; no palpable lesions    LABS:                        8.4    3.79  )-----------( 153      ( 15 Dec 2020 05:17 )             27.8     12-15    141  |  106  |  11  ----------------------------<  137<H>  3.3<L>   |  26  |  0.47<L>    Ca    7.8<L>      15 Dec 2020 05:17  Phos  1.1     12-15  Mg     2.1     12-15    TPro  4.6<L>  /  Alb  2.8<L>  /  TBili  1.1  /  DBili  x   /  AST  35  /  ALT  44  /  AlkPhos  49  12-15              Culture - Blood (collected 13 Dec 2020 20:58)  Source: .Blood Blood  Preliminary Report (14 Dec 2020 21:01):    No growth at 1 day.    Culture - Blood (collected 13 Dec 2020 08:20)  Source: .Blood Blood  Preliminary Report (15 Dec 2020 09:04):    No growth at 2 days.      COVID-19 PCR: NotDetec (14 Dec 2020 14:16)  SARS-CoV-2: Detected (27 Nov 2020 19:30)      RADIOLOGY & ADDITIONAL TESTS:  Imaging from Last 24 Hours:    Electrocardiogram/QTc Interval:    COORDINATION OF CARE:  Care Discussed with Consultants/Other Providers:

## 2020-12-16 LAB
ALBUMIN SERPL ELPH-MCNC: 2.8 G/DL — LOW (ref 3.3–5)
ALP SERPL-CCNC: 50 U/L — SIGNIFICANT CHANGE UP (ref 40–120)
ALT FLD-CCNC: 46 U/L — HIGH (ref 10–45)
ANION GAP SERPL CALC-SCNC: 9 MMOL/L — SIGNIFICANT CHANGE UP (ref 5–17)
ANISOCYTOSIS BLD QL: SIGNIFICANT CHANGE UP
APTT BLD: 47.8 SEC — HIGH (ref 27.5–35.5)
AST SERPL-CCNC: 38 U/L — SIGNIFICANT CHANGE UP (ref 10–40)
BASE EXCESS BLDA CALC-SCNC: 1.7 MMOL/L — SIGNIFICANT CHANGE UP (ref -2–3)
BASOPHILS # BLD AUTO: 0 K/UL — SIGNIFICANT CHANGE UP (ref 0–0.2)
BASOPHILS # BLD AUTO: 0.01 K/UL — SIGNIFICANT CHANGE UP (ref 0–0.2)
BASOPHILS NFR BLD AUTO: 0 % — SIGNIFICANT CHANGE UP (ref 0–2)
BASOPHILS NFR BLD AUTO: 0.4 % — SIGNIFICANT CHANGE UP (ref 0–2)
BILIRUB SERPL-MCNC: 1 MG/DL — SIGNIFICANT CHANGE UP (ref 0.2–1.2)
BUN SERPL-MCNC: 10 MG/DL — SIGNIFICANT CHANGE UP (ref 7–23)
CALCIUM SERPL-MCNC: 7.9 MG/DL — LOW (ref 8.4–10.5)
CHLORIDE SERPL-SCNC: 110 MMOL/L — HIGH (ref 96–108)
CO2 SERPL-SCNC: 25 MMOL/L — SIGNIFICANT CHANGE UP (ref 22–31)
CREAT SERPL-MCNC: 0.45 MG/DL — LOW (ref 0.5–1.3)
CRP SERPL-MCNC: 0.08 MG/DL — SIGNIFICANT CHANGE UP (ref 0–0.4)
D DIMER BLD IA.RAPID-MCNC: 525 NG/ML DDU — HIGH
DACRYOCYTES BLD QL SMEAR: SLIGHT — SIGNIFICANT CHANGE UP
EOSINOPHIL # BLD AUTO: 0.22 K/UL — SIGNIFICANT CHANGE UP (ref 0–0.5)
EOSINOPHIL # BLD AUTO: 0.24 K/UL — SIGNIFICANT CHANGE UP (ref 0–0.5)
EOSINOPHIL NFR BLD AUTO: 8.2 % — HIGH (ref 0–6)
EOSINOPHIL NFR BLD AUTO: 8.5 % — HIGH (ref 0–6)
GAS PNL BLDA: SIGNIFICANT CHANGE UP
GIANT PLATELETS BLD QL SMEAR: PRESENT — SIGNIFICANT CHANGE UP
GLUCOSE BLDC GLUCOMTR-MCNC: 115 MG/DL — HIGH (ref 70–99)
GLUCOSE BLDC GLUCOMTR-MCNC: 120 MG/DL — HIGH (ref 70–99)
GLUCOSE BLDC GLUCOMTR-MCNC: 141 MG/DL — HIGH (ref 70–99)
GLUCOSE BLDC GLUCOMTR-MCNC: 149 MG/DL — HIGH (ref 70–99)
GLUCOSE SERPL-MCNC: 153 MG/DL — HIGH (ref 70–99)
HCO3 BLDA-SCNC: 25 MMOL/L — SIGNIFICANT CHANGE UP (ref 21–28)
HCT VFR BLD CALC: 29.2 % — LOW (ref 39–50)
HCT VFR BLD CALC: 30.1 % — LOW (ref 39–50)
HGB BLD-MCNC: 8.6 G/DL — LOW (ref 13–17)
HGB BLD-MCNC: 8.7 G/DL — LOW (ref 13–17)
HYPOCHROMIA BLD QL: SLIGHT — SIGNIFICANT CHANGE UP
IMM GRANULOCYTES NFR BLD AUTO: 0.4 % — SIGNIFICANT CHANGE UP (ref 0–1.5)
LYMPHOCYTES # BLD AUTO: 0.86 K/UL — LOW (ref 1–3.3)
LYMPHOCYTES # BLD AUTO: 1.06 K/UL — SIGNIFICANT CHANGE UP (ref 1–3.3)
LYMPHOCYTES # BLD AUTO: 31.8 % — SIGNIFICANT CHANGE UP (ref 13–44)
LYMPHOCYTES # BLD AUTO: 37.5 % — SIGNIFICANT CHANGE UP (ref 13–44)
MACROCYTES BLD QL: SLIGHT — SIGNIFICANT CHANGE UP
MAGNESIUM SERPL-MCNC: 2.3 MG/DL — SIGNIFICANT CHANGE UP (ref 1.6–2.6)
MANUAL SMEAR VERIFICATION: SIGNIFICANT CHANGE UP
MCHC RBC-ENTMCNC: 24.2 PG — LOW (ref 27–34)
MCHC RBC-ENTMCNC: 24.6 PG — LOW (ref 27–34)
MCHC RBC-ENTMCNC: 28.6 GM/DL — LOW (ref 32–36)
MCHC RBC-ENTMCNC: 29.8 GM/DL — LOW (ref 32–36)
MCV RBC AUTO: 82.5 FL — SIGNIFICANT CHANGE UP (ref 80–100)
MCV RBC AUTO: 84.8 FL — SIGNIFICANT CHANGE UP (ref 80–100)
MICROCYTES BLD QL: SLIGHT — SIGNIFICANT CHANGE UP
MONOCYTES # BLD AUTO: 0.13 K/UL — SIGNIFICANT CHANGE UP (ref 0–0.9)
MONOCYTES # BLD AUTO: 0.2 K/UL — SIGNIFICANT CHANGE UP (ref 0–0.9)
MONOCYTES NFR BLD AUTO: 4.6 % — SIGNIFICANT CHANGE UP (ref 2–14)
MONOCYTES NFR BLD AUTO: 7.1 % — SIGNIFICANT CHANGE UP (ref 2–14)
NEUTROPHILS # BLD AUTO: 1.31 K/UL — LOW (ref 1.8–7.4)
NEUTROPHILS # BLD AUTO: 1.46 K/UL — LOW (ref 1.8–7.4)
NEUTROPHILS NFR BLD AUTO: 46.1 % — SIGNIFICANT CHANGE UP (ref 43–77)
NEUTROPHILS NFR BLD AUTO: 53.6 % — SIGNIFICANT CHANGE UP (ref 43–77)
NRBC # BLD: 0 /100 WBCS — SIGNIFICANT CHANGE UP (ref 0–0)
NRBC # BLD: 1 /100 — HIGH (ref 0–0)
NRBC # BLD: SIGNIFICANT CHANGE UP /100 WBCS (ref 0–0)
OVALOCYTES BLD QL SMEAR: SLIGHT — SIGNIFICANT CHANGE UP
PCO2 BLDA: 37 MMHG — SIGNIFICANT CHANGE UP (ref 35–48)
PH BLDA: 7.46 — HIGH (ref 7.35–7.45)
PHOSPHATE SERPL-MCNC: 2.6 MG/DL — SIGNIFICANT CHANGE UP (ref 2.5–4.5)
PLAT MORPH BLD: ABNORMAL
PLATELET # BLD AUTO: 138 K/UL — LOW (ref 150–400)
PLATELET # BLD AUTO: 142 K/UL — LOW (ref 150–400)
PO2 BLDA: 68 MMHG — LOW (ref 83–108)
POLYCHROMASIA BLD QL SMEAR: SLIGHT — SIGNIFICANT CHANGE UP
POTASSIUM SERPL-MCNC: 3.4 MMOL/L — LOW (ref 3.5–5.3)
POTASSIUM SERPL-SCNC: 3.4 MMOL/L — LOW (ref 3.5–5.3)
PROT SERPL-MCNC: 4.7 G/DL — LOW (ref 6–8.3)
RBC # BLD: 3.54 M/UL — LOW (ref 4.2–5.8)
RBC # BLD: 3.55 M/UL — LOW (ref 4.2–5.8)
RBC # FLD: 24.9 % — HIGH (ref 10.3–14.5)
RBC # FLD: 25 % — HIGH (ref 10.3–14.5)
RBC BLD AUTO: ABNORMAL
SAO2 % BLDA: 93 % — LOW (ref 95–100)
SMUDGE CELLS # BLD: PRESENT — SIGNIFICANT CHANGE UP
SODIUM SERPL-SCNC: 144 MMOL/L — SIGNIFICANT CHANGE UP (ref 135–145)
SPHEROCYTES BLD QL SMEAR: SLIGHT — SIGNIFICANT CHANGE UP
VARIANT LYMPHS # BLD: 1.8 % — SIGNIFICANT CHANGE UP (ref 0–6)
WBC # BLD: 2.72 K/UL — LOW (ref 3.8–10.5)
WBC # BLD: 2.83 K/UL — LOW (ref 3.8–10.5)
WBC # FLD AUTO: 2.72 K/UL — LOW (ref 3.8–10.5)
WBC # FLD AUTO: 2.83 K/UL — LOW (ref 3.8–10.5)

## 2020-12-16 PROCEDURE — 99233 SBSQ HOSP IP/OBS HIGH 50: CPT | Mod: GC

## 2020-12-16 RX ORDER — POTASSIUM CHLORIDE 20 MEQ
40 PACKET (EA) ORAL ONCE
Refills: 0 | Status: COMPLETED | OUTPATIENT
Start: 2020-12-16 | End: 2020-12-16

## 2020-12-16 RX ADMIN — POLYETHYLENE GLYCOL 3350 17 GRAM(S): 17 POWDER, FOR SOLUTION ORAL at 10:41

## 2020-12-16 RX ADMIN — PIPERACILLIN AND TAZOBACTAM 200 GRAM(S): 4; .5 INJECTION, POWDER, LYOPHILIZED, FOR SOLUTION INTRAVENOUS at 18:08

## 2020-12-16 RX ADMIN — ENOXAPARIN SODIUM 90 MILLIGRAM(S): 100 INJECTION SUBCUTANEOUS at 10:08

## 2020-12-16 RX ADMIN — PROPOFOL 2.67 MICROGRAM(S)/KG/MIN: 10 INJECTION, EMULSION INTRAVENOUS at 11:10

## 2020-12-16 RX ADMIN — PROPOFOL 2.67 MICROGRAM(S)/KG/MIN: 10 INJECTION, EMULSION INTRAVENOUS at 22:42

## 2020-12-16 RX ADMIN — AMIODARONE HYDROCHLORIDE 400 MILLIGRAM(S): 400 TABLET ORAL at 18:08

## 2020-12-16 RX ADMIN — DEXMEDETOMIDINE HYDROCHLORIDE IN 0.9% SODIUM CHLORIDE 4.45 MICROGRAM(S)/KG/HR: 4 INJECTION INTRAVENOUS at 16:45

## 2020-12-16 RX ADMIN — Medication 1 APPLICATION(S): at 06:35

## 2020-12-16 RX ADMIN — PROPOFOL 2.67 MICROGRAM(S)/KG/MIN: 10 INJECTION, EMULSION INTRAVENOUS at 16:26

## 2020-12-16 RX ADMIN — Medication 1 APPLICATION(S): at 10:41

## 2020-12-16 RX ADMIN — DEXMEDETOMIDINE HYDROCHLORIDE IN 0.9% SODIUM CHLORIDE 4.45 MICROGRAM(S)/KG/HR: 4 INJECTION INTRAVENOUS at 08:33

## 2020-12-16 RX ADMIN — PIPERACILLIN AND TAZOBACTAM 200 GRAM(S): 4; .5 INJECTION, POWDER, LYOPHILIZED, FOR SOLUTION INTRAVENOUS at 23:19

## 2020-12-16 RX ADMIN — Medication 1 APPLICATION(S): at 23:20

## 2020-12-16 RX ADMIN — Medication 1 APPLICATION(S): at 16:58

## 2020-12-16 RX ADMIN — CHLORHEXIDINE GLUCONATE 15 MILLILITER(S): 213 SOLUTION TOPICAL at 06:35

## 2020-12-16 RX ADMIN — PIPERACILLIN AND TAZOBACTAM 200 GRAM(S): 4; .5 INJECTION, POWDER, LYOPHILIZED, FOR SOLUTION INTRAVENOUS at 06:36

## 2020-12-16 RX ADMIN — DEXMEDETOMIDINE HYDROCHLORIDE IN 0.9% SODIUM CHLORIDE 4.45 MICROGRAM(S)/KG/HR: 4 INJECTION INTRAVENOUS at 07:12

## 2020-12-16 RX ADMIN — PROPOFOL 2.67 MICROGRAM(S)/KG/MIN: 10 INJECTION, EMULSION INTRAVENOUS at 06:44

## 2020-12-16 RX ADMIN — PIPERACILLIN AND TAZOBACTAM 200 GRAM(S): 4; .5 INJECTION, POWDER, LYOPHILIZED, FOR SOLUTION INTRAVENOUS at 10:42

## 2020-12-16 RX ADMIN — CHLORHEXIDINE GLUCONATE 15 MILLILITER(S): 213 SOLUTION TOPICAL at 18:08

## 2020-12-16 RX ADMIN — INSULIN GLARGINE 8 UNIT(S): 100 INJECTION, SOLUTION SUBCUTANEOUS at 23:20

## 2020-12-16 RX ADMIN — AMIODARONE HYDROCHLORIDE 400 MILLIGRAM(S): 400 TABLET ORAL at 06:35

## 2020-12-16 RX ADMIN — PROPOFOL 2.67 MICROGRAM(S)/KG/MIN: 10 INJECTION, EMULSION INTRAVENOUS at 02:40

## 2020-12-16 RX ADMIN — DEXMEDETOMIDINE HYDROCHLORIDE IN 0.9% SODIUM CHLORIDE 4.45 MICROGRAM(S)/KG/HR: 4 INJECTION INTRAVENOUS at 17:35

## 2020-12-16 RX ADMIN — CHLORHEXIDINE GLUCONATE 1 APPLICATION(S): 213 SOLUTION TOPICAL at 06:36

## 2020-12-16 RX ADMIN — Medication 40 MILLIEQUIVALENT(S): at 08:33

## 2020-12-16 RX ADMIN — DEXMEDETOMIDINE HYDROCHLORIDE IN 0.9% SODIUM CHLORIDE 4.45 MICROGRAM(S)/KG/HR: 4 INJECTION INTRAVENOUS at 22:42

## 2020-12-16 RX ADMIN — PANTOPRAZOLE SODIUM 40 MILLIGRAM(S): 20 TABLET, DELAYED RELEASE ORAL at 10:41

## 2020-12-16 RX ADMIN — ENOXAPARIN SODIUM 90 MILLIGRAM(S): 100 INJECTION SUBCUTANEOUS at 22:41

## 2020-12-16 NOTE — CHART NOTE - NSCHARTNOTEFT_GEN_A_CORE
Admitting Diagnosis:   Patient is a 70y old  Male who presents with a chief complaint of COVID-19 pna (16 Dec 2020 11:56)      PAST MEDICAL & SURGICAL HISTORY:  H/O sciatica  CHR PAIN LT LE    H/O cystoscopy  resection of bladder tumor    Bladder cancer    DM (diabetes mellitus)    Hypercholesteremia    Afib    HTN (hypertension)    History of hernia repair        Current Nutrition Order:  Glucerna 1.5 Helio @ 34ml/hr x 24hrs plus ProstatBID via NGT (816ml TV, 1424kcal/1986kcal w/propofol, 97g pro, 619ml free H2O, 82% RDI, 1.57g/kg IBW protein, 26 non-protein kcal/kg IBW)    PO Intake: Good (%) [   ]  Fair (50-75%) [   ] Poor (<25%) [   ]- NA NPO w/EN    GI Issues: Unable to assess at this time 2/2 vent   No regurgitation or residuals overnight  Large BM this afternoon (12/16)    Pain: Unable to assess at this time 2/2 vent     Skin Integrity: Yang 14; generalized trace edema  Intact pressure-wise     Labs:   12-16    144  |  110<H>  |  10  ----------------------------<  153<H>  3.4<L>   |  25  |  0.45<L>    Ca    7.9<L>      16 Dec 2020 07:35  Phos  2.6     12-16  Mg     2.3     12-16    TPro  4.7<L>  /  Alb  2.8<L>  /  TBili  1.0  /  DBili  x   /  AST  38  /  ALT  46<H>  /  AlkPhos  50  12-16    CAPILLARY BLOOD GLUCOSE      POCT Blood Glucose.: 141 mg/dL (16 Dec 2020 10:46)  POCT Blood Glucose.: 120 mg/dL (16 Dec 2020 07:15)  POCT Blood Glucose.: 140 mg/dL (15 Dec 2020 23:02)  POCT Blood Glucose.: 158 mg/dL (15 Dec 2020 17:23)      Medications:  MEDICATIONS  (STANDING):  aMIOdarone    Tablet 400 milliGRAM(s) Oral every 12 hours  chlorhexidine 0.12% Liquid 15 milliLiter(s) Oral Mucosa every 12 hours  chlorhexidine 2% Cloths 1 Application(s) Topical <User Schedule>  dexMEDEtomidine Infusion 0.2 MICROgram(s)/kG/Hr (4.45 mL/Hr) IV Continuous <Continuous>  dextrose 40% Gel 15 Gram(s) Oral once  dextrose 5%. 1000 milliLiter(s) (50 mL/Hr) IV Continuous <Continuous>  dextrose 5%. 1000 milliLiter(s) (100 mL/Hr) IV Continuous <Continuous>  dextrose 50% Injectable 25 Gram(s) IV Push once  dextrose 50% Injectable 12.5 Gram(s) IV Push once  dextrose 50% Injectable 25 Gram(s) IV Push once  enoxaparin Injectable 90 milliGRAM(s) SubCutaneous every 12 hours  glucagon  Injectable 1 milliGRAM(s) IntraMuscular once  insulin glargine Injectable (LANTUS) 8 Unit(s) SubCutaneous at bedtime  insulin regular  human corrective regimen sliding scale   SubCutaneous every 6 hours  pantoprazole   Suspension 40 milliGRAM(s) Enteral Tube daily  petrolatum Ophthalmic Ointment 1 Application(s) Both EYES every 6 hours  phenylephrine    Infusion 0.1 MICROgram(s)/kG/Min (1.67 mL/Hr) IV Continuous <Continuous>  piperacillin/tazobactam IVPB.. 4.5 Gram(s) IV Intermittent every 6 hours  polyethylene glycol 3350 17 Gram(s) Oral daily  propofol Infusion 5 MICROgram(s)/kG/Min (2.67 mL/Hr) IV Continuous <Continuous>  senna 2 Tablet(s) Oral at bedtime    MEDICATIONS  (PRN):  acetaminophen    Suspension .. 650 milliGRAM(s) Oral every 6 hours PRN Temp greater or equal to 38C (100.4F), Mild Pain (1 - 3)      Weight: 87kg (12/13)     Weight Change: 1-2kg weight loss from admission. Will continue to trend     Nutrition Focused Physical Exam: Completed [   ]  Not Pertinent [ X  ]    Estimated energy needs:   Height 65"; ABW 88.9kg (admit); IBW 61.6kg; 143%IBW; BMI 32.6  Based on [facility]EN critical care guidelines for the treatment of critically ill patients with COVID-19, recommend:  *using IBW based on clinical judgment   Calories: 25-30 kcal/kg = 7321-0287 kcal/day  Protein: 1.5-2 g/kg = 93-124g protein/day  Fluids per team 2/2 compromised respiratory status       Subjective: 69 yo/male with PMHx T2DM, Afib, HTN, HLD, admitted to [facility] w/COVID on 11/27. Intubated on 12/2 for increased work of breathing. Transferred to [facility] for further care. Pt extubated on 12/13 after tolerating CPAP though was immediately reintubated 2/2 desaturation and increased secretions. Unable to conduct a face to face interview or nutrition-focused physical exam due to limited contact restrictions related to the pt's medical condition and isolation precautions. Pt remains intubated on VC/AC mode, sedated on propofol @ 21.3ml/hr (562kcal/day from lipids), and precedex. MAP 71- on [place] for BP support. Currently in supine position. Was NPO earlier for possible trach and PEG today, though now postponed to 12/18 so feeds resumed. Last BM 12/16. Afebrile this morning. POC , 120mg/dL. Will continue to follow per RD protocol.     Previous Nutrition Diagnosis:  Increased Nutrient Needs RT increased demand for protein-calorie intake AEB vent, COVID infection.   Active [  X ]  Resolved [   ]    If resolved, new PES:     Goal: Pt will continue to meet % of EER via EN     Recommendations:  1. Continue with current EN order. Monitor for s/s intolerance; maintain aspiration precautions at all times. Additional free H2O flushes per team *will adjust goal rate pending propofol titration   2. Monitor lytes and replete prn. POC BG q6hrs (goal 140-180mg/dL)  3. Pain and bowel regimens per team (goal 1 BM/day)  4. Trend weights     Education: NA- intubated, sedated, isolation precautions     Risk Level: High [ X  ] Moderate [   ] Low [   ]

## 2020-12-16 NOTE — PROGRESS NOTE ADULT - SUBJECTIVE AND OBJECTIVE BOX
Medicine Progress Note    Patient is a 70y old  Male who presents with a chief complaint of COVID-19 pna (16 Dec 2020 11:56)      SUBJECTIVE / OVERNIGHT EVENTS: No acute events overnight. patient is intubated and sedated, in no apparent distress.    ADDITIONAL REVIEW OF SYSTEMS:    MEDICATIONS  (STANDING):  aMIOdarone    Tablet 400 milliGRAM(s) Oral every 12 hours  chlorhexidine 0.12% Liquid 15 milliLiter(s) Oral Mucosa every 12 hours  chlorhexidine 2% Cloths 1 Application(s) Topical <User Schedule>  dexMEDEtomidine Infusion 0.2 MICROgram(s)/kG/Hr (4.45 mL/Hr) IV Continuous <Continuous>  dextrose 40% Gel 15 Gram(s) Oral once  dextrose 5%. 1000 milliLiter(s) (50 mL/Hr) IV Continuous <Continuous>  dextrose 5%. 1000 milliLiter(s) (100 mL/Hr) IV Continuous <Continuous>  dextrose 50% Injectable 25 Gram(s) IV Push once  dextrose 50% Injectable 12.5 Gram(s) IV Push once  dextrose 50% Injectable 25 Gram(s) IV Push once  enoxaparin Injectable 90 milliGRAM(s) SubCutaneous every 12 hours  glucagon  Injectable 1 milliGRAM(s) IntraMuscular once  insulin glargine Injectable (LANTUS) 8 Unit(s) SubCutaneous at bedtime  insulin regular  human corrective regimen sliding scale   SubCutaneous every 6 hours  pantoprazole   Suspension 40 milliGRAM(s) Enteral Tube daily  petrolatum Ophthalmic Ointment 1 Application(s) Both EYES every 6 hours  phenylephrine    Infusion 0.1 MICROgram(s)/kG/Min (1.67 mL/Hr) IV Continuous <Continuous>  piperacillin/tazobactam IVPB.. 4.5 Gram(s) IV Intermittent every 6 hours  polyethylene glycol 3350 17 Gram(s) Oral daily  propofol Infusion 5 MICROgram(s)/kG/Min (2.67 mL/Hr) IV Continuous <Continuous>  senna 2 Tablet(s) Oral at bedtime    MEDICATIONS  (PRN):  acetaminophen    Suspension .. 650 milliGRAM(s) Oral every 6 hours PRN Temp greater or equal to 38C (100.4F), Mild Pain (1 - 3)    CAPILLARY BLOOD GLUCOSE      POCT Blood Glucose.: 141 mg/dL (16 Dec 2020 10:46)  POCT Blood Glucose.: 120 mg/dL (16 Dec 2020 07:15)  POCT Blood Glucose.: 140 mg/dL (15 Dec 2020 23:02)  POCT Blood Glucose.: 158 mg/dL (15 Dec 2020 17:23)    I&O's Summary    15 Dec 2020 07:01  -  16 Dec 2020 07:00  --------------------------------------------------------  IN: 2464.5 mL / OUT: 1395 mL / NET: 1069.5 mL    16 Dec 2020 07:01  -  16 Dec 2020 13:50  --------------------------------------------------------  IN: 407.2 mL / OUT: 105 mL / NET: 302.2 mL        PHYSICAL EXAM:  Vital Signs Last 24 Hrs  T(C): 36.9 (16 Dec 2020 10:48), Max: 36.9 (16 Dec 2020 10:48)  T(F): 98.4 (16 Dec 2020 10:48), Max: 98.4 (16 Dec 2020 10:48)  HR: 50 (16 Dec 2020 11:00) (47 - 70)  BP: 116/58 (16 Dec 2020 01:00) (116/58 - 129/61)  BP(mean): 83 (16 Dec 2020 01:00) (83 - 88)  RR: 18 (16 Dec 2020 11:00) (18 - 26)  SpO2: 99% (16 Dec 2020 11:00) (95% - 100%)  CONSTITUTIONAL: NAD  ENMT: Moist oral mucosa, no pharyngeal injection or exudates; normal dentition  RESPIRATORY: Normal respiratory effort; lungs are clear to auscultation bilaterally  CARDIOVASCULAR: Regular rate and rhythm, normal S1 and S2, no murmur/rub/gallop; No lower extremity edema; Peripheral pulses are 2+ bilaterally  ABDOMEN: Nontender to palpation, normoactive bowel sounds, no rebound/guarding; No hepatosplenomegaly  PSYCH: Sedated, intubated  NEUROLOGY: Sedated  SKIN: No rashes; no palpable lesions    LABS:                        8.7    2.72  )-----------( 138      ( 16 Dec 2020 07:35 )             29.2     12-16    144  |  110<H>  |  10  ----------------------------<  153<H>  3.4<L>   |  25  |  0.45<L>    Ca    7.9<L>      16 Dec 2020 07:35  Phos  2.6     12-16  Mg     2.3     12-16    TPro  4.7<L>  /  Alb  2.8<L>  /  TBili  1.0  /  DBili  x   /  AST  38  /  ALT  46<H>  /  AlkPhos  50  12-16    PTT - ( 16 Dec 2020 07:35 )  PTT:47.8 sec          Culture - Blood (collected 13 Dec 2020 20:58)  Source: .Blood Blood  Preliminary Report (15 Dec 2020 21:02):    No growth at 2 days.      COVID-19 PCR: NotDetec (14 Dec 2020 14:16)  SARS-CoV-2: Detected (27 Nov 2020 19:30)      RADIOLOGY & ADDITIONAL TESTS:  Imaging from Last 24 Hours:    Electrocardiogram/QTc Interval:    COORDINATION OF CARE:  Care Discussed with Consultants/Other Providers:

## 2020-12-16 NOTE — PROGRESS NOTE ADULT - ASSESSMENT
MAGGIE SAHNI is a 70 yr old male with PMhx of DMT2, Afib on xarelto, HTN, HLD who was admitted to [facility] on 11/27 and started on remdisivir and decadron on 11/28, received convalescent plasma on 11/30, as well as Toci on 12/2, but was intubated on 12/2 due to worsening O2 sats. Also treated with Zosyn since 12/2 and 7 days of Levoquin (12/2- 12/8), along with  zyvox and caspofungin on 12/8. Transferred to [facility] 12/8 overnight.     NEUROLOGY  #Sedation  - Propofol and precedex  - In restraints  - give Seroquel 12.5 qhs for agitation    RESPIRATORY   #Acute hypoxic respiratory failure 2/2 COVID vs. overlying pneumonia. completed decadron course  - Sputum growing gram positive cocci in clusters; given vanc and zosyn o/n, ID consulted  - c/w Zosyn (12/14-12/21)  - consulted ENT for tracheostomy placement    CARDIOVASCULAR  #Septic shock, present on admission, was improving however last night febrile. Fever work up done, tylenol given, blood cultures sent.  - on phenylephrine instead of levo for pressure support afib with RVR, however phenylephrine requirements decreasing  - f/u Echo  - Consider diuresis for fluid overload    #H/o A-fib with RVR  - c/w Amiodarone gtt  - Lovenox 90 BID    #Severe carotid disease  -Patient with occluded right ICA and severe left ICA stenosis, s/p left CEA, uncomplicated  -f/u with cardiology outpatient    RENAL   - goal I/O net even  - strict I/O tracking    ENDOCRINE  - goal 140-180 blood glucose  - mSSI  - Decreased lantus to 8     GASTROINTESTINAL  - glucerna tube feeds  - protonix 40  - bowel regimen  - GI consulted for PEG placement    INFECTIOUS DISEASE  #VAP vs. COVID-19 Associated fevers  - c/w Zosyn (12-14-12/21)    HEMATOLOGY/ONCOLOGY  - lovenox full anticoagulation for Afib - 90 q12    ACCESS/LINES/DRAINS  -[place] and Left IJ placed 12/9  -OG tube  -Araujo (which patient came in with from [place])    ETHICS/GOALS OF CARE  - update family daily    PROPHYLACTIC  ELECTROLYTES: Mg<2, K<4  DIET: regular  GI PPX: protonix  VTE PPX: SCD, lovenox  CODE: FULL  DISPO: [facility]

## 2020-12-17 ENCOUNTER — TRANSCRIPTION ENCOUNTER (OUTPATIENT)
Age: 70
End: 2020-12-17

## 2020-12-17 LAB
ALBUMIN SERPL ELPH-MCNC: 2.7 G/DL — LOW (ref 3.3–5)
ALP SERPL-CCNC: 61 U/L — SIGNIFICANT CHANGE UP (ref 40–120)
ALT FLD-CCNC: 58 U/L — HIGH (ref 10–45)
ANION GAP SERPL CALC-SCNC: 9 MMOL/L — SIGNIFICANT CHANGE UP (ref 5–17)
ANISOCYTOSIS BLD QL: SIGNIFICANT CHANGE UP
APTT BLD: 52.8 SEC — HIGH (ref 27.5–35.5)
AST SERPL-CCNC: 49 U/L — HIGH (ref 10–40)
BASE EXCESS BLDA CALC-SCNC: 2 MMOL/L — SIGNIFICANT CHANGE UP (ref -2–3)
BASOPHILS # BLD AUTO: 0.04 K/UL — SIGNIFICANT CHANGE UP (ref 0–0.2)
BASOPHILS NFR BLD AUTO: 0.9 % — SIGNIFICANT CHANGE UP (ref 0–2)
BILIRUB SERPL-MCNC: 0.9 MG/DL — SIGNIFICANT CHANGE UP (ref 0.2–1.2)
BUN SERPL-MCNC: 11 MG/DL — SIGNIFICANT CHANGE UP (ref 7–23)
CALCIUM SERPL-MCNC: 8.1 MG/DL — LOW (ref 8.4–10.5)
CHLORIDE SERPL-SCNC: 109 MMOL/L — HIGH (ref 96–108)
CO2 SERPL-SCNC: 25 MMOL/L — SIGNIFICANT CHANGE UP (ref 22–31)
CREAT SERPL-MCNC: 0.47 MG/DL — LOW (ref 0.5–1.3)
CRP SERPL-MCNC: 0.07 MG/DL — SIGNIFICANT CHANGE UP (ref 0–0.4)
CULTURE RESULTS: SIGNIFICANT CHANGE UP
D DIMER BLD IA.RAPID-MCNC: 489 NG/ML DDU — HIGH
EOSINOPHIL # BLD AUTO: 0.27 K/UL — SIGNIFICANT CHANGE UP (ref 0–0.5)
EOSINOPHIL NFR BLD AUTO: 6.3 % — HIGH (ref 0–6)
FERRITIN SERPL-MCNC: 111 NG/ML — SIGNIFICANT CHANGE UP (ref 30–400)
GIANT PLATELETS BLD QL SMEAR: PRESENT — SIGNIFICANT CHANGE UP
GLUCOSE BLDC GLUCOMTR-MCNC: 127 MG/DL — HIGH (ref 70–99)
GLUCOSE BLDC GLUCOMTR-MCNC: 129 MG/DL — HIGH (ref 70–99)
GLUCOSE BLDC GLUCOMTR-MCNC: 131 MG/DL — HIGH (ref 70–99)
GLUCOSE BLDC GLUCOMTR-MCNC: 134 MG/DL — HIGH (ref 70–99)
GLUCOSE SERPL-MCNC: 136 MG/DL — HIGH (ref 70–99)
HCO3 BLDA-SCNC: 26 MMOL/L — SIGNIFICANT CHANGE UP (ref 21–28)
HCT VFR BLD CALC: 30.2 % — LOW (ref 39–50)
HGB BLD-MCNC: 8.9 G/DL — LOW (ref 13–17)
HYPOCHROMIA BLD QL: SLIGHT — SIGNIFICANT CHANGE UP
LYMPHOCYTES # BLD AUTO: 1.23 K/UL — SIGNIFICANT CHANGE UP (ref 1–3.3)
LYMPHOCYTES # BLD AUTO: 28.8 % — SIGNIFICANT CHANGE UP (ref 13–44)
MACROCYTES BLD QL: SLIGHT — SIGNIFICANT CHANGE UP
MAGNESIUM SERPL-MCNC: 2 MG/DL — SIGNIFICANT CHANGE UP (ref 1.6–2.6)
MANUAL SMEAR VERIFICATION: SIGNIFICANT CHANGE UP
MCHC RBC-ENTMCNC: 23.8 PG — LOW (ref 27–34)
MCHC RBC-ENTMCNC: 29.5 GM/DL — LOW (ref 32–36)
MCV RBC AUTO: 80.7 FL — SIGNIFICANT CHANGE UP (ref 80–100)
METAMYELOCYTES # FLD: 0.9 % — HIGH (ref 0–0)
MICROCYTES BLD QL: SLIGHT — SIGNIFICANT CHANGE UP
MONOCYTES # BLD AUTO: 0.19 K/UL — SIGNIFICANT CHANGE UP (ref 0–0.9)
MONOCYTES NFR BLD AUTO: 4.5 % — SIGNIFICANT CHANGE UP (ref 2–14)
NEUTROPHILS # BLD AUTO: 2.43 K/UL — SIGNIFICANT CHANGE UP (ref 1.8–7.4)
NEUTROPHILS NFR BLD AUTO: 56.8 % — SIGNIFICANT CHANGE UP (ref 43–77)
NRBC # BLD: 1 /100 — HIGH (ref 0–0)
NRBC # BLD: SIGNIFICANT CHANGE UP /100 WBCS (ref 0–0)
OVALOCYTES BLD QL SMEAR: SLIGHT — SIGNIFICANT CHANGE UP
PCO2 BLDA: 37 MMHG — SIGNIFICANT CHANGE UP (ref 35–48)
PH BLDA: 7.46 — HIGH (ref 7.35–7.45)
PHOSPHATE SERPL-MCNC: 2.8 MG/DL — SIGNIFICANT CHANGE UP (ref 2.5–4.5)
PLAT MORPH BLD: ABNORMAL
PLATELET # BLD AUTO: 137 K/UL — LOW (ref 150–400)
PO2 BLDA: 75 MMHG — LOW (ref 83–108)
POLYCHROMASIA BLD QL SMEAR: SLIGHT — SIGNIFICANT CHANGE UP
POTASSIUM SERPL-MCNC: 4.1 MMOL/L — SIGNIFICANT CHANGE UP (ref 3.5–5.3)
POTASSIUM SERPL-SCNC: 4.1 MMOL/L — SIGNIFICANT CHANGE UP (ref 3.5–5.3)
PROT SERPL-MCNC: 4.8 G/DL — LOW (ref 6–8.3)
RBC # BLD: 3.74 M/UL — LOW (ref 4.2–5.8)
RBC # FLD: 25.4 % — HIGH (ref 10.3–14.5)
RBC BLD AUTO: ABNORMAL
SAO2 % BLDA: 95 % — SIGNIFICANT CHANGE UP (ref 95–100)
SMUDGE CELLS # BLD: PRESENT — SIGNIFICANT CHANGE UP
SODIUM SERPL-SCNC: 143 MMOL/L — SIGNIFICANT CHANGE UP (ref 135–145)
SPECIMEN SOURCE: SIGNIFICANT CHANGE UP
SPHEROCYTES BLD QL SMEAR: SLIGHT — SIGNIFICANT CHANGE UP
TRIGL SERPL-MCNC: 205 MG/DL — HIGH
VARIANT LYMPHS # BLD: 1.8 % — SIGNIFICANT CHANGE UP (ref 0–6)
WBC # BLD: 4.27 K/UL — SIGNIFICANT CHANGE UP (ref 3.8–10.5)
WBC # FLD AUTO: 4.27 K/UL — SIGNIFICANT CHANGE UP (ref 3.8–10.5)

## 2020-12-17 PROCEDURE — 36010 PLACE CATHETER IN VEIN: CPT

## 2020-12-17 PROCEDURE — 71045 X-RAY EXAM CHEST 1 VIEW: CPT | Mod: 26

## 2020-12-17 PROCEDURE — 99233 SBSQ HOSP IP/OBS HIGH 50: CPT | Mod: GC

## 2020-12-17 PROCEDURE — 76937 US GUIDE VASCULAR ACCESS: CPT | Mod: 26

## 2020-12-17 PROCEDURE — 36620 INSERTION CATHETER ARTERY: CPT

## 2020-12-17 RX ADMIN — PIPERACILLIN AND TAZOBACTAM 200 GRAM(S): 4; .5 INJECTION, POWDER, LYOPHILIZED, FOR SOLUTION INTRAVENOUS at 18:16

## 2020-12-17 RX ADMIN — Medication 1 APPLICATION(S): at 05:10

## 2020-12-17 RX ADMIN — CHLORHEXIDINE GLUCONATE 15 MILLILITER(S): 213 SOLUTION TOPICAL at 05:09

## 2020-12-17 RX ADMIN — PANTOPRAZOLE SODIUM 40 MILLIGRAM(S): 20 TABLET, DELAYED RELEASE ORAL at 11:30

## 2020-12-17 RX ADMIN — ENOXAPARIN SODIUM 90 MILLIGRAM(S): 100 INJECTION SUBCUTANEOUS at 08:51

## 2020-12-17 RX ADMIN — Medication 1 APPLICATION(S): at 19:16

## 2020-12-17 RX ADMIN — PIPERACILLIN AND TAZOBACTAM 200 GRAM(S): 4; .5 INJECTION, POWDER, LYOPHILIZED, FOR SOLUTION INTRAVENOUS at 11:30

## 2020-12-17 RX ADMIN — AMIODARONE HYDROCHLORIDE 400 MILLIGRAM(S): 400 TABLET ORAL at 19:15

## 2020-12-17 RX ADMIN — AMIODARONE HYDROCHLORIDE 400 MILLIGRAM(S): 400 TABLET ORAL at 05:08

## 2020-12-17 RX ADMIN — CHLORHEXIDINE GLUCONATE 15 MILLILITER(S): 213 SOLUTION TOPICAL at 19:15

## 2020-12-17 RX ADMIN — Medication 1 APPLICATION(S): at 23:30

## 2020-12-17 RX ADMIN — Medication 1 APPLICATION(S): at 11:30

## 2020-12-17 RX ADMIN — PROPOFOL 2.67 MICROGRAM(S)/KG/MIN: 10 INJECTION, EMULSION INTRAVENOUS at 07:13

## 2020-12-17 RX ADMIN — CHLORHEXIDINE GLUCONATE 1 APPLICATION(S): 213 SOLUTION TOPICAL at 05:08

## 2020-12-17 RX ADMIN — INSULIN GLARGINE 8 UNIT(S): 100 INJECTION, SOLUTION SUBCUTANEOUS at 23:29

## 2020-12-17 RX ADMIN — PIPERACILLIN AND TAZOBACTAM 200 GRAM(S): 4; .5 INJECTION, POWDER, LYOPHILIZED, FOR SOLUTION INTRAVENOUS at 23:29

## 2020-12-17 RX ADMIN — DEXMEDETOMIDINE HYDROCHLORIDE IN 0.9% SODIUM CHLORIDE 4.45 MICROGRAM(S)/KG/HR: 4 INJECTION INTRAVENOUS at 07:13

## 2020-12-17 RX ADMIN — PIPERACILLIN AND TAZOBACTAM 200 GRAM(S): 4; .5 INJECTION, POWDER, LYOPHILIZED, FOR SOLUTION INTRAVENOUS at 05:10

## 2020-12-17 NOTE — PROCEDURE NOTE - NSINDICATIONS_GEN_A_CORE
arterial puncture to obtain ABG's/critical patient
venous access/critical illness
critical patient/monitoring purposes
respiratory failure/critical patient/respiratory distress
respiratory failure/failure to thrive/feeding difficulties
critical illness/emergency venous access

## 2020-12-17 NOTE — PROCEDURE NOTE - ADDITIONAL PROCEDURE DETAILS
NGT placed to depth of 56 cm, right nares. No complications, post procedure radiography confirmed placement
may use for meds today, feeds tomorrow

## 2020-12-17 NOTE — PROCEDURE NOTE - NSPOSTCAREGUIDE_GEN_A_CORE
Care for catheter as per unit/ICU protocols
Verbal/written post procedure instructions were given to patient/caregiver/Instructed patient/caregiver to follow-up with primary care physician/Instructed patient/caregiver regarding signs and symptoms of infection/Keep the cast/splint/dressing clean and dry/Care for catheter as per unit/ICU protocols
Verbal/written post procedure instructions were given to patient/caregiver/Instructed patient/caregiver to follow-up with primary care physician/Instructed patient/caregiver regarding signs and symptoms of infection/Keep the cast/splint/dressing clean and dry/Care for catheter as per unit/ICU protocols

## 2020-12-17 NOTE — PROCEDURE NOTE - NSPROCNAME_GEN_A_CORE
Arterial Puncture/Cannulation
Central Line Insertion
Feeding Tube Replacement
Tracheal Intubation
Arterial Puncture/Cannulation
Feeding Tube Placement
Central Line Insertion

## 2020-12-17 NOTE — PROCEDURE NOTE - NSINFORMCONSENT_GEN_A_CORE
Benefits, risks, and possible complications of procedure explained to patient/caregiver who verbalized understanding and gave verbal consent.
Benefits, risks, and possible complications of procedure explained to patient/caregiver who verbalized understanding and gave written consent.
This was an emergent procedure.
Benefits, risks, and possible complications of procedure explained to patient/caregiver who verbalized understanding and gave verbal consent.
Benefits, risks, and possible complications of procedure explained to patient/caregiver who verbalized understanding and gave verbal consent.
Kraig Fermin/Benefits, risks, and possible complications of procedure explained to patient/caregiver who verbalized understanding and gave verbal consent.

## 2020-12-17 NOTE — PROCEDURE NOTE - NSSITEPREP_SKIN_A_CORE
chlorhexidine/Adherence to aseptic technique: hand hygiene prior to donning barriers (gown, gloves), don cap and mask, sterile drape over patient
chlorhexidine
chlorhexidine
Adherence to aseptic technique: hand hygiene prior to donning barriers (gown, gloves), don cap and mask, sterile drape over patient/chlorhexidine
chlorhexidine

## 2020-12-17 NOTE — PROGRESS NOTE ADULT - SUBJECTIVE AND OBJECTIVE BOX
Medicine Progress Note    Patient is a 70y old  Male who presents with a chief complaint of COVID-19 pna (17 Dec 2020 10:45)      SUBJECTIVE / OVERNIGHT EVENTS: no acute events o/n. patient resting comfortably in bed. NAD. intubated and sedated.    ADDITIONAL REVIEW OF SYSTEMS:    MEDICATIONS  (STANDING):  aMIOdarone    Tablet 400 milliGRAM(s) Oral every 12 hours  chlorhexidine 0.12% Liquid 15 milliLiter(s) Oral Mucosa every 12 hours  chlorhexidine 2% Cloths 1 Application(s) Topical <User Schedule>  dexMEDEtomidine Infusion 0.2 MICROgram(s)/kG/Hr (4.45 mL/Hr) IV Continuous <Continuous>  dextrose 40% Gel 15 Gram(s) Oral once  dextrose 5%. 1000 milliLiter(s) (50 mL/Hr) IV Continuous <Continuous>  dextrose 5%. 1000 milliLiter(s) (100 mL/Hr) IV Continuous <Continuous>  dextrose 50% Injectable 25 Gram(s) IV Push once  dextrose 50% Injectable 12.5 Gram(s) IV Push once  dextrose 50% Injectable 25 Gram(s) IV Push once  glucagon  Injectable 1 milliGRAM(s) IntraMuscular once  insulin glargine Injectable (LANTUS) 8 Unit(s) SubCutaneous at bedtime  insulin regular  human corrective regimen sliding scale   SubCutaneous every 6 hours  pantoprazole   Suspension 40 milliGRAM(s) Enteral Tube daily  petrolatum Ophthalmic Ointment 1 Application(s) Both EYES every 6 hours  phenylephrine    Infusion 0.1 MICROgram(s)/kG/Min (1.67 mL/Hr) IV Continuous <Continuous>  piperacillin/tazobactam IVPB.. 4.5 Gram(s) IV Intermittent every 6 hours  polyethylene glycol 3350 17 Gram(s) Oral daily  propofol Infusion 5 MICROgram(s)/kG/Min (2.67 mL/Hr) IV Continuous <Continuous>  senna 2 Tablet(s) Oral at bedtime    MEDICATIONS  (PRN):  acetaminophen    Suspension .. 650 milliGRAM(s) Oral every 6 hours PRN Temp greater or equal to 38C (100.4F), Mild Pain (1 - 3)    CAPILLARY BLOOD GLUCOSE      POCT Blood Glucose.: 127 mg/dL (17 Dec 2020 11:42)  POCT Blood Glucose.: 131 mg/dL (17 Dec 2020 04:41)  POCT Blood Glucose.: 115 mg/dL (16 Dec 2020 23:16)  POCT Blood Glucose.: 149 mg/dL (16 Dec 2020 17:34)    I&O's Summary    16 Dec 2020 07:01  -  17 Dec 2020 07:00  --------------------------------------------------------  IN: 1684.1 mL / OUT: 1110 mL / NET: 574.1 mL    17 Dec 2020 07:01  -  17 Dec 2020 16:06  --------------------------------------------------------  IN: 566.6 mL / OUT: 845 mL / NET: -278.4 mL        PHYSICAL EXAM:  Vital Signs Last 24 Hrs  T(C): 36.3 (17 Dec 2020 10:02), Max: 36.6 (17 Dec 2020 05:35)  T(F): 97.3 (17 Dec 2020 10:02), Max: 97.9 (17 Dec 2020 05:35)  HR: 59 (17 Dec 2020 14:00) (50 - 61)  BP: 150/64 (17 Dec 2020 05:00) (150/64 - 150/64)  BP(mean): 94 (17 Dec 2020 05:00) (94 - 94)  RR: 25 (17 Dec 2020 15:00) (23 - 27)  SpO2: 99% (17 Dec 2020 15:00) (92% - 100%)  CONSTITUTIONAL: NAD  ENMT: Moist oral mucosa, no pharyngeal injection or exudates; normal dentition  RESPIRATORY: Normal respiratory effort; lungs are clear to auscultation bilaterally  CARDIOVASCULAR: Regular rate and rhythm, normal S1 and S2, no murmur/rub/gallop; No lower extremity edema; Peripheral pulses are 2+ bilaterally  ABDOMEN: Nontender to palpation, normoactive bowel sounds, no rebound/guarding; No hepatosplenomegaly  PSYCH: Sedated  NEUROLOGY: Intubated  SKIN: No rashes; no palpable lesions    LABS:                        8.9    4.27  )-----------( 137      ( 17 Dec 2020 05:07 )             30.2     12-17    143  |  109<H>  |  11  ----------------------------<  136<H>  4.1   |  25  |  0.47<L>    Ca    8.1<L>      17 Dec 2020 05:07  Phos  2.8     12-17  Mg     2.0     12-17    TPro  4.8<L>  /  Alb  2.7<L>  /  TBili  0.9  /  DBili  x   /  AST  49<H>  /  ALT  58<H>  /  AlkPhos  61  12-17    PTT - ( 17 Dec 2020 05:07 )  PTT:52.8 sec          COVID-19 PCR: NotDetec (14 Dec 2020 14:16)  SARS-CoV-2: Detected (27 Nov 2020 19:30)      RADIOLOGY & ADDITIONAL TESTS:  Imaging from Last 24 Hours:    Electrocardiogram/QTc Interval:    COORDINATION OF CARE:  Care Discussed with Consultants/Other Providers:

## 2020-12-17 NOTE — PROCEDURE NOTE - PROCEDURE DATE TIME, MLM
09-Dec-2020 11:32
13-Dec-2020 12:08
17-Dec-2020 14:20
09-Dec-2020 11:41
13-Dec-2020 08:15
17-Dec-2020 15:00
17-Dec-2020 15:45

## 2020-12-17 NOTE — PROCEDURE NOTE - NSPOSTPRCRAD_GEN_A_CORE
feeding tube in correct gastric position/post-procedure radiography performed
post procedure radiography not performed
central line located in the superior vena cava
central line located in the superior vena cava/post-procedure radiography performed

## 2020-12-17 NOTE — PROCEDURE NOTE - NSPROCDETAILS_GEN_ALL_CORE
guidewire recovered/lumen(s) aspirated and flushed/sterile dressing applied/sterile technique, catheter placed/ultrasound guidance with use of sterile gel and probe cove
patient pre-oxygenated, tube inserted, placement confirmed
guidewire recovered/sterile technique, catheter placed/sterile dressing applied/lumen(s) aspirated and flushed
location identified, draped/prepped, sterile technique used, needle inserted/introduced/sutured in place/connected to a pressurized flush line/hemostasis with direct pressure, dressing applied/all materials/supplies accounted for at end of procedure/positive blood return obtained via catheter/Seldinger technique
location identified, draped/prepped, sterile technique used, needle inserted/introduced/positive blood return obtained via catheter/connected to a pressurized flush line/sutured in place/Seldinger technique/all materials/supplies accounted for at end of procedure

## 2020-12-17 NOTE — PROGRESS NOTE ADULT - ASSESSMENT
MAGGIE SAHNI is a 70 yr old male with PMhx of DMT2, Afib on xarelto, HTN, HLD who was admitted to [facility] on 11/27 and started on remdisivir and decadron on 11/28, received convalescent plasma on 11/30, as well as Toci on 12/2, but was intubated on 12/2 due to worsening O2 sats. Also treated with Zosyn since 12/2 and 7 days of Levoquin (12/2- 12/8), along with  zyvox and caspofungin on 12/8. Transferred to [facility] 12/8 overnight.     NEUROLOGY  #Sedation  - Propofol and precedex  - In restraints  - give Seroquel 12.5 qhs for agitation    RESPIRATORY   #Acute hypoxic respiratory failure 2/2 COVID vs. overlying pneumonia. completed decadron course  - Sputum growing gram positive cocci in clusters; given vanc and zosyn o/n, ID consulted  - c/w Zosyn (12/14-12/21)  - consulted ENT for tracheostomy placement tomorrow (12/18)    CARDIOVASCULAR  #Septic shock, present on admission, was improving however last night febrile. Fever work up done, tylenol given, blood cultures sent.  - c/w phenylephrine  - f/u Echo  - Consider diuresis for fluid overload    #H/o A-fib with RVR  - c/w Amiodarone gtt  - Lovenox 90 BID    #Severe carotid disease  -Patient with occluded right ICA and severe left ICA stenosis, s/p left CEA, uncomplicated  -f/u with cardiology outpatient    RENAL   - goal I/O net even  - strict I/O tracking    ENDOCRINE  - goal 140-180 blood glucose  - mSSI  - Decreased lantus to 8     GASTROINTESTINAL  - glucerna tube feeds  - protonix 40  - bowel regimen  - GI consulted for PEG placement    INFECTIOUS DISEASE  #VAP vs. COVID-19 Associated fevers  - c/w Zosyn (12-14-12/21)    HEMATOLOGY/ONCOLOGY  - lovenox full anticoagulation for Afib - 90 q12    ACCESS/LINES/DRAINS  -[place] and Left IJ placed 12/9  -OG tube  -Araujo (which patient came in with from [place])    ETHICS/GOALS OF CARE  - update family daily    PROPHYLACTIC  ELECTROLYTES: Mg<2, K<4  DIET: regular  GI PPX: protonix  VTE PPX: SCD, lovenox  CODE: FULL  [facility]

## 2020-12-17 NOTE — PROCEDURE NOTE - NSICDXPROCEDURE_GEN_ALL_CORE_FT
PROCEDURES:  US guided central cath 17-Dec-2020 18:20:09  Sedrick Mitchell  
PROCEDURES:  Insertion, arterial line, percutaneous 09-Dec-2020 11:42:02  Tremayne Sylvester  
PROCEDURES:  Insertion, arterial line, percutaneous 17-Dec-2020 18:24:28  Sedrick Mitchell  US guided central cath 17-Dec-2020 18:20:09  Sedrick Mitchell

## 2020-12-18 LAB
ALBUMIN SERPL ELPH-MCNC: 2.8 G/DL — LOW (ref 3.3–5)
ALP SERPL-CCNC: 80 U/L — SIGNIFICANT CHANGE UP (ref 40–120)
ALT FLD-CCNC: 49 U/L — HIGH (ref 10–45)
ANION GAP SERPL CALC-SCNC: 10 MMOL/L — SIGNIFICANT CHANGE UP (ref 5–17)
AST SERPL-CCNC: 34 U/L — SIGNIFICANT CHANGE UP (ref 10–40)
BASE EXCESS BLDA CALC-SCNC: 1.2 MMOL/L — SIGNIFICANT CHANGE UP (ref -2–3)
BASOPHILS # BLD AUTO: 0.02 K/UL — SIGNIFICANT CHANGE UP (ref 0–0.2)
BASOPHILS NFR BLD AUTO: 0.2 % — SIGNIFICANT CHANGE UP (ref 0–2)
BILIRUB SERPL-MCNC: 0.9 MG/DL — SIGNIFICANT CHANGE UP (ref 0.2–1.2)
BLD GP AB SCN SERPL QL: NEGATIVE — SIGNIFICANT CHANGE UP
BUN SERPL-MCNC: 10 MG/DL — SIGNIFICANT CHANGE UP (ref 7–23)
CALCIUM SERPL-MCNC: 8.2 MG/DL — LOW (ref 8.4–10.5)
CHLORIDE SERPL-SCNC: 107 MMOL/L — SIGNIFICANT CHANGE UP (ref 96–108)
CO2 SERPL-SCNC: 24 MMOL/L — SIGNIFICANT CHANGE UP (ref 22–31)
CREAT SERPL-MCNC: 0.5 MG/DL — SIGNIFICANT CHANGE UP (ref 0.5–1.3)
CRP SERPL-MCNC: 0.71 MG/DL — HIGH (ref 0–0.4)
CULTURE RESULTS: SIGNIFICANT CHANGE UP
CULTURE RESULTS: SIGNIFICANT CHANGE UP
D DIMER BLD IA.RAPID-MCNC: 431 NG/ML DDU — HIGH
EOSINOPHIL # BLD AUTO: 0.4 K/UL — SIGNIFICANT CHANGE UP (ref 0–0.5)
EOSINOPHIL NFR BLD AUTO: 4.8 % — SIGNIFICANT CHANGE UP (ref 0–6)
FERRITIN SERPL-MCNC: 109 NG/ML — SIGNIFICANT CHANGE UP (ref 30–400)
GLUCOSE BLDC GLUCOMTR-MCNC: 127 MG/DL — HIGH (ref 70–99)
GLUCOSE BLDC GLUCOMTR-MCNC: 137 MG/DL — HIGH (ref 70–99)
GLUCOSE BLDC GLUCOMTR-MCNC: 141 MG/DL — HIGH (ref 70–99)
GLUCOSE BLDC GLUCOMTR-MCNC: 148 MG/DL — HIGH (ref 70–99)
GLUCOSE SERPL-MCNC: 154 MG/DL — HIGH (ref 70–99)
HCO3 BLDA-SCNC: 25 MMOL/L — SIGNIFICANT CHANGE UP (ref 21–28)
HCT VFR BLD CALC: 31.7 % — LOW (ref 39–50)
HGB BLD-MCNC: 9.4 G/DL — LOW (ref 13–17)
IMM GRANULOCYTES NFR BLD AUTO: 0.5 % — SIGNIFICANT CHANGE UP (ref 0–1.5)
LYMPHOCYTES # BLD AUTO: 1.45 K/UL — SIGNIFICANT CHANGE UP (ref 1–3.3)
LYMPHOCYTES # BLD AUTO: 17.3 % — SIGNIFICANT CHANGE UP (ref 13–44)
MAGNESIUM SERPL-MCNC: 1.9 MG/DL — SIGNIFICANT CHANGE UP (ref 1.6–2.6)
MCHC RBC-ENTMCNC: 23.9 PG — LOW (ref 27–34)
MCHC RBC-ENTMCNC: 29.7 GM/DL — LOW (ref 32–36)
MCV RBC AUTO: 80.5 FL — SIGNIFICANT CHANGE UP (ref 80–100)
MONOCYTES # BLD AUTO: 0.29 K/UL — SIGNIFICANT CHANGE UP (ref 0–0.9)
MONOCYTES NFR BLD AUTO: 3.5 % — SIGNIFICANT CHANGE UP (ref 2–14)
NEUTROPHILS # BLD AUTO: 6.17 K/UL — SIGNIFICANT CHANGE UP (ref 1.8–7.4)
NEUTROPHILS NFR BLD AUTO: 73.7 % — SIGNIFICANT CHANGE UP (ref 43–77)
NRBC # BLD: 0 /100 WBCS — SIGNIFICANT CHANGE UP (ref 0–0)
PCO2 BLDA: 35 MMHG — SIGNIFICANT CHANGE UP (ref 35–48)
PH BLDA: 7.47 — HIGH (ref 7.35–7.45)
PHOSPHATE SERPL-MCNC: 3 MG/DL — SIGNIFICANT CHANGE UP (ref 2.5–4.5)
PLATELET # BLD AUTO: 147 K/UL — LOW (ref 150–400)
PO2 BLDA: 74 MMHG — LOW (ref 83–108)
POTASSIUM SERPL-MCNC: 4.4 MMOL/L — SIGNIFICANT CHANGE UP (ref 3.5–5.3)
POTASSIUM SERPL-SCNC: 4.4 MMOL/L — SIGNIFICANT CHANGE UP (ref 3.5–5.3)
PROT SERPL-MCNC: 5.3 G/DL — LOW (ref 6–8.3)
RBC # BLD: 3.94 M/UL — LOW (ref 4.2–5.8)
RBC # FLD: 25.3 % — HIGH (ref 10.3–14.5)
RH IG SCN BLD-IMP: NEGATIVE — SIGNIFICANT CHANGE UP
SAO2 % BLDA: 95 % — SIGNIFICANT CHANGE UP (ref 95–100)
SODIUM SERPL-SCNC: 141 MMOL/L — SIGNIFICANT CHANGE UP (ref 135–145)
SPECIMEN SOURCE: SIGNIFICANT CHANGE UP
SPECIMEN SOURCE: SIGNIFICANT CHANGE UP
WBC # BLD: 8.37 K/UL — SIGNIFICANT CHANGE UP (ref 3.8–10.5)
WBC # FLD AUTO: 8.37 K/UL — SIGNIFICANT CHANGE UP (ref 3.8–10.5)

## 2020-12-18 PROCEDURE — 31600 PLANNED TRACHEOSTOMY: CPT

## 2020-12-18 PROCEDURE — 99233 SBSQ HOSP IP/OBS HIGH 50: CPT | Mod: GC

## 2020-12-18 RX ORDER — MIDODRINE HYDROCHLORIDE 2.5 MG/1
10 TABLET ORAL EVERY 8 HOURS
Refills: 0 | Status: DISCONTINUED | OUTPATIENT
Start: 2020-12-18 | End: 2020-12-21

## 2020-12-18 RX ORDER — MAGNESIUM SULFATE 500 MG/ML
1 VIAL (ML) INJECTION ONCE
Refills: 0 | Status: COMPLETED | OUTPATIENT
Start: 2020-12-18 | End: 2020-12-18

## 2020-12-18 RX ADMIN — PIPERACILLIN AND TAZOBACTAM 200 GRAM(S): 4; .5 INJECTION, POWDER, LYOPHILIZED, FOR SOLUTION INTRAVENOUS at 23:19

## 2020-12-18 RX ADMIN — AMIODARONE HYDROCHLORIDE 400 MILLIGRAM(S): 400 TABLET ORAL at 17:21

## 2020-12-18 RX ADMIN — Medication 1 APPLICATION(S): at 05:21

## 2020-12-18 RX ADMIN — SENNA PLUS 2 TABLET(S): 8.6 TABLET ORAL at 23:19

## 2020-12-18 RX ADMIN — Medication 1 APPLICATION(S): at 17:21

## 2020-12-18 RX ADMIN — PROPOFOL 2.67 MICROGRAM(S)/KG/MIN: 10 INJECTION, EMULSION INTRAVENOUS at 21:39

## 2020-12-18 RX ADMIN — CHLORHEXIDINE GLUCONATE 15 MILLILITER(S): 213 SOLUTION TOPICAL at 05:20

## 2020-12-18 RX ADMIN — POLYETHYLENE GLYCOL 3350 17 GRAM(S): 17 POWDER, FOR SOLUTION ORAL at 11:38

## 2020-12-18 RX ADMIN — CHLORHEXIDINE GLUCONATE 15 MILLILITER(S): 213 SOLUTION TOPICAL at 17:21

## 2020-12-18 RX ADMIN — PIPERACILLIN AND TAZOBACTAM 200 GRAM(S): 4; .5 INJECTION, POWDER, LYOPHILIZED, FOR SOLUTION INTRAVENOUS at 05:21

## 2020-12-18 RX ADMIN — PROPOFOL 2.67 MICROGRAM(S)/KG/MIN: 10 INJECTION, EMULSION INTRAVENOUS at 04:15

## 2020-12-18 RX ADMIN — DEXMEDETOMIDINE HYDROCHLORIDE IN 0.9% SODIUM CHLORIDE 4.45 MICROGRAM(S)/KG/HR: 4 INJECTION INTRAVENOUS at 21:38

## 2020-12-18 RX ADMIN — DEXMEDETOMIDINE HYDROCHLORIDE IN 0.9% SODIUM CHLORIDE 4.45 MICROGRAM(S)/KG/HR: 4 INJECTION INTRAVENOUS at 07:47

## 2020-12-18 RX ADMIN — MIDODRINE HYDROCHLORIDE 10 MILLIGRAM(S): 2.5 TABLET ORAL at 17:21

## 2020-12-18 RX ADMIN — INSULIN GLARGINE 8 UNIT(S): 100 INJECTION, SOLUTION SUBCUTANEOUS at 23:19

## 2020-12-18 RX ADMIN — PIPERACILLIN AND TAZOBACTAM 200 GRAM(S): 4; .5 INJECTION, POWDER, LYOPHILIZED, FOR SOLUTION INTRAVENOUS at 11:37

## 2020-12-18 RX ADMIN — DEXMEDETOMIDINE HYDROCHLORIDE IN 0.9% SODIUM CHLORIDE 4.45 MICROGRAM(S)/KG/HR: 4 INJECTION INTRAVENOUS at 04:15

## 2020-12-18 RX ADMIN — Medication 100 GRAM(S): at 07:47

## 2020-12-18 RX ADMIN — PROPOFOL 2.67 MICROGRAM(S)/KG/MIN: 10 INJECTION, EMULSION INTRAVENOUS at 07:47

## 2020-12-18 RX ADMIN — AMIODARONE HYDROCHLORIDE 400 MILLIGRAM(S): 400 TABLET ORAL at 05:21

## 2020-12-18 RX ADMIN — PIPERACILLIN AND TAZOBACTAM 200 GRAM(S): 4; .5 INJECTION, POWDER, LYOPHILIZED, FOR SOLUTION INTRAVENOUS at 17:21

## 2020-12-18 RX ADMIN — CHLORHEXIDINE GLUCONATE 1 APPLICATION(S): 213 SOLUTION TOPICAL at 05:20

## 2020-12-18 RX ADMIN — PROPOFOL 2.67 MICROGRAM(S)/KG/MIN: 10 INJECTION, EMULSION INTRAVENOUS at 01:09

## 2020-12-18 RX ADMIN — Medication 1 APPLICATION(S): at 12:28

## 2020-12-18 RX ADMIN — DEXMEDETOMIDINE HYDROCHLORIDE IN 0.9% SODIUM CHLORIDE 4.45 MICROGRAM(S)/KG/HR: 4 INJECTION INTRAVENOUS at 01:08

## 2020-12-18 RX ADMIN — Medication 1 APPLICATION(S): at 23:17

## 2020-12-18 RX ADMIN — PANTOPRAZOLE SODIUM 40 MILLIGRAM(S): 20 TABLET, DELAYED RELEASE ORAL at 11:38

## 2020-12-18 RX ADMIN — MIDODRINE HYDROCHLORIDE 10 MILLIGRAM(S): 2.5 TABLET ORAL at 09:46

## 2020-12-18 NOTE — PROGRESS NOTE ADULT - ASSESSMENT
MAGGIE SAHNI is a 70 yr old male with PMhx of DMT2, Afib on xarelto, HTN, HLD who was admitted to [facility] on 11/27 and started on remdisivir and decadron on 11/28, received convalescent plasma on 11/30, as well as Toci on 12/2, but was intubated on 12/2 due to worsening O2 sats. Also treated with Zosyn since 12/2 and 7 days of Levoquin (12/2- 12/8), along with  zyvox and caspofungin on 12/8. Transferred to [facility] 12/8 overnight.     NEUROLOGY  #Sedation  - Propofol and precedex  - In restraints  - give Seroquel 12.5 qhs for agitation    RESPIRATORY   #Acute hypoxic respiratory failure 2/2 COVID vs. overlying pneumonia. completed decadron course  - Sputum growing gram positive cocci in clusters; given vanc and zosyn o/n, ID consulted  - c/w Zosyn (12/14-12/21)  - consulted ENT for tracheostomy placement (12/18)    CARDIOVASCULAR  #Septic shock, present on admission, was improving however last night febrile. Fever work up done, tylenol given, blood cultures sent.  - c/w phenylephrine  - f/u Echo  - Consider diuresis for fluid overload    #H/o A-fib with RVR  - c/w Amiodarone gtt  - Lovenox 90 BID    #Severe carotid disease  -Patient with occluded right ICA and severe left ICA stenosis, s/p left CEA, uncomplicated  -f/u with cardiology outpatient    RENAL   - goal I/O net even  - strict I/O tracking    ENDOCRINE  - goal 140-180 blood glucose  - mSSI  - Decreased lantus to 8     GASTROINTESTINAL  - glucerna tube feeds  - protonix 40  - bowel regimen  - GI consulted for PEG placement    INFECTIOUS DISEASE  #VAP vs. COVID-19 Associated fevers  - c/w Zosyn (12-14-12/21)    HEMATOLOGY/ONCOLOGY  - lovenox full anticoagulation for Afib - 90 q12    ACCESS/LINES/DRAINS  -[place] and Left IJ placed 12/9  -OG tube  -Araujo (which patient came in with from [place])    ETHICS/GOALS OF CARE  - update family daily    PROPHYLACTIC  ELECTROLYTES: Mg<2, K<4  DIET: regular  GI PPX: protonix  VTE PPX: SCD, lovenox  CODE: FULL  DISPO: CHERYL

## 2020-12-18 NOTE — PROGRESS NOTE ADULT - SUBJECTIVE AND OBJECTIVE BOX
Medicine Progress Note    Patient is a 70y old  Male who presents with a chief complaint of COVID-19 pna (18 Dec 2020 08:22)      SUBJECTIVE / OVERNIGHT EVENTS: NPO for trach today. NAD. Intubated and sedated.    ADDITIONAL REVIEW OF SYSTEMS:    MEDICATIONS  (STANDING):  aMIOdarone    Tablet 400 milliGRAM(s) Oral every 12 hours  chlorhexidine 0.12% Liquid 15 milliLiter(s) Oral Mucosa every 12 hours  chlorhexidine 2% Cloths 1 Application(s) Topical <User Schedule>  dexMEDEtomidine Infusion 0.2 MICROgram(s)/kG/Hr (4.45 mL/Hr) IV Continuous <Continuous>  dextrose 40% Gel 15 Gram(s) Oral once  dextrose 5%. 1000 milliLiter(s) (50 mL/Hr) IV Continuous <Continuous>  dextrose 5%. 1000 milliLiter(s) (100 mL/Hr) IV Continuous <Continuous>  dextrose 50% Injectable 25 Gram(s) IV Push once  dextrose 50% Injectable 12.5 Gram(s) IV Push once  dextrose 50% Injectable 25 Gram(s) IV Push once  glucagon  Injectable 1 milliGRAM(s) IntraMuscular once  insulin glargine Injectable (LANTUS) 8 Unit(s) SubCutaneous at bedtime  insulin regular  human corrective regimen sliding scale   SubCutaneous every 6 hours  midodrine 10 milliGRAM(s) Oral every 8 hours  pantoprazole   Suspension 40 milliGRAM(s) Enteral Tube daily  petrolatum Ophthalmic Ointment 1 Application(s) Both EYES every 6 hours  phenylephrine    Infusion 0.1 MICROgram(s)/kG/Min (1.67 mL/Hr) IV Continuous <Continuous>  piperacillin/tazobactam IVPB.. 4.5 Gram(s) IV Intermittent every 6 hours  polyethylene glycol 3350 17 Gram(s) Oral daily  propofol Infusion 5 MICROgram(s)/kG/Min (2.67 mL/Hr) IV Continuous <Continuous>  senna 2 Tablet(s) Oral at bedtime    MEDICATIONS  (PRN):  acetaminophen    Suspension .. 650 milliGRAM(s) Oral every 6 hours PRN Temp greater or equal to 38C (100.4F), Mild Pain (1 - 3)    CAPILLARY BLOOD GLUCOSE      POCT Blood Glucose.: 137 mg/dL (18 Dec 2020 11:10)  POCT Blood Glucose.: 148 mg/dL (18 Dec 2020 04:05)  POCT Blood Glucose.: 134 mg/dL (17 Dec 2020 23:24)  POCT Blood Glucose.: 129 mg/dL (17 Dec 2020 17:55)    I&O's Summary    17 Dec 2020 07:01  -  18 Dec 2020 07:00  --------------------------------------------------------  IN: 1587.8 mL / OUT: 2130 mL / NET: -542.2 mL    18 Dec 2020 07:01  -  18 Dec 2020 13:11  --------------------------------------------------------  IN: 232 mL / OUT: 280 mL / NET: -48 mL        PHYSICAL EXAM:  Vital Signs Last 24 Hrs  T(C): 36.6 (18 Dec 2020 09:32), Max: 37.9 (18 Dec 2020 05:00)  T(F): 97.9 (18 Dec 2020 09:32), Max: 100.3 (18 Dec 2020 05:00)  HR: 59 (18 Dec 2020 11:00) (57 - 72)  BP: 98/54 (18 Dec 2020 06:00) (91/50 - 125/59)  BP(mean): 73 (18 Dec 2020 06:00) (68 - 85)  RR: 22 (18 Dec 2020 11:00) (22 - 28)  SpO2: 98% (18 Dec 2020 11:00) (95% - 100%)  CONSTITUTIONAL: NAD  ENMT: Intubated  RESPIRATORY: Normal respiratory effort; lungs are clear to auscultation bilaterally  CARDIOVASCULAR: Regular rate and rhythm, normal S1 and S2, no murmur/rub/gallop; No lower extremity edema; Peripheral pulses are 2+ bilaterally  ABDOMEN: Nontender to palpation, normoactive bowel sounds, no rebound/guarding; No hepatosplenomegaly  PSYCH: Intubated and sedated  NEUROLOGY: Sedated  SKIN: No rashes; no palpable lesions    LABS:                        9.4    8.37  )-----------( 147      ( 18 Dec 2020 04:27 )             31.7     12-18    141  |  107  |  10  ----------------------------<  154<H>  4.4   |  24  |  0.50    Ca    8.2<L>      18 Dec 2020 04:27  Phos  3.0     12-18  Mg     1.9     12-18    TPro  5.3<L>  /  Alb  2.8<L>  /  TBili  0.9  /  DBili  x   /  AST  34  /  ALT  49<H>  /  AlkPhos  80  12-18    PTT - ( 17 Dec 2020 05:07 )  PTT:52.8 sec          COVID-19 PCR: NotDetec (14 Dec 2020 14:16)  SARS-CoV-2: Detected (27 Nov 2020 19:30)      RADIOLOGY & ADDITIONAL TESTS:  Imaging from Last 24 Hours:    Electrocardiogram/QTc Interval:    COORDINATION OF CARE:  Care Discussed with Consultants/Other Providers:

## 2020-12-19 LAB
ALBUMIN SERPL ELPH-MCNC: 2.6 G/DL — LOW (ref 3.3–5)
ALP SERPL-CCNC: 91 U/L — SIGNIFICANT CHANGE UP (ref 40–120)
ALT FLD-CCNC: 37 U/L — SIGNIFICANT CHANGE UP (ref 10–45)
ANION GAP SERPL CALC-SCNC: 8 MMOL/L — SIGNIFICANT CHANGE UP (ref 5–17)
APTT BLD: 79 SEC — HIGH (ref 27.5–35.5)
AST SERPL-CCNC: 23 U/L — SIGNIFICANT CHANGE UP (ref 10–40)
BASE EXCESS BLDA CALC-SCNC: 0.3 MMOL/L — SIGNIFICANT CHANGE UP (ref -2–3)
BASOPHILS # BLD AUTO: 0.01 K/UL — SIGNIFICANT CHANGE UP (ref 0–0.2)
BASOPHILS NFR BLD AUTO: 0.1 % — SIGNIFICANT CHANGE UP (ref 0–2)
BILIRUB SERPL-MCNC: 0.8 MG/DL — SIGNIFICANT CHANGE UP (ref 0.2–1.2)
BUN SERPL-MCNC: 7 MG/DL — SIGNIFICANT CHANGE UP (ref 7–23)
CALCIUM SERPL-MCNC: 8.4 MG/DL — SIGNIFICANT CHANGE UP (ref 8.4–10.5)
CHLORIDE SERPL-SCNC: 106 MMOL/L — SIGNIFICANT CHANGE UP (ref 96–108)
CO2 SERPL-SCNC: 25 MMOL/L — SIGNIFICANT CHANGE UP (ref 22–31)
CORTIS AM PEAK SERPL-MCNC: 2.9 UG/DL — LOW (ref 3.9–37.5)
CREAT SERPL-MCNC: 0.45 MG/DL — LOW (ref 0.5–1.3)
CRP SERPL-MCNC: 2.33 MG/DL — HIGH (ref 0–0.4)
D DIMER BLD IA.RAPID-MCNC: 557 NG/ML DDU — HIGH
EOSINOPHIL # BLD AUTO: 0.61 K/UL — HIGH (ref 0–0.5)
EOSINOPHIL NFR BLD AUTO: 7.9 % — HIGH (ref 0–6)
FERRITIN SERPL-MCNC: 105 NG/ML — SIGNIFICANT CHANGE UP (ref 30–400)
GAS PNL BLDA: SIGNIFICANT CHANGE UP
GLUCOSE BLDC GLUCOMTR-MCNC: 145 MG/DL — HIGH (ref 70–99)
GLUCOSE BLDC GLUCOMTR-MCNC: 165 MG/DL — HIGH (ref 70–99)
GLUCOSE BLDC GLUCOMTR-MCNC: 167 MG/DL — HIGH (ref 70–99)
GLUCOSE BLDC GLUCOMTR-MCNC: 188 MG/DL — HIGH (ref 70–99)
GLUCOSE SERPL-MCNC: 144 MG/DL — HIGH (ref 70–99)
HCO3 BLDA-SCNC: 24 MMOL/L — SIGNIFICANT CHANGE UP (ref 21–28)
HCT VFR BLD CALC: 32.5 % — LOW (ref 39–50)
HGB BLD-MCNC: 9.8 G/DL — LOW (ref 13–17)
IMM GRANULOCYTES NFR BLD AUTO: 0.4 % — SIGNIFICANT CHANGE UP (ref 0–1.5)
LYMPHOCYTES # BLD AUTO: 1.87 K/UL — SIGNIFICANT CHANGE UP (ref 1–3.3)
LYMPHOCYTES # BLD AUTO: 24.1 % — SIGNIFICANT CHANGE UP (ref 13–44)
MAGNESIUM SERPL-MCNC: 1.9 MG/DL — SIGNIFICANT CHANGE UP (ref 1.6–2.6)
MCHC RBC-ENTMCNC: 24.7 PG — LOW (ref 27–34)
MCHC RBC-ENTMCNC: 30.2 GM/DL — LOW (ref 32–36)
MCV RBC AUTO: 81.9 FL — SIGNIFICANT CHANGE UP (ref 80–100)
MONOCYTES # BLD AUTO: 0.32 K/UL — SIGNIFICANT CHANGE UP (ref 0–0.9)
MONOCYTES NFR BLD AUTO: 4.1 % — SIGNIFICANT CHANGE UP (ref 2–14)
NEUTROPHILS # BLD AUTO: 4.91 K/UL — SIGNIFICANT CHANGE UP (ref 1.8–7.4)
NEUTROPHILS NFR BLD AUTO: 63.4 % — SIGNIFICANT CHANGE UP (ref 43–77)
NRBC # BLD: 0 /100 WBCS — SIGNIFICANT CHANGE UP (ref 0–0)
PCO2 BLDA: 37 MMHG — SIGNIFICANT CHANGE UP (ref 35–48)
PH BLDA: 7.44 — SIGNIFICANT CHANGE UP (ref 7.35–7.45)
PHOSPHATE SERPL-MCNC: 3.1 MG/DL — SIGNIFICANT CHANGE UP (ref 2.5–4.5)
PLATELET # BLD AUTO: 151 K/UL — SIGNIFICANT CHANGE UP (ref 150–400)
PO2 BLDA: 91 MMHG — SIGNIFICANT CHANGE UP (ref 83–108)
POTASSIUM SERPL-MCNC: 4.2 MMOL/L — SIGNIFICANT CHANGE UP (ref 3.5–5.3)
POTASSIUM SERPL-SCNC: 4.2 MMOL/L — SIGNIFICANT CHANGE UP (ref 3.5–5.3)
PROT SERPL-MCNC: 5.1 G/DL — LOW (ref 6–8.3)
RBC # BLD: 3.97 M/UL — LOW (ref 4.2–5.8)
RBC # FLD: 25.2 % — HIGH (ref 10.3–14.5)
SAO2 % BLDA: 97 % — SIGNIFICANT CHANGE UP (ref 95–100)
SODIUM SERPL-SCNC: 139 MMOL/L — SIGNIFICANT CHANGE UP (ref 135–145)
WBC # BLD: 7.75 K/UL — SIGNIFICANT CHANGE UP (ref 3.8–10.5)
WBC # FLD AUTO: 7.75 K/UL — SIGNIFICANT CHANGE UP (ref 3.8–10.5)

## 2020-12-19 PROCEDURE — 99233 SBSQ HOSP IP/OBS HIGH 50: CPT | Mod: GC

## 2020-12-19 RX ORDER — MAGNESIUM SULFATE 500 MG/ML
1 VIAL (ML) INJECTION ONCE
Refills: 0 | Status: COMPLETED | OUTPATIENT
Start: 2020-12-19 | End: 2020-12-19

## 2020-12-19 RX ORDER — ENOXAPARIN SODIUM 100 MG/ML
90 INJECTION SUBCUTANEOUS EVERY 12 HOURS
Refills: 0 | Status: DISCONTINUED | OUTPATIENT
Start: 2020-12-19 | End: 2020-12-25

## 2020-12-19 RX ORDER — ENOXAPARIN SODIUM 100 MG/ML
40 INJECTION SUBCUTANEOUS EVERY 12 HOURS
Refills: 0 | Status: DISCONTINUED | OUTPATIENT
Start: 2020-12-19 | End: 2020-12-19

## 2020-12-19 RX ADMIN — PROPOFOL 2.67 MICROGRAM(S)/KG/MIN: 10 INJECTION, EMULSION INTRAVENOUS at 03:32

## 2020-12-19 RX ADMIN — Medication 100 GRAM(S): at 06:06

## 2020-12-19 RX ADMIN — SENNA PLUS 2 TABLET(S): 8.6 TABLET ORAL at 21:19

## 2020-12-19 RX ADMIN — ENOXAPARIN SODIUM 90 MILLIGRAM(S): 100 INJECTION SUBCUTANEOUS at 08:21

## 2020-12-19 RX ADMIN — CHLORHEXIDINE GLUCONATE 15 MILLILITER(S): 213 SOLUTION TOPICAL at 17:24

## 2020-12-19 RX ADMIN — DEXMEDETOMIDINE HYDROCHLORIDE IN 0.9% SODIUM CHLORIDE 4.45 MICROGRAM(S)/KG/HR: 4 INJECTION INTRAVENOUS at 19:41

## 2020-12-19 RX ADMIN — CHLORHEXIDINE GLUCONATE 15 MILLILITER(S): 213 SOLUTION TOPICAL at 06:45

## 2020-12-19 RX ADMIN — INSULIN HUMAN 2: 100 INJECTION, SOLUTION SUBCUTANEOUS at 19:40

## 2020-12-19 RX ADMIN — DEXMEDETOMIDINE HYDROCHLORIDE IN 0.9% SODIUM CHLORIDE 4.45 MICROGRAM(S)/KG/HR: 4 INJECTION INTRAVENOUS at 23:59

## 2020-12-19 RX ADMIN — INSULIN GLARGINE 8 UNIT(S): 100 INJECTION, SOLUTION SUBCUTANEOUS at 23:10

## 2020-12-19 RX ADMIN — PIPERACILLIN AND TAZOBACTAM 200 GRAM(S): 4; .5 INJECTION, POWDER, LYOPHILIZED, FOR SOLUTION INTRAVENOUS at 17:25

## 2020-12-19 RX ADMIN — Medication 1 APPLICATION(S): at 12:14

## 2020-12-19 RX ADMIN — MIDODRINE HYDROCHLORIDE 10 MILLIGRAM(S): 2.5 TABLET ORAL at 21:18

## 2020-12-19 RX ADMIN — PROPOFOL 2.67 MICROGRAM(S)/KG/MIN: 10 INJECTION, EMULSION INTRAVENOUS at 19:41

## 2020-12-19 RX ADMIN — MIDODRINE HYDROCHLORIDE 10 MILLIGRAM(S): 2.5 TABLET ORAL at 12:16

## 2020-12-19 RX ADMIN — PIPERACILLIN AND TAZOBACTAM 200 GRAM(S): 4; .5 INJECTION, POWDER, LYOPHILIZED, FOR SOLUTION INTRAVENOUS at 11:21

## 2020-12-19 RX ADMIN — PIPERACILLIN AND TAZOBACTAM 200 GRAM(S): 4; .5 INJECTION, POWDER, LYOPHILIZED, FOR SOLUTION INTRAVENOUS at 06:06

## 2020-12-19 RX ADMIN — PIPERACILLIN AND TAZOBACTAM 200 GRAM(S): 4; .5 INJECTION, POWDER, LYOPHILIZED, FOR SOLUTION INTRAVENOUS at 23:10

## 2020-12-19 RX ADMIN — CHLORHEXIDINE GLUCONATE 1 APPLICATION(S): 213 SOLUTION TOPICAL at 06:04

## 2020-12-19 RX ADMIN — INSULIN HUMAN 2: 100 INJECTION, SOLUTION SUBCUTANEOUS at 12:15

## 2020-12-19 RX ADMIN — MIDODRINE HYDROCHLORIDE 10 MILLIGRAM(S): 2.5 TABLET ORAL at 06:06

## 2020-12-19 RX ADMIN — INSULIN HUMAN 2: 100 INJECTION, SOLUTION SUBCUTANEOUS at 23:08

## 2020-12-19 RX ADMIN — Medication 1 APPLICATION(S): at 19:40

## 2020-12-19 RX ADMIN — PANTOPRAZOLE SODIUM 40 MILLIGRAM(S): 20 TABLET, DELAYED RELEASE ORAL at 11:21

## 2020-12-19 RX ADMIN — POLYETHYLENE GLYCOL 3350 17 GRAM(S): 17 POWDER, FOR SOLUTION ORAL at 11:20

## 2020-12-19 RX ADMIN — DEXMEDETOMIDINE HYDROCHLORIDE IN 0.9% SODIUM CHLORIDE 4.45 MICROGRAM(S)/KG/HR: 4 INJECTION INTRAVENOUS at 00:29

## 2020-12-19 RX ADMIN — ENOXAPARIN SODIUM 90 MILLIGRAM(S): 100 INJECTION SUBCUTANEOUS at 21:18

## 2020-12-19 RX ADMIN — AMIODARONE HYDROCHLORIDE 400 MILLIGRAM(S): 400 TABLET ORAL at 17:24

## 2020-12-19 RX ADMIN — Medication 1 APPLICATION(S): at 23:09

## 2020-12-19 RX ADMIN — Medication 1 APPLICATION(S): at 06:05

## 2020-12-19 RX ADMIN — AMIODARONE HYDROCHLORIDE 400 MILLIGRAM(S): 400 TABLET ORAL at 06:06

## 2020-12-19 NOTE — PROGRESS NOTE ADULT - ASSESSMENT
MAGGIE SAHNI is a 70 yr old male with PMhx of DMT2, Afib on xarelto, HTN, HLD who was admitted to [facility] on 11/27 and started on remdisivir and decadron on 11/28, received convalescent plasma on 11/30, as well as Toci on 12/2, but was intubated on 12/2 due to worsening O2 sats. Also treated with Zosyn since 12/2 and 7 days of Levoquin (12/2- 12/8), along with  zyvox and caspofungin on 12/8. Transferred to [facility] 12/8 overnight.     NEUROLOGY  #Sedation  - Propofol and precedex  - Attempted to turn off propofol today but became tachypneic and hypotensive  - In restraints  - give Seroquel 12.5 qhs for agitation    RESPIRATORY   #Acute hypoxic respiratory failure 2/2 COVID vs. overlying pneumonia. completed decadron course  - Sputum growing gram positive cocci in clusters; given vanc and zosyn o/n, ID consulted  - c/w Zosyn (12/14-12/21)  - Tracheostomy tube placement (12/18)    CARDIOVASCULAR  #Septic shock, present on admission, was improving however last night febrile. Fever work up done, tylenol given, blood cultures sent.  - c/w phenylephrine and midodrine  - f/u Echo  - Consider diuresis for fluid overload  - Continues to require [place], will check random cortisol    #H/o A-fib with RVR  - c/w Amiodarone gtt  - Lovenox 90 BID    #Severe carotid disease  -Patient with occluded right ICA and severe left ICA stenosis, s/p left CEA, uncomplicated  -f/u with cardiology outpatient    RENAL   - goal I/O net even  - strict I/O tracking    ENDOCRINE  - goal 140-180 blood glucose  - mSSI  - Decreased lantus to 8     GASTROINTESTINAL  - glucerna tube feeds  - protonix 40  - bowel regimen  - GI consulted for PEG placement    INFECTIOUS DISEASE  #VAP vs. COVID-19 Associated fevers  - c/w Zosyn (12-14-12/21)    HEMATOLOGY/ONCOLOGY  - lovenox full anticoagulation for Afib - 90 q12    ACCESS/LINES/DRAINS  -[place] and Left IJ placed 12/9  -OG tube  -Araujo (which patient came in with from [place])    ETHICS/GOALS OF CARE  - update family daily    PROPHYLACTIC  ELECTROLYTES: Mg<2, K<4  DIET: regular  GI PPX: protonix  VTE PPX: SCD, lovenox  CODE: FULL  DISPO: MICU     MAGGIE SAHNI is a 70 yr old male with PMhx of DMT2, Afib on xarelto, HTN, HLD who was admitted to [facility] on 11/27 and started on remdisivir and decadron on 11/28, received convalescent plasma on 11/30, as well as Toci on 12/2, but was intubated on 12/2 due to worsening O2 sats. Also treated with Zosyn since 12/2 and 7 days of Levoquin (12/2- 12/8), along with  zyvox and caspofungin on 12/8. Transferred to [facility] 12/8 overnight.     NEUROLOGY  #Sedation  - Propofol and precedex  - Attempted to turn off propofol today but became tachypneic and hypotensive  - In restraints  - give Seroquel 12.5 qhs for agitation    RESPIRATORY   #Acute hypoxic respiratory failure 2/2 COVID vs. overlying pneumonia. completed decadron course  - Sputum growing gram positive cocci in clusters; given vanc and zosyn o/n, ID consulted  - c/w Zosyn (12/14-12/21)  - Tracheostomy tube placement (12/18)    CARDIOVASCULAR  #Septic shock vs Vasoplegic shock  - c/w phenylephrine and midodrine  - f/u Echo  - Consider diuresis for fluid overload  - Continues to require Willard, will check random cortisol    #H/o A-fib with RVR  - c/w Amiodarone gtt  - Lovenox 90 BID    #Severe carotid disease  -Patient with occluded right ICA and severe left ICA stenosis, s/p left CEA, uncomplicated  -f/u with cardiology outpatient    RENAL   - goal I/O net even  - strict I/O tracking    ENDOCRINE  - goal 140-180 blood glucose  - mSSI  - Decreased lantus to 8     GASTROINTESTINAL  - glucerna tube feeds  - protonix 40  - bowel regimen  - GI consulted for PEG placement    INFECTIOUS DISEASE  #VAP vs. COVID-19 Associated fevers  - c/w Zosyn (12-14-12/21)    HEMATOLOGY/ONCOLOGY  - lovenox full anticoagulation for Afib - 90 q12    ACCESS/LINES/DRAINS  -[place] and Left IJ placed 12/9  -OG tube  -Araujo (which patient came in with from [place])    ETHICS/GOALS OF CARE  - update family daily    PROPHYLACTIC  ELECTROLYTES: Mg<2, K<4  DIET: regular  GI PPX: protonix  VTE PPX: SCD, lovenox  CODE: FULL  DISPO: [facility]

## 2020-12-19 NOTE — PROGRESS NOTE ADULT - SUBJECTIVE AND OBJECTIVE BOX
Medicine Progress Note    Patient is a 70y old  Male who presents with a chief complaint of COVID-19 pna (19 Dec 2020 10:31)      SUBJECTIVE / OVERNIGHT EVENTS: Tracheostomy completed yesterday. NAD, sedated and breathing comfortably on the vent.    ADDITIONAL REVIEW OF SYSTEMS:    MEDICATIONS  (STANDING):  aMIOdarone    Tablet 400 milliGRAM(s) Oral every 12 hours  chlorhexidine 0.12% Liquid 15 milliLiter(s) Oral Mucosa every 12 hours  chlorhexidine 2% Cloths 1 Application(s) Topical <User Schedule>  dexMEDEtomidine Infusion 0.2 MICROgram(s)/kG/Hr (4.45 mL/Hr) IV Continuous <Continuous>  dextrose 40% Gel 15 Gram(s) Oral once  dextrose 5%. 1000 milliLiter(s) (50 mL/Hr) IV Continuous <Continuous>  dextrose 5%. 1000 milliLiter(s) (100 mL/Hr) IV Continuous <Continuous>  dextrose 50% Injectable 25 Gram(s) IV Push once  dextrose 50% Injectable 12.5 Gram(s) IV Push once  dextrose 50% Injectable 25 Gram(s) IV Push once  enoxaparin Injectable 90 milliGRAM(s) SubCutaneous every 12 hours  glucagon  Injectable 1 milliGRAM(s) IntraMuscular once  insulin glargine Injectable (LANTUS) 8 Unit(s) SubCutaneous at bedtime  insulin regular  human corrective regimen sliding scale   SubCutaneous every 6 hours  midodrine 10 milliGRAM(s) Oral every 8 hours  pantoprazole   Suspension 40 milliGRAM(s) Enteral Tube daily  petrolatum Ophthalmic Ointment 1 Application(s) Both EYES every 6 hours  phenylephrine    Infusion 0.1 MICROgram(s)/kG/Min (1.67 mL/Hr) IV Continuous <Continuous>  piperacillin/tazobactam IVPB.. 4.5 Gram(s) IV Intermittent every 6 hours  polyethylene glycol 3350 17 Gram(s) Oral daily  propofol Infusion 5 MICROgram(s)/kG/Min (2.67 mL/Hr) IV Continuous <Continuous>  senna 2 Tablet(s) Oral at bedtime    MEDICATIONS  (PRN):  acetaminophen    Suspension .. 650 milliGRAM(s) Oral every 6 hours PRN Temp greater or equal to 38C (100.4F), Mild Pain (1 - 3)    CAPILLARY BLOOD GLUCOSE      POCT Blood Glucose.: 165 mg/dL (19 Dec 2020 12:05)  POCT Blood Glucose.: 145 mg/dL (19 Dec 2020 06:44)  POCT Blood Glucose.: 141 mg/dL (18 Dec 2020 23:09)  POCT Blood Glucose.: 127 mg/dL (18 Dec 2020 16:59)    I&O's Summary    18 Dec 2020 07:01  -  19 Dec 2020 07:00  --------------------------------------------------------  IN: 1423.5 mL / OUT: 2165 mL / NET: -741.5 mL    19 Dec 2020 07:01  -  19 Dec 2020 14:36  --------------------------------------------------------  IN: 338 mL / OUT: 360 mL / NET: -22 mL        PHYSICAL EXAM:  Vital Signs Last 24 Hrs  T(C): 37.7 (19 Dec 2020 13:00), Max: 37.7 (19 Dec 2020 13:00)  T(F): 99.8 (19 Dec 2020 13:00), Max: 99.8 (19 Dec 2020 13:00)  HR: 66 (19 Dec 2020 13:00) (55 - 109)  BP: 136/61 (19 Dec 2020 13:00) (89/41 - 154/50)  BP(mean): 88 (19 Dec 2020 13:00) (59 - 88)  RR: 30 (19 Dec 2020 13:00) (20 - 30)  SpO2: 97% (19 Dec 2020 13:00) (92% - 100%)  CONSTITUTIONAL: NAD, well-developed, well-groomed  ENMT: Moist oral mucosa, no pharyngeal injection or exudates; normal dentition  RESPIRATORY: Normal respiratory effort; lungs are clear to auscultation bilaterally  CARDIOVASCULAR: Regular rate and rhythm, normal S1 and S2, no murmur/rub/gallop; No lower extremity edema; Peripheral pulses are 2+ bilaterally  ABDOMEN: Nontender to palpation, normoactive bowel sounds, no rebound/guarding; No hepatosplenomegaly  PSYCH: A+O to person, place, and time; affect appropriate  NEUROLOGY: CN 2-12 are intact and symmetric; no gross sensory deficits   SKIN: No rashes; no palpable lesions    LABS:                        9.8    7.75  )-----------( 151      ( 19 Dec 2020 04:39 )             32.5     12-19    139  |  106  |  7   ----------------------------<  144<H>  4.2   |  25  |  0.45<L>    Ca    8.4      19 Dec 2020 04:39  Phos  3.1     12-19  Mg     1.9     12-19    TPro  5.1<L>  /  Alb  2.6<L>  /  TBili  0.8  /  DBili  x   /  AST  23  /  ALT  37  /  AlkPhos  91  12-19              COVID-19 PCR: NotDetec (14 Dec 2020 14:16)  SARS-CoV-2: Detected (27 Nov 2020 19:30)      RADIOLOGY & ADDITIONAL TESTS:  Imaging from Last 24 Hours:    Electrocardiogram/QTc Interval:    COORDINATION OF CARE:  Care Discussed with Consultants/Other Providers:

## 2020-12-20 LAB
ALBUMIN SERPL ELPH-MCNC: 2.7 G/DL — LOW (ref 3.3–5)
ALP SERPL-CCNC: 96 U/L — SIGNIFICANT CHANGE UP (ref 40–120)
ALT FLD-CCNC: 28 U/L — SIGNIFICANT CHANGE UP (ref 10–45)
ANION GAP SERPL CALC-SCNC: 8 MMOL/L — SIGNIFICANT CHANGE UP (ref 5–17)
AST SERPL-CCNC: 22 U/L — SIGNIFICANT CHANGE UP (ref 10–40)
BASE EXCESS BLDA CALC-SCNC: 2.4 MMOL/L — SIGNIFICANT CHANGE UP (ref -2–3)
BASOPHILS # BLD AUTO: 0.02 K/UL — SIGNIFICANT CHANGE UP (ref 0–0.2)
BASOPHILS NFR BLD AUTO: 0.2 % — SIGNIFICANT CHANGE UP (ref 0–2)
BILIRUB SERPL-MCNC: 0.7 MG/DL — SIGNIFICANT CHANGE UP (ref 0.2–1.2)
BUN SERPL-MCNC: 7 MG/DL — SIGNIFICANT CHANGE UP (ref 7–23)
CALCIUM SERPL-MCNC: 8.2 MG/DL — LOW (ref 8.4–10.5)
CHLORIDE SERPL-SCNC: 105 MMOL/L — SIGNIFICANT CHANGE UP (ref 96–108)
CO2 SERPL-SCNC: 25 MMOL/L — SIGNIFICANT CHANGE UP (ref 22–31)
CREAT SERPL-MCNC: 0.45 MG/DL — LOW (ref 0.5–1.3)
CRP SERPL-MCNC: 1.99 MG/DL — HIGH (ref 0–0.4)
D DIMER BLD IA.RAPID-MCNC: 470 NG/ML DDU — HIGH
EOSINOPHIL # BLD AUTO: 0.6 K/UL — HIGH (ref 0–0.5)
EOSINOPHIL NFR BLD AUTO: 7.3 % — HIGH (ref 0–6)
FERRITIN SERPL-MCNC: 121 NG/ML — SIGNIFICANT CHANGE UP (ref 30–400)
GAS PNL BLDA: SIGNIFICANT CHANGE UP
GLUCOSE BLDC GLUCOMTR-MCNC: 118 MG/DL — HIGH (ref 70–99)
GLUCOSE BLDC GLUCOMTR-MCNC: 141 MG/DL — HIGH (ref 70–99)
GLUCOSE BLDC GLUCOMTR-MCNC: 142 MG/DL — HIGH (ref 70–99)
GLUCOSE SERPL-MCNC: 150 MG/DL — HIGH (ref 70–99)
HCO3 BLDA-SCNC: 26 MMOL/L — SIGNIFICANT CHANGE UP (ref 21–28)
HCT VFR BLD CALC: 30.7 % — LOW (ref 39–50)
HGB BLD-MCNC: 9.3 G/DL — LOW (ref 13–17)
IMM GRANULOCYTES NFR BLD AUTO: 0.5 % — SIGNIFICANT CHANGE UP (ref 0–1.5)
LYMPHOCYTES # BLD AUTO: 2.03 K/UL — SIGNIFICANT CHANGE UP (ref 1–3.3)
LYMPHOCYTES # BLD AUTO: 24.8 % — SIGNIFICANT CHANGE UP (ref 13–44)
MAGNESIUM SERPL-MCNC: 1.9 MG/DL — SIGNIFICANT CHANGE UP (ref 1.6–2.6)
MCHC RBC-ENTMCNC: 24.4 PG — LOW (ref 27–34)
MCHC RBC-ENTMCNC: 30.3 GM/DL — LOW (ref 32–36)
MCV RBC AUTO: 80.6 FL — SIGNIFICANT CHANGE UP (ref 80–100)
MONOCYTES # BLD AUTO: 0.41 K/UL — SIGNIFICANT CHANGE UP (ref 0–0.9)
MONOCYTES NFR BLD AUTO: 5 % — SIGNIFICANT CHANGE UP (ref 2–14)
NEUTROPHILS # BLD AUTO: 5.07 K/UL — SIGNIFICANT CHANGE UP (ref 1.8–7.4)
NEUTROPHILS NFR BLD AUTO: 62.2 % — SIGNIFICANT CHANGE UP (ref 43–77)
NRBC # BLD: 0 /100 WBCS — SIGNIFICANT CHANGE UP (ref 0–0)
PCO2 BLDA: 38 MMHG — SIGNIFICANT CHANGE UP (ref 35–48)
PH BLDA: 7.46 — HIGH (ref 7.35–7.45)
PHOSPHATE SERPL-MCNC: 2.2 MG/DL — LOW (ref 2.5–4.5)
PLATELET # BLD AUTO: 170 K/UL — SIGNIFICANT CHANGE UP (ref 150–400)
PO2 BLDA: 84 MMHG — SIGNIFICANT CHANGE UP (ref 83–108)
POTASSIUM SERPL-MCNC: 4.1 MMOL/L — SIGNIFICANT CHANGE UP (ref 3.5–5.3)
POTASSIUM SERPL-SCNC: 4.1 MMOL/L — SIGNIFICANT CHANGE UP (ref 3.5–5.3)
PROT SERPL-MCNC: 5.5 G/DL — LOW (ref 6–8.3)
RBC # BLD: 3.81 M/UL — LOW (ref 4.2–5.8)
RBC # FLD: 25.7 % — HIGH (ref 10.3–14.5)
SAO2 % BLDA: 97 % — SIGNIFICANT CHANGE UP (ref 95–100)
SODIUM SERPL-SCNC: 138 MMOL/L — SIGNIFICANT CHANGE UP (ref 135–145)
WBC # BLD: 8.17 K/UL — SIGNIFICANT CHANGE UP (ref 3.8–10.5)
WBC # FLD AUTO: 8.17 K/UL — SIGNIFICANT CHANGE UP (ref 3.8–10.5)

## 2020-12-20 PROCEDURE — 99233 SBSQ HOSP IP/OBS HIGH 50: CPT | Mod: GC

## 2020-12-20 PROCEDURE — 71045 X-RAY EXAM CHEST 1 VIEW: CPT | Mod: 26

## 2020-12-20 RX ORDER — FENTANYL CITRATE 50 UG/ML
50 INJECTION INTRAVENOUS ONCE
Refills: 0 | Status: DISCONTINUED | OUTPATIENT
Start: 2020-12-20 | End: 2020-12-20

## 2020-12-20 RX ADMIN — ENOXAPARIN SODIUM 90 MILLIGRAM(S): 100 INJECTION SUBCUTANEOUS at 21:59

## 2020-12-20 RX ADMIN — FENTANYL CITRATE 50 MICROGRAM(S): 50 INJECTION INTRAVENOUS at 19:35

## 2020-12-20 RX ADMIN — PROPOFOL 2.67 MICROGRAM(S)/KG/MIN: 10 INJECTION, EMULSION INTRAVENOUS at 05:21

## 2020-12-20 RX ADMIN — FENTANYL CITRATE 50 MICROGRAM(S): 50 INJECTION INTRAVENOUS at 17:30

## 2020-12-20 RX ADMIN — CHLORHEXIDINE GLUCONATE 15 MILLILITER(S): 213 SOLUTION TOPICAL at 05:22

## 2020-12-20 RX ADMIN — Medication 1 APPLICATION(S): at 23:37

## 2020-12-20 RX ADMIN — PROPOFOL 2.67 MICROGRAM(S)/KG/MIN: 10 INJECTION, EMULSION INTRAVENOUS at 14:00

## 2020-12-20 RX ADMIN — PANTOPRAZOLE SODIUM 40 MILLIGRAM(S): 20 TABLET, DELAYED RELEASE ORAL at 13:59

## 2020-12-20 RX ADMIN — PROPOFOL 2.67 MICROGRAM(S)/KG/MIN: 10 INJECTION, EMULSION INTRAVENOUS at 02:42

## 2020-12-20 RX ADMIN — Medication 1 APPLICATION(S): at 19:12

## 2020-12-20 RX ADMIN — FENTANYL CITRATE 50 MICROGRAM(S): 50 INJECTION INTRAVENOUS at 17:15

## 2020-12-20 RX ADMIN — PIPERACILLIN AND TAZOBACTAM 200 GRAM(S): 4; .5 INJECTION, POWDER, LYOPHILIZED, FOR SOLUTION INTRAVENOUS at 05:22

## 2020-12-20 RX ADMIN — INSULIN GLARGINE 8 UNIT(S): 100 INJECTION, SOLUTION SUBCUTANEOUS at 23:35

## 2020-12-20 RX ADMIN — MIDODRINE HYDROCHLORIDE 10 MILLIGRAM(S): 2.5 TABLET ORAL at 13:59

## 2020-12-20 RX ADMIN — AMIODARONE HYDROCHLORIDE 400 MILLIGRAM(S): 400 TABLET ORAL at 05:22

## 2020-12-20 RX ADMIN — MIDODRINE HYDROCHLORIDE 10 MILLIGRAM(S): 2.5 TABLET ORAL at 22:23

## 2020-12-20 RX ADMIN — FENTANYL CITRATE 50 MICROGRAM(S): 50 INJECTION INTRAVENOUS at 23:21

## 2020-12-20 RX ADMIN — Medication 1 APPLICATION(S): at 13:59

## 2020-12-20 RX ADMIN — CHLORHEXIDINE GLUCONATE 1 APPLICATION(S): 213 SOLUTION TOPICAL at 05:22

## 2020-12-20 RX ADMIN — DEXMEDETOMIDINE HYDROCHLORIDE IN 0.9% SODIUM CHLORIDE 4.45 MICROGRAM(S)/KG/HR: 4 INJECTION INTRAVENOUS at 05:21

## 2020-12-20 RX ADMIN — Medication 1 APPLICATION(S): at 05:22

## 2020-12-20 RX ADMIN — PROPOFOL 2.67 MICROGRAM(S)/KG/MIN: 10 INJECTION, EMULSION INTRAVENOUS at 22:51

## 2020-12-20 RX ADMIN — ENOXAPARIN SODIUM 90 MILLIGRAM(S): 100 INJECTION SUBCUTANEOUS at 10:19

## 2020-12-20 RX ADMIN — MIDODRINE HYDROCHLORIDE 10 MILLIGRAM(S): 2.5 TABLET ORAL at 05:22

## 2020-12-20 RX ADMIN — CHLORHEXIDINE GLUCONATE 15 MILLILITER(S): 213 SOLUTION TOPICAL at 19:12

## 2020-12-20 RX ADMIN — FENTANYL CITRATE 50 MICROGRAM(S): 50 INJECTION INTRAVENOUS at 19:11

## 2020-12-20 NOTE — PROGRESS NOTE ADULT - SUBJECTIVE AND OBJECTIVE BOX
NOTE IN PROGRESS OVERNIGHT EVENTS:    SUBJECTIVE / INTERVAL HPI: Patient seen and examined at bedside.     VITAL SIGNS:  Vital Signs Last 24 Hrs  T(C): 37 (20 Dec 2020 09:58), Max: 37.7 (19 Dec 2020 13:00)  T(F): 98.6 (20 Dec 2020 09:58), Max: 99.8 (19 Dec 2020 13:00)  HR: 51 (20 Dec 2020 11:00) (51 - 109)  BP: 118/56 (20 Dec 2020 07:00) (112/43 - 168/63)  BP(mean): 96 (20 Dec 2020 06:00) (69 - 102)  RR: 22 (20 Dec 2020 11:00) (22 - 30)  SpO2: 100% (20 Dec 2020 11:00) (92% - 100%)    PHYSICAL EXAM:    General: WDWN  HEENT: NC/AT; PERRL, anicteric sclera; MMM  Neck: supple  Cardiovascular: +S1/S2; RRR  Respiratory: CTA B/L; no W/R/R  Gastrointestinal: soft, NT/ND; +BSx4  Extremities: WWP; no edema, clubbing or cyanosis  Vascular: 2+ radial, DP/PT pulses B/L  Neurological: AAOx3; no focal deficits    MEDICATIONS:  MEDICATIONS  (STANDING):  chlorhexidine 0.12% Liquid 15 milliLiter(s) Oral Mucosa every 12 hours  chlorhexidine 2% Cloths 1 Application(s) Topical <User Schedule>  dexMEDEtomidine Infusion 0.2 MICROgram(s)/kG/Hr (4.45 mL/Hr) IV Continuous <Continuous>  dextrose 40% Gel 15 Gram(s) Oral once  dextrose 5%. 1000 milliLiter(s) (50 mL/Hr) IV Continuous <Continuous>  dextrose 5%. 1000 milliLiter(s) (100 mL/Hr) IV Continuous <Continuous>  dextrose 50% Injectable 25 Gram(s) IV Push once  dextrose 50% Injectable 12.5 Gram(s) IV Push once  dextrose 50% Injectable 25 Gram(s) IV Push once  enoxaparin Injectable 90 milliGRAM(s) SubCutaneous every 12 hours  glucagon  Injectable 1 milliGRAM(s) IntraMuscular once  insulin glargine Injectable (LANTUS) 8 Unit(s) SubCutaneous at bedtime  insulin regular  human corrective regimen sliding scale   SubCutaneous every 6 hours  midodrine 10 milliGRAM(s) Oral every 8 hours  pantoprazole   Suspension 40 milliGRAM(s) Enteral Tube daily  petrolatum Ophthalmic Ointment 1 Application(s) Both EYES every 6 hours  phenylephrine    Infusion 0.1 MICROgram(s)/kG/Min (1.67 mL/Hr) IV Continuous <Continuous>  polyethylene glycol 3350 17 Gram(s) Oral daily  propofol Infusion 5 MICROgram(s)/kG/Min (2.67 mL/Hr) IV Continuous <Continuous>  senna 2 Tablet(s) Oral at bedtime    MEDICATIONS  (PRN):  acetaminophen    Suspension .. 650 milliGRAM(s) Oral every 6 hours PRN Temp greater or equal to 38C (100.4F), Mild Pain (1 - 3)      ALLERGIES:  Allergies    No Known Allergies    Intolerances        LABS:                        9.3    8.17  )-----------( 170      ( 20 Dec 2020 04:11 )             30.7     12-20    138  |  105  |  7   ----------------------------<  150<H>  4.1   |  25  |  0.45<L>    Ca    8.2<L>      20 Dec 2020 04:11  Phos  2.2     12-20  Mg     1.9     12-20    TPro  5.5<L>  /  Alb  2.7<L>  /  TBili  0.7  /  DBili  x   /  AST  22  /  ALT  28  /  AlkPhos  96  12-20    PTT - ( 19 Dec 2020 18:35 )  PTT:79.0 sec    CAPILLARY BLOOD GLUCOSE      POCT Blood Glucose.: 141 mg/dL (20 Dec 2020 10:30)      RADIOLOGY & ADDITIONAL TESTS: Reviewed.    ASSESSMENT:    PLAN:  OVERNIGHT EVENTS: bethanie     SUBJECTIVE / INTERVAL HPI: Patient seen and examined at bedside.  EKG with prolonged qtc. stop precedex and stopped amiodarone        VITAL SIGNS:  Vital Signs Last 24 Hrs  T(C): 37 (20 Dec 2020 09:58), Max: 37.7 (19 Dec 2020 13:00)  T(F): 98.6 (20 Dec 2020 09:58), Max: 99.8 (19 Dec 2020 13:00)  HR: 51 (20 Dec 2020 11:00) (51 - 109)  BP: 118/56 (20 Dec 2020 07:00) (112/43 - 168/63)  BP(mean): 96 (20 Dec 2020 06:00) (69 - 102)  RR: 22 (20 Dec 2020 11:00) (22 - 30)  SpO2: 100% (20 Dec 2020 11:00) (92% - 100%)    PHYSICAL EXAM:    General: intubated, sedated . on fio2 40, peep 8, rr 22, tv 400   HEENT: NC/AT; PERRL, anicteric sclera; MMM  Neck: supple  Cardiovascular: +S1/S2; RRR  Respiratory: basilar crackles , decreased bs at bases   Gastrointestinal: soft, NT/ND; +BSx4  Extremities: no le edema   Vascular: 2+ radial, DP/PT pulses B/L  Neurological: AAOx0 on sedation at this time, opening eyes, gag reflex intact as per nursing staff, moving extremities     MEDICATIONS:  MEDICATIONS  (STANDING):  chlorhexidine 0.12% Liquid 15 milliLiter(s) Oral Mucosa every 12 hours  chlorhexidine 2% Cloths 1 Application(s) Topical <User Schedule>  dexMEDEtomidine Infusion 0.2 MICROgram(s)/kG/Hr (4.45 mL/Hr) IV Continuous <Continuous>  dextrose 40% Gel 15 Gram(s) Oral once  dextrose 5%. 1000 milliLiter(s) (50 mL/Hr) IV Continuous <Continuous>  dextrose 5%. 1000 milliLiter(s) (100 mL/Hr) IV Continuous <Continuous>  dextrose 50% Injectable 25 Gram(s) IV Push once  dextrose 50% Injectable 12.5 Gram(s) IV Push once  dextrose 50% Injectable 25 Gram(s) IV Push once  enoxaparin Injectable 90 milliGRAM(s) SubCutaneous every 12 hours  glucagon  Injectable 1 milliGRAM(s) IntraMuscular once  insulin glargine Injectable (LANTUS) 8 Unit(s) SubCutaneous at bedtime  insulin regular  human corrective regimen sliding scale   SubCutaneous every 6 hours  midodrine 10 milliGRAM(s) Oral every 8 hours  pantoprazole   Suspension 40 milliGRAM(s) Enteral Tube daily  petrolatum Ophthalmic Ointment 1 Application(s) Both EYES every 6 hours  phenylephrine    Infusion 0.1 MICROgram(s)/kG/Min (1.67 mL/Hr) IV Continuous <Continuous>  polyethylene glycol 3350 17 Gram(s) Oral daily  propofol Infusion 5 MICROgram(s)/kG/Min (2.67 mL/Hr) IV Continuous <Continuous>  senna 2 Tablet(s) Oral at bedtime    MEDICATIONS  (PRN):  acetaminophen    Suspension .. 650 milliGRAM(s) Oral every 6 hours PRN Temp greater or equal to 38C (100.4F), Mild Pain (1 - 3)      ALLERGIES:  Allergies    No Known Allergies    Intolerances        LABS:                        9.3    8.17  )-----------( 170      ( 20 Dec 2020 04:11 )             30.7     12-20    138  |  105  |  7   ----------------------------<  150<H>  4.1   |  25  |  0.45<L>    Ca    8.2<L>      20 Dec 2020 04:11  Phos  2.2     12-20  Mg     1.9     12-20    TPro  5.5<L>  /  Alb  2.7<L>  /  TBili  0.7  /  DBili  x   /  AST  22  /  ALT  28  /  AlkPhos  96  12-20    PTT - ( 19 Dec 2020 18:35 )  PTT:79.0 sec    CAPILLARY BLOOD GLUCOSE      POCT Blood Glucose.: 141 mg/dL (20 Dec 2020 10:30)      RADIOLOGY & ADDITIONAL TESTS: Reviewed.    ASSESSMENT:    PLAN:

## 2020-12-20 NOTE — PROGRESS NOTE ADULT - ASSESSMENT
MAGGIE SAHNI is a 70 yr old male with PMhx of DMT2, Afib on xarelto, HTN, HLD who was admitted to [facility] on 11/27 and started on remdisivir and decadron on 11/28, received convalescent plasma on 11/30, as well as Toci on 12/2, but was intubated on 12/2 due to worsening O2 sats. Also treated with Zosyn since 12/2 and 7 days of Levoquin (12/2- 12/8), along with  zyvox and caspofungin on 12/8. Transferred to [facility] 12/8 overnight, patient now s/p trach and peg tube.         NEUROLOGY  #Sedation  - Propofol continued and precedex stopped as patient with prolonged qtc  - avoid qtc prolonging agents such as Seroquel for agitation     RESPIRATORY   #Acute hypoxic respiratory failure 2/2 COVID as well as concern for  overlying pneumonia.   - sputum with normal resp failure. He has received a course of  Zosyn (12/14-12/20) for possible vap after having a fever on 12/12  -s/p trach 12 /18  and peg 12/17   -peep decreased to 5 today from 8  , on fio2 40, rr 22, tv 400     CARDIOVASCULAR  #Septic shock vs Vasoplegic shock  - c/w phenylephrine and midodrine, will attempt to down tirate phenylephrine     #H/o A-fib with RVR  -amiodarone stopped on 12/20 , due to prolongd qtc .   -repeat ekg in am , consider ep consulte   - Lovenox 90 BID    #Severe carotid disease  -Patient with occluded right ICA and severe left ICA stenosis, s/p left CEA, uncomplicated  -f/u with cardiology outpatient    RENAL   - goal I/O net even    ENDOCRINE  - goal 140-180 blood glucose  - mSSI  - lantus 8 unit at bedtime     GASTROINTESTINAL  - glucerna feeds via peg  - protonix 40     MAGGIE SAHNI is a 70 yr old male with PMhx of DMT2, Afib on xarelto, HTN, HLD who was admitted to [facility] on 11/27 and started on remdisivir and decadron on 11/28, received convalescent plasma on 11/30, as well as Toci on 12/2, but was intubated on 12/2 due to worsening O2 sats. Also treated with Zosyn since 12/2 and 7 days of Levoquin (12/2- 12/8), along with  zyvox and caspofungin on 12/8. Transferred to [facility] 12/8 overnight, patient now s/p trach and peg tube.         NEUROLOGY  #Sedation  - Propofol continued and precedex stopped as patient with prolonged qtc  - avoid qtc prolonging agents such as Seroquel for agitation     RESPIRATORY   #Acute hypoxic respiratory failure 2/2 COVID as well as concern for  overlying pneumonia.   - sputum with normal resp pepper. He has received a course of  Zosyn (12/14-12/20) for possible vap after having a fever on 12/12  -s/p trach 12 /18  and peg 12/17   -peep decreased to 5 today from 8  , on fio2 40, rr 22, tv 400     CARDIOVASCULAR  #Septic shock vs Vasoplegic shock  - c/w phenylephrine and midodrine, will attempt to down tirate phenylephrine     #H/o A-fib with RVR  -amiodarone stopped on 12/20 , due to prolongd qtc .   -repeat ekg in am , consider ep consulte   - Lovenox 90 BID    #Severe carotid disease  -Patient with occluded right ICA and severe left ICA stenosis, s/p left CEA, uncomplicated  -f/u with cardiology outpatient    # QTC prolongation:   ekg with qtc 560 on 12/20  stopped Precedex stopped amiodarone  please avoid qtc avoiding agents-held off on starting seroquel       RENAL   - goal I/O net even    ENDOCRINE  - goal 140-180 blood glucose  - mSSI  - lantus 8 unit at bedtime     GASTROINTESTINAL  - glucerna feeds via peg  - protonix 40      INFECTIOUS DISEASE  #VAP vs. COVID-19 Associated fevers  fever on 12/12, sputum cx sent showing normal resp pepper   He has received a course of  Zosyn (12/14-12/20) for possible vap       HEMATOLOGY/ONCOLOGY  - lovenox full anticoagulation for Afib - 90 q12    ACCESS/LINES/DRAINS   A-line 12/17  RIJ 12/17    PEG 12/17          PROPHYLACTIC  ELECTROLYTES: Mg<2, K<4  DIET: regular  GI PPX: Protonix  VTE PPX: SCD, lovenox bid for afib  CODE: FULL  DISPO: MICU

## 2020-12-21 LAB
ALBUMIN SERPL ELPH-MCNC: 2.9 G/DL — LOW (ref 3.3–5)
ALP SERPL-CCNC: 98 U/L — SIGNIFICANT CHANGE UP (ref 40–120)
ALT FLD-CCNC: 23 U/L — SIGNIFICANT CHANGE UP (ref 10–45)
ANION GAP SERPL CALC-SCNC: 13 MMOL/L — SIGNIFICANT CHANGE UP (ref 5–17)
AST SERPL-CCNC: 19 U/L — SIGNIFICANT CHANGE UP (ref 10–40)
BASE EXCESS BLDA CALC-SCNC: -1.9 MMOL/L — SIGNIFICANT CHANGE UP (ref -2–3)
BASE EXCESS BLDA CALC-SCNC: -3.7 MMOL/L — LOW (ref -2–3)
BASOPHILS # BLD AUTO: 0.02 K/UL — SIGNIFICANT CHANGE UP (ref 0–0.2)
BASOPHILS NFR BLD AUTO: 0.3 % — SIGNIFICANT CHANGE UP (ref 0–2)
BILIRUB SERPL-MCNC: 0.5 MG/DL — SIGNIFICANT CHANGE UP (ref 0.2–1.2)
BUN SERPL-MCNC: 5 MG/DL — LOW (ref 7–23)
CALCIUM SERPL-MCNC: 8.2 MG/DL — LOW (ref 8.4–10.5)
CHLORIDE SERPL-SCNC: 106 MMOL/L — SIGNIFICANT CHANGE UP (ref 96–108)
CO2 SERPL-SCNC: 21 MMOL/L — LOW (ref 22–31)
CREAT SERPL-MCNC: 0.38 MG/DL — LOW (ref 0.5–1.3)
CRP SERPL-MCNC: 1.35 MG/DL — HIGH (ref 0–0.4)
D DIMER BLD IA.RAPID-MCNC: 496 NG/ML DDU — HIGH
EOSINOPHIL # BLD AUTO: 0.49 K/UL — SIGNIFICANT CHANGE UP (ref 0–0.5)
EOSINOPHIL NFR BLD AUTO: 6.4 % — HIGH (ref 0–6)
FERRITIN SERPL-MCNC: 147 NG/ML — SIGNIFICANT CHANGE UP (ref 30–400)
GAS PNL BLDA: SIGNIFICANT CHANGE UP
GAS PNL BLDA: SIGNIFICANT CHANGE UP
GLUCOSE BLDC GLUCOMTR-MCNC: 100 MG/DL — HIGH (ref 70–99)
GLUCOSE BLDC GLUCOMTR-MCNC: 134 MG/DL — HIGH (ref 70–99)
GLUCOSE BLDC GLUCOMTR-MCNC: 147 MG/DL — HIGH (ref 70–99)
GLUCOSE BLDC GLUCOMTR-MCNC: 186 MG/DL — HIGH (ref 70–99)
GLUCOSE SERPL-MCNC: 101 MG/DL — HIGH (ref 70–99)
HCO3 BLDA-SCNC: 20 MMOL/L — LOW (ref 21–28)
HCO3 BLDA-SCNC: 22 MMOL/L — SIGNIFICANT CHANGE UP (ref 21–28)
HCT VFR BLD CALC: 31.5 % — LOW (ref 39–50)
HGB BLD-MCNC: 9.4 G/DL — LOW (ref 13–17)
IMM GRANULOCYTES NFR BLD AUTO: 0.7 % — SIGNIFICANT CHANGE UP (ref 0–1.5)
LYMPHOCYTES # BLD AUTO: 2.36 K/UL — SIGNIFICANT CHANGE UP (ref 1–3.3)
LYMPHOCYTES # BLD AUTO: 30.9 % — SIGNIFICANT CHANGE UP (ref 13–44)
MAGNESIUM SERPL-MCNC: 1.9 MG/DL — SIGNIFICANT CHANGE UP (ref 1.6–2.6)
MCHC RBC-ENTMCNC: 24.5 PG — LOW (ref 27–34)
MCHC RBC-ENTMCNC: 29.8 GM/DL — LOW (ref 32–36)
MCV RBC AUTO: 82.2 FL — SIGNIFICANT CHANGE UP (ref 80–100)
MONOCYTES # BLD AUTO: 0.49 K/UL — SIGNIFICANT CHANGE UP (ref 0–0.9)
MONOCYTES NFR BLD AUTO: 6.4 % — SIGNIFICANT CHANGE UP (ref 2–14)
NEUTROPHILS # BLD AUTO: 4.23 K/UL — SIGNIFICANT CHANGE UP (ref 1.8–7.4)
NEUTROPHILS NFR BLD AUTO: 55.3 % — SIGNIFICANT CHANGE UP (ref 43–77)
NRBC # BLD: 0 /100 WBCS — SIGNIFICANT CHANGE UP (ref 0–0)
PCO2 BLDA: 32 MMHG — LOW (ref 35–48)
PCO2 BLDA: 32 MMHG — LOW (ref 35–48)
PH BLDA: 7.42 — SIGNIFICANT CHANGE UP (ref 7.35–7.45)
PH BLDA: 7.44 — SIGNIFICANT CHANGE UP (ref 7.35–7.45)
PHOSPHATE SERPL-MCNC: 2.2 MG/DL — LOW (ref 2.5–4.5)
PLATELET # BLD AUTO: 208 K/UL — SIGNIFICANT CHANGE UP (ref 150–400)
PO2 BLDA: 116 MMHG — HIGH (ref 83–108)
PO2 BLDA: 146 MMHG — HIGH (ref 83–108)
POTASSIUM SERPL-MCNC: 3.7 MMOL/L — SIGNIFICANT CHANGE UP (ref 3.5–5.3)
POTASSIUM SERPL-SCNC: 3.7 MMOL/L — SIGNIFICANT CHANGE UP (ref 3.5–5.3)
PROT SERPL-MCNC: 5.8 G/DL — LOW (ref 6–8.3)
RBC # BLD: 3.83 M/UL — LOW (ref 4.2–5.8)
RBC # FLD: 26.4 % — HIGH (ref 10.3–14.5)
SAO2 % BLDA: 99 % — SIGNIFICANT CHANGE UP (ref 95–100)
SAO2 % BLDA: 99 % — SIGNIFICANT CHANGE UP (ref 95–100)
SODIUM SERPL-SCNC: 140 MMOL/L — SIGNIFICANT CHANGE UP (ref 135–145)
TRIGL SERPL-MCNC: 220 MG/DL — HIGH
WBC # BLD: 7.64 K/UL — SIGNIFICANT CHANGE UP (ref 3.8–10.5)
WBC # FLD AUTO: 7.64 K/UL — SIGNIFICANT CHANGE UP (ref 3.8–10.5)

## 2020-12-21 PROCEDURE — 71045 X-RAY EXAM CHEST 1 VIEW: CPT | Mod: 26

## 2020-12-21 PROCEDURE — 99233 SBSQ HOSP IP/OBS HIGH 50: CPT | Mod: GC

## 2020-12-21 RX ORDER — FENTANYL CITRATE 50 UG/ML
25 INJECTION INTRAVENOUS ONCE
Refills: 0 | Status: DISCONTINUED | OUTPATIENT
Start: 2020-12-21 | End: 2020-12-21

## 2020-12-21 RX ORDER — MAGNESIUM SULFATE 500 MG/ML
1 VIAL (ML) INJECTION ONCE
Refills: 0 | Status: COMPLETED | OUTPATIENT
Start: 2020-12-21 | End: 2020-12-21

## 2020-12-21 RX ORDER — FENTANYL CITRATE 50 UG/ML
50 INJECTION INTRAVENOUS ONCE
Refills: 0 | Status: DISCONTINUED | OUTPATIENT
Start: 2020-12-21 | End: 2020-12-21

## 2020-12-21 RX ORDER — POTASSIUM PHOSPHATE, MONOBASIC POTASSIUM PHOSPHATE, DIBASIC 236; 224 MG/ML; MG/ML
15 INJECTION, SOLUTION INTRAVENOUS ONCE
Refills: 0 | Status: COMPLETED | OUTPATIENT
Start: 2020-12-21 | End: 2020-12-21

## 2020-12-21 RX ADMIN — PROPOFOL 2.67 MICROGRAM(S)/KG/MIN: 10 INJECTION, EMULSION INTRAVENOUS at 05:09

## 2020-12-21 RX ADMIN — FENTANYL CITRATE 50 MICROGRAM(S): 50 INJECTION INTRAVENOUS at 04:10

## 2020-12-21 RX ADMIN — Medication 1 APPLICATION(S): at 17:48

## 2020-12-21 RX ADMIN — Medication 1 APPLICATION(S): at 22:15

## 2020-12-21 RX ADMIN — Medication 100 GRAM(S): at 07:44

## 2020-12-21 RX ADMIN — INSULIN HUMAN 2: 100 INJECTION, SOLUTION SUBCUTANEOUS at 22:15

## 2020-12-21 RX ADMIN — FENTANYL CITRATE 50 MICROGRAM(S): 50 INJECTION INTRAVENOUS at 03:33

## 2020-12-21 RX ADMIN — Medication 1 APPLICATION(S): at 05:09

## 2020-12-21 RX ADMIN — PROPOFOL 2.67 MICROGRAM(S)/KG/MIN: 10 INJECTION, EMULSION INTRAVENOUS at 11:05

## 2020-12-21 RX ADMIN — FENTANYL CITRATE 25 MICROGRAM(S): 50 INJECTION INTRAVENOUS at 13:27

## 2020-12-21 RX ADMIN — ENOXAPARIN SODIUM 90 MILLIGRAM(S): 100 INJECTION SUBCUTANEOUS at 11:05

## 2020-12-21 RX ADMIN — FENTANYL CITRATE 25 MICROGRAM(S): 50 INJECTION INTRAVENOUS at 13:11

## 2020-12-21 RX ADMIN — PROPOFOL 2.67 MICROGRAM(S)/KG/MIN: 10 INJECTION, EMULSION INTRAVENOUS at 01:37

## 2020-12-21 RX ADMIN — CHLORHEXIDINE GLUCONATE 15 MILLILITER(S): 213 SOLUTION TOPICAL at 05:09

## 2020-12-21 RX ADMIN — PROPOFOL 2.67 MICROGRAM(S)/KG/MIN: 10 INJECTION, EMULSION INTRAVENOUS at 16:06

## 2020-12-21 RX ADMIN — PANTOPRAZOLE SODIUM 40 MILLIGRAM(S): 20 TABLET, DELAYED RELEASE ORAL at 11:06

## 2020-12-21 RX ADMIN — MIDODRINE HYDROCHLORIDE 10 MILLIGRAM(S): 2.5 TABLET ORAL at 05:09

## 2020-12-21 RX ADMIN — Medication 1 APPLICATION(S): at 11:22

## 2020-12-21 RX ADMIN — PROPOFOL 2.67 MICROGRAM(S)/KG/MIN: 10 INJECTION, EMULSION INTRAVENOUS at 19:45

## 2020-12-21 RX ADMIN — INSULIN GLARGINE 8 UNIT(S): 100 INJECTION, SOLUTION SUBCUTANEOUS at 22:15

## 2020-12-21 RX ADMIN — ENOXAPARIN SODIUM 90 MILLIGRAM(S): 100 INJECTION SUBCUTANEOUS at 21:49

## 2020-12-21 RX ADMIN — FENTANYL CITRATE 50 MICROGRAM(S): 50 INJECTION INTRAVENOUS at 00:00

## 2020-12-21 RX ADMIN — POTASSIUM PHOSPHATE, MONOBASIC POTASSIUM PHOSPHATE, DIBASIC 63.75 MILLIMOLE(S): 236; 224 INJECTION, SOLUTION INTRAVENOUS at 08:25

## 2020-12-21 RX ADMIN — CHLORHEXIDINE GLUCONATE 1 APPLICATION(S): 213 SOLUTION TOPICAL at 05:09

## 2020-12-21 RX ADMIN — CHLORHEXIDINE GLUCONATE 15 MILLILITER(S): 213 SOLUTION TOPICAL at 17:48

## 2020-12-21 NOTE — CHART NOTE - NSCHARTNOTEFT_GEN_A_CORE
Admitting Diagnosis:   Patient is a 70y old  Male who presents with a chief complaint of COVID-19 pna (21 Dec 2020 08:06)      PAST MEDICAL & SURGICAL HISTORY:  H/O sciatica  CHR PAIN LT LE    H/O cystoscopy  resection of bladder tumor    Bladder cancer    DM (diabetes mellitus)    Hypercholesteremia    Afib    HTN (hypertension)    History of hernia repair        Current Nutrition Order:  Glucerna 1.5 Helio @ 34ml/hr x 24hrs plus ProstatBID via NGT (816ml TV, 1424kcal (w/o propofol), 1847kcal (w/propofol), 97g pro, 619ml free H2O, 82% RDI, 1.57g/kg IBW protein.    PO Intake: Good (%) [   ]  Fair (50-75%) [   ] Poor (<25%) [   ]- NA NPO w/EN    GI Issues:   Unable to assess at this time 2/2 vent   No regurgitation or residuals overnight  Rectal tube placed 12/20; 500cc x24hrs  Not on bowel regimen; miralax and senna d/c'd 12/20    Pain:   Unable to assess at this time 2/2 vent     Skin Integrity:   Yang 11; generalized trace edema  Intact pressure-wise       Labs:   12-21    140  |  106  |  5<L>  ----------------------------<  101<H>  3.7   |  21<L>  |  0.38<L>    Ca    8.2<L>      21 Dec 2020 05:40  Phos  2.2     12-21  Mg     1.9     12-21    TPro  5.8<L>  /  Alb  2.9<L>  /  TBili  0.5  /  DBili  x   /  AST  19  /  ALT  23  /  AlkPhos  98  12-21    CAPILLARY BLOOD GLUCOSE      POCT Blood Glucose.: 147 mg/dL (21 Dec 2020 11:09)  POCT Blood Glucose.: 100 mg/dL (21 Dec 2020 05:57)  POCT Blood Glucose.: 118 mg/dL (20 Dec 2020 23:33)  POCT Blood Glucose.: 142 mg/dL (20 Dec 2020 15:55)      Medications:  MEDICATIONS  (STANDING):  chlorhexidine 0.12% Liquid 15 milliLiter(s) Oral Mucosa every 12 hours  chlorhexidine 2% Cloths 1 Application(s) Topical <User Schedule>  dextrose 40% Gel 15 Gram(s) Oral once  dextrose 5%. 1000 milliLiter(s) (50 mL/Hr) IV Continuous <Continuous>  dextrose 5%. 1000 milliLiter(s) (100 mL/Hr) IV Continuous <Continuous>  dextrose 50% Injectable 25 Gram(s) IV Push once  dextrose 50% Injectable 12.5 Gram(s) IV Push once  dextrose 50% Injectable 25 Gram(s) IV Push once  enoxaparin Injectable 90 milliGRAM(s) SubCutaneous every 12 hours  fentaNYL    Injectable 25 MICROGram(s) IV Push once  glucagon  Injectable 1 milliGRAM(s) IntraMuscular once  insulin glargine Injectable (LANTUS) 8 Unit(s) SubCutaneous at bedtime  insulin regular  human corrective regimen sliding scale   SubCutaneous every 6 hours  pantoprazole   Suspension 40 milliGRAM(s) Enteral Tube daily  petrolatum Ophthalmic Ointment 1 Application(s) Both EYES every 6 hours  propofol Infusion 5 MICROgram(s)/kG/Min (2.67 mL/Hr) IV Continuous <Continuous>    MEDICATIONS  (PRN):  acetaminophen    Suspension .. 650 milliGRAM(s) Oral every 6 hours PRN Temp greater or equal to 38C (100.4F), Mild Pain (1 - 3)        Weight: 87kg (12/13)     Weight Change: 1-2kg weight loss from admission. Will continue to trend     Nutrition Focused Physical Exam: Completed [   ]  Not Pertinent [ X  ]    Estimated energy needs:   Height 65"; ABW 88.9kg (admit); IBW 61.6kg; 143%IBW; BMI 32.6  Based on [employer] critical care guidelines for the treatment of critically ill patients with COVID-19, recommend:  *using IBW based on clinical judgment   Calories: 25-30 kcal/kg = 1899-7055 kcal/day  Protein: 1.5-2 g/kg = 93-124g protein/day  Fluids per team 2/2 compromised respiratory status       Subjective:   71 yo/male with PMHx T2DM, Afib, HTN, HLD, admitted to [facility] w/COVID on 11/27. Intubated on 12/2 for increased work of breathing. Transferred to [facility] for further care. Pt extubated on 12/13 after tolerating CPAP though was immediately reintubated 2/2 desaturation and increased secretions. S/p PEG placed 12/17 and Trach 12/18. CPAP trial this am.     Unable to conduct a face to face interview or nutrition-focused physical exam due to limited contact restrictions related to the pt's medical condition and isolation precautions. Pt trached to vent, on CPAP trial this am, sedated on propofol @ 16.02ml/hr (423kcal/day from lipids) and requiring fentanyl pushes for agitation. - not requiring pressor support. Currently in supine position. Having loose stool via rectal tube; 500cc x24hrs. Afebrile this morning. POC , 118, 142, Phos 2.2mg/dL. Will continue to follow per RD protocol.     Previous Nutrition Diagnosis:  Increased Nutrient Needs RT increased demand for protein-calorie intake AEB vent, COVID infection.   Active [  X ]  Resolved [   ]    If resolved, new PES:     Goal: Pt will continue to meet % of EER via EN     Recommendations:  1. Continue with current EN order. Monitor for s/s intolerance; maintain aspiration precautions at all times. Additional free H2O flushes per team *will adjust goal rate pending propofol titration   2. Monitor lytes and replete prn. POC BG q6hrs (goal 140-180mg/dL)  3. Pain and bowel regimens per team. Monitor for s/s diarrhea on Glucerna and assess need to switch to Vital 1.5  4. Trend weights     Education: NA- intubated, sedated, isolation precautions     Risk Level: High [ X  ] Moderate [   ] Low [   ].

## 2020-12-21 NOTE — PROGRESS NOTE ADULT - ASSESSMENT
MAGGIE SAHNI is a 70 yr old male with PMhx of DMT2, Afib on xarelto, HTN, HLD who was admitted to [facility] on 11/27 and started on remdisivir and decadron on 11/28, received convalescent plasma on 11/30, as well as Toci on 12/2, but was intubated on 12/2 due to worsening O2 sats. Also treated with Zosyn since 12/2 and 7 days of Levoquin (12/2- 12/8), along with  zyvox and caspofungin on 12/8. Transferred to [facility] 12/8 overnight, patient now s/p trach and peg tube.     NEUROLOGY  #Sedation  - Propofol continued and precedex stopped as patient with prolonged qtc  - avoid qtc prolonging agents such as Seroquel for agitation     RESPIRATORY   #Acute hypoxic respiratory failure 2/2 COVID as well as concern for  overlying pneumonia.   - sputum with normal resp pepper. He has received a course of  Zosyn (12/14-12/20) for possible vap after having a fever on 12/12  -s/p trach 12 /18  and peg 12/17   -peep decreased to 5 today from 8  , on fio2 40, rr 22, tv 400     CARDIOVASCULAR  #Septic shock vs Vasoplegic shock  - c/w phenylephrine and midodrine, will attempt to down tirate phenylephrine     #H/o A-fib with RVR  -amiodarone stopped on 12/20 , due to prolongd qtc .   -repeat ekg in am , consider ep consulte   - Lovenox 90 BID    #Severe carotid disease  -Patient with occluded right ICA and severe left ICA stenosis, s/p left CEA, uncomplicated  -f/u with cardiology outpatient    # QTC prolongation:   ekg with qtc 560 on 12/20  stopped Precedex stopped amiodarone  please avoid qtc avoiding agents-held off on starting seroquel     RENAL   - goal I/O net even    ENDOCRINE  - goal 140-180 blood glucose  - mSSI  - lantus 8 unit at bedtime     GASTROINTESTINAL  - glucerna feeds via peg  - protonix 40    INFECTIOUS DISEASE  #VAP vs. COVID-19 Associated fevers  fever on 12/12, sputum cx sent showing normal resp pepper   He has received a course of  Zosyn (12/14-12/20) for possible vap     HEMATOLOGY/ONCOLOGY  - lovenox full anticoagulation for Afib - 90 q12    ACCESS/LINES/DRAINS   A-line 12/17  RIJ 12/17    PEG 12/17          PROPHYLACTIC  ELECTROLYTES: Mg<2, K<4  DIET: regular  GI PPX: Protonix  VTE PPX: SCD, lovenox bid for afib  CODE: FULL  DISPO: MICU     MAGGIE SAHNI is a 70 yr old male with PMhx of DMT2, Afib on xarelto, HTN, HLD who was admitted to [facility] on 11/27 and started on remdisivir and decadron on 11/28, received convalescent plasma on 11/30, as well as Toci on 12/2, but was intubated on 12/2 due to worsening O2 sats. Also treated with Zosyn since 12/2 and 7 days of Levoquin (12/2- 12/8), along with  zyvox and caspofungin on 12/8. Transferred to [facility] 12/8 overnight, patient now s/p trach and peg tube.     NEUROLOGY  #Sedation  - Propofol continued and precedex stopped as patient with prolonged qtc  - avoid qtc prolonging agents such as Seroquel for agitation     RESPIRATORY   #Acute hypoxic respiratory failure 2/2 COVID as well as concern for  overlying pneumonia.   -sputum with normal resp pepper. He has received a course of  Zosyn (12/14-12/20) for possible vap after having a fever on 12/12  -s/p trach 12 /18  and peg 12/17   -Trial of CPAP today    CARDIOVASCULAR  #Septic shock vs Vasoplegic shock  - dc'ed midodrine and phenylephrine  - HD stable off pressors    #H/o A-fib with RVR  - amiodarone dc'ed on 12/20, due to prolonged qtc  - Lovenox 90 BID    #Severe carotid disease  -Patient with occluded right ICA and severe left ICA stenosis, s/p left CEA, uncomplicated  -f/u with cardiology outpatient    # QTC prolongation:   -ekg with qtc 560 on 12/20  -stopped Precedex, stopped amiodarone  -please avoid qtc prolonging agents-held off on starting seroquel     RENAL   - goal I/O is net even    ENDOCRINE  - goal 140-180 blood glucose  - mSSI  - lantus 8 unit at bedtime     GASTROINTESTINAL  - glucerna feeds via peg  - protonix 40  - Rectal tube in place    INFECTIOUS DISEASE  #VAP vs. COVID-19 Associated fevers  fever on 12/12, sputum cx sent showing normal resp pepper   He has received a course of  Zosyn (12/14-12/20) for possible vap     HEMATOLOGY/ONCOLOGY  - lovenox full anticoagulation for Afib - 90 q12    ACCESS/LINES/DRAINS   A-line 12/17  RIJ 12/17    PEG 12/17    PROPHYLACTIC  ELECTROLYTES: Mg<2, K<4  DIET: regular  GI PPX: Protonix  VTE PPX: SCD, lovenox bid for afib  CODE: FULL  DISPO: CHERYL

## 2020-12-21 NOTE — PROGRESS NOTE ADULT - SUBJECTIVE AND OBJECTIVE BOX
OVERNIGHT EVENTS: Yesterday amio was stopped due to prolonged Qt. Overnight, patient was agitated and in afib w/ RVR, gave fentanyl x2 and converted back to sinus.    SUBJECTIVE / INTERVAL HPI: Patient seen and examined at bedside. This morning patient appears more awake than previously, eyes are open. Responsive to pain. Eyes do not track.     VITAL SIGNS:  Vital Signs Last 24 Hrs  T(C): 37.4 (21 Dec 2020 15:00), Max: 37.4 (21 Dec 2020 15:00)  T(F): 99.3 (21 Dec 2020 15:00), Max: 99.3 (21 Dec 2020 15:00)  HR: 101 (21 Dec 2020 15:00) (100 - 140)  BP: 157/65 (21 Dec 2020 14:00) (133/58 - 190/87)  BP(mean): 101 (21 Dec 2020 14:00) (83 - 125)  RR: 24 (21 Dec 2020 15:00) (13 - 30)  SpO2: 98% (21 Dec 2020 15:00) (95% - 100%)  I&O's Summary    20 Dec 2020 07:01  -  21 Dec 2020 07:00  --------------------------------------------------------  IN: 1374.7 mL / OUT: 2620 mL / NET: -1245.3 mL    21 Dec 2020 07:01  -  21 Dec 2020 16:02  --------------------------------------------------------  IN: 316.4 mL / OUT: 525 mL / NET: -208.6 mL        PHYSICAL EXAM:    General: WDWN  HEENT: NC/AT; PERRL, anicteric sclera; MMM  Neck: supple  Cardiovascular: +S1/S2; RRR  Respiratory: CTA B/L; no W/R/R  Gastrointestinal: soft, NT/ND; +BSx4  Extremities: WWP; no edema, clubbing or cyanosis  Vascular: 2+ radial, DP/PT pulses B/L  Neurological: AAOx3; no focal deficits    MEDICATIONS:  MEDICATIONS  (STANDING):  chlorhexidine 0.12% Liquid 15 milliLiter(s) Oral Mucosa every 12 hours  chlorhexidine 2% Cloths 1 Application(s) Topical <User Schedule>  dextrose 40% Gel 15 Gram(s) Oral once  dextrose 5%. 1000 milliLiter(s) (50 mL/Hr) IV Continuous <Continuous>  dextrose 5%. 1000 milliLiter(s) (100 mL/Hr) IV Continuous <Continuous>  dextrose 50% Injectable 25 Gram(s) IV Push once  dextrose 50% Injectable 12.5 Gram(s) IV Push once  dextrose 50% Injectable 25 Gram(s) IV Push once  enoxaparin Injectable 90 milliGRAM(s) SubCutaneous every 12 hours  glucagon  Injectable 1 milliGRAM(s) IntraMuscular once  insulin glargine Injectable (LANTUS) 8 Unit(s) SubCutaneous at bedtime  insulin regular  human corrective regimen sliding scale   SubCutaneous every 6 hours  pantoprazole   Suspension 40 milliGRAM(s) Enteral Tube daily  petrolatum Ophthalmic Ointment 1 Application(s) Both EYES every 6 hours  propofol Infusion 5 MICROgram(s)/kG/Min (2.67 mL/Hr) IV Continuous <Continuous>    MEDICATIONS  (PRN):  acetaminophen    Suspension .. 650 milliGRAM(s) Oral every 6 hours PRN Temp greater or equal to 38C (100.4F), Mild Pain (1 - 3)      ALLERGIES:  Allergies    No Known Allergies    Intolerances        LABS:                        9.4    7.64  )-----------( 208      ( 21 Dec 2020 05:40 )             31.5     12-21    140  |  106  |  5<L>  ----------------------------<  101<H>  3.7   |  21<L>  |  0.38<L>    Ca    8.2<L>      21 Dec 2020 05:40  Phos  2.2     12-21  Mg     1.9     12-21    TPro  5.8<L>  /  Alb  2.9<L>  /  TBili  0.5  /  DBili  x   /  AST  19  /  ALT  23  /  AlkPhos  98  12-21    PTT - ( 19 Dec 2020 18:35 )  PTT:79.0 sec    CAPILLARY BLOOD GLUCOSE      POCT Blood Glucose.: 147 mg/dL (21 Dec 2020 11:09)      RADIOLOGY & ADDITIONAL TESTS: Reviewed.   OVERNIGHT EVENTS: Yesterday amio was stopped due to prolonged Qt. Overnight, patient was agitated and in afib w/ RVR, gave fentanyl x2 and converted back to sinus.    SUBJECTIVE / INTERVAL HPI: Patient seen and examined at bedside. This morning patient appears more awake than previously, eyes are open. Responsive to pain.    VITAL SIGNS:  Vital Signs Last 24 Hrs  T(C): 37.4 (21 Dec 2020 15:00), Max: 37.4 (21 Dec 2020 15:00)  T(F): 99.3 (21 Dec 2020 15:00), Max: 99.3 (21 Dec 2020 15:00)  HR: 101 (21 Dec 2020 15:00) (100 - 140)  BP: 157/65 (21 Dec 2020 14:00) (133/58 - 190/87)  BP(mean): 101 (21 Dec 2020 14:00) (83 - 125)  RR: 24 (21 Dec 2020 15:00) (13 - 30)  SpO2: 98% (21 Dec 2020 15:00) (95% - 100%)  I&O's Summary    20 Dec 2020 07:01  -  21 Dec 2020 07:00  --------------------------------------------------------  IN: 1374.7 mL / OUT: 2620 mL / NET: -1245.3 mL    21 Dec 2020 07:01  -  21 Dec 2020 16:02  --------------------------------------------------------  IN: 316.4 mL / OUT: 525 mL / NET: -208.6 mL        PHYSICAL EXAM:    General: NAD, tracheostomy tube in place  HEENT: NC/AT; PERRL, anicteric sclera; MMM  Neck: supple  Cardiovascular: +S1/S2; RRR  Respiratory: CTA B/L; Crackles b/l  Gastrointestinal: soft, NT/ND; +BSx4; PEG tube in place  Extremities: WWP; no edema, clubbing or cyanosis  Vascular: 2+ radial, DP/PT pulses B/L  Neurological: Sedated on propofol    MEDICATIONS:  MEDICATIONS  (STANDING):  chlorhexidine 0.12% Liquid 15 milliLiter(s) Oral Mucosa every 12 hours  chlorhexidine 2% Cloths 1 Application(s) Topical <User Schedule>  dextrose 40% Gel 15 Gram(s) Oral once  dextrose 5%. 1000 milliLiter(s) (50 mL/Hr) IV Continuous <Continuous>  dextrose 5%. 1000 milliLiter(s) (100 mL/Hr) IV Continuous <Continuous>  dextrose 50% Injectable 25 Gram(s) IV Push once  dextrose 50% Injectable 12.5 Gram(s) IV Push once  dextrose 50% Injectable 25 Gram(s) IV Push once  enoxaparin Injectable 90 milliGRAM(s) SubCutaneous every 12 hours  glucagon  Injectable 1 milliGRAM(s) IntraMuscular once  insulin glargine Injectable (LANTUS) 8 Unit(s) SubCutaneous at bedtime  insulin regular  human corrective regimen sliding scale   SubCutaneous every 6 hours  pantoprazole   Suspension 40 milliGRAM(s) Enteral Tube daily  petrolatum Ophthalmic Ointment 1 Application(s) Both EYES every 6 hours  propofol Infusion 5 MICROgram(s)/kG/Min (2.67 mL/Hr) IV Continuous <Continuous>    MEDICATIONS  (PRN):  acetaminophen    Suspension .. 650 milliGRAM(s) Oral every 6 hours PRN Temp greater or equal to 38C (100.4F), Mild Pain (1 - 3)      ALLERGIES:  Allergies    No Known Allergies    Intolerances        LABS:                        9.4    7.64  )-----------( 208      ( 21 Dec 2020 05:40 )             31.5     12-21    140  |  106  |  5<L>  ----------------------------<  101<H>  3.7   |  21<L>  |  0.38<L>    Ca    8.2<L>      21 Dec 2020 05:40  Phos  2.2     12-21  Mg     1.9     12-21    TPro  5.8<L>  /  Alb  2.9<L>  /  TBili  0.5  /  DBili  x   /  AST  19  /  ALT  23  /  AlkPhos  98  12-21    PTT - ( 19 Dec 2020 18:35 )  PTT:79.0 sec    CAPILLARY BLOOD GLUCOSE      POCT Blood Glucose.: 147 mg/dL (21 Dec 2020 11:09)      RADIOLOGY & ADDITIONAL TESTS: Reviewed.

## 2020-12-22 LAB
ALBUMIN SERPL ELPH-MCNC: 2.9 G/DL — LOW (ref 3.3–5)
ALP SERPL-CCNC: 102 U/L — SIGNIFICANT CHANGE UP (ref 40–120)
ALT FLD-CCNC: 22 U/L — SIGNIFICANT CHANGE UP (ref 10–45)
ANION GAP SERPL CALC-SCNC: 11 MMOL/L — SIGNIFICANT CHANGE UP (ref 5–17)
AST SERPL-CCNC: 16 U/L — SIGNIFICANT CHANGE UP (ref 10–40)
BASOPHILS # BLD AUTO: 0.02 K/UL — SIGNIFICANT CHANGE UP (ref 0–0.2)
BASOPHILS NFR BLD AUTO: 0.3 % — SIGNIFICANT CHANGE UP (ref 0–2)
BILIRUB SERPL-MCNC: 0.5 MG/DL — SIGNIFICANT CHANGE UP (ref 0.2–1.2)
BUN SERPL-MCNC: 6 MG/DL — LOW (ref 7–23)
CALCIUM SERPL-MCNC: 8.6 MG/DL — SIGNIFICANT CHANGE UP (ref 8.4–10.5)
CHLORIDE SERPL-SCNC: 106 MMOL/L — SIGNIFICANT CHANGE UP (ref 96–108)
CO2 SERPL-SCNC: 24 MMOL/L — SIGNIFICANT CHANGE UP (ref 22–31)
CREAT SERPL-MCNC: 0.35 MG/DL — LOW (ref 0.5–1.3)
CRP SERPL-MCNC: 0.81 MG/DL — HIGH (ref 0–0.4)
D DIMER BLD IA.RAPID-MCNC: 499 NG/ML DDU — HIGH
EOSINOPHIL # BLD AUTO: 0.4 K/UL — SIGNIFICANT CHANGE UP (ref 0–0.5)
EOSINOPHIL NFR BLD AUTO: 6.3 % — HIGH (ref 0–6)
FERRITIN SERPL-MCNC: 123 NG/ML — SIGNIFICANT CHANGE UP (ref 30–400)
GLUCOSE BLDC GLUCOMTR-MCNC: 141 MG/DL — HIGH (ref 70–99)
GLUCOSE BLDC GLUCOMTR-MCNC: 146 MG/DL — HIGH (ref 70–99)
GLUCOSE BLDC GLUCOMTR-MCNC: 154 MG/DL — HIGH (ref 70–99)
GLUCOSE BLDC GLUCOMTR-MCNC: 231 MG/DL — HIGH (ref 70–99)
GLUCOSE SERPL-MCNC: 159 MG/DL — HIGH (ref 70–99)
HCT VFR BLD CALC: 32.5 % — LOW (ref 39–50)
HGB BLD-MCNC: 9.7 G/DL — LOW (ref 13–17)
IMM GRANULOCYTES NFR BLD AUTO: 0.6 % — SIGNIFICANT CHANGE UP (ref 0–1.5)
LYMPHOCYTES # BLD AUTO: 1.67 K/UL — SIGNIFICANT CHANGE UP (ref 1–3.3)
LYMPHOCYTES # BLD AUTO: 26.2 % — SIGNIFICANT CHANGE UP (ref 13–44)
MAGNESIUM SERPL-MCNC: 2 MG/DL — SIGNIFICANT CHANGE UP (ref 1.6–2.6)
MCHC RBC-ENTMCNC: 24.7 PG — LOW (ref 27–34)
MCHC RBC-ENTMCNC: 29.8 GM/DL — LOW (ref 32–36)
MCV RBC AUTO: 82.9 FL — SIGNIFICANT CHANGE UP (ref 80–100)
MONOCYTES # BLD AUTO: 0.49 K/UL — SIGNIFICANT CHANGE UP (ref 0–0.9)
MONOCYTES NFR BLD AUTO: 7.7 % — SIGNIFICANT CHANGE UP (ref 2–14)
NEUTROPHILS # BLD AUTO: 3.75 K/UL — SIGNIFICANT CHANGE UP (ref 1.8–7.4)
NEUTROPHILS NFR BLD AUTO: 58.9 % — SIGNIFICANT CHANGE UP (ref 43–77)
NRBC # BLD: 0 /100 WBCS — SIGNIFICANT CHANGE UP (ref 0–0)
PHOSPHATE SERPL-MCNC: 2 MG/DL — LOW (ref 2.5–4.5)
PLATELET # BLD AUTO: 231 K/UL — SIGNIFICANT CHANGE UP (ref 150–400)
POTASSIUM SERPL-MCNC: 4 MMOL/L — SIGNIFICANT CHANGE UP (ref 3.5–5.3)
POTASSIUM SERPL-SCNC: 4 MMOL/L — SIGNIFICANT CHANGE UP (ref 3.5–5.3)
PROT SERPL-MCNC: 6.1 G/DL — SIGNIFICANT CHANGE UP (ref 6–8.3)
RBC # BLD: 3.92 M/UL — LOW (ref 4.2–5.8)
RBC # FLD: 26.5 % — HIGH (ref 10.3–14.5)
SODIUM SERPL-SCNC: 141 MMOL/L — SIGNIFICANT CHANGE UP (ref 135–145)
WBC # BLD: 6.37 K/UL — SIGNIFICANT CHANGE UP (ref 3.8–10.5)
WBC # FLD AUTO: 6.37 K/UL — SIGNIFICANT CHANGE UP (ref 3.8–10.5)

## 2020-12-22 PROCEDURE — 71045 X-RAY EXAM CHEST 1 VIEW: CPT | Mod: 26

## 2020-12-22 PROCEDURE — 99233 SBSQ HOSP IP/OBS HIGH 50: CPT | Mod: GC

## 2020-12-22 RX ORDER — DILTIAZEM HCL 120 MG
10 CAPSULE, EXT RELEASE 24 HR ORAL ONCE
Refills: 0 | Status: DISCONTINUED | OUTPATIENT
Start: 2020-12-22 | End: 2020-12-22

## 2020-12-22 RX ORDER — FENTANYL CITRATE 50 UG/ML
50 INJECTION INTRAVENOUS ONCE
Refills: 0 | Status: DISCONTINUED | OUTPATIENT
Start: 2020-12-22 | End: 2020-12-22

## 2020-12-22 RX ORDER — POTASSIUM PHOSPHATE, MONOBASIC POTASSIUM PHOSPHATE, DIBASIC 236; 224 MG/ML; MG/ML
15 INJECTION, SOLUTION INTRAVENOUS ONCE
Refills: 0 | Status: COMPLETED | OUTPATIENT
Start: 2020-12-22 | End: 2020-12-22

## 2020-12-22 RX ORDER — CARVEDILOL PHOSPHATE 80 MG/1
3.12 CAPSULE, EXTENDED RELEASE ORAL
Refills: 0 | Status: DISCONTINUED | OUTPATIENT
Start: 2020-12-22 | End: 2020-12-22

## 2020-12-22 RX ORDER — SODIUM CHLORIDE 9 MG/ML
500 INJECTION INTRAMUSCULAR; INTRAVENOUS; SUBCUTANEOUS ONCE
Refills: 0 | Status: COMPLETED | OUTPATIENT
Start: 2020-12-22 | End: 2020-12-22

## 2020-12-22 RX ORDER — METOPROLOL TARTRATE 50 MG
5 TABLET ORAL ONCE
Refills: 0 | Status: COMPLETED | OUTPATIENT
Start: 2020-12-22 | End: 2020-12-22

## 2020-12-22 RX ORDER — PHENYLEPHRINE HYDROCHLORIDE 10 MG/ML
0.1 INJECTION INTRAVENOUS
Qty: 40 | Refills: 0 | Status: DISCONTINUED | OUTPATIENT
Start: 2020-12-22 | End: 2020-12-22

## 2020-12-22 RX ORDER — HYDROMORPHONE HYDROCHLORIDE 2 MG/ML
1 INJECTION INTRAMUSCULAR; INTRAVENOUS; SUBCUTANEOUS EVERY 6 HOURS
Refills: 0 | Status: DISCONTINUED | OUTPATIENT
Start: 2020-12-22 | End: 2020-12-26

## 2020-12-22 RX ORDER — AMIODARONE HYDROCHLORIDE 400 MG/1
150 TABLET ORAL ONCE
Refills: 0 | Status: COMPLETED | OUTPATIENT
Start: 2020-12-22 | End: 2020-12-22

## 2020-12-22 RX ORDER — CARVEDILOL PHOSPHATE 80 MG/1
3.12 CAPSULE, EXTENDED RELEASE ORAL EVERY 12 HOURS
Refills: 0 | Status: DISCONTINUED | OUTPATIENT
Start: 2020-12-22 | End: 2020-12-22

## 2020-12-22 RX ORDER — METOPROLOL TARTRATE 50 MG
12.5 TABLET ORAL EVERY 12 HOURS
Refills: 0 | Status: DISCONTINUED | OUTPATIENT
Start: 2020-12-22 | End: 2020-12-24

## 2020-12-22 RX ADMIN — Medication 12.5 MILLIGRAM(S): at 22:08

## 2020-12-22 RX ADMIN — INSULIN HUMAN 4: 100 INJECTION, SOLUTION SUBCUTANEOUS at 18:16

## 2020-12-22 RX ADMIN — HYDROMORPHONE HYDROCHLORIDE 1 MILLIGRAM(S): 2 INJECTION INTRAMUSCULAR; INTRAVENOUS; SUBCUTANEOUS at 10:03

## 2020-12-22 RX ADMIN — Medication 1 APPLICATION(S): at 11:10

## 2020-12-22 RX ADMIN — Medication 5 MILLIGRAM(S): at 15:24

## 2020-12-22 RX ADMIN — CARVEDILOL PHOSPHATE 3.12 MILLIGRAM(S): 80 CAPSULE, EXTENDED RELEASE ORAL at 15:48

## 2020-12-22 RX ADMIN — FENTANYL CITRATE 50 MICROGRAM(S): 50 INJECTION INTRAVENOUS at 20:47

## 2020-12-22 RX ADMIN — INSULIN GLARGINE 8 UNIT(S): 100 INJECTION, SOLUTION SUBCUTANEOUS at 23:05

## 2020-12-22 RX ADMIN — SODIUM CHLORIDE 1000 MILLILITER(S): 9 INJECTION INTRAMUSCULAR; INTRAVENOUS; SUBCUTANEOUS at 16:15

## 2020-12-22 RX ADMIN — PANTOPRAZOLE SODIUM 40 MILLIGRAM(S): 20 TABLET, DELAYED RELEASE ORAL at 11:10

## 2020-12-22 RX ADMIN — HYDROMORPHONE HYDROCHLORIDE 1 MILLIGRAM(S): 2 INJECTION INTRAMUSCULAR; INTRAVENOUS; SUBCUTANEOUS at 18:05

## 2020-12-22 RX ADMIN — AMIODARONE HYDROCHLORIDE 600 MILLIGRAM(S): 400 TABLET ORAL at 17:53

## 2020-12-22 RX ADMIN — FENTANYL CITRATE 50 MICROGRAM(S): 50 INJECTION INTRAVENOUS at 06:11

## 2020-12-22 RX ADMIN — CHLORHEXIDINE GLUCONATE 15 MILLILITER(S): 213 SOLUTION TOPICAL at 18:04

## 2020-12-22 RX ADMIN — ENOXAPARIN SODIUM 90 MILLIGRAM(S): 100 INJECTION SUBCUTANEOUS at 22:07

## 2020-12-22 RX ADMIN — FENTANYL CITRATE 50 MICROGRAM(S): 50 INJECTION INTRAVENOUS at 17:13

## 2020-12-22 RX ADMIN — Medication 1 APPLICATION(S): at 06:16

## 2020-12-22 RX ADMIN — ENOXAPARIN SODIUM 90 MILLIGRAM(S): 100 INJECTION SUBCUTANEOUS at 08:23

## 2020-12-22 RX ADMIN — POTASSIUM PHOSPHATE, MONOBASIC POTASSIUM PHOSPHATE, DIBASIC 62.5 MILLIMOLE(S): 236; 224 INJECTION, SOLUTION INTRAVENOUS at 08:24

## 2020-12-22 RX ADMIN — Medication 1 APPLICATION(S): at 17:27

## 2020-12-22 RX ADMIN — FENTANYL CITRATE 50 MICROGRAM(S): 50 INJECTION INTRAVENOUS at 04:07

## 2020-12-22 RX ADMIN — INSULIN HUMAN 2: 100 INJECTION, SOLUTION SUBCUTANEOUS at 23:06

## 2020-12-22 RX ADMIN — CHLORHEXIDINE GLUCONATE 15 MILLILITER(S): 213 SOLUTION TOPICAL at 06:26

## 2020-12-22 RX ADMIN — HYDROMORPHONE HYDROCHLORIDE 1 MILLIGRAM(S): 2 INJECTION INTRAMUSCULAR; INTRAVENOUS; SUBCUTANEOUS at 15:47

## 2020-12-22 RX ADMIN — HYDROMORPHONE HYDROCHLORIDE 1 MILLIGRAM(S): 2 INJECTION INTRAMUSCULAR; INTRAVENOUS; SUBCUTANEOUS at 12:00

## 2020-12-22 RX ADMIN — PROPOFOL 2.67 MICROGRAM(S)/KG/MIN: 10 INJECTION, EMULSION INTRAVENOUS at 22:13

## 2020-12-22 RX ADMIN — CHLORHEXIDINE GLUCONATE 1 APPLICATION(S): 213 SOLUTION TOPICAL at 06:27

## 2020-12-22 NOTE — PROGRESS NOTE ADULT - SUBJECTIVE AND OBJECTIVE BOX
OVERNIGHT EVENTS: No acute events overnight    SUBJECTIVE / INTERVAL HPI: Patient seen and examined at bedside. NAD. Eyes are open but minimally responsive. Does not follow commands or track with eyes.    VITAL SIGNS:  Vital Signs Last 24 Hrs  T(C): 37.2 (22 Dec 2020 13:08), Max: 37.4 (21 Dec 2020 15:00)  T(F): 99 (22 Dec 2020 13:08), Max: 99.3 (21 Dec 2020 15:00)  HR: 113 (22 Dec 2020 13:00) (100 - 114)  BP: 157/65 (21 Dec 2020 14:00) (157/65 - 157/65)  BP(mean): 101 (21 Dec 2020 14:00) (101 - 101)  RR: 19 (22 Dec 2020 13:00) (15 - 27)  SpO2: 98% (22 Dec 2020 13:00) (95% - 99%)  I&O's Summary    21 Dec 2020 07:01  -  22 Dec 2020 07:00  --------------------------------------------------------  IN: 1093.4 mL / OUT: 2840 mL / NET: -1746.6 mL    22 Dec 2020 07:01  -  22 Dec 2020 13:29  --------------------------------------------------------  IN: 250.6 mL / OUT: 535 mL / NET: -284.4 mL        PHYSICAL EXAM:    General: NAD  HEENT: NC/AT; PERRL, anicteric sclera; MMM  Neck: Tracheostomy tube in place  Cardiovascular: +S1/S2; RRR  Respiratory: CTA B/L; no W/R/R  Gastrointestinal: soft, NT/ND; +BSx4  Extremities: WWP; no edema, clubbing or cyanosis  Vascular: 2+ radial, DP/PT pulses B/L  Neurological: Sedated    MEDICATIONS:  MEDICATIONS  (STANDING):  chlorhexidine 0.12% Liquid 15 milliLiter(s) Oral Mucosa every 12 hours  chlorhexidine 2% Cloths 1 Application(s) Topical <User Schedule>  dextrose 40% Gel 15 Gram(s) Oral once  dextrose 5%. 1000 milliLiter(s) (50 mL/Hr) IV Continuous <Continuous>  dextrose 5%. 1000 milliLiter(s) (100 mL/Hr) IV Continuous <Continuous>  dextrose 50% Injectable 25 Gram(s) IV Push once  dextrose 50% Injectable 12.5 Gram(s) IV Push once  dextrose 50% Injectable 25 Gram(s) IV Push once  enoxaparin Injectable 90 milliGRAM(s) SubCutaneous every 12 hours  glucagon  Injectable 1 milliGRAM(s) IntraMuscular once  insulin glargine Injectable (LANTUS) 8 Unit(s) SubCutaneous at bedtime  insulin regular  human corrective regimen sliding scale   SubCutaneous every 6 hours  pantoprazole   Suspension 40 milliGRAM(s) Enteral Tube daily  petrolatum Ophthalmic Ointment 1 Application(s) Both EYES every 6 hours  propofol Infusion 5 MICROgram(s)/kG/Min (2.67 mL/Hr) IV Continuous <Continuous>    MEDICATIONS  (PRN):  acetaminophen    Suspension .. 650 milliGRAM(s) Oral every 6 hours PRN Temp greater or equal to 38C (100.4F), Mild Pain (1 - 3)  HYDROmorphone  Injectable 1 milliGRAM(s) IV Push every 6 hours PRN Moderate Pain (4 - 6)      ALLERGIES:  Allergies    No Known Allergies    Intolerances        LABS:                        9.7    6.37  )-----------( 231      ( 22 Dec 2020 04:10 )             32.5     12-22    141  |  106  |  6<L>  ----------------------------<  159<H>  4.0   |  24  |  0.35<L>    Ca    8.6      22 Dec 2020 04:10  Phos  2.0     12-22  Mg     2.0     12-22    TPro  6.1  /  Alb  2.9<L>  /  TBili  0.5  /  DBili  x   /  AST  16  /  ALT  22  /  AlkPhos  102  12-22        CAPILLARY BLOOD GLUCOSE      POCT Blood Glucose.: 146 mg/dL (22 Dec 2020 11:20)      RADIOLOGY & ADDITIONAL TESTS: Reviewed.   Hospital Course:  70M DMT2, A-fib on Xarelto, HTN, HLD, severe carotid disease who was admitted to [facility] on 11/27, started on remdesivir and decadron on 11/28. Convalescent plasma on 11/30.  however was intubated on 12/2 due to worsening O2 sats. At [facility], received tocilizumab on 12/2, Zosyn 12/2-8, levaquin 12/2-8, zyvox 12/8, and caspofungin on 12/8. S/p trach and PEG placement. Amio for A-fib was stopped on 12/20 due to prolonged Qtc. S/p MSSA VAP treatment with zosyn 12/13-20. Hemodynamically stable off pressors. Weaning propofol gtt. D/c pending LTAC placement.    OVERNIGHT EVENTS: No acute events overnight    SUBJECTIVE / INTERVAL HPI: Patient seen and examined at bedside. NAD. Eyes are open but minimally responsive. Does not follow commands or track with eyes.    VITAL SIGNS:  Vital Signs Last 24 Hrs  T(C): 37.2 (22 Dec 2020 13:08), Max: 37.4 (21 Dec 2020 15:00)  T(F): 99 (22 Dec 2020 13:08), Max: 99.3 (21 Dec 2020 15:00)  HR: 113 (22 Dec 2020 13:00) (100 - 114)  BP: 157/65 (21 Dec 2020 14:00) (157/65 - 157/65)  BP(mean): 101 (21 Dec 2020 14:00) (101 - 101)  RR: 19 (22 Dec 2020 13:00) (15 - 27)  SpO2: 98% (22 Dec 2020 13:00) (95% - 99%)  I&O's Summary    21 Dec 2020 07:01  -  22 Dec 2020 07:00  --------------------------------------------------------  IN: 1093.4 mL / OUT: 2840 mL / NET: -1746.6 mL    22 Dec 2020 07:01  -  22 Dec 2020 13:29  --------------------------------------------------------  IN: 250.6 mL / OUT: 535 mL / NET: -284.4 mL        PHYSICAL EXAM:    General: NAD  HEENT: NC/AT; PERRL, anicteric sclera; MMM  Neck: Tracheostomy tube in place  Cardiovascular: +S1/S2; RRR  Respiratory: CTA B/L; no W/R/R  Gastrointestinal: soft, NT/ND; +BSx4  Extremities: WWP; no edema, clubbing or cyanosis  Vascular: 2+ radial, DP/PT pulses B/L  Neurological: Sedated    MEDICATIONS:  MEDICATIONS  (STANDING):  chlorhexidine 0.12% Liquid 15 milliLiter(s) Oral Mucosa every 12 hours  chlorhexidine 2% Cloths 1 Application(s) Topical <User Schedule>  dextrose 40% Gel 15 Gram(s) Oral once  dextrose 5%. 1000 milliLiter(s) (50 mL/Hr) IV Continuous <Continuous>  dextrose 5%. 1000 milliLiter(s) (100 mL/Hr) IV Continuous <Continuous>  dextrose 50% Injectable 25 Gram(s) IV Push once  dextrose 50% Injectable 12.5 Gram(s) IV Push once  dextrose 50% Injectable 25 Gram(s) IV Push once  enoxaparin Injectable 90 milliGRAM(s) SubCutaneous every 12 hours  glucagon  Injectable 1 milliGRAM(s) IntraMuscular once  insulin glargine Injectable (LANTUS) 8 Unit(s) SubCutaneous at bedtime  insulin regular  human corrective regimen sliding scale   SubCutaneous every 6 hours  pantoprazole   Suspension 40 milliGRAM(s) Enteral Tube daily  petrolatum Ophthalmic Ointment 1 Application(s) Both EYES every 6 hours  propofol Infusion 5 MICROgram(s)/kG/Min (2.67 mL/Hr) IV Continuous <Continuous>    MEDICATIONS  (PRN):  acetaminophen    Suspension .. 650 milliGRAM(s) Oral every 6 hours PRN Temp greater or equal to 38C (100.4F), Mild Pain (1 - 3)  HYDROmorphone  Injectable 1 milliGRAM(s) IV Push every 6 hours PRN Moderate Pain (4 - 6)      ALLERGIES:  Allergies    No Known Allergies    Intolerances        LABS:                        9.7    6.37  )-----------( 231      ( 22 Dec 2020 04:10 )             32.5     12-22    141  |  106  |  6<L>  ----------------------------<  159<H>  4.0   |  24  |  0.35<L>    Ca    8.6      22 Dec 2020 04:10  Phos  2.0     12-22  Mg     2.0     12-22    TPro  6.1  /  Alb  2.9<L>  /  TBili  0.5  /  DBili  x   /  AST  16  /  ALT  22  /  AlkPhos  102  12-22        CAPILLARY BLOOD GLUCOSE      POCT Blood Glucose.: 146 mg/dL (22 Dec 2020 11:20)      RADIOLOGY & ADDITIONAL TESTS: Reviewed.

## 2020-12-22 NOTE — PROGRESS NOTE ADULT - ASSESSMENT
MAGGIE SAHNI is a 70 yr old male with PMhx of DMT2, Afib on xarelto, HTN, HLD who was admitted to [facility] on 11/27 and started on remdisivir and decadron on 11/28, received convalescent plasma on 11/30, as well as Toci on 12/2, but was intubated on 12/2 due to worsening O2 sats. Also treated with Zosyn since 12/2 and 7 days of Levoquin (12/2- 12/8), along with  zyvox and caspofungin on 12/8. Transferred to [facility] 12/8 overnight, patient now s/p trach and peg tube.     NEUROLOGY  #Sedation  - Propofol  - Give fentanyl for acute pain PRN  - avoid qtc prolonging agents such as Seroquel    RESPIRATORY   #Acute hypoxic respiratory failure 2/2 COVID as well as concern for  overlying pneumonia.   -sputum with normal resp pepper. He has received a course of  Zosyn (12/14-12/20) for possible vap after having a fever on 12/12  -s/p trach 12 /18  and peg 12/17   -Trial of CPAP today    CARDIOVASCULAR  HD stable off pressors    #H/o A-fib with RVR  - amiodarone dc'ed on 12/20, due to prolonged qtc  - Lovenox 90 BID    #Severe carotid disease  -Patient with occluded right ICA and severe left ICA stenosis, s/p left CEA, uncomplicated  -f/u with cardiology outpatient    # QTC prolongation:   -ekg with qtc 560 on 12/20  -stopped Precedex, stopped amiodarone  -please avoid qtc prolonging agents-held off on starting seroquel     RENAL   - goal I/O is net even    ENDOCRINE  - goal 140-180 blood glucose  - mSSI  - lantus 8 unit at bedtime     GASTROINTESTINAL  - glucerna feeds via peg  - protonix 40  - Rectal tube in place    INFECTIOUS DISEASE  #VAP vs. COVID-19 Associated fevers  fever on 12/12, sputum cx sent showing normal resp pepper   He has received a course of  Zosyn (12/14-12/20) for possible vap     HEMATOLOGY/ONCOLOGY  - lovenox full anticoagulation for Afib - 90 q12    ACCESS/LINES/DRAINS   A-line 12/17  RIJ 12/17    PEG 12/17    PROPHYLACTIC  ELECTROLYTES: Mg<2, K<4  DIET: regular  GI PPX: Protonix  VTE PPX: SCD, lovenox bid for afib  CODE: FULL  DISPO: CHERYL

## 2020-12-23 LAB
ALBUMIN SERPL ELPH-MCNC: 3.2 G/DL — LOW (ref 3.3–5)
ALP SERPL-CCNC: 91 U/L — SIGNIFICANT CHANGE UP (ref 40–120)
ALT FLD-CCNC: 20 U/L — SIGNIFICANT CHANGE UP (ref 10–45)
ANION GAP SERPL CALC-SCNC: 9 MMOL/L — SIGNIFICANT CHANGE UP (ref 5–17)
AST SERPL-CCNC: 18 U/L — SIGNIFICANT CHANGE UP (ref 10–40)
BASOPHILS # BLD AUTO: 0.03 K/UL — SIGNIFICANT CHANGE UP (ref 0–0.2)
BASOPHILS NFR BLD AUTO: 0.3 % — SIGNIFICANT CHANGE UP (ref 0–2)
BILIRUB SERPL-MCNC: 0.4 MG/DL — SIGNIFICANT CHANGE UP (ref 0.2–1.2)
BUN SERPL-MCNC: 11 MG/DL — SIGNIFICANT CHANGE UP (ref 7–23)
CALCIUM SERPL-MCNC: 8.8 MG/DL — SIGNIFICANT CHANGE UP (ref 8.4–10.5)
CHLORIDE SERPL-SCNC: 107 MMOL/L — SIGNIFICANT CHANGE UP (ref 96–108)
CO2 SERPL-SCNC: 25 MMOL/L — SIGNIFICANT CHANGE UP (ref 22–31)
CREAT SERPL-MCNC: 0.35 MG/DL — LOW (ref 0.5–1.3)
CRP SERPL-MCNC: 0.43 MG/DL — HIGH (ref 0–0.4)
D DIMER BLD IA.RAPID-MCNC: 551 NG/ML DDU — HIGH
EOSINOPHIL # BLD AUTO: 0.42 K/UL — SIGNIFICANT CHANGE UP (ref 0–0.5)
EOSINOPHIL NFR BLD AUTO: 4.6 % — SIGNIFICANT CHANGE UP (ref 0–6)
FERRITIN SERPL-MCNC: 115 NG/ML — SIGNIFICANT CHANGE UP (ref 30–400)
GLUCOSE BLDC GLUCOMTR-MCNC: 153 MG/DL — HIGH (ref 70–99)
GLUCOSE BLDC GLUCOMTR-MCNC: 185 MG/DL — HIGH (ref 70–99)
GLUCOSE BLDC GLUCOMTR-MCNC: 194 MG/DL — HIGH (ref 70–99)
GLUCOSE SERPL-MCNC: 175 MG/DL — HIGH (ref 70–99)
HCT VFR BLD CALC: 32.5 % — LOW (ref 39–50)
HGB BLD-MCNC: 9.8 G/DL — LOW (ref 13–17)
IMM GRANULOCYTES NFR BLD AUTO: 0.8 % — SIGNIFICANT CHANGE UP (ref 0–1.5)
LYMPHOCYTES # BLD AUTO: 1.89 K/UL — SIGNIFICANT CHANGE UP (ref 1–3.3)
LYMPHOCYTES # BLD AUTO: 20.5 % — SIGNIFICANT CHANGE UP (ref 13–44)
MAGNESIUM SERPL-MCNC: 2 MG/DL — SIGNIFICANT CHANGE UP (ref 1.6–2.6)
MCHC RBC-ENTMCNC: 25 PG — LOW (ref 27–34)
MCHC RBC-ENTMCNC: 30.2 GM/DL — LOW (ref 32–36)
MCV RBC AUTO: 82.9 FL — SIGNIFICANT CHANGE UP (ref 80–100)
MONOCYTES # BLD AUTO: 0.7 K/UL — SIGNIFICANT CHANGE UP (ref 0–0.9)
MONOCYTES NFR BLD AUTO: 7.6 % — SIGNIFICANT CHANGE UP (ref 2–14)
NEUTROPHILS # BLD AUTO: 6.09 K/UL — SIGNIFICANT CHANGE UP (ref 1.8–7.4)
NEUTROPHILS NFR BLD AUTO: 66.2 % — SIGNIFICANT CHANGE UP (ref 43–77)
NRBC # BLD: 0 /100 WBCS — SIGNIFICANT CHANGE UP (ref 0–0)
PHOSPHATE SERPL-MCNC: 2.2 MG/DL — LOW (ref 2.5–4.5)
PLATELET # BLD AUTO: 275 K/UL — SIGNIFICANT CHANGE UP (ref 150–400)
POTASSIUM SERPL-MCNC: 4.1 MMOL/L — SIGNIFICANT CHANGE UP (ref 3.5–5.3)
POTASSIUM SERPL-SCNC: 4.1 MMOL/L — SIGNIFICANT CHANGE UP (ref 3.5–5.3)
PROT SERPL-MCNC: 6.1 G/DL — SIGNIFICANT CHANGE UP (ref 6–8.3)
RBC # BLD: 3.92 M/UL — LOW (ref 4.2–5.8)
RBC # FLD: 26.4 % — HIGH (ref 10.3–14.5)
SODIUM SERPL-SCNC: 141 MMOL/L — SIGNIFICANT CHANGE UP (ref 135–145)
WBC # BLD: 9.2 K/UL — SIGNIFICANT CHANGE UP (ref 3.8–10.5)
WBC # FLD AUTO: 9.2 K/UL — SIGNIFICANT CHANGE UP (ref 3.8–10.5)

## 2020-12-23 PROCEDURE — 71045 X-RAY EXAM CHEST 1 VIEW: CPT | Mod: 26

## 2020-12-23 PROCEDURE — 99233 SBSQ HOSP IP/OBS HIGH 50: CPT | Mod: GC

## 2020-12-23 RX ORDER — PHENYLEPHRINE HYDROCHLORIDE 10 MG/ML
0.1 INJECTION INTRAVENOUS
Qty: 40 | Refills: 0 | Status: DISCONTINUED | OUTPATIENT
Start: 2020-12-23 | End: 2020-12-30

## 2020-12-23 RX ORDER — POTASSIUM PHOSPHATE, MONOBASIC POTASSIUM PHOSPHATE, DIBASIC 236; 224 MG/ML; MG/ML
15 INJECTION, SOLUTION INTRAVENOUS ONCE
Refills: 0 | Status: DISCONTINUED | OUTPATIENT
Start: 2020-12-23 | End: 2020-12-23

## 2020-12-23 RX ORDER — DIGOXIN 250 MCG
0.25 TABLET ORAL ONCE
Refills: 0 | Status: COMPLETED | OUTPATIENT
Start: 2020-12-23 | End: 2020-12-23

## 2020-12-23 RX ORDER — AMIODARONE HYDROCHLORIDE 400 MG/1
1 TABLET ORAL
Qty: 900 | Refills: 0 | Status: DISCONTINUED | OUTPATIENT
Start: 2020-12-23 | End: 2020-12-24

## 2020-12-23 RX ORDER — AMIODARONE HYDROCHLORIDE 400 MG/1
0.5 TABLET ORAL
Qty: 900 | Refills: 0 | Status: DISCONTINUED | OUTPATIENT
Start: 2020-12-23 | End: 2020-12-24

## 2020-12-23 RX ORDER — DIGOXIN 250 MCG
0.25 TABLET ORAL EVERY 8 HOURS
Refills: 0 | Status: COMPLETED | OUTPATIENT
Start: 2020-12-24 | End: 2020-12-24

## 2020-12-23 RX ORDER — PROPOFOL 10 MG/ML
10 INJECTION, EMULSION INTRAVENOUS
Qty: 1000 | Refills: 0 | Status: DISCONTINUED | OUTPATIENT
Start: 2020-12-23 | End: 2020-12-27

## 2020-12-23 RX ORDER — DIGOXIN 250 MCG
0.25 TABLET ORAL ONCE
Refills: 0 | Status: DISCONTINUED | OUTPATIENT
Start: 2020-12-23 | End: 2020-12-23

## 2020-12-23 RX ADMIN — Medication 1 APPLICATION(S): at 00:05

## 2020-12-23 RX ADMIN — Medication 85 MILLIMOLE(S): at 06:51

## 2020-12-23 RX ADMIN — Medication 1 APPLICATION(S): at 05:46

## 2020-12-23 RX ADMIN — PROPOFOL 5.33 MICROGRAM(S)/KG/MIN: 10 INJECTION, EMULSION INTRAVENOUS at 10:31

## 2020-12-23 RX ADMIN — CHLORHEXIDINE GLUCONATE 1 APPLICATION(S): 213 SOLUTION TOPICAL at 05:45

## 2020-12-23 RX ADMIN — HYDROMORPHONE HYDROCHLORIDE 1 MILLIGRAM(S): 2 INJECTION INTRAMUSCULAR; INTRAVENOUS; SUBCUTANEOUS at 19:48

## 2020-12-23 RX ADMIN — INSULIN HUMAN 2: 100 INJECTION, SOLUTION SUBCUTANEOUS at 17:18

## 2020-12-23 RX ADMIN — HYDROMORPHONE HYDROCHLORIDE 1 MILLIGRAM(S): 2 INJECTION INTRAMUSCULAR; INTRAVENOUS; SUBCUTANEOUS at 10:08

## 2020-12-23 RX ADMIN — AMIODARONE HYDROCHLORIDE 33.3 MG/MIN: 400 TABLET ORAL at 09:33

## 2020-12-23 RX ADMIN — AMIODARONE HYDROCHLORIDE 16.7 MG/MIN: 400 TABLET ORAL at 15:31

## 2020-12-23 RX ADMIN — CHLORHEXIDINE GLUCONATE 15 MILLILITER(S): 213 SOLUTION TOPICAL at 17:08

## 2020-12-23 RX ADMIN — Medication 12.5 MILLIGRAM(S): at 10:01

## 2020-12-23 RX ADMIN — ENOXAPARIN SODIUM 90 MILLIGRAM(S): 100 INJECTION SUBCUTANEOUS at 08:42

## 2020-12-23 RX ADMIN — PROPOFOL 5.33 MICROGRAM(S)/KG/MIN: 10 INJECTION, EMULSION INTRAVENOUS at 11:08

## 2020-12-23 RX ADMIN — HYDROMORPHONE HYDROCHLORIDE 1 MILLIGRAM(S): 2 INJECTION INTRAMUSCULAR; INTRAVENOUS; SUBCUTANEOUS at 08:43

## 2020-12-23 RX ADMIN — Medication 1 APPLICATION(S): at 11:17

## 2020-12-23 RX ADMIN — PHENYLEPHRINE HYDROCHLORIDE 3.33 MICROGRAM(S)/KG/MIN: 10 INJECTION INTRAVENOUS at 15:02

## 2020-12-23 RX ADMIN — Medication 0.25 MILLIGRAM(S): at 18:09

## 2020-12-23 RX ADMIN — Medication 1 APPLICATION(S): at 17:08

## 2020-12-23 RX ADMIN — PANTOPRAZOLE SODIUM 40 MILLIGRAM(S): 20 TABLET, DELAYED RELEASE ORAL at 11:09

## 2020-12-23 RX ADMIN — INSULIN HUMAN 2: 100 INJECTION, SOLUTION SUBCUTANEOUS at 11:17

## 2020-12-23 RX ADMIN — INSULIN HUMAN 2: 100 INJECTION, SOLUTION SUBCUTANEOUS at 05:30

## 2020-12-23 RX ADMIN — CHLORHEXIDINE GLUCONATE 15 MILLILITER(S): 213 SOLUTION TOPICAL at 05:45

## 2020-12-23 NOTE — PROGRESS NOTE ADULT - ASSESSMENT
70M DMT2, A-fib on Xarelto, HTN, HLD, severe carotid disease who was admitted to [facility] on 11/27 where he received decadron, remdesivir, convalescent plasma, and several antibiotics before being transferred to [facility].  currently s/p trach and PEG and treatment for mssa VAP treatment wtih zosyn from 12/13-20.  course has been complicated by afib with rvr with patient on amiodarone gtt.        NEUROLOGY  #Sedation  - c/w propofol    RESPIRATORY   #Acute hypoxic respiratory failure 2/2 COVID  -pt spiked fever on 12/12; sputum cx negative but concern for overlying pna so received zosyn 12/14-12/20  -s/p trach 12 /18  and peg 12/17   -trial of CPAP today    CARDIOVASCULAR    #H/o A-fib with RVR  - amiodarone dc'ed on 12/20, due to prolonged qtc 560 but repeat 462  - episode of Afib with RVR o/n 12/22, given amiodarone bolus  - started on amiodarone gtt  - c/w lovenox 90 BID    #Severe carotid disease  -Patient with occluded right ICA and severe left ICA stenosis, s/p left CEA, uncomplicated  -f/u with cardiology outpatient    #QTC prolongation:   -ekg with qtc 560 on 12/20  -stopped Precedex, stopped amiodarone  -repeat 462 on 12/23  -restarted amiodarone, continue to monitor QTc     RENAL   - monitor Is/Os    ENDOCRINE  - goal 140-180 blood glucose  - mSSI  - lantus 8 unit at bedtime     GASTROINTESTINAL  - glucerna feeds via peg  - protonix 40  - Rectal tube in place    INFECTIOUS DISEASE  #VAP vs. COVID-19 Associated fevers  -as above    HEMATOLOGY/ONCOLOGY  - lovenox full anticoagulation for Afib - 90 q12    ACCESS/LINES/DRAINS  A-line 12/17  RIJ 12/17    PEG 12/17    PROPHYLACTIC  ELECTROLYTES: Mg<2, K<4  DIET: regular  GI PPX: Protonix  VTE PPX: SCD, lovenox bid for afib  CODE: FULL  DISPO: MICU  70M DMT2, A-fib on Xarelto, HTN, HLD, severe carotid disease who was admitted to [facility] on 11/27 where he received decadron, remdesivir, convalescent plasma, and several antibiotics before being transferred to [facility].  currently s/p trach and PEG and treatment for mssa VAP treatment wtih zosyn from 12/13-20.  course has been complicated by afib with rvr with patient currently on amiodarone gtt.  experienced an episode of hypotension today, so started on phenylephrine with EP consulted.      NEUROLOGY  #Sedation  - c/w propofol    RESPIRATORY   #Acute hypoxic respiratory failure 2/2 COVID  -pt spiked fever on 12/12; sputum cx negative but concern for overlying pna so received zosyn 12/14-12/20  -s/p trach 12 /18  and peg 12/17   -trial of CPAP today    CARDIOVASCULAR    #H/o A-fib with RVR  - amiodarone dc'ed on 12/20, due to prolonged qtc 560 but repeat 462  - episode of Afib with RVR o/n 12/22, given amiodarone bolus  - started on amiodarone gtt 12/23  - c/w lovenox 90 BID    #hypotension  - episode of hypotension 12/23 dropping to 50s/30; started on phenylephrine  - EP consulted    #Severe carotid disease  -Patient with occluded right ICA and severe left ICA stenosis, s/p left CEA, uncomplicated  -f/u with cardiology outpatient    #QTC prolongation:   -ekg with qtc 560 on 12/20  -stopped Precedex, stopped amiodarone  -repeat 462 on 12/23  -restarted amiodarone, continue to monitor QTc     RENAL   - monitor Is/Os    ENDOCRINE  - goal 140-180 blood glucose  - mSSI  - lantus 8 unit at bedtime     GASTROINTESTINAL  - glucerna feeds via peg  - protonix 40  - Rectal tube in place    INFECTIOUS DISEASE  #VAP vs. COVID-19 Associated fevers  -as above    HEMATOLOGY/ONCOLOGY  - lovenox full anticoagulation for Afib - 90 q12    ACCESS/LINES/DRAINS  A-line 12/17  RIJ 12/17    PEG 12/17    PROPHYLACTIC  ELECTROLYTES: Mg<2, K<4  DIET: regular  GI PPX: Protonix  VTE PPX: SCD, lovenox bid for afib  CODE: FULL  DISPO: MICU

## 2020-12-23 NOTE — CHART NOTE - NSCHARTNOTEFT_GEN_A_CORE
Admitting Diagnosis:   Patient is a 70y old  Male who presents with a chief complaint of COVID-19 pna (23 Dec 2020 05:55)      PAST MEDICAL & SURGICAL HISTORY:  H/O sciatica  [facility] PAIN LT LE    H/O cystoscopy  resection of bladder tumor    Bladder cancer    DM (diabetes mellitus)    Hypercholesteremia    Afib    HTN (hypertension)    History of hernia repair        Current Nutrition Order:  Glucerna 1.5 Helio @ 34ml/hr x 24hrs plus ProstatBID via NGT (816ml TV, 1424kcal (off propofol), 97g pro, 619ml free H2O, 82% RDI, 1.57g/kg IBW protein.    PO Intake: Good (%) [   ]  Fair (50-75%) [   ] Poor (<25%) [   ]- NA NPO w/EN    GI Issues:   Unable to assess at this time 2/2 vent   No regurgitation or residuals overnight  Rectal tube placed 12/20; 200cc x  Not on bowel regimen; miralax and senna d/c'd 12/20    Pain:   Unable to assess at this time 2/2 vent     Skin Integrity:   Yang 11; generalized trace edema  Intact pressure-wise     Labs:   12-23    141  |  107  |  11  ----------------------------<  175<H>  4.1   |  25  |  0.35<L>    Ca    8.8      23 Dec 2020 05:00  Phos  2.2     12-23  Mg     2.0     12-23    TPro  6.1  /  Alb  3.2<L>  /  TBili  0.4  /  DBili  x   /  AST  18  /  ALT  20  /  AlkPhos  91  12-23    CAPILLARY BLOOD GLUCOSE      POCT Blood Glucose.: 153 mg/dL (23 Dec 2020 05:27)  POCT Blood Glucose.: 154 mg/dL (22 Dec 2020 22:56)  POCT Blood Glucose.: 231 mg/dL (22 Dec 2020 18:12)  POCT Blood Glucose.: 146 mg/dL (22 Dec 2020 11:20)      Medications:  MEDICATIONS  (STANDING):  aMIOdarone Infusion 1 mG/Min (33.3 mL/Hr) IV Continuous <Continuous>  aMIOdarone Infusion 0.5 mG/Min (16.7 mL/Hr) IV Continuous <Continuous>  chlorhexidine 0.12% Liquid 15 milliLiter(s) Oral Mucosa every 12 hours  chlorhexidine 2% Cloths 1 Application(s) Topical <User Schedule>  dextrose 40% Gel 15 Gram(s) Oral once  dextrose 5%. 1000 milliLiter(s) (100 mL/Hr) IV Continuous <Continuous>  dextrose 5%. 1000 milliLiter(s) (50 mL/Hr) IV Continuous <Continuous>  dextrose 50% Injectable 25 Gram(s) IV Push once  dextrose 50% Injectable 12.5 Gram(s) IV Push once  dextrose 50% Injectable 25 Gram(s) IV Push once  enoxaparin Injectable 90 milliGRAM(s) SubCutaneous every 12 hours  glucagon  Injectable 1 milliGRAM(s) IntraMuscular once  insulin glargine Injectable (LANTUS) 8 Unit(s) SubCutaneous at bedtime  insulin regular  human corrective regimen sliding scale   SubCutaneous every 6 hours  metoprolol tartrate 12.5 milliGRAM(s) Oral every 12 hours  pantoprazole   Suspension 40 milliGRAM(s) Enteral Tube daily  petrolatum Ophthalmic Ointment 1 Application(s) Both EYES every 6 hours    MEDICATIONS  (PRN):  acetaminophen    Suspension .. 650 milliGRAM(s) Oral every 6 hours PRN Temp greater or equal to 38C (100.4F), Mild Pain (1 - 3)  HYDROmorphone  Injectable 1 milliGRAM(s) IV Push every 6 hours PRN Moderate Pain (4 - 6)    Weight: 87kg (12/13)     Weight Change: 1-2kg weight loss from admission. Will continue to trend     Nutrition Focused Physical Exam: Completed [   ]  Not Pertinent [ X  ]    Estimated energy needs:   Height 65"; ABW 88.9kg (admit); IBW 61.6kg; 143%IBW; BMI 32.6  Based on [employer] critical care guidelines for the treatment of critically ill patients with COVID-19, recommend:  *using IBW based on clinical judgment   Calories: 25-30 kcal/kg = 6657-4151 kcal/day  Protein: 1.5-2 g/kg = 93-124g protein/day  Fluids per team 2/2 compromised respiratory status       Subjective:   69 yo/male with PMHx T2DM, Afib, HTN, HLD, admitted to [facility] w/COVID on 11/27. Intubated on 12/2 for increased work of breathing. Transferred to [facility] for further care. Pt extubated on 12/13 after tolerating CPAP though was immediately reintubated 2/2 desaturation and increased secretions. S/p PEG placed 12/17 and Trach 12/18. CPAP trials. Pending LTAC placement.    Unable to conduct a face to face interview or nutrition-focused physical exam due to limited contact restrictions related to the pt's medical condition and isolation precautions. Pt trached to vent on AC/VC mode. Sedation turned off. MAP 68- not requiring pressor support. Currently in supine position. Having loose stool via rectal tube; 300cc 12/22. afebrile this morning. POC BG 94, 158, 165, 146mg/dL, phos 2.2 (being repleted). Feeds were running at goal with good tolerance. New order placed now that pt is off propofol- dicsussed on rounds. Will continue to follow per RD protocol.     Previous Nutrition Diagnosis:  Increased Nutrient Needs RT increased demand for protein-calorie intake AEB vent, COVID infection.   Active [  X ]  Resolved [   ]    If resolved, new PES:     Goal: Pt will continue to meet % of EER via EN     Recommendations:  1. With continued intubation and while off propofol, recommend Glucerna 1.5  @42mlhr x24hrs +1 prostat/day via PEG. Provides 1008ml TV, 1612kcal, 98g pro, 1.5g pro/kg, 765ml FW.   Additional free H2O flushes per team  2. Monitor lytes and replete prn. POC BG q6hrs (goal 140-180mg/dL)  3. Pain and bowel regimens per team. Monitor for s/s diarrhea on Glucerna and assess need to switch to Vital 1.5  4. Trend weights   *discussed on rounds. Order placed for MD verification.    Education: NA- intubated, sedated, isolation precautions     Risk Level: High [ X  ] Moderate [   ] Low [   ].

## 2020-12-23 NOTE — PROGRESS NOTE ADULT - SUBJECTIVE AND OBJECTIVE BOX
Medicine Progress Note    Patient is a 70y old  Male who presents with a chief complaint of COVID-19 pna (23 Dec 2020 05:55)      SUBJECTIVE / OVERNIGHT EVENTS: went into Afib with RVR and given bolus of amiodarone.  brought back into sinus tach into 110s.  returned to rapid afib and started on amio gtt.      MEDICATIONS  (STANDING):  aMIOdarone Infusion 1 mG/Min (33.3 mL/Hr) IV Continuous <Continuous>  aMIOdarone Infusion 0.5 mG/Min (16.7 mL/Hr) IV Continuous <Continuous>  chlorhexidine 0.12% Liquid 15 milliLiter(s) Oral Mucosa every 12 hours  chlorhexidine 2% Cloths 1 Application(s) Topical <User Schedule>  dextrose 40% Gel 15 Gram(s) Oral once  dextrose 5%. 1000 milliLiter(s) (50 mL/Hr) IV Continuous <Continuous>  dextrose 5%. 1000 milliLiter(s) (100 mL/Hr) IV Continuous <Continuous>  dextrose 50% Injectable 25 Gram(s) IV Push once  dextrose 50% Injectable 12.5 Gram(s) IV Push once  dextrose 50% Injectable 25 Gram(s) IV Push once  enoxaparin Injectable 90 milliGRAM(s) SubCutaneous every 12 hours  glucagon  Injectable 1 milliGRAM(s) IntraMuscular once  insulin glargine Injectable (LANTUS) 8 Unit(s) SubCutaneous at bedtime  insulin regular  human corrective regimen sliding scale   SubCutaneous every 6 hours  metoprolol tartrate 12.5 milliGRAM(s) Oral every 12 hours  pantoprazole   Suspension 40 milliGRAM(s) Enteral Tube daily  petrolatum Ophthalmic Ointment 1 Application(s) Both EYES every 6 hours  propofol Infusion 10 MICROgram(s)/kG/Min (5.33 mL/Hr) IV Continuous <Continuous>    MEDICATIONS  (PRN):  acetaminophen    Suspension .. 650 milliGRAM(s) Oral every 6 hours PRN Temp greater or equal to 38C (100.4F), Mild Pain (1 - 3)  HYDROmorphone  Injectable 1 milliGRAM(s) IV Push every 6 hours PRN Moderate Pain (4 - 6)    CAPILLARY BLOOD GLUCOSE      POCT Blood Glucose.: 194 mg/dL (23 Dec 2020 11:12)  POCT Blood Glucose.: 153 mg/dL (23 Dec 2020 05:27)  POCT Blood Glucose.: 154 mg/dL (22 Dec 2020 22:56)  POCT Blood Glucose.: 231 mg/dL (22 Dec 2020 18:12)    I&O's Summary    22 Dec 2020 07:01  -  23 Dec 2020 07:00  --------------------------------------------------------  IN: 1595.6 mL / OUT: 1880 mL / NET: -284.4 mL    23 Dec 2020 07:01  -  23 Dec 2020 13:07  --------------------------------------------------------  IN: 265.2 mL / OUT: 375 mL / NET: -109.8 mL        PHYSICAL EXAM:  Vital Signs Last 24 Hrs  T(C): 37.3 (23 Dec 2020 12:50), Max: 37.4 (23 Dec 2020 07:00)  T(F): 99.1 (23 Dec 2020 12:50), Max: 99.3 (23 Dec 2020 07:00)  HR: 98 (23 Dec 2020 13:00) (94 - 151)  BP: 177/76 (23 Dec 2020 07:00) (177/76 - 177/76)  BP(mean): 119 (23 Dec 2020 07:00) (119 - 119)  RR: 22 (23 Dec 2020 13:00) (15 - 35)  SpO2: 99% (23 Dec 2020 13:00) (96% - 99%)  CONSTITUTIONAL: NAD, moves but not communicative   ENMT: Moist oral mucosa, no pharyngeal injection or exudates; normal dentition  RESPIRATORY: no iwob, trach in place  CARDIOVASCULAR: Regular rate and rhythm, normal S1 and S2, no murmur/rub/gallop; Peripheral pulses are 2+ bilaterally  ABDOMEN: Nontender to palpation, normoactive bowel sounds, no rebound/guarding  NEUROLOGY: AAOX0  SKIN: No rashes; no palpable lesions    LABS:                        9.8    9.20  )-----------( 275      ( 23 Dec 2020 05:00 )             32.5     12-23    141  |  107  |  11  ----------------------------<  175<H>  4.1   |  25  |  0.35<L>    Ca    8.8      23 Dec 2020 05:00  Phos  2.2     12-23  Mg     2.0     12-23    TPro  6.1  /  Alb  3.2<L>  /  TBili  0.4  /  DBili  x   /  AST  18  /  ALT  20  /  AlkPhos  91  12-23              COVID-19 PCR: NotDetec (14 Dec 2020 14:16)  SARS-CoV-2: Detected (27 Nov 2020 19:30)      RADIOLOGY & ADDITIONAL TESTS:  Imaging from Last 24 Hours:    Electrocardiogram/QTc Interval:    COORDINATION OF CARE:  Care Discussed with Consultants/Other Providers:

## 2020-12-23 NOTE — CONSULT NOTE ADULT - SUBJECTIVE AND OBJECTIVE BOX
Preliminary note - will write full consult on 12/24.  Dr. Medina spoke with primary team.  history of paroxysmal afib - now here with COVID - long hospital course (chart reviewed) now trached.  On Phenylepherine - but majorities of -150 (one blood pressure in 60s?).  No known EF.  Was on amiodarone but reported QTc up to 550 Inot scanned into chart) -now on low dose 0.5 amio gtt.  Would recommend coming off phenylephrine if BP allows.  If not only option is digoxin - can give 0.25now and then 0.25 q8 x2 (total of 3 doses).  and the day after check dig level.  Will continue to follow / formal note 12/24 HPI: patient with trach / sedated - history obtained from chart    70 year old male with diabetes, HTN, HLD, atrial fibrillation (on xarelto at home) admitted to [facility] with COVID, transferred to [facility] now trached with paroxysmal afib with RVR.  EP called for further recommendations.    He was admitted to [facility] on 11/27 where he received decadron, remdesivir, convalescent plasma, and several antibiotics before being transferred to [facility]. Now s/p trach and PEG and treatment for mssa VAP treatment with zosyn from 12/13-20.  He has had episodes of afib with RVR.  He was initially on amiodarone but this was stopped secondary to a prolonged QT but then with recurrent afib and put on 0.5 amio gtt with EKGs WNL.  With episodic hypotension on pressors (phenylephrine)- but occurs with afib and in NSR.   Today spiking fever to 101.           PAST MEDICAL & SURGICAL HISTORY:  H/O sciatica CHR PAIN LT LE  H/O cystoscopy resection of bladder tumor  Bladder cancer  DM (diabetes mellitus)  Hypercholesteremia  Afib  HTN (hypertension)  History of hernia repair        Social History: no drugs      Inpatient Medications:   acetaminophen    Suspension .. 650 milliGRAM(s) Oral every 6 hours PRN  aMIOdarone    Tablet 400 milliGRAM(s) Oral every 12 hours  enoxaparin Injectable 90 milliGRAM(s) SubCutaneous every 12 hours  HYDROmorphone  Injectable 1 milliGRAM(s) IV Push every 6 hours PRN  insulin glargine Injectable (LANTUS) 8 Unit(s) SubCutaneous at bedtime  insulin regular  human corrective regimen sliding scale   SubCutaneous every 6 hours  metoprolol tartrate 12.5 milliGRAM(s) Oral every 12 hours  pantoprazole   Suspension 40 milliGRAM(s) Enteral Tube daily  petrolatum Ophthalmic Ointment 1 Application(s) Both EYES every 6 hours  phenylephrine    Infusion 0.1 MICROgram(s)/kG/Min IV Continuous <Continuous>  propofol Infusion 10 MICROgram(s)/kG/Min IV Continuous <Continuous>      Allergies: No Known Allergies      ROS:   intubated / sedated     PHYSICAL:  T(C): 37.8 (12-24-20 @ 09:56), Max: 38.3 (12-24-20 @ 06:00)  HR: 107 (12-24-20 @ 10:21) (87 - 143)  BP: 185/80 (12-24-20 @ 01:00) (185/80 - 185/80)  RR: 33 (12-24-20 @ 12:00) (22 - 35)  SpO2: 100% (12-24-20 @ 12:00) (95% - 100%)     Appearance: intubated / sedated   Cardiovascular: irregularly irregular - tachy   Respiratory: Lungs clear    Gastrointestinal:  Soft, NT/ND 	  Skin: no rash noted, normal color and pigmentation.        LABS:                        9.8    11.15 )-----------( 340      ( 24 Dec 2020 06:10 )             32.4     140  |  105  |  21  ----------------------------<  185<H>  3.8   |  25  |  0.42<L>    Ca    8.7     Phos  2.3   Mg     2.2    TPro  6.5  /  Alb  3.3  /  TBili  0.5  /  DBili  x   /  AST  17  /  ALT  24  /  AlkPhos  97      EKG: reviewed in chart - EKG with prolonged QT likely because p wave in t wave - all EKGs since with QTC WNL    Telemetry: paroxysmal afib rates in sinus 100 in afib up to 150    ECHO: none in chart       Assessment Plan:  70 year old male with diabetes, HTN, HLD, atrial fibrillation (on xarelto at home) admitted to [facility] with COVID, transferred to [facility] now trached with paroxysmal afib with RVR.   Patient with recurrent fevers on IV antibiotics,  Episodes of hypotension on pressors.  Hypotension independent of afib with RVR episodes.  Would start PO amiodarone 400 BID for 10 days with DAILY EKGs to monitor QTC.  can overlap with IV drip for 1-2 days.  If switch to PO amio no more digoxin.  Wean off pressors when BP allows.

## 2020-12-24 LAB
ALBUMIN SERPL ELPH-MCNC: 3.3 G/DL — SIGNIFICANT CHANGE UP (ref 3.3–5)
ALP SERPL-CCNC: 97 U/L — SIGNIFICANT CHANGE UP (ref 40–120)
ALT FLD-CCNC: 24 U/L — SIGNIFICANT CHANGE UP (ref 10–45)
ANION GAP SERPL CALC-SCNC: 10 MMOL/L — SIGNIFICANT CHANGE UP (ref 5–17)
APPEARANCE UR: CLEAR — SIGNIFICANT CHANGE UP
AST SERPL-CCNC: 17 U/L — SIGNIFICANT CHANGE UP (ref 10–40)
BACTERIA # UR AUTO: PRESENT /HPF
BASOPHILS # BLD AUTO: 0.04 K/UL — SIGNIFICANT CHANGE UP (ref 0–0.2)
BASOPHILS NFR BLD AUTO: 0.4 % — SIGNIFICANT CHANGE UP (ref 0–2)
BILIRUB SERPL-MCNC: 0.5 MG/DL — SIGNIFICANT CHANGE UP (ref 0.2–1.2)
BILIRUB UR-MCNC: NEGATIVE — SIGNIFICANT CHANGE UP
BUN SERPL-MCNC: 21 MG/DL — SIGNIFICANT CHANGE UP (ref 7–23)
CALCIUM SERPL-MCNC: 8.7 MG/DL — SIGNIFICANT CHANGE UP (ref 8.4–10.5)
CHLORIDE SERPL-SCNC: 105 MMOL/L — SIGNIFICANT CHANGE UP (ref 96–108)
CO2 SERPL-SCNC: 25 MMOL/L — SIGNIFICANT CHANGE UP (ref 22–31)
COLOR SPEC: YELLOW — SIGNIFICANT CHANGE UP
COMMENT - URINE: SIGNIFICANT CHANGE UP
CREAT SERPL-MCNC: 0.42 MG/DL — LOW (ref 0.5–1.3)
CRP SERPL-MCNC: 0.65 MG/DL — HIGH (ref 0–0.4)
D DIMER BLD IA.RAPID-MCNC: 652 NG/ML DDU — HIGH
DIFF PNL FLD: NEGATIVE — SIGNIFICANT CHANGE UP
EOSINOPHIL # BLD AUTO: 0.37 K/UL — SIGNIFICANT CHANGE UP (ref 0–0.5)
EOSINOPHIL NFR BLD AUTO: 3.3 % — SIGNIFICANT CHANGE UP (ref 0–6)
EPI CELLS # UR: SIGNIFICANT CHANGE UP /HPF (ref 0–5)
FERRITIN SERPL-MCNC: 106 NG/ML — SIGNIFICANT CHANGE UP (ref 30–400)
GLUCOSE BLDC GLUCOMTR-MCNC: 189 MG/DL — HIGH (ref 70–99)
GLUCOSE BLDC GLUCOMTR-MCNC: 210 MG/DL — HIGH (ref 70–99)
GLUCOSE BLDC GLUCOMTR-MCNC: 242 MG/DL — HIGH (ref 70–99)
GLUCOSE SERPL-MCNC: 185 MG/DL — HIGH (ref 70–99)
GLUCOSE UR QL: >=1000
GRAM STN FLD: SIGNIFICANT CHANGE UP
HCT VFR BLD CALC: 32.4 % — LOW (ref 39–50)
HGB BLD-MCNC: 9.8 G/DL — LOW (ref 13–17)
IMM GRANULOCYTES NFR BLD AUTO: 0.7 % — SIGNIFICANT CHANGE UP (ref 0–1.5)
KETONES UR-MCNC: ABNORMAL MG/DL
LEUKOCYTE ESTERASE UR-ACNC: NEGATIVE — SIGNIFICANT CHANGE UP
LYMPHOCYTES # BLD AUTO: 1.91 K/UL — SIGNIFICANT CHANGE UP (ref 1–3.3)
LYMPHOCYTES # BLD AUTO: 17.1 % — SIGNIFICANT CHANGE UP (ref 13–44)
MAGNESIUM SERPL-MCNC: 2.2 MG/DL — SIGNIFICANT CHANGE UP (ref 1.6–2.6)
MCHC RBC-ENTMCNC: 25 PG — LOW (ref 27–34)
MCHC RBC-ENTMCNC: 30.2 GM/DL — LOW (ref 32–36)
MCV RBC AUTO: 82.7 FL — SIGNIFICANT CHANGE UP (ref 80–100)
MONOCYTES # BLD AUTO: 0.94 K/UL — HIGH (ref 0–0.9)
MONOCYTES NFR BLD AUTO: 8.4 % — SIGNIFICANT CHANGE UP (ref 2–14)
MRSA PCR RESULT.: NEGATIVE — SIGNIFICANT CHANGE UP
NEUTROPHILS # BLD AUTO: 7.85 K/UL — HIGH (ref 1.8–7.4)
NEUTROPHILS NFR BLD AUTO: 70.1 % — SIGNIFICANT CHANGE UP (ref 43–77)
NITRITE UR-MCNC: NEGATIVE — SIGNIFICANT CHANGE UP
NRBC # BLD: 0 /100 WBCS — SIGNIFICANT CHANGE UP (ref 0–0)
PH UR: 7 — SIGNIFICANT CHANGE UP (ref 5–8)
PHOSPHATE SERPL-MCNC: 2.3 MG/DL — LOW (ref 2.5–4.5)
PLATELET # BLD AUTO: 340 K/UL — SIGNIFICANT CHANGE UP (ref 150–400)
POTASSIUM SERPL-MCNC: 3.8 MMOL/L — SIGNIFICANT CHANGE UP (ref 3.5–5.3)
POTASSIUM SERPL-SCNC: 3.8 MMOL/L — SIGNIFICANT CHANGE UP (ref 3.5–5.3)
PROT SERPL-MCNC: 6.5 G/DL — SIGNIFICANT CHANGE UP (ref 6–8.3)
PROT UR-MCNC: ABNORMAL MG/DL
RBC # BLD: 3.92 M/UL — LOW (ref 4.2–5.8)
RBC # FLD: 26.5 % — HIGH (ref 10.3–14.5)
RBC CASTS # UR COMP ASSIST: ABNORMAL /HPF
S AUREUS DNA NOSE QL NAA+PROBE: NEGATIVE — SIGNIFICANT CHANGE UP
SODIUM SERPL-SCNC: 140 MMOL/L — SIGNIFICANT CHANGE UP (ref 135–145)
SP GR SPEC: 1.02 — SIGNIFICANT CHANGE UP (ref 1–1.03)
SPECIMEN SOURCE: SIGNIFICANT CHANGE UP
TRIGL SERPL-MCNC: 199 MG/DL — HIGH
UROBILINOGEN FLD QL: 2 E.U./DL
WBC # BLD: 11.15 K/UL — HIGH (ref 3.8–10.5)
WBC # FLD AUTO: 11.15 K/UL — HIGH (ref 3.8–10.5)
WBC UR QL: ABNORMAL /HPF

## 2020-12-24 PROCEDURE — 74018 RADEX ABDOMEN 1 VIEW: CPT | Mod: 26

## 2020-12-24 PROCEDURE — 71045 X-RAY EXAM CHEST 1 VIEW: CPT | Mod: 26

## 2020-12-24 PROCEDURE — 93308 TTE F-UP OR LMTD: CPT | Mod: 26

## 2020-12-24 PROCEDURE — 99233 SBSQ HOSP IP/OBS HIGH 50: CPT | Mod: GC

## 2020-12-24 PROCEDURE — 93321 DOPPLER ECHO F-UP/LMTD STD: CPT | Mod: 26

## 2020-12-24 RX ORDER — METOPROLOL TARTRATE 50 MG
5 TABLET ORAL ONCE
Refills: 0 | Status: COMPLETED | OUTPATIENT
Start: 2020-12-24 | End: 2020-12-24

## 2020-12-24 RX ORDER — SODIUM CHLORIDE 9 MG/ML
500 INJECTION INTRAMUSCULAR; INTRAVENOUS; SUBCUTANEOUS ONCE
Refills: 0 | Status: COMPLETED | OUTPATIENT
Start: 2020-12-24 | End: 2020-12-24

## 2020-12-24 RX ORDER — MEROPENEM 1 G/30ML
1000 INJECTION INTRAVENOUS EVERY 8 HOURS
Refills: 0 | Status: DISCONTINUED | OUTPATIENT
Start: 2020-12-24 | End: 2020-12-26

## 2020-12-24 RX ORDER — MEROPENEM 1 G/30ML
1000 INJECTION INTRAVENOUS EVERY 8 HOURS
Refills: 0 | Status: DISCONTINUED | OUTPATIENT
Start: 2020-12-24 | End: 2020-12-24

## 2020-12-24 RX ORDER — METOPROLOL TARTRATE 50 MG
25 TABLET ORAL EVERY 12 HOURS
Refills: 0 | Status: DISCONTINUED | OUTPATIENT
Start: 2020-12-24 | End: 2020-12-25

## 2020-12-24 RX ORDER — VANCOMYCIN HCL 1 G
1500 VIAL (EA) INTRAVENOUS EVERY 12 HOURS
Refills: 0 | Status: DISCONTINUED | OUTPATIENT
Start: 2020-12-24 | End: 2020-12-26

## 2020-12-24 RX ORDER — AMIODARONE HYDROCHLORIDE 400 MG/1
400 TABLET ORAL EVERY 12 HOURS
Refills: 0 | Status: DISCONTINUED | OUTPATIENT
Start: 2020-12-24 | End: 2020-12-25

## 2020-12-24 RX ORDER — POTASSIUM CHLORIDE 20 MEQ
20 PACKET (EA) ORAL ONCE
Refills: 0 | Status: COMPLETED | OUTPATIENT
Start: 2020-12-24 | End: 2020-12-24

## 2020-12-24 RX ADMIN — CHLORHEXIDINE GLUCONATE 1 APPLICATION(S): 213 SOLUTION TOPICAL at 06:50

## 2020-12-24 RX ADMIN — Medication 5 MILLIGRAM(S): at 16:55

## 2020-12-24 RX ADMIN — INSULIN HUMAN 4: 100 INJECTION, SOLUTION SUBCUTANEOUS at 18:17

## 2020-12-24 RX ADMIN — Medication 1 APPLICATION(S): at 00:00

## 2020-12-24 RX ADMIN — Medication 20 MILLIEQUIVALENT(S): at 08:31

## 2020-12-24 RX ADMIN — Medication 300 MILLIGRAM(S): at 14:38

## 2020-12-24 RX ADMIN — Medication 0.25 MILLIGRAM(S): at 01:00

## 2020-12-24 RX ADMIN — ENOXAPARIN SODIUM 90 MILLIGRAM(S): 100 INJECTION SUBCUTANEOUS at 00:00

## 2020-12-24 RX ADMIN — SODIUM CHLORIDE 1000 MILLILITER(S): 9 INJECTION INTRAMUSCULAR; INTRAVENOUS; SUBCUTANEOUS at 12:34

## 2020-12-24 RX ADMIN — CHLORHEXIDINE GLUCONATE 15 MILLILITER(S): 213 SOLUTION TOPICAL at 06:49

## 2020-12-24 RX ADMIN — CHLORHEXIDINE GLUCONATE 15 MILLILITER(S): 213 SOLUTION TOPICAL at 17:53

## 2020-12-24 RX ADMIN — Medication 25 MILLIGRAM(S): at 23:51

## 2020-12-24 RX ADMIN — PANTOPRAZOLE SODIUM 40 MILLIGRAM(S): 20 TABLET, DELAYED RELEASE ORAL at 11:05

## 2020-12-24 RX ADMIN — ENOXAPARIN SODIUM 90 MILLIGRAM(S): 100 INJECTION SUBCUTANEOUS at 08:30

## 2020-12-24 RX ADMIN — Medication 1 APPLICATION(S): at 11:05

## 2020-12-24 RX ADMIN — Medication 1 APPLICATION(S): at 17:53

## 2020-12-24 RX ADMIN — Medication 62.5 MILLIMOLE(S): at 10:43

## 2020-12-24 RX ADMIN — INSULIN GLARGINE 8 UNIT(S): 100 INJECTION, SOLUTION SUBCUTANEOUS at 02:31

## 2020-12-24 RX ADMIN — INSULIN HUMAN 2: 100 INJECTION, SOLUTION SUBCUTANEOUS at 00:00

## 2020-12-24 RX ADMIN — Medication 650 MILLIGRAM(S): at 09:15

## 2020-12-24 RX ADMIN — Medication 0.25 MILLIGRAM(S): at 08:30

## 2020-12-24 RX ADMIN — ENOXAPARIN SODIUM 90 MILLIGRAM(S): 100 INJECTION SUBCUTANEOUS at 21:40

## 2020-12-24 RX ADMIN — Medication 650 MILLIGRAM(S): at 06:49

## 2020-12-24 RX ADMIN — SODIUM CHLORIDE 500 MILLILITER(S): 9 INJECTION INTRAMUSCULAR; INTRAVENOUS; SUBCUTANEOUS at 08:55

## 2020-12-24 RX ADMIN — Medication 12.5 MILLIGRAM(S): at 02:31

## 2020-12-24 RX ADMIN — MEROPENEM 100 MILLIGRAM(S): 1 INJECTION INTRAVENOUS at 21:40

## 2020-12-24 RX ADMIN — Medication 12.5 MILLIGRAM(S): at 11:05

## 2020-12-24 RX ADMIN — INSULIN HUMAN 4: 100 INJECTION, SOLUTION SUBCUTANEOUS at 11:31

## 2020-12-24 RX ADMIN — AMIODARONE HYDROCHLORIDE 400 MILLIGRAM(S): 400 TABLET ORAL at 10:57

## 2020-12-24 RX ADMIN — Medication 1 APPLICATION(S): at 06:54

## 2020-12-24 RX ADMIN — INSULIN HUMAN 2: 100 INJECTION, SOLUTION SUBCUTANEOUS at 06:52

## 2020-12-24 RX ADMIN — MEROPENEM 100 MILLIGRAM(S): 1 INJECTION INTRAVENOUS at 13:46

## 2020-12-24 RX ADMIN — INSULIN GLARGINE 8 UNIT(S): 100 INJECTION, SOLUTION SUBCUTANEOUS at 23:50

## 2020-12-24 RX ADMIN — PHENYLEPHRINE HYDROCHLORIDE 3.33 MICROGRAM(S)/KG/MIN: 10 INJECTION INTRAVENOUS at 10:43

## 2020-12-24 RX ADMIN — AMIODARONE HYDROCHLORIDE 400 MILLIGRAM(S): 400 TABLET ORAL at 17:53

## 2020-12-24 NOTE — PROGRESS NOTE ADULT - SUBJECTIVE AND OBJECTIVE BOX
OVERNIGHT EVENTS: temp 101F rectal, blood cx X2 sent, received 500mL bolus X2    SUBJECTIVE / INTERVAL HPI: Patient seen and examined at bedside.  patient alert but unable to answer questions.    VITAL SIGNS:  Vital Signs Last 24 Hrs  T(C): 37.8 (24 Dec 2020 09:56), Max: 38.3 (24 Dec 2020 06:00)  T(F): 100.1 (24 Dec 2020 09:56), Max: 101 (24 Dec 2020 06:00)  HR: 104 (24 Dec 2020 14:00) (92 - 143)  BP: 185/80 (24 Dec 2020 01:00) (185/80 - 185/80)  BP(mean): 115 (24 Dec 2020 01:00) (115 - 115)  RR: 34 (24 Dec 2020 14:00) (22 - 35)  SpO2: 100% (24 Dec 2020 14:00) (95% - 100%)    PHYSICAL EXAM:    General: WDWN, NAD  HEENT: pupils reactive  Neck: trach in place  Cardiovascular: distant heart sounds  Respiratory: coarse breath sounds  Gastrointestinal: soft, nontender, nondistended. bowel sounds present, rectal tube in place  Genitourinary: santana in place  Skin: no ulcerations or visible rashes appreciated  Extremities: WWP; no edema  Vascular: 2+ DP pulses B/L  Neurological: alert and following commands; squeezing fingers and plantar flexion    MEDICATIONS:  MEDICATIONS  (STANDING):  aMIOdarone    Tablet 400 milliGRAM(s) Oral every 12 hours  chlorhexidine 0.12% Liquid 15 milliLiter(s) Oral Mucosa every 12 hours  chlorhexidine 2% Cloths 1 Application(s) Topical <User Schedule>  dextrose 40% Gel 15 Gram(s) Oral once  dextrose 5%. 1000 milliLiter(s) (50 mL/Hr) IV Continuous <Continuous>  dextrose 5%. 1000 milliLiter(s) (100 mL/Hr) IV Continuous <Continuous>  dextrose 50% Injectable 25 Gram(s) IV Push once  dextrose 50% Injectable 12.5 Gram(s) IV Push once  dextrose 50% Injectable 25 Gram(s) IV Push once  enoxaparin Injectable 90 milliGRAM(s) SubCutaneous every 12 hours  glucagon  Injectable 1 milliGRAM(s) IntraMuscular once  insulin glargine Injectable (LANTUS) 8 Unit(s) SubCutaneous at bedtime  insulin regular  human corrective regimen sliding scale   SubCutaneous every 6 hours  meropenem  IVPB 1000 milliGRAM(s) IV Intermittent every 8 hours  metoprolol tartrate 12.5 milliGRAM(s) Oral every 12 hours  pantoprazole   Suspension 40 milliGRAM(s) Enteral Tube daily  petrolatum Ophthalmic Ointment 1 Application(s) Both EYES every 6 hours  phenylephrine    Infusion 0.1 MICROgram(s)/kG/Min (3.33 mL/Hr) IV Continuous <Continuous>  propofol Infusion 10 MICROgram(s)/kG/Min (5.33 mL/Hr) IV Continuous <Continuous>  vancomycin  IVPB 1500 milliGRAM(s) IV Intermittent every 12 hours    MEDICATIONS  (PRN):  acetaminophen    Suspension .. 650 milliGRAM(s) Oral every 6 hours PRN Temp greater or equal to 38C (100.4F), Mild Pain (1 - 3)  HYDROmorphone  Injectable 1 milliGRAM(s) IV Push every 6 hours PRN Moderate Pain (4 - 6)      ALLERGIES:  Allergies    No Known Allergies    Intolerances        LABS:                        9.8    11.15 )-----------( 340      ( 24 Dec 2020 06:10 )             32.4     12-24    140  |  105  |  21  ----------------------------<  185<H>  3.8   |  25  |  0.42<L>    Ca    8.7      24 Dec 2020 06:10  Phos  2.3     12-24  Mg     2.2     12-24    TPro  6.5  /  Alb  3.3  /  TBili  0.5  /  DBili  x   /  AST  17  /  ALT  24  /  AlkPhos  97  12-24      Urinalysis Basic - ( 24 Dec 2020 12:24 )    Color: Yellow / Appearance: Clear / S.020 / pH: x  Gluc: x / Ketone: Trace mg/dL  / Bili: Negative / Urobili: 2.0 E.U./dL   Blood: x / Protein: Trace mg/dL / Nitrite: NEGATIVE   Leuk Esterase: NEGATIVE / RBC: 5-10 /HPF / WBC 5-10 /HPF   Sq Epi: x / Non Sq Epi: 0-5 /HPF / Bacteria: Present /HPF      CAPILLARY BLOOD GLUCOSE      POCT Blood Glucose.: 242 mg/dL (24 Dec 2020 11:16)      RADIOLOGY & ADDITIONAL TESTS: Reviewed.

## 2020-12-24 NOTE — PROGRESS NOTE ADULT - SUBJECTIVE AND OBJECTIVE BOX
HPI: 70yoM, with PMHx of T2DM, afib, HTN, HLD, severe PVD (occluded right ICA; severe left ICA stenosis, s/p left CEA), was admitted to [facility] with COVID pneumonia on 11/27.  Required intubation 12/02 for worsening hypoxia.  Transferred to [facility] 12/08.  Did not tolerate CPAP 12/11 --> went into RVR Afib, so resedated.  On 12/13, he was extubated but desatted to 65% on CPAP, so  was reintubated same day.  CXR after reintubation showed worsening infiltrates  For COVID, treated with decadron, remdisivir, convalescent plasma, and tocilizumab.   Currently being treated with Zosyn for fevers and GPC growing in the sputum culture.  Septic shock treated with phenylephrine instead of levo for pressure support afib with RVR, however phenylephrine requirements.  On full anticoagulation with lovenox and amiodarone for Afib w RVR      PAST MEDICAL & SURGICAL HISTORY:  H/O sciatica  CHR PAIN LT LE  H/O cystoscopy  resection of bladder tumor  Bladder cancer  DM (diabetes mellitus)  Hypercholesteremia  Afib  HTN (hypertension)  History of hernia repair    ALLERGIES: No Known Allergies/Intolerances    MEDICATIONS  (STANDING):  aMIOdarone    Tablet 400 milliGRAM(s) Oral every 12 hours  chlorhexidine 0.12% Liquid 15 milliLiter(s) Oral Mucosa every 12 hours  chlorhexidine 2% Cloths 1 Application(s) Topical <User Schedule>  dextrose 40% Gel 15 Gram(s) Oral once  dextrose 5%. 1000 milliLiter(s) (50 mL/Hr) IV Continuous <Continuous>  dextrose 5%. 1000 milliLiter(s) (100 mL/Hr) IV Continuous <Continuous>  dextrose 50% Injectable 25 Gram(s) IV Push once  dextrose 50% Injectable 12.5 Gram(s) IV Push once  dextrose 50% Injectable 25 Gram(s) IV Push once  enoxaparin Injectable 90 milliGRAM(s) SubCutaneous every 12 hours  glucagon  Injectable 1 milliGRAM(s) IntraMuscular once  insulin glargine Injectable (LANTUS) 8 Unit(s) SubCutaneous at bedtime  insulin regular  human corrective regimen sliding scale   SubCutaneous every 6 hours  meropenem  IVPB 1000 milliGRAM(s) IV Intermittent every 8 hours  metoprolol tartrate 12.5 milliGRAM(s) Oral every 12 hours  pantoprazole   Suspension 40 milliGRAM(s) Enteral Tube daily  petrolatum Ophthalmic Ointment 1 Application(s) Both EYES every 6 hours  phenylephrine    Infusion 0.1 MICROgram(s)/kG/Min (3.33 mL/Hr) IV Continuous <Continuous>  propofol Infusion 10 MICROgram(s)/kG/Min (5.33 mL/Hr) IV Continuous <Continuous>  vancomycin  IVPB 1500 milliGRAM(s) IV Intermittent every 12 hours    MEDICATIONS  (PRN):  acetaminophen    Suspension .. 650 milliGRAM(s) Oral every 6 hours PRN Temp greater or equal to 38C (100.4F), Mild Pain (1 - 3)  HYDROmorphone  Injectable 1 milliGRAM(s) IV Push every 6 hours PRN Moderate Pain (4 - 6)    Vital Signs Last 24 Hrs  T(C): 37.8 (24 Dec 2020 09:56), Max: 38.3 (24 Dec 2020 06:00)  T(F): 100.1 (24 Dec 2020 09:56), Max: 101 (24 Dec 2020 06:00)  HR: 104 (24 Dec 2020 14:00) (88 - 143)  BP: 185/80 (24 Dec 2020 01:00) (185/80 - 185/80)  BP(mean): 115 (24 Dec 2020 01:00) (115 - 115)  RR: 34 (24 Dec 2020 14:00) (22 - 35)  SpO2: 100% (24 Dec 2020 14:00) (95% - 100%)      PHYSICAL EXAM:  NAD  Trach in appropriate position. Copious secretions surrounding trach  On CPAP         LABS                 CBC (12-24 @ 06:10)                              9.8<L>                         11.15<H>  )----------------(  340        70.1  % Neutrophils, 17.1  % Lymphocytes, ANC: 7.85<H>                              32.4<L>              CBC (12-23 @ 05:00)                              9.8<L>                         9.20    )----------------(  275        66.2  % Neutrophils, 20.5  % Lymphocytes, ANC: 6.09                                32.5<L>                BMP (12-24 @ 06:10)             140     |  105     |  21    		Ca++ --      Ca 8.7                ---------------------------------( 185<H>		Mg 2.2                3.8     |  25      |  0.42<L>			Ph 2.3<L>  BMP (12-23 @ 05:00)             141     |  107     |  11    		Ca++ --      Ca 8.8                ---------------------------------( 175<H>		Mg 2.0                4.1     |  25      |  0.35<L>			Ph 2.2<L>    LFTs (12-24 @ 06:10)      TPro 6.5 / Alb 3.3 / TBili 0.5 / DBili -- / AST 17 / ALT 24 / AlkPhos 97  LFTs (12-23 @ 05:00)      TPro 6.1 / Alb 3.2<L> / TBili 0.4 / DBili -- / AST 18 / ALT 20 / AlkPhos 91

## 2020-12-24 NOTE — PROGRESS NOTE ADULT - ASSESSMENT
70yoM with PMHx of T2DM, afib, HTN, HLD with COVID pneumonia initially admitted to [facility] on 11/27 and intubated on 12/02.  He did not tolerate extubation on 12/13; reintubated same day after desat to 65%.  Now s/p trach    ENT to sign off at this time  -ROUTINE TRACH CARE:   - HOB at 30 degrees   - Suction with red rubber catheters only   - Obturator at HOB  - Change inner cannula q shift and PRN  - Keep surrounding skin clean and dry  - Please page ENT with additional questions

## 2020-12-24 NOTE — PROGRESS NOTE ADULT - ASSESSMENT
70M DMT2, A-fib on Xarelto, HTN, HLD, severe carotid disease who was admitted to [facility] on 11/27 where he received decadron, remdesivir, convalescent plasma, and several antibiotics before being transferred to [facility].  currently s/p trach and PEG and treatment for mssa VAP treatment wtih zosyn from 12/13-20.  course has been complicated by afib with rvr, episode of bradycardia, and now new fever spike with concern for underlying infection of unknown origin.    NEUROLOGY  #Sedation  - dc'd propofol after episode of hypotension on 12/23    RESPIRATORY   #Acute hypoxic respiratory failure 2/2 COVID  -pt spiked fever on 12/12; sputum cx negative but concern for overlying pna so received zosyn 12/14-12/20  -s/p trach 12 /18 and peg 12/17   -wean pressure support    CARDIOVASCULAR    #H/o A-fib with RVR  - c/w lovenox 90 BID  - amiodarone dc'ed on 12/20, due to prolonged qtc 560 but repeat 462  - episode of Afib with RVR o/n 12/22, given amiodarone bolus  - dc amiodarone gtt 12/23; start amio 400mg BID  - amio boluses if enters afib with RVR  - f/u EP recs  - f/u TTE  - daily EKG    #hypotension  - episode of hypotension 12/23 dropping to 50s/30; started on phenylephrine  - EP recs, as above    #Severe carotid disease  -Patient with occluded right ICA and severe left ICA stenosis, s/p left CEA, uncomplicated  -f/u with cardiology outpatient    #QTC prolongation:   -ekg with qtc 560 on 12/20  -stopped Precedex, stopped amiodarone  -repeat 462 on 12/23  -restarted amiodarone, continue to monitor QTc     RENAL   - monitor Is/Os    ENDOCRINE  - goal 140-180 blood glucose  - mSSI  - lantus 8 unit at bedtime     GASTROINTESTINAL  - glucerna feeds via peg  - protonix 40  - Rectal tube in place    INFECTIOUS DISEASE  #VAP vs. COVID-19 Associated fevers  -as above    #Fever of unknown origin  -new fever spike 12/23 overnight  -f/u blood cx  -f/u sputum cx  -CXR with no new infiltrates  -f/u MRSA swab  -monitor for trachiitis  -dc central line and replace with peripheral IVs  -UA    HEMATOLOGY/ONCOLOGY  - lovenox full anticoagulation for Afib - 90 q12    ACCESS/LINES/DRAINS  A-line 12/17  RIJ 12/17, removed today    PEG 12/17    PROPHYLACTIC  ELECTROLYTES: Mg<2, K<4  DIET: regular  GI PPX: Protonix  VTE PPX: SCD, lovenox bid for afib  CODE: FULL  DISPO: MICU

## 2020-12-24 NOTE — CHART NOTE - NSCHARTNOTEFT_GEN_A_CORE
Infectious Diseases Anti-infective Approval Note    Medication:  Meropenem  Dose:  1 gram   Route:  IV  Frequency:  q8hrs  Duration:  4 days     Dose may be adjusted as needed for alterations in renal function.    *THIS IS NOT AN INFECTIOUS DISEASES CONSULTATION*

## 2020-12-25 LAB
ALBUMIN SERPL ELPH-MCNC: 3.3 G/DL — SIGNIFICANT CHANGE UP (ref 3.3–5)
ALP SERPL-CCNC: 70 U/L — SIGNIFICANT CHANGE UP (ref 40–120)
ALT FLD-CCNC: 16 U/L — SIGNIFICANT CHANGE UP (ref 10–45)
ANION GAP SERPL CALC-SCNC: 11 MMOL/L — SIGNIFICANT CHANGE UP (ref 5–17)
AST SERPL-CCNC: 15 U/L — SIGNIFICANT CHANGE UP (ref 10–40)
BASOPHILS # BLD AUTO: 0.03 K/UL — SIGNIFICANT CHANGE UP (ref 0–0.2)
BASOPHILS NFR BLD AUTO: 0.3 % — SIGNIFICANT CHANGE UP (ref 0–2)
BILIRUB SERPL-MCNC: 0.4 MG/DL — SIGNIFICANT CHANGE UP (ref 0.2–1.2)
BLD GP AB SCN SERPL QL: NEGATIVE — SIGNIFICANT CHANGE UP
BUN SERPL-MCNC: 25 MG/DL — HIGH (ref 7–23)
CALCIUM SERPL-MCNC: 9.1 MG/DL — SIGNIFICANT CHANGE UP (ref 8.4–10.5)
CHLORIDE SERPL-SCNC: 106 MMOL/L — SIGNIFICANT CHANGE UP (ref 96–108)
CO2 SERPL-SCNC: 25 MMOL/L — SIGNIFICANT CHANGE UP (ref 22–31)
CREAT SERPL-MCNC: 0.35 MG/DL — LOW (ref 0.5–1.3)
CRP SERPL-MCNC: 0.96 MG/DL — HIGH (ref 0–0.4)
D DIMER BLD IA.RAPID-MCNC: 421 NG/ML DDU — HIGH
DIGOXIN SERPL-MCNC: 0.5 NG/ML — LOW (ref 0.8–2)
EOSINOPHIL # BLD AUTO: 0.2 K/UL — SIGNIFICANT CHANGE UP (ref 0–0.5)
EOSINOPHIL NFR BLD AUTO: 2.1 % — SIGNIFICANT CHANGE UP (ref 0–6)
FERRITIN SERPL-MCNC: 115 NG/ML — SIGNIFICANT CHANGE UP (ref 30–400)
GLUCOSE BLDC GLUCOMTR-MCNC: 187 MG/DL — HIGH (ref 70–99)
GLUCOSE SERPL-MCNC: 171 MG/DL — HIGH (ref 70–99)
HCT VFR BLD CALC: 24 % — LOW (ref 39–50)
HCT VFR BLD CALC: 25.2 % — LOW (ref 39–50)
HCT VFR BLD CALC: 29 % — LOW (ref 39–50)
HGB BLD-MCNC: 7.3 G/DL — LOW (ref 13–17)
HGB BLD-MCNC: 7.6 G/DL — LOW (ref 13–17)
HGB BLD-MCNC: 9 G/DL — LOW (ref 13–17)
IMM GRANULOCYTES NFR BLD AUTO: 1.3 % — SIGNIFICANT CHANGE UP (ref 0–1.5)
LYMPHOCYTES # BLD AUTO: 2.44 K/UL — SIGNIFICANT CHANGE UP (ref 1–3.3)
LYMPHOCYTES # BLD AUTO: 25.4 % — SIGNIFICANT CHANGE UP (ref 13–44)
MAGNESIUM SERPL-MCNC: 2 MG/DL — SIGNIFICANT CHANGE UP (ref 1.6–2.6)
MCHC RBC-ENTMCNC: 25.2 PG — LOW (ref 27–34)
MCHC RBC-ENTMCNC: 25.2 PG — LOW (ref 27–34)
MCHC RBC-ENTMCNC: 26.2 PG — LOW (ref 27–34)
MCHC RBC-ENTMCNC: 30.2 GM/DL — LOW (ref 32–36)
MCHC RBC-ENTMCNC: 30.4 GM/DL — LOW (ref 32–36)
MCHC RBC-ENTMCNC: 31 GM/DL — LOW (ref 32–36)
MCV RBC AUTO: 82.8 FL — SIGNIFICANT CHANGE UP (ref 80–100)
MCV RBC AUTO: 83.4 FL — SIGNIFICANT CHANGE UP (ref 80–100)
MCV RBC AUTO: 84.5 FL — SIGNIFICANT CHANGE UP (ref 80–100)
MONOCYTES # BLD AUTO: 1.13 K/UL — HIGH (ref 0–0.9)
MONOCYTES NFR BLD AUTO: 11.8 % — SIGNIFICANT CHANGE UP (ref 2–14)
NEUTROPHILS # BLD AUTO: 5.67 K/UL — SIGNIFICANT CHANGE UP (ref 1.8–7.4)
NEUTROPHILS NFR BLD AUTO: 59.1 % — SIGNIFICANT CHANGE UP (ref 43–77)
NRBC # BLD: 0 /100 WBCS — SIGNIFICANT CHANGE UP (ref 0–0)
PHOSPHATE SERPL-MCNC: 2 MG/DL — LOW (ref 2.5–4.5)
PLATELET # BLD AUTO: 349 K/UL — SIGNIFICANT CHANGE UP (ref 150–400)
PLATELET # BLD AUTO: 368 K/UL — SIGNIFICANT CHANGE UP (ref 150–400)
PLATELET # BLD AUTO: 376 K/UL — SIGNIFICANT CHANGE UP (ref 150–400)
POTASSIUM SERPL-MCNC: 3.4 MMOL/L — LOW (ref 3.5–5.3)
POTASSIUM SERPL-SCNC: 3.4 MMOL/L — LOW (ref 3.5–5.3)
PROT SERPL-MCNC: 6.1 G/DL — SIGNIFICANT CHANGE UP (ref 6–8.3)
RBC # BLD: 2.9 M/UL — LOW (ref 4.2–5.8)
RBC # BLD: 3.02 M/UL — LOW (ref 4.2–5.8)
RBC # BLD: 3.43 M/UL — LOW (ref 4.2–5.8)
RBC # FLD: 23.2 % — HIGH (ref 10.3–14.5)
RBC # FLD: 25 % — HIGH (ref 10.3–14.5)
RBC # FLD: 26.5 % — HIGH (ref 10.3–14.5)
RH IG SCN BLD-IMP: NEGATIVE — SIGNIFICANT CHANGE UP
SODIUM SERPL-SCNC: 142 MMOL/L — SIGNIFICANT CHANGE UP (ref 135–145)
WBC # BLD: 7.75 K/UL — SIGNIFICANT CHANGE UP (ref 3.8–10.5)
WBC # BLD: 9.59 K/UL — SIGNIFICANT CHANGE UP (ref 3.8–10.5)
WBC # BLD: 9.75 K/UL — SIGNIFICANT CHANGE UP (ref 3.8–10.5)
WBC # FLD AUTO: 7.75 K/UL — SIGNIFICANT CHANGE UP (ref 3.8–10.5)
WBC # FLD AUTO: 9.59 K/UL — SIGNIFICANT CHANGE UP (ref 3.8–10.5)
WBC # FLD AUTO: 9.75 K/UL — SIGNIFICANT CHANGE UP (ref 3.8–10.5)

## 2020-12-25 PROCEDURE — 99233 SBSQ HOSP IP/OBS HIGH 50: CPT | Mod: GC

## 2020-12-25 PROCEDURE — 99232 SBSQ HOSP IP/OBS MODERATE 35: CPT

## 2020-12-25 RX ORDER — AMIODARONE HYDROCHLORIDE 400 MG/1
0.5 TABLET ORAL
Qty: 900 | Refills: 0 | Status: DISCONTINUED | OUTPATIENT
Start: 2020-12-25 | End: 2020-12-26

## 2020-12-25 RX ORDER — SODIUM CHLORIDE 9 MG/ML
500 INJECTION, SOLUTION INTRAVENOUS ONCE
Refills: 0 | Status: COMPLETED | OUTPATIENT
Start: 2020-12-25 | End: 2020-12-25

## 2020-12-25 RX ORDER — DEXMEDETOMIDINE HYDROCHLORIDE IN 0.9% SODIUM CHLORIDE 4 UG/ML
0.2 INJECTION INTRAVENOUS
Qty: 400 | Refills: 0 | Status: DISCONTINUED | OUTPATIENT
Start: 2020-12-25 | End: 2020-12-30

## 2020-12-25 RX ORDER — PANTOPRAZOLE SODIUM 20 MG/1
40 TABLET, DELAYED RELEASE ORAL EVERY 12 HOURS
Refills: 0 | Status: DISCONTINUED | OUTPATIENT
Start: 2020-12-25 | End: 2020-12-30

## 2020-12-25 RX ORDER — POTASSIUM PHOSPHATE, MONOBASIC POTASSIUM PHOSPHATE, DIBASIC 236; 224 MG/ML; MG/ML
30 INJECTION, SOLUTION INTRAVENOUS ONCE
Refills: 0 | Status: COMPLETED | OUTPATIENT
Start: 2020-12-25 | End: 2020-12-25

## 2020-12-25 RX ADMIN — Medication 1 APPLICATION(S): at 18:07

## 2020-12-25 RX ADMIN — Medication 1 APPLICATION(S): at 12:40

## 2020-12-25 RX ADMIN — MEROPENEM 100 MILLIGRAM(S): 1 INJECTION INTRAVENOUS at 22:26

## 2020-12-25 RX ADMIN — Medication 300 MILLIGRAM(S): at 15:03

## 2020-12-25 RX ADMIN — PANTOPRAZOLE SODIUM 40 MILLIGRAM(S): 20 TABLET, DELAYED RELEASE ORAL at 22:26

## 2020-12-25 RX ADMIN — Medication 300 MILLIGRAM(S): at 01:34

## 2020-12-25 RX ADMIN — CHLORHEXIDINE GLUCONATE 15 MILLILITER(S): 213 SOLUTION TOPICAL at 06:27

## 2020-12-25 RX ADMIN — Medication 1 APPLICATION(S): at 00:28

## 2020-12-25 RX ADMIN — POTASSIUM PHOSPHATE, MONOBASIC POTASSIUM PHOSPHATE, DIBASIC 83.33 MILLIMOLE(S): 236; 224 INJECTION, SOLUTION INTRAVENOUS at 07:27

## 2020-12-25 RX ADMIN — HYDROMORPHONE HYDROCHLORIDE 1 MILLIGRAM(S): 2 INJECTION INTRAMUSCULAR; INTRAVENOUS; SUBCUTANEOUS at 00:45

## 2020-12-25 RX ADMIN — INSULIN HUMAN 2: 100 INJECTION, SOLUTION SUBCUTANEOUS at 00:27

## 2020-12-25 RX ADMIN — SODIUM CHLORIDE 500 MILLILITER(S): 9 INJECTION, SOLUTION INTRAVENOUS at 07:44

## 2020-12-25 RX ADMIN — HYDROMORPHONE HYDROCHLORIDE 1 MILLIGRAM(S): 2 INJECTION INTRAMUSCULAR; INTRAVENOUS; SUBCUTANEOUS at 23:00

## 2020-12-25 RX ADMIN — PANTOPRAZOLE SODIUM 40 MILLIGRAM(S): 20 TABLET, DELAYED RELEASE ORAL at 07:26

## 2020-12-25 RX ADMIN — CHLORHEXIDINE GLUCONATE 15 MILLILITER(S): 213 SOLUTION TOPICAL at 18:07

## 2020-12-25 RX ADMIN — INSULIN HUMAN 2: 100 INJECTION, SOLUTION SUBCUTANEOUS at 06:28

## 2020-12-25 RX ADMIN — MEROPENEM 100 MILLIGRAM(S): 1 INJECTION INTRAVENOUS at 13:58

## 2020-12-25 RX ADMIN — MEROPENEM 100 MILLIGRAM(S): 1 INJECTION INTRAVENOUS at 06:25

## 2020-12-25 RX ADMIN — HYDROMORPHONE HYDROCHLORIDE 1 MILLIGRAM(S): 2 INJECTION INTRAMUSCULAR; INTRAVENOUS; SUBCUTANEOUS at 01:15

## 2020-12-25 RX ADMIN — HYDROMORPHONE HYDROCHLORIDE 1 MILLIGRAM(S): 2 INJECTION INTRAMUSCULAR; INTRAVENOUS; SUBCUTANEOUS at 22:26

## 2020-12-25 RX ADMIN — CHLORHEXIDINE GLUCONATE 1 APPLICATION(S): 213 SOLUTION TOPICAL at 06:26

## 2020-12-25 RX ADMIN — AMIODARONE HYDROCHLORIDE 400 MILLIGRAM(S): 400 TABLET ORAL at 06:26

## 2020-12-25 RX ADMIN — Medication 1 APPLICATION(S): at 06:22

## 2020-12-25 NOTE — PROGRESS NOTE ADULT - SUBJECTIVE AND OBJECTIVE BOX
OVERNIGHT EVENTS:  Hgb dropped 2 points overnight and pt with dark/black stools    SUBJECTIVE / INTERVAL HPI: Patient seen and examined at bedside. alert but did not participate in interview.    VITAL SIGNS:  Vital Signs Last 24 Hrs  T(C): 37.6 (24 Dec 2020 22:13), Max: 37.6 (24 Dec 2020 22:13)  T(F): 99.7 (24 Dec 2020 22:13), Max: 99.7 (24 Dec 2020 22:13)  HR: 102 (25 Dec 2020 10:00) (85 - 144)  BP: 138/68 (24 Dec 2020 20:00) (138/68 - 138/68)  BP(mean): 96 (24 Dec 2020 20:00) (96 - 96)  RR: 35 (25 Dec 2020 10:00) (22 - 38)  SpO2: 99% (25 Dec 2020 10:00) (93% - 100%)    PHYSICAL EXAM:    General: WDWN, lethargic but arousable  HEENT: pupils reactive, tracking, eyes open  Neck: trach in place  Cardiovascular: distant heart sounds  Respiratory: CTAB, no WRR  Gastrointestinal: soft, nontender, nondistended. bowel sounds present, rectal tube in place with dark stool  Genitourinary: santana in place  Skin: no ulcerations or visible rashes appreciated  Extremities: WWP; no edema  Vascular: 2+ DP pulses B/L  Neurological: alert and following commands; squeezing fingers and plantar flexion    MEDICATIONS:  MEDICATIONS  (STANDING):  aMIOdarone Infusion 0.5 mG/Min (16.7 mL/Hr) IV Continuous <Continuous>  chlorhexidine 0.12% Liquid 15 milliLiter(s) Oral Mucosa every 12 hours  chlorhexidine 2% Cloths 1 Application(s) Topical <User Schedule>  dexMEDEtomidine Infusion 0.2 MICROgram(s)/kG/Hr (4.45 mL/Hr) IV Continuous <Continuous>  dextrose 40% Gel 15 Gram(s) Oral once  dextrose 5%. 1000 milliLiter(s) (50 mL/Hr) IV Continuous <Continuous>  dextrose 5%. 1000 milliLiter(s) (100 mL/Hr) IV Continuous <Continuous>  dextrose 50% Injectable 12.5 Gram(s) IV Push once  dextrose 50% Injectable 25 Gram(s) IV Push once  dextrose 50% Injectable 25 Gram(s) IV Push once  glucagon  Injectable 1 milliGRAM(s) IntraMuscular once  meropenem  IVPB 1000 milliGRAM(s) IV Intermittent every 8 hours  pantoprazole  Injectable 40 milliGRAM(s) IV Push every 12 hours  petrolatum Ophthalmic Ointment 1 Application(s) Both EYES every 6 hours  phenylephrine    Infusion 0.1 MICROgram(s)/kG/Min (3.33 mL/Hr) IV Continuous <Continuous>  propofol Infusion 10 MICROgram(s)/kG/Min (5.33 mL/Hr) IV Continuous <Continuous>  vancomycin  IVPB 1500 milliGRAM(s) IV Intermittent every 12 hours    MEDICATIONS  (PRN):  acetaminophen    Suspension .. 650 milliGRAM(s) Oral every 6 hours PRN Temp greater or equal to 38C (100.4F), Mild Pain (1 - 3)  HYDROmorphone  Injectable 1 milliGRAM(s) IV Push every 6 hours PRN Moderate Pain (4 - 6)      ALLERGIES:  Allergies    No Known Allergies    Intolerances        LABS:                        7.3    9.59  )-----------( 376      ( 25 Dec 2020 05:13 )             24.0     12    142  |  106  |  25<H>  ----------------------------<  171<H>  3.4<L>   |  25  |  0.35<L>    Ca    9.1      25 Dec 2020 05:13  Phos  2.0       Mg     2.0         TPro  6.1  /  Alb  3.3  /  TBili  0.4  /  DBili  x   /  AST  15  /  ALT  16  /  AlkPhos  70        Urinalysis Basic - ( 24 Dec 2020 12:24 )    Color: Yellow / Appearance: Clear / S.020 / pH: x  Gluc: x / Ketone: Trace mg/dL  / Bili: Negative / Urobili: 2.0 E.U./dL   Blood: x / Protein: Trace mg/dL / Nitrite: NEGATIVE   Leuk Esterase: NEGATIVE / RBC: 5-10 /HPF / WBC 5-10 /HPF   Sq Epi: x / Non Sq Epi: 0-5 /HPF / Bacteria: Present /HPF      CAPILLARY BLOOD GLUCOSE      POCT Blood Glucose.: 187 mg/dL (25 Dec 2020 00:04)      RADIOLOGY & ADDITIONAL TESTS: Reviewed.

## 2020-12-25 NOTE — PROGRESS NOTE ADULT - ASSESSMENT
70M DMT2, A-fib on Xarelto, HTN, HLD, severe carotid disease who was admitted to [facility] on 11/27 where he received decadron, remdesivir, convalescent plasma, and several antibiotics before being transferred to [facility].  currently s/p trach and PEG and treatment for mssa VAP treatment wtih zosyn from 12/13-20.  course has been complicated by afib with rvr, and hemodynamic instability with new melanotic stools and devloping shock-like picture.      NEUROLOGY  #Sedation  - dc'd propofol after episode of hypotension on 12/23  - start precedex for agitation    RESPIRATORY   #Acute hypoxic respiratory failure 2/2 COVID.  Now exhibiting significant tachypnea to 30s-40s likely 2/2 underlying infection.  -pt spiked fever on 12/12; sputum cx negative but concern for overlying pna so received zosyn 12/14-12/20  -s/p trach 12 /18 and peg 12/17   -see ID below    CARDIOVASCULAR    #H/o A-fib with RVR  - dc lovenox 2/2 GIB  - amiodarone dc'ed on 12/20, due to prolonged qtc 560 but repeat 462  - episode of Afib with RVR o/n 12/22, given amiodarone bolus  - dc amiodarone gtt 12/23; start amio 400mg BID  - amio boluses if enters afib with RVR  - dc PO amiodarone and start gtt since NPO for GI bleed  - f/u EP recs  - TTE with no wall abnormalities    #hypotension  -c/w phenylephrine    #Severe carotid disease  -Patient with occluded right ICA and severe left ICA stenosis, s/p left CEA, uncomplicated  -f/u with cardiology outpatient    #QTC prolongation:   -ekg with qtc 560 on 12/20  -stopped Precedex, stopped amiodarone  -restarted amiodarone, continue to monitor QTc   -daily EKG    RENAL   - monitor Is/Os    ENDOCRINE  - goal 140-180 blood glucose  - c/w mSSI  - hold lantus since NPO for GIB    GASTROINTESTINAL  - hold glucerna feeds via peg  - protonix 40  - Rectal tube in place  - NPO for GIB  - see anemia below    INFECTIOUS DISEASE    #Fever of unknown origin with developing shock  -new fever spike 12/23 overnight  -f/u blood cx  -f/u sputum cx  -CXR with no new infiltrates  -MRSA swab negative  -UA weakly positive  -monitor for trachiitis  -started on vancomycin and meropenem for empiric coverage until culture susceptibilities return  -given 500cc bolus LR overnight  -dc'd lopressor    HEMATOLOGY/ONCOLOGY  #Anemia  patient experienced drop in Hgb from 9.8 to 7.3 over past day and found to have melanotic stools.  also hemodynamically unstable with BPs in 80s-90s/30s-40s.  -ordered for 1u pRBC  -maintain active T&S  -dc'd lopressor  -start IV protonix   -f/u FOBT    ACCESS/LINES/DRAINS  A-line 12/17  RIJ 12/17-12/24   PEG 12/17  Peripheral line placed 12/25    PROPHYLACTIC  ELECTROLYTES: Mg<2, K<4  DIET: NPO  GI PPX: Protonix  VTE PPX: SCD, lovenox bid for afib  CODE: FULL  DISPO: MICU

## 2020-12-25 NOTE — PROGRESS NOTE ADULT - SUBJECTIVE AND OBJECTIVE BOX
Pt seen and examined   events noted  called to evaluate due to melena and drop in Hgb    REVIEW OF SYSTEMS:  unable      MEDICATIONS:  MEDICATIONS  (STANDING):  aMIOdarone Infusion 0.5 mG/Min (16.7 mL/Hr) IV Continuous <Continuous>  chlorhexidine 0.12% Liquid 15 milliLiter(s) Oral Mucosa every 12 hours  chlorhexidine 2% Cloths 1 Application(s) Topical <User Schedule>  dexMEDEtomidine Infusion 0.2 MICROgram(s)/kG/Hr (4.45 mL/Hr) IV Continuous <Continuous>  dextrose 40% Gel 15 Gram(s) Oral once  dextrose 5%. 1000 milliLiter(s) (50 mL/Hr) IV Continuous <Continuous>  dextrose 5%. 1000 milliLiter(s) (100 mL/Hr) IV Continuous <Continuous>  dextrose 50% Injectable 25 Gram(s) IV Push once  dextrose 50% Injectable 12.5 Gram(s) IV Push once  dextrose 50% Injectable 25 Gram(s) IV Push once  glucagon  Injectable 1 milliGRAM(s) IntraMuscular once  meropenem  IVPB 1000 milliGRAM(s) IV Intermittent every 8 hours  pantoprazole  Injectable 40 milliGRAM(s) IV Push every 12 hours  petrolatum Ophthalmic Ointment 1 Application(s) Both EYES every 6 hours  phenylephrine    Infusion 0.1 MICROgram(s)/kG/Min (3.33 mL/Hr) IV Continuous <Continuous>  propofol Infusion 10 MICROgram(s)/kG/Min (5.33 mL/Hr) IV Continuous <Continuous>  vancomycin  IVPB 1500 milliGRAM(s) IV Intermittent every 12 hours    MEDICATIONS  (PRN):  acetaminophen    Suspension .. 650 milliGRAM(s) Oral every 6 hours PRN Temp greater or equal to 38C (100.4F), Mild Pain (1 - 3)  HYDROmorphone  Injectable 1 milliGRAM(s) IV Push every 6 hours PRN Moderate Pain (4 - 6)      Allergies    No Known Allergies    Intolerances        Vital Signs Last 24 Hrs  T(C): 37.6 (24 Dec 2020 22:13), Max: 37.6 (24 Dec 2020 22:13)  T(F): 99.7 (24 Dec 2020 22:13), Max: 99.7 (24 Dec 2020 22:13)  HR: 144 (25 Dec 2020 11:00) (85 - 144)  BP: 138/68 (24 Dec 2020 20:00) (138/68 - 138/68)  BP(mean): 96 (24 Dec 2020 20:00) (96 - 96)  RR: 27 (25 Dec 2020 11:00) (22 - 38)  SpO2: 93% (25 Dec 2020 11:00) (93% - 100%)     @ 07:01  -   @ 07:00  --------------------------------------------------------  IN: 2655.6 mL / OUT: 1685 mL / NET: 970.6 mL     @ 07:01  -   @ 12:22  --------------------------------------------------------  IN: 50 mL / OUT: 170 mL / NET: -120 mL        PHYSICAL EXAM:    General:  in no acute distress  HEENT: MMM, conjunctiva and sclera clear  Gastrointestinal: Soft non-tender non-distended; Normal bowel sounds; peg site ok  Skin: Warm and dry. No obvious rash  Rectal tube = melena    LABS:      CBC Full  -  ( 25 Dec 2020 05:13 )  WBC Count : 9.59 K/uL  RBC Count : 2.90 M/uL  Hemoglobin : 7.3 g/dL  Hematocrit : 24.0 %  Platelet Count - Automated : 376 K/uL  Mean Cell Volume : 82.8 fl  Mean Cell Hemoglobin : 25.2 pg  Mean Cell Hemoglobin Concentration : 30.4 gm/dL  Auto Neutrophil # : 5.67 K/uL  Auto Lymphocyte # : 2.44 K/uL  Auto Monocyte # : 1.13 K/uL  Auto Eosinophil # : 0.20 K/uL  Auto Basophil # : 0.03 K/uL  Auto Neutrophil % : 59.1 %  Auto Lymphocyte % : 25.4 %  Auto Monocyte % : 11.8 %  Auto Eosinophil % : 2.1 %  Auto Basophil % : 0.3 %        142  |  106  |  25<H>  ----------------------------<  171<H>  3.4<L>   |  25  |  0.35<L>    Ca    9.1      25 Dec 2020 05:13  Phos  2.0     12  Mg     2.0         TPro  6.1  /  Alb  3.3  /  TBili  0.4  /  DBili  x   /  AST  15  /  ALT  16  /  AlkPhos  70            Urinalysis Basic - ( 24 Dec 2020 12:24 )    Color: Yellow / Appearance: Clear / S.020 / pH: x  Gluc: x / Ketone: Trace mg/dL  / Bili: Negative / Urobili: 2.0 E.U./dL   Blood: x / Protein: Trace mg/dL / Nitrite: NEGATIVE   Leuk Esterase: NEGATIVE / RBC: 5-10 /HPF / WBC 5-10 /HPF   Sq Epi: x / Non Sq Epi: 0-5 /HPF / Bacteria: Present /HPF                RADIOLOGY & ADDITIONAL STUDIES (The following images were personally reviewed):

## 2020-12-25 NOTE — PROGRESS NOTE ADULT - ASSESSMENT
had egd 2 weeks ago for peg no abnormalities noted  ? stress gastritis or ulcers which bled in light of AC  Peg flushed by me no blood seen  for now IV PPI  hold/dc AC  follow clinical course

## 2020-12-25 NOTE — PROGRESS NOTE ADULT - SUBJECTIVE AND OBJECTIVE BOX
EPS Progress Note    S:  Increasing phenylephrine requirements in setting of rectal bleeding    T(C): 36.4 (12-25-20 @ 21:40), Max: 37.6 (12-24-20 @ 22:13)  HR: 69 (12-25-20 @ 19:00) (67 - 144)  BP: --  RR: 26 (12-25-20 @ 19:00) (16 - 38)  SpO2: 100% (12-25-20 @ 19:00) (93% - 100%)  Wt(kg): --     Telemetry:  NSR with alternating with periods of AF with RVR to 140s          General:  Trach      Chest:  Chest is clear to auscultation bilaterally without wheezes, crackles, or rhonchi  Cardiac:  Regular rate and rhythm.  No murmur, rubs, or gallops heard.  Abdomen:  Soft without rebound or guarding.  Bowel sounds are presnt in all 4 quadrants.  No hepatosplenomegaly  Extremities:  No lower extremity edema is present.  No cyanosis or clubbing       MEDICATIONS  (STANDING):  aMIOdarone Infusion 0.5 mG/Min (16.7 mL/Hr) IV Continuous <Continuous>  chlorhexidine 0.12% Liquid 15 milliLiter(s) Oral Mucosa every 12 hours  chlorhexidine 2% Cloths 1 Application(s) Topical <User Schedule>  dexMEDEtomidine Infusion 0.2 MICROgram(s)/kG/Hr (4.45 mL/Hr) IV Continuous <Continuous>  dextrose 40% Gel 15 Gram(s) Oral once  dextrose 5%. 1000 milliLiter(s) (50 mL/Hr) IV Continuous <Continuous>  dextrose 5%. 1000 milliLiter(s) (100 mL/Hr) IV Continuous <Continuous>  dextrose 50% Injectable 12.5 Gram(s) IV Push once  dextrose 50% Injectable 25 Gram(s) IV Push once  dextrose 50% Injectable 25 Gram(s) IV Push once  glucagon  Injectable 1 milliGRAM(s) IntraMuscular once  meropenem  IVPB 1000 milliGRAM(s) IV Intermittent every 8 hours  pantoprazole  Injectable 40 milliGRAM(s) IV Push every 12 hours  petrolatum Ophthalmic Ointment 1 Application(s) Both EYES every 6 hours  phenylephrine    Infusion 0.1 MICROgram(s)/kG/Min (3.33 mL/Hr) IV Continuous <Continuous>  propofol Infusion 10 MICROgram(s)/kG/Min (5.33 mL/Hr) IV Continuous <Continuous>  vancomycin  IVPB 1500 milliGRAM(s) IV Intermittent every 12 hours    MEDICATIONS  (PRN):  acetaminophen    Suspension .. 650 milliGRAM(s) Oral every 6 hours PRN Temp greater or equal to 38C (100.4F), Mild Pain (1 - 3)  HYDROmorphone  Injectable 1 milliGRAM(s) IV Push every 6 hours PRN Moderate Pain (4 - 6)                                                         7.6    9.75  )-----------( 368      ( 25 Dec 2020 14:51 )             25.2     12-25    142  |  106  |  25<H>  ----------------------------<  171<H>  3.4<L>   |  25  |  0.35<L>    Ca    9.1      25 Dec 2020 05:13  Phos  2.0     12-25  Mg     2.0     12-25    TPro  6.1  /  Alb  3.3  /  TBili  0.4  /  DBili  x   /  AST  15  /  ALT  16  /  AlkPhos  70  12-25

## 2020-12-25 NOTE — PROGRESS NOTE ADULT - ASSESSMENT
70 year old male with diabetes, HTN, HLD, atrial fibrillation (on xarelto at home) admitted to [facility] with COVID, transferred to [facility] now trached with paroxysmal afib with RVR.   Patient with recurrent fevers on IV antibiotics,  Episodes of hypotension on pressors.  Hypotension independent of afib with RVR episodes.  Would maintain PO amiodarone 400 BID for 10 days with DAILY EKGs to monitor QTC.  can overlap with IV drip for 1-2 days.  If switch to PO amio no more digoxin.  Wean off pressors when BP allows.  EP will follow with you.

## 2020-12-26 LAB
-  AMPICILLIN/SULBACTAM: SIGNIFICANT CHANGE UP
-  AMPICILLIN: SIGNIFICANT CHANGE UP
-  CEFAZOLIN: SIGNIFICANT CHANGE UP
-  CEFEPIME: SIGNIFICANT CHANGE UP
-  CEFTAZIDIME: SIGNIFICANT CHANGE UP
-  CEFTRIAXONE: SIGNIFICANT CHANGE UP
-  CIPROFLOXACIN: SIGNIFICANT CHANGE UP
-  GENTAMICIN: SIGNIFICANT CHANGE UP
-  LEVOFLOXACIN: SIGNIFICANT CHANGE UP
-  MEROPENEM: SIGNIFICANT CHANGE UP
-  TOBRAMYCIN: SIGNIFICANT CHANGE UP
-  TRIMETHOPRIM/SULFAMETHOXAZOLE: SIGNIFICANT CHANGE UP
-  TRIMETHOPRIM/SULFAMETHOXAZOLE: SIGNIFICANT CHANGE UP
ALBUMIN SERPL ELPH-MCNC: 3 G/DL — LOW (ref 3.3–5)
ALBUMIN SERPL ELPH-MCNC: 3.2 G/DL — LOW (ref 3.3–5)
ALP SERPL-CCNC: 66 U/L — SIGNIFICANT CHANGE UP (ref 40–120)
ALP SERPL-CCNC: 68 U/L — SIGNIFICANT CHANGE UP (ref 40–120)
ALT FLD-CCNC: 16 U/L — SIGNIFICANT CHANGE UP (ref 10–45)
ALT FLD-CCNC: 20 U/L — SIGNIFICANT CHANGE UP (ref 10–45)
ANION GAP SERPL CALC-SCNC: 10 MMOL/L — SIGNIFICANT CHANGE UP (ref 5–17)
ANION GAP SERPL CALC-SCNC: 12 MMOL/L — SIGNIFICANT CHANGE UP (ref 5–17)
ANISOCYTOSIS BLD QL: SLIGHT — SIGNIFICANT CHANGE UP
AST SERPL-CCNC: 17 U/L — SIGNIFICANT CHANGE UP (ref 10–40)
AST SERPL-CCNC: 21 U/L — SIGNIFICANT CHANGE UP (ref 10–40)
BASE EXCESS BLDA CALC-SCNC: 5.6 MMOL/L — HIGH (ref -2–3)
BASOPHILS # BLD AUTO: 0.06 K/UL — SIGNIFICANT CHANGE UP (ref 0–0.2)
BASOPHILS NFR BLD AUTO: 0.9 % — SIGNIFICANT CHANGE UP (ref 0–2)
BILIRUB SERPL-MCNC: 0.6 MG/DL — SIGNIFICANT CHANGE UP (ref 0.2–1.2)
BILIRUB SERPL-MCNC: 0.7 MG/DL — SIGNIFICANT CHANGE UP (ref 0.2–1.2)
BUN SERPL-MCNC: 14 MG/DL — SIGNIFICANT CHANGE UP (ref 7–23)
BUN SERPL-MCNC: 8 MG/DL — SIGNIFICANT CHANGE UP (ref 7–23)
CALCIUM SERPL-MCNC: 8.4 MG/DL — SIGNIFICANT CHANGE UP (ref 8.4–10.5)
CALCIUM SERPL-MCNC: 8.5 MG/DL — SIGNIFICANT CHANGE UP (ref 8.4–10.5)
CHLORIDE SERPL-SCNC: 104 MMOL/L — SIGNIFICANT CHANGE UP (ref 96–108)
CHLORIDE SERPL-SCNC: 104 MMOL/L — SIGNIFICANT CHANGE UP (ref 96–108)
CO2 SERPL-SCNC: 23 MMOL/L — SIGNIFICANT CHANGE UP (ref 22–31)
CO2 SERPL-SCNC: 25 MMOL/L — SIGNIFICANT CHANGE UP (ref 22–31)
CREAT SERPL-MCNC: 0.32 MG/DL — LOW (ref 0.5–1.3)
CREAT SERPL-MCNC: 0.38 MG/DL — LOW (ref 0.5–1.3)
CRP SERPL-MCNC: 1.58 MG/DL — HIGH (ref 0–0.4)
CULTURE RESULTS: NO GROWTH — SIGNIFICANT CHANGE UP
D DIMER BLD IA.RAPID-MCNC: 503 NG/ML DDU — HIGH
DACRYOCYTES BLD QL SMEAR: SLIGHT — SIGNIFICANT CHANGE UP
EOSINOPHIL # BLD AUTO: 0.44 K/UL — SIGNIFICANT CHANGE UP (ref 0–0.5)
EOSINOPHIL NFR BLD AUTO: 6.2 % — HIGH (ref 0–6)
FERRITIN SERPL-MCNC: 112 NG/ML — SIGNIFICANT CHANGE UP (ref 30–400)
GAS PNL BLDA: SIGNIFICANT CHANGE UP
GIANT PLATELETS BLD QL SMEAR: PRESENT — SIGNIFICANT CHANGE UP
GLUCOSE SERPL-MCNC: 130 MG/DL — HIGH (ref 70–99)
GLUCOSE SERPL-MCNC: 162 MG/DL — HIGH (ref 70–99)
HCO3 BLDA-SCNC: 32 MMOL/L — HIGH (ref 21–28)
HCT VFR BLD CALC: 28.6 % — LOW (ref 39–50)
HGB BLD-MCNC: 8.9 G/DL — LOW (ref 13–17)
HYPOCHROMIA BLD QL: SLIGHT — SIGNIFICANT CHANGE UP
LYMPHOCYTES # BLD AUTO: 1.71 K/UL — SIGNIFICANT CHANGE UP (ref 1–3.3)
LYMPHOCYTES # BLD AUTO: 23.9 % — SIGNIFICANT CHANGE UP (ref 13–44)
MAGNESIUM SERPL-MCNC: 1.8 MG/DL — SIGNIFICANT CHANGE UP (ref 1.6–2.6)
MAGNESIUM SERPL-MCNC: 2.2 MG/DL — SIGNIFICANT CHANGE UP (ref 1.6–2.6)
MANUAL SMEAR VERIFICATION: SIGNIFICANT CHANGE UP
MCHC RBC-ENTMCNC: 25.9 PG — LOW (ref 27–34)
MCHC RBC-ENTMCNC: 31.1 GM/DL — LOW (ref 32–36)
MCV RBC AUTO: 83.4 FL — SIGNIFICANT CHANGE UP (ref 80–100)
METHOD TYPE: SIGNIFICANT CHANGE UP
MICROCYTES BLD QL: SLIGHT — SIGNIFICANT CHANGE UP
MONOCYTES # BLD AUTO: 0.89 K/UL — SIGNIFICANT CHANGE UP (ref 0–0.9)
MONOCYTES NFR BLD AUTO: 12.4 % — SIGNIFICANT CHANGE UP (ref 2–14)
MYELOCYTES NFR BLD: 1.7 % — HIGH (ref 0–0)
NEUTROPHILS # BLD AUTO: 3.87 K/UL — SIGNIFICANT CHANGE UP (ref 1.8–7.4)
NEUTROPHILS NFR BLD AUTO: 54 % — SIGNIFICANT CHANGE UP (ref 43–77)
NRBC # BLD: 2 /100 — HIGH (ref 0–0)
NRBC # BLD: SIGNIFICANT CHANGE UP /100 WBCS (ref 0–0)
OVALOCYTES BLD QL SMEAR: SLIGHT — SIGNIFICANT CHANGE UP
PCO2 BLDA: 55 MMHG — HIGH (ref 35–48)
PH BLDA: 7.39 — SIGNIFICANT CHANGE UP (ref 7.35–7.45)
PHOSPHATE SERPL-MCNC: 1.4 MG/DL — LOW (ref 2.5–4.5)
PHOSPHATE SERPL-MCNC: 2.2 MG/DL — LOW (ref 2.5–4.5)
PLAT MORPH BLD: NORMAL — SIGNIFICANT CHANGE UP
PLATELET # BLD AUTO: 369 K/UL — SIGNIFICANT CHANGE UP (ref 150–400)
PO2 BLDA: 125 MMHG — HIGH (ref 83–108)
POIKILOCYTOSIS BLD QL AUTO: SLIGHT — SIGNIFICANT CHANGE UP
POLYCHROMASIA BLD QL SMEAR: SLIGHT — SIGNIFICANT CHANGE UP
POTASSIUM SERPL-MCNC: 3.4 MMOL/L — LOW (ref 3.5–5.3)
POTASSIUM SERPL-MCNC: 3.4 MMOL/L — LOW (ref 3.5–5.3)
POTASSIUM SERPL-SCNC: 3.4 MMOL/L — LOW (ref 3.5–5.3)
POTASSIUM SERPL-SCNC: 3.4 MMOL/L — LOW (ref 3.5–5.3)
PROT SERPL-MCNC: 5.7 G/DL — LOW (ref 6–8.3)
PROT SERPL-MCNC: 5.9 G/DL — LOW (ref 6–8.3)
RBC # BLD: 3.43 M/UL — LOW (ref 4.2–5.8)
RBC # FLD: 23.1 % — HIGH (ref 10.3–14.5)
RBC BLD AUTO: ABNORMAL
ROULEAUX BLD QL SMEAR: PRESENT
SAO2 % BLDA: 98 % — SIGNIFICANT CHANGE UP (ref 95–100)
SCHISTOCYTES BLD QL AUTO: SLIGHT — SIGNIFICANT CHANGE UP
SMUDGE CELLS # BLD: PRESENT — SIGNIFICANT CHANGE UP
SODIUM SERPL-SCNC: 139 MMOL/L — SIGNIFICANT CHANGE UP (ref 135–145)
SODIUM SERPL-SCNC: 139 MMOL/L — SIGNIFICANT CHANGE UP (ref 135–145)
SPECIMEN SOURCE: SIGNIFICANT CHANGE UP
VARIANT LYMPHS # BLD: 0.9 % — SIGNIFICANT CHANGE UP (ref 0–6)
WBC # BLD: 7.17 K/UL — SIGNIFICANT CHANGE UP (ref 3.8–10.5)
WBC # FLD AUTO: 7.17 K/UL — SIGNIFICANT CHANGE UP (ref 3.8–10.5)

## 2020-12-26 PROCEDURE — 99291 CRITICAL CARE FIRST HOUR: CPT

## 2020-12-26 RX ORDER — MAGNESIUM SULFATE 500 MG/ML
1 VIAL (ML) INJECTION ONCE
Refills: 0 | Status: COMPLETED | OUTPATIENT
Start: 2020-12-26 | End: 2020-12-26

## 2020-12-26 RX ORDER — POTASSIUM PHOSPHATE, MONOBASIC POTASSIUM PHOSPHATE, DIBASIC 236; 224 MG/ML; MG/ML
30 INJECTION, SOLUTION INTRAVENOUS ONCE
Refills: 0 | Status: COMPLETED | OUTPATIENT
Start: 2020-12-26 | End: 2020-12-26

## 2020-12-26 RX ORDER — QUETIAPINE FUMARATE 200 MG/1
25 TABLET, FILM COATED ORAL EVERY 12 HOURS
Refills: 0 | Status: DISCONTINUED | OUTPATIENT
Start: 2020-12-26 | End: 2020-12-26

## 2020-12-26 RX ORDER — MORPHINE SULFATE 50 MG/1
2 CAPSULE, EXTENDED RELEASE ORAL EVERY 4 HOURS
Refills: 0 | Status: DISCONTINUED | OUTPATIENT
Start: 2020-12-26 | End: 2020-12-26

## 2020-12-26 RX ORDER — OLANZAPINE 15 MG/1
5 TABLET, FILM COATED ORAL EVERY 8 HOURS
Refills: 0 | Status: DISCONTINUED | OUTPATIENT
Start: 2020-12-26 | End: 2020-12-29

## 2020-12-26 RX ORDER — OLANZAPINE 15 MG/1
10 TABLET, FILM COATED ORAL ONCE
Refills: 0 | Status: COMPLETED | OUTPATIENT
Start: 2020-12-26 | End: 2020-12-26

## 2020-12-26 RX ORDER — AMIODARONE HYDROCHLORIDE 400 MG/1
400 TABLET ORAL EVERY 12 HOURS
Refills: 0 | Status: DISCONTINUED | OUTPATIENT
Start: 2020-12-26 | End: 2020-12-28

## 2020-12-26 RX ADMIN — OLANZAPINE 5 MILLIGRAM(S): 15 TABLET, FILM COATED ORAL at 21:29

## 2020-12-26 RX ADMIN — PANTOPRAZOLE SODIUM 40 MILLIGRAM(S): 20 TABLET, DELAYED RELEASE ORAL at 06:52

## 2020-12-26 RX ADMIN — Medication 2 TABLET(S): at 09:10

## 2020-12-26 RX ADMIN — Medication 1 APPLICATION(S): at 11:09

## 2020-12-26 RX ADMIN — Medication 1 APPLICATION(S): at 23:33

## 2020-12-26 RX ADMIN — Medication 1 APPLICATION(S): at 00:00

## 2020-12-26 RX ADMIN — HYDROMORPHONE HYDROCHLORIDE 1 MILLIGRAM(S): 2 INJECTION INTRAMUSCULAR; INTRAVENOUS; SUBCUTANEOUS at 06:30

## 2020-12-26 RX ADMIN — HYDROMORPHONE HYDROCHLORIDE 1 MILLIGRAM(S): 2 INJECTION INTRAMUSCULAR; INTRAVENOUS; SUBCUTANEOUS at 06:00

## 2020-12-26 RX ADMIN — Medication 300 MILLIGRAM(S): at 01:28

## 2020-12-26 RX ADMIN — Medication 2 TABLET(S): at 15:10

## 2020-12-26 RX ADMIN — PANTOPRAZOLE SODIUM 40 MILLIGRAM(S): 20 TABLET, DELAYED RELEASE ORAL at 17:17

## 2020-12-26 RX ADMIN — Medication 100 GRAM(S): at 07:50

## 2020-12-26 RX ADMIN — MORPHINE SULFATE 2 MILLIGRAM(S): 50 CAPSULE, EXTENDED RELEASE ORAL at 09:37

## 2020-12-26 RX ADMIN — Medication 1 APPLICATION(S): at 17:17

## 2020-12-26 RX ADMIN — Medication 1 APPLICATION(S): at 07:58

## 2020-12-26 RX ADMIN — DEXMEDETOMIDINE HYDROCHLORIDE IN 0.9% SODIUM CHLORIDE 4.45 MICROGRAM(S)/KG/HR: 4 INJECTION INTRAVENOUS at 23:33

## 2020-12-26 RX ADMIN — POTASSIUM PHOSPHATE, MONOBASIC POTASSIUM PHOSPHATE, DIBASIC 83.33 MILLIMOLE(S): 236; 224 INJECTION, SOLUTION INTRAVENOUS at 23:33

## 2020-12-26 RX ADMIN — MEROPENEM 100 MILLIGRAM(S): 1 INJECTION INTRAVENOUS at 06:50

## 2020-12-26 RX ADMIN — CHLORHEXIDINE GLUCONATE 15 MILLILITER(S): 213 SOLUTION TOPICAL at 06:54

## 2020-12-26 RX ADMIN — OLANZAPINE 10 MILLIGRAM(S): 15 TABLET, FILM COATED ORAL at 14:19

## 2020-12-26 RX ADMIN — Medication 2 TABLET(S): at 21:31

## 2020-12-26 RX ADMIN — POTASSIUM PHOSPHATE, MONOBASIC POTASSIUM PHOSPHATE, DIBASIC 83.33 MILLIMOLE(S): 236; 224 INJECTION, SOLUTION INTRAVENOUS at 10:46

## 2020-12-26 RX ADMIN — MORPHINE SULFATE 2 MILLIGRAM(S): 50 CAPSULE, EXTENDED RELEASE ORAL at 09:16

## 2020-12-26 RX ADMIN — DEXMEDETOMIDINE HYDROCHLORIDE IN 0.9% SODIUM CHLORIDE 4.45 MICROGRAM(S)/KG/HR: 4 INJECTION INTRAVENOUS at 12:27

## 2020-12-26 RX ADMIN — CHLORHEXIDINE GLUCONATE 15 MILLILITER(S): 213 SOLUTION TOPICAL at 17:17

## 2020-12-26 RX ADMIN — CHLORHEXIDINE GLUCONATE 1 APPLICATION(S): 213 SOLUTION TOPICAL at 06:51

## 2020-12-26 RX ADMIN — AMIODARONE HYDROCHLORIDE 400 MILLIGRAM(S): 400 TABLET ORAL at 09:10

## 2020-12-26 RX ADMIN — AMIODARONE HYDROCHLORIDE 400 MILLIGRAM(S): 400 TABLET ORAL at 17:17

## 2020-12-26 NOTE — PROGRESS NOTE ADULT - SUBJECTIVE AND OBJECTIVE BOX
OVERNIGHT EVENTS: post transfusion cbc >9.     SUBJECTIVE / INTERVAL HPI: Patient seen and examined at bedside. He denied pain or discomfort. Followed commands and was interactive.     VITAL SIGNS:  Vital Signs Last 24 Hrs  T(C): 36.4 (25 Dec 2020 21:40), Max: 36.8 (25 Dec 2020 17:47)  T(F): 97.6 (25 Dec 2020 21:40), Max: 98.3 (25 Dec 2020 17:47)  HR: 69 (26 Dec 2020 08:00) (59 - 144)  BP: --  BP(mean): --  RR: 19 (26 Dec 2020 08:00) (16 - 35)  SpO2: 95% (26 Dec 2020 08:00) (93% - 100%)    PHYSICAL EXAM:    General: WDWN, arousable, following commands, interactive.   HEENT: NC/AT; PERRL, anicteric sclera; MMM  Neck: supple, trach in palce.   Cardiovascular: +S1/S2; RRR  Respiratory: CTA B/L; no W/R/R  Gastrointestinal: soft, diffusely tender to palpation  Extremities: WWP; no edema, clubbing or cyanosis  Vascular: 2+ radial, DP/PT pulses B/L  Neurological: AAOx3; no focal deficits    MEDICATIONS:  MEDICATIONS  (STANDING):  aMIOdarone Infusion 0.5 mG/Min (16.7 mL/Hr) IV Continuous <Continuous>  chlorhexidine 0.12% Liquid 15 milliLiter(s) Oral Mucosa every 12 hours  chlorhexidine 2% Cloths 1 Application(s) Topical <User Schedule>  dexMEDEtomidine Infusion 0.2 MICROgram(s)/kG/Hr (4.45 mL/Hr) IV Continuous <Continuous>  dextrose 40% Gel 15 Gram(s) Oral once  dextrose 5%. 1000 milliLiter(s) (100 mL/Hr) IV Continuous <Continuous>  dextrose 5%. 1000 milliLiter(s) (50 mL/Hr) IV Continuous <Continuous>  dextrose 50% Injectable 25 Gram(s) IV Push once  dextrose 50% Injectable 12.5 Gram(s) IV Push once  dextrose 50% Injectable 25 Gram(s) IV Push once  glucagon  Injectable 1 milliGRAM(s) IntraMuscular once  meropenem  IVPB 1000 milliGRAM(s) IV Intermittent every 8 hours  pantoprazole  Injectable 40 milliGRAM(s) IV Push every 12 hours  petrolatum Ophthalmic Ointment 1 Application(s) Both EYES every 6 hours  phenylephrine    Infusion 0.1 MICROgram(s)/kG/Min (3.33 mL/Hr) IV Continuous <Continuous>  propofol Infusion 10 MICROgram(s)/kG/Min (5.33 mL/Hr) IV Continuous <Continuous>    MEDICATIONS  (PRN):  acetaminophen    Suspension .. 650 milliGRAM(s) Oral every 6 hours PRN Temp greater or equal to 38C (100.4F), Mild Pain (1 - 3)  HYDROmorphone  Injectable 1 milliGRAM(s) IV Push every 6 hours PRN Moderate Pain (4 - 6)      ALLERGIES:  Allergies    No Known Allergies    Intolerances        LABS:                        8.9    7.17  )-----------( 369      ( 26 Dec 2020 06:18 )             28.6         139  |  104  |  14  ----------------------------<  162<H>  3.4<L>   |  25  |  0.32<L>    Ca    8.4      26 Dec 2020 06:18  Phos  1.4       Mg     1.8         TPro  5.7<L>  /  Alb  3.0<L>  /  TBili  0.7  /  DBili  x   /  AST  17  /  ALT  16  /  AlkPhos  66        Urinalysis Basic - ( 24 Dec 2020 12:24 )    Color: Yellow / Appearance: Clear / S.020 / pH: x  Gluc: x / Ketone: Trace mg/dL  / Bili: Negative / Urobili: 2.0 E.U./dL   Blood: x / Protein: Trace mg/dL / Nitrite: NEGATIVE   Leuk Esterase: NEGATIVE / RBC: 5-10 /HPF / WBC 5-10 /HPF   Sq Epi: x / Non Sq Epi: 0-5 /HPF / Bacteria: Present /HPF      CAPILLARY BLOOD GLUCOSE      POCT Blood Glucose.: 187 mg/dL (25 Dec 2020 00:04)      RADIOLOGY & ADDITIONAL TESTS: Reviewed.    ASSESSMENT:    PLAN:

## 2020-12-26 NOTE — PROGRESS NOTE ADULT - ASSESSMENT
70M DMT2, A-fib on Xarelto, HTN, HLD, severe carotid disease who was admitted to [facility] on 11/27 where he received decadron, remdesivir, convalescent plasma, and several antibiotics before being transferred to [facility].  currently s/p trach and PEG and treatment for mssa VAP treatment wtih zosyn from 12/13-20.  course has been complicated by afib with rvr, and hemodynamic instability with new melanotic stools and devloping shock-like picture.      NEUROLOGY  #Sedation  - dc'd propofol after episode of hypotension on 12/23  - start precedex for agitation    RESPIRATORY   #Acute hypoxic respiratory failure 2/2 COVID.  Now exhibiting significant tachypnea to 30s-40s likely 2/2 underlying infection.  -pt spiked fever on 12/12; sputum cx negative but concern for overlying pna so received zosyn 12/14-12/20  -s/p trach 12 /18 and peg 12/17   -see ID below    CARDIOVASCULAR    #H/o A-fib with RVR  - dc lovenox 2/2 GIB  - amiodarone dc'ed on 12/20, due to prolonged qtc 560 but repeat 462  - episode of Afib with RVR o/n 12/22, given amiodarone bolus  - dc amiodarone gtt 12/23; start amio 400mg BID  - amio boluses if enters afib with RVR  - dc PO amiodarone and start gtt since NPO for GI bleed  - f/u EP recs  - TTE with no wall abnormalities    #hypotension  -c/w phenylephrine    #Severe carotid disease  -Patient with occluded right ICA and severe left ICA stenosis, s/p left CEA, uncomplicated  -f/u with cardiology outpatient    #QTC prolongation:   -ekg with qtc 560 on 12/20  -stopped Precedex, stopped amiodarone  -restarted amiodarone, continue to monitor QTc   -daily EKG    RENAL   - monitor Is/Os    ENDOCRINE  - goal 140-180 blood glucose  - c/w mSSI  - hold lantus since NPO for GIB    GASTROINTESTINAL  - hold glucerna feeds via peg  - protonix 40  - Rectal tube in place  - NPO for GIB  - see anemia below    INFECTIOUS DISEASE    #Fever of unknown origin with developing shock  -new fever spike 12/23 overnight  -f/u blood cx  -f/u sputum cx  -CXR with no new infiltrates  -MRSA swab negative  -UA weakly positive  -monitor for trachiitis  -started on vancomycin and meropenem for empiric coverage until culture susceptibilities return  -given 500cc bolus LR overnight  -dc'd lopressor    HEMATOLOGY/ONCOLOGY  #Anemia  patient experienced drop in Hgb from 9.8 to 7.3 over past day and found to have melanotic stools.  also hemodynamically unstable with BPs in 80s-90s/30s-40s.  -ordered for 1u pRBC  -maintain active T&S  -dc'd lopressor  -start IV protonix   -f/u FOBT    ACCESS/LINES/DRAINS  A-line 12/17  RIJ 12/17-12/24   PEG 12/17  Peripheral line placed 12/25    PROPHYLACTIC  ELECTROLYTES: Mg<2, K<4  DIET: NPO  GI PPX: Protonix  VTE PPX: SCD, lovenox bid for afib  CODE: FULL  DISPO: MICU  70M DMT2, A-fib on Xarelto, HTN, HLD, severe carotid disease who was admitted to [facility] on 11/27 where he received decadron, remdesivir, convalescent plasma, and several antibiotics before being transferred to [facility].  currently s/p trach and PEG and treatment for mssa VAP treatment wtih zosyn from 12/13-20.  course has been complicated by afib with rvr, and hemodynamic instability with new melanotic stools and devloping shock-like picture.      NEUROLOGY  #Sedation  - dc'd propofol after episode of hypotension on 12/23  - start precedex for agitation    RESPIRATORY   #Acute hypoxic respiratory failure 2/2 COVID.  Now exhibiting significant tachypnea to 30s-40s likely 2/2 underlying infection.  -pt spiked fever on 12/12; sputum cx negative but concern for overlying pna so received zosyn 12/14-12/20  -s/p trach 12 /18 and peg 12/17   -see ID below    CARDIOVASCULAR    #H/o A-fib with RVR  - dc lovenox 2/2 GIB  - amiodarone dc'ed on 12/20, due to prolonged qtc 560 but repeat 462  - episode of Afib with RVR o/n 12/22, given amiodarone bolus  - dc amiodarone gtt 12/23; start amio 400mg BID  - amio boluses if enters afib with RVR  - dc PO amiodarone and start gtt since NPO for GI bleed  - f/u EP recs  - TTE with no wall abnormalities    #hypotension  -c/w phenylephrine    #Severe carotid disease  -Patient with occluded right ICA and severe left ICA stenosis, s/p left CEA, uncomplicated  -f/u with cardiology outpatient    #QTC prolongation:   -ekg with qtc 560 on 12/20  -stopped Precedex, stopped amiodarone  -restarted amiodarone, continue to monitor QTc   -daily EKG    RENAL   - monitor Is/Os    ENDOCRINE  - goal 140-180 blood glucose  - c/w mSSI  - hold lantus since NPO for GIB    GASTROINTESTINAL  - hold glucerna feeds via peg  - protonix 40  - Rectal tube in place  - NPO for GIB  - see anemia below    INFECTIOUS DISEASE    #Fever of unknown origin with developing shock-could be secondary to streptophomonas pna.   -f/u blood cx  -started on bactrim and meropenem    HEMATOLOGY/ONCOLOGY  #Anemia  patient experienced drop in Hgb from 9.8 to 7.3 over past day and found to have melanotic stools.  also hemodynamically unstable with BPs in 80s-90s/30s-40s.  -ordered for 1u pRBC  -maintain active T&S  -dc'd lopressor  -start IV protonix   -f/u FOBT    ACCESS/LINES/DRAINS  A-line 12/17  RIJ 12/17-12/24   PEG 12/17  Peripheral line placed 12/25    PROPHYLACTIC  ELECTROLYTES: Mg<2, K<4  DIET: NPO  GI PPX: Protonix  VTE PPX: SCD, lovenox bid for afib  CODE: FULL  DISPO: MICU

## 2020-12-27 LAB
-  PIPERACILLIN/TAZOBACTAM: SIGNIFICANT CHANGE UP
ALBUMIN SERPL ELPH-MCNC: 2.8 G/DL — LOW (ref 3.3–5)
ALP SERPL-CCNC: 67 U/L — SIGNIFICANT CHANGE UP (ref 40–120)
ALT FLD-CCNC: 20 U/L — SIGNIFICANT CHANGE UP (ref 10–45)
ANION GAP SERPL CALC-SCNC: 9 MMOL/L — SIGNIFICANT CHANGE UP (ref 5–17)
AST SERPL-CCNC: 19 U/L — SIGNIFICANT CHANGE UP (ref 10–40)
BASE EXCESS BLDA CALC-SCNC: -0.7 MMOL/L — SIGNIFICANT CHANGE UP (ref -2–3)
BASOPHILS # BLD AUTO: 0.03 K/UL — SIGNIFICANT CHANGE UP (ref 0–0.2)
BASOPHILS NFR BLD AUTO: 0.4 % — SIGNIFICANT CHANGE UP (ref 0–2)
BILIRUB SERPL-MCNC: 0.5 MG/DL — SIGNIFICANT CHANGE UP (ref 0.2–1.2)
BUN SERPL-MCNC: 8 MG/DL — SIGNIFICANT CHANGE UP (ref 7–23)
CALCIUM SERPL-MCNC: 8.2 MG/DL — LOW (ref 8.4–10.5)
CHLORIDE SERPL-SCNC: 105 MMOL/L — SIGNIFICANT CHANGE UP (ref 96–108)
CO2 SERPL-SCNC: 22 MMOL/L — SIGNIFICANT CHANGE UP (ref 22–31)
CREAT SERPL-MCNC: 0.39 MG/DL — LOW (ref 0.5–1.3)
CRP SERPL-MCNC: 4.6 MG/DL — HIGH (ref 0–0.4)
CULTURE RESULTS: SIGNIFICANT CHANGE UP
D DIMER BLD IA.RAPID-MCNC: 611 NG/ML DDU — HIGH
EOSINOPHIL # BLD AUTO: 0.45 K/UL — SIGNIFICANT CHANGE UP (ref 0–0.5)
EOSINOPHIL NFR BLD AUTO: 5.3 % — SIGNIFICANT CHANGE UP (ref 0–6)
FERRITIN SERPL-MCNC: 114 NG/ML — SIGNIFICANT CHANGE UP (ref 30–400)
GAS PNL BLDA: SIGNIFICANT CHANGE UP
GLUCOSE BLDC GLUCOMTR-MCNC: 126 MG/DL — HIGH (ref 70–99)
GLUCOSE SERPL-MCNC: 129 MG/DL — HIGH (ref 70–99)
HCO3 BLDA-SCNC: 23 MMOL/L — SIGNIFICANT CHANGE UP (ref 21–28)
HCT VFR BLD CALC: 27.8 % — LOW (ref 39–50)
HGB BLD-MCNC: 8.8 G/DL — LOW (ref 13–17)
IMM GRANULOCYTES NFR BLD AUTO: 0.7 % — SIGNIFICANT CHANGE UP (ref 0–1.5)
LYMPHOCYTES # BLD AUTO: 1.74 K/UL — SIGNIFICANT CHANGE UP (ref 1–3.3)
LYMPHOCYTES # BLD AUTO: 20.6 % — SIGNIFICANT CHANGE UP (ref 13–44)
MAGNESIUM SERPL-MCNC: 2 MG/DL — SIGNIFICANT CHANGE UP (ref 1.6–2.6)
MCHC RBC-ENTMCNC: 27.1 PG — SIGNIFICANT CHANGE UP (ref 27–34)
MCHC RBC-ENTMCNC: 31.7 GM/DL — LOW (ref 32–36)
MCV RBC AUTO: 85.5 FL — SIGNIFICANT CHANGE UP (ref 80–100)
METHOD TYPE: SIGNIFICANT CHANGE UP
MONOCYTES # BLD AUTO: 0.81 K/UL — SIGNIFICANT CHANGE UP (ref 0–0.9)
MONOCYTES NFR BLD AUTO: 9.6 % — SIGNIFICANT CHANGE UP (ref 2–14)
NEUTROPHILS # BLD AUTO: 5.35 K/UL — SIGNIFICANT CHANGE UP (ref 1.8–7.4)
NEUTROPHILS NFR BLD AUTO: 63.4 % — SIGNIFICANT CHANGE UP (ref 43–77)
NRBC # BLD: 0 /100 WBCS — SIGNIFICANT CHANGE UP (ref 0–0)
ORGANISM # SPEC MICROSCOPIC CNT: SIGNIFICANT CHANGE UP
PCO2 BLDA: 33 MMHG — LOW (ref 35–48)
PH BLDA: 7.46 — HIGH (ref 7.35–7.45)
PHOSPHATE SERPL-MCNC: 3.7 MG/DL — SIGNIFICANT CHANGE UP (ref 2.5–4.5)
PLATELET # BLD AUTO: 350 K/UL — SIGNIFICANT CHANGE UP (ref 150–400)
PO2 BLDA: 93 MMHG — SIGNIFICANT CHANGE UP (ref 83–108)
POTASSIUM SERPL-MCNC: 4.2 MMOL/L — SIGNIFICANT CHANGE UP (ref 3.5–5.3)
POTASSIUM SERPL-SCNC: 4.2 MMOL/L — SIGNIFICANT CHANGE UP (ref 3.5–5.3)
PROT SERPL-MCNC: 5.7 G/DL — LOW (ref 6–8.3)
RBC # BLD: 3.25 M/UL — LOW (ref 4.2–5.8)
RBC # FLD: 23.5 % — HIGH (ref 10.3–14.5)
SAO2 % BLDA: 97 % — SIGNIFICANT CHANGE UP (ref 95–100)
SODIUM SERPL-SCNC: 136 MMOL/L — SIGNIFICANT CHANGE UP (ref 135–145)
SPECIMEN SOURCE: SIGNIFICANT CHANGE UP
WBC # BLD: 8.44 K/UL — SIGNIFICANT CHANGE UP (ref 3.8–10.5)
WBC # FLD AUTO: 8.44 K/UL — SIGNIFICANT CHANGE UP (ref 3.8–10.5)

## 2020-12-27 PROCEDURE — 99291 CRITICAL CARE FIRST HOUR: CPT

## 2020-12-27 RX ORDER — ENOXAPARIN SODIUM 100 MG/ML
40 INJECTION SUBCUTANEOUS EVERY 12 HOURS
Refills: 0 | Status: DISCONTINUED | OUTPATIENT
Start: 2020-12-27 | End: 2020-12-27

## 2020-12-27 RX ORDER — ENOXAPARIN SODIUM 100 MG/ML
90 INJECTION SUBCUTANEOUS EVERY 12 HOURS
Refills: 0 | Status: DISCONTINUED | OUTPATIENT
Start: 2020-12-28 | End: 2020-12-31

## 2020-12-27 RX ADMIN — PANTOPRAZOLE SODIUM 40 MILLIGRAM(S): 20 TABLET, DELAYED RELEASE ORAL at 05:19

## 2020-12-27 RX ADMIN — PANTOPRAZOLE SODIUM 40 MILLIGRAM(S): 20 TABLET, DELAYED RELEASE ORAL at 18:11

## 2020-12-27 RX ADMIN — DEXMEDETOMIDINE HYDROCHLORIDE IN 0.9% SODIUM CHLORIDE 4.45 MICROGRAM(S)/KG/HR: 4 INJECTION INTRAVENOUS at 06:04

## 2020-12-27 RX ADMIN — OLANZAPINE 5 MILLIGRAM(S): 15 TABLET, FILM COATED ORAL at 22:08

## 2020-12-27 RX ADMIN — CHLORHEXIDINE GLUCONATE 15 MILLILITER(S): 213 SOLUTION TOPICAL at 06:01

## 2020-12-27 RX ADMIN — PHENYLEPHRINE HYDROCHLORIDE 3.33 MICROGRAM(S)/KG/MIN: 10 INJECTION INTRAVENOUS at 18:11

## 2020-12-27 RX ADMIN — CHLORHEXIDINE GLUCONATE 1 APPLICATION(S): 213 SOLUTION TOPICAL at 05:20

## 2020-12-27 RX ADMIN — CHLORHEXIDINE GLUCONATE 15 MILLILITER(S): 213 SOLUTION TOPICAL at 18:11

## 2020-12-27 RX ADMIN — Medication 2 TABLET(S): at 03:00

## 2020-12-27 RX ADMIN — Medication 1 APPLICATION(S): at 05:20

## 2020-12-27 RX ADMIN — Medication 2 TABLET(S): at 22:09

## 2020-12-27 RX ADMIN — Medication 1 APPLICATION(S): at 12:36

## 2020-12-27 RX ADMIN — Medication 1 APPLICATION(S): at 23:35

## 2020-12-27 RX ADMIN — OLANZAPINE 5 MILLIGRAM(S): 15 TABLET, FILM COATED ORAL at 06:57

## 2020-12-27 RX ADMIN — Medication 2 TABLET(S): at 08:38

## 2020-12-27 RX ADMIN — Medication 2 TABLET(S): at 14:23

## 2020-12-27 RX ADMIN — OLANZAPINE 5 MILLIGRAM(S): 15 TABLET, FILM COATED ORAL at 13:43

## 2020-12-27 RX ADMIN — AMIODARONE HYDROCHLORIDE 400 MILLIGRAM(S): 400 TABLET ORAL at 05:19

## 2020-12-27 RX ADMIN — Medication 1 APPLICATION(S): at 18:12

## 2020-12-27 RX ADMIN — AMIODARONE HYDROCHLORIDE 400 MILLIGRAM(S): 400 TABLET ORAL at 18:12

## 2020-12-27 NOTE — PROGRESS NOTE ADULT - ASSESSMENT
70M DMT2, A-fib on Xarelto, HTN, HLD, severe carotid disease who was admitted to [facility] on 11/27 where he received decadron, remdesivir, convalescent plasma, and several antibiotics before being transferred to [facility].  currently s/p trach and PEG and treatment for mssa VAP treatment wtih zosyn from 12/13-20.  course has been complicated by afib with rvr, and hemodynamic instability with new melanotic stools and devloping shock-like picture, although stable today.    NEUROLOGY  #Sedation  - dc'd propofol after episode of hypotension on 12/23  - wean off precedex    RESPIRATORY   #Acute hypoxic respiratory failure 2/2 COVID.  Now exhibiting significant tachypnea to 30s-40s likely 2/2 underlying infection.  -pt spiked fever on 12/12; sputum cx negative but concern for overlying pna so received zosyn 12/14-12/20  -s/p trach 12 /18 and peg 12/17   -see ID below    CARDIOVASCULAR    #H/o A-fib with RVR  - dc lovenox 2/2 GIB, can restart tonight  - amiodarone dc'ed on 12/20, due to prolonged qtc 560 but repeat 462  - episode of Afib with RVR o/n 12/22, given amiodarone bolus  - dc amiodarone gtt 12/23  - amio boluses if enters afib with RVR  - f/u EP recs  - TTE with no wall abnormalities  - c/w amio PO 400mg BID    #hypotension  -wean phenylephrine    #Severe carotid disease  -Patient with occluded right ICA and severe left ICA stenosis, s/p left CEA, uncomplicated  -f/u with cardiology outpatient    #QTC prolongation:   -ekg with qtc 560 on 12/20  -stopped Precedex, stopped amiodarone  -restarted amiodarone, continue to monitor QTc   -daily EKG    RENAL   - monitor Is/Os    ENDOCRINE  - goal 140-180 blood glucose  - c/w mSSI  - hold lantus since NPO for GIB    GASTROINTESTINAL  - hold glucerna feeds via peg  - protonix 40  - Rectal tube in place  - NPO for GIB  - see anemia below    INFECTIOUS DISEASE    #Fever of unknown origin with developing shock-could be secondary to streptophomonas pna.   -f/u blood cx  -started on bactrim and meropenem    HEMATOLOGY/ONCOLOGY  #Anemia  patient experienced drop in Hgb from 9.8 to 7.3 over past day and found to have melanotic stools.  also hemodynamically unstable with BPs in 80s-90s/30s-40s.  -ordered for 1u pRBC  -maintain active T&S  -dc'd lopressor  -start IV protonix   -f/u FOBT    ACCESS/LINES/DRAINS  A-line 12/17  RIJ 12/17-12/24   PEG 12/17  Peripheral line placed 12/25    PROPHYLACTIC  ELECTROLYTES: Mg<2, K<4  DIET: NPO  GI PPX: Protonix  VTE PPX: SCD, lovenox bid for afib  CODE: FULL  DISPO: MICU  70M DMT2, A-fib on Xarelto, HTN, HLD, severe carotid disease who was admitted to [facility] on 11/27 where he received decadron, remdesivir, convalescent plasma, and several antibiotics before being transferred to [facility].  currently s/p trach and PEG and treatment for mssa VAP treatment wtih zosyn from 12/13-20.  course has been complicated by afib with rvr, and hemodynamic instability with new melanotic stools and devloping shock-like picture, although stable today.    NEUROLOGY  #Sedation  - dc'd propofol after episode of hypotension on 12/23  - wean off precedex    RESPIRATORY   #Acute hypoxic respiratory failure 2/2 COVID.  Now exhibiting significant tachypnea to 30s-40s likely 2/2 underlying infection.  -pt spiked fever on 12/12; sputum cx negative but concern for overlying pna so received zosyn 12/14-12/20  -s/p trach 12 /18 and peg 12/17   -see ID below    CARDIOVASCULAR    #H/o A-fib with RVR  - dc lovenox 2/2 GIB, can restart tonight  - amiodarone dc'ed on 12/20, due to prolonged qtc 560 but repeat 462  - episode of Afib with RVR o/n 12/22, given amiodarone bolus  - dc amiodarone gtt 12/23  - amio boluses if enters afib with RVR  - f/u EP recs  - TTE with no wall abnormalities  - c/w amio PO 400mg BID    #hypotension  -wean phenylephrine    #Severe carotid disease  -Patient with occluded right ICA and severe left ICA stenosis, s/p left CEA, uncomplicated  -f/u with cardiology outpatient    #QTC prolongation:   -ekg with qtc 560 on 12/20  -stopped Precedex, stopped amiodarone  -restarted amiodarone, continue to monitor QTc   -daily EKG    RENAL   - monitor Is/Os    ENDOCRINE  - goal 140-180 blood glucose  - c/w mSSI  - hold lantus since NPO for GIB    GASTROINTESTINAL  - hold glucerna feeds via peg; restart trickle feeds  - protonix 40  - Rectal tube in place  - see anemia below    INFECTIOUS DISEASE    #Fever of unknown origin with developing shock-could be secondary to streptophomonas and enterobacter pna.   -f/u blood cx  -started on bactrim and meropenem    HEMATOLOGY/ONCOLOGY  #Anemia  patient experienced drop in Hgb from 9.8 to 7.3 over past day and found to have melanotic stools.  also hemodynamically unstable with BPs in 80s-90s/30s-40s.  -ordered for 1u pRBC  -maintain active T&S  -dc'd lopressor  -start IV protonix   -f/u FOBT    ACCESS/LINES/DRAINS  A-line 12/17  RIJ 12/17-12/24   PEG 12/17  Peripheral line placed 12/25    PROPHYLACTIC  ELECTROLYTES: Mg<2, K<4  DIET: NPO  GI PPX: Protonix  VTE PPX: SCD, lovenox bid for afib  CODE: FULL  DISPO: MICU  70M DMT2, A-fib on Xarelto, HTN, HLD, severe carotid disease who was admitted to [facility] on 11/27 where he received decadron, remdesivir, convalescent plasma, and several antibiotics before being transferred to [facility].  currently s/p trach and PEG and treatment for mssa VAP treatment wtih zosyn from 12/13-20.  course has been complicated by afib with rvr, and hemodynamic instability with new melanotic stools and devloping shock-like picture, although stable today.    NEUROLOGY  #Sedation  - dc'd propofol after episode of hypotension on 12/23  - wean off precedex    RESPIRATORY   #Acute hypoxic respiratory failure 2/2 COVID.  Now exhibiting significant tachypnea to 30s-40s likely 2/2 underlying infection.  -pt spiked fever on 12/12; sputum cx negative but concern for overlying pna so received zosyn 12/14-12/20  -s/p trach 12 /18 and peg 12/17   -see ID below    CARDIOVASCULAR    #H/o A-fib with RVR  - dc lovenox 2/2 GIB, can restart tonight  - amiodarone dc'ed on 12/20, due to prolonged qtc 560 but repeat 462  - episode of Afib with RVR o/n 12/22, given amiodarone bolus  - dc amiodarone gtt 12/23  - amio boluses if enters afib with RVR  - f/u EP recs  - TTE with no wall abnormalities  - c/w amio PO 400mg BID    #hypotension  -wean phenylephrine    #Severe carotid disease  -Patient with occluded right ICA and severe left ICA stenosis, s/p left CEA, uncomplicated  -f/u with cardiology outpatient    #QTC prolongation:   -ekg with qtc 560 on 12/20  -stopped Precedex, stopped amiodarone  -restarted amiodarone, continue to monitor QTc   -daily EKG    RENAL   - monitor Is/Os    ENDOCRINE  - goal 140-180 blood glucose  - c/w mSSI  - hold lantus since NPO for GIB    GASTROINTESTINAL  - hold glucerna feeds via peg; restart trickle feeds  - protonix 40  - Rectal tube in place  - see anemia below    INFECTIOUS DISEASE    #Fever of unknown origin with developing shock-could be secondary to streptophomonas and enterobacter pna.   -f/u blood cx  -s/p meropenem 12/24-26  -c/w bactrim 12/26-1/1    HEMATOLOGY/ONCOLOGY  #Anemia  patient experienced drop in Hgb from 9.8 to 7.3 over past day and found to have melanotic stools.  also hemodynamically unstable with BPs in 80s-90s/30s-40s.  -s/p 2u pRBC  -maintain active T&S  -dc'd lopressor  -c/w IV protonix   -f/u FOBT    ACCESS/LINES/DRAINS  A-line 12/17  PEG 12/17    PROPHYLACTIC  ELECTROLYTES: Mg<2, K<4  DIET: NPO  GI PPX: Protonix  VTE PPX: SCD, lovenox bid for afib  CODE: FULL  DISPO: MICU

## 2020-12-27 NOTE — PROGRESS NOTE ADULT - SUBJECTIVE AND OBJECTIVE BOX
OVERNIGHT EVENTS: MIKI    SUBJECTIVE / INTERVAL HPI: Patient seen and examined at bedside.  alert and trying to participate in interview but unable to speak.    VITAL SIGNS:  Vital Signs Last 24 Hrs  T(C): 37.1 (27 Dec 2020 13:17), Max: 37.9 (27 Dec 2020 01:25)  T(F): 98.8 (27 Dec 2020 13:17), Max: 100.2 (27 Dec 2020 01:25)  HR: 62 (27 Dec 2020 17:01) (60 - 102)  BP: --  BP(mean): --  RR: 36 (27 Dec 2020 16:00) (21 - 36)  SpO2: 97% (27 Dec 2020 17:01) (94% - 99%)    PHYSICAL EXAM:    General: WDWN, NAD  HEENT: NCAT; PERRL  Neck: trach  Cardiovascular: distant heart sounds  Respiratory: CTABL; no W/R/R  Gastrointestinal: soft, nontender, nondistended. bowel sounds present.  Skin: no ulcerations or visible rashes appreciated  Extremities: WWP; no edema  Vascular: 2+ radial, DP pulses B/L  Neurological: appears alert and engaged, follows commands, moves all extremities    MEDICATIONS:  MEDICATIONS  (STANDING):  aMIOdarone    Tablet 400 milliGRAM(s) Oral every 12 hours  chlorhexidine 0.12% Liquid 15 milliLiter(s) Oral Mucosa every 12 hours  chlorhexidine 2% Cloths 1 Application(s) Topical <User Schedule>  dexMEDEtomidine Infusion 0.2 MICROgram(s)/kG/Hr (4.45 mL/Hr) IV Continuous <Continuous>  dextrose 40% Gel 15 Gram(s) Oral once  dextrose 5%. 1000 milliLiter(s) (100 mL/Hr) IV Continuous <Continuous>  dextrose 5%. 1000 milliLiter(s) (50 mL/Hr) IV Continuous <Continuous>  dextrose 50% Injectable 25 Gram(s) IV Push once  dextrose 50% Injectable 12.5 Gram(s) IV Push once  dextrose 50% Injectable 25 Gram(s) IV Push once  glucagon  Injectable 1 milliGRAM(s) IntraMuscular once  OLANZapine Disintegrating Tablet 5 milliGRAM(s) Oral every 8 hours  pantoprazole  Injectable 40 milliGRAM(s) IV Push every 12 hours  petrolatum Ophthalmic Ointment 1 Application(s) Both EYES every 6 hours  phenylephrine    Infusion 0.1 MICROgram(s)/kG/Min (3.33 mL/Hr) IV Continuous <Continuous>  trimethoprim  160 mG/sulfamethoxazole 800 mG 2 Tablet(s) Oral every 6 hours    MEDICATIONS  (PRN):  acetaminophen    Suspension .. 650 milliGRAM(s) Oral every 6 hours PRN Temp greater or equal to 38C (100.4F), Mild Pain (1 - 3)      ALLERGIES:  Allergies    No Known Allergies    Intolerances        LABS:                        8.8    8.44  )-----------( 350      ( 27 Dec 2020 03:27 )             27.8     12-27    136  |  105  |  8   ----------------------------<  129<H>  4.2   |  22  |  0.39<L>    Ca    8.2<L>      27 Dec 2020 03:27  Phos  3.7     12-27  Mg     2.0     12-27    TPro  5.7<L>  /  Alb  2.8<L>  /  TBili  0.5  /  DBili  x   /  AST  19  /  ALT  20  /  AlkPhos  67  12-27        CAPILLARY BLOOD GLUCOSE          RADIOLOGY & ADDITIONAL TESTS: Reviewed.

## 2020-12-28 LAB
ALBUMIN SERPL ELPH-MCNC: 3 G/DL — LOW (ref 3.3–5)
ALP SERPL-CCNC: 78 U/L — SIGNIFICANT CHANGE UP (ref 40–120)
ALT FLD-CCNC: 25 U/L — SIGNIFICANT CHANGE UP (ref 10–45)
ANION GAP SERPL CALC-SCNC: 11 MMOL/L — SIGNIFICANT CHANGE UP (ref 5–17)
AST SERPL-CCNC: 21 U/L — SIGNIFICANT CHANGE UP (ref 10–40)
BASOPHILS # BLD AUTO: 0.04 K/UL — SIGNIFICANT CHANGE UP (ref 0–0.2)
BASOPHILS NFR BLD AUTO: 0.4 % — SIGNIFICANT CHANGE UP (ref 0–2)
BILIRUB SERPL-MCNC: 0.5 MG/DL — SIGNIFICANT CHANGE UP (ref 0.2–1.2)
BUN SERPL-MCNC: 10 MG/DL — SIGNIFICANT CHANGE UP (ref 7–23)
CALCIUM SERPL-MCNC: 8.4 MG/DL — SIGNIFICANT CHANGE UP (ref 8.4–10.5)
CHLORIDE SERPL-SCNC: 103 MMOL/L — SIGNIFICANT CHANGE UP (ref 96–108)
CO2 SERPL-SCNC: 21 MMOL/L — LOW (ref 22–31)
CREAT SERPL-MCNC: 0.39 MG/DL — LOW (ref 0.5–1.3)
CRP SERPL-MCNC: 7.41 MG/DL — HIGH (ref 0–0.4)
D DIMER BLD IA.RAPID-MCNC: 731 NG/ML DDU — HIGH
EOSINOPHIL # BLD AUTO: 0.27 K/UL — SIGNIFICANT CHANGE UP (ref 0–0.5)
EOSINOPHIL NFR BLD AUTO: 2.6 % — SIGNIFICANT CHANGE UP (ref 0–6)
GLUCOSE BLDC GLUCOMTR-MCNC: 100 MG/DL — HIGH (ref 70–99)
GLUCOSE BLDC GLUCOMTR-MCNC: 149 MG/DL — HIGH (ref 70–99)
GLUCOSE BLDC GLUCOMTR-MCNC: 94 MG/DL — SIGNIFICANT CHANGE UP (ref 70–99)
GLUCOSE SERPL-MCNC: 145 MG/DL — HIGH (ref 70–99)
HCT VFR BLD CALC: 29.2 % — LOW (ref 39–50)
HGB BLD-MCNC: 9.3 G/DL — LOW (ref 13–17)
IMM GRANULOCYTES NFR BLD AUTO: 0.7 % — SIGNIFICANT CHANGE UP (ref 0–1.5)
LYMPHOCYTES # BLD AUTO: 1.75 K/UL — SIGNIFICANT CHANGE UP (ref 1–3.3)
LYMPHOCYTES # BLD AUTO: 16.9 % — SIGNIFICANT CHANGE UP (ref 13–44)
MAGNESIUM SERPL-MCNC: 2.1 MG/DL — SIGNIFICANT CHANGE UP (ref 1.6–2.6)
MCHC RBC-ENTMCNC: 27.4 PG — SIGNIFICANT CHANGE UP (ref 27–34)
MCHC RBC-ENTMCNC: 31.8 GM/DL — LOW (ref 32–36)
MCV RBC AUTO: 85.9 FL — SIGNIFICANT CHANGE UP (ref 80–100)
MONOCYTES # BLD AUTO: 0.94 K/UL — HIGH (ref 0–0.9)
MONOCYTES NFR BLD AUTO: 9.1 % — SIGNIFICANT CHANGE UP (ref 2–14)
NEUTROPHILS # BLD AUTO: 7.27 K/UL — SIGNIFICANT CHANGE UP (ref 1.8–7.4)
NEUTROPHILS NFR BLD AUTO: 70.3 % — SIGNIFICANT CHANGE UP (ref 43–77)
NRBC # BLD: 0 /100 WBCS — SIGNIFICANT CHANGE UP (ref 0–0)
PHOSPHATE SERPL-MCNC: 2.9 MG/DL — SIGNIFICANT CHANGE UP (ref 2.5–4.5)
PLATELET # BLD AUTO: 429 K/UL — HIGH (ref 150–400)
POTASSIUM SERPL-MCNC: 4.3 MMOL/L — SIGNIFICANT CHANGE UP (ref 3.5–5.3)
POTASSIUM SERPL-SCNC: 4.3 MMOL/L — SIGNIFICANT CHANGE UP (ref 3.5–5.3)
PROT SERPL-MCNC: 6.1 G/DL — SIGNIFICANT CHANGE UP (ref 6–8.3)
RBC # BLD: 3.4 M/UL — LOW (ref 4.2–5.8)
RBC # FLD: 23.9 % — HIGH (ref 10.3–14.5)
SODIUM SERPL-SCNC: 135 MMOL/L — SIGNIFICANT CHANGE UP (ref 135–145)
WBC # BLD: 10.34 K/UL — SIGNIFICANT CHANGE UP (ref 3.8–10.5)
WBC # FLD AUTO: 10.34 K/UL — SIGNIFICANT CHANGE UP (ref 3.8–10.5)

## 2020-12-28 PROCEDURE — 99291 CRITICAL CARE FIRST HOUR: CPT

## 2020-12-28 PROCEDURE — 99232 SBSQ HOSP IP/OBS MODERATE 35: CPT

## 2020-12-28 RX ORDER — MIDODRINE HYDROCHLORIDE 2.5 MG/1
5 TABLET ORAL EVERY 8 HOURS
Refills: 0 | Status: DISCONTINUED | OUTPATIENT
Start: 2020-12-28 | End: 2020-12-30

## 2020-12-28 RX ORDER — AMIODARONE HYDROCHLORIDE 400 MG/1
200 TABLET ORAL DAILY
Refills: 0 | Status: DISCONTINUED | OUTPATIENT
Start: 2020-12-29 | End: 2021-01-07

## 2020-12-28 RX ORDER — INSULIN LISPRO 100/ML
VIAL (ML) SUBCUTANEOUS EVERY 6 HOURS
Refills: 0 | Status: DISCONTINUED | OUTPATIENT
Start: 2020-12-28 | End: 2021-01-07

## 2020-12-28 RX ADMIN — CHLORHEXIDINE GLUCONATE 15 MILLILITER(S): 213 SOLUTION TOPICAL at 06:22

## 2020-12-28 RX ADMIN — OLANZAPINE 5 MILLIGRAM(S): 15 TABLET, FILM COATED ORAL at 05:08

## 2020-12-28 RX ADMIN — Medication 2 TABLET(S): at 14:32

## 2020-12-28 RX ADMIN — AMIODARONE HYDROCHLORIDE 400 MILLIGRAM(S): 400 TABLET ORAL at 05:07

## 2020-12-28 RX ADMIN — MIDODRINE HYDROCHLORIDE 5 MILLIGRAM(S): 2.5 TABLET ORAL at 08:52

## 2020-12-28 RX ADMIN — CHLORHEXIDINE GLUCONATE 15 MILLILITER(S): 213 SOLUTION TOPICAL at 17:31

## 2020-12-28 RX ADMIN — PANTOPRAZOLE SODIUM 40 MILLIGRAM(S): 20 TABLET, DELAYED RELEASE ORAL at 17:42

## 2020-12-28 RX ADMIN — OLANZAPINE 5 MILLIGRAM(S): 15 TABLET, FILM COATED ORAL at 14:32

## 2020-12-28 RX ADMIN — Medication 1 APPLICATION(S): at 12:04

## 2020-12-28 RX ADMIN — DEXMEDETOMIDINE HYDROCHLORIDE IN 0.9% SODIUM CHLORIDE 4.45 MICROGRAM(S)/KG/HR: 4 INJECTION INTRAVENOUS at 05:04

## 2020-12-28 RX ADMIN — Medication 1 APPLICATION(S): at 23:02

## 2020-12-28 RX ADMIN — ENOXAPARIN SODIUM 90 MILLIGRAM(S): 100 INJECTION SUBCUTANEOUS at 06:22

## 2020-12-28 RX ADMIN — MIDODRINE HYDROCHLORIDE 5 MILLIGRAM(S): 2.5 TABLET ORAL at 14:32

## 2020-12-28 RX ADMIN — CHLORHEXIDINE GLUCONATE 1 APPLICATION(S): 213 SOLUTION TOPICAL at 05:04

## 2020-12-28 RX ADMIN — PANTOPRAZOLE SODIUM 40 MILLIGRAM(S): 20 TABLET, DELAYED RELEASE ORAL at 06:22

## 2020-12-28 RX ADMIN — Medication 1 APPLICATION(S): at 17:31

## 2020-12-28 RX ADMIN — MIDODRINE HYDROCHLORIDE 5 MILLIGRAM(S): 2.5 TABLET ORAL at 23:15

## 2020-12-28 RX ADMIN — Medication 2 TABLET(S): at 05:05

## 2020-12-28 RX ADMIN — Medication 2 TABLET(S): at 23:23

## 2020-12-28 RX ADMIN — Medication 1 APPLICATION(S): at 05:04

## 2020-12-28 RX ADMIN — Medication 2 TABLET(S): at 08:52

## 2020-12-28 RX ADMIN — ENOXAPARIN SODIUM 90 MILLIGRAM(S): 100 INJECTION SUBCUTANEOUS at 17:42

## 2020-12-28 NOTE — PROGRESS NOTE ADULT - ASSESSMENT
70 year old male with diabetes, HTN, HLD, paroxysmal atrial fibrillation (on xarelto at home), transferred here for COVID management. He is now s/p trach and PEG. While here, tele noted afib RVR episodes.  Also with episodes of hypotension on pressors and some were independent of AFIB with RVR.  He also had melena 3 days ago and A/C was held temporarily (now resumed).  He was put on Amio load 400 bid on 12/26/20 while transitioning from IV drip.  TELE showed NSR.  EKGs in the chart were reviewed. Pt with prolonged Qtc (~500ms).   - In light of prolonged QTc on EKG, please bring Amio back down to 200 mg daily.  He is also on Bactrim and Olanzapine which are also QTc prolonging agents.  Please reassess to see if these are necessary.  Daily EKG while here (especially if he is to be transferred to NON-tele floor). If QTc doesn't improve in the next few days, would not continue Amio.    - Back on A/C (lovenox SQ BID).   - Case d/w attending and 3Wo resident.

## 2020-12-28 NOTE — CHART NOTE - NSCHARTNOTEFT_GEN_A_CORE
Admitting Diagnosis:   Patient is a 70y old  Male who presents with a chief complaint of COVID-19 pna (28 Dec 2020 06:04)      PAST MEDICAL & SURGICAL HISTORY:  H/O sciatica  [facility] PAIN LT LE    H/O cystoscopy  resection of bladder tumor    Bladder cancer    DM (diabetes mellitus)    Hypercholesteremia    Afib    HTN (hypertension)    History of hernia repair        Current Nutrition Order:  Glucerna 1.5 Helio @ 10ml/hr (reduced to trickle 2/2 dark brown black stool)  *per discussion on rounds will switch over to Vital 1.0 for lower osmolality formula 2/2 liquid rectal output    PO Intake: Good (%) [   ]  Fair (50-75%) [   ] Poor (<25%) [   ]- NA NPO w/EN    GI Issues:   Unable to assess at this time 2/2 vent   No regurgitation or residuals overnight  Rectal tube placed 12/20; 200cc x 24hrs  Not on bowel regimen; miralax and senna d/c'd 12/20    Pain:   Unable to assess at this time 2/2 vent     Skin Integrity:   Yang 11; generalized trace edema  Intact pressure-wise         Labs:   12-28    135  |  103  |  10  ----------------------------<  145<H>  4.3   |  21<L>  |  0.39<L>    Ca    8.4      28 Dec 2020 04:08  Phos  2.9     12-28  Mg     2.1     12-28    TPro  6.1  /  Alb  3.0<L>  /  TBili  0.5  /  DBili  x   /  AST  21  /  ALT  25  /  AlkPhos  78  12-28    CAPILLARY BLOOD GLUCOSE      POCT Blood Glucose.: 149 mg/dL (28 Dec 2020 05:41)  POCT Blood Glucose.: 126 mg/dL (27 Dec 2020 18:30)      Medications:  MEDICATIONS  (STANDING):  aMIOdarone    Tablet 400 milliGRAM(s) Oral every 12 hours  chlorhexidine 0.12% Liquid 15 milliLiter(s) Oral Mucosa every 12 hours  chlorhexidine 2% Cloths 1 Application(s) Topical <User Schedule>  dexMEDEtomidine Infusion 0.2 MICROgram(s)/kG/Hr (4.45 mL/Hr) IV Continuous <Continuous>  dextrose 40% Gel 15 Gram(s) Oral once  dextrose 5%. 1000 milliLiter(s) (100 mL/Hr) IV Continuous <Continuous>  dextrose 5%. 1000 milliLiter(s) (50 mL/Hr) IV Continuous <Continuous>  dextrose 50% Injectable 25 Gram(s) IV Push once  dextrose 50% Injectable 12.5 Gram(s) IV Push once  dextrose 50% Injectable 25 Gram(s) IV Push once  enoxaparin Injectable 90 milliGRAM(s) SubCutaneous every 12 hours  glucagon  Injectable 1 milliGRAM(s) IntraMuscular once  insulin lispro (ADMELOG) corrective regimen sliding scale   SubCutaneous every 6 hours  midodrine 5 milliGRAM(s) Oral every 8 hours  OLANZapine Disintegrating Tablet 5 milliGRAM(s) Oral every 8 hours  pantoprazole  Injectable 40 milliGRAM(s) IV Push every 12 hours  petrolatum Ophthalmic Ointment 1 Application(s) Both EYES every 6 hours  phenylephrine    Infusion 0.1 MICROgram(s)/kG/Min (3.33 mL/Hr) IV Continuous <Continuous>  trimethoprim  160 mG/sulfamethoxazole 800 mG 2 Tablet(s) Oral every 6 hours    MEDICATIONS  (PRN):  acetaminophen    Suspension .. 650 milliGRAM(s) Oral every 6 hours PRN Temp greater or equal to 38C (100.4F), Mild Pain (1 - 3)      Weight: 87kg (12/13)     Weight Change: 1-2kg weight loss from admission. Will continue to trend     Nutrition Focused Physical Exam: Completed [   ]  Not Pertinent [ X  ]    Estimated energy needs:   Height 65"; ABW 88.9kg (admit); IBW 61.6kg; 143%IBW; BMI 32.6  Based on ASPEN critical care guidelines for the treatment of critically ill patients with COVID-19, recommend:  *using IBW based on clinical judgment   Calories: 25-30 kcal/kg = 6792-0212 kcal/day  Protein: 1.5-2 g/kg = 93-124g protein/day  Fluids per team 2/2 compromised respiratory status       Subjective:   71 yo/male with PMHx T2DM, Afib, HTN, HLD, admitted to [facility] w/COVID on 11/27. Intubated on 12/2 for increased work of breathing. Transferred to [facility] for further care. Pt extubated on 12/13 after tolerating CPAP though was immediately reintubated 2/2 desaturation and increased secretions. S/p PEG placed 12/17 and Trach 12/18. CPAP trials. Course c/b afib w/ RVR, and hemodynamic instability wtih new melanotic stools and developing shock-like picture.     Unable to conduct a face to face interview or nutrition-focused physical exam due to limited contact restrictions related to the pt's medical condition and isolation precautions. Pt trached to vent on AC/VC mode. Sedation on precedex. - requiring [place] for pressor support. Currently in supine position. Having loose stool via rectal tube; 200cc 12/26 last recorded but high liquid output per attending this am. afebrile this morning, Tmax 99 over last 24hrs. POC , 126, 187, Cr 0.39, CRP 7.4. Feeds were running at [employer]. New order placed but will monitor for further GI distress and feasibility of switching back to Glucerna- dicsussed on rounds. Will continue to follow per RD protocol.     Previous Nutrition Diagnosis:  Increased Nutrient Needs RT increased demand for protein-calorie intake AEB vent, COVID infection.   Active [  X ]  Resolved [   ]    If resolved, new PES:     Goal: Pt will continue to meet % of EER via EN     Recommendations:  1. Per discussion on rounds, pt to start on Vital 1.0. Recommend Vital 1.0 @54ml/hr +3 prostat/day via PEG. Provides 1296ml TV, 1596kcal,20kcal/kg IBW, 96g pro, 1.5g pro/kg IBW, 1087ml FW.  2. If able to switch back to CHO steady formula pending improvements in GI function recommend Glucerna 1.5  @42mlhr x24hrs +1 prostat/day via PEG. Provides 1008ml TV, 1612kcal, 98g pro, 1.5g pro/kg, 765ml FW.   Additional free H2O flushes per team  3. Monitor lytes and replete prn. POC BG q6hrs (goal 140-180mg/dL)  4. Pain and bowel regimens per team. Monitor for s/s diarrhea on Glucerna and assess need to switch to Vital 1.5  5. Trend weights   *discussed on rounds. Order placed for MD verification.    Education: NA- intubated, sedated, isolation precautions     Risk Level: High [ X  ] Moderate [   ] Low [   ]. Admitting Diagnosis:   Patient is a 70y old  Male who presents with a chief complaint of COVID-19 pna (28 Dec 2020 06:04)      PAST MEDICAL & SURGICAL HISTORY:  H/O sciatica  CHR PAIN LT LE    H/O cystoscopy  resection of bladder tumor    Bladder cancer    DM (diabetes mellitus)    Hypercholesteremia    Afib    HTN (hypertension)    History of hernia repair        Current Nutrition Order:  Glucerna 1.5 Helio @ 10ml/hr (reduced to trickle 2/2 dark brown black stool)  *per discussion on rounds will switch over to Vital 1.0 for lower osmolality formula 2/2 liquid rectal output    PO Intake: Good (%) [   ]  Fair (50-75%) [   ] Poor (<25%) [   ]- NA NPO w/EN    GI Issues:   Unable to assess at this time 2/2 vent   No regurgitation or residuals overnight  Rectal tube placed 12/20; 200cc x 24hrs  Not on bowel regimen; miralax and senna d/c'd 12/20    Pain:   Unable to assess at this time 2/2 vent     Skin Integrity:   Yang 11; generalized trace edema  Intact pressure-wise         Labs:   12-28    135  |  103  |  10  ----------------------------<  145<H>  4.3   |  21<L>  |  0.39<L>    Ca    8.4      28 Dec 2020 04:08  Phos  2.9     12-28  Mg     2.1     12-28    TPro  6.1  /  Alb  3.0<L>  /  TBili  0.5  /  DBili  x   /  AST  21  /  ALT  25  /  AlkPhos  78  12-28    CAPILLARY BLOOD GLUCOSE      POCT Blood Glucose.: 149 mg/dL (28 Dec 2020 05:41)  POCT Blood Glucose.: 126 mg/dL (27 Dec 2020 18:30)      Medications:  MEDICATIONS  (STANDING):  aMIOdarone    Tablet 400 milliGRAM(s) Oral every 12 hours  chlorhexidine 0.12% Liquid 15 milliLiter(s) Oral Mucosa every 12 hours  chlorhexidine 2% Cloths 1 Application(s) Topical <User Schedule>  dexMEDEtomidine Infusion 0.2 MICROgram(s)/kG/Hr (4.45 mL/Hr) IV Continuous <Continuous>  dextrose 40% Gel 15 Gram(s) Oral once  dextrose 5%. 1000 milliLiter(s) (100 mL/Hr) IV Continuous <Continuous>  dextrose 5%. 1000 milliLiter(s) (50 mL/Hr) IV Continuous <Continuous>  dextrose 50% Injectable 25 Gram(s) IV Push once  dextrose 50% Injectable 12.5 Gram(s) IV Push once  dextrose 50% Injectable 25 Gram(s) IV Push once  enoxaparin Injectable 90 milliGRAM(s) SubCutaneous every 12 hours  glucagon  Injectable 1 milliGRAM(s) IntraMuscular once  insulin lispro (ADMELOG) corrective regimen sliding scale   SubCutaneous every 6 hours  midodrine 5 milliGRAM(s) Oral every 8 hours  OLANZapine Disintegrating Tablet 5 milliGRAM(s) Oral every 8 hours  pantoprazole  Injectable 40 milliGRAM(s) IV Push every 12 hours  petrolatum Ophthalmic Ointment 1 Application(s) Both EYES every 6 hours  phenylephrine    Infusion 0.1 MICROgram(s)/kG/Min (3.33 mL/Hr) IV Continuous <Continuous>  trimethoprim  160 mG/sulfamethoxazole 800 mG 2 Tablet(s) Oral every 6 hours    MEDICATIONS  (PRN):  acetaminophen    Suspension .. 650 milliGRAM(s) Oral every 6 hours PRN Temp greater or equal to 38C (100.4F), Mild Pain (1 - 3)      Weight: 87kg (12/13)     Weight Change: 1-2kg weight loss from admission. Will continue to trend     Nutrition Focused Physical Exam: Completed [   ]  Not Pertinent [ X  ]    Estimated energy needs:   Height 65"; ABW 88.9kg (admit); IBW 61.6kg; 143%IBW; BMI 32.6  Based on ASPEN critical care guidelines for the treatment of critically ill patients with COVID-19, recommend:  *using IBW based on clinical judgment   Calories: 25-30 kcal/kg = 9660-8868 kcal/day  Protein: 1.5-2 g/kg = 93-124g protein/day  Fluids per team 2/2 compromised respiratory status       Subjective:   71 yo/male with PMHx T2DM, Afib, HTN, HLD, admitted to [facility] w/COVID on 11/27. Intubated on 12/2 for increased work of breathing. Transferred to [facility] for further care. Pt extubated on 12/13 after tolerating CPAP though was immediately reintubated 2/2 desaturation and increased secretions. S/p PEG placed 12/17 and Trach 12/18. CPAP trials. Course c/b afib w/ RVR, and hemodynamic instability wtih new melanotic stools and developing shock-like picture.     Unable to conduct a face to face interview or nutrition-focused physical exam due to limited contact restrictions related to the pt's medical condition and isolation precautions. Pt trached to vent on AC/VC mode. Sedation on precedex. - requiring [place] for pressor support. Currently in supine position. Having loose stool via rectal tube; 200cc 12/26 last recorded but high liquid output per attending this am. afebrile this morning, Tmax 99 over last 24hrs. POC , 126, 187, Cr 0.39, CRP 7.4. Feeds were running at [facility]. New order placed but will monitor for further GI distress and feasibility of switching back to Glucerna- dicsussed on rounds. Will continue to follow per RD protocol.     Previous Nutrition Diagnosis:  Increased Nutrient Needs RT increased demand for protein-calorie intake AEB vent, COVID infection.   Active [  X ]  Resolved [   ]    If resolved, new PES:     Goal: Pt will continue to meet % of EER via EN     Recommendations:  1. Per discussion on rounds, pt to start on Vital 1.0. Recommend Vital 1.0 @54ml/hr +3 prostat/day via PEG. Provides 1296ml TV, 1596kcal, 96g pro, 1.5g pro/kg IBW, 1087ml FW.  2. If able to switch back to CHO steady formula pending improvements in GI function recommend Glucerna 1.5  @42mlhr x24hrs +1 prostat/day via PEG. Provides 1008ml TV, 1612kcal, 98g pro, 1.5g pro/kg, 765ml FW.   Additional free H2O flushes per team  3. Monitor lytes and replete prn. POC BG q6hrs (goal 140-180mg/dL)  4. Pain and bowel regimens per team. Monitor for s/s diarrhea on Glucerna and assess need to switch to Vital 1.5  5. Trend weights   *discussed on rounds. Order placed for MD verification.    Education: NA- intubated, sedated, isolation precautions     Risk Level: High [ X  ] Moderate [   ] Low [   ].

## 2020-12-28 NOTE — PROGRESS NOTE ADULT - ASSESSMENT
70M DMT2, A-fib on Xarelto, HTN, HLD, severe carotid disease who was admitted to [facility] on 11/27 where he received decadron, remdesivir, convalescent plasma, and several antibiotics before being transferred to [facility].  currently s/p trach and PEG and treatment for mssa VAP treatment wtih zosyn from 12/13-20.  course has been complicated by afib with rvr and melanotic stools but mentation appears stable and hopeful for LTAC placement soon.     NEUROLOGY  #Sedation  -wean off precedex    RESPIRATORY   #Acute hypoxic respiratory failure 2/2 COVID.  Now exhibiting significant tachypnea to 30s-40s likely 2/2 underlying infection.  -pt spiked fever on 12/12; sputum cx negative but concern for overlying pna so received zosyn 12/14-12/20  -s/p trach 12 /18 and peg 12/17   -c/w CPAP  -see ID below    #COVID 19  -s/p remdesivir and decadron    CARDIOVASCULAR    #H/o A-fib with RVR  - AM EKG  - c/w lovenox  - s/p amiodarone 400mg BID 12/10-20  - dc amiodarone gtt 12/23  - amio boluses if enters afib with RVR  - TTE with no wall abnormalities  - switch from amio PO 400mg BID to 200BID  - f/u EP recs    #hypotension  -wean phenylephrine    #Severe carotid disease  -Patient with occluded right ICA and severe left ICA stenosis, s/p left CEA, uncomplicated  -f/u with cardiology outpatient    #QTC prolongation:   - daily EKG; monitor on amiodarone    RENAL   - monitor Is/Os  - dc santana; TODIOMEDES    ENDOCRINE  - goal 140-180 blood glucose  - c/w mSSI  - hold lantus since NPO for GIB    GASTROINTESTINAL  - hold glucerna feeds via peg; restart trickle feeds  - protonix 40  - Rectal tube in place  - see anemia below    INFECTIOUS DISEASE    #Fever of unknown origin with developing shock-could be secondary to streptophomonas and enterobacter pna.   -f/u blood cx  -s/p meropenem 12/24-26  -c/w bactrim 12/26-1/1    HEMATOLOGY/ONCOLOGY  #Anemia  patient experienced drop in Hgb from 9.8 to 7.3 over past day and found to have melanotic stools.  also hemodynamically unstable with BPs in 80s-90s/30s-40s.  GI was concerned for stress gastritic or ulcers which bled in light of newly re-started on anticoagulation.    RESOLVED  -s/p 2u pRBC  -stabilized    ACCESS/LINES/DRAINS  A-line 12/17  PEG 12/17    PROPHYLACTIC  ELECTROLYTES: Mg<2, K<4  DIET: NPO  GI PPX: Protonix  VTE PPX: SCD, lovenox bid for afib  CODE: FULL  DISPO: MICU  70M DMT2, A-fib on Xarelto, HTN, HLD, severe carotid disease who was admitted to [facility] on 11/27 where he received decadron, remdesivir, convalescent plasma, and several antibiotics before being transferred to [facility].  currently s/p trach and PEG and treatment for mssa VAP treatment wtih zosyn from 12/13-20.  course has been complicated by afib with rvr and melanotic stools but mentation appears stable and hopeful for LTAC placement soon.     NEUROLOGY  #Sedation  -wean off precedex    RESPIRATORY   #Acute hypoxic respiratory failure 2/2 COVID.  Now exhibiting significant tachypnea to 30s-40s likely 2/2 underlying infection.  -pt spiked fever on 12/12; sputum cx negative but concern for overlying pna so received zosyn 12/14-12/20  -s/p trach 12 /18 and peg 12/17   -c/w CPAP  -see ID below    #COVID 19  -s/p remdesivir and decadron    CARDIOVASCULAR    #H/o A-fib with RVR  - AM EKG  - c/w lovenox  - s/p amiodarone 400mg BID 12/10-20  - dc amiodarone gtt 12/23  - amio boluses if enters afib with RVR  - TTE with no wall abnormalities  - switch from amio PO 400mg BID to 200BID  - f/u EP recs    #hypotension  -wean phenylephrine  -midodrine 5mg q8h    #Severe carotid disease  -Patient with occluded right ICA and severe left ICA stenosis, s/p left CEA, uncomplicated  -f/u with cardiology outpatient    #QTC prolongation:   - daily EKG; monitor on amiodarone    RENAL   - monitor Is/Os  - dc santana; TOV    ENDOCRINE  - goal 140-180 blood glucose  - c/w mSSI  - hold lantus since NPO for GIB    GASTROINTESTINAL  - hold glucerna feeds via peg; restart trickle feeds  - protonix 40  - Rectal tube in place  - see anemia below    INFECTIOUS DISEASE    #Fever of unknown origin with developing shock-could be secondary to streptophomonas and enterobacter pna.   -f/u blood cx  -s/p meropenem 12/24-26  -c/w bactrim 12/26-1/1    HEMATOLOGY/ONCOLOGY  #Anemia  patient experienced drop in Hgb from 9.8 to 7.3 over past day and found to have melanotic stools.  also hemodynamically unstable with BPs in 80s-90s/30s-40s.  GI was concerned for stress gastritic or ulcers which bled in light of newly re-started on anticoagulation.    RESOLVED  -s/p 2u pRBC  -stabilized    ACCESS/LINES/DRAINS  A-line 12/17  PEG 12/17    PROPHYLACTIC  ELECTROLYTES: Mg<2, K<4  DIET: NPO  GI PPX: Protonix  VTE PPX: SCD, lovenox bid for afib  CODE: FULL  DISPO: MICU

## 2020-12-28 NOTE — PROGRESS NOTE ADULT - SUBJECTIVE AND OBJECTIVE BOX
EPS Progress Note  CC: Covid    S: no acute events     O: T(C): 36.2 (12-28-20 @ 13:03), Max: 37.2 (12-27-20 @ 18:35)  HR: 83 (12-28-20 @ 14:50) (58 - 120)  BP: 125/52  RR: 35 (12-28-20 @ 14:50) (20 - 44)  SpO2: 95% (12-28-20 @ 14:50) (94% - 99%)      TELE: NSR since 12/27/20.  HR 60s    EKG 12/28/20: NSR 84 bpm.  First degree AVB (pr 200 ms).  Narrow QRS. Qtc 501 ms (prolonged).   EKG 12/26/20: NSR 73 bpm.  QTc 544ms  EKG 12/25/20: NSR 74 bpm. QTc 523 ms   EKG 12/24/20: AFIB 130 bpm. QTc 459 ms  EKG 12/24/20:  bpm.  Qtc 482ms  EKG 12/20/20: NSR 60bpm.  QTc 560 ms.       PHYSICAL  Constitutional:  NAD        Cardiac:   + s1/s2, RRR      LABS:                        9.3    10.34 )-----------( 429      ( 28 Dec 2020 04:08 )             29.2     12-28    135  |  103  |  10  ----------------------------<  145<H>  4.3   |  21<L>  |  0.39<L>    Ca    8.4      28 Dec 2020 04:08  Phos  2.9     12-28  Mg     2.1     12-28    TPro  6.1  /  Alb  3.0<L>  /  TBili  0.5  /  DBili  x   /  AST  21  /  ALT  25  /  AlkPhos  78  12-28      MEDICATIONS:  acetaminophen    Suspension .. 650 milliGRAM(s) Oral every 6 hours PRN  chlorhexidine 0.12% Liquid 15 milliLiter(s) Oral Mucosa every 12 hours  chlorhexidine 2% Cloths 1 Application(s) Topical <User Schedule>  dexMEDEtomidine Infusion 0.2 MICROgram(s)/kG/Hr IV Continuous <Continuous>  enoxaparin Injectable 90 milliGRAM(s) SubCutaneous every 12 hours  glucagon  Injectable 1 milliGRAM(s) IntraMuscular once  insulin lispro (ADMELOG) corrective regimen sliding scale   SubCutaneous every 6 hours  midodrine 5 milliGRAM(s) Oral every 8 hours  OLANZapine Disintegrating Tablet 5 milliGRAM(s) Oral every 8 hours  pantoprazole  Injectable 40 milliGRAM(s) IV Push every 12 hours  petrolatum Ophthalmic Ointment 1 Application(s) Both EYES every 6 hours  phenylephrine    Infusion 0.1 MICROgram(s)/kG/Min IV Continuous <Continuous>  trimethoprim  160 mG/sulfamethoxazole 800 mG 2 Tablet(s) Oral every 6 hours

## 2020-12-28 NOTE — PROGRESS NOTE ADULT - SUBJECTIVE AND OBJECTIVE BOX
HOSPITAL COURSE:  Hospital Course:  70M DMT2, A-fib on Xarelto, HTN, HLD, severe carotid disease who was admitted to [facility] on 11/27, started on remdesivir and decadron on 11/28. Convalescent plasma on 11/30.  however was intubated on 12/2 due to worsening O2 sats. At [facility], received tocilizumab on 12/2, Zosyn 12/2-8, levaquin 12/2-8, zyvox 12/8, and caspofungin on 12/8. S/p trach and PEG placement. Amio for A-fib was stopped on 12/20 due to prolonged Qtc. S/p MSSA VAP treatment with zosyn 12/13-20 and currently receiving treatment for stenotrophomonas PNA, started on meropenem and de-escalated to bactrim.      OVERNIGHT EVENTS:    SUBJECTIVE / INTERVAL HPI: Patient seen and examined at bedside.     VITAL SIGNS:  Vital Signs Last 24 Hrs  T(C): 36.8 (28 Dec 2020 18:06), Max: 37.2 (27 Dec 2020 23:00)  T(F): 98.3 (28 Dec 2020 18:06), Max: 99 (27 Dec 2020 23:00)  HR: 73 (28 Dec 2020 17:00) (58 - 120)  BP: --  BP(mean): --  RR: 34 (28 Dec 2020 17:00) (15 - 44)  SpO2: 98% (28 Dec 2020 17:00) (94% - 99%)    PHYSICAL EXAM:    General: WDWN  HEENT: NCAT; PERRL, anicteric sclera; MMM  Neck: supple, trachea midline  Cardiovascular: S1, S2 normal; RRR, no M/G/R  Respiratory: CTABL; no W/R/R  Gastrointestinal: soft, nontender, nondistended. bowel sounds present.  Skin: no ulcerations or visible rashes appreciated  Extremities: WWP; no edema, clubbing or cyanosis  Vascular: 2+ radial, DP/PT pulses B/L  Neurological: AAOx3; CN II-XII grossly intact; no focal deficits    MEDICATIONS:  MEDICATIONS  (STANDING):  chlorhexidine 0.12% Liquid 15 milliLiter(s) Oral Mucosa every 12 hours  chlorhexidine 2% Cloths 1 Application(s) Topical <User Schedule>  dexMEDEtomidine Infusion 0.2 MICROgram(s)/kG/Hr (4.45 mL/Hr) IV Continuous <Continuous>  dextrose 40% Gel 15 Gram(s) Oral once  dextrose 5%. 1000 milliLiter(s) (50 mL/Hr) IV Continuous <Continuous>  dextrose 5%. 1000 milliLiter(s) (100 mL/Hr) IV Continuous <Continuous>  dextrose 50% Injectable 25 Gram(s) IV Push once  dextrose 50% Injectable 12.5 Gram(s) IV Push once  dextrose 50% Injectable 25 Gram(s) IV Push once  enoxaparin Injectable 90 milliGRAM(s) SubCutaneous every 12 hours  glucagon  Injectable 1 milliGRAM(s) IntraMuscular once  insulin lispro (ADMELOG) corrective regimen sliding scale   SubCutaneous every 6 hours  midodrine 5 milliGRAM(s) Oral every 8 hours  OLANZapine Disintegrating Tablet 5 milliGRAM(s) Oral every 8 hours  pantoprazole  Injectable 40 milliGRAM(s) IV Push every 12 hours  petrolatum Ophthalmic Ointment 1 Application(s) Both EYES every 6 hours  phenylephrine    Infusion 0.1 MICROgram(s)/kG/Min (3.33 mL/Hr) IV Continuous <Continuous>  trimethoprim  160 mG/sulfamethoxazole 800 mG 2 Tablet(s) Oral every 6 hours    MEDICATIONS  (PRN):  acetaminophen    Suspension .. 650 milliGRAM(s) Oral every 6 hours PRN Temp greater or equal to 38C (100.4F), Mild Pain (1 - 3)      ALLERGIES:  Allergies    No Known Allergies    Intolerances        LABS:                        9.3    10.34 )-----------( 429      ( 28 Dec 2020 04:08 )             29.2     12-28    135  |  103  |  10  ----------------------------<  145<H>  4.3   |  21<L>  |  0.39<L>    Ca    8.4      28 Dec 2020 04:08  Phos  2.9     12-28  Mg     2.1     12-28    TPro  6.1  /  Alb  3.0<L>  /  TBili  0.5  /  DBili  x   /  AST  21  /  ALT  25  /  AlkPhos  78  12-28        CAPILLARY BLOOD GLUCOSE      POCT Blood Glucose.: 100 mg/dL (28 Dec 2020 18:09)      RADIOLOGY & ADDITIONAL TESTS: Reviewed. HOSPITAL COURSE:  Hospital Course:  70M DMT2, A-fib on Xarelto, HTN, HLD, severe carotid disease who was admitted to [facility] on 11/27, started on remdesivir and decadron on 11/28. Convalescent plasma on 11/30.  however was intubated on 12/2 due to worsening O2 sats. At [facility], received tocilizumab on 12/2, Zosyn 12/2-8, levaquin 12/2-8, zyvox 12/8, and caspofungin on 12/8. S/p trach and PEG placement. Previously received 2 courses of zosyn for previous PNA.  Sputum cx from 12/24 growing stenotrophomonas, so started on Meropenem and transitioned to Bactrim for current PNA.  Course has been complicated by afib with rvr with varying amiodarone dosing, with electrophysiology consulted and following with recs.  Additionally, patient had episodic melena that has now resolved s/p X1 u pRBC.  GI was concerned for stress gastritic or ulcers which bled in light of newly re-started on anticoagulation.  Patient currently with improving mentation and goal for LTAC.    OVERNIGHT EVENTS: MIKI    SUBJECTIVE / INTERVAL HPI: Patient seen and examined at bedside.  alert and nods head to say that he feels better.     VITAL SIGNS:  Vital Signs Last 24 Hrs  T(C): 36.8 (28 Dec 2020 18:06), Max: 37.2 (27 Dec 2020 23:00)  T(F): 98.3 (28 Dec 2020 18:06), Max: 99 (27 Dec 2020 23:00)  HR: 73 (28 Dec 2020 17:00) (58 - 120)  BP: --  BP(mean): --  RR: 34 (28 Dec 2020 17:00) (15 - 44)  SpO2: 98% (28 Dec 2020 17:00) (94% - 99%)    PHYSICAL EXAM:    General: WDWN, NAD  HEENT: NCAT; PERRL  Neck: trach  Cardiovascular: distant heart sounds  Respiratory: CTABL; no W/R/R  Gastrointestinal: soft, nontender, nondistended. bowel sounds present.  Skin: no ulcerations or visible rashes appreciated  Extremities: WWP; no edema  Vascular: 2+ radial, DP pulses B/L  Neurological: appears alert and engaged, follows commands, moves all extremities    MEDICATIONS:  MEDICATIONS  (STANDING):  chlorhexidine 0.12% Liquid 15 milliLiter(s) Oral Mucosa every 12 hours  chlorhexidine 2% Cloths 1 Application(s) Topical <User Schedule>  dexMEDEtomidine Infusion 0.2 MICROgram(s)/kG/Hr (4.45 mL/Hr) IV Continuous <Continuous>  dextrose 40% Gel 15 Gram(s) Oral once  dextrose 5%. 1000 milliLiter(s) (50 mL/Hr) IV Continuous <Continuous>  dextrose 5%. 1000 milliLiter(s) (100 mL/Hr) IV Continuous <Continuous>  dextrose 50% Injectable 25 Gram(s) IV Push once  dextrose 50% Injectable 12.5 Gram(s) IV Push once  dextrose 50% Injectable 25 Gram(s) IV Push once  enoxaparin Injectable 90 milliGRAM(s) SubCutaneous every 12 hours  glucagon  Injectable 1 milliGRAM(s) IntraMuscular once  insulin lispro (ADMELOG) corrective regimen sliding scale   SubCutaneous every 6 hours  midodrine 5 milliGRAM(s) Oral every 8 hours  OLANZapine Disintegrating Tablet 5 milliGRAM(s) Oral every 8 hours  pantoprazole  Injectable 40 milliGRAM(s) IV Push every 12 hours  petrolatum Ophthalmic Ointment 1 Application(s) Both EYES every 6 hours  phenylephrine    Infusion 0.1 MICROgram(s)/kG/Min (3.33 mL/Hr) IV Continuous <Continuous>  trimethoprim  160 mG/sulfamethoxazole 800 mG 2 Tablet(s) Oral every 6 hours    MEDICATIONS  (PRN):  acetaminophen    Suspension .. 650 milliGRAM(s) Oral every 6 hours PRN Temp greater or equal to 38C (100.4F), Mild Pain (1 - 3)      ALLERGIES:  Allergies    No Known Allergies    Intolerances        LABS:                        9.3    10.34 )-----------( 429      ( 28 Dec 2020 04:08 )             29.2     12-28    135  |  103  |  10  ----------------------------<  145<H>  4.3   |  21<L>  |  0.39<L>    Ca    8.4      28 Dec 2020 04:08  Phos  2.9     12-28  Mg     2.1     12-28    TPro  6.1  /  Alb  3.0<L>  /  TBili  0.5  /  DBili  x   /  AST  21  /  ALT  25  /  AlkPhos  78  12-28        CAPILLARY BLOOD GLUCOSE      POCT Blood Glucose.: 100 mg/dL (28 Dec 2020 18:09)      RADIOLOGY & ADDITIONAL TESTS: Reviewed.

## 2020-12-29 LAB
ANION GAP SERPL CALC-SCNC: 13 MMOL/L — SIGNIFICANT CHANGE UP (ref 5–17)
BASE EXCESS BLDA CALC-SCNC: -3.4 MMOL/L — LOW (ref -2–3)
BASOPHILS # BLD AUTO: 0.04 K/UL — SIGNIFICANT CHANGE UP (ref 0–0.2)
BASOPHILS NFR BLD AUTO: 0.4 % — SIGNIFICANT CHANGE UP (ref 0–2)
BUN SERPL-MCNC: 16 MG/DL — SIGNIFICANT CHANGE UP (ref 7–23)
CALCIUM SERPL-MCNC: 8.8 MG/DL — SIGNIFICANT CHANGE UP (ref 8.4–10.5)
CHLORIDE SERPL-SCNC: 103 MMOL/L — SIGNIFICANT CHANGE UP (ref 96–108)
CO2 SERPL-SCNC: 19 MMOL/L — LOW (ref 22–31)
CREAT SERPL-MCNC: 0.42 MG/DL — LOW (ref 0.5–1.3)
CULTURE RESULTS: SIGNIFICANT CHANGE UP
EOSINOPHIL # BLD AUTO: 0.25 K/UL — SIGNIFICANT CHANGE UP (ref 0–0.5)
EOSINOPHIL NFR BLD AUTO: 2.6 % — SIGNIFICANT CHANGE UP (ref 0–6)
GLUCOSE BLDC GLUCOMTR-MCNC: 119 MG/DL — HIGH (ref 70–99)
GLUCOSE BLDC GLUCOMTR-MCNC: 123 MG/DL — HIGH (ref 70–99)
GLUCOSE BLDC GLUCOMTR-MCNC: 130 MG/DL — HIGH (ref 70–99)
GLUCOSE BLDC GLUCOMTR-MCNC: 187 MG/DL — HIGH (ref 70–99)
GLUCOSE BLDC GLUCOMTR-MCNC: 99 MG/DL — SIGNIFICANT CHANGE UP (ref 70–99)
GLUCOSE SERPL-MCNC: 118 MG/DL — HIGH (ref 70–99)
HCO3 BLDA-SCNC: 20 MMOL/L — LOW (ref 21–28)
HCT VFR BLD CALC: 29.1 % — LOW (ref 39–50)
HGB BLD-MCNC: 9.2 G/DL — LOW (ref 13–17)
IMM GRANULOCYTES NFR BLD AUTO: 0.8 % — SIGNIFICANT CHANGE UP (ref 0–1.5)
LYMPHOCYTES # BLD AUTO: 1.73 K/UL — SIGNIFICANT CHANGE UP (ref 1–3.3)
LYMPHOCYTES # BLD AUTO: 17.9 % — SIGNIFICANT CHANGE UP (ref 13–44)
MAGNESIUM SERPL-MCNC: 2.2 MG/DL — SIGNIFICANT CHANGE UP (ref 1.6–2.6)
MCHC RBC-ENTMCNC: 27 PG — SIGNIFICANT CHANGE UP (ref 27–34)
MCHC RBC-ENTMCNC: 31.6 GM/DL — LOW (ref 32–36)
MCV RBC AUTO: 85.3 FL — SIGNIFICANT CHANGE UP (ref 80–100)
MONOCYTES # BLD AUTO: 0.68 K/UL — SIGNIFICANT CHANGE UP (ref 0–0.9)
MONOCYTES NFR BLD AUTO: 7 % — SIGNIFICANT CHANGE UP (ref 2–14)
NEUTROPHILS # BLD AUTO: 6.87 K/UL — SIGNIFICANT CHANGE UP (ref 1.8–7.4)
NEUTROPHILS NFR BLD AUTO: 71.3 % — SIGNIFICANT CHANGE UP (ref 43–77)
NRBC # BLD: 0 /100 WBCS — SIGNIFICANT CHANGE UP (ref 0–0)
PCO2 BLDA: 32 MMHG — LOW (ref 35–48)
PH BLDA: 7.43 — SIGNIFICANT CHANGE UP (ref 7.35–7.45)
PHOSPHATE SERPL-MCNC: 3.8 MG/DL — SIGNIFICANT CHANGE UP (ref 2.5–4.5)
PLATELET # BLD AUTO: 489 K/UL — HIGH (ref 150–400)
PO2 BLDA: 154 MMHG — HIGH (ref 83–108)
POTASSIUM SERPL-MCNC: 4.5 MMOL/L — SIGNIFICANT CHANGE UP (ref 3.5–5.3)
POTASSIUM SERPL-SCNC: 4.5 MMOL/L — SIGNIFICANT CHANGE UP (ref 3.5–5.3)
RBC # BLD: 3.41 M/UL — LOW (ref 4.2–5.8)
RBC # FLD: 23.8 % — HIGH (ref 10.3–14.5)
SAO2 % BLDA: 99 % — SIGNIFICANT CHANGE UP (ref 95–100)
SODIUM SERPL-SCNC: 135 MMOL/L — SIGNIFICANT CHANGE UP (ref 135–145)
SPECIMEN SOURCE: SIGNIFICANT CHANGE UP
WBC # BLD: 9.65 K/UL — SIGNIFICANT CHANGE UP (ref 3.8–10.5)
WBC # FLD AUTO: 9.65 K/UL — SIGNIFICANT CHANGE UP (ref 3.8–10.5)

## 2020-12-29 PROCEDURE — 99233 SBSQ HOSP IP/OBS HIGH 50: CPT | Mod: GC

## 2020-12-29 RX ORDER — OLANZAPINE 15 MG/1
5 TABLET, FILM COATED ORAL EVERY 12 HOURS
Refills: 0 | Status: DISCONTINUED | OUTPATIENT
Start: 2020-12-29 | End: 2020-12-30

## 2020-12-29 RX ADMIN — Medication 2 TABLET(S): at 05:09

## 2020-12-29 RX ADMIN — Medication 2 TABLET(S): at 07:49

## 2020-12-29 RX ADMIN — Medication 2 TABLET(S): at 14:58

## 2020-12-29 RX ADMIN — MIDODRINE HYDROCHLORIDE 5 MILLIGRAM(S): 2.5 TABLET ORAL at 23:13

## 2020-12-29 RX ADMIN — ENOXAPARIN SODIUM 90 MILLIGRAM(S): 100 INJECTION SUBCUTANEOUS at 18:37

## 2020-12-29 RX ADMIN — OLANZAPINE 5 MILLIGRAM(S): 15 TABLET, FILM COATED ORAL at 05:08

## 2020-12-29 RX ADMIN — OLANZAPINE 5 MILLIGRAM(S): 15 TABLET, FILM COATED ORAL at 17:59

## 2020-12-29 RX ADMIN — PANTOPRAZOLE SODIUM 40 MILLIGRAM(S): 20 TABLET, DELAYED RELEASE ORAL at 07:48

## 2020-12-29 RX ADMIN — ENOXAPARIN SODIUM 90 MILLIGRAM(S): 100 INJECTION SUBCUTANEOUS at 05:08

## 2020-12-29 RX ADMIN — CHLORHEXIDINE GLUCONATE 15 MILLILITER(S): 213 SOLUTION TOPICAL at 05:07

## 2020-12-29 RX ADMIN — MIDODRINE HYDROCHLORIDE 5 MILLIGRAM(S): 2.5 TABLET ORAL at 14:58

## 2020-12-29 RX ADMIN — CHLORHEXIDINE GLUCONATE 15 MILLILITER(S): 213 SOLUTION TOPICAL at 18:37

## 2020-12-29 RX ADMIN — OLANZAPINE 5 MILLIGRAM(S): 15 TABLET, FILM COATED ORAL at 07:48

## 2020-12-29 RX ADMIN — AMIODARONE HYDROCHLORIDE 200 MILLIGRAM(S): 400 TABLET ORAL at 07:48

## 2020-12-29 RX ADMIN — CHLORHEXIDINE GLUCONATE 1 APPLICATION(S): 213 SOLUTION TOPICAL at 05:07

## 2020-12-29 RX ADMIN — MIDODRINE HYDROCHLORIDE 5 MILLIGRAM(S): 2.5 TABLET ORAL at 07:48

## 2020-12-29 RX ADMIN — Medication 1 APPLICATION(S): at 05:09

## 2020-12-29 RX ADMIN — PANTOPRAZOLE SODIUM 40 MILLIGRAM(S): 20 TABLET, DELAYED RELEASE ORAL at 18:38

## 2020-12-29 RX ADMIN — Medication 2 TABLET(S): at 23:13

## 2020-12-29 RX ADMIN — Medication 1 APPLICATION(S): at 11:31

## 2020-12-29 RX ADMIN — Medication 1 APPLICATION(S): at 18:38

## 2020-12-29 NOTE — PROGRESS NOTE ADULT - SUBJECTIVE AND OBJECTIVE BOX
CC: Patient is a 70y old  Male who presents with a chief complaint of COVID-19 pna (28 Dec 2020 15:31)      INTERVAL EVENTS:    SUBJECTIVE / INTERVAL HPI: Patient seen and examined at bedside.     ROS: negative unless otherwise stated above.    VITAL SIGNS:  Vital Signs Last 24 Hrs  T(C): 36.4 (29 Dec 2020 04:33), Max: 36.8 (28 Dec 2020 18:06)  T(F): 97.5 (29 Dec 2020 04:33), Max: 98.3 (28 Dec 2020 18:06)  HR: 89 (29 Dec 2020 05:29) (64 - 92)  BP: --  BP(mean): --  RR: 24 (29 Dec 2020 05:29) (15 - 40)  SpO2: 96% (29 Dec 2020 05:29) (88% - 98%)      12-28-20 @ 07:01  -  12-29-20 @ 07:00  --------------------------------------------------------  IN: 750 mL / OUT: 1126 mL / NET: -376 mL        PHYSICAL EXAM:    General: WDWN  HEENT: NC/AT; PERRL, anicteric sclera; MMM  Neck: supple  Cardiovascular: +S1/S2; RRR  Respiratory: CTA B/L; no W/R/R  Gastrointestinal: soft, NT/ND; +BSx4  Extremities: WWP; no edema, clubbing or cyanosis  Vascular: 2+ radial, DP/PT pulses B/L  Neurological: AAOx3; no focal deficits    MEDICATIONS:  MEDICATIONS  (STANDING):  aMIOdarone    Tablet 200 milliGRAM(s) Oral daily  chlorhexidine 0.12% Liquid 15 milliLiter(s) Oral Mucosa every 12 hours  chlorhexidine 2% Cloths 1 Application(s) Topical <User Schedule>  dexMEDEtomidine Infusion 0.2 MICROgram(s)/kG/Hr (4.45 mL/Hr) IV Continuous <Continuous>  dextrose 40% Gel 15 Gram(s) Oral once  dextrose 5%. 1000 milliLiter(s) (100 mL/Hr) IV Continuous <Continuous>  dextrose 5%. 1000 milliLiter(s) (50 mL/Hr) IV Continuous <Continuous>  dextrose 50% Injectable 25 Gram(s) IV Push once  dextrose 50% Injectable 12.5 Gram(s) IV Push once  dextrose 50% Injectable 25 Gram(s) IV Push once  enoxaparin Injectable 90 milliGRAM(s) SubCutaneous every 12 hours  glucagon  Injectable 1 milliGRAM(s) IntraMuscular once  insulin lispro (ADMELOG) corrective regimen sliding scale   SubCutaneous every 6 hours  midodrine 5 milliGRAM(s) Oral every 8 hours  OLANZapine Disintegrating Tablet 5 milliGRAM(s) Oral every 8 hours  pantoprazole  Injectable 40 milliGRAM(s) IV Push every 12 hours  petrolatum Ophthalmic Ointment 1 Application(s) Both EYES every 6 hours  phenylephrine    Infusion 0.1 MICROgram(s)/kG/Min (3.33 mL/Hr) IV Continuous <Continuous>  trimethoprim  160 mG/sulfamethoxazole 800 mG 2 Tablet(s) Oral every 6 hours    MEDICATIONS  (PRN):  acetaminophen    Suspension .. 650 milliGRAM(s) Oral every 6 hours PRN Temp greater or equal to 38C (100.4F), Mild Pain (1 - 3)      ALLERGIES:  Allergies    No Known Allergies    Intolerances        LABS:                        9.2    9.65  )-----------( 489      ( 29 Dec 2020 03:56 )             29.1     12-29    135  |  103  |  16  ----------------------------<  118<H>  4.5   |  19<L>  |  0.42<L>    Ca    8.8      29 Dec 2020 03:56  Phos  2.9     12-28  Mg     2.2     12-29    TPro  6.1  /  Alb  3.0<L>  /  TBili  0.5  /  DBili  x   /  AST  21  /  ALT  25  /  AlkPhos  78  12-28        CAPILLARY BLOOD GLUCOSE      POCT Blood Glucose.: 99 mg/dL (29 Dec 2020 07:00)      RADIOLOGY & ADDITIONAL TESTS: Reviewed. CC: Patient is a 70y old  Male who presents with a chief complaint of COVID-19 pna (29 Dec 2020 07:44)      INTERVAL EVENTS: Pt tolerating CPAP. Restarted feeds.    SUBJECTIVE / INTERVAL HPI: Patient seen and examined at bedside. Pt on a vent and unable to participate in ROS.    ROS: negative unless otherwise stated above.    VITAL SIGNS:  Vital Signs Last 24 Hrs  T(C): 36.4 (29 Dec 2020 04:33), Max: 36.8 (28 Dec 2020 18:06)  T(F): 97.5 (29 Dec 2020 04:33), Max: 98.3 (28 Dec 2020 18:06)  HR: 81 (29 Dec 2020 09:45) (62 - 92)  BP: --  BP(mean): --  RR: 24 (29 Dec 2020 08:00) (15 - 40)  SpO2: 100% (29 Dec 2020 09:45) (88% - 100%)      12-28-20 @ 07:01  -  12-29-20 @ 07:00  --------------------------------------------------------  IN: 750 mL / OUT: 1126 mL / NET: -376 mL        PHYSICAL EXAM:    General: sedated on a ventilator  Cardiovascular: +S1/S2; RRR  Respiratory: CTA B/L; no W/R/R  Gastrointestinal: soft, non-distended; +BSx4  Extremities: WWP; no edema, clubbing or cyanosis  Vascular: 2+ radial, DP/PT pulses B/L  Neurological: sedated on a ventilator    MEDICATIONS:  MEDICATIONS  (STANDING):  aMIOdarone    Tablet 200 milliGRAM(s) Oral daily  chlorhexidine 0.12% Liquid 15 milliLiter(s) Oral Mucosa every 12 hours  chlorhexidine 2% Cloths 1 Application(s) Topical <User Schedule>  dexMEDEtomidine Infusion 0.2 MICROgram(s)/kG/Hr (4.45 mL/Hr) IV Continuous <Continuous>  dextrose 40% Gel 15 Gram(s) Oral once  dextrose 5%. 1000 milliLiter(s) (50 mL/Hr) IV Continuous <Continuous>  dextrose 5%. 1000 milliLiter(s) (100 mL/Hr) IV Continuous <Continuous>  dextrose 50% Injectable 25 Gram(s) IV Push once  dextrose 50% Injectable 12.5 Gram(s) IV Push once  dextrose 50% Injectable 25 Gram(s) IV Push once  enoxaparin Injectable 90 milliGRAM(s) SubCutaneous every 12 hours  glucagon  Injectable 1 milliGRAM(s) IntraMuscular once  insulin lispro (ADMELOG) corrective regimen sliding scale   SubCutaneous every 6 hours  midodrine 5 milliGRAM(s) Oral every 8 hours  OLANZapine Disintegrating Tablet 5 milliGRAM(s) Oral every 12 hours  pantoprazole  Injectable 40 milliGRAM(s) IV Push every 12 hours  petrolatum Ophthalmic Ointment 1 Application(s) Both EYES every 6 hours  phenylephrine    Infusion 0.1 MICROgram(s)/kG/Min (3.33 mL/Hr) IV Continuous <Continuous>  trimethoprim  160 mG/sulfamethoxazole 800 mG 2 Tablet(s) Oral every 6 hours    MEDICATIONS  (PRN):  acetaminophen    Suspension .. 650 milliGRAM(s) Oral every 6 hours PRN Temp greater or equal to 38C (100.4F), Mild Pain (1 - 3)      ALLERGIES:  Allergies    No Known Allergies    Intolerances        LABS:                        9.2    9.65  )-----------( 489      ( 29 Dec 2020 03:56 )             29.1     12-29    135  |  103  |  16  ----------------------------<  118<H>  4.5   |  19<L>  |  0.42<L>    Ca    8.8      29 Dec 2020 03:56  Phos  3.8     12-29  Mg     2.2     12-29    TPro  6.1  /  Alb  3.0<L>  /  TBili  0.5  /  DBili  x   /  AST  21  /  ALT  25  /  AlkPhos  78  12-28        CAPILLARY BLOOD GLUCOSE      POCT Blood Glucose.: 119 mg/dL (29 Dec 2020 11:26)      RADIOLOGY & ADDITIONAL TESTS: Reviewed. CC: Patient is a 70y old  Male who presents with a chief complaint of COVID-19 pna (29 Dec 2020 07:44)      INTERVAL EVENTS: Pt tolerating CPAP. Restarted feeds.    SUBJECTIVE / INTERVAL HPI: Patient seen and examined at bedside. Pt on a vent and unable to participate in ROS.    ROS: negative unless otherwise stated above.    VITAL SIGNS:  Vital Signs Last 24 Hrs  T(C): 36.4 (29 Dec 2020 04:33), Max: 36.8 (28 Dec 2020 18:06)  T(F): 97.5 (29 Dec 2020 04:33), Max: 98.3 (28 Dec 2020 18:06)  HR: 81 (29 Dec 2020 09:45) (62 - 92)  BP: --  BP(mean): --  RR: 24 (29 Dec 2020 08:00) (15 - 40)  SpO2: 100% (29 Dec 2020 09:45) (88% - 100%)      12-28-20 @ 07:01  -  12-29-20 @ 07:00  --------------------------------------------------------  IN: 750 mL / OUT: 1126 mL / NET: -376 mL        PHYSICAL EXAM:    General: sedated on a ventilator  HEENT: PERRL, EOMI, MMM  Cardiovascular: +S1/S2; RRR  Respiratory: CTA B/L; no W/R/R  Gastrointestinal: soft, non-distended; +BSx4  Extremities: WWP; no edema, clubbing or cyanosis  Vascular: 2+ radial, DP/PT pulses B/L  Neurological: awake and interactive, moving extremities  Skin: few DTI    MEDICATIONS:  MEDICATIONS  (STANDING):  aMIOdarone    Tablet 200 milliGRAM(s) Oral daily  chlorhexidine 0.12% Liquid 15 milliLiter(s) Oral Mucosa every 12 hours  chlorhexidine 2% Cloths 1 Application(s) Topical <User Schedule>  dexMEDEtomidine Infusion 0.2 MICROgram(s)/kG/Hr (4.45 mL/Hr) IV Continuous <Continuous>  dextrose 40% Gel 15 Gram(s) Oral once  dextrose 5%. 1000 milliLiter(s) (50 mL/Hr) IV Continuous <Continuous>  dextrose 5%. 1000 milliLiter(s) (100 mL/Hr) IV Continuous <Continuous>  dextrose 50% Injectable 25 Gram(s) IV Push once  dextrose 50% Injectable 12.5 Gram(s) IV Push once  dextrose 50% Injectable 25 Gram(s) IV Push once  enoxaparin Injectable 90 milliGRAM(s) SubCutaneous every 12 hours  glucagon  Injectable 1 milliGRAM(s) IntraMuscular once  insulin lispro (ADMELOG) corrective regimen sliding scale   SubCutaneous every 6 hours  midodrine 5 milliGRAM(s) Oral every 8 hours  OLANZapine Disintegrating Tablet 5 milliGRAM(s) Oral every 12 hours  pantoprazole  Injectable 40 milliGRAM(s) IV Push every 12 hours  petrolatum Ophthalmic Ointment 1 Application(s) Both EYES every 6 hours  phenylephrine    Infusion 0.1 MICROgram(s)/kG/Min (3.33 mL/Hr) IV Continuous <Continuous>  trimethoprim  160 mG/sulfamethoxazole 800 mG 2 Tablet(s) Oral every 6 hours    MEDICATIONS  (PRN):  acetaminophen    Suspension .. 650 milliGRAM(s) Oral every 6 hours PRN Temp greater or equal to 38C (100.4F), Mild Pain (1 - 3)      ALLERGIES:  Allergies    No Known Allergies    Intolerances        LABS:                        9.2    9.65  )-----------( 489      ( 29 Dec 2020 03:56 )             29.1     12-29    135  |  103  |  16  ----------------------------<  118<H>  4.5   |  19<L>  |  0.42<L>    Ca    8.8      29 Dec 2020 03:56  Phos  3.8     12-29  Mg     2.2     12-29    TPro  6.1  /  Alb  3.0<L>  /  TBili  0.5  /  DBili  x   /  AST  21  /  ALT  25  /  AlkPhos  78  12-28        CAPILLARY BLOOD GLUCOSE      POCT Blood Glucose.: 119 mg/dL (29 Dec 2020 11:26)      RADIOLOGY & ADDITIONAL TESTS: Reviewed.

## 2020-12-29 NOTE — PHYSICAL THERAPY INITIAL EVALUATION ADULT - GENERAL OBSERVATIONS, REHAB EVAL
Pt received semi supine in bed, +tele, +R Axillary A line, +trach collar CPAP 40% Peep 5, +rectal tube

## 2020-12-29 NOTE — PHYSICAL THERAPY INITIAL EVALUATION ADULT - PERTINENT HX OF CURRENT PROBLEM, REHAB EVAL
70 yr old male with PMhx of DMT2, Afib on xeralto, HTN, HLD who was admitted to [facility] on 11/27 and started on remdisivir and decadron on 11/28, received convalexcent plasma on 11/30, as well as Toci on 12/2, but was intubated on 12/2 due to worsening O2 sats. Also treated with Zosyn since 12/2 and 7 days of Levoquin (12/2- 12/8), along with  zyvox and caspofungin on 12/8. Transferred to [facility] 12/8 overnight.

## 2020-12-29 NOTE — PROGRESS NOTE ADULT - ASSESSMENT
70M DMT2, A-fib on Xarelto, HTN, HLD, severe carotid disease who was admitted to [facility] on 11/27 where he received decadron, remdesivir, convalescent plasma, and several antibiotics before being transferred to [facility].  currently s/p trach and PEG and treatment for mssa VAP treatment wtih zosyn from 12/13-20.  course has been complicated by afib with rvr and melanotic stools but mentation appears stable and hopeful for LTAC placement soon.     NEUROLOGY  #Sedation  -wean off precedex    RESPIRATORY   #Acute hypoxic respiratory failure 2/2 COVID.  Now exhibiting significant tachypnea to 30s-40s likely 2/2 underlying infection.  -pt spiked fever on 12/12; sputum cx negative but concern for overlying pna so received zosyn 12/14-12/20  -s/p trach 12 /18 and peg 12/17   -c/w CPAP  -see ID below    #COVID 19  -s/p remdesivir and decadron    CARDIOVASCULAR    #H/o A-fib with RVR  - AM EKG  - c/w lovenox  - s/p amiodarone 400mg BID 12/10-20  - dc amiodarone gtt 12/23  - amio boluses if enters afib with RVR  - TTE with no wall abnormalities  - switch from amio PO 400mg BID to 200BID  - f/u EP recs    #hypotension  -wean phenylephrine  -midodrine 5mg q8h    #Severe carotid disease  -Patient with occluded right ICA and severe left ICA stenosis, s/p left CEA, uncomplicated  -f/u with cardiology outpatient    #QTC prolongation:   - daily EKG; monitor on amiodarone    RENAL   - monitor Is/Os  - dc santana; TOV    ENDOCRINE  - goal 140-180 blood glucose  - c/w mSSI  - hold lantus since NPO for GIB    GASTROINTESTINAL  - hold glucerna feeds via peg; restart trickle feeds  - protonix 40  - Rectal tube in place  - see anemia below    INFECTIOUS DISEASE    #Fever of unknown origin with developing shock-could be secondary to streptophomonas and enterobacter pna.   -f/u blood cx  -s/p meropenem 12/24-26  -c/w bactrim 12/26-1/1    HEMATOLOGY/ONCOLOGY  #Anemia  patient experienced drop in Hgb from 9.8 to 7.3 over past day and found to have melanotic stools.  also hemodynamically unstable with BPs in 80s-90s/30s-40s.  GI was concerned for stress gastritic or ulcers which bled in light of newly re-started on anticoagulation.    RESOLVED  -s/p 2u pRBC  -stabilized    ACCESS/LINES/DRAINS  A-line 12/17  PEG 12/17    PROPHYLACTIC  ELECTROLYTES: Mg<2, K<4  DIET: NPO  GI PPX: Protonix  VTE PPX: SCD, lovenox bid for afib  CODE: FULL  DISPO: MICU  70M DMT2, A-fib on Xarelto, HTN, HLD, severe carotid disease who was admitted to [facility] on 11/27 where he received decadron, remdesivir, convalescent plasma, and several antibiotics before being transferred to [facility].  currently s/p trach and PEG and treatment for mssa VAP treatment wtih zosyn from 12/13-20.  course has been complicated by afib with rvr and melanotic stools but mentation appears stable and hopeful for LTAC placement soon.     NEUROLOGY  #Sedation  -wean off precedex  -cont olanzapine 5mg BID (decreased due to QTc prolongation)    RESPIRATORY   #Acute hypoxic respiratory failure  -2/2 covid with ?overlying PNA  -s/p trach 12/18 and peg 12/17  Plan:  -c/w CPAP  -trial of trach collar  -see ID below    CARDIOVASCULAR    #H/o A-fib with RVR, now controlled  - s/p amiodarone 400mg BID 12/10-20, amiodarone gtt 12/23  - TTE with no wall abnormalities  Plan:  - AM EKG  - c/w lovenox  - cont amio 200mg PO daily  - f/u EP recs    #hypotension  -wean phenylephrine  -cont midodrine 5mg q8h    #Severe carotid disease  -Patient with occluded right ICA and severe left ICA stenosis, s/p left CEA, uncomplicated  -f/u with cardiology outpatient    #QTC prolongation:   - daily EKG; monitor on amiodarone and olanzapine    RENAL   - monitor Is/Os  - dc santana; failed TOV  - f/u straight cath    ENDOCRINE  - goal 140-180 blood glucose  - c/w mSSI    GASTROINTESTINAL  - protonix 40  - Rectal tube in place  - see anemia below    INFECTIOUS DISEASE  #COVID 19  -s/p remdesivir and decadron    #?PNA  -pt spiked fever on 12/12; sputum cx negative but concern for overlying pna so received zosyn 12/14-12/20    #Fever of unknown origin with developing shock-could be secondary to streptophomonas and enterobacter pna.   -f/u blood cx  -s/p meropenem 12/24-26  -c/w bactrim 12/26-1/1    HEMATOLOGY/ONCOLOGY  #Anemia  -RESOLVED  patient experienced drop in Hgb from 9.8 to 7.3 on 12/24 and found to have melanotic stools.  At time, also hemodynamically unstable with BPs in 80s-90s/30s-40s.  GI was concerned for stress gastritic or ulcers which bled in light of newly re-started on anticoagulation.    -s/p 2u pRBC  -stabilized    ACCESS/LINES/DRAINS  A-line 12/17  PEG 12/17    PROPHYLACTIC  ELECTROLYTES: Mg<2, K<4  DIET: NPO with tube feeds  GI PPX: Protonix  VTE PPX: SCD, lovenox bid for afib  CODE: FULL  DISPO: MICU

## 2020-12-30 LAB
ALBUMIN SERPL ELPH-MCNC: 3.2 G/DL — LOW (ref 3.3–5)
ALP SERPL-CCNC: 101 U/L — SIGNIFICANT CHANGE UP (ref 40–120)
ALT FLD-CCNC: 25 U/L — SIGNIFICANT CHANGE UP (ref 10–45)
ANION GAP SERPL CALC-SCNC: 16 MMOL/L — SIGNIFICANT CHANGE UP (ref 5–17)
APPEARANCE UR: CLEAR — SIGNIFICANT CHANGE UP
AST SERPL-CCNC: 22 U/L — SIGNIFICANT CHANGE UP (ref 10–40)
BACTERIA # UR AUTO: PRESENT /HPF
BASOPHILS # BLD AUTO: 0.05 K/UL — SIGNIFICANT CHANGE UP (ref 0–0.2)
BASOPHILS NFR BLD AUTO: 0.6 % — SIGNIFICANT CHANGE UP (ref 0–2)
BILIRUB SERPL-MCNC: 0.6 MG/DL — SIGNIFICANT CHANGE UP (ref 0.2–1.2)
BILIRUB UR-MCNC: ABNORMAL
BLD GP AB SCN SERPL QL: NEGATIVE — SIGNIFICANT CHANGE UP
BUN SERPL-MCNC: 14 MG/DL — SIGNIFICANT CHANGE UP (ref 7–23)
CALCIUM SERPL-MCNC: 9.3 MG/DL — SIGNIFICANT CHANGE UP (ref 8.4–10.5)
CHLORIDE SERPL-SCNC: 100 MMOL/L — SIGNIFICANT CHANGE UP (ref 96–108)
CO2 SERPL-SCNC: 20 MMOL/L — LOW (ref 22–31)
COLOR SPEC: YELLOW — SIGNIFICANT CHANGE UP
COMMENT - URINE: SIGNIFICANT CHANGE UP
CREAT SERPL-MCNC: 0.45 MG/DL — LOW (ref 0.5–1.3)
DIFF PNL FLD: ABNORMAL
EOSINOPHIL # BLD AUTO: 0.32 K/UL — SIGNIFICANT CHANGE UP (ref 0–0.5)
EOSINOPHIL NFR BLD AUTO: 4 % — SIGNIFICANT CHANGE UP (ref 0–6)
EPI CELLS # UR: SIGNIFICANT CHANGE UP /HPF (ref 0–5)
GLUCOSE BLDC GLUCOMTR-MCNC: 128 MG/DL — HIGH (ref 70–99)
GLUCOSE BLDC GLUCOMTR-MCNC: 139 MG/DL — HIGH (ref 70–99)
GLUCOSE BLDC GLUCOMTR-MCNC: 147 MG/DL — HIGH (ref 70–99)
GLUCOSE BLDC GLUCOMTR-MCNC: 157 MG/DL — HIGH (ref 70–99)
GLUCOSE SERPL-MCNC: 140 MG/DL — HIGH (ref 70–99)
GLUCOSE UR QL: NEGATIVE — SIGNIFICANT CHANGE UP
HCT VFR BLD CALC: 33.1 % — LOW (ref 39–50)
HGB BLD-MCNC: 10 G/DL — LOW (ref 13–17)
IMM GRANULOCYTES NFR BLD AUTO: 0.7 % — SIGNIFICANT CHANGE UP (ref 0–1.5)
KETONES UR-MCNC: >=80 MG/DL
LEUKOCYTE ESTERASE UR-ACNC: NEGATIVE — SIGNIFICANT CHANGE UP
LYMPHOCYTES # BLD AUTO: 1.36 K/UL — SIGNIFICANT CHANGE UP (ref 1–3.3)
LYMPHOCYTES # BLD AUTO: 16.8 % — SIGNIFICANT CHANGE UP (ref 13–44)
MAGNESIUM SERPL-MCNC: 2.1 MG/DL — SIGNIFICANT CHANGE UP (ref 1.6–2.6)
MCHC RBC-ENTMCNC: 26.5 PG — LOW (ref 27–34)
MCHC RBC-ENTMCNC: 30.2 GM/DL — LOW (ref 32–36)
MCV RBC AUTO: 87.6 FL — SIGNIFICANT CHANGE UP (ref 80–100)
MONOCYTES # BLD AUTO: 0.65 K/UL — SIGNIFICANT CHANGE UP (ref 0–0.9)
MONOCYTES NFR BLD AUTO: 8 % — SIGNIFICANT CHANGE UP (ref 2–14)
NEUTROPHILS # BLD AUTO: 5.65 K/UL — SIGNIFICANT CHANGE UP (ref 1.8–7.4)
NEUTROPHILS NFR BLD AUTO: 69.9 % — SIGNIFICANT CHANGE UP (ref 43–77)
NITRITE UR-MCNC: NEGATIVE — SIGNIFICANT CHANGE UP
NRBC # BLD: 0 /100 WBCS — SIGNIFICANT CHANGE UP (ref 0–0)
PH UR: 6 — SIGNIFICANT CHANGE UP (ref 5–8)
PHOSPHATE SERPL-MCNC: 2.9 MG/DL — SIGNIFICANT CHANGE UP (ref 2.5–4.5)
PLATELET # BLD AUTO: 468 K/UL — HIGH (ref 150–400)
POTASSIUM SERPL-MCNC: 4.6 MMOL/L — SIGNIFICANT CHANGE UP (ref 3.5–5.3)
POTASSIUM SERPL-SCNC: 4.6 MMOL/L — SIGNIFICANT CHANGE UP (ref 3.5–5.3)
PROT SERPL-MCNC: 7 G/DL — SIGNIFICANT CHANGE UP (ref 6–8.3)
PROT UR-MCNC: 30 MG/DL
RBC # BLD: 3.78 M/UL — LOW (ref 4.2–5.8)
RBC # FLD: 23.8 % — HIGH (ref 10.3–14.5)
RBC CASTS # UR COMP ASSIST: < 5 /HPF — SIGNIFICANT CHANGE UP
RH IG SCN BLD-IMP: NEGATIVE — SIGNIFICANT CHANGE UP
SODIUM SERPL-SCNC: 136 MMOL/L — SIGNIFICANT CHANGE UP (ref 135–145)
SP GR SPEC: >=1.03 — SIGNIFICANT CHANGE UP (ref 1–1.03)
UROBILINOGEN FLD QL: 1 E.U./DL — SIGNIFICANT CHANGE UP
WBC # BLD: 8.09 K/UL — SIGNIFICANT CHANGE UP (ref 3.8–10.5)
WBC # FLD AUTO: 8.09 K/UL — SIGNIFICANT CHANGE UP (ref 3.8–10.5)
WBC UR QL: < 5 /HPF — SIGNIFICANT CHANGE UP

## 2020-12-30 PROCEDURE — 99233 SBSQ HOSP IP/OBS HIGH 50: CPT | Mod: GC

## 2020-12-30 RX ORDER — DILTIAZEM HCL 120 MG
30 CAPSULE, EXT RELEASE 24 HR ORAL EVERY 6 HOURS
Refills: 0 | Status: DISCONTINUED | OUTPATIENT
Start: 2020-12-30 | End: 2020-12-31

## 2020-12-30 RX ORDER — PANTOPRAZOLE SODIUM 20 MG/1
40 TABLET, DELAYED RELEASE ORAL EVERY 12 HOURS
Refills: 0 | Status: DISCONTINUED | OUTPATIENT
Start: 2020-12-30 | End: 2021-01-07

## 2020-12-30 RX ORDER — DILTIAZEM HCL 120 MG
30 CAPSULE, EXT RELEASE 24 HR ORAL EVERY 6 HOURS
Refills: 0 | Status: DISCONTINUED | OUTPATIENT
Start: 2020-12-30 | End: 2020-12-30

## 2020-12-30 RX ORDER — LACTOBACILLUS ACIDOPHILUS 100MM CELL
1 CAPSULE ORAL EVERY 24 HOURS
Refills: 0 | Status: DISCONTINUED | OUTPATIENT
Start: 2020-12-30 | End: 2021-01-07

## 2020-12-30 RX ADMIN — AMIODARONE HYDROCHLORIDE 200 MILLIGRAM(S): 400 TABLET ORAL at 07:02

## 2020-12-30 RX ADMIN — CHLORHEXIDINE GLUCONATE 15 MILLILITER(S): 213 SOLUTION TOPICAL at 17:45

## 2020-12-30 RX ADMIN — PANTOPRAZOLE SODIUM 40 MILLIGRAM(S): 20 TABLET, DELAYED RELEASE ORAL at 07:04

## 2020-12-30 RX ADMIN — Medication 30 MILLIGRAM(S): at 18:40

## 2020-12-30 RX ADMIN — ENOXAPARIN SODIUM 90 MILLIGRAM(S): 100 INJECTION SUBCUTANEOUS at 07:04

## 2020-12-30 RX ADMIN — Medication 1 TABLET(S): at 11:26

## 2020-12-30 RX ADMIN — Medication 1 APPLICATION(S): at 11:25

## 2020-12-30 RX ADMIN — CHLORHEXIDINE GLUCONATE 1 APPLICATION(S): 213 SOLUTION TOPICAL at 05:26

## 2020-12-30 RX ADMIN — Medication 1 APPLICATION(S): at 07:04

## 2020-12-30 RX ADMIN — Medication 2 TABLET(S): at 11:21

## 2020-12-30 RX ADMIN — Medication 1 APPLICATION(S): at 00:17

## 2020-12-30 RX ADMIN — MIDODRINE HYDROCHLORIDE 5 MILLIGRAM(S): 2.5 TABLET ORAL at 07:04

## 2020-12-30 RX ADMIN — PANTOPRAZOLE SODIUM 40 MILLIGRAM(S): 20 TABLET, DELAYED RELEASE ORAL at 18:42

## 2020-12-30 RX ADMIN — OLANZAPINE 5 MILLIGRAM(S): 15 TABLET, FILM COATED ORAL at 07:04

## 2020-12-30 RX ADMIN — Medication 30 MILLIGRAM(S): at 11:25

## 2020-12-30 RX ADMIN — Medication 1 APPLICATION(S): at 17:49

## 2020-12-30 RX ADMIN — Medication 2 TABLET(S): at 18:42

## 2020-12-30 RX ADMIN — CHLORHEXIDINE GLUCONATE 15 MILLILITER(S): 213 SOLUTION TOPICAL at 05:26

## 2020-12-30 RX ADMIN — ENOXAPARIN SODIUM 90 MILLIGRAM(S): 100 INJECTION SUBCUTANEOUS at 18:40

## 2020-12-30 RX ADMIN — Medication 2 TABLET(S): at 07:04

## 2020-12-30 NOTE — OCCUPATIONAL THERAPY INITIAL EVALUATION ADULT - DIAGNOSIS, OT EVAL
Pt presents with generalized weakness, deficits in cognition, functional balance, activity tolerance, resulting in decreased ADLs/functional mobility.

## 2020-12-30 NOTE — OCCUPATIONAL THERAPY INITIAL EVALUATION ADULT - RANGE OF MOTION EXAMINATION, UPPER EXTREMITY
B shoulders 3-/5, B elbows/forearms/wrist/digits 4-/5/bilateral UE Active Assistive ROM was WFL  (within functional limits)

## 2020-12-30 NOTE — OCCUPATIONAL THERAPY INITIAL EVALUATION ADULT - GENERAL OBSERVATIONS, REHAB EVAL
Pt received semisupine, NAD, +telemtry, CPAP 40% PEEP 5, rectal tube, santana catheter, PEG, heplock.

## 2020-12-30 NOTE — OCCUPATIONAL THERAPY INITIAL EVALUATION ADULT - RANGE OF MOTION EXAMINATION, LOWER EXTREMITY
BLE 2+/5 hips, 3+/5 knee/ankle/bilateral LE Active Assistive ROM was WFL  (within functional limits)

## 2020-12-30 NOTE — PROGRESS NOTE ADULT - SUBJECTIVE AND OBJECTIVE BOX
CC: Patient is a 70y old  Male who presents with a chief complaint of COVID-19 pna (29 Dec 2020 07:44)      INTERVAL EVENTS: Tolerating CPAP overnight. Araujo placed for retaining.    SUBJECTIVE / INTERVAL HPI: Patient seen and examined at bedside. Pt unable to vocalize complaints, but denies current complaints.    ROS: negative unless otherwise stated above.    VITAL SIGNS:  Vital Signs Last 24 Hrs  T(C): 37.3 (30 Dec 2020 08:00), Max: 37.3 (29 Dec 2020 14:39)  T(F): 99.1 (30 Dec 2020 08:00), Max: 99.1 (29 Dec 2020 14:39)  HR: 114 (30 Dec 2020 10:00) (77 - 117)  BP: 138/64 (30 Dec 2020 10:00) (135/73 - 176/72)  BP(mean): 95 (30 Dec 2020 10:00) (95 - 105)  RR: 32 (30 Dec 2020 10:00) (24 - 37)  SpO2: 100% (30 Dec 2020 10:00) (95% - 100%)      12-29-20 @ 07:01  -  12-30-20 @ 07:00  --------------------------------------------------------  IN: 827 mL / OUT: 1575 mL / NET: -748 mL    12-30-20 @ 07:01  -  12-30-20 @ 13:23  --------------------------------------------------------  IN: 60 mL / OUT: 135 mL / NET: -75 mL        PHYSICAL EXAM:    General: elderly man on trach in NAD  Cardiovascular: +S1/S2; RRR  Respiratory: CTA B/L; no W/R/R  Gastrointestinal: soft, NT/ND; +BSx4  Extremities: WWP; no edema, clubbing or cyanosis  Vascular: 2+ radial, DP/PT pulses B/L  Neurological: AAOx2; no focal deficits    MEDICATIONS:  MEDICATIONS  (STANDING):  aMIOdarone    Tablet 200 milliGRAM(s) Oral daily  chlorhexidine 0.12% Liquid 15 milliLiter(s) Oral Mucosa every 12 hours  chlorhexidine 2% Cloths 1 Application(s) Topical <User Schedule>  dextrose 40% Gel 15 Gram(s) Oral once  dextrose 5%. 1000 milliLiter(s) (50 mL/Hr) IV Continuous <Continuous>  dextrose 5%. 1000 milliLiter(s) (100 mL/Hr) IV Continuous <Continuous>  dextrose 50% Injectable 25 Gram(s) IV Push once  dextrose 50% Injectable 12.5 Gram(s) IV Push once  dextrose 50% Injectable 25 Gram(s) IV Push once  diltiazem    Tablet 30 milliGRAM(s) Oral every 6 hours  enoxaparin Injectable 90 milliGRAM(s) SubCutaneous every 12 hours  glucagon  Injectable 1 milliGRAM(s) IntraMuscular once  insulin lispro (ADMELOG) corrective regimen sliding scale   SubCutaneous every 6 hours  lactobacillus acidophilus 1 Tablet(s) Oral every 24 hours  pantoprazole   Suspension 40 milliGRAM(s) Enteral Tube every 12 hours  petrolatum Ophthalmic Ointment 1 Application(s) Both EYES every 6 hours  trimethoprim  160 mG/sulfamethoxazole 800 mG 2 Tablet(s) Oral every 6 hours    MEDICATIONS  (PRN):  acetaminophen    Suspension .. 650 milliGRAM(s) Oral every 6 hours PRN Temp greater or equal to 38C (100.4F), Mild Pain (1 - 3)      ALLERGIES:  Allergies    No Known Allergies    Intolerances        LABS:                        10.0   8.09  )-----------( 468      ( 30 Dec 2020 06:47 )             33.1     12-30    136  |  100  |  14  ----------------------------<  140<H>  4.6   |  20<L>  |  0.45<L>    Ca    9.3      30 Dec 2020 06:47  Phos  2.9     12-30  Mg     2.1     12-30    TPro  7.0  /  Alb  3.2<L>  /  TBili  0.6  /  DBili  x   /  AST  22  /  ALT  25  /  AlkPhos  101  12-30      Urinalysis Basic - ( 30 Dec 2020 12:03 )    Color: Yellow / Appearance: Clear / SG: >=1.030 / pH: x  Gluc: x / Ketone: >=80 mg/dL  / Bili: Moderate / Urobili: 1.0 E.U./dL   Blood: x / Protein: 30 mg/dL / Nitrite: NEGATIVE   Leuk Esterase: NEGATIVE / RBC: < 5 /HPF / WBC < 5 /HPF   Sq Epi: x / Non Sq Epi: 0-5 /HPF / Bacteria: Present /HPF      CAPILLARY BLOOD GLUCOSE      POCT Blood Glucose.: 157 mg/dL (30 Dec 2020 11:53)      RADIOLOGY & ADDITIONAL TESTS: Reviewed.

## 2020-12-30 NOTE — OCCUPATIONAL THERAPY INITIAL EVALUATION ADULT - PERTINENT HX OF CURRENT PROBLEM, REHAB EVAL
70M DMT2, A-fib on Xarelto, HTN, HLD, severe carotid disease who was admitted to [facility] on 11/27 where he received decadron, remdesivir, convalescent plasma, and several antibiotics before being transferred to [facility].  currently s/p trach and PEG and treatment for mssa VAP treatment wtih zosyn from 12/13-20.  course has been complicated by afib with rvr and melanotic stools but mentation appears stable and hopeful for LTAC placement soon.

## 2020-12-30 NOTE — PROGRESS NOTE ADULT - ASSESSMENT
70M DMT2, A-fib on Xarelto, HTN, HLD, severe carotid disease who was admitted to [facility] on 11/27 where he received decadron, remdesivir, convalescent plasma, and several antibiotics before being transferred to [facility].  currently s/p trach and PEG and treatment for mssa VAP treatment wtih zosyn from 12/13-20.  course has been complicated by afib with rvr and melanotic stools but mentation appears stable and hopeful for LTAC placement soon.     NEUROLOGY  #Sedation  -s/p precedex  -d/c olanzapine 5mg BID (caution with QTc prolongation)    RESPIRATORY   #Acute hypoxic respiratory failure  -2/2 covid with ?overlying PNA  -s/p trach 12/18 and peg 12/17  -tolerating CPAP  Plan:  -trial of trach collar  -see ID below    CARDIOVASCULAR    #H/o A-fib with RVR, now controlled  - s/p amiodarone 400mg BID 12/10-20, amiodarone gtt 12/23  - TTE with no wall abnormalities  Plan:  - AM EKG  - c/w lovenox  - start cardizem 30mg q6hr  - cont amio 200mg PO daily, but possibly d/c if stable on cardizem    #hypotension  -s/p phenylephrine  -d/c midodrine 5mg q8h    #Severe carotid disease  -Patient with occluded right ICA and severe left ICA stenosis, s/p left CEA, uncomplicated  -f/u with cardiology outpatient    #QTC prolongation:   - daily EKG; monitor on amiodarone and olanzapine    RENAL   - monitor Is/Os  - dc santana; failed TOV  - Santana placed again on 12/30    ENDOCRINE  - goal 140-180 blood glucose  - c/w mSSI    GASTROINTESTINAL  - protonix 40  - Rectal tube in place  - see anemia below    #diarrhea  -normal WBC, afebrile, low concern for C Dill. More likely related to antibiotic use  Plan:  -cont probiotic    INFECTIOUS DISEASE  #COVID 19  -s/p remdesivir and decadron    #?PNA  -pt spiked fever on 12/12; sputum cx negative but concern for overlying pna so received zosyn 12/14-12/20    #Fever of unknown origin with developing shock-could be secondary to streptophomonas and enterobacter pna.   -f/u blood cx  -s/p meropenem 12/24-26  -c/w bactrim 12/26-1/1    HEMATOLOGY/ONCOLOGY  #Anemia  -RESOLVED  patient experienced drop in Hgb from 9.8 to 7.3 on 12/24 and found to have melanotic stools.  At time, also hemodynamically unstable with BPs in 80s-90s/30s-40s.  GI was concerned for stress gastritic or ulcers which bled in light of newly re-started on anticoagulation.    -s/p 2u pRBC  -stabilized    ACCESS/LINES/DRAINS  A-line 12/17  PEG 12/17    PROPHYLACTIC  ELECTROLYTES: Mg<2, K<4  DIET: NPO with tube feeds  GI PPX: Protonix, probiotics  VTE PPX: SCD, lovenox bid for afib  CODE: FULL  DISPO: MICU

## 2020-12-31 LAB
ALBUMIN SERPL ELPH-MCNC: 3.4 G/DL — SIGNIFICANT CHANGE UP (ref 3.3–5)
ALP SERPL-CCNC: 106 U/L — SIGNIFICANT CHANGE UP (ref 40–120)
ALT FLD-CCNC: 24 U/L — SIGNIFICANT CHANGE UP (ref 10–45)
ANION GAP SERPL CALC-SCNC: 15 MMOL/L — SIGNIFICANT CHANGE UP (ref 5–17)
AST SERPL-CCNC: 19 U/L — SIGNIFICANT CHANGE UP (ref 10–40)
BASOPHILS # BLD AUTO: 0.05 K/UL — SIGNIFICANT CHANGE UP (ref 0–0.2)
BASOPHILS NFR BLD AUTO: 0.7 % — SIGNIFICANT CHANGE UP (ref 0–2)
BILIRUB SERPL-MCNC: 0.4 MG/DL — SIGNIFICANT CHANGE UP (ref 0.2–1.2)
BUN SERPL-MCNC: 12 MG/DL — SIGNIFICANT CHANGE UP (ref 7–23)
CALCIUM SERPL-MCNC: 9 MG/DL — SIGNIFICANT CHANGE UP (ref 8.4–10.5)
CHLORIDE SERPL-SCNC: 96 MMOL/L — SIGNIFICANT CHANGE UP (ref 96–108)
CO2 SERPL-SCNC: 21 MMOL/L — LOW (ref 22–31)
CREAT SERPL-MCNC: 0.48 MG/DL — LOW (ref 0.5–1.3)
EOSINOPHIL # BLD AUTO: 0.3 K/UL — SIGNIFICANT CHANGE UP (ref 0–0.5)
EOSINOPHIL NFR BLD AUTO: 4 % — SIGNIFICANT CHANGE UP (ref 0–6)
GLUCOSE BLDC GLUCOMTR-MCNC: 139 MG/DL — HIGH (ref 70–99)
GLUCOSE BLDC GLUCOMTR-MCNC: 140 MG/DL — HIGH (ref 70–99)
GLUCOSE BLDC GLUCOMTR-MCNC: 167 MG/DL — HIGH (ref 70–99)
GLUCOSE BLDC GLUCOMTR-MCNC: 214 MG/DL — HIGH (ref 70–99)
GLUCOSE SERPL-MCNC: 129 MG/DL — HIGH (ref 70–99)
HCT VFR BLD CALC: 33 % — LOW (ref 39–50)
HGB BLD-MCNC: 10.1 G/DL — LOW (ref 13–17)
IMM GRANULOCYTES NFR BLD AUTO: 0.5 % — SIGNIFICANT CHANGE UP (ref 0–1.5)
LYMPHOCYTES # BLD AUTO: 1.43 K/UL — SIGNIFICANT CHANGE UP (ref 1–3.3)
LYMPHOCYTES # BLD AUTO: 19.2 % — SIGNIFICANT CHANGE UP (ref 13–44)
MAGNESIUM SERPL-MCNC: 2 MG/DL — SIGNIFICANT CHANGE UP (ref 1.6–2.6)
MCHC RBC-ENTMCNC: 26.4 PG — LOW (ref 27–34)
MCHC RBC-ENTMCNC: 30.6 GM/DL — LOW (ref 32–36)
MCV RBC AUTO: 86.4 FL — SIGNIFICANT CHANGE UP (ref 80–100)
MONOCYTES # BLD AUTO: 0.62 K/UL — SIGNIFICANT CHANGE UP (ref 0–0.9)
MONOCYTES NFR BLD AUTO: 8.3 % — SIGNIFICANT CHANGE UP (ref 2–14)
NEUTROPHILS # BLD AUTO: 5.02 K/UL — SIGNIFICANT CHANGE UP (ref 1.8–7.4)
NEUTROPHILS NFR BLD AUTO: 67.3 % — SIGNIFICANT CHANGE UP (ref 43–77)
NRBC # BLD: 0 /100 WBCS — SIGNIFICANT CHANGE UP (ref 0–0)
PHOSPHATE SERPL-MCNC: 3 MG/DL — SIGNIFICANT CHANGE UP (ref 2.5–4.5)
PLATELET # BLD AUTO: 495 K/UL — HIGH (ref 150–400)
POTASSIUM SERPL-MCNC: 4.4 MMOL/L — SIGNIFICANT CHANGE UP (ref 3.5–5.3)
POTASSIUM SERPL-SCNC: 4.4 MMOL/L — SIGNIFICANT CHANGE UP (ref 3.5–5.3)
PROT SERPL-MCNC: 7.2 G/DL — SIGNIFICANT CHANGE UP (ref 6–8.3)
RBC # BLD: 3.82 M/UL — LOW (ref 4.2–5.8)
RBC # FLD: 23 % — HIGH (ref 10.3–14.5)
SARS-COV-2 RNA SPEC QL NAA+PROBE: SIGNIFICANT CHANGE UP
SODIUM SERPL-SCNC: 132 MMOL/L — LOW (ref 135–145)
WBC # BLD: 7.46 K/UL — SIGNIFICANT CHANGE UP (ref 3.8–10.5)
WBC # FLD AUTO: 7.46 K/UL — SIGNIFICANT CHANGE UP (ref 3.8–10.5)

## 2020-12-31 PROCEDURE — 99233 SBSQ HOSP IP/OBS HIGH 50: CPT | Mod: GC

## 2020-12-31 RX ORDER — DILTIAZEM HCL 120 MG
60 CAPSULE, EXT RELEASE 24 HR ORAL EVERY 6 HOURS
Refills: 0 | Status: DISCONTINUED | OUTPATIENT
Start: 2020-12-31 | End: 2020-12-31

## 2020-12-31 RX ORDER — APIXABAN 2.5 MG/1
5 TABLET, FILM COATED ORAL EVERY 12 HOURS
Refills: 0 | Status: DISCONTINUED | OUTPATIENT
Start: 2020-12-31 | End: 2021-01-07

## 2020-12-31 RX ORDER — DILTIAZEM HCL 120 MG
60 CAPSULE, EXT RELEASE 24 HR ORAL EVERY 6 HOURS
Refills: 0 | Status: DISCONTINUED | OUTPATIENT
Start: 2020-12-31 | End: 2021-01-04

## 2020-12-31 RX ORDER — DOXAZOSIN MESYLATE 4 MG
1 TABLET ORAL AT BEDTIME
Refills: 0 | Status: DISCONTINUED | OUTPATIENT
Start: 2020-12-31 | End: 2021-01-01

## 2020-12-31 RX ADMIN — Medication: at 07:31

## 2020-12-31 RX ADMIN — Medication 2 TABLET(S): at 23:43

## 2020-12-31 RX ADMIN — Medication 60 MILLIGRAM(S): at 23:36

## 2020-12-31 RX ADMIN — PANTOPRAZOLE SODIUM 40 MILLIGRAM(S): 20 TABLET, DELAYED RELEASE ORAL at 07:30

## 2020-12-31 RX ADMIN — ENOXAPARIN SODIUM 90 MILLIGRAM(S): 100 INJECTION SUBCUTANEOUS at 07:28

## 2020-12-31 RX ADMIN — Medication 1 TABLET(S): at 12:01

## 2020-12-31 RX ADMIN — CHLORHEXIDINE GLUCONATE 1 APPLICATION(S): 213 SOLUTION TOPICAL at 07:28

## 2020-12-31 RX ADMIN — Medication 1 APPLICATION(S): at 00:05

## 2020-12-31 RX ADMIN — CHLORHEXIDINE GLUCONATE 15 MILLILITER(S): 213 SOLUTION TOPICAL at 07:28

## 2020-12-31 RX ADMIN — Medication 2 TABLET(S): at 18:10

## 2020-12-31 RX ADMIN — Medication 2 TABLET(S): at 07:06

## 2020-12-31 RX ADMIN — Medication 60 MILLIGRAM(S): at 18:10

## 2020-12-31 RX ADMIN — PANTOPRAZOLE SODIUM 40 MILLIGRAM(S): 20 TABLET, DELAYED RELEASE ORAL at 18:10

## 2020-12-31 RX ADMIN — Medication 2 TABLET(S): at 00:05

## 2020-12-31 RX ADMIN — Medication 1 MILLIGRAM(S): at 21:15

## 2020-12-31 RX ADMIN — Medication 60 MILLIGRAM(S): at 12:02

## 2020-12-31 RX ADMIN — APIXABAN 5 MILLIGRAM(S): 2.5 TABLET, FILM COATED ORAL at 18:10

## 2020-12-31 RX ADMIN — Medication 30 MILLIGRAM(S): at 07:28

## 2020-12-31 RX ADMIN — Medication 30 MILLIGRAM(S): at 00:06

## 2020-12-31 RX ADMIN — CHLORHEXIDINE GLUCONATE 15 MILLILITER(S): 213 SOLUTION TOPICAL at 18:11

## 2020-12-31 RX ADMIN — Medication 2: at 12:57

## 2020-12-31 RX ADMIN — Medication 4: at 18:12

## 2020-12-31 RX ADMIN — Medication 1 APPLICATION(S): at 07:06

## 2020-12-31 RX ADMIN — AMIODARONE HYDROCHLORIDE 200 MILLIGRAM(S): 400 TABLET ORAL at 07:28

## 2020-12-31 RX ADMIN — Medication 2 TABLET(S): at 12:02

## 2020-12-31 NOTE — PROGRESS NOTE ADULT - ASSESSMENT
70M DMT2, A-fib on Xarelto, HTN, HLD, severe carotid disease who was admitted to [facility] on 11/27 where he received decadron, remdesivir, convalescent plasma, and several antibiotics before being transferred to [facility].  currently s/p trach and PEG and treatment for mssa VAP treatment wtih zosyn from 12/13-20.  course has been complicated by afib with rvr and melanotic stools but mentation appears stable and hopeful for LTAC placement soon.     NEUROLOGY  no acute issues    RESPIRATORY   #Acute hypoxic respiratory failure  -2/2 covid with ?overlying PNA  -s/p trach 12/18 and peg 12/17  -tolerating trach collar  Plan:  -resolved    CARDIOVASCULAR    #H/o A-fib with RVR, now controlled  - s/p amiodarone 400mg BID 12/10-20, amiodarone gtt 12/23  - TTE with no wall abnormalities  Plan:  - AM EKG  - c/w lovenox  - cont cardizem 60mg q6hr  - cont amio 200mg PO daily, but possibly d/c if stable on cardizem  - f/u EP recs    #hypotension  -s/p phenylephrine  -s/p midodrine    #Severe carotid disease  -Patient with occluded right ICA and severe left ICA stenosis, s/p left CEA, uncomplicated  -f/u with cardiology outpatient    #QTC prolongation:   - daily EKG; monitor on amiodarone    RENAL   - monitor Is/Os  - dc santana; failed TOV  - Santana placed again on 12/30    ENDOCRINE  - goal 140-180 blood glucose  - c/w mSSI    GASTROINTESTINAL  - protonix 40  - Rectal tube in place  - see anemia below    #diarrhea  -normal WBC, afebrile, low concern for C Dill. More likely related to antibiotic use  -resolving  Plan:  -cont probiotic    INFECTIOUS DISEASE  #COVID 19  -s/p remdesivir and decadron    #?PNA  -pt spiked fever on 12/12; sputum cx negative but concern for overlying pna so received zosyn 12/14-12/20    #Fever of unknown origin with developing shock-could be secondary to streptophomonas and enterobacter pna on sputum cx   -f/u blood cx  -s/p meropenem 12/24-26  -c/w bactrim 12/26-1/1    HEMATOLOGY/ONCOLOGY  #Anemia  -RESOLVED  patient experienced drop in Hgb from 9.8 to 7.3 on 12/24 and found to have melanotic stools.  At time, also hemodynamically unstable with BPs in 80s-90s/30s-40s.  GI was concerned for stress gastritic or ulcers which bled in light of newly re-started on anticoagulation.    -s/p 2u pRBC  -stabilized    ACCESS/LINES/DRAINS  A-line 12/17  PEG 12/17    PROPHYLACTIC  ELECTROLYTES: Mg<2, K<4  DIET: NPO with tube feeds  GI PPX: Protonix, probiotics  VTE PPX: SCD, lovenox bid for afib  CODE: FULL  DISPO: MICU  70M DMT2, A-fib on Xarelto, HTN, HLD, severe carotid disease who was admitted to [facility] on 11/27 where he received decadron, remdesivir, convalescent plasma, and several antibiotics before being transferred to [facility].  currently s/p trach and PEG and treatment for mssa VAP treatment wtih zosyn from 12/13-20.  course has been complicated by afib with rvr and melanotic stools but mentation appears stable and hopeful for LTAC placement soon.     NEUROLOGY  no acute issues    RESPIRATORY   #Acute hypoxic respiratory failure  -2/2 covid with ?overlying PNA  -s/p trach 12/18 and peg 12/17  -tolerating trach collar  Plan:  -resolved    CARDIOVASCULAR    #H/o A-fib with RVR, now controlled  - s/p amiodarone 400mg BID 12/10-20, amiodarone gtt 12/23  - TTE with no wall abnormalities  Plan:  - AM EKG  - switch lovenox to eliquis 5mg BID  - cont cardizem 60mg q6hr  - cont amio 200mg PO daily, but possibly d/c if stable on cardizem  - f/u EP recs    #hypotension  -s/p phenylephrine  -s/p midodrine    #Severe carotid disease  -Patient with occluded right ICA and severe left ICA stenosis, s/p left CEA, uncomplicated  -f/u with cardiology outpatient    #QTC prolongation:   - daily EKG; monitor on amiodarone    RENAL   - monitor Is/Os  - dc santana; failed TOV  - Santana placed again on 12/30    ENDOCRINE  - goal 140-180 blood glucose  - c/w mSSI    GASTROINTESTINAL  - protonix 40  - Rectal tube in place  - see anemia below    #diarrhea  -normal WBC, afebrile, low concern for C Dill. More likely related to antibiotic use  -resolving  Plan:  -cont probiotic    INFECTIOUS DISEASE  #COVID 19  -s/p remdesivir and decadron    #?PNA  -pt spiked fever on 12/12; sputum cx negative but concern for overlying pna so received zosyn 12/14-12/20    #Fever of unknown origin with developing shock-could be secondary to streptophomonas and enterobacter pna on sputum cx   -f/u blood cx  -s/p meropenem 12/24-26  -c/w bactrim 12/26-1/1    HEMATOLOGY/ONCOLOGY  #Anemia  -RESOLVED  patient experienced drop in Hgb from 9.8 to 7.3 on 12/24 and found to have melanotic stools.  At time, also hemodynamically unstable with BPs in 80s-90s/30s-40s.  GI was concerned for stress gastritic or ulcers which bled in light of newly re-started on anticoagulation.    -s/p 2u pRBC  -stabilized    ACCESS/LINES/DRAINS  A-line 12/17  PEG 12/17    PROPHYLACTIC  ELECTROLYTES: Mg<2, K<4  DIET: NPO with tube feeds  GI PPX: Protonix, probiotics  VTE PPX: SCD, eliquis bid for afib  CODE: FULL  DISPO: MICU

## 2020-12-31 NOTE — PROGRESS NOTE ADULT - SUBJECTIVE AND OBJECTIVE BOX
CC: Patient is a 70y old  Male who presents with a chief complaint of COVID-19 pna (30 Dec 2020 13:23)      INTERVAL EVENTS: Episode of -140 with resolution after cardizem.    SUBJECTIVE / INTERVAL HPI: Patient seen and examined at bedside. Pt denies fevers, chills, chest pain, shortness of breath, cough, abdominal pain, nausea, vomiting, diarrhea, constipation, leg pain/swelling.     ROS: negative unless otherwise stated above.    VITAL SIGNS:  Vital Signs Last 24 Hrs  T(C): 37 (31 Dec 2020 09:00), Max: 37 (30 Dec 2020 18:00)  T(F): 98.6 (31 Dec 2020 09:00), Max: 98.6 (30 Dec 2020 18:00)  HR: 114 (31 Dec 2020 10:00) (97 - 125)  BP: 161/75 (31 Dec 2020 10:00) (101/59 - 161/75)  BP(mean): 75 (31 Dec 2020 04:00) (75 - 107)  RR: 28 (31 Dec 2020 10:00) (16 - 35)  SpO2: 97% (31 Dec 2020 10:00) (90% - 100%)      12-30-20 @ 07:01  -  12-31-20 @ 07:00  --------------------------------------------------------  IN: 410 mL / OUT: 1110 mL / NET: -700 mL    12-31-20 @ 07:01  -  12-31-20 @ 13:25  --------------------------------------------------------  IN: 120 mL / OUT: 555 mL / NET: -435 mL        PHYSICAL EXAM:    General: WDWN on trach collar  Neck: supple  Cardiovascular: +S1/S2; RRR  Respiratory: CTA B/L; no W/R/R  Gastrointestinal: soft, NT/ND; +BSx4  Extremities: WWP; no edema, clubbing or cyanosis  Vascular: 2+ radial, DP/PT pulses B/L  Neurological: AAOx2; no focal deficits    MEDICATIONS:  MEDICATIONS  (STANDING):  aMIOdarone    Tablet 200 milliGRAM(s) Oral daily  apixaban 5 milliGRAM(s) Oral every 12 hours  chlorhexidine 0.12% Liquid 15 milliLiter(s) Oral Mucosa every 12 hours  chlorhexidine 2% Cloths 1 Application(s) Topical <User Schedule>  dextrose 40% Gel 15 Gram(s) Oral once  dextrose 5%. 1000 milliLiter(s) (50 mL/Hr) IV Continuous <Continuous>  dextrose 5%. 1000 milliLiter(s) (100 mL/Hr) IV Continuous <Continuous>  dextrose 50% Injectable 25 Gram(s) IV Push once  dextrose 50% Injectable 12.5 Gram(s) IV Push once  dextrose 50% Injectable 25 Gram(s) IV Push once  diltiazem    Tablet 60 milliGRAM(s) Oral every 6 hours  glucagon  Injectable 1 milliGRAM(s) IntraMuscular once  insulin lispro (ADMELOG) corrective regimen sliding scale   SubCutaneous every 6 hours  lactobacillus acidophilus 1 Tablet(s) Oral every 24 hours  pantoprazole   Suspension 40 milliGRAM(s) Enteral Tube every 12 hours  petrolatum Ophthalmic Ointment 1 Application(s) Both EYES every 6 hours  trimethoprim  160 mG/sulfamethoxazole 800 mG 2 Tablet(s) Oral every 6 hours    MEDICATIONS  (PRN):  acetaminophen    Suspension .. 650 milliGRAM(s) Oral every 6 hours PRN Temp greater or equal to 38C (100.4F), Mild Pain (1 - 3)      ALLERGIES:  Allergies    No Known Allergies    Intolerances        LABS:                        10.1   7.46  )-----------( 495      ( 31 Dec 2020 06:26 )             33.0     12-31    132<L>  |  96  |  12  ----------------------------<  129<H>  4.4   |  21<L>  |  0.48<L>    Ca    9.0      31 Dec 2020 06:26  Phos  3.0     12-31  Mg     2.0     12-31    TPro  7.2  /  Alb  3.4  /  TBili  0.4  /  DBili  x   /  AST  19  /  ALT  24  /  AlkPhos  106  12-31      Urinalysis Basic - ( 30 Dec 2020 12:03 )    Color: Yellow / Appearance: Clear / SG: >=1.030 / pH: x  Gluc: x / Ketone: >=80 mg/dL  / Bili: Moderate / Urobili: 1.0 E.U./dL   Blood: x / Protein: 30 mg/dL / Nitrite: NEGATIVE   Leuk Esterase: NEGATIVE / RBC: < 5 /HPF / WBC < 5 /HPF   Sq Epi: x / Non Sq Epi: 0-5 /HPF / Bacteria: Present /HPF      CAPILLARY BLOOD GLUCOSE      POCT Blood Glucose.: 167 mg/dL (31 Dec 2020 12:09)      RADIOLOGY & ADDITIONAL TESTS: Reviewed.

## 2021-01-01 ENCOUNTER — TRANSCRIPTION ENCOUNTER (OUTPATIENT)
Age: 71
End: 2021-01-01

## 2021-01-01 LAB
ALBUMIN SERPL ELPH-MCNC: 3.4 G/DL — SIGNIFICANT CHANGE UP (ref 3.3–5)
ALP SERPL-CCNC: 105 U/L — SIGNIFICANT CHANGE UP (ref 40–120)
ALT FLD-CCNC: 23 U/L — SIGNIFICANT CHANGE UP (ref 10–45)
ANION GAP SERPL CALC-SCNC: 15 MMOL/L — SIGNIFICANT CHANGE UP (ref 5–17)
AST SERPL-CCNC: 26 U/L — SIGNIFICANT CHANGE UP (ref 10–40)
BASOPHILS # BLD AUTO: 0.04 K/UL — SIGNIFICANT CHANGE UP (ref 0–0.2)
BASOPHILS NFR BLD AUTO: 0.6 % — SIGNIFICANT CHANGE UP (ref 0–2)
BILIRUB SERPL-MCNC: 0.4 MG/DL — SIGNIFICANT CHANGE UP (ref 0.2–1.2)
BUN SERPL-MCNC: 17 MG/DL — SIGNIFICANT CHANGE UP (ref 7–23)
CALCIUM SERPL-MCNC: 9.6 MG/DL — SIGNIFICANT CHANGE UP (ref 8.4–10.5)
CHLORIDE SERPL-SCNC: 96 MMOL/L — SIGNIFICANT CHANGE UP (ref 96–108)
CO2 SERPL-SCNC: 20 MMOL/L — LOW (ref 22–31)
CREAT SERPL-MCNC: 0.47 MG/DL — LOW (ref 0.5–1.3)
CULTURE RESULTS: NO GROWTH — SIGNIFICANT CHANGE UP
EOSINOPHIL # BLD AUTO: 0.16 K/UL — SIGNIFICANT CHANGE UP (ref 0–0.5)
EOSINOPHIL NFR BLD AUTO: 2.4 % — SIGNIFICANT CHANGE UP (ref 0–6)
GLUCOSE BLDC GLUCOMTR-MCNC: 133 MG/DL — HIGH (ref 70–99)
GLUCOSE BLDC GLUCOMTR-MCNC: 141 MG/DL — HIGH (ref 70–99)
GLUCOSE BLDC GLUCOMTR-MCNC: 162 MG/DL — HIGH (ref 70–99)
GLUCOSE BLDC GLUCOMTR-MCNC: 171 MG/DL — HIGH (ref 70–99)
GLUCOSE BLDC GLUCOMTR-MCNC: 176 MG/DL — HIGH (ref 70–99)
GLUCOSE SERPL-MCNC: 206 MG/DL — HIGH (ref 70–99)
HCT VFR BLD CALC: 34 % — LOW (ref 39–50)
HGB BLD-MCNC: 10.5 G/DL — LOW (ref 13–17)
IMM GRANULOCYTES NFR BLD AUTO: 0.6 % — SIGNIFICANT CHANGE UP (ref 0–1.5)
LYMPHOCYTES # BLD AUTO: 1.1 K/UL — SIGNIFICANT CHANGE UP (ref 1–3.3)
LYMPHOCYTES # BLD AUTO: 16.4 % — SIGNIFICANT CHANGE UP (ref 13–44)
MAGNESIUM SERPL-MCNC: 2.1 MG/DL — SIGNIFICANT CHANGE UP (ref 1.6–2.6)
MCHC RBC-ENTMCNC: 26.5 PG — LOW (ref 27–34)
MCHC RBC-ENTMCNC: 30.9 GM/DL — LOW (ref 32–36)
MCV RBC AUTO: 85.9 FL — SIGNIFICANT CHANGE UP (ref 80–100)
MONOCYTES # BLD AUTO: 0.51 K/UL — SIGNIFICANT CHANGE UP (ref 0–0.9)
MONOCYTES NFR BLD AUTO: 7.6 % — SIGNIFICANT CHANGE UP (ref 2–14)
NEUTROPHILS # BLD AUTO: 4.86 K/UL — SIGNIFICANT CHANGE UP (ref 1.8–7.4)
NEUTROPHILS NFR BLD AUTO: 72.4 % — SIGNIFICANT CHANGE UP (ref 43–77)
NRBC # BLD: 0 /100 WBCS — SIGNIFICANT CHANGE UP (ref 0–0)
PHOSPHATE SERPL-MCNC: 3.1 MG/DL — SIGNIFICANT CHANGE UP (ref 2.5–4.5)
PLATELET # BLD AUTO: 466 K/UL — HIGH (ref 150–400)
POTASSIUM SERPL-MCNC: 4.8 MMOL/L — SIGNIFICANT CHANGE UP (ref 3.5–5.3)
POTASSIUM SERPL-SCNC: 4.8 MMOL/L — SIGNIFICANT CHANGE UP (ref 3.5–5.3)
PROT SERPL-MCNC: 7.2 G/DL — SIGNIFICANT CHANGE UP (ref 6–8.3)
RBC # BLD: 3.96 M/UL — LOW (ref 4.2–5.8)
RBC # FLD: 22.8 % — HIGH (ref 10.3–14.5)
SODIUM SERPL-SCNC: 131 MMOL/L — LOW (ref 135–145)
SPECIMEN SOURCE: SIGNIFICANT CHANGE UP
WBC # BLD: 6.71 K/UL — SIGNIFICANT CHANGE UP (ref 3.8–10.5)
WBC # FLD AUTO: 6.71 K/UL — SIGNIFICANT CHANGE UP (ref 3.8–10.5)

## 2021-01-01 PROCEDURE — 99291 CRITICAL CARE FIRST HOUR: CPT

## 2021-01-01 RX ORDER — OLANZAPINE 15 MG/1
5 TABLET, FILM COATED ORAL ONCE
Refills: 0 | Status: COMPLETED | OUTPATIENT
Start: 2021-01-01 | End: 2021-01-01

## 2021-01-01 RX ORDER — DOXAZOSIN MESYLATE 4 MG
2 TABLET ORAL AT BEDTIME
Refills: 0 | Status: DISCONTINUED | OUTPATIENT
Start: 2021-01-01 | End: 2021-01-07

## 2021-01-01 RX ADMIN — CHLORHEXIDINE GLUCONATE 1 APPLICATION(S): 213 SOLUTION TOPICAL at 05:17

## 2021-01-01 RX ADMIN — Medication 2: at 11:51

## 2021-01-01 RX ADMIN — Medication 2 MILLIGRAM(S): at 21:23

## 2021-01-01 RX ADMIN — AMIODARONE HYDROCHLORIDE 200 MILLIGRAM(S): 400 TABLET ORAL at 05:16

## 2021-01-01 RX ADMIN — PANTOPRAZOLE SODIUM 40 MILLIGRAM(S): 20 TABLET, DELAYED RELEASE ORAL at 18:29

## 2021-01-01 RX ADMIN — Medication 60 MILLIGRAM(S): at 11:51

## 2021-01-01 RX ADMIN — Medication 1 TABLET(S): at 11:50

## 2021-01-01 RX ADMIN — PANTOPRAZOLE SODIUM 40 MILLIGRAM(S): 20 TABLET, DELAYED RELEASE ORAL at 05:22

## 2021-01-01 RX ADMIN — Medication 2 TABLET(S): at 18:28

## 2021-01-01 RX ADMIN — CHLORHEXIDINE GLUCONATE 15 MILLILITER(S): 213 SOLUTION TOPICAL at 05:18

## 2021-01-01 RX ADMIN — APIXABAN 5 MILLIGRAM(S): 2.5 TABLET, FILM COATED ORAL at 18:29

## 2021-01-01 RX ADMIN — Medication 2: at 18:27

## 2021-01-01 RX ADMIN — Medication 2 TABLET(S): at 05:22

## 2021-01-01 RX ADMIN — Medication 2 TABLET(S): at 11:50

## 2021-01-01 RX ADMIN — CHLORHEXIDINE GLUCONATE 15 MILLILITER(S): 213 SOLUTION TOPICAL at 18:29

## 2021-01-01 RX ADMIN — APIXABAN 5 MILLIGRAM(S): 2.5 TABLET, FILM COATED ORAL at 06:29

## 2021-01-01 RX ADMIN — Medication 60 MILLIGRAM(S): at 05:17

## 2021-01-01 RX ADMIN — OLANZAPINE 5 MILLIGRAM(S): 15 TABLET, FILM COATED ORAL at 03:45

## 2021-01-01 RX ADMIN — Medication 60 MILLIGRAM(S): at 18:28

## 2021-01-01 NOTE — DISCHARGE NOTE PROVIDER - PROVIDER TOKENS
PROVIDER:[TOKEN:[[id number]],FOLLOWUP:[2 weeks]] PROVIDER:[TOKEN:[[id number]],FOLLOWUP:[2 weeks]],PROVIDER:[TOKEN:[[id number]],FOLLOWUP:[2 weeks]],PROVIDER:[TOKEN:[[id number]],FOLLOWUP:[2 weeks]]

## 2021-01-01 NOTE — DISCHARGE NOTE PROVIDER - NSDCMRMEDTOKEN_GEN_ALL_CORE_FT
Aspir 81 oral delayed release tablet: 1 tab(s) orally once a day  carvedilol 6.25 mg oral tablet: 1 tab(s) orally 2 times a day  Janumet 50 mg-1000 mg oral tablet: orally once a day  losartan 50 mg oral tablet: 1 tab(s) orally once a day  simvastatin 20 mg oral tablet: 1 tab(s) orally once a day (at bedtime)  Xarelto 20 mg oral tablet: 1 tab(s) orally once a day (in the evening)   amiodarone 200 mg oral tablet: 1 tab(s) orally once a day  apixaban 5 mg oral tablet: 1 tab(s) orally every 12 hours  Aspir 81 oral delayed release tablet: 1 tab(s) orally once a day  carvedilol 6.25 mg oral tablet: 1 tab(s) orally 2 times a day  dilTIAZem 240 mg/24 hours oral capsule, extended release: 1 cap(s) orally once a day  doxazosin 2 mg oral tablet: 1 tab(s) orally once a day (at bedtime)  Janumet 50 mg-1000 mg oral tablet: orally once a day  lactobacillus acidophilus oral capsule: 1 tab(s) orally once a day  pantoprazole 40 mg oral granule, delayed release: 1  orally once a day  polyethylene glycol 3350 oral powder for reconstitution: 17 gram(s) orally once a day  simvastatin 20 mg oral tablet: 1 tab(s) orally once a day (at bedtime)

## 2021-01-01 NOTE — DISCHARGE NOTE PROVIDER - CARE PROVIDERS DIRECT ADDRESSES
,DirectAddress_Unknown ,DirectAddress_Unknown,[email or web address]

## 2021-01-01 NOTE — PROGRESS NOTE ADULT - ASSESSMENT
70M DMT2, A-fib on Xarelto, HTN, HLD, severe carotid disease who was admitted to [facility] on 11/27 where he received decadron, remdesivir, convalescent plasma, and several antibiotics before being transferred to [facility].  currently s/p trach and PEG and treatment for mssa VAP treatment wtih zosyn from 12/13-20.  course has been complicated by afib with rvr and melanotic stools but mentation appears stable and hopeful for LTAC placement soon.     NEUROLOGY  no acute issues    RESPIRATORY   #Acute hypoxic respiratory failure  -2/2 covid with ?overlying PNA  -s/p trach 12/18 and peg 12/17  -tolerating trach collar  Plan:  -resolved    CARDIOVASCULAR    #H/o A-fib with RVR, now controlled  - s/p amiodarone 400mg BID 12/10-20, amiodarone gtt 12/23  - TTE with no wall abnormalities  Plan:  - AM EKG  - switch lovenox to eliquis 5mg BID  - cont cardizem 60mg q6hr  - cont amio 200mg PO daily  - f/u EP recs  - f/u with Dr. Medina o/p    #hypotension  -s/p phenylephrine  -s/p midodrine    #Severe carotid disease  -Patient with occluded right ICA and severe left ICA stenosis, s/p left CEA, uncomplicated  -f/u with cardiology outpatient    #QTC prolongation:   - stable    RENAL   - monitor Is/Os  - dc santana; failed TOV  - Santana placed again on 12/30  - cont doxazosin 2mg    ENDOCRINE  - goal 140-180 blood glucose  - c/w mSSI    GASTROINTESTINAL  - protonix 40  - Rectal tube in place  - see anemia below    #diarrhea  -normal WBC, afebrile, low concern for C Dill. More likely related to antibiotic use  -resolving  Plan:  -cont probiotic    INFECTIOUS DISEASE  #COVID 19  -s/p remdesivir and decadron    #?PNA  -pt spiked fever on 12/12; sputum cx negative but concern for overlying pna so received zosyn 12/14-12/20    #Fever of unknown origin with developing shock-could be secondary to streptophomonas and enterobacter pna on sputum cx   -f/u blood cx  -s/p meropenem 12/24-26  -s/p bactrim 12/26-1/1    HEMATOLOGY/ONCOLOGY  #Anemia  -RESOLVED  patient experienced drop in Hgb from 9.8 to 7.3 on 12/24 and found to have melanotic stools.  At time, also hemodynamically unstable with BPs in 80s-90s/30s-40s.  GI was concerned for stress gastritic or ulcers which bled in light of newly re-started on anticoagulation.    -s/p 2u pRBC  -stabilized    ACCESS/LINES/DRAINS  A-line 12/17  PEG 12/17    PROPHYLACTIC  ELECTROLYTES: Mg<2, K<4  DIET: NPO with tube feeds  GI PPX: Protonix, probiotics  VTE PPX: SCD, eliquis bid for afib  CODE: FULL  DISPO: MICU

## 2021-01-01 NOTE — DISCHARGE NOTE PROVIDER - CARE PROVIDER_API CALL
Casey Medina (MD)  Cardiovascular Disease; Internal Medicine  [address] [place] [address]  Phone: [phone]  Fax: [phone]  Follow Up Time: 2 weeks   Casey Medina)  Cardiovascular Disease; Internal Medicine  [address] [place] [address]  Phone: [phone]  Fax: [phone]  Follow Up Time: 2 weeks    Nelson Barney)  Gastroenterology; Internal Medicine  [address] [place] [address]  Phone: [phone]  Fax: [phone]  Follow Up Time: 2 weeks    Marva Ortez  OTOLARYNGOLOGY  [address] [place] [address]  Phone: [phone]  Fax: [phone]  Follow Up Time: 2 weeks

## 2021-01-01 NOTE — PROGRESS NOTE ADULT - SUBJECTIVE AND OBJECTIVE BOX
CC: Patient is a 70y old  Male who presents with a chief complaint of COVID-19 pna (31 Dec 2020 13:25)      INTERVAL EVENTS: Given 5mg zyprexa for agitation.    SUBJECTIVE / INTERVAL HPI: Patient seen and examined at bedside. Pt denies fevers, chills, chest pain, shortness of breath, cough, abdominal pain, nausea, vomiting, diarrhea, constipation, leg pain/swelling.     ROS: negative unless otherwise stated above.    VITAL SIGNS:  Vital Signs Last 24 Hrs  T(C): 36.9 (01 Jan 2021 08:51), Max: 37 (01 Jan 2021 05:30)  T(F): 98.5 (01 Jan 2021 08:51), Max: 98.6 (01 Jan 2021 05:30)  HR: 95 (01 Jan 2021 12:15) (88 - 114)  BP: 105/57 (01 Jan 2021 12:00) (105/57 - 161/74)  BP(mean): 85 (01 Jan 2021 10:00) (76 - 100)  RR: 24 (01 Jan 2021 12:15) (18 - 31)  SpO2: 97% (01 Jan 2021 12:15) (94% - 98%)      12-31-20 @ 07:01 - 01-01-21 @ 07:00  --------------------------------------------------------  IN: 660 mL / OUT: 1206 mL / NET: -546 mL    01-01-21 @ 07:01 - 01-01-21 @ 12:42  --------------------------------------------------------  IN: 164 mL / OUT: 150 mL / NET: 14 mL        PHYSICAL EXAM:    General: comfortable on trach collar  Neck: supple  Cardiovascular: +S1/S2; RRR  Respiratory: CTA B/L; no W/R/R  Gastrointestinal: soft, NT/ND; +BSx4  Extremities: WWP; no edema, clubbing or cyanosis  Vascular: 2+ radial, DP/PT pulses B/L  Neurological: no focal deficits    MEDICATIONS:  MEDICATIONS  (STANDING):  aMIOdarone    Tablet 200 milliGRAM(s) Oral daily  apixaban 5 milliGRAM(s) Oral every 12 hours  chlorhexidine 0.12% Liquid 15 milliLiter(s) Oral Mucosa every 12 hours  chlorhexidine 2% Cloths 1 Application(s) Topical <User Schedule>  dextrose 40% Gel 15 Gram(s) Oral once  dextrose 5%. 1000 milliLiter(s) (100 mL/Hr) IV Continuous <Continuous>  dextrose 5%. 1000 milliLiter(s) (50 mL/Hr) IV Continuous <Continuous>  dextrose 50% Injectable 25 Gram(s) IV Push once  dextrose 50% Injectable 12.5 Gram(s) IV Push once  dextrose 50% Injectable 25 Gram(s) IV Push once  diltiazem    Tablet 60 milliGRAM(s) Oral every 6 hours  doxazosin 2 milliGRAM(s) Oral at bedtime  glucagon  Injectable 1 milliGRAM(s) IntraMuscular once  insulin lispro (ADMELOG) corrective regimen sliding scale   SubCutaneous every 6 hours  lactobacillus acidophilus 1 Tablet(s) Oral every 24 hours  pantoprazole   Suspension 40 milliGRAM(s) Enteral Tube every 12 hours  trimethoprim  160 mG/sulfamethoxazole 800 mG 2 Tablet(s) Oral every 6 hours    MEDICATIONS  (PRN):  acetaminophen    Suspension .. 650 milliGRAM(s) Oral every 6 hours PRN Temp greater or equal to 38C (100.4F), Mild Pain (1 - 3)      ALLERGIES:  Allergies    No Known Allergies    Intolerances        LABS:                        10.5   6.71  )-----------( 466      ( 01 Jan 2021 04:23 )             34.0     01-01    131<L>  |  96  |  17  ----------------------------<  206<H>  4.8   |  20<L>  |  0.47<L>    Ca    9.6      01 Jan 2021 04:23  Phos  3.1     01-01  Mg     2.1     01-01    TPro  7.2  /  Alb  3.4  /  TBili  0.4  /  DBili  x   /  AST  26  /  ALT  23  /  AlkPhos  105  01-01        CAPILLARY BLOOD GLUCOSE      POCT Blood Glucose.: 162 mg/dL (01 Jan 2021 11:36)      RADIOLOGY & ADDITIONAL TESTS: Reviewed.

## 2021-01-01 NOTE — DISCHARGE NOTE PROVIDER - HOSPITAL COURSE
#Discharge: do not delete    70M DMT2, A-fib on Xarelto, HTN, HLD, severe carotid disease who was admitted to [facility] on 11/27 where he received decadron, remdesivir, convalescent plasma, and several antibiotics before being transferred to [facility].  Currently s/p trach and PEG and treatment for MSSA VAP treatment with zosyn from 12/13-20.  Course has been complicated by afib with rvr (now controlled on amiodarone and diltiazem) and melanotic stools 2/2 stress gastric ulcers.     Problem List/Main Diagnoses (system-based):   NEUROLOGY  no acute issues    RESPIRATORY   #Acute hypoxic respiratory failure  -2/2 covid with ?overlying PNA  -s/p trach 12/18  -tolerating trach collar  Plan:  -resolved    CARDIOVASCULAR    #H/o A-fib with RVR, now controlled  - s/p amiodarone 400mg BID 12/10-20, amiodarone gtt 12/23  - TTE with no wall abnormalities  Plan:  - cont eliquis 5mg BID as Hgb has been stable  - cont cardizem 60mg q6hr  - cont amio 200mg PO daily until f/u with Dr. Medina o/p    #hypotension  -s/p phenylephrine  -s/p midodrine    #Severe carotid disease  -Patient with occluded right ICA and severe left ICA stenosis, s/p left CEA, uncomplicated  -f/u with cardiology outpatient    #QTC prolongation:   - stable    RENAL   - failed TOV  - Araujo placed again on 12/30  - cont doxazosin 2mg    ENDOCRINE  - goal 140-180 blood glucose  - c/w mSSI    GASTROINTESTINAL  - s/p PEG 12/17  - protonix 40    #diarrhea  -normal WBC, afebrile, low concern for C Diff. More likely related to antibiotic use  -resolving  Plan:  -cont probiotic    INFECTIOUS DISEASE  #COVID 19  -s/p remdesivir and decadron    #?PNA  -pt spiked fever on 12/12; sputum cx negative but concern for overlying pna so received zosyn 12/14-12/20    #Fever of unknown origin with developing shock-could be secondary to streptophomonas and enterobacter pna on sputum cx   -s/p meropenem 12/24-26  -s/p bactrim 12/26-1/1    HEMATOLOGY/ONCOLOGY  #Anemia  -RESOLVED  patient experienced drop in Hgb from 9.8 to 7.3 on 12/24 and found to have melanotic stools.  At time, also hemodynamically unstable with BPs in 80s-90s/30s-40s.  GI was concerned for stress gastritic or ulcers which bled in light of newly re-started on anticoagulation.    -s/p 2u pRBC  -stabilized  -restarted on anticoagulation      Inpatient treatment course: As above    New medications: amiodarone 200mg PO daily, eliquis 5mg BID, cardizem 60mg q6hr, doxazosin 2mg, lactobacillus  Labs to be followed outpatient:   Exam to be followed outpatient:    #Discharge: do not delete    70M DMT2, A-fib on Xarelto, HTN, HLD, severe carotid disease who was admitted to [facility] on 11/27 where he received decadron, remdesivir, convalescent plasma, and several antibiotics before being transferred to [facility].  Currently s/p trach and PEG and treatment for MSSA VAP treatment with zosyn from 12/13-20.  Course has been complicated by afib with rvr (now controlled on amiodarone and diltiazem) and melanotic stools 2/2 stress gastric ulcers.     Problem List/Main Diagnoses (system-based):     #Acute respiratory failure with hypoxia  RESOLVED  Cause was 2/2 covid PNA, complicated by VAP, now s/p trach 12/18 and peg 12/17, tolerating trach collar, tolerating PO diet, and hemodynamically stable. Speech and swallow was consulted, barium swallow was performed and did not show obstruction, and patient was started on soft mechanical diet. Patient continued on tube feeds initially after beginning PO diet to ensure adequate calorie intake  -???feeds?  --continue ???? soft mechanical diet??, advance as tolerated??    #COVID-19.    RESOLVED  -s/p remdesivir, decadron, convalescent plasma, and toclizumab, negative PCR x3.   - no additional workup required    #Atrial fibrillation with RVR.  Plan: Course had been complicated with afib with RVR, resolved and stable on diltiazam 240mg QD and 200mg amiodarone daily  - continue diltiazam and amiodarone  - continue with eliquis 5mg BID  - f/u with Dr. Medina o/p    #QTC prolongation:   - stable, avoid QTC prolonging agents.     # Pneumonia.  Plan: RESOLVED  Patient developed superimposed bacterial pneumonia, treated with 7 day course of zosyn  -no additional workup necessary    #Anemia.    Patient experienced drop in Hgb from 9.8 to 7.3 on 12/24 and found to have melanotic stools, now stable with hemoglobin ~9.  At time, also hemodynamically unstable with BPs in 80s-90s/30s-40s.  GI was concerned for stress gastritic or ulcers which bled in light of newly re-started on anticoagulation.    -s/p 2u pRBC  -stabilized  -c/w protonix 40mg at discharge     #Urinary retention.   Patient had developed urinary retention, currently resolved and on doxazosin  - cont doxazosin 2mg.    #Diarrhea.  Normal WBC, afebrile, low concern for C Dill. More likely related to antibiotic use  -resolved  -cont probiotic lactobacillus acidophillus.     #Carotid artery disease  Patient with occluded right ICA and severe left ICA stenosis, s/p left CEA, uncomplicated  -f/u with cardiology outpatient.       Inpatient treatment course:     70M DMT2, A-fib on Xarelto, HTN, HLD, severe carotid disease who was admitted to [facility] on 11/27 and found to have COVID pneumonia. He was started on remdesivir and decadron on 11/28. Convalescent plasma was given on 11/30. He was intubated on 12/2 due to worsening O2 sats. At [facility] he received tocilizumab on 12/2, Zosyn 12/2-8, levaquin 12/2-8, zyvox 12/8, and caspofungin on 12/8. He was transferred to [facility] for further management due to [facility] capacity. S/p trach and PEG placement. Sputum cx from 12/24 growing stenotrophomonas, so started on Meropenem and transitioned to Bactrim.  Course has been complicated by afib with rvr with varying amiodarone dosing and started on diltiazem, with electrophysiology consulted and following with recs.  Additionally, patient had episodic melena that has now resolved s/p 2u pRBC. GI was concerned for stress gastritic or ulcers which bled in light of newly re-started on anticoagulation, but now stable and restarted on anticoagulation. Patient currently stable on trach collar, weaned off pressors, and no longer on antibiotics. His admission was complicated by urinary retention for which he had a santana placed. Santana was removed and he passed TOV. Speech and swallow evaluated patient for Passy [place] valve and assessed that the patient required a collar exchanged to a smaller size. ENT performed exchange on 1/4. PEG tube noted to be clogged, GI evaluated and will unclog. Speech and swallow evaluating patient and ordered barium swallow to assess for dysphagia and possibility of beginning PO diet. Barium swallow performed, started on mechanical soft diet, tube feeds continued, calorie count ordered per nutrition to assess if tube feeds still required.    New medications: amiodarone 200mg PO daily, eliquis 5mg BID, cardizem 60mg q6hr, doxazosin 2mg, lactobacillus #Discharge: do not delete    70M DMT2, A-fib on Xarelto, HTN, HLD, severe carotid disease who was admitted to [facility] on 11/27 where he received decadron, remdesivir, convalescent plasma, and several antibiotics before being transferred to [facility].  Currently s/p trach and PEG and treatment for MSSA VAP treatment with zosyn from 12/13-20.  Course has been complicated by afib with rvr (now controlled on amiodarone and diltiazem) and melanotic stools 2/2 stress gastric ulcers.     Problem List/Main Diagnoses (system-based):     #Acute respiratory failure with hypoxia  RESOLVED  Cause was 2/2 covid PNA, complicated by VAP, now s/p trach 12/18 and peg 12/17, tolerating trach collar, tolerating PO diet, and hemodynamically stable. Speech and swallow was consulted, barium swallow was performed and did not show obstruction, and patient was started on soft mechanical diet. Patient continued on tube feeds initially after beginning PO diet to ensure adequate calorie intake  -???feeds?  --continue ???? soft mechanical diet??, advance as tolerated??    #COVID-19.    RESOLVED  -s/p remdesivir, decadron, convalescent plasma, and toclizumab, negative PCR x3.   - no additional workup required    #Atrial fibrillation with RVR.  Plan: Course had been complicated with afib with RVR, resolved and stable on diltiazam 240mg QD and 200mg amiodarone daily  - continue diltiazam and amiodarone  - continue with eliquis 5mg BID  - f/u with Dr. Medina o/p    #QTC prolongation:   - stable, avoid QTC prolonging agents.     # Pneumonia.  Plan: RESOLVED  Patient developed superimposed bacterial pneumonia, treated with 7 day course of zosyn  -no additional workup necessary    #Anemia.    Patient experienced drop in Hgb from 9.8 to 7.3 on 12/24 and found to have melanotic stools, now stable with hemoglobin ~9.  At time, also hemodynamically unstable with BPs in 80s-90s/30s-40s.  GI was concerned for stress gastritic or ulcers which bled in light of newly re-started on anticoagulation.    -s/p 2u pRBC  -stabilized  -c/w protonix 40mg at discharge     #Urinary retention.   Patient had developed urinary retention, currently resolved and on doxazosin  - cont doxazosin 2mg.    #Diarrhea.  Normal WBC, afebrile, low concern for C Dill. More likely related to antibiotic use  -resolved  -cont probiotic lactobacillus acidophillus.     #Carotid artery disease  Patient with occluded right ICA and severe left ICA stenosis, s/p left CEA, uncomplicated  -f/u with cardiology outpatient.     Inpatient treatment course:     70M DMT2, A-fib on Xarelto, HTN, HLD, severe carotid disease who was admitted to [facility] on 11/27 and found to have COVID pneumonia. He was started on remdesivir and decadron on 11/28. Convalescent plasma was given on 11/30. He was intubated on 12/2 due to worsening O2 sats. At [facility] he received tocilizumab on 12/2, Zosyn 12/2-8, levaquin 12/2-8, zyvox 12/8, and caspofungin on 12/8. He was transferred to [facility] for further management due to [facility] capacity. S/p trach and PEG placement. Sputum cx from 12/24 growing stenotrophomonas, so started on Meropenem and transitioned to Bactrim.  Course has been complicated by afib with rvr with varying amiodarone dosing and started on diltiazem, with electrophysiology consulted and following with recs.  Additionally, patient had episodic melena that has now resolved s/p 2u pRBC. GI was concerned for stress gastritic or ulcers which bled in light of newly re-started on anticoagulation, but now stable and restarted on anticoagulation. Patient currently stable on trach collar, weaned off pressors, and no longer on antibiotics. His admission was complicated by urinary retention for which he had a santana placed. Santana was removed and he passed TOV. Speech and swallow evaluated patient for Passy [place] valve and assessed that the patient required a collar exchanged to a smaller size. ENT performed exchange on 1/4. PEG tube noted to be clogged, GI evaluated and will unclog. Speech and swallow evaluating patient and ordered barium swallow to assess for dysphagia and possibility of beginning PO diet. Barium swallow performed, started on mechanical soft diet, tube feeds continued, calorie count ordered per nutrition to assess if tube feeds still required.    New medications: amiodarone 200mg PO daily, eliquis 5mg BID, cardizem 60mg q6hr, doxazosin 2mg, lactobacillus 70M DMT2, A-fib on Xarelto, HTN, HLD, severe carotid disease who was admitted to [facility] on 11/27 found to have COVID PNA, s/p remdesivir, decadron, convalescent plasma He was started on remdesivir and decadron on 11/28. Convalescent plasma was given on 11/30. He was intubated on 12/2 due to worsening O2 sats. At [facility] he received tocilizumab on 12/2, Zosyn 12/2-8, levaquin 12/2-8, zyvox 12/8, and caspofungin on 12/8. He was transferred to [facility] for further management due to [facility] capacity. S/p trach and PEG placement. Sputum cx from 12/24 growing stenotrophomonas, so started on Meropenem and transitioned to Bactrim.  Course has been complicated by afib with rvr with varying amiodarone dosing and started on diltiazem, with electrophysiology consulted and following with recs.  Additionally, patient had episodic melena that has now resolved s/p 2u pRBC. GI was concerned for stress gastritic or ulcers which bled in light of newly re-started on anticoagulation, but now stable and restarted on anticoagulation. Patient currently stable on trach collar, weaned off pressors, and no longer on antibiotics. His admission was complicated by urinary retention for which he had a santana placed. Santana was removed and he passed TOV. Speech and swallow evaluated patient for Passy [place] valve and assessed that the patient required a collar exchanged to a smaller size. ENT performed exchange on 1/4. On 1/6, pt passed S&S eval and started tolerating oral feeds. Pt stable for [facility] and will be decannulated at [facility].    Problem List/Main Diagnoses (system-based):   #Dysphagia - pt passed S&S eval and started tolerating PO feeds. PEG currently in place  -plan for PEG to be removed at [facility]     #Acute respiratory failure with hypoxia  RESOLVED  Cause was 2/2 covid PNA, complicated by VAP, now s/p trach 12/18 and peg 12/17, tolerating trach collar, tolerating PO diet, and hemodynamically stable. Speech and swallow was consulted, barium swallow was performed and did not show obstruction, and patient was started on soft mechanical diet. Patient stopped tube feeds as tolerating adequate PO intake.   -breathing well on RA   -plan to decannulate trach at [facility]     #COVID-19.    RESOLVED  -s/p remdesivir, decadron, convalescent plasma, and toclizumab, negative PCR x3.   - no additional workup required    #Atrial fibrillation with RVR.  Plan: Course had been complicated with afib with RVR, resolved and stable on diltiazam 240mg QD and 200mg amiodarone daily  - continue diltiazam and amiodarone  - continue with eliquis 5mg BID  - f/u with Dr. Medina o/p    #QTC prolongation:   - stable, avoid QTC prolonging agents.     # Pneumonia.  Plan: RESOLVED  Patient developed superimposed bacterial pneumonia, treated with 7 day course of zosyn  -no additional workup necessary    #Anemia.    Patient experienced drop in Hgb from 9.8 to 7.3 on 12/24 and found to have melanotic stools, now stable with hemoglobin ~9.  At time, also hemodynamically unstable with BPs in 80s-90s/30s-40s.  GI was concerned for stress gastritic or ulcers which bled in light of newly re-started on anticoagulation.    -s/p 2u pRBC  -stabilized  -c/w protonix 40mg at discharge     #Urinary retention.   Patient had developed urinary retention, currently resolved and on doxazosin  - cont doxazosin 2mg.    #Diarrhea.  Normal WBC, afebrile, low concern for C Dill. More likely related to antibiotic use  -resolved  -cont probiotic lactobacillus acidophillus.     #Carotid artery disease  Patient with occluded right ICA and severe left ICA stenosis, s/p left CEA, uncomplicated  -f/u with cardiology outpatient.

## 2021-01-01 NOTE — DISCHARGE NOTE PROVIDER - NSDCCPCAREPLAN_GEN_ALL_CORE_FT
PRINCIPAL DISCHARGE DIAGNOSIS  Diagnosis: COVID-19  Assessment and Plan of Treatment: COVID-19. You were diagnosed with COVID 19, and you had extensive treatment for this including steroids, remdesivir. Due to your trouble breathing, you had a tracheostomy done. Your oxygen requirements gradually decreased and now you are breathign well on room air. The ear nose throat doctors plan to decannulate (remove) your trach collar while you are at subacute rehab.      SECONDARY DISCHARGE DIAGNOSES  Diagnosis: Dysphagia  Assessment and Plan of Treatment: During your hospital stay, you initially couldn't tolerate eating food, which is why you had a tube placed through your stomach to give you nutrition. Our speech and swallow specialists evaluated you recently and deemed that you could eat again. You will follow up closely with the GI doctors when going to rehab, and they will remove the PEG tube in your stomach.    Diagnosis: Atrial fibrillation with RVR  Assessment and Plan of Treatment: During your hospital course, your heart went into an abnormal rhythm which can cause clots. We normalized your heart rate with two medications called amiodarone and diltiazem, which you will continue at subacute rehab.    Diagnosis: Pneumonia  Assessment and Plan of Treatment: In addition to COVID infection, you had a superimposed bacterial infection in your lungs. You were treated with antibiotics, and your pneumonia improved over time.

## 2021-01-02 DIAGNOSIS — R63.8 OTHER SYMPTOMS AND SIGNS CONCERNING FOOD AND FLUID INTAKE: ICD-10-CM

## 2021-01-02 DIAGNOSIS — E11.9 TYPE 2 DIABETES MELLITUS WITHOUT COMPLICATIONS: ICD-10-CM

## 2021-01-02 DIAGNOSIS — D64.9 ANEMIA, UNSPECIFIED: ICD-10-CM

## 2021-01-02 DIAGNOSIS — I48.91 UNSPECIFIED ATRIAL FIBRILLATION: ICD-10-CM

## 2021-01-02 DIAGNOSIS — U07.1 COVID-19: ICD-10-CM

## 2021-01-02 DIAGNOSIS — J96.01 ACUTE RESPIRATORY FAILURE WITH HYPOXIA: ICD-10-CM

## 2021-01-02 DIAGNOSIS — J18.9 PNEUMONIA, UNSPECIFIED ORGANISM: ICD-10-CM

## 2021-01-02 DIAGNOSIS — R33.9 RETENTION OF URINE, UNSPECIFIED: ICD-10-CM

## 2021-01-02 DIAGNOSIS — I77.9 DISORDER OF ARTERIES AND ARTERIOLES, UNSPECIFIED: ICD-10-CM

## 2021-01-02 DIAGNOSIS — R19.7 DIARRHEA, UNSPECIFIED: ICD-10-CM

## 2021-01-02 LAB
ALBUMIN SERPL ELPH-MCNC: 3.3 G/DL — SIGNIFICANT CHANGE UP (ref 3.3–5)
ALP SERPL-CCNC: 105 U/L — SIGNIFICANT CHANGE UP (ref 40–120)
ALT FLD-CCNC: 27 U/L — SIGNIFICANT CHANGE UP (ref 10–45)
ANION GAP SERPL CALC-SCNC: 14 MMOL/L — SIGNIFICANT CHANGE UP (ref 5–17)
AST SERPL-CCNC: 24 U/L — SIGNIFICANT CHANGE UP (ref 10–40)
BASOPHILS # BLD AUTO: 0.03 K/UL — SIGNIFICANT CHANGE UP (ref 0–0.2)
BASOPHILS NFR BLD AUTO: 0.4 % — SIGNIFICANT CHANGE UP (ref 0–2)
BILIRUB SERPL-MCNC: 0.3 MG/DL — SIGNIFICANT CHANGE UP (ref 0.2–1.2)
BLD GP AB SCN SERPL QL: NEGATIVE — SIGNIFICANT CHANGE UP
BUN SERPL-MCNC: 23 MG/DL — SIGNIFICANT CHANGE UP (ref 7–23)
CALCIUM SERPL-MCNC: 9.2 MG/DL — SIGNIFICANT CHANGE UP (ref 8.4–10.5)
CHLORIDE SERPL-SCNC: 97 MMOL/L — SIGNIFICANT CHANGE UP (ref 96–108)
CO2 SERPL-SCNC: 20 MMOL/L — LOW (ref 22–31)
CREAT SERPL-MCNC: 0.45 MG/DL — LOW (ref 0.5–1.3)
EOSINOPHIL # BLD AUTO: 0.18 K/UL — SIGNIFICANT CHANGE UP (ref 0–0.5)
EOSINOPHIL NFR BLD AUTO: 2.7 % — SIGNIFICANT CHANGE UP (ref 0–6)
GLUCOSE BLDC GLUCOMTR-MCNC: 115 MG/DL — HIGH (ref 70–99)
GLUCOSE BLDC GLUCOMTR-MCNC: 174 MG/DL — HIGH (ref 70–99)
GLUCOSE BLDC GLUCOMTR-MCNC: 215 MG/DL — HIGH (ref 70–99)
GLUCOSE SERPL-MCNC: 176 MG/DL — HIGH (ref 70–99)
HCT VFR BLD CALC: 29.9 % — LOW (ref 39–50)
HGB BLD-MCNC: 9.4 G/DL — LOW (ref 13–17)
IMM GRANULOCYTES NFR BLD AUTO: 0.6 % — SIGNIFICANT CHANGE UP (ref 0–1.5)
LYMPHOCYTES # BLD AUTO: 0.95 K/UL — LOW (ref 1–3.3)
LYMPHOCYTES # BLD AUTO: 14 % — SIGNIFICANT CHANGE UP (ref 13–44)
MAGNESIUM SERPL-MCNC: 2.2 MG/DL — SIGNIFICANT CHANGE UP (ref 1.6–2.6)
MCHC RBC-ENTMCNC: 26.9 PG — LOW (ref 27–34)
MCHC RBC-ENTMCNC: 31.4 GM/DL — LOW (ref 32–36)
MCV RBC AUTO: 85.4 FL — SIGNIFICANT CHANGE UP (ref 80–100)
MONOCYTES # BLD AUTO: 0.47 K/UL — SIGNIFICANT CHANGE UP (ref 0–0.9)
MONOCYTES NFR BLD AUTO: 6.9 % — SIGNIFICANT CHANGE UP (ref 2–14)
NEUTROPHILS # BLD AUTO: 5.12 K/UL — SIGNIFICANT CHANGE UP (ref 1.8–7.4)
NEUTROPHILS NFR BLD AUTO: 75.4 % — SIGNIFICANT CHANGE UP (ref 43–77)
NRBC # BLD: 0 /100 WBCS — SIGNIFICANT CHANGE UP (ref 0–0)
PHOSPHATE SERPL-MCNC: 4 MG/DL — SIGNIFICANT CHANGE UP (ref 2.5–4.5)
PLATELET # BLD AUTO: 415 K/UL — HIGH (ref 150–400)
POTASSIUM SERPL-MCNC: 4.6 MMOL/L — SIGNIFICANT CHANGE UP (ref 3.5–5.3)
POTASSIUM SERPL-SCNC: 4.6 MMOL/L — SIGNIFICANT CHANGE UP (ref 3.5–5.3)
PROT SERPL-MCNC: 6.7 G/DL — SIGNIFICANT CHANGE UP (ref 6–8.3)
RBC # BLD: 3.5 M/UL — LOW (ref 4.2–5.8)
RBC # FLD: 22.7 % — HIGH (ref 10.3–14.5)
RH IG SCN BLD-IMP: NEGATIVE — SIGNIFICANT CHANGE UP
SODIUM SERPL-SCNC: 131 MMOL/L — LOW (ref 135–145)
WBC # BLD: 6.79 K/UL — SIGNIFICANT CHANGE UP (ref 3.8–10.5)
WBC # FLD AUTO: 6.79 K/UL — SIGNIFICANT CHANGE UP (ref 3.8–10.5)

## 2021-01-02 PROCEDURE — 99233 SBSQ HOSP IP/OBS HIGH 50: CPT | Mod: GC

## 2021-01-02 RX ORDER — OLANZAPINE 15 MG/1
5 TABLET, FILM COATED ORAL ONCE
Refills: 0 | Status: COMPLETED | OUTPATIENT
Start: 2021-01-02 | End: 2021-01-02

## 2021-01-02 RX ADMIN — APIXABAN 5 MILLIGRAM(S): 2.5 TABLET, FILM COATED ORAL at 18:16

## 2021-01-02 RX ADMIN — Medication 2 TABLET(S): at 00:02

## 2021-01-02 RX ADMIN — PANTOPRAZOLE SODIUM 40 MILLIGRAM(S): 20 TABLET, DELAYED RELEASE ORAL at 06:36

## 2021-01-02 RX ADMIN — Medication 2: at 00:01

## 2021-01-02 RX ADMIN — Medication 60 MILLIGRAM(S): at 11:05

## 2021-01-02 RX ADMIN — APIXABAN 5 MILLIGRAM(S): 2.5 TABLET, FILM COATED ORAL at 06:35

## 2021-01-02 RX ADMIN — CHLORHEXIDINE GLUCONATE 15 MILLILITER(S): 213 SOLUTION TOPICAL at 06:34

## 2021-01-02 RX ADMIN — OLANZAPINE 5 MILLIGRAM(S): 15 TABLET, FILM COATED ORAL at 09:19

## 2021-01-02 RX ADMIN — AMIODARONE HYDROCHLORIDE 200 MILLIGRAM(S): 400 TABLET ORAL at 06:36

## 2021-01-02 RX ADMIN — CHLORHEXIDINE GLUCONATE 15 MILLILITER(S): 213 SOLUTION TOPICAL at 18:19

## 2021-01-02 RX ADMIN — Medication 60 MILLIGRAM(S): at 00:02

## 2021-01-02 RX ADMIN — Medication 4: at 06:37

## 2021-01-02 RX ADMIN — PANTOPRAZOLE SODIUM 40 MILLIGRAM(S): 20 TABLET, DELAYED RELEASE ORAL at 18:19

## 2021-01-02 RX ADMIN — CHLORHEXIDINE GLUCONATE 1 APPLICATION(S): 213 SOLUTION TOPICAL at 06:34

## 2021-01-02 RX ADMIN — Medication 2 MILLIGRAM(S): at 22:02

## 2021-01-02 RX ADMIN — Medication 1 TABLET(S): at 11:05

## 2021-01-02 RX ADMIN — Medication 2: at 18:15

## 2021-01-02 RX ADMIN — Medication 60 MILLIGRAM(S): at 18:18

## 2021-01-02 NOTE — PROGRESS NOTE ADULT - PROBLEM SELECTOR PLAN 5
-RESOLVED  patient experienced drop in Hgb from 9.8 to 7.3 on 12/24 and found to have melanotic stools.  At time, also hemodynamically unstable with BPs in 80s-90s/30s-40s.  GI was concerned for stress gastritic or ulcers which bled in light of newly re-started on anticoagulation.    -s/p 2u pRBC  -stabilized  -c/w protonix 40mg

## 2021-01-02 NOTE — CHART NOTE - NSCHARTNOTEFT_GEN_A_CORE
Admitting Diagnosis:   Patient is a 70y old  Male who presents with a chief complaint of COVID-19 pna (02 Jan 2021 08:28)      PAST MEDICAL & SURGICAL HISTORY:  H/O sciatica  [facility] PAIN LT LE    H/O cystoscopy  resection of bladder tumor    Bladder cancer    DM (diabetes mellitus)    Hypercholesteremia    Afib    HTN (hypertension)    History of hernia repair        Current Nutrition Order :PEG feeds of Glucerna 1.5@[id number] with 1 pro-stat:1008ccTV/1612kcal/98gmprotein/765cc free water.Trach/PEG       PO Intake: Good (%) [   ]  Fair (50-75%) [   ] Poor (<25%) [   ]NPO on PEG feeds    GI Issues: some diarrhea, with reported improvement .Has rectal tube  Pain: None reported  Skin Integrity Intact PU wise    Labs:   01-02    131<L>  |  97  |  23  ----------------------------<  176<H>  4.6   |  20<L>  |  0.45<L>    Ca    9.2      02 Jan 2021 05:05  Phos  4.0     01-02  Mg     2.2     01-02    TPro  6.7  /  Alb  3.3  /  TBili  0.3  /  DBili  x   /  AST  24  /  ALT  27  /  AlkPhos  105  01-02    CAPILLARY BLOOD GLUCOSE      POCT Blood Glucose.: 115 mg/dL (02 Jan 2021 11:14)  POCT Blood Glucose.: 215 mg/dL (02 Jan 2021 06:18)  POCT Blood Glucose.: 171 mg/dL (01 Jan 2021 23:23)  POCT Blood Glucose.: 176 mg/dL (01 Jan 2021 17:25)      Medications:  MEDICATIONS  (STANDING):  aMIOdarone    Tablet 200 milliGRAM(s) Oral daily  apixaban 5 milliGRAM(s) Oral every 12 hours  chlorhexidine 0.12% Liquid 15 milliLiter(s) Oral Mucosa every 12 hours  chlorhexidine 2% Cloths 1 Application(s) Topical <User Schedule>  dextrose 40% Gel 15 Gram(s) Oral once  dextrose 5%. 1000 milliLiter(s) (50 mL/Hr) IV Continuous <Continuous>  dextrose 5%. 1000 milliLiter(s) (100 mL/Hr) IV Continuous <Continuous>  dextrose 50% Injectable 25 Gram(s) IV Push once  dextrose 50% Injectable 25 Gram(s) IV Push once  dextrose 50% Injectable 12.5 Gram(s) IV Push once  diltiazem    Tablet 60 milliGRAM(s) Oral every 6 hours  doxazosin 2 milliGRAM(s) Oral at bedtime  glucagon  Injectable 1 milliGRAM(s) IntraMuscular once  insulin lispro (ADMELOG) corrective regimen sliding scale   SubCutaneous every 6 hours  lactobacillus acidophilus 1 Tablet(s) Oral every 24 hours  pantoprazole   Suspension 40 milliGRAM(s) Enteral Tube every 12 hours    MEDICATIONS  (PRN):  acetaminophen    Suspension .. 650 milliGRAM(s) Oral every 6 hours PRN Temp greater or equal to 38C (100.4F), Mild Pain (1 - 3)      Weight:88.9kg  Daily     Daily No updated weights noted>RD requested    Weight Change: No updated weights recorded    Estimated energy needs: IBW used due to above 120% of IBW.IBW:61.6kg u40-32iacf(increased COVID demands):1550-1860kcal and 1.5-2.0gmprotein(COVID/Diarrheal demands):93-124gm and fluids per team.     Subjective: 71y/o female with history of 2TDM,AFib,HTN,HLD,Severe Carotid disease, Bladder CA, admitted due to COVID-19+.Was vented Now on Trach collar and continues on ATC feeds of Glucerna 1.5 with reported good tolerance .Meeting 28kcal.kg/IBW of kcal and 105% of protein needs @1.5gm/kgIBW.Per nursing ,diarrheal episodes improving. Will continue on present feeds with close monitoring of diarrheal episodes.(remains on probiotic)  Previous Nutrition Diagnosis :Increased nutrient needs r/t increased demand for kcal/protein and nutrients AEB: Hypermetabolic demands of COVID    Active [ x  ]  Resolved [   ]    If resolved, new PES:     Goal:Meet >75% of EER consistently with no further S/S of malnutrition    Recommendations:1.Trend weights 2.Monitor lytes and POC [id number] 3.Consider change to Vital 1.5 if diarrhea not resolved     Education: not warranted    Risk Level: High [ x  ] Moderate [   ] Low [   ]

## 2021-01-02 NOTE — PROGRESS NOTE ADULT - ASSESSMENT
70M DMT2, A-fib on Xarelto, HTN, HLD, severe carotid disease who was admitted to [facility] on 11/27 where he received decadron, remdesivir, convalescent plasma, and several antibiotics before being transferred to [facility].  currently s/p trach and PEG and treatment for mssa VAP treatment wtih zosyn from 12/13-20.  course has been complicated by afib with rvr and melanotic stools but mentation appears stable and hopeful for LTAC placement soon.     NEUROLOGY  no acute issues    RESPIRATORY   #Acute hypoxic respiratory failure  -2/2 covid with ?overlying PNA  -s/p trach 12/18 and peg 12/17  -tolerating trach collar  Plan:  -resolved    CARDIOVASCULAR    #H/o A-fib with RVR, now controlled  - s/p amiodarone 400mg BID 12/10-20, amiodarone gtt 12/23  - TTE with no wall abnormalities  Plan:  - AM EKG  - cont eliquis 5mg BID  - cont cardizem 60mg q6hr, can increase to 90mg q6hr if continuously tachycardic  - cont amio 200mg PO daily  - f/u EP recs  - f/u with Dr. Medina o/p    #hypotension  -s/p phenylephrine  -s/p midodrine    #Severe carotid disease  -Patient with occluded right ICA and severe left ICA stenosis, s/p left CEA, uncomplicated  -f/u with cardiology outpatient    #QTC prolongation:   - stable    RENAL   - monitor Is/Os  - dc santana; failed TOV  - Santana placed again on 12/30  - cont doxazosin 2mg    ENDOCRINE  - goal 140-180 blood glucose  - c/w mSSI    GASTROINTESTINAL  - protonix 40  - Rectal tube in place  - see anemia below    #diarrhea  -normal WBC, afebrile, low concern for C Dill. More likely related to antibiotic use  -resolving  Plan:  -cont probiotic    INFECTIOUS DISEASE  #COVID 19  -s/p remdesivir and decadron    #?PNA  -pt spiked fever on 12/12; sputum cx negative but concern for overlying pna so received zosyn 12/14-12/20    #Fever of unknown origin with developing shock-could be secondary to streptophomonas and enterobacter pna on sputum cx   -f/u blood cx  -s/p meropenem 12/24-26  -s/p bactrim 12/26-1/1    HEMATOLOGY/ONCOLOGY  #Anemia  -RESOLVED  patient experienced drop in Hgb from 9.8 to 7.3 on 12/24 and found to have melanotic stools.  At time, also hemodynamically unstable with BPs in 80s-90s/30s-40s.  GI was concerned for stress gastritic or ulcers which bled in light of newly re-started on anticoagulation.    -s/p 2u pRBC  -stabilized    ACCESS/LINES/DRAINS  A-line 12/17  PEG 12/17    PROPHYLACTIC  ELECTROLYTES: Mg<2, K<4  DIET: NPO with tube feeds  GI PPX: Protonix, probiotics  VTE PPX: SCD, eliquis bid for afib  CODE: FULL  DISPO: MICU

## 2021-01-02 NOTE — PROGRESS NOTE ADULT - PROBLEM SELECTOR PLAN 4
-pt spiked fever on 12/12; sputum cx negative but concern for overlying pna so received zosyn 12/14-12/20    #Fever of unknown origin with developing shock-could be secondary to streptophomonas and enterobacter pna on sputum cx   -f/u blood cx  -s/p meropenem 12/24-26  -s/p bactrim 12/26-1/1

## 2021-01-02 NOTE — PROGRESS NOTE ADULT - SUBJECTIVE AND OBJECTIVE BOX
CC: Patient is a 70y old  Male who presents with a chief complaint of COVID-19 pna (01 Jan 2021 13:53)      INTERVAL EVENTS: MIKI    SUBJECTIVE / INTERVAL HPI: Patient seen and examined at bedside. Pt denies fevers, chills, chest pain, shortness of breath, cough, abdominal pain, nausea, vomiting, diarrhea, constipation, leg pain/swelling.     ROS: negative unless otherwise stated above.    VITAL SIGNS:  Vital Signs Last 24 Hrs  T(C): 36.1 (02 Jan 2021 05:16), Max: 36.9 (01 Jan 2021 08:51)  T(F): 97 (02 Jan 2021 05:16), Max: 98.5 (01 Jan 2021 08:51)  HR: 103 (02 Jan 2021 08:00) (79 - 106)  BP: 110/56 (02 Jan 2021 08:00) (95/53 - 132/60)  BP(mean): 78 (02 Jan 2021 02:00) (78 - 85)  RR: 24 (02 Jan 2021 08:00) (19 - 30)  SpO2: 98% (02 Jan 2021 08:00) (91% - 98%)      01-01-21 @ 07:01  -  01-02-21 @ 07:00  --------------------------------------------------------  IN: 968 mL / OUT: 920 mL / NET: 48 mL    01-02-21 @ 07:01  - 01-02-21 @ 08:28  --------------------------------------------------------  IN: 42 mL / OUT: 0 mL / NET: 42 mL        PHYSICAL EXAM:    General: on trach collar  Cardiovascular: +S1/S2; RRR  Respiratory: CTA B/L; no W/R/R  Gastrointestinal: soft, NT/ND; +BSx4  Extremities: WWP; no edema, clubbing or cyanosis  Vascular: 2+ radial, DP/PT pulses B/L  Neurological: no focal deficits    MEDICATIONS:  MEDICATIONS  (STANDING):  aMIOdarone    Tablet 200 milliGRAM(s) Oral daily  apixaban 5 milliGRAM(s) Oral every 12 hours  chlorhexidine 0.12% Liquid 15 milliLiter(s) Oral Mucosa every 12 hours  chlorhexidine 2% Cloths 1 Application(s) Topical <User Schedule>  dextrose 40% Gel 15 Gram(s) Oral once  dextrose 5%. 1000 milliLiter(s) (50 mL/Hr) IV Continuous <Continuous>  dextrose 5%. 1000 milliLiter(s) (100 mL/Hr) IV Continuous <Continuous>  dextrose 50% Injectable 25 Gram(s) IV Push once  dextrose 50% Injectable 12.5 Gram(s) IV Push once  dextrose 50% Injectable 25 Gram(s) IV Push once  diltiazem    Tablet 60 milliGRAM(s) Oral every 6 hours  doxazosin 2 milliGRAM(s) Oral at bedtime  glucagon  Injectable 1 milliGRAM(s) IntraMuscular once  insulin lispro (ADMELOG) corrective regimen sliding scale   SubCutaneous every 6 hours  lactobacillus acidophilus 1 Tablet(s) Oral every 24 hours  pantoprazole   Suspension 40 milliGRAM(s) Enteral Tube every 12 hours    MEDICATIONS  (PRN):  acetaminophen    Suspension .. 650 milliGRAM(s) Oral every 6 hours PRN Temp greater or equal to 38C (100.4F), Mild Pain (1 - 3)      ALLERGIES:  Allergies    No Known Allergies    Intolerances        LABS:                        9.4    6.79  )-----------( 415      ( 02 Jan 2021 05:05 )             29.9     01-02    131<L>  |  97  |  23  ----------------------------<  176<H>  4.6   |  20<L>  |  0.45<L>    Ca    9.2      02 Jan 2021 05:05  Phos  4.0     01-02  Mg     2.2     01-02    TPro  6.7  /  Alb  3.3  /  TBili  0.3  /  DBili  x   /  AST  24  /  ALT  27  /  AlkPhos  105  01-02        CAPILLARY BLOOD GLUCOSE      POCT Blood Glucose.: 215 mg/dL (02 Jan 2021 06:18)      RADIOLOGY & ADDITIONAL TESTS: Reviewed. TRANSFER FROM [facility] MICU TO [facility] TELE    CC: Patient is a 70y old  Male who presents with a chief complaint of COVID-19 pna (01 Jan 2021 13:53)    Hospital Course:  70M DMT2, A-fib on Xarelto, HTN, HLD, severe carotid disease who was admitted to [facility] on 11/27, started on remdesivir and decadron on 11/28. Convalescent plasma on 11/30.  Intubated on 12/2 due to worsening O2 sats. At [facility], received tocilizumab on 12/2, Zosyn 12/2-8, levaquin 12/2-8, zyvox 12/8, and caspofungin on 12/8. Transferred to [facility] for further management due to [facility] capacity. S/p trach and PEG placement. Sputum cx from 12/24 growing stenotrophomonas, so started on Meropenem and transitioned to Bactrim.  Course has been complicated by afib with rvr with varying amiodarone dosing and started on diltiazem, with electrophysiology consulted and following with recs.  Additionally, patient had episodic melena that has now resolved s/p 1u pRBC.  GI was concerned for stress gastritic or ulcers which bled in light of newly re-started on anticoagulation, but now stable and restarted on anticoagulation. Patient currently stable on trach collar s/p pressors and Abx. Course c/b urinary retention s/p Araujo started on doxuzosin.     SUBJECTIVE / INTERVAL HPI: Patient seen and examined at bedside. Pt denies fevers, chills, chest pain, shortness of breath, cough, abdominal pain, nausea, vomiting, diarrhea, constipation, leg pain/swelling.     ROS: negative unless otherwise stated above.    VITAL SIGNS:  Vital Signs Last 24 Hrs  T(C): 36.1 (02 Jan 2021 05:16), Max: 36.9 (01 Jan 2021 08:51)  T(F): 97 (02 Jan 2021 05:16), Max: 98.5 (01 Jan 2021 08:51)  HR: 103 (02 Jan 2021 08:00) (79 - 106)  BP: 110/56 (02 Jan 2021 08:00) (95/53 - 132/60)  BP(mean): 78 (02 Jan 2021 02:00) (78 - 85)  RR: 24 (02 Jan 2021 08:00) (19 - 30)  SpO2: 98% (02 Jan 2021 08:00) (91% - 98%)      01-01-21 @ 07:01  -  01-02-21 @ 07:00  --------------------------------------------------------  IN: 968 mL / OUT: 920 mL / NET: 48 mL    01-02-21 @ 07:01  -  01-02-21 @ 08:28  --------------------------------------------------------  IN: 42 mL / OUT: 0 mL / NET: 42 mL        PHYSICAL EXAM:    General: on trach collar  Cardiovascular: +S1/S2; RRR  Respiratory: CTA B/L; no W/R/R  Gastrointestinal: soft, NT/ND; +BSx4  Extremities: WWP; no edema, clubbing or cyanosis  Vascular: 2+ radial, DP/PT pulses B/L  Neurological: no focal deficits    MEDICATIONS:  MEDICATIONS  (STANDING):  aMIOdarone    Tablet 200 milliGRAM(s) Oral daily  apixaban 5 milliGRAM(s) Oral every 12 hours  chlorhexidine 0.12% Liquid 15 milliLiter(s) Oral Mucosa every 12 hours  chlorhexidine 2% Cloths 1 Application(s) Topical <User Schedule>  dextrose 40% Gel 15 Gram(s) Oral once  dextrose 5%. 1000 milliLiter(s) (50 mL/Hr) IV Continuous <Continuous>  dextrose 5%. 1000 milliLiter(s) (100 mL/Hr) IV Continuous <Continuous>  dextrose 50% Injectable 25 Gram(s) IV Push once  dextrose 50% Injectable 12.5 Gram(s) IV Push once  dextrose 50% Injectable 25 Gram(s) IV Push once  diltiazem    Tablet 60 milliGRAM(s) Oral every 6 hours  doxazosin 2 milliGRAM(s) Oral at bedtime  glucagon  Injectable 1 milliGRAM(s) IntraMuscular once  insulin lispro (ADMELOG) corrective regimen sliding scale   SubCutaneous every 6 hours  lactobacillus acidophilus 1 Tablet(s) Oral every 24 hours  pantoprazole   Suspension 40 milliGRAM(s) Enteral Tube every 12 hours    MEDICATIONS  (PRN):  acetaminophen    Suspension .. 650 milliGRAM(s) Oral every 6 hours PRN Temp greater or equal to 38C (100.4F), Mild Pain (1 - 3)      ALLERGIES:  Allergies    No Known Allergies    Intolerances        LABS:                        9.4    6.79  )-----------( 415      ( 02 Jan 2021 05:05 )             29.9     01-02    131<L>  |  97  |  23  ----------------------------<  176<H>  4.6   |  20<L>  |  0.45<L>    Ca    9.2      02 Jan 2021 05:05  Phos  4.0     01-02  Mg     2.2     01-02    TPro  6.7  /  Alb  3.3  /  TBili  0.3  /  DBili  x   /  AST  24  /  ALT  27  /  AlkPhos  105  01-02        CAPILLARY BLOOD GLUCOSE      POCT Blood Glucose.: 215 mg/dL (02 Jan 2021 06:18)      RADIOLOGY & ADDITIONAL TESTS: Reviewed. TRANSFER FROM [facility] MICU TO [facility]    CC: Patient is a 70y old  Male who presents with a chief complaint of COVID-19 pna (01 Jan 2021 13:53)    Hospital Course:  70M DMT2, A-fib on Xarelto, HTN, HLD, severe carotid disease who was admitted to [facility] on 11/27, started on remdesivir and decadron on 11/28. Convalescent plasma on 11/30.  Intubated on 12/2 due to worsening O2 sats. At [facility], received tocilizumab on 12/2, Zosyn 12/2-8, levaquin 12/2-8, zyvox 12/8, and caspofungin on 12/8. Transferred to [facility] for further management due to [facility] capacity. S/p trach and PEG placement. Sputum cx from 12/24 growing stenotrophomonas, so started on Meropenem and transitioned to Bactrim.  Course has been complicated by afib with rvr with varying amiodarone dosing and started on diltiazem, with electrophysiology consulted and following with recs.  Additionally, patient had episodic melena that has now resolved s/p 1u pRBC.  GI was concerned for stress gastritic or ulcers which bled in light of newly re-started on anticoagulation, but now stable and restarted on anticoagulation. Patient currently stable on trach collar s/p pressors and Abx. Course c/b urinary retention s/p Araujo started on doxuzosin.     SUBJECTIVE / INTERVAL HPI: Patient seen and examined at bedside. Pt denies fevers, chills, chest pain, shortness of breath, cough, abdominal pain, nausea, vomiting, diarrhea, constipation, leg pain/swelling.     ROS: negative unless otherwise stated above.    VITAL SIGNS:  Vital Signs Last 24 Hrs  T(C): 36.1 (02 Jan 2021 05:16), Max: 36.9 (01 Jan 2021 08:51)  T(F): 97 (02 Jan 2021 05:16), Max: 98.5 (01 Jan 2021 08:51)  HR: 103 (02 Jan 2021 08:00) (79 - 106)  BP: 110/56 (02 Jan 2021 08:00) (95/53 - 132/60)  BP(mean): 78 (02 Jan 2021 02:00) (78 - 85)  RR: 24 (02 Jan 2021 08:00) (19 - 30)  SpO2: 98% (02 Jan 2021 08:00) (91% - 98%)      01-01-21 @ 07:01  -  01-02-21 @ 07:00  --------------------------------------------------------  IN: 968 mL / OUT: 920 mL / NET: 48 mL    01-02-21 @ 07:01  -  01-02-21 @ 08:28  --------------------------------------------------------  IN: 42 mL / OUT: 0 mL / NET: 42 mL        PHYSICAL EXAM:    General: on trach collar  Cardiovascular: +S1/S2; RRR  Respiratory: CTA B/L; no W/R/R  Gastrointestinal: soft, NT/ND; +BSx4  Extremities: WWP; no edema, clubbing or cyanosis  Vascular: 2+ radial, DP/PT pulses B/L  Neurological: no focal deficits    MEDICATIONS:  MEDICATIONS  (STANDING):  aMIOdarone    Tablet 200 milliGRAM(s) Oral daily  apixaban 5 milliGRAM(s) Oral every 12 hours  chlorhexidine 0.12% Liquid 15 milliLiter(s) Oral Mucosa every 12 hours  chlorhexidine 2% Cloths 1 Application(s) Topical <User Schedule>  dextrose 40% Gel 15 Gram(s) Oral once  dextrose 5%. 1000 milliLiter(s) (50 mL/Hr) IV Continuous <Continuous>  dextrose 5%. 1000 milliLiter(s) (100 mL/Hr) IV Continuous <Continuous>  dextrose 50% Injectable 25 Gram(s) IV Push once  dextrose 50% Injectable 12.5 Gram(s) IV Push once  dextrose 50% Injectable 25 Gram(s) IV Push once  diltiazem    Tablet 60 milliGRAM(s) Oral every 6 hours  doxazosin 2 milliGRAM(s) Oral at bedtime  glucagon  Injectable 1 milliGRAM(s) IntraMuscular once  insulin lispro (ADMELOG) corrective regimen sliding scale   SubCutaneous every 6 hours  lactobacillus acidophilus 1 Tablet(s) Oral every 24 hours  pantoprazole   Suspension 40 milliGRAM(s) Enteral Tube every 12 hours    MEDICATIONS  (PRN):  acetaminophen    Suspension .. 650 milliGRAM(s) Oral every 6 hours PRN Temp greater or equal to 38C (100.4F), Mild Pain (1 - 3)      ALLERGIES:  Allergies    No Known Allergies    Intolerances        LABS:                        9.4    6.79  )-----------( 415      ( 02 Jan 2021 05:05 )             29.9     01-02    131<L>  |  97  |  23  ----------------------------<  176<H>  4.6   |  20<L>  |  0.45<L>    Ca    9.2      02 Jan 2021 05:05  Phos  4.0     01-02  Mg     2.2     01-02    TPro  6.7  /  Alb  3.3  /  TBili  0.3  /  DBili  x   /  AST  24  /  ALT  27  /  AlkPhos  105  01-02        CAPILLARY BLOOD GLUCOSE      POCT Blood Glucose.: 215 mg/dL (02 Jan 2021 06:18)      RADIOLOGY & ADDITIONAL TESTS: Reviewed.

## 2021-01-02 NOTE — PROGRESS NOTE ADULT - PROBLEM SELECTOR PLAN 3
Now controlled  - s/p amiodarone 400mg BID 12/10-20, amiodarone gtt 12/23  - TTE with no wall abnormalities  Plan:  - AM EKG  - cont eliquis 5mg BID  - cont cardizem 60mg q6hr, can increase to 90mg q6hr if continuously tachycardic  - cont amio 200mg PO daily  - f/u EP recs  - f/u with Dr. Medina o/p    #hypotension  RESOLVED  -s/p phenylephrine  -s/p midodrine    #QTC prolongation:   - stable

## 2021-01-02 NOTE — PROGRESS NOTE ADULT - PROBLEM SELECTOR PLAN 7
-normal WBC, afebrile, low concern for C Dill. More likely related to antibiotic use  -resolving  Plan:  -cont probiotic

## 2021-01-02 NOTE — PROGRESS NOTE ADULT - PROBLEM SELECTOR PLAN 10
ELECTROLYTES: Mg<2, K<4  DIET: NPO with tube feeds, PEG 12/17  GI PPX: Protonix, probiotics  VTE PPX: eliquis bid for afib  CODE: FULL  DISPO: YEN

## 2021-01-02 NOTE — PROGRESS NOTE ADULT - PROBLEM SELECTOR PLAN 1
-2/2 covid with ?overlying PNA  -s/p trach 12/18 and peg 12/17  -tolerating trach collar  Plan:  RESOLVED

## 2021-01-02 NOTE — PROGRESS NOTE ADULT - ASSESSMENT
70M DMT2, A-fib on Xarelto, HTN, HLD, severe carotid disease who was admitted to [facility] on 11/27 where he received decadron, remdesivir, convalescent plasma, and several antibiotics before being transferred to [facility].  currently s/p trach and PEG and treatment for mssa VAP treatment wtih zosyn from 12/13-20.  course has been complicated by afib with rvr and melanotic stools but mentation appears stable and hopeful for LTAC placement soon.     NEUROLOGY  no acute issues    RESPIRATORY   #Acute hypoxic respiratory failure  -2/2 covid with ?overlying PNA  -s/p trach 12/18 and peg 12/17  -tolerating trach collar  Plan:  -resolved    CARDIOVASCULAR    #H/o A-fib with RVR, now controlled  - s/p amiodarone 400mg BID 12/10-20, amiodarone gtt 12/23  - TTE with no wall abnormalities  Plan:  - AM EKG  - cont eliquis 5mg BID  - cont cardizem 60mg q6hr, can increase to 90mg q6hr if continuously tachycardic  - cont amio 200mg PO daily  - f/u EP recs  - f/u with Dr. Medina o/p    #hypotension  -s/p phenylephrine  -s/p midodrine    #Severe carotid disease  -Patient with occluded right ICA and severe left ICA stenosis, s/p left CEA, uncomplicated  -f/u with cardiology outpatient    #QTC prolongation:   - stable    RENAL   - monitor Is/Os  - dc santana; failed TOV  - Santana placed again on 12/30  - cont doxazosin 2mg    ENDOCRINE  - goal 140-180 blood glucose  - c/w mSSI    GASTROINTESTINAL  - protonix 40  - Rectal tube in place  - see anemia below    #diarrhea  -normal WBC, afebrile, low concern for C Dill. More likely related to antibiotic use  -resolving  Plan:  -cont probiotic    INFECTIOUS DISEASE  #COVID 19  -s/p remdesivir and decadron    #?PNA  -pt spiked fever on 12/12; sputum cx negative but concern for overlying pna so received zosyn 12/14-12/20    #Fever of unknown origin with developing shock-could be secondary to streptophomonas and enterobacter pna on sputum cx   -f/u blood cx  -s/p meropenem 12/24-26  -s/p bactrim 12/26-1/1    HEMATOLOGY/ONCOLOGY  #Anemia  -RESOLVED  patient experienced drop in Hgb from 9.8 to 7.3 on 12/24 and found to have melanotic stools.  At time, also hemodynamically unstable with BPs in 80s-90s/30s-40s.  GI was concerned for stress gastritic or ulcers which bled in light of newly re-started on anticoagulation.    -s/p 2u pRBC  -stabilized    ACCESS/LINES/DRAINS  A-line 12/17  PEG 12/17    PROPHYLACTIC  ELECTROLYTES: Mg<2, K<4  DIET: NPO with tube feeds  GI PPX: Protonix, probiotics  VTE PPX: SCD, eliquis bid for afib  CODE: FULL  DISPO: MICU

## 2021-01-02 NOTE — PROGRESS NOTE ADULT - SUBJECTIVE AND OBJECTIVE BOX
***INCOMPLETE NOTE***    TRANSFER ACCEPT FROM MICU TO [facility]:    Hospital Course:  70M DMT2, A-fib on Xarelto, HTN, HLD, severe carotid disease who was admitted to [facility] on 11/27, started on remdesivir and decadron on 11/28. Convalescent plasma on 11/30.  Intubated on 12/2 due to worsening O2 sats. At [facility], received tocilizumab on 12/2, Zosyn 12/2-8, levaquin 12/2-8, zyvox 12/8, and caspofungin on 12/8. Transferred to [facility] for further management due to [facility] capacity. S/p trach and PEG placement. Sputum cx from 12/24 growing stenotrophomonas, so started on Meropenem and transitioned to Bactrim.  Course has been complicated by afib with rvr with varying amiodarone dosing and started on diltiazem, with electrophysiology consulted and following with recs.  Additionally, patient had episodic melena that has now resolved s/p 2u pRBC.  GI was concerned for stress gastritic or ulcers which bled in light of newly re-started on anticoagulation, but now stable and restarted on anticoagulation. Patient currently stable on trach collar s/p pressors and Abx. Course c/b urinary retention s/p santana started on doxuzosin.       SUBJECTIVE / INTERVAL HPI: Patient seen and examined at bedside.     VITAL SIGNS:  Vital Signs Last 24 Hrs  T(C): 36.6 (02 Jan 2021 17:19), Max: 37.1 (02 Jan 2021 14:22)  T(F): 97.9 (02 Jan 2021 17:19), Max: 98.7 (02 Jan 2021 14:22)  HR: 100 (02 Jan 2021 17:19) (79 - 107)  BP: 130/71 (02 Jan 2021 17:19) (95/53 - 132/59)  BP(mean): 78 (02 Jan 2021 02:00) (78 - 78)  RR: 22 (02 Jan 2021 17:19) (19 - 35)  SpO2: 96% (02 Jan 2021 17:19) (91% - 98%)    PHYSICAL EXAM:    General: WDWN  HEENT: NCAT; PERRL, anicteric sclera; MMM  Neck: supple, trachea midline  Cardiovascular: S1, S2 normal; RRR, no M/G/R  Respiratory: CTABL; no W/R/R  Gastrointestinal: soft, nontender, nondistended. bowel sounds present.  Skin: no ulcerations or visible rashes appreciated  Extremities: WWP; no edema, clubbing or cyanosis  Vascular: 2+ radial, DP/PT pulses B/L  Neurological: AAOx3; CN II-XII grossly intact; no focal deficits    MEDICATIONS:  MEDICATIONS  (STANDING):  aMIOdarone    Tablet 200 milliGRAM(s) Oral daily  apixaban 5 milliGRAM(s) Oral every 12 hours  chlorhexidine 0.12% Liquid 15 milliLiter(s) Oral Mucosa every 12 hours  chlorhexidine 2% Cloths 1 Application(s) Topical <User Schedule>  dextrose 40% Gel 15 Gram(s) Oral once  dextrose 5%. 1000 milliLiter(s) (50 mL/Hr) IV Continuous <Continuous>  dextrose 5%. 1000 milliLiter(s) (100 mL/Hr) IV Continuous <Continuous>  dextrose 50% Injectable 25 Gram(s) IV Push once  dextrose 50% Injectable 25 Gram(s) IV Push once  dextrose 50% Injectable 12.5 Gram(s) IV Push once  diltiazem    Tablet 60 milliGRAM(s) Oral every 6 hours  doxazosin 2 milliGRAM(s) Oral at bedtime  glucagon  Injectable 1 milliGRAM(s) IntraMuscular once  insulin lispro (ADMELOG) corrective regimen sliding scale   SubCutaneous every 6 hours  lactobacillus acidophilus 1 Tablet(s) Oral every 24 hours  pantoprazole   Suspension 40 milliGRAM(s) Enteral Tube every 12 hours    MEDICATIONS  (PRN):  acetaminophen    Suspension .. 650 milliGRAM(s) Oral every 6 hours PRN Temp greater or equal to 38C (100.4F), Mild Pain (1 - 3)      ALLERGIES:  Allergies    No Known Allergies    Intolerances        LABS:                        9.4    6.79  )-----------( 415      ( 02 Jan 2021 05:05 )             29.9     01-02    131<L>  |  97  |  23  ----------------------------<  176<H>  4.6   |  20<L>  |  0.45<L>    Ca    9.2      02 Jan 2021 05:05  Phos  4.0     01-02  Mg     2.2     01-02    TPro  6.7  /  Alb  3.3  /  TBili  0.3  /  DBili  x   /  AST  24  /  ALT  27  /  AlkPhos  105  01-02        CAPILLARY BLOOD GLUCOSE      POCT Blood Glucose.: 115 mg/dL (02 Jan 2021 11:14)      RADIOLOGY & ADDITIONAL TESTS: Reviewed. TRANSFER ACCEPT FROM MICU TO [facility]:    Hospital Course:  70M DMT2, A-fib on Xarelto, HTN, HLD, severe carotid disease who was admitted to [facility] on 11/27, started on remdesivir and decadron on 11/28. Convalescent plasma on 11/30.  Intubated on 12/2 due to worsening O2 sats. At [facility], received tocilizumab on 12/2, Zosyn 12/2-8, levaquin 12/2-8, zyvox 12/8, and caspofungin on 12/8. Transferred to [facility] for further management due to [facility] capacity. S/p trach and PEG placement. Sputum cx from 12/24 growing stenotrophomonas, so started on Meropenem and transitioned to Bactrim.  Course has been complicated by afib with rvr with varying amiodarone dosing and started on diltiazem, with electrophysiology consulted and following with recs.  Additionally, patient had episodic melena that has now resolved s/p 2u pRBC.  GI was concerned for stress gastritic or ulcers which bled in light of newly re-started on anticoagulation, but now stable and restarted on anticoagulation. Patient currently stable on trach collar s/p pressors and Abx. Course c/b urinary retention s/p santana started on doxuzosin.       SUBJECTIVE / INTERVAL HPI: Patient seen and examined at bedside.  Expresses understanding and tries to communicate but unable to d/t trach collar.  Attempts to write on piece of paper but has severe tremor that prevents legible writing.       VITAL SIGNS:  Vital Signs Last 24 Hrs  T(C): 36.6 (02 Jan 2021 17:19), Max: 37.1 (02 Jan 2021 14:22)  T(F): 97.9 (02 Jan 2021 17:19), Max: 98.7 (02 Jan 2021 14:22)  HR: 100 (02 Jan 2021 17:19) (79 - 107)  BP: 130/71 (02 Jan 2021 17:19) (95/53 - 132/59)  BP(mean): 78 (02 Jan 2021 02:00) (78 - 78)  RR: 22 (02 Jan 2021 17:19) (19 - 35)  SpO2: 96% (02 Jan 2021 17:19) (91% - 98%)    PHYSICAL EXAM:    General: WDWN, NAD  HEENT: NCAT; PERRL, anicteric sclera; MMM, tongue tremor?  Neck: trach collar  Cardiovascular: irregularly irregular  Respiratory: coughing   Gastrointestinal: soft, nontender, nondistended. bowel sounds present.  Skin: no ulcerations or visible rashes appreciated  Extremities: WWP; no edema, clubbing or cyanosis  Vascular: 2+ radial, DP/PT pulses B/L  Neurological: AAOx3; CN II-XII grossly intact; no focal deficits    MEDICATIONS:  MEDICATIONS  (STANDING):  aMIOdarone    Tablet 200 milliGRAM(s) Oral daily  apixaban 5 milliGRAM(s) Oral every 12 hours  chlorhexidine 0.12% Liquid 15 milliLiter(s) Oral Mucosa every 12 hours  chlorhexidine 2% Cloths 1 Application(s) Topical <User Schedule>  dextrose 40% Gel 15 Gram(s) Oral once  dextrose 5%. 1000 milliLiter(s) (50 mL/Hr) IV Continuous <Continuous>  dextrose 5%. 1000 milliLiter(s) (100 mL/Hr) IV Continuous <Continuous>  dextrose 50% Injectable 25 Gram(s) IV Push once  dextrose 50% Injectable 25 Gram(s) IV Push once  dextrose 50% Injectable 12.5 Gram(s) IV Push once  diltiazem    Tablet 60 milliGRAM(s) Oral every 6 hours  doxazosin 2 milliGRAM(s) Oral at bedtime  glucagon  Injectable 1 milliGRAM(s) IntraMuscular once  insulin lispro (ADMELOG) corrective regimen sliding scale   SubCutaneous every 6 hours  lactobacillus acidophilus 1 Tablet(s) Oral every 24 hours  pantoprazole   Suspension 40 milliGRAM(s) Enteral Tube every 12 hours    MEDICATIONS  (PRN):  acetaminophen    Suspension .. 650 milliGRAM(s) Oral every 6 hours PRN Temp greater or equal to 38C (100.4F), Mild Pain (1 - 3)      ALLERGIES:  Allergies    No Known Allergies    Intolerances        LABS:                        9.4    6.79  )-----------( 415      ( 02 Jan 2021 05:05 )             29.9     01-02    131<L>  |  97  |  23  ----------------------------<  176<H>  4.6   |  20<L>  |  0.45<L>    Ca    9.2      02 Jan 2021 05:05  Phos  4.0     01-02  Mg     2.2     01-02    TPro  6.7  /  Alb  3.3  /  TBili  0.3  /  DBili  x   /  AST  24  /  ALT  27  /  AlkPhos  105  01-02        CAPILLARY BLOOD GLUCOSE      POCT Blood Glucose.: 115 mg/dL (02 Jan 2021 11:14)      RADIOLOGY & ADDITIONAL TESTS: Reviewed. TRANSFER ACCEPT FROM MICU TO [facility] [place]:    Hospital Course:  70M DMT2, A-fib on Xarelto, HTN, HLD, severe carotid disease who was admitted to [facility] on 11/27, started on remdesivir and decadron on 11/28. Convalescent plasma on 11/30.  Intubated on 12/2 due to worsening O2 sats. At [facility], received tocilizumab on 12/2, Zosyn 12/2-8, levaquin 12/2-8, zyvox 12/8, and caspofungin on 12/8. Transferred to [facility] for further management due to [facility] capacity. S/p trach and PEG placement. Sputum cx from 12/24 growing stenotrophomonas, so started on Meropenem and transitioned to Bactrim.  Course has been complicated by afib with rvr with varying amiodarone dosing and started on diltiazem, with electrophysiology consulted and following with recs.  Additionally, patient had episodic melena that has now resolved s/p 2u pRBC.  GI was concerned for stress gastritic or ulcers which bled in light of newly re-started on anticoagulation, but now stable and restarted on anticoagulation. Patient currently stable on trach collar s/p pressors and Abx. Course c/b urinary retention s/p santana started on doxuzosin.       SUBJECTIVE / INTERVAL HPI: Patient seen and examined at bedside.  Expresses understanding and tries to communicate but unable to d/t trach collar.  Attempts to write on piece of paper but has severe tremor that prevents legible writing.       VITAL SIGNS:  Vital Signs Last 24 Hrs  T(C): 36.6 (02 Jan 2021 17:19), Max: 37.1 (02 Jan 2021 14:22)  T(F): 97.9 (02 Jan 2021 17:19), Max: 98.7 (02 Jan 2021 14:22)  HR: 100 (02 Jan 2021 17:19) (79 - 107)  BP: 130/71 (02 Jan 2021 17:19) (95/53 - 132/59)  BP(mean): 78 (02 Jan 2021 02:00) (78 - 78)  RR: 22 (02 Jan 2021 17:19) (19 - 35)  SpO2: 96% (02 Jan 2021 17:19) (91% - 98%)    PHYSICAL EXAM:    General: WDWN, NAD  HEENT: NCAT; PERRL, anicteric sclera; MMM, tongue tremor?  Neck: trach collar   Cardiovascular: irregularly irregular  Respiratory: wet cough with clear mucous out of trach collar  Gastrointestinal: soft, nontender, nondistended. bowel sounds present.  Skin: no ulcerations or visible rashes appreciated  Extremities: WWP; no edema  Vascular: 2+ radial, DP pulses B/L  Neurological: alert and follows instructions but unable to speak with trach collar; CN II-XII grossly intact; no focal deficits, moves all extremities; b/l hand tremor.    MEDICATIONS:  MEDICATIONS  (STANDING):  aMIOdarone    Tablet 200 milliGRAM(s) Oral daily  apixaban 5 milliGRAM(s) Oral every 12 hours  chlorhexidine 0.12% Liquid 15 milliLiter(s) Oral Mucosa every 12 hours  chlorhexidine 2% Cloths 1 Application(s) Topical <User Schedule>  dextrose 40% Gel 15 Gram(s) Oral once  dextrose 5%. 1000 milliLiter(s) (50 mL/Hr) IV Continuous <Continuous>  dextrose 5%. 1000 milliLiter(s) (100 mL/Hr) IV Continuous <Continuous>  dextrose 50% Injectable 25 Gram(s) IV Push once  dextrose 50% Injectable 25 Gram(s) IV Push once  dextrose 50% Injectable 12.5 Gram(s) IV Push once  diltiazem    Tablet 60 milliGRAM(s) Oral every 6 hours  doxazosin 2 milliGRAM(s) Oral at bedtime  glucagon  Injectable 1 milliGRAM(s) IntraMuscular once  insulin lispro (ADMELOG) corrective regimen sliding scale   SubCutaneous every 6 hours  lactobacillus acidophilus 1 Tablet(s) Oral every 24 hours  pantoprazole   Suspension 40 milliGRAM(s) Enteral Tube every 12 hours    MEDICATIONS  (PRN):  acetaminophen    Suspension .. 650 milliGRAM(s) Oral every 6 hours PRN Temp greater or equal to 38C (100.4F), Mild Pain (1 - 3)      ALLERGIES:  Allergies    No Known Allergies    Intolerances        LABS:                        9.4    6.79  )-----------( 415      ( 02 Jan 2021 05:05 )             29.9     01-02    131<L>  |  97  |  23  ----------------------------<  176<H>  4.6   |  20<L>  |  0.45<L>    Ca    9.2      02 Jan 2021 05:05  Phos  4.0     01-02  Mg     2.2     01-02    TPro  6.7  /  Alb  3.3  /  TBili  0.3  /  DBili  x   /  AST  24  /  ALT  27  /  AlkPhos  105  01-02        CAPILLARY BLOOD GLUCOSE      POCT Blood Glucose.: 115 mg/dL (02 Jan 2021 11:14)      RADIOLOGY & ADDITIONAL TESTS: Reviewed.

## 2021-01-03 ENCOUNTER — RX RENEWAL (OUTPATIENT)
Age: 71
End: 2021-01-03

## 2021-01-03 LAB
ANION GAP SERPL CALC-SCNC: 13 MMOL/L — SIGNIFICANT CHANGE UP (ref 5–17)
BUN SERPL-MCNC: 27 MG/DL — HIGH (ref 7–23)
CALCIUM SERPL-MCNC: 8.9 MG/DL — SIGNIFICANT CHANGE UP (ref 8.4–10.5)
CHLORIDE SERPL-SCNC: 98 MMOL/L — SIGNIFICANT CHANGE UP (ref 96–108)
CO2 SERPL-SCNC: 22 MMOL/L — SIGNIFICANT CHANGE UP (ref 22–31)
CREAT SERPL-MCNC: 0.53 MG/DL — SIGNIFICANT CHANGE UP (ref 0.5–1.3)
GLUCOSE BLDC GLUCOMTR-MCNC: 150 MG/DL — HIGH (ref 70–99)
GLUCOSE BLDC GLUCOMTR-MCNC: 152 MG/DL — HIGH (ref 70–99)
GLUCOSE BLDC GLUCOMTR-MCNC: 166 MG/DL — HIGH (ref 70–99)
GLUCOSE BLDC GLUCOMTR-MCNC: 182 MG/DL — HIGH (ref 70–99)
GLUCOSE SERPL-MCNC: 179 MG/DL — HIGH (ref 70–99)
HCT VFR BLD CALC: 30.4 % — LOW (ref 39–50)
HGB BLD-MCNC: 9.4 G/DL — LOW (ref 13–17)
MAGNESIUM SERPL-MCNC: 2.3 MG/DL — SIGNIFICANT CHANGE UP (ref 1.6–2.6)
MCHC RBC-ENTMCNC: 26.9 PG — LOW (ref 27–34)
MCHC RBC-ENTMCNC: 30.9 GM/DL — LOW (ref 32–36)
MCV RBC AUTO: 86.9 FL — SIGNIFICANT CHANGE UP (ref 80–100)
NRBC # BLD: 0 /100 WBCS — SIGNIFICANT CHANGE UP (ref 0–0)
PLATELET # BLD AUTO: 428 K/UL — HIGH (ref 150–400)
POTASSIUM SERPL-MCNC: 4.4 MMOL/L — SIGNIFICANT CHANGE UP (ref 3.5–5.3)
POTASSIUM SERPL-SCNC: 4.4 MMOL/L — SIGNIFICANT CHANGE UP (ref 3.5–5.3)
RBC # BLD: 3.5 M/UL — LOW (ref 4.2–5.8)
RBC # FLD: 22.9 % — HIGH (ref 10.3–14.5)
SODIUM SERPL-SCNC: 133 MMOL/L — LOW (ref 135–145)
WBC # BLD: 6.72 K/UL — SIGNIFICANT CHANGE UP (ref 3.8–10.5)
WBC # FLD AUTO: 6.72 K/UL — SIGNIFICANT CHANGE UP (ref 3.8–10.5)

## 2021-01-03 PROCEDURE — 99233 SBSQ HOSP IP/OBS HIGH 50: CPT | Mod: GC

## 2021-01-03 RX ADMIN — Medication 60 MILLIGRAM(S): at 06:25

## 2021-01-03 RX ADMIN — Medication 2: at 00:39

## 2021-01-03 RX ADMIN — Medication 60 MILLIGRAM(S): at 19:28

## 2021-01-03 RX ADMIN — Medication 1 TABLET(S): at 13:08

## 2021-01-03 RX ADMIN — AMIODARONE HYDROCHLORIDE 200 MILLIGRAM(S): 400 TABLET ORAL at 06:26

## 2021-01-03 RX ADMIN — Medication 60 MILLIGRAM(S): at 00:39

## 2021-01-03 RX ADMIN — APIXABAN 5 MILLIGRAM(S): 2.5 TABLET, FILM COATED ORAL at 06:25

## 2021-01-03 RX ADMIN — CHLORHEXIDINE GLUCONATE 15 MILLILITER(S): 213 SOLUTION TOPICAL at 06:25

## 2021-01-03 RX ADMIN — PANTOPRAZOLE SODIUM 40 MILLIGRAM(S): 20 TABLET, DELAYED RELEASE ORAL at 06:25

## 2021-01-03 RX ADMIN — Medication 60 MILLIGRAM(S): at 13:08

## 2021-01-03 RX ADMIN — CHLORHEXIDINE GLUCONATE 15 MILLILITER(S): 213 SOLUTION TOPICAL at 19:27

## 2021-01-03 RX ADMIN — Medication 2: at 06:25

## 2021-01-03 RX ADMIN — APIXABAN 5 MILLIGRAM(S): 2.5 TABLET, FILM COATED ORAL at 19:33

## 2021-01-03 RX ADMIN — CHLORHEXIDINE GLUCONATE 1 APPLICATION(S): 213 SOLUTION TOPICAL at 06:26

## 2021-01-03 RX ADMIN — Medication 2 MILLIGRAM(S): at 21:32

## 2021-01-03 RX ADMIN — Medication 2: at 13:08

## 2021-01-03 RX ADMIN — PANTOPRAZOLE SODIUM 40 MILLIGRAM(S): 20 TABLET, DELAYED RELEASE ORAL at 19:28

## 2021-01-03 NOTE — PROGRESS NOTE ADULT - ASSESSMENT
70M w/ DMII, A-fib, carotid disease txr from [facility] on 11/27 s/p decadron, remdesivir, convalescent plasma, and several antibiotics for COVID-19 and bacterial PNA.  Course complicated by VAP 2/2 stenotrophomonas and MSSA and melanotic stool on A/C, now HD stable.  S/p PEG/Trach, awaiting LTAC.    VAP: S/ treatment, resolved    Acute respiratory failure: S/p trach and PEG, pending LTAC.    COVID-19: S/p treatment, improving.  pending placement.  Most recent swab negative    Afib: On A/c, rate controlled w/ amio    DM: Glucose controlled, on glucerna    HD stable.

## 2021-01-03 NOTE — PROGRESS NOTE ADULT - SUBJECTIVE AND OBJECTIVE BOX
O/N Events: MIKI    Subjective/ROS: Denies discomort/pain, difficulty breathing    VITALS  Vital Signs Last 24 Hrs  T(C): 36.8 (03 Jan 2021 09:40), Max: 37.1 (03 Jan 2021 06:16)  T(F): 98.3 (03 Jan 2021 09:40), Max: 98.7 (03 Jan 2021 06:16)  HR: 95 (03 Jan 2021 09:40) (85 - 100)  BP: 126/62 (03 Jan 2021 09:40) (112/61 - 148/73)  BP(mean): --  RR: 18 (03 Jan 2021 09:40) (18 - 22)  SpO2: 99% (03 Jan 2021 09:40) (94% - 99%)    CAPILLARY BLOOD GLUCOSE      POCT Blood Glucose.: 182 mg/dL (03 Jan 2021 12:20)  POCT Blood Glucose.: 166 mg/dL (03 Jan 2021 06:12)  POCT Blood Glucose.: 152 mg/dL (03 Jan 2021 00:20)  POCT Blood Glucose.: 174 mg/dL (02 Jan 2021 18:14)      PHYSICAL EXAM  General: A&Ox3; NAD  Head: NC/AT; MMM; PERRL; EOMI;  Neck: Supple; no JVD  Respiratory: Trach in place w/ B/L crackles but w/ good air movement   Cardiovascular: Regular rhythm/rate; S1/S2   Gastrointestinal: Soft; NTND; normoactive BS; PEG tube in place  Extremities: WWP; no edema/cyanosis  Neurological:  CNII-XII grossly intact; no obvious focal deficits    MEDICATIONS  (STANDING):  aMIOdarone    Tablet 200 milliGRAM(s) Oral daily  apixaban 5 milliGRAM(s) Oral every 12 hours  chlorhexidine 0.12% Liquid 15 milliLiter(s) Oral Mucosa every 12 hours  chlorhexidine 2% Cloths 1 Application(s) Topical <User Schedule>  dextrose 40% Gel 15 Gram(s) Oral once  dextrose 5%. 1000 milliLiter(s) (100 mL/Hr) IV Continuous <Continuous>  dextrose 5%. 1000 milliLiter(s) (50 mL/Hr) IV Continuous <Continuous>  dextrose 50% Injectable 25 Gram(s) IV Push once  dextrose 50% Injectable 12.5 Gram(s) IV Push once  dextrose 50% Injectable 25 Gram(s) IV Push once  diltiazem    Tablet 60 milliGRAM(s) Oral every 6 hours  doxazosin 2 milliGRAM(s) Oral at bedtime  glucagon  Injectable 1 milliGRAM(s) IntraMuscular once  insulin lispro (ADMELOG) corrective regimen sliding scale   SubCutaneous every 6 hours  lactobacillus acidophilus 1 Tablet(s) Oral every 24 hours  pantoprazole   Suspension 40 milliGRAM(s) Enteral Tube every 12 hours    MEDICATIONS  (PRN):  acetaminophen    Suspension .. 650 milliGRAM(s) Oral every 6 hours PRN Temp greater or equal to 38C (100.4F), Mild Pain (1 - 3)      No Known Allergies      LABS                        9.4    6.72  )-----------( 428      ( 03 Jan 2021 06:06 )             30.4     01-03    133<L>  |  98  |  27<H>  ----------------------------<  179<H>  4.4   |  22  |  0.53    Ca    8.9      03 Jan 2021 06:06  Phos  4.0     01-02  Mg     2.3     01-03    TPro  6.7  /  Alb  3.3  /  TBili  0.3  /  DBili  x   /  AST  24  /  ALT  27  /  AlkPhos  105  01-02              IMAGING/EKG/ETC: Reviewed

## 2021-01-04 LAB
ALBUMIN SERPL ELPH-MCNC: 3.5 G/DL — SIGNIFICANT CHANGE UP (ref 3.3–5)
ALP SERPL-CCNC: 116 U/L — SIGNIFICANT CHANGE UP (ref 40–120)
ALT FLD-CCNC: 34 U/L — SIGNIFICANT CHANGE UP (ref 10–45)
ANION GAP SERPL CALC-SCNC: 14 MMOL/L — SIGNIFICANT CHANGE UP (ref 5–17)
AST SERPL-CCNC: 28 U/L — SIGNIFICANT CHANGE UP (ref 10–40)
BASOPHILS # BLD AUTO: 0.05 K/UL — SIGNIFICANT CHANGE UP (ref 0–0.2)
BASOPHILS NFR BLD AUTO: 0.7 % — SIGNIFICANT CHANGE UP (ref 0–2)
BILIRUB SERPL-MCNC: 0.4 MG/DL — SIGNIFICANT CHANGE UP (ref 0.2–1.2)
BUN SERPL-MCNC: 24 MG/DL — HIGH (ref 7–23)
CALCIUM SERPL-MCNC: 8.8 MG/DL — SIGNIFICANT CHANGE UP (ref 8.4–10.5)
CHLORIDE SERPL-SCNC: 98 MMOL/L — SIGNIFICANT CHANGE UP (ref 96–108)
CO2 SERPL-SCNC: 22 MMOL/L — SIGNIFICANT CHANGE UP (ref 22–31)
CREAT SERPL-MCNC: 0.49 MG/DL — LOW (ref 0.5–1.3)
CRP SERPL-MCNC: 1.36 MG/DL — HIGH (ref 0–0.4)
D DIMER BLD IA.RAPID-MCNC: 300 NG/ML DDU — HIGH
EOSINOPHIL # BLD AUTO: 0.31 K/UL — SIGNIFICANT CHANGE UP (ref 0–0.5)
EOSINOPHIL NFR BLD AUTO: 4.3 % — SIGNIFICANT CHANGE UP (ref 0–6)
FERRITIN SERPL-MCNC: 118 NG/ML — SIGNIFICANT CHANGE UP (ref 30–400)
GLUCOSE BLDC GLUCOMTR-MCNC: 127 MG/DL — HIGH (ref 70–99)
GLUCOSE BLDC GLUCOMTR-MCNC: 139 MG/DL — HIGH (ref 70–99)
GLUCOSE BLDC GLUCOMTR-MCNC: 141 MG/DL — HIGH (ref 70–99)
GLUCOSE BLDC GLUCOMTR-MCNC: 176 MG/DL — HIGH (ref 70–99)
GLUCOSE BLDC GLUCOMTR-MCNC: 183 MG/DL — HIGH (ref 70–99)
GLUCOSE SERPL-MCNC: 151 MG/DL — HIGH (ref 70–99)
HCT VFR BLD CALC: 31.2 % — LOW (ref 39–50)
HGB BLD-MCNC: 9.6 G/DL — LOW (ref 13–17)
IMM GRANULOCYTES NFR BLD AUTO: 0.4 % — SIGNIFICANT CHANGE UP (ref 0–1.5)
LYMPHOCYTES # BLD AUTO: 1.29 K/UL — SIGNIFICANT CHANGE UP (ref 1–3.3)
LYMPHOCYTES # BLD AUTO: 18 % — SIGNIFICANT CHANGE UP (ref 13–44)
MAGNESIUM SERPL-MCNC: 2.4 MG/DL — SIGNIFICANT CHANGE UP (ref 1.6–2.6)
MCHC RBC-ENTMCNC: 26.6 PG — LOW (ref 27–34)
MCHC RBC-ENTMCNC: 30.8 GM/DL — LOW (ref 32–36)
MCV RBC AUTO: 86.4 FL — SIGNIFICANT CHANGE UP (ref 80–100)
MONOCYTES # BLD AUTO: 0.47 K/UL — SIGNIFICANT CHANGE UP (ref 0–0.9)
MONOCYTES NFR BLD AUTO: 6.6 % — SIGNIFICANT CHANGE UP (ref 2–14)
NEUTROPHILS # BLD AUTO: 5 K/UL — SIGNIFICANT CHANGE UP (ref 1.8–7.4)
NEUTROPHILS NFR BLD AUTO: 70 % — SIGNIFICANT CHANGE UP (ref 43–77)
NRBC # BLD: 0 /100 WBCS — SIGNIFICANT CHANGE UP (ref 0–0)
PHOSPHATE SERPL-MCNC: 3.3 MG/DL — SIGNIFICANT CHANGE UP (ref 2.5–4.5)
PLATELET # BLD AUTO: 441 K/UL — HIGH (ref 150–400)
POTASSIUM SERPL-MCNC: 4.5 MMOL/L — SIGNIFICANT CHANGE UP (ref 3.5–5.3)
POTASSIUM SERPL-SCNC: 4.5 MMOL/L — SIGNIFICANT CHANGE UP (ref 3.5–5.3)
PROT SERPL-MCNC: 7 G/DL — SIGNIFICANT CHANGE UP (ref 6–8.3)
RBC # BLD: 3.61 M/UL — LOW (ref 4.2–5.8)
RBC # FLD: 22.4 % — HIGH (ref 10.3–14.5)
SARS-COV-2 RNA SPEC QL NAA+PROBE: NEGATIVE — SIGNIFICANT CHANGE UP
SODIUM SERPL-SCNC: 134 MMOL/L — LOW (ref 135–145)
WBC # BLD: 7.15 K/UL — SIGNIFICANT CHANGE UP (ref 3.8–10.5)
WBC # FLD AUTO: 7.15 K/UL — SIGNIFICANT CHANGE UP (ref 3.8–10.5)

## 2021-01-04 PROCEDURE — 99231 SBSQ HOSP IP/OBS SF/LOW 25: CPT | Mod: GC

## 2021-01-04 PROCEDURE — 99233 SBSQ HOSP IP/OBS HIGH 50: CPT | Mod: GC

## 2021-01-04 RX ORDER — DILTIAZEM HCL 120 MG
240 CAPSULE, EXT RELEASE 24 HR ORAL DAILY
Refills: 0 | Status: DISCONTINUED | OUTPATIENT
Start: 2021-01-04 | End: 2021-01-07

## 2021-01-04 RX ADMIN — CHLORHEXIDINE GLUCONATE 15 MILLILITER(S): 213 SOLUTION TOPICAL at 19:46

## 2021-01-04 RX ADMIN — AMIODARONE HYDROCHLORIDE 200 MILLIGRAM(S): 400 TABLET ORAL at 07:22

## 2021-01-04 RX ADMIN — Medication 2: at 07:20

## 2021-01-04 RX ADMIN — APIXABAN 5 MILLIGRAM(S): 2.5 TABLET, FILM COATED ORAL at 07:22

## 2021-01-04 RX ADMIN — CHLORHEXIDINE GLUCONATE 1 APPLICATION(S): 213 SOLUTION TOPICAL at 07:22

## 2021-01-04 RX ADMIN — Medication 2: at 00:14

## 2021-01-04 RX ADMIN — Medication 60 MILLIGRAM(S): at 07:23

## 2021-01-04 RX ADMIN — CHLORHEXIDINE GLUCONATE 15 MILLILITER(S): 213 SOLUTION TOPICAL at 07:20

## 2021-01-04 RX ADMIN — Medication 60 MILLIGRAM(S): at 00:15

## 2021-01-04 RX ADMIN — PANTOPRAZOLE SODIUM 40 MILLIGRAM(S): 20 TABLET, DELAYED RELEASE ORAL at 08:47

## 2021-01-04 RX ADMIN — Medication 1 TABLET(S): at 12:26

## 2021-01-04 NOTE — SPEAKING VALVE EVALUATION - RECOMMENDATIONS
Consider downsizing trach to number 6 cuffless. This service will continue to follow and determine candidacy for [place] Valve use as well as PO trials

## 2021-01-04 NOTE — PROGRESS NOTE ADULT - SUBJECTIVE AND OBJECTIVE BOX
Subjective: Tracheostomy tube exchanged to 6CFS. Patient tolerated procedure well without complications. Stable on trach collar.     Vital Signs Last 24 Hrs  T(C): 36.7 (04 Jan 2021 08:53), Max: 37.3 (03 Jan 2021 21:30)  T(F): 98.1 (04 Jan 2021 08:53), Max: 99.1 (03 Jan 2021 21:30)  HR: 88 (04 Jan 2021 10:29) (87 - 104)  BP: 102/59 (04 Jan 2021 08:53) (102/59 - 135/56)  BP(mean): --  RR: 18 (04 Jan 2021 10:29) (18 - 22)  SpO2: 97% (04 Jan 2021 10:29) (94% - 98%)    Physical Exam  General: well appearing in no acute distress, AAO x 3  Head: normocephalic, atraumatic  Neck: 6CFS secured in place with trach collar, soft, flat, no subcutaneous emphysema; red rubber catheter passes easily, on trach collar  Resp; normal work of breathing    Assessment/Plan    70M s/p tracheostomy for prolonged intubation 2/2 to COVID PNA. Now s/p tracheostomy exchange to 6CFS, back on trach collar.    -routine trach care, suctioning q2 hours and PRN, clean inner canula q shift and PRN  -humidified air  -rest of care per primary team  -please page ENT with changes in airway status  -please page ENT with questions or concerns

## 2021-01-04 NOTE — PROGRESS NOTE ADULT - PROBLEM SELECTOR PLAN 1
-2/2 covid with ?overlying PNA  -s/p trach 12/18 and peg 12/17  -tolerating trach collar  Plan:  RESOLVED -2/2 covid with ?overlying PNA  -s/p trach 12/18 and peg 12/17  -tolerating trach collar  - speech and swallow consulted, f/u recs  Plan:  RESOLVED

## 2021-01-04 NOTE — PROGRESS NOTE ADULT - PROBLEM SELECTOR PLAN 3
Now controlled  - s/p amiodarone 400mg BID 12/10-20, amiodarone gtt 12/23  - TTE with no wall abnormalities  Plan:  - AM EKG  - cont eliquis 5mg BID  - cont cardizem 60mg q6hr, can increase to 90mg q6hr if continuously tachycardic  - cont amio 200mg PO daily  - f/u EP recs  - f/u with Dr. Medina o/p    #hypotension  RESOLVED  -s/p phenylephrine  -s/p midodrine    #QTC prolongation:   - stable Now controlled  - s/p amiodarone 400mg BID 12/10-20, amiodarone gtt 12/23  - TTE with no wall abnormalities  Plan:  - c/w eliquis 5mg BID  - c/w cardizem 240 QD  - c/w amio 200mg PO daily  - f/u with Dr. Medina o/p    #hypotension  RESOLVED  -s/p phenylephrine  -s/p midodrine    #QTC prolongation:   - stable

## 2021-01-04 NOTE — PROGRESS NOTE ADULT - ASSESSMENT
70M DMT2, A-fib on Xarelto, HTN, HLD, severe carotid disease who was admitted to [facility] on 11/27 where he received decadron, remdesivir, convalescent plasma, and several antibiotics before being transferred to [facility].  currently s/p trach and PEG and treatment for mssa VAP treatment wtih zosyn from 12/13-20.  course has been complicated by afib with rvr and melanotic stools but mentation appears stable and hopeful for LTAC placement soon.

## 2021-01-04 NOTE — PROGRESS NOTE ADULT - PROBLEM SELECTOR PLAN 4
-pt spiked fever on 12/12; sputum cx negative but concern for overlying pna so received zosyn 12/14-12/20    #Fever of unknown origin with developing shock-could be secondary to streptophomonas and enterobacter pna on sputum cx   -f/u blood cx  -s/p meropenem 12/24-26  -s/p bactrim 12/26-1/1 -pt spiked fever on 12/12; sputum cx negative but concern for overlying pna so received zosyn 12/14-12/20    #Fever of unknown origin with developing shock-could be secondary to streptophomonas and enterobacter pna on sputum cx   -blood cx NGTD  -s/p meropenem 12/24-26  -s/p bactrim 12/26-1/1

## 2021-01-04 NOTE — SPEAKING VALVE EVALUATION - OBSERVATIONS
Pt was not able to achieve voicing with digital occlusion with evidence of distress suspect due to large size trach with cuff presence. Will defer PMV trials at this time and request trach change to at least size 6 cuffless

## 2021-01-04 NOTE — PROGRESS NOTE ADULT - PROBLEM SELECTOR PLAN 6
- monitor Is/Os  - dc’d santana; failed TOV  - Santana placed again on 12/30  - cont doxazosin 2mg - monitor Is/Os  - Patient passed TOV 1/4  - cont doxazosin 2mg

## 2021-01-04 NOTE — SPEAKING VALVE EVALUATION - DIAGNOSTIC IMPRESSIONS
Pt is not a candidate for PMV placement at this time due to suspected limited airflow to upper airway insetting of large cuffed trach.

## 2021-01-04 NOTE — PROGRESS NOTE ADULT - SUBJECTIVE AND OBJECTIVE BOX
OVERNIGHT EVENTS:    SUBJECTIVE / INTERVAL HPI: Patient seen and examined at bedside.     VITAL SIGNS:  Vital Signs Last 24 Hrs  T(C): 36.7 (04 Jan 2021 08:53), Max: 37.3 (03 Jan 2021 21:30)  T(F): 98.1 (04 Jan 2021 08:53), Max: 99.1 (03 Jan 2021 21:30)  HR: 88 (04 Jan 2021 10:29) (87 - 104)  BP: 102/59 (04 Jan 2021 08:53) (102/59 - 135/56)  BP(mean): --  RR: 18 (04 Jan 2021 10:29) (18 - 22)  SpO2: 97% (04 Jan 2021 10:29) (94% - 99%)  I&O's Summary    03 Jan 2021 07:01  -  04 Jan 2021 07:00  --------------------------------------------------------  IN: 528 mL / OUT: 180 mL / NET: 348 mL        PHYSICAL EXAM:    General: WDWN  HEENT: NC/AT; PERRL, anicteric sclera; MMM  Neck: supple  Cardiovascular: +S1/S2; RRR  Respiratory: CTA B/L; no W/R/R  Gastrointestinal: soft, NT/ND; +BSx4  Extremities: WWP; no edema, clubbing or cyanosis  Vascular: 2+ radial, DP/PT pulses B/L  Neurological: AAOx3; no focal deficits    MEDICATIONS:  MEDICATIONS  (STANDING):  aMIOdarone    Tablet 200 milliGRAM(s) Oral daily  apixaban 5 milliGRAM(s) Oral every 12 hours  chlorhexidine 0.12% Liquid 15 milliLiter(s) Oral Mucosa every 12 hours  chlorhexidine 2% Cloths 1 Application(s) Topical <User Schedule>  dextrose 40% Gel 15 Gram(s) Oral once  dextrose 5%. 1000 milliLiter(s) (50 mL/Hr) IV Continuous <Continuous>  dextrose 5%. 1000 milliLiter(s) (100 mL/Hr) IV Continuous <Continuous>  dextrose 50% Injectable 12.5 Gram(s) IV Push once  dextrose 50% Injectable 25 Gram(s) IV Push once  dextrose 50% Injectable 25 Gram(s) IV Push once  diltiazem    Tablet 60 milliGRAM(s) Oral every 6 hours  doxazosin 2 milliGRAM(s) Oral at bedtime  glucagon  Injectable 1 milliGRAM(s) IntraMuscular once  insulin lispro (ADMELOG) corrective regimen sliding scale   SubCutaneous every 6 hours  lactobacillus acidophilus 1 Tablet(s) Oral every 24 hours  pantoprazole   Suspension 40 milliGRAM(s) Enteral Tube every 12 hours    MEDICATIONS  (PRN):  acetaminophen    Suspension .. 650 milliGRAM(s) Oral every 6 hours PRN Temp greater or equal to 38C (100.4F), Mild Pain (1 - 3)      ALLERGIES:  Allergies    No Known Allergies    Intolerances        LABS:                        9.6    7.15  )-----------( 441      ( 04 Jan 2021 06:34 )             31.2     01-04    134<L>  |  98  |  24<H>  ----------------------------<  151<H>  4.5   |  22  |  0.49<L>    Ca    8.8      04 Jan 2021 06:34  Phos  3.3     01-04  Mg     2.4     01-04    TPro  7.0  /  Alb  3.5  /  TBili  0.4  /  DBili  x   /  AST  28  /  ALT  34  /  AlkPhos  116  01-04        CAPILLARY BLOOD GLUCOSE      POCT Blood Glucose.: 183 mg/dL (04 Jan 2021 06:34)      RADIOLOGY & ADDITIONAL TESTS: Reviewed.   OVERNIGHT EVENTS: MIKI    SUBJECTIVE / INTERVAL HPI: Patient seen and examined at bedside. Mouths frustration about being unable to eat/speak. denies other complaints, appears comfortable on trach collar.     VITAL SIGNS:  Vital Signs Last 24 Hrs  T(C): 36.7 (04 Jan 2021 08:53), Max: 37.3 (03 Jan 2021 21:30)  T(F): 98.1 (04 Jan 2021 08:53), Max: 99.1 (03 Jan 2021 21:30)  HR: 88 (04 Jan 2021 10:29) (87 - 104)  BP: 102/59 (04 Jan 2021 08:53) (102/59 - 135/56)  BP(mean): --  RR: 18 (04 Jan 2021 10:29) (18 - 22)  SpO2: 97% (04 Jan 2021 10:29) (94% - 99%)  I&O's Summary    03 Jan 2021 07:01  -  04 Jan 2021 07:00  --------------------------------------------------------  IN: 528 mL / OUT: 180 mL / NET: 348 mL        PHYSICAL EXAM:    General: WDWN  HEENT: NC/AT; PERRL, anicteric sclera; MMM  Cardiovascular: +S1/S2; RRR  Respiratory: CTA B/L; no W/R/R  Gastrointestinal: soft, NT/ND; +BSx4  Extremities: WWP; no edema, clubbing or cyanosis  Vascular: 2+ radial, DP/PT pulses B/L  Neurological: AAOx3    MEDICATIONS:  MEDICATIONS  (STANDING):  aMIOdarone    Tablet 200 milliGRAM(s) Oral daily  apixaban 5 milliGRAM(s) Oral every 12 hours  chlorhexidine 0.12% Liquid 15 milliLiter(s) Oral Mucosa every 12 hours  chlorhexidine 2% Cloths 1 Application(s) Topical <User Schedule>  dextrose 40% Gel 15 Gram(s) Oral once  dextrose 5%. 1000 milliLiter(s) (50 mL/Hr) IV Continuous <Continuous>  dextrose 5%. 1000 milliLiter(s) (100 mL/Hr) IV Continuous <Continuous>  dextrose 50% Injectable 12.5 Gram(s) IV Push once  dextrose 50% Injectable 25 Gram(s) IV Push once  dextrose 50% Injectable 25 Gram(s) IV Push once  diltiazem    Tablet 60 milliGRAM(s) Oral every 6 hours  doxazosin 2 milliGRAM(s) Oral at bedtime  glucagon  Injectable 1 milliGRAM(s) IntraMuscular once  insulin lispro (ADMELOG) corrective regimen sliding scale   SubCutaneous every 6 hours  lactobacillus acidophilus 1 Tablet(s) Oral every 24 hours  pantoprazole   Suspension 40 milliGRAM(s) Enteral Tube every 12 hours    MEDICATIONS  (PRN):  acetaminophen    Suspension .. 650 milliGRAM(s) Oral every 6 hours PRN Temp greater or equal to 38C (100.4F), Mild Pain (1 - 3)      ALLERGIES:  Allergies    No Known Allergies    Intolerances        LABS:                        9.6    7.15  )-----------( 441      ( 04 Jan 2021 06:34 )             31.2     01-04    134<L>  |  98  |  24<H>  ----------------------------<  151<H>  4.5   |  22  |  0.49<L>    Ca    8.8      04 Jan 2021 06:34  Phos  3.3     01-04  Mg     2.4     01-04    TPro  7.0  /  Alb  3.5  /  TBili  0.4  /  DBili  x   /  AST  28  /  ALT  34  /  AlkPhos  116  01-04        CAPILLARY BLOOD GLUCOSE      POCT Blood Glucose.: 183 mg/dL (04 Jan 2021 06:34)      RADIOLOGY & ADDITIONAL TESTS: Reviewed.   OVERNIGHT EVENTS: MIKI    SUBJECTIVE / INTERVAL HPI: Patient seen and examined at bedside. Mouths frustration about being unable to eat/speak. denies other complaints, appears comfortable on trach collar. Passed TOV. ENT changed trach cuff size, S&S following, will eval again tomorrow for [place] valve placement.    VITAL SIGNS:  Vital Signs Last 24 Hrs  T(C): 36.7 (04 Jan 2021 08:53), Max: 37.3 (03 Jan 2021 21:30)  T(F): 98.1 (04 Jan 2021 08:53), Max: 99.1 (03 Jan 2021 21:30)  HR: 88 (04 Jan 2021 10:29) (87 - 104)  BP: 102/59 (04 Jan 2021 08:53) (102/59 - 135/56)  BP(mean): --  RR: 18 (04 Jan 2021 10:29) (18 - 22)  SpO2: 97% (04 Jan 2021 10:29) (94% - 99%)  I&O's Summary    03 Jan 2021 07:01  -  04 Jan 2021 07:00  --------------------------------------------------------  IN: 528 mL / OUT: 180 mL / NET: 348 mL        PHYSICAL EXAM:    General: WDWN  HEENT: NC/AT; PERRL, anicteric sclera; MMM  Cardiovascular: +S1/S2; RRR  Respiratory: CTA B/L; no W/R/R  Gastrointestinal: soft, NT/ND; +BSx4  Extremities: WWP; no edema, clubbing or cyanosis  Vascular: 2+ radial, DP/PT pulses B/L  Neurological: AAOx3    MEDICATIONS:  MEDICATIONS  (STANDING):  aMIOdarone    Tablet 200 milliGRAM(s) Oral daily  apixaban 5 milliGRAM(s) Oral every 12 hours  chlorhexidine 0.12% Liquid 15 milliLiter(s) Oral Mucosa every 12 hours  chlorhexidine 2% Cloths 1 Application(s) Topical <User Schedule>  dextrose 40% Gel 15 Gram(s) Oral once  dextrose 5%. 1000 milliLiter(s) (50 mL/Hr) IV Continuous <Continuous>  dextrose 5%. 1000 milliLiter(s) (100 mL/Hr) IV Continuous <Continuous>  dextrose 50% Injectable 12.5 Gram(s) IV Push once  dextrose 50% Injectable 25 Gram(s) IV Push once  dextrose 50% Injectable 25 Gram(s) IV Push once  diltiazem    Tablet 60 milliGRAM(s) Oral every 6 hours  doxazosin 2 milliGRAM(s) Oral at bedtime  glucagon  Injectable 1 milliGRAM(s) IntraMuscular once  insulin lispro (ADMELOG) corrective regimen sliding scale   SubCutaneous every 6 hours  lactobacillus acidophilus 1 Tablet(s) Oral every 24 hours  pantoprazole   Suspension 40 milliGRAM(s) Enteral Tube every 12 hours    MEDICATIONS  (PRN):  acetaminophen    Suspension .. 650 milliGRAM(s) Oral every 6 hours PRN Temp greater or equal to 38C (100.4F), Mild Pain (1 - 3)      ALLERGIES:  Allergies    No Known Allergies    Intolerances        LABS:                        9.6    7.15  )-----------( 441      ( 04 Jan 2021 06:34 )             31.2     01-04    134<L>  |  98  |  24<H>  ----------------------------<  151<H>  4.5   |  22  |  0.49<L>    Ca    8.8      04 Jan 2021 06:34  Phos  3.3     01-04  Mg     2.4     01-04    TPro  7.0  /  Alb  3.5  /  TBili  0.4  /  DBili  x   /  AST  28  /  ALT  34  /  AlkPhos  116  01-04        CAPILLARY BLOOD GLUCOSE      POCT Blood Glucose.: 183 mg/dL (04 Jan 2021 06:34)      RADIOLOGY & ADDITIONAL TESTS: Reviewed.

## 2021-01-05 LAB
GLUCOSE BLDC GLUCOMTR-MCNC: 134 MG/DL — HIGH (ref 70–99)
GLUCOSE BLDC GLUCOMTR-MCNC: 145 MG/DL — HIGH (ref 70–99)
GLUCOSE BLDC GLUCOMTR-MCNC: 161 MG/DL — HIGH (ref 70–99)

## 2021-01-05 PROCEDURE — 99233 SBSQ HOSP IP/OBS HIGH 50: CPT | Mod: GC

## 2021-01-05 RX ADMIN — APIXABAN 5 MILLIGRAM(S): 2.5 TABLET, FILM COATED ORAL at 18:05

## 2021-01-05 RX ADMIN — Medication 2 MILLIGRAM(S): at 22:32

## 2021-01-05 RX ADMIN — PANTOPRAZOLE SODIUM 40 MILLIGRAM(S): 20 TABLET, DELAYED RELEASE ORAL at 18:05

## 2021-01-05 RX ADMIN — Medication 2: at 07:30

## 2021-01-05 NOTE — PROGRESS NOTE ADULT - PROBLEM SELECTOR PLAN 1
-2/2 covid with ?overlying PNA  -s/p trach 12/18 and peg 12/17  -tolerating trach collar  - speech and swallow consulted, f/u recs  - f/u modified barium swallow  Plan:  RESOLVED RESOLVED  Cause was 2/2 covid PNA, complicated by VAP, now s/p trach 12/18 and peg 12/17, tolerating trach collar and hemodynamically stable  - speech and swallow consulted, f/u recs  - f/u modified barium swallow

## 2021-01-05 NOTE — PROGRESS NOTE ADULT - PROBLEM SELECTOR PLAN 1
-2/2 covid with ?overlying PNA  -s/p trach 12/18 and peg 12/17  -tolerating trach collar  - speech and swallow consulted, f/u recs  Plan:  RESOLVED -2/2 covid with ?overlying PNA  -s/p trach 12/18 and peg 12/17  -tolerating trach collar  - speech and swallow consulted, f/u recs  - f/u modified barium swallow  Plan:  RESOLVED

## 2021-01-05 NOTE — PROGRESS NOTE ADULT - PROBLEM SELECTOR PLAN 5
-RESOLVED  patient experienced drop in Hgb from 9.8 to 7.3 on 12/24 and found to have melanotic stools.  At time, also hemodynamically unstable with BPs in 80s-90s/30s-40s.  GI was concerned for stress gastritic or ulcers which bled in light of newly re-started on anticoagulation.    -s/p 2u pRBC  -stabilized  -c/w protonix 40mg -RESOLVED  patient experienced drop in Hgb from 9.8 to 7.3 on 12/24 and found to have melanotic stools, now stable with hemoglobin ~9.  At time, also hemodynamically unstable with BPs in 80s-90s/30s-40s.  GI was concerned for stress gastritic or ulcers which bled in light of newly re-started on anticoagulation.    -s/p 2u pRBC  -stabilized  -c/w protonix 40mg

## 2021-01-05 NOTE — PROGRESS NOTE ADULT - ASSESSMENT
70M DMT2, A-fib on Xarelto, HTN, HLD, severe carotid disease, admitted for acute hypoxic respiratory failure secondary to covid PNA, s/p decadron, remdesivir, convalescent plasma, and several antibiotics, s/p trach and PEG, treatment for mssa VAP treatment wtih zosyn from 12/13-20.  course has been complicated by afib with rvr and melanotic stools but mentation appears stable and hopeful for [facility] placement soon.  70M DMT2, A-fib on Xarelto, HTN, HLD, severe carotid disease, admitted for acute hypoxic respiratory failure secondary to covid PNA, s/p decadron, remdesivir, convalescent plasma, and several antibiotics, s/p trach and PEG, treatment for VAP, melanotic stools, now stabolized, as well as treatment for afib with RVR, now pending speech and swallow evaluation with anticipated discharge to subacute rehab.

## 2021-01-05 NOTE — PROGRESS NOTE ADULT - PROBLEM SELECTOR PLAN 10
ELECTROLYTES: Mg<2, K<4  DIET: NPO with tube feeds, PEG 12/17  GI PPX: Protonix, probiotics  VTE PPX: eliquis bid for afib  CODE: FULL  DISPO: YEN ELECTROLYTES: Mg<2, K<4  DIET: NPO with tube feeds pending MBS, PEG 12/17  GI PPX: Protonix, probiotics  VTE PPX: eliquis bid for afib  CODE: FULL  DISPO: RMMASON

## 2021-01-05 NOTE — PROGRESS NOTE ADULT - PROBLEM SELECTOR PLAN 2
-s/p remdesivir and decadron RESOLVED  -s/p remdesivir, decadron, convalescent plasma, and toclizumab, negative PCR x3

## 2021-01-05 NOTE — PROGRESS NOTE ADULT - PROBLEM SELECTOR PLAN 4
-pt spiked fever on 12/12; sputum cx negative but concern for overlying pna so received zosyn 12/14-12/20    #Fever of unknown origin with developing shock-could be secondary to streptophomonas and enterobacter pna on sputum cx   -blood cx NGTD  -s/p meropenem 12/24-26  -s/p bactrim 12/26-1/1 RESOLVED  -pt spiked fever on 12/12; sputum cx negative but concern for overlying pna so received zosyn 12/14-12/20    #Fever of unknown origin with developing shock-could be secondary to streptophomonas and enterobacter pna on sputum cx   -blood cx NGTD  -s/p meropenem 12/24-26  -s/p bactrim 12/26-1/1 RESOLVED  -pt spiked fever on 12/12; sputum cx negative but concern for overlying pna so received zosyn 12/14-12/20

## 2021-01-05 NOTE — PROGRESS NOTE ADULT - PROBLEM SELECTOR PLAN 7
-normal WBC, afebrile, low concern for C D[place]. More likely related to antibiotic use  -resolving  Plan:  -cont probiotic -normal WBC, afebrile, low concern for C D[place]. More likely related to antibiotic use  -resolving  Plan:  -cont probiotic lactobacillus acidophillus

## 2021-01-05 NOTE — PROGRESS NOTE ADULT - ATTENDING COMMENTS
70 male with acute hypoxic respiratory failure secondary to COVID s/p dexamethasone (completed 10 days course) with remdesivir (finished course), s/p tocilizumab 12/2, plasma (11/30) with intubation on 12/2. Significant improvement in P/F ratio above 200 after 48 hours of proning and paralysis.  Plan:  - Nimbex d/c. Sedation vacation was done. CPAP trail was done today. Patient tolerated CPAP trial well but remained encephalopathic.   - Feeding to continue.  - goal Input output zero  - cultures negative so far. Afebrile.  - Amiodarone for atrial fibrillation  - phenylephrine for shock  - Rest as above.
Patient seen and examined with house-staff during bedside rounds.  Resident note read, including vitals, physical findings, laboratory data, and radiological reports.   Revisions included below.  Direct personal management at bed side and extensive interpretation of the data.  Plan was outlined and discussed in details with the housestaff.  Decision making of high complexity  Action taken for acute disease activity to reflect the level of care provided:  - medication reconciliation  - review laboratory data    Patient is still requiring sedation.  He did not tolerate getting off the propofol.  The patient had a tracheostomy and a PEG tube.  He is tolerating tube feed.  Renal function is stable.  We will proceed with weaning trials    I spent 35 minutes and discussed the case with the above
Patient seen and examined with house-staff during bedside rounds.  Resident note read, including vitals, physical findings, laboratory data, and radiological reports.   Revisions included below.  Direct personal management at bed side and extensive interpretation of the data.  Plan was outlined and discussed in details with the housestaff.  Decision making of high complexity  Action taken for acute disease activity to reflect the level of care provided:  - medication reconciliation  - review laboratory data  - Trial of PSV  - Spiked temperature monitor temperature curve  - HR is better controlled on amiodarone  - fluid status is stable  - Decrease Propofol to DC
No obvious source of supra infection. Low threshold to start abx if change in clinical status.
Patient seen and examined with house-staff during bedside rounds.  Resident note read, including vitals, physical findings, laboratory data, and radiological reports.   Revisions included below.  Direct personal management at bed side and extensive interpretation of the data.  Plan was outlined and discussed in details with the housestaff.  Decision making of high complexity  Action taken for acute disease activity to reflect the level of care provided:  - medication reconciliation  - review laboratory data
Patient seen and examined with house-staff during bedside rounds.  Resident note read, including vitals, physical findings, laboratory data, and radiological reports.   Revisions included below.  Direct personal management at bed side and extensive interpretation of the data.  Plan was outlined and discussed in details with the housestaff.  Decision making of high complexity  Action taken for acute disease activity to reflect the level of care provided:  - medication reconciliation  - review laboratory data    The patient is for tracheostomy.  Patient had a PEG tube.  N.p.o. for the procedure IV fluid.  Continue current regimen until after tracheostomy    Possible trial of weaning tomorrow
Please follow QT. patient on multiple agents that can cause prolongation. Decrease amio to maintenance dose. If QT continues to prolong will have to discontinue. Think of alternatives for other QT prolonging agents.
70 male with acute hypoxic respiratory failure secondary to COVID s/p dexamethasone (completed 10 days course) with remdesivir (finished course), s/p tocilizumab 12/2, plasma (11/30) with intubation on 12/2  Plan:  - Worsening hypoxia. Sedation with propofol and fentanyl. Nimbex to continue for paralysis. Proning done  - goal Input output zero  - cultures sent, sputum pending. Antibiotic and anti fungal have been d.c  - Amiodarone for atrial fibrillation  - Levophed for shock  - Rest as above.
70 male with acute hypoxic respiratory failure secondary to COVID s/p dexamethasone (completed 10 days course) with remdesivir (finished course), s/p tocilizumab 12/2, plasma (11/30) with intubation on 12/2. Significant improvement in P/F ratio above 200 after 48 hours of proning and paralysis. Patient was extubated during weekend but he was reintubated again.  Plan:  - Plan to d/c propofol and start Precedex. Continue CPAP trail again.   - Day 13 of intubation. Discussed with family. Plan for trach and PEG. ENT and GI consult. Sputum PCR for COVID sent today..  - Amiodarone for atrial fibrillation  - phenylephrine for shock (drug induced)  - Rest as above.
Patient seen and examined with house-staff during bedside rounds.  Resident note read, including vitals, physical findings, laboratory data, and radiological reports.   Revisions included below.  Direct personal management at bed side and extensive interpretation of the data.  Plan was outlined and discussed in details with the housestaff.  Decision making of high complexity  Action taken for acute disease activity to reflect the level of care provided:  - medication reconciliation  - review laboratory data    The patient is clinically stable and on pressure support weaning trials.  Decrease to sedation and continue with Seroquel.  The patient later in the day developed rapid atrial fibrillation and restarted amiodarone.  Continue tube feed.  The blood the blood sugar is controlled.  Adequate diuresis
Patient seen and examined with house-staff during bedside rounds.  Resident note read, including vitals, physical findings, laboratory data, and radiological reports.   Revisions included below.  Direct personal management at bed side and extensive interpretation of the data.  Plan was outlined and discussed in details with the housestaff.  Decision making of high complexity  Action taken for acute disease activity to reflect the level of care provided:  - medication reconciliation  - review laboratory data  The patient is clinically stable.  He had episode of rapid atrial fibrillation.  The patient currently on amiodarone and beta blocker.  Discussed the case with electrophysiology and will continue the current regimen with as needed amiodarone boluses if the heart rate is rapid.  The patient is full anticoagulation.  Patient spiked a fever and he was culture.  Patient is still off antibiotic.  Follow-up on the cultures.  Continue to feed.  Blood sugars controlled on current insulin level.  Patient is more alert but he is on propofol drip.
70 male with acute hypoxic respiratory failure secondary to COVID s/p dexamethasone (completed 10 days course) with remdesivir (finished course), s/p tocilizumab 12/2, plasma (11/30) with intubation on 12/2  Plan:  - Continue Proning (day 2 today) along with proning and sedation.  - Feeding to continue.  - goal Input output zero  - cultures negative so far. Afebrile.  - Amiodarone for atrial fibrillation  - phenylephrine for shock  - Discussed with cortez, who is a physician.   - Rest as above.
Acute hypoxic respiratory failure with COVID pneumonia s/p tracheostomy with atrial fibrillation (on amiodarone) with shock (phenylephrine). Patient has acute anemia with GI bleed s/p anticoagulation discontinuation and PRBC. Patient was in hyperactive delirium yesterday. Improved with addition of Zyprexa. Rest as above.
Pt failed extubation today with almost immediate desaturation and need for aggressive suctioning for deep copious secretions. Pt was reintubated and rerecruited. Will continue pulm toilet and current care and reevaluate for extubation in the next day or so if ventilatory support is at a minimum and pt is with minimal secretions.
Acute hypoxic respiratory failure with COVID pneumonia s/p tracheostomy with atrial fibrillation (on amiodarone) with shock (phenylephrine). Patient was in hyperactive delerium . Dose of Seroquel was increased. Rest as above.
Patient seen and examined with house-staff during bedside rounds.  Resident note read, including vitals, physical findings, laboratory data, and radiological reports.   Revisions included below.  Direct personal management at bed side and extensive interpretation of the data.  Plan was outlined and discussed in details with the housestaff.  Decision making of high complexity  Action taken for acute disease activity to reflect the level of care provided:  - medication reconciliation  - review laboratory data
Patient seen and examined with house-staff during bedside rounds.  Resident note read, including vitals, physical findings, laboratory data, and radiological reports.   Revisions included below.  Direct personal management at bed side and extensive interpretation of the data.  Plan was outlined and discussed in details with the housestaff.  Decision making of high complexity  Action taken for acute disease activity to reflect the level of care provided:  - medication reconciliation  - review laboratory data
Patient seen and examined with house-staff during bedside rounds.  Resident note read, including vitals, physical findings, laboratory data, and radiological reports.   Revisions included below.  Direct personal management at bed side and extensive interpretation of the data.  Plan was outlined and discussed in details with the housestaff.  Decision making of high complexity  Action taken for acute disease activity to reflect the level of care provided:  - medication reconciliation  - review laboratory data  start weaning on PSV  continue pulmoanry toilet  decrease sedation
Pt clinically stable, awaiting placement, given 3 consecutive negative COVID swabs and COVID infection > 1 month ago per Epidemiology can be transferred to non COVID floor
Pt is stable, will evaluate for speech valve, COVID swab for rehab placement

## 2021-01-05 NOTE — PROGRESS NOTE ADULT - SUBJECTIVE AND OBJECTIVE BOX
Hospital Course:  70M DMT2, A-fib on Xarelto, HTN, HLD, severe carotid disease who was admitted to [facility] on 11/27 and found to have COVID pneumonia. He was started on remdesivir and decadron on 11/28. Convalescent plasma was given on 11/30. He was intubated on 12/2 due to worsening O2 sats. At [facility] he received tocilizumab on 12/2, Zosyn 12/2-8, levaquin 12/2-8, zyvox 12/8, and caspofungin on 12/8. He was transferred to [facility] for further management due to [facility] capacity. S/p trach and PEG placement. Sputum cx from 12/24 growing stenotrophomonas, so started on Meropenem and transitioned to Bactrim.  Course has been complicated by afib with rvr with varying amiodarone dosing and started on diltiazem, with electrophysiology consulted and following with recs.  Additionally, patient had episodic melena that has now resolved s/p 2u pRBC. GI was concerned for stress gastritic or ulcers which bled in light of newly re-started on anticoagulation, but now stable and restarted on anticoagulation. Patient currently stable on trach collar, weaned off pressors, and no longer on antibiotics. His admission was complicated by urinary retention for which he had a santana placed. Santana was removed and he passed TOV. Speech and swallow evaluated patient for Passy [place] valve and assessed that the patient required a collar exchanged to a smaller size. ENT performed exchange on 1/4. PEG tube noted to be clogged, GI evaluated and will unclog.   had santana, now removed 1/4 passed TOV. S&S following to eval pt for Passy [place] valve placement, ENT exchanged trach collar to smaller size on 1/4 to optimize for the same. Modified barium swallow pending to start PO diet.      INTERVAL HPI/OVERNIGHT EVENTS:    VITAL SIGNS:  T(F): 98 (01-05-21 @ 09:46)  HR: 98 (01-05-21 @ 09:46)  BP: 111/65 (01-05-21 @ 09:46)  RR: 18 (01-05-21 @ 09:46)  SpO2: 94% (01-05-21 @ 09:46)  Wt(kg): --    PHYSICAL EXAM:     Constitutional:  Eyes:  ENMT:  Neck:  Respiratory:  Cardiovascular:  Gastrointestinal:  Extremities:  Vascular:  Neurological:  Musculoskeletal:    MEDICATIONS  (STANDING):  aMIOdarone    Tablet 200 milliGRAM(s) Oral daily  apixaban 5 milliGRAM(s) Oral every 12 hours  chlorhexidine 0.12% Liquid 15 milliLiter(s) Oral Mucosa every 12 hours  chlorhexidine 2% Cloths 1 Application(s) Topical <User Schedule>  dextrose 40% Gel 15 Gram(s) Oral once  dextrose 5%. 1000 milliLiter(s) (50 mL/Hr) IV Continuous <Continuous>  dextrose 5%. 1000 milliLiter(s) (100 mL/Hr) IV Continuous <Continuous>  dextrose 50% Injectable 25 Gram(s) IV Push once  dextrose 50% Injectable 12.5 Gram(s) IV Push once  dextrose 50% Injectable 25 Gram(s) IV Push once  diltiazem    milliGRAM(s) Oral daily  doxazosin 2 milliGRAM(s) Oral at bedtime  glucagon  Injectable 1 milliGRAM(s) IntraMuscular once  insulin lispro (ADMELOG) corrective regimen sliding scale   SubCutaneous every 6 hours  lactobacillus acidophilus 1 Tablet(s) Oral every 24 hours  pantoprazole   Suspension 40 milliGRAM(s) Enteral Tube every 12 hours    MEDICATIONS  (PRN):  acetaminophen    Suspension .. 650 milliGRAM(s) Oral every 6 hours PRN Temp greater or equal to 38C (100.4F), Mild Pain (1 - 3)      Allergies    No Known Allergies    Intolerances        LABS:                        9.6    7.15  )-----------( 441      ( 04 Jan 2021 06:34 )             31.2     01-04    134<L>  |  98  |  24<H>  ----------------------------<  151<H>  4.5   |  22  |  0.49<L>    Ca    8.8      04 Jan 2021 06:34  Phos  3.3     01-04  Mg     2.4     01-04    TPro  7.0  /  Alb  3.5  /  TBili  0.4  /  DBili  x   /  AST  28  /  ALT  34  /  AlkPhos  116  01-04          RADIOLOGY & ADDITIONAL TESTS: Reviewed     Hospital Course:  70M DMT2, A-fib on Xarelto, HTN, HLD, severe carotid disease who was admitted to [facility] on 11/27 and found to have COVID pneumonia. He was started on remdesivir and decadron on 11/28. Convalescent plasma was given on 11/30. He was intubated on 12/2 due to worsening O2 sats. At [facility] he received tocilizumab on 12/2, Zosyn 12/2-8, levaquin 12/2-8, zyvox 12/8, and caspofungin on 12/8. He was transferred to [facility] for further management due to [facility] capacity. S/p trach and PEG placement. Sputum cx from 12/24 growing stenotrophomonas, so started on Meropenem and transitioned to Bactrim.  Course has been complicated by afib with rvr with varying amiodarone dosing and started on diltiazem, with electrophysiology consulted and following with recs.  Additionally, patient had episodic melena that has now resolved s/p 2u pRBC. GI was concerned for stress gastritic or ulcers which bled in light of newly re-started on anticoagulation, but now stable and restarted on anticoagulation. Patient currently stable on trach collar, weaned off pressors, and no longer on antibiotics. His admission was complicated by urinary retention for which he had a santana placed. Santana was removed and he passed TOV. Speech and swallow evaluated patient for Passy [place] valve and assessed that the patient required a collar exchanged to a smaller size. ENT performed exchange on 1/4. PEG tube noted to be clogged, GI evaluated and will unclog. Speech and swallow evaluating patient and ordered barium swallow to assess for dysphagia and possibility of beginning PO diet    INTERVAL HPI/OVERNIGHT EVENTS: Patient denies any acute complaints. States that he feels well. Patient is hungry and requesting food.     VITAL SIGNS:  T(C): 36.7 (01-05-21 @ 12:54), Max: 36.7 (01-05-21 @ 09:46)  T(F): 98.1 (01-05-21 @ 12:54), Max: 98.1 (01-05-21 @ 12:54)  HR: 96 (01-05-21 @ 12:54) (96 - 103)  BP: 117/80 (01-05-21 @ 12:54) (111/65 - 146/77)  BP(mean): --  RR: 18 (01-05-21 @ 12:54) (18 - 20)  SpO2: 95% (01-05-21 @ 12:54) (94% - 96%)  Wt(kg): --    PHYSICAL EXAM:    Constitutional: WDWN resting comfortably in bed; NAD  Head: NC/AT  Eyes: anicteric sclera  ENT: no nasal discharge; MMM  Respiratory: CTA B/L; no W/R/R, no retractions; trach in place, capped  Cardiac: +S1/S2; RRR; no M/R/G; PMI non-displaced  Gastrointestinal: soft, NT/ND; no rebound or guarding; PEG tube in place  Extremities: WWP, no clubbing or cyanosis; no peripheral edema  Musculoskeletal: NROM x4; no joint swelling, tenderness or erythema  Vascular: 2+ radial, femoral, DP/PT pulses B/L  Dermatologic: skin warm, dry and intact; no rashes, wounds, or scars  Neurologic: AAOx3;   Psychiatric: affect and characteristics of appearance, verbalizations, behaviors are appropriate    MEDICATIONS  (STANDING):  aMIOdarone    Tablet 200 milliGRAM(s) Oral daily  apixaban 5 milliGRAM(s) Oral every 12 hours  chlorhexidine 0.12% Liquid 15 milliLiter(s) Oral Mucosa every 12 hours  chlorhexidine 2% Cloths 1 Application(s) Topical <User Schedule>  dextrose 40% Gel 15 Gram(s) Oral once  dextrose 5%. 1000 milliLiter(s) (50 mL/Hr) IV Continuous <Continuous>  dextrose 5%. 1000 milliLiter(s) (100 mL/Hr) IV Continuous <Continuous>  dextrose 50% Injectable 25 Gram(s) IV Push once  dextrose 50% Injectable 12.5 Gram(s) IV Push once  dextrose 50% Injectable 25 Gram(s) IV Push once  diltiazem    milliGRAM(s) Oral daily  doxazosin 2 milliGRAM(s) Oral at bedtime  glucagon  Injectable 1 milliGRAM(s) IntraMuscular once  insulin lispro (ADMELOG) corrective regimen sliding scale   SubCutaneous every 6 hours  lactobacillus acidophilus 1 Tablet(s) Oral every 24 hours  pantoprazole   Suspension 40 milliGRAM(s) Enteral Tube every 12 hours    MEDICATIONS  (PRN):  acetaminophen    Suspension .. 650 milliGRAM(s) Oral every 6 hours PRN Temp greater or equal to 38C (100.4F), Mild Pain (1 - 3)      Allergies    No Known Allergies    Intolerances        LABS:                        9.6    7.15  )-----------( 441      ( 04 Jan 2021 06:34 )             31.2     01-04    134<L>  |  98  |  24<H>  ----------------------------<  151<H>  4.5   |  22  |  0.49<L>    Ca    8.8      04 Jan 2021 06:34  Phos  3.3     01-04  Mg     2.4     01-04    TPro  7.0  /  Alb  3.5  /  TBili  0.4  /  DBili  x   /  AST  28  /  ALT  34  /  AlkPhos  116  01-04          RADIOLOGY & ADDITIONAL TESTS: Reviewed

## 2021-01-05 NOTE — PROGRESS NOTE ADULT - PROBLEM SELECTOR PLAN 3
Now controlled  - s/p amiodarone 400mg BID 12/10-20, amiodarone gtt 12/23  - TTE with no wall abnormalities  Plan:  - c/w eliquis 5mg BID  - c/w cardizem 240 QD  - c/w amio 200mg PO daily  - f/u with Dr. Medina o/p    #hypotension  RESOLVED  -s/p phenylephrine  -s/p midodrine    #QTC prolongation:   - stable Course had been complicated with afib with RVR, resolved and stable on diltiazam 240mg QD and 200mg amiodarone daily  - continue diltiazam and amiodarone  - continue with eliquis 5mg BID  - TTE with no wall abnormalities  - f/u with Dr. Medina o/p    #hypotension  RESOLVED  -s/p phenylephrine  -s/p midodrine    #QTC prolongation:   - stable, avoid QTC prolonging agents Course had been complicated with afib with RVR, resolved and stable on diltiazam 240mg QD and 200mg amiodarone daily  - continue diltiazam and amiodarone  - continue with eliquis 5mg BID  - TTE with no wall abnormalities  - f/u with Dr. Medina o/p    #QTC prolongation:   - stable, avoid QTC prolonging agents

## 2021-01-05 NOTE — PROGRESS NOTE ADULT - PROBLEM SELECTOR PLAN 3
Now controlled  - s/p amiodarone 400mg BID 12/10-20, amiodarone gtt 12/23  - TTE with no wall abnormalities  Plan:  - c/w eliquis 5mg BID  - c/w cardizem 240 QD  - c/w amio 200mg PO daily  - f/u with Dr. Medina o/p    #hypotension  RESOLVED  -s/p phenylephrine  -s/p midodrine    #QTC prolongation:   - stable

## 2021-01-05 NOTE — PROGRESS NOTE ADULT - PROBLEM SELECTOR PLAN 4
-pt spiked fever on 12/12; sputum cx negative but concern for overlying pna so received zosyn 12/14-12/20    #Fever of unknown origin with developing shock-could be secondary to streptophomonas and enterobacter pna on sputum cx   -blood cx NGTD  -s/p meropenem 12/24-26  -s/p bactrim 12/26-1/1

## 2021-01-05 NOTE — PROGRESS NOTE ADULT - ASSESSMENT
70M DMT2, A-fib on Xarelto, HTN, HLD, severe carotid disease who was admitted to [facility] on 11/27 where he received decadron, remdesivir, convalescent plasma, and several antibiotics before being transferred to [facility].  currently s/p trach and PEG and treatment for mssa VAP treatment wtih zosyn from 12/13-20.  course has been complicated by afib with rvr and melanotic stools but mentation appears stable and hopeful for LTAC placement soon.  70M DMT2, A-fib on Xarelto, HTN, HLD, severe carotid disease who was admitted to [facility] on 11/27 where he received decadron, remdesivir, convalescent plasma, and several antibiotics before being transferred to [facility].  currently s/p trach and PEG and treatment for mssa VAP treatment wtih zosyn from 12/13-20.  course has been complicated by afib with rvr and melanotic stools but mentation appears stable and hopeful for [facility] placement soon.

## 2021-01-05 NOTE — PROGRESS NOTE ADULT - PROBLEM SELECTOR PLAN 10
ELECTROLYTES: Mg<2, K<4  DIET: NPO with tube feeds pending MBS, PEG 12/17  GI PPX: Protonix, probiotics  VTE PPX: eliquis bid for afib  CODE: FULL  DISPO: RMMASON

## 2021-01-05 NOTE — PROGRESS NOTE ADULT - SUBJECTIVE AND OBJECTIVE BOX
OVERNIGHT EVENTS:    SUBJECTIVE / INTERVAL HPI: Patient seen and examined at bedside.     VITAL SIGNS:  Vital Signs Last 24 Hrs  T(C): 36.4 (05 Jan 2021 06:24), Max: 36.6 (04 Jan 2021 16:08)  T(F): 97.5 (05 Jan 2021 06:24), Max: 97.9 (04 Jan 2021 16:08)  HR: 100 (05 Jan 2021 06:24) (88 - 103)  BP: 146/77 (05 Jan 2021 06:24) (114/67 - 146/77)  BP(mean): --  RR: 20 (05 Jan 2021 06:24) (18 - 20)  SpO2: 95% (05 Jan 2021 06:24) (94% - 97%)  I&O's Summary    04 Jan 2021 07:01  -  05 Jan 2021 07:00  --------------------------------------------------------  IN: 126 mL / OUT: 300 mL / NET: -174 mL        PHYSICAL EXAM:    General: WDWN  HEENT: NC/AT; PERRL, anicteric sclera; MMM  Neck: supple  Cardiovascular: +S1/S2; RRR  Respiratory: CTA B/L; no W/R/R  Gastrointestinal: soft, NT/ND; +BSx4  Extremities: WWP; no edema, clubbing or cyanosis  Vascular: 2+ radial, DP/PT pulses B/L  Neurological: AAOx3; no focal deficits    MEDICATIONS:  MEDICATIONS  (STANDING):  aMIOdarone    Tablet 200 milliGRAM(s) Oral daily  apixaban 5 milliGRAM(s) Oral every 12 hours  chlorhexidine 0.12% Liquid 15 milliLiter(s) Oral Mucosa every 12 hours  chlorhexidine 2% Cloths 1 Application(s) Topical <User Schedule>  dextrose 40% Gel 15 Gram(s) Oral once  dextrose 5%. 1000 milliLiter(s) (100 mL/Hr) IV Continuous <Continuous>  dextrose 5%. 1000 milliLiter(s) (50 mL/Hr) IV Continuous <Continuous>  dextrose 50% Injectable 25 Gram(s) IV Push once  dextrose 50% Injectable 12.5 Gram(s) IV Push once  dextrose 50% Injectable 25 Gram(s) IV Push once  diltiazem    milliGRAM(s) Oral daily  doxazosin 2 milliGRAM(s) Oral at bedtime  glucagon  Injectable 1 milliGRAM(s) IntraMuscular once  insulin lispro (ADMELOG) corrective regimen sliding scale   SubCutaneous every 6 hours  lactobacillus acidophilus 1 Tablet(s) Oral every 24 hours  pantoprazole   Suspension 40 milliGRAM(s) Enteral Tube every 12 hours    MEDICATIONS  (PRN):  acetaminophen    Suspension .. 650 milliGRAM(s) Oral every 6 hours PRN Temp greater or equal to 38C (100.4F), Mild Pain (1 - 3)      ALLERGIES:  Allergies    No Known Allergies    Intolerances        LABS:                        9.6    7.15  )-----------( 441      ( 04 Jan 2021 06:34 )             31.2     01-04    134<L>  |  98  |  24<H>  ----------------------------<  151<H>  4.5   |  22  |  0.49<L>    Ca    8.8      04 Jan 2021 06:34  Phos  3.3     01-04  Mg     2.4     01-04    TPro  7.0  /  Alb  3.5  /  TBili  0.4  /  DBili  x   /  AST  28  /  ALT  34  /  AlkPhos  116  01-04        CAPILLARY BLOOD GLUCOSE      POCT Blood Glucose.: 161 mg/dL (05 Jan 2021 06:42)      RADIOLOGY & ADDITIONAL TESTS: Reviewed.   *****Transfer note: COVID RMF -> ******  Hospital course:  70M DMT2, A-fib on Xarelto, HTN, HLD, severe carotid disease who was admitted to [facility] on 11/27, started on remdesivir and decadron on 11/28. Convalescent plasma on 11/30.  Intubated on 12/2 due to worsening O2 sats. At [facility], received tocilizumab on 12/2, Zosyn 12/2-8, levaquin 12/2-8, zyvox 12/8, and caspofungin on 12/8. Transferred to [facility] for further management due to [facility] capacity. S/p trach and PEG placement. Sputum cx from 12/24 growing stenotrophomonas, so started on Meropenem and transitioned to Bactrim.  Course has been complicated by afib with rvr with varying amiodarone dosing and started on diltiazem, with electrophysiology consulted and following with recs.  Additionally, patient had episodic melena that has now resolved s/p 2u pRBC. GI was concerned for stress gastritic or ulcers which bled in light of newly re-started on anticoagulation, but now stable and restarted on anticoagulation. Patient currently stable on trach collar s/p pressors and Abx. Course c/b urinary retention, had santana, now removed 1/4 passed TOV. S&S following to eval pt for Passy [place] valve placement, ENT exchanged trach collar to smaller size on 1/4 to optimize for the same. Modified barium swallow pending to start PO diet.    OVERNIGHT EVENTS: MIKI    SUBJECTIVE / INTERVAL HPI: Patient seen and examined at bedside. Patient resting in bed, able to speak now - oxygen not connected to trach but patient appears very comfortable, no respiratory distress. States that he is hungry and wants to take food PO, denies SOB, cough, chest pain, abdominal pain.    VITAL SIGNS:  Vital Signs Last 24 Hrs  T(C): 36.4 (05 Jan 2021 06:24), Max: 36.6 (04 Jan 2021 16:08)  T(F): 97.5 (05 Jan 2021 06:24), Max: 97.9 (04 Jan 2021 16:08)  HR: 100 (05 Jan 2021 06:24) (88 - 103)  BP: 146/77 (05 Jan 2021 06:24) (114/67 - 146/77)  BP(mean): --  RR: 20 (05 Jan 2021 06:24) (18 - 20)  SpO2: 95% (05 Jan 2021 06:24) (94% - 97%)  I&O's Summary    04 Jan 2021 07:01  -  05 Jan 2021 07:00  --------------------------------------------------------  IN: 126 mL / OUT: 300 mL / NET: -174 mL        PHYSICAL EXAM:    General: NAD  HEENT: NC/AT; PERRL, anicteric sclera; MMM  Cardiovascular: +S1/S2; RRR  Respiratory: CTA B/L; no W/R/R  Gastrointestinal: soft, NT/ND; +BSx4  Extremities: WWP; no edema, clubbing or cyanosis  Vascular: 2+ radial, DP/PT pulses B/L  Neurological: AAOx3    MEDICATIONS:  MEDICATIONS  (STANDING):  aMIOdarone    Tablet 200 milliGRAM(s) Oral daily  apixaban 5 milliGRAM(s) Oral every 12 hours  chlorhexidine 0.12% Liquid 15 milliLiter(s) Oral Mucosa every 12 hours  chlorhexidine 2% Cloths 1 Application(s) Topical <User Schedule>  dextrose 40% Gel 15 Gram(s) Oral once  dextrose 5%. 1000 milliLiter(s) (100 mL/Hr) IV Continuous <Continuous>  dextrose 5%. 1000 milliLiter(s) (50 mL/Hr) IV Continuous <Continuous>  dextrose 50% Injectable 25 Gram(s) IV Push once  dextrose 50% Injectable 12.5 Gram(s) IV Push once  dextrose 50% Injectable 25 Gram(s) IV Push once  diltiazem    milliGRAM(s) Oral daily  doxazosin 2 milliGRAM(s) Oral at bedtime  glucagon  Injectable 1 milliGRAM(s) IntraMuscular once  insulin lispro (ADMELOG) corrective regimen sliding scale   SubCutaneous every 6 hours  lactobacillus acidophilus 1 Tablet(s) Oral every 24 hours  pantoprazole   Suspension 40 milliGRAM(s) Enteral Tube every 12 hours    MEDICATIONS  (PRN):  acetaminophen    Suspension .. 650 milliGRAM(s) Oral every 6 hours PRN Temp greater or equal to 38C (100.4F), Mild Pain (1 - 3)      ALLERGIES:  Allergies    No Known Allergies    Intolerances        LABS:                        9.6    7.15  )-----------( 441      ( 04 Jan 2021 06:34 )             31.2     01-04    134<L>  |  98  |  24<H>  ----------------------------<  151<H>  4.5   |  22  |  0.49<L>    Ca    8.8      04 Jan 2021 06:34  Phos  3.3     01-04  Mg     2.4     01-04    TPro  7.0  /  Alb  3.5  /  TBili  0.4  /  DBili  x   /  AST  28  /  ALT  34  /  AlkPhos  116  01-04        CAPILLARY BLOOD GLUCOSE      POCT Blood Glucose.: 161 mg/dL (05 Jan 2021 06:42)      RADIOLOGY & ADDITIONAL TESTS: Reviewed.   *****Transfer note: COVID RMF -> 7U******  Hospital course:  70M DMT2, A-fib on Xarelto, HTN, HLD, severe carotid disease who was admitted to [facility] on 11/27, started on remdesivir and decadron on 11/28. Convalescent plasma on 11/30.  Intubated on 12/2 due to worsening O2 sats. At [facility], received tocilizumab on 12/2, Zosyn 12/2-8, levaquin 12/2-8, zyvox 12/8, and caspofungin on 12/8. Transferred to [facility] for further management due to [facility] capacity. S/p trach and PEG placement. Sputum cx from 12/24 growing stenotrophomonas, so started on Meropenem and transitioned to Bactrim.  Course has been complicated by afib with rvr with varying amiodarone dosing and started on diltiazem, with electrophysiology consulted and following with recs.  Additionally, patient had episodic melena that has now resolved s/p 2u pRBC. GI was concerned for stress gastritic or ulcers which bled in light of newly re-started on anticoagulation, but now stable and restarted on anticoagulation. Patient currently stable on trach collar s/p pressors and Abx. Course c/b urinary retention, had santana, now removed 1/4 passed TOV. S&S following to eval pt for Passy [place] valve placement, ENT exchanged trach collar to smaller size on 1/4 to optimize for the same. Modified barium swallow pending to start PO diet.    OVERNIGHT EVENTS: MIKI    SUBJECTIVE / INTERVAL HPI: Patient seen and examined at bedside. Patient resting in bed, able to speak now - oxygen not connected to trach but patient appears very comfortable, no respiratory distress. States that he is hungry and wants to take food PO, denies SOB, cough, chest pain, abdominal pain.    VITAL SIGNS:  Vital Signs Last 24 Hrs  T(C): 36.4 (05 Jan 2021 06:24), Max: 36.6 (04 Jan 2021 16:08)  T(F): 97.5 (05 Jan 2021 06:24), Max: 97.9 (04 Jan 2021 16:08)  HR: 100 (05 Jan 2021 06:24) (88 - 103)  BP: 146/77 (05 Jan 2021 06:24) (114/67 - 146/77)  BP(mean): --  RR: 20 (05 Jan 2021 06:24) (18 - 20)  SpO2: 95% (05 Jan 2021 06:24) (94% - 97%)  I&O's Summary    04 Jan 2021 07:01  -  05 Jan 2021 07:00  --------------------------------------------------------  IN: 126 mL / OUT: 300 mL / NET: -174 mL        PHYSICAL EXAM:    General: NAD  HEENT: NC/AT; PERRL, anicteric sclera; MMM  Cardiovascular: +S1/S2; RRR  Respiratory: CTA B/L; no W/R/R  Gastrointestinal: soft, NT/ND; +BSx4  Extremities: WWP; no edema, clubbing or cyanosis  Vascular: 2+ radial, DP/PT pulses B/L  Neurological: AAOx3    MEDICATIONS:  MEDICATIONS  (STANDING):  aMIOdarone    Tablet 200 milliGRAM(s) Oral daily  apixaban 5 milliGRAM(s) Oral every 12 hours  chlorhexidine 0.12% Liquid 15 milliLiter(s) Oral Mucosa every 12 hours  chlorhexidine 2% Cloths 1 Application(s) Topical <User Schedule>  dextrose 40% Gel 15 Gram(s) Oral once  dextrose 5%. 1000 milliLiter(s) (100 mL/Hr) IV Continuous <Continuous>  dextrose 5%. 1000 milliLiter(s) (50 mL/Hr) IV Continuous <Continuous>  dextrose 50% Injectable 25 Gram(s) IV Push once  dextrose 50% Injectable 12.5 Gram(s) IV Push once  dextrose 50% Injectable 25 Gram(s) IV Push once  diltiazem    milliGRAM(s) Oral daily  doxazosin 2 milliGRAM(s) Oral at bedtime  glucagon  Injectable 1 milliGRAM(s) IntraMuscular once  insulin lispro (ADMELOG) corrective regimen sliding scale   SubCutaneous every 6 hours  lactobacillus acidophilus 1 Tablet(s) Oral every 24 hours  pantoprazole   Suspension 40 milliGRAM(s) Enteral Tube every 12 hours    MEDICATIONS  (PRN):  acetaminophen    Suspension .. 650 milliGRAM(s) Oral every 6 hours PRN Temp greater or equal to 38C (100.4F), Mild Pain (1 - 3)      ALLERGIES:  Allergies    No Known Allergies    Intolerances        LABS:                        9.6    7.15  )-----------( 441      ( 04 Jan 2021 06:34 )             31.2     01-04    134<L>  |  98  |  24<H>  ----------------------------<  151<H>  4.5   |  22  |  0.49<L>    Ca    8.8      04 Jan 2021 06:34  Phos  3.3     01-04  Mg     2.4     01-04    TPro  7.0  /  Alb  3.5  /  TBili  0.4  /  DBili  x   /  AST  28  /  ALT  34  /  AlkPhos  116  01-04        CAPILLARY BLOOD GLUCOSE      POCT Blood Glucose.: 161 mg/dL (05 Jan 2021 06:42)      RADIOLOGY & ADDITIONAL TESTS: Reviewed.

## 2021-01-06 LAB
ALBUMIN SERPL ELPH-MCNC: 3.7 G/DL — SIGNIFICANT CHANGE UP (ref 3.3–5)
ALP SERPL-CCNC: 119 U/L — SIGNIFICANT CHANGE UP (ref 40–120)
ALT FLD-CCNC: 46 U/L — HIGH (ref 10–45)
ANION GAP SERPL CALC-SCNC: 13 MMOL/L — SIGNIFICANT CHANGE UP (ref 5–17)
AST SERPL-CCNC: 29 U/L — SIGNIFICANT CHANGE UP (ref 10–40)
BILIRUB SERPL-MCNC: 0.5 MG/DL — SIGNIFICANT CHANGE UP (ref 0.2–1.2)
BLD GP AB SCN SERPL QL: NEGATIVE — SIGNIFICANT CHANGE UP
BUN SERPL-MCNC: 31 MG/DL — HIGH (ref 7–23)
CALCIUM SERPL-MCNC: 9.9 MG/DL — SIGNIFICANT CHANGE UP (ref 8.4–10.5)
CHLORIDE SERPL-SCNC: 99 MMOL/L — SIGNIFICANT CHANGE UP (ref 96–108)
CO2 SERPL-SCNC: 25 MMOL/L — SIGNIFICANT CHANGE UP (ref 22–31)
CREAT SERPL-MCNC: 0.56 MG/DL — SIGNIFICANT CHANGE UP (ref 0.5–1.3)
CULTURE RESULTS: SIGNIFICANT CHANGE UP
GLUCOSE BLDC GLUCOMTR-MCNC: 156 MG/DL — HIGH (ref 70–99)
GLUCOSE BLDC GLUCOMTR-MCNC: 170 MG/DL — HIGH (ref 70–99)
GLUCOSE BLDC GLUCOMTR-MCNC: 191 MG/DL — HIGH (ref 70–99)
GLUCOSE BLDC GLUCOMTR-MCNC: 242 MG/DL — HIGH (ref 70–99)
GLUCOSE SERPL-MCNC: 167 MG/DL — HIGH (ref 70–99)
HCT VFR BLD CALC: 37.6 % — LOW (ref 39–50)
HGB BLD-MCNC: 11.5 G/DL — LOW (ref 13–17)
MAGNESIUM SERPL-MCNC: 2.4 MG/DL — SIGNIFICANT CHANGE UP (ref 1.6–2.6)
MCHC RBC-ENTMCNC: 26.6 PG — LOW (ref 27–34)
MCHC RBC-ENTMCNC: 30.6 GM/DL — LOW (ref 32–36)
MCV RBC AUTO: 87 FL — SIGNIFICANT CHANGE UP (ref 80–100)
NRBC # BLD: 0 /100 WBCS — SIGNIFICANT CHANGE UP (ref 0–0)
PHOSPHATE SERPL-MCNC: 3.7 MG/DL — SIGNIFICANT CHANGE UP (ref 2.5–4.5)
PLATELET # BLD AUTO: 452 K/UL — HIGH (ref 150–400)
POTASSIUM SERPL-MCNC: 4.8 MMOL/L — SIGNIFICANT CHANGE UP (ref 3.5–5.3)
POTASSIUM SERPL-SCNC: 4.8 MMOL/L — SIGNIFICANT CHANGE UP (ref 3.5–5.3)
PROT SERPL-MCNC: 7.4 G/DL — SIGNIFICANT CHANGE UP (ref 6–8.3)
RBC # BLD: 4.32 M/UL — SIGNIFICANT CHANGE UP (ref 4.2–5.8)
RBC # FLD: 21.8 % — HIGH (ref 10.3–14.5)
RH IG SCN BLD-IMP: NEGATIVE — SIGNIFICANT CHANGE UP
SARS-COV-2 IGG SERPL QL IA: POSITIVE
SARS-COV-2 IGM SERPL IA-ACNC: 7.75 INDEX — HIGH
SODIUM SERPL-SCNC: 137 MMOL/L — SIGNIFICANT CHANGE UP (ref 135–145)
SPECIMEN SOURCE: SIGNIFICANT CHANGE UP
WBC # BLD: 7.91 K/UL — SIGNIFICANT CHANGE UP (ref 3.8–10.5)
WBC # FLD AUTO: 7.91 K/UL — SIGNIFICANT CHANGE UP (ref 3.8–10.5)

## 2021-01-06 PROCEDURE — 99233 SBSQ HOSP IP/OBS HIGH 50: CPT | Mod: GC

## 2021-01-06 PROCEDURE — 74230 X-RAY XM SWLNG FUNCJ C+: CPT | Mod: 26

## 2021-01-06 RX ADMIN — Medication 4: at 17:55

## 2021-01-06 RX ADMIN — APIXABAN 5 MILLIGRAM(S): 2.5 TABLET, FILM COATED ORAL at 17:55

## 2021-01-06 RX ADMIN — Medication 2: at 01:05

## 2021-01-06 RX ADMIN — APIXABAN 5 MILLIGRAM(S): 2.5 TABLET, FILM COATED ORAL at 06:40

## 2021-01-06 RX ADMIN — Medication 2: at 12:53

## 2021-01-06 RX ADMIN — Medication 2 MILLIGRAM(S): at 21:29

## 2021-01-06 RX ADMIN — AMIODARONE HYDROCHLORIDE 200 MILLIGRAM(S): 400 TABLET ORAL at 06:40

## 2021-01-06 RX ADMIN — Medication 240 MILLIGRAM(S): at 07:40

## 2021-01-06 RX ADMIN — PANTOPRAZOLE SODIUM 40 MILLIGRAM(S): 20 TABLET, DELAYED RELEASE ORAL at 06:40

## 2021-01-06 RX ADMIN — PANTOPRAZOLE SODIUM 40 MILLIGRAM(S): 20 TABLET, DELAYED RELEASE ORAL at 17:56

## 2021-01-06 RX ADMIN — Medication 1 TABLET(S): at 12:32

## 2021-01-06 NOTE — PROGRESS NOTE ADULT - PROBLEM SELECTOR PLAN 4
RESOLVED  -pt spiked fever on 12/12; sputum cx negative but concern for overlying pna so received zosyn 12/14-12/20

## 2021-01-06 NOTE — PROGRESS NOTE ADULT - PROBLEM SELECTOR PROBLEM 3
Atrial fibrillation with RVR

## 2021-01-06 NOTE — PROGRESS NOTE ADULT - PROVIDER SPECIALTY LIST ADULT
ENT
Electrophysiology
Gastroenterology
Infectious Disease
Internal Medicine
MICU
Critical Care
Hospitalist
Internal Medicine
Internal Medicine
MICU
Critical Care
Internal Medicine
Critical Care
ENT
Electrophysiology
Internal Medicine
Internal Medicine

## 2021-01-06 NOTE — SWALLOW VFSS/MBS ASSESSMENT ADULT - RESIDUE IN PYRIFORM SINUSES
Physical Therapy Rehabilitation Referral    Patient Name:  Mishel Sheldon      YOB: 1950    Diagnosis:    1. Left shoulder pain, unspecified chronicity    2. Posterior capsular tightness  3. shoulder stiffness. Precautions:     [x] Evaluate and Treat    Post Op Instructions:  [] Continuous passive motion (CPM) [] Elbow ROM  [x] Exercise in plane of scapula  []  Strengthening     [] Pulley and instruction   [x] Home exercise program (copy to patient)   [] Sling when arm at risk  [] Sling or brace at all times   [] AAROM: Forward elevation to  140            [] AAROM: External rotation  To  40    [] Isometric external rotator strengthening [] AAROM: internal rotation: up the back  [x] Isometric abductor strengthening  [] AAROM: Internal abduction   [] Isometric internal rotator strengthening [] AAROM: cross-body adduction             Stretching:     Strengthening:  [x] Four quadrant (FE, ER, IR, CBA)  [] Rotator cuff (ER, IR, Abd)  [x] Forward Elevation    [] External Rotators     [x] External Rotation    [] Internal Rotators  [x] Internal Rotation: up/back   [] Abductors     [x] Internal Rotation: supine in abduction  [x] Sleeper Stretch    [] Flexors  [x] Cross-body abduction    [] Extensors  [x] Pendulum (FE, Abd/Add, cw/ccw)  [x] Scapular Stabilizers   [x] Wall-walking (FE, Abd)        [x] Shoulder shrugs     [x] Table slides (FE)                [x] Rhomboid pinch  [] Elbow (flex, ext, pron, sup)        [] Lat.  Pull downs     [] Medial epicondylitis program       [] Forward punch   [] Lateral epicondylitis program       [] Internal rotators     [] Progressive resistive exercises  [] Bench Press        [] Bench press plus  Activities:     [] Lateral pull-downs  [] Rowing     [] Progressive two-hand supine press  [] Stepper/Exercise bike   [x] Biceps: curls/supination  [] Swimming  [] Water exercises    Modalities:     Return to Sport:  [x] Of Choice      [] Plyometrics
Mild/Moderate

## 2021-01-06 NOTE — PROGRESS NOTE ADULT - PROBLEM SELECTOR PLAN 9
-Patient with occluded right ICA and severe left ICA stenosis, s/p left CEA, uncomplicated  -f/u with cardiology outpatient

## 2021-01-06 NOTE — PROGRESS NOTE ADULT - PROBLEM SELECTOR PLAN 8
- goal 140-180 blood glucose  - c/w mSSI

## 2021-01-06 NOTE — PROGRESS NOTE ADULT - PROBLEM SELECTOR PLAN 3
Course had been complicated with afib with RVR, resolved and stable on diltiazam 240mg QD and 200mg amiodarone daily  - continue diltiazam and amiodarone  - continue with eliquis 5mg BID  - TTE with no wall abnormalities  - f/u with Dr. Medina o/p    #QTC prolongation:   - stable, avoid QTC prolonging agents

## 2021-01-06 NOTE — PROGRESS NOTE ADULT - PROBLEM SELECTOR PLAN 7
-normal WBC, afebrile, low concern for C Dill. More likely related to antibiotic use  -resolving  Plan:  -cont probiotic lactobacillus acidophillus

## 2021-01-06 NOTE — PROGRESS NOTE ADULT - PROBLEM SELECTOR PLAN 5
-RESOLVED  patient experienced drop in Hgb from 9.8 to 7.3 on 12/24 and found to have melanotic stools, now stable with hemoglobin ~9.  At time, also hemodynamically unstable with BPs in 80s-90s/30s-40s.  GI was concerned for stress gastritic or ulcers which bled in light of newly re-started on anticoagulation.    -s/p 2u pRBC  -stabilized  -c/w protonix 40mg

## 2021-01-06 NOTE — PROGRESS NOTE ADULT - SUBJECTIVE AND OBJECTIVE BOX
Patient was seen and examined by me at bedside. I agree with resident's note, subjective, objective physical exam, assessment and plan with following modifications/additions.    Greater than 35 minutes spent on total encounter; more than 50% of the visit was spent counseling and/or coordinating care by the attending physician.    70M DMT2, A-fib on Xarelto, HTN, HLD, severe carotid disease, admitted for acute hypoxic respiratory failure secondary to covid PNA, s/p decadron, remdesivir, convalescent plasma, and several antibiotics, s/p trach and PEG, treatment for VAP, melanotic stools now resolved, as well as treatment for afib with RVR now in NSR, pending speech and swallow evaluation to allow tolerating po and hopefully dc PEG before transfer to [facility] possibly tomorrow  -barium swallow in early in am to advance diet, speak with nutrition on timing to dc PEG (if calorie count needed today s/p tolerating po to dc PEG)  -afib continue rate control and anticoag    Tr Delarosa MD [phone]

## 2021-01-06 NOTE — PROGRESS NOTE ADULT - SUBJECTIVE AND OBJECTIVE BOX
INTERVAL HPI/OVERNIGHT EVENTS: Overnight there were no acute events. This morning the patient was complaining of hunger. Denies any other complaints.    VITAL SIGNS:  T(C): 36.7 (01-06-21 @ 05:44), Max: 36.9 (01-05-21 @ 20:55)  T(F): 98.1 (01-06-21 @ 05:44), Max: 98.5 (01-05-21 @ 20:55)  HR: 99 (01-06-21 @ 10:40) (91 - 104)  BP: 126/69 (01-06-21 @ 05:44) (96/66 - 126/74)  BP(mean): --  RR: 18 (01-06-21 @ 10:40) (18 - 20)  SpO2: 94% (01-06-21 @ 10:40) (92% - 95%)  Wt(kg): --    PHYSICAL EXAM:    Constitutional: WDWN resting comfortably in bed; NAD  Head: NC/AT  Eyes: anicteric sclera  ENT: no nasal discharge; uvula midline, no oropharyngeal erythema or exudates; MMM  Neck: supple; no JVD or thyromegaly  Respiratory: CTA B/L; no W/R/R, no retractions  Cardiac: +S1/S2; RRR; no M/R/G;  Gastrointestinal: soft, NT/ND; no rebound or guarding  Extremities: WWP, no clubbing or cyanosis; no peripheral edema  Dermatologic: skin warm, dry and intact; no rashes, wounds, or scars  Neurologic: AAOx3;   Psychiatric: affect and characteristics of appearance, verbalizations, behaviors are appropriate    MEDICATIONS  (STANDING):  aMIOdarone    Tablet 200 milliGRAM(s) Oral daily  apixaban 5 milliGRAM(s) Oral every 12 hours  dextrose 40% Gel 15 Gram(s) Oral once  dextrose 5%. 1000 milliLiter(s) (50 mL/Hr) IV Continuous <Continuous>  dextrose 5%. 1000 milliLiter(s) (100 mL/Hr) IV Continuous <Continuous>  dextrose 50% Injectable 12.5 Gram(s) IV Push once  dextrose 50% Injectable 25 Gram(s) IV Push once  dextrose 50% Injectable 25 Gram(s) IV Push once  diltiazem    milliGRAM(s) Oral daily  doxazosin 2 milliGRAM(s) Oral at bedtime  glucagon  Injectable 1 milliGRAM(s) IntraMuscular once  insulin lispro (ADMELOG) corrective regimen sliding scale   SubCutaneous every 6 hours  lactobacillus acidophilus 1 Tablet(s) Oral every 24 hours  pantoprazole   Suspension 40 milliGRAM(s) Enteral Tube every 12 hours    MEDICATIONS  (PRN):  acetaminophen    Suspension .. 650 milliGRAM(s) Oral every 6 hours PRN Temp greater or equal to 38C (100.4F), Mild Pain (1 - 3)      Allergies    No Known Allergies    Intolerances        LABS:                        11.5   7.91  )-----------( 452      ( 06 Jan 2021 06:25 )             37.6     01-06    137  |  99  |  31<H>  ----------------------------<  167<H>  4.8   |  25  |  0.56    Ca    9.9      06 Jan 2021 06:25  Phos  3.7     01-06  Mg     2.4     01-06    TPro  7.4  /  Alb  3.7  /  TBili  0.5  /  DBili  x   /  AST  29  /  ALT  46<H>  /  AlkPhos  119  01-06          RADIOLOGY & ADDITIONAL TESTS: Reviewed     Hospital Course:  70M DMT2, A-fib on Xarelto, HTN, HLD, severe carotid disease who was admitted to [facility] on 11/27 and found to have COVID pneumonia. He was started on remdesivir and decadron on 11/28. Convalescent plasma was given on 11/30. He was intubated on 12/2 due to worsening O2 sats. At [facility] he received tocilizumab on 12/2, Zosyn 12/2-8, levaquin 12/2-8, zyvox 12/8, and caspofungin on 12/8. He was transferred to [facility] for further management due to [facility] capacity. S/p trach and PEG placement. Sputum cx from 12/24 growing stenotrophomonas, so started on Meropenem and transitioned to Bactrim.  Course has been complicated by afib with rvr with varying amiodarone dosing and started on diltiazem, with electrophysiology consulted and following with recs.  Additionally, patient had episodic melena that has now resolved s/p 2u pRBC. GI was concerned for stress gastritic or ulcers which bled in light of newly re-started on anticoagulation, but now stable and restarted on anticoagulation. Patient currently stable on trach collar, weaned off pressors, and no longer on antibiotics. His admission was complicated by urinary retention for which he had a santana placed. Santana was removed and he passed TOV. Speech and swallow evaluated patient for [place] valve and assessed that the patient required a collar exchanged to a smaller size. ENT performed exchange on 1/4. PEG tube noted to be clogged, GI evaluated and will unclog. Speech and swallow evaluating patient and ordered barium swallow to assess for dysphagia and possibility of beginning PO diet. Barium swallow performed, started on mechanical soft diet, tube feeds continued, calorie count ordered per nutrition to assess if tube feeds still required.      INTERVAL HPI/OVERNIGHT EVENTS: Overnight there were no acute events. This morning the patient was complaining of hunger. Denies any other complaints.    VITAL SIGNS:  T(C): 36.7 (01-06-21 @ 05:44), Max: 36.9 (01-05-21 @ 20:55)  T(F): 98.1 (01-06-21 @ 05:44), Max: 98.5 (01-05-21 @ 20:55)  HR: 99 (01-06-21 @ 10:40) (91 - 104)  BP: 126/69 (01-06-21 @ 05:44) (96/66 - 126/74)  BP(mean): --  RR: 18 (01-06-21 @ 10:40) (18 - 20)  SpO2: 94% (01-06-21 @ 10:40) (92% - 95%)  Wt(kg): --    PHYSICAL EXAM:    Constitutional: WDWN resting comfortably in bed; NAD  Head: NC/AT  Eyes: anicteric sclera  ENT: no nasal discharge; uvula midline, no oropharyngeal erythema or exudates; MMM  Neck: supple; no JVD or thyromegaly  Respiratory: CTA B/L; no W/R/R, no retractions  Cardiac: +S1/S2; RRR; no M/R/G;  Gastrointestinal: soft, NT/ND; no rebound or guarding  Extremities: WWP, no clubbing or cyanosis; no peripheral edema  Dermatologic: skin warm, dry and intact; no rashes, wounds, or scars  Neurologic: AAOx3;   Psychiatric: affect and characteristics of appearance, verbalizations, behaviors are appropriate    MEDICATIONS  (STANDING):  aMIOdarone    Tablet 200 milliGRAM(s) Oral daily  apixaban 5 milliGRAM(s) Oral every 12 hours  dextrose 40% Gel 15 Gram(s) Oral once  dextrose 5%. 1000 milliLiter(s) (50 mL/Hr) IV Continuous <Continuous>  dextrose 5%. 1000 milliLiter(s) (100 mL/Hr) IV Continuous <Continuous>  dextrose 50% Injectable 12.5 Gram(s) IV Push once  dextrose 50% Injectable 25 Gram(s) IV Push once  dextrose 50% Injectable 25 Gram(s) IV Push once  diltiazem    milliGRAM(s) Oral daily  doxazosin 2 milliGRAM(s) Oral at bedtime  glucagon  Injectable 1 milliGRAM(s) IntraMuscular once  insulin lispro (ADMELOG) corrective regimen sliding scale   SubCutaneous every 6 hours  lactobacillus acidophilus 1 Tablet(s) Oral every 24 hours  pantoprazole   Suspension 40 milliGRAM(s) Enteral Tube every 12 hours    MEDICATIONS  (PRN):  acetaminophen    Suspension .. 650 milliGRAM(s) Oral every 6 hours PRN Temp greater or equal to 38C (100.4F), Mild Pain (1 - 3)      Allergies    No Known Allergies    Intolerances        LABS:                        11.5   7.91  )-----------( 452      ( 06 Jan 2021 06:25 )             37.6     01-06    137  |  99  |  31<H>  ----------------------------<  167<H>  4.8   |  25  |  0.56    Ca    9.9      06 Jan 2021 06:25  Phos  3.7     01-06  Mg     2.4     01-06    TPro  7.4  /  Alb  3.7  /  TBili  0.5  /  DBili  x   /  AST  29  /  ALT  46<H>  /  AlkPhos  119  01-06          RADIOLOGY & ADDITIONAL TESTS: Reviewed

## 2021-01-06 NOTE — PROGRESS NOTE ADULT - PROBLEM SELECTOR PLAN 1
RESOLVED  Cause was 2/2 covid PNA, complicated by VAP, now s/p trach 12/18 and peg 12/17, tolerating trach collar and hemodynamically stable  - speech and swallow consulted, f/u recs  - f/u modified barium swallow

## 2021-01-06 NOTE — SWALLOW VFSS/MBS ASSESSMENT ADULT - PHARYNGEAL PHASE COMMENTS
Decreased base of tongue to posterior pharyngeal wall contact along with inconsistently absent epiglottic inversion resulted in significant valleculae and some base of tongue residue. There was one instance of laryngeal penetration during thin liquid wash due to decreased laryngeal vestibule closure. Decreased pharyngeal contraction further limited bolus drive through the pharynx resulting in posterior pharyngeal wall residue. Decreased hyolaryngeal elevation and excursion combined with presence of C5-C6 osteophytes limited UES opening resulting in residue with one instance of penetration via interarytenoid space. Pharyngeal residue decreased with cued multiple swallows.

## 2021-01-06 NOTE — PROGRESS NOTE ADULT - PROBLEM SELECTOR PROBLEM 9
Carotid artery disease

## 2021-01-06 NOTE — PROGRESS NOTE ADULT - ASSESSMENT
70M DMT2, A-fib on Xarelto, HTN, HLD, severe carotid disease, admitted for acute hypoxic respiratory failure secondary to covid PNA, s/p decadron, remdesivir, convalescent plasma, and several antibiotics, s/p trach and PEG, treatment for VAP, melanotic stools, now stabolized, as well as treatment for afib with RVR, now pending speech and swallow evaluation with anticipated discharge to subacute rehab.

## 2021-01-07 ENCOUNTER — APPOINTMENT (OUTPATIENT)
Dept: CARDIOLOGY | Facility: CLINIC | Age: 71
End: 2021-01-07

## 2021-01-07 ENCOUNTER — TRANSCRIPTION ENCOUNTER (OUTPATIENT)
Age: 71
End: 2021-01-07

## 2021-01-07 VITALS — SYSTOLIC BLOOD PRESSURE: 103 MMHG | DIASTOLIC BLOOD PRESSURE: 57 MMHG

## 2021-01-07 LAB
ANION GAP SERPL CALC-SCNC: 13 MMOL/L — SIGNIFICANT CHANGE UP (ref 5–17)
BASOPHILS # BLD AUTO: 0.06 K/UL — SIGNIFICANT CHANGE UP (ref 0–0.2)
BASOPHILS NFR BLD AUTO: 0.8 % — SIGNIFICANT CHANGE UP (ref 0–2)
BUN SERPL-MCNC: 31 MG/DL — HIGH (ref 7–23)
CALCIUM SERPL-MCNC: 9.3 MG/DL — SIGNIFICANT CHANGE UP (ref 8.4–10.5)
CHLORIDE SERPL-SCNC: 99 MMOL/L — SIGNIFICANT CHANGE UP (ref 96–108)
CO2 SERPL-SCNC: 22 MMOL/L — SIGNIFICANT CHANGE UP (ref 22–31)
CREAT SERPL-MCNC: 0.45 MG/DL — LOW (ref 0.5–1.3)
EOSINOPHIL # BLD AUTO: 0.24 K/UL — SIGNIFICANT CHANGE UP (ref 0–0.5)
EOSINOPHIL NFR BLD AUTO: 3.3 % — SIGNIFICANT CHANGE UP (ref 0–6)
GLUCOSE BLDC GLUCOMTR-MCNC: 176 MG/DL — HIGH (ref 70–99)
GLUCOSE BLDC GLUCOMTR-MCNC: 184 MG/DL — HIGH (ref 70–99)
GLUCOSE BLDC GLUCOMTR-MCNC: 203 MG/DL — HIGH (ref 70–99)
GLUCOSE BLDC GLUCOMTR-MCNC: 288 MG/DL — HIGH (ref 70–99)
GLUCOSE SERPL-MCNC: 175 MG/DL — HIGH (ref 70–99)
HCT VFR BLD CALC: 36.4 % — LOW (ref 39–50)
HGB BLD-MCNC: 10.6 G/DL — LOW (ref 13–17)
IMM GRANULOCYTES NFR BLD AUTO: 0.7 % — SIGNIFICANT CHANGE UP (ref 0–1.5)
LYMPHOCYTES # BLD AUTO: 1.46 K/UL — SIGNIFICANT CHANGE UP (ref 1–3.3)
LYMPHOCYTES # BLD AUTO: 20.3 % — SIGNIFICANT CHANGE UP (ref 13–44)
MAGNESIUM SERPL-MCNC: 2.2 MG/DL — SIGNIFICANT CHANGE UP (ref 1.6–2.6)
MCHC RBC-ENTMCNC: 26.4 PG — LOW (ref 27–34)
MCHC RBC-ENTMCNC: 29.1 GM/DL — LOW (ref 32–36)
MCV RBC AUTO: 90.8 FL — SIGNIFICANT CHANGE UP (ref 80–100)
MONOCYTES # BLD AUTO: 0.44 K/UL — SIGNIFICANT CHANGE UP (ref 0–0.9)
MONOCYTES NFR BLD AUTO: 6.1 % — SIGNIFICANT CHANGE UP (ref 2–14)
NEUTROPHILS # BLD AUTO: 4.94 K/UL — SIGNIFICANT CHANGE UP (ref 1.8–7.4)
NEUTROPHILS NFR BLD AUTO: 68.8 % — SIGNIFICANT CHANGE UP (ref 43–77)
NRBC # BLD: 0 /100 WBCS — SIGNIFICANT CHANGE UP (ref 0–0)
PHOSPHATE SERPL-MCNC: 3.4 MG/DL — SIGNIFICANT CHANGE UP (ref 2.5–4.5)
PLATELET # BLD AUTO: 371 K/UL — SIGNIFICANT CHANGE UP (ref 150–400)
POTASSIUM SERPL-MCNC: 4.1 MMOL/L — SIGNIFICANT CHANGE UP (ref 3.5–5.3)
POTASSIUM SERPL-SCNC: 4.1 MMOL/L — SIGNIFICANT CHANGE UP (ref 3.5–5.3)
RBC # BLD: 4.01 M/UL — LOW (ref 4.2–5.8)
RBC # FLD: 21.2 % — HIGH (ref 10.3–14.5)
SODIUM SERPL-SCNC: 134 MMOL/L — LOW (ref 135–145)
WBC # BLD: 7.19 K/UL — SIGNIFICANT CHANGE UP (ref 3.8–10.5)
WBC # FLD AUTO: 7.19 K/UL — SIGNIFICANT CHANGE UP (ref 3.8–10.5)

## 2021-01-07 PROCEDURE — 85610 PROTHROMBIN TIME: CPT

## 2021-01-07 PROCEDURE — 87070 CULTURE OTHR SPECIMN AEROBIC: CPT

## 2021-01-07 PROCEDURE — 94003 VENT MGMT INPAT SUBQ DAY: CPT

## 2021-01-07 PROCEDURE — 85730 THROMBOPLASTIN TIME PARTIAL: CPT

## 2021-01-07 PROCEDURE — 85379 FIBRIN DEGRADATION QUANT: CPT

## 2021-01-07 PROCEDURE — 97116 GAIT TRAINING THERAPY: CPT

## 2021-01-07 PROCEDURE — 94002 VENT MGMT INPAT INIT DAY: CPT

## 2021-01-07 PROCEDURE — L8699: CPT

## 2021-01-07 PROCEDURE — 83735 ASSAY OF MAGNESIUM: CPT

## 2021-01-07 PROCEDURE — 82803 BLOOD GASES ANY COMBINATION: CPT

## 2021-01-07 PROCEDURE — 99239 HOSP IP/OBS DSCHRG MGMT >30: CPT | Mod: GC

## 2021-01-07 PROCEDURE — 87040 BLOOD CULTURE FOR BACTERIA: CPT

## 2021-01-07 PROCEDURE — 86140 C-REACTIVE PROTEIN: CPT

## 2021-01-07 PROCEDURE — 80053 COMPREHEN METABOLIC PANEL: CPT

## 2021-01-07 PROCEDURE — 97161 PT EVAL LOW COMPLEX 20 MIN: CPT

## 2021-01-07 PROCEDURE — C1889: CPT

## 2021-01-07 PROCEDURE — 74230 X-RAY XM SWLNG FUNCJ C+: CPT

## 2021-01-07 PROCEDURE — 82728 ASSAY OF FERRITIN: CPT

## 2021-01-07 PROCEDURE — 87181 SC STD AGAR DILUTION PER AGT: CPT

## 2021-01-07 PROCEDURE — 93306 TTE W/DOPPLER COMPLETE: CPT

## 2021-01-07 PROCEDURE — 92610 EVALUATE SWALLOWING FUNCTION: CPT

## 2021-01-07 PROCEDURE — 92522 EVALUATE SPEECH PRODUCTION: CPT

## 2021-01-07 PROCEDURE — U0005: CPT

## 2021-01-07 PROCEDURE — 83605 ASSAY OF LACTIC ACID: CPT

## 2021-01-07 PROCEDURE — 71045 X-RAY EXAM CHEST 1 VIEW: CPT

## 2021-01-07 PROCEDURE — 84478 ASSAY OF TRIGLYCERIDES: CPT

## 2021-01-07 PROCEDURE — 84145 PROCALCITONIN (PCT): CPT

## 2021-01-07 PROCEDURE — 87640 STAPH A DNA AMP PROBE: CPT

## 2021-01-07 PROCEDURE — 97110 THERAPEUTIC EXERCISES: CPT

## 2021-01-07 PROCEDURE — 84443 ASSAY THYROID STIM HORMONE: CPT

## 2021-01-07 PROCEDURE — 87086 URINE CULTURE/COLONY COUNT: CPT

## 2021-01-07 PROCEDURE — 81001 URINALYSIS AUTO W/SCOPE: CPT

## 2021-01-07 PROCEDURE — 86901 BLOOD TYPING SEROLOGIC RH(D): CPT

## 2021-01-07 PROCEDURE — 86900 BLOOD TYPING SEROLOGIC ABO: CPT

## 2021-01-07 PROCEDURE — 84100 ASSAY OF PHOSPHORUS: CPT

## 2021-01-07 PROCEDURE — 80162 ASSAY OF DIGOXIN TOTAL: CPT

## 2021-01-07 PROCEDURE — 97530 THERAPEUTIC ACTIVITIES: CPT

## 2021-01-07 PROCEDURE — 80048 BASIC METABOLIC PNL TOTAL CA: CPT

## 2021-01-07 PROCEDURE — 87449 NOS EACH ORGANISM AG IA: CPT

## 2021-01-07 PROCEDURE — U0003: CPT

## 2021-01-07 PROCEDURE — 85027 COMPLETE CBC AUTOMATED: CPT

## 2021-01-07 PROCEDURE — 36430 TRANSFUSION BLD/BLD COMPNT: CPT

## 2021-01-07 PROCEDURE — 36415 COLL VENOUS BLD VENIPUNCTURE: CPT

## 2021-01-07 PROCEDURE — 87641 MR-STAPH DNA AMP PROBE: CPT

## 2021-01-07 PROCEDURE — 82550 ASSAY OF CK (CPK): CPT

## 2021-01-07 PROCEDURE — 97535 SELF CARE MNGMENT TRAINING: CPT

## 2021-01-07 PROCEDURE — P9016: CPT

## 2021-01-07 PROCEDURE — 82533 TOTAL CORTISOL: CPT

## 2021-01-07 PROCEDURE — 86850 RBC ANTIBODY SCREEN: CPT

## 2021-01-07 PROCEDURE — 87184 SC STD DISK METHOD PER PLATE: CPT

## 2021-01-07 PROCEDURE — 92611 MOTION FLUOROSCOPY/SWALLOW: CPT

## 2021-01-07 PROCEDURE — 74018 RADEX ABDOMEN 1 VIEW: CPT

## 2021-01-07 PROCEDURE — 86769 SARS-COV-2 COVID-19 ANTIBODY: CPT

## 2021-01-07 PROCEDURE — 82962 GLUCOSE BLOOD TEST: CPT

## 2021-01-07 PROCEDURE — 85025 COMPLETE CBC W/AUTO DIFF WBC: CPT

## 2021-01-07 PROCEDURE — 87186 SC STD MICRODIL/AGAR DIL: CPT

## 2021-01-07 PROCEDURE — 86923 COMPATIBILITY TEST ELECTRIC: CPT

## 2021-01-07 RX ORDER — DOXAZOSIN MESYLATE 4 MG
1 TABLET ORAL
Qty: 0 | Refills: 0 | DISCHARGE
Start: 2021-01-07

## 2021-01-07 RX ORDER — LOSARTAN POTASSIUM 100 MG/1
1 TABLET, FILM COATED ORAL
Qty: 0 | Refills: 0 | DISCHARGE

## 2021-01-07 RX ORDER — PANTOPRAZOLE SODIUM 20 MG/1
1 TABLET, DELAYED RELEASE ORAL
Qty: 0 | Refills: 0 | DISCHARGE
Start: 2021-01-07

## 2021-01-07 RX ORDER — APIXABAN 2.5 MG/1
1 TABLET, FILM COATED ORAL
Qty: 0 | Refills: 0 | DISCHARGE
Start: 2021-01-07

## 2021-01-07 RX ORDER — AMIODARONE HYDROCHLORIDE 400 MG/1
1 TABLET ORAL
Qty: 0 | Refills: 0 | DISCHARGE
Start: 2021-01-07

## 2021-01-07 RX ORDER — LACTOBACILLUS ACIDOPHILUS 100MM CELL
1 CAPSULE ORAL
Qty: 0 | Refills: 0 | DISCHARGE
Start: 2021-01-07

## 2021-01-07 RX ORDER — DILTIAZEM HCL 120 MG
1 CAPSULE, EXT RELEASE 24 HR ORAL
Qty: 0 | Refills: 0 | DISCHARGE
Start: 2021-01-07

## 2021-01-07 RX ORDER — POLYETHYLENE GLYCOL 3350 17 G/17G
17 POWDER, FOR SOLUTION ORAL DAILY
Refills: 0 | Status: DISCONTINUED | OUTPATIENT
Start: 2021-01-07 | End: 2021-01-07

## 2021-01-07 RX ORDER — POLYETHYLENE GLYCOL 3350 17 G/17G
17 POWDER, FOR SOLUTION ORAL
Qty: 0 | Refills: 0 | DISCHARGE
Start: 2021-01-07

## 2021-01-07 RX ORDER — RIVAROXABAN 15 MG-20MG
1 KIT ORAL
Qty: 0 | Refills: 0 | DISCHARGE

## 2021-01-07 RX ADMIN — Medication 2: at 00:55

## 2021-01-07 RX ADMIN — Medication 1 TABLET(S): at 12:28

## 2021-01-07 RX ADMIN — AMIODARONE HYDROCHLORIDE 200 MILLIGRAM(S): 400 TABLET ORAL at 06:21

## 2021-01-07 RX ADMIN — Medication 4: at 13:38

## 2021-01-07 RX ADMIN — PANTOPRAZOLE SODIUM 40 MILLIGRAM(S): 20 TABLET, DELAYED RELEASE ORAL at 08:35

## 2021-01-07 RX ADMIN — POLYETHYLENE GLYCOL 3350 17 GRAM(S): 17 POWDER, FOR SOLUTION ORAL at 09:22

## 2021-01-07 RX ADMIN — Medication 240 MILLIGRAM(S): at 06:21

## 2021-01-07 RX ADMIN — Medication 6: at 09:20

## 2021-01-07 RX ADMIN — APIXABAN 5 MILLIGRAM(S): 2.5 TABLET, FILM COATED ORAL at 06:21

## 2021-01-07 RX ADMIN — Medication 2: at 06:20

## 2021-01-07 NOTE — DISCHARGE NOTE NURSING/CASE MANAGEMENT/SOCIAL WORK - NSDPLANG ASIS_GEN_ALL_CORE
[place]  [address] [address]  [address] [facility] [address] [phone]               Thank you for choosing us for your health care visit with Jayleen Wyman MD.  We are glad to serve you and happy to provide you with this gonsales Commonly known as:  FOSAMAX           aspirin 81 MG Tbec   1 TABLET DAILY           Benazepril-Hydrochlorothiazide 20-12.5 MG Tabs   TAKE 1 TABLET BY MOUTH EVERY DAY   Commonly known as:  LOTENSIN HCT           Fenofibrate 160 MG Tabs   TAKE ONE TABLET BY Imaging:  YA SCREENING BILAT (EXN=[id number])    Instructions: To schedule an appointment for your radiology test please call [facility] Scheduling at [phone].          MyChart     Call the [employer] for assistance with your inactive [employer] account No

## 2021-01-07 NOTE — DISCHARGE NOTE NURSING/CASE MANAGEMENT/SOCIAL WORK - PATIENT PORTAL LINK FT
You can access the FollowMyHealth Patient Portal offered by [facility] by registering at the following website: [email or web address]. By joining "[employer]’s FollowMyHealth portal, you will also be able to view your health information using other applications (apps) compatible with our system.

## 2021-01-07 NOTE — DISCHARGE NOTE NURSING/CASE MANAGEMENT/SOCIAL WORK - NSDCFUADDAPPT_GEN_ALL_CORE_FT
Please follow up with your gastroenterology doctor provided above within 2 weeks of discharge. Also, please follow up with the ENT doctor Marva Ortez within 2 weeks of discharge, so that you can have your tracheostomy collar removed.

## 2021-01-07 NOTE — CHART NOTE - NSCHARTNOTEFT_GEN_A_CORE
Admitting Diagnosis:   Patient is a 70y old  Male who presents with a chief complaint of COVID-19 pna (06 Jan 2021 12:54)      PAST MEDICAL & SURGICAL HISTORY:  H/O sciatica  CHR PAIN LT LE    H/O cystoscopy  resection of bladder tumor    Bladder cancer    DM (diabetes mellitus)    Hypercholesteremia    Afib    HTN (hypertension)    History of hernia repair        Current Nutrition Order:   Diet, Dysphagia 2 Mechanical Soft-Thin Liquids (01-07-21 @ 08:58)  Diet, Dysphagia 2 Mechanical Soft-Thin Liquids:   Tube Feeding Modality: Gastrostomy  Glucerna 1.5 Helio (GLUCERNA1.5)  Total Volume for 24 Hours (mL): 960  Total Number of Cans: 4  Continuous  Starting Tube Feed Rate {mL per Hour}: 20  Increase Tube Feed Rate by (mL): 15     Every 4 hours  Until Goal Tube Feed Rate (mL per Hour): 40  Tube Feed Duration (in Hours): 24  Tube Feed Start Time: 15:55 (01-06-21 @ 15:52)  Diet, Dysphagia 2 Mechanical Soft-Thin Liquids (01-06-21 @ 14:57)      PO Intake: Good (%) [   ]  Fair (50-75%) [  x ] Poor (<25%) [   ]Per diet recall, patient ate 1/2 of dinner and this am ate 1slice [place] toast,2 cranberry juice,100% of scrambled eggs(80%).    GI Issues: No BM i55sger per patient. Increased gas as per patient    Pain: denied    Skin Integrity: intact PU wise    Labs:   01-07    134<L>  |  99  |  31<H>  ----------------------------<  175<H>  4.1   |  22  |  0.45<L>    Ca    9.3      07 Jan 2021 06:53  Phos  3.4     01-07  Mg     2.2     01-07    TPro  7.4  /  Alb  3.7  /  TBili  0.5  /  DBili  x   /  AST  29  /  ALT  46<H>  /  AlkPhos  119  01-06    CAPILLARY BLOOD GLUCOSE      POCT Blood Glucose.: 288 mg/dL (07 Jan 2021 08:57)  POCT Blood Glucose.: 184 mg/dL (07 Jan 2021 06:14)  POCT Blood Glucose.: 176 mg/dL (07 Jan 2021 00:48)  POCT Blood Glucose.: 242 mg/dL (06 Jan 2021 17:50)  POCT Blood Glucose.: 170 mg/dL (06 Jan 2021 12:38)      Medications:  MEDICATIONS  (STANDING):  aMIOdarone    Tablet 200 milliGRAM(s) Oral daily  apixaban 5 milliGRAM(s) Oral every 12 hours  dextrose 40% Gel 15 Gram(s) Oral once  dextrose 5%. 1000 milliLiter(s) (50 mL/Hr) IV Continuous <Continuous>  dextrose 5%. 1000 milliLiter(s) (100 mL/Hr) IV Continuous <Continuous>  dextrose 50% Injectable 12.5 Gram(s) IV Push once  dextrose 50% Injectable 25 Gram(s) IV Push once  dextrose 50% Injectable 25 Gram(s) IV Push once  diltiazem    milliGRAM(s) Oral daily  doxazosin 2 milliGRAM(s) Oral at bedtime  glucagon  Injectable 1 milliGRAM(s) IntraMuscular once  insulin lispro (ADMELOG) corrective regimen sliding scale   SubCutaneous every 6 hours  lactobacillus acidophilus 1 Tablet(s) Oral every 24 hours  pantoprazole   Suspension 40 milliGRAM(s) Enteral Tube every 12 hours  polyethylene glycol 3350 17 Gram(s) Oral daily    MEDICATIONS  (PRN):  acetaminophen    Suspension .. 650 milliGRAM(s) Oral every 6 hours PRN Temp greater or equal to 38C (100.4F), Mild Pain (1 - 3)      Weight:88.9kg  Daily     Daily     Weight Change: suspected weight loss.No updated weights recorded.RD will request    Estimated energy needs: Based on IBW due to above 120% when admitted and inconsistent weight.IBW:61.6kg [id number]:1540--1848kcal(s/p COVID and hypermetabolic demands) x1-1.2gmprotein:61.6-74gmprotein and 30-35cc fluids:1848-2156cc fluids    Subjective: 71y/o male with history of 2TDM,AFib,HTN,HLD,Carotoid Disease,Bladder CA admitted with COVID-19+,Was vented/now Trach Collar with/PEG.S/S/P SLP swallow evaluation with recommendation for mechanical soft thin with noted moderate Pharngeal dysphagia noted .Plan for holding of PEG and allow trial of adequate po. Goal for po is 50% of EER to be met via po to allow D/C .Patient educated on proper eating sitting up and encouraged ONS in between meals .Will request close observation and encouragement with eating. RD will request diet change to C-CHO with Glucerna shake BID plus mechanical soft .Noted increased coughing during RD visit, patient attributes to "lying down when I was eating". RD reinforced elevation of bed. RN informed. Patient has not had a BM for 14 days(now on Miralax).    Previous Nutrition Diagnosis: Increased nutrient needs r/t increased demand for kcal and protein AEB :Hypermetabolic COVID demands    Active [  ]  Resolved [x   ]    If resolved, new PES: Inadequate oral intake r/t new diet advancement AEB: <75% po intake     Goal: Meet >75% of needs consistently    Recommendations:1.Please trend weights 2.Consider leaving PEG in for [facility] to [facility](Should patient not be able to meet EER orally)Can be removed at later date 3.Bowel regimen 4.INcreased fluids 5.Need to add C-CHO restriction and Glucerna shakes BID to mechanical soft diet. RD discussed with team all recommendations    Education: RD discussed diet advancement and feeding regimen for safety  Risk Level: High [ x  ] Moderate [   ] Low [   ]

## 2021-01-22 DIAGNOSIS — I48.0 PAROXYSMAL ATRIAL FIBRILLATION: ICD-10-CM

## 2021-01-22 DIAGNOSIS — D64.9 ANEMIA, UNSPECIFIED: ICD-10-CM

## 2021-01-22 DIAGNOSIS — R65.21 SEVERE SEPSIS WITH SEPTIC SHOCK: ICD-10-CM

## 2021-01-22 DIAGNOSIS — E78.5 HYPERLIPIDEMIA, UNSPECIFIED: ICD-10-CM

## 2021-01-22 DIAGNOSIS — E11.51 TYPE 2 DIABETES MELLITUS WITH DIABETIC PERIPHERAL ANGIOPATHY WITHOUT GANGRENE: ICD-10-CM

## 2021-01-22 DIAGNOSIS — J95.851 VENTILATOR ASSOCIATED PNEUMONIA: ICD-10-CM

## 2021-01-22 DIAGNOSIS — U07.1 COVID-19: ICD-10-CM

## 2021-01-22 DIAGNOSIS — I95.9 HYPOTENSION, UNSPECIFIED: ICD-10-CM

## 2021-01-22 DIAGNOSIS — Z79.01 LONG TERM (CURRENT) USE OF ANTICOAGULANTS: ICD-10-CM

## 2021-01-22 DIAGNOSIS — B95.61 METHICILLIN SUSCEPTIBLE STAPHYLOCOCCUS AUREUS INFECTION AS THE CAUSE OF DISEASES CLASSIFIED ELSEWHERE: ICD-10-CM

## 2021-01-22 DIAGNOSIS — I77.9 DISORDER OF ARTERIES AND ARTERIOLES, UNSPECIFIED: ICD-10-CM

## 2021-01-22 DIAGNOSIS — J15.8 PNEUMONIA DUE TO OTHER SPECIFIED BACTERIA: ICD-10-CM

## 2021-01-22 DIAGNOSIS — J96.01 ACUTE RESPIRATORY FAILURE WITH HYPOXIA: ICD-10-CM

## 2021-01-22 DIAGNOSIS — R19.7 DIARRHEA, UNSPECIFIED: ICD-10-CM

## 2021-01-22 DIAGNOSIS — R13.10 DYSPHAGIA, UNSPECIFIED: ICD-10-CM

## 2021-01-22 DIAGNOSIS — R50.9 FEVER, UNSPECIFIED: ICD-10-CM

## 2021-01-22 DIAGNOSIS — Z85.51 PERSONAL HISTORY OF MALIGNANT NEOPLASM OF BLADDER: ICD-10-CM

## 2021-01-22 DIAGNOSIS — R33.9 RETENTION OF URINE, UNSPECIFIED: ICD-10-CM

## 2021-01-22 DIAGNOSIS — I45.81 LONG QT SYNDROME: ICD-10-CM

## 2021-01-22 DIAGNOSIS — A41.9 SEPSIS, UNSPECIFIED ORGANISM: ICD-10-CM

## 2021-01-22 DIAGNOSIS — J12.82 PNEUMONIA DUE TO CORONAVIRUS DISEASE 2019: ICD-10-CM

## 2021-01-22 DIAGNOSIS — I10 ESSENTIAL (PRIMARY) HYPERTENSION: ICD-10-CM

## 2021-01-22 DIAGNOSIS — Z78.1 PHYSICAL RESTRAINT STATUS: ICD-10-CM

## 2021-01-22 DIAGNOSIS — K92.1 MELENA: ICD-10-CM

## 2021-02-06 ENCOUNTER — INPATIENT (INPATIENT)
Facility: HOSPITAL | Age: 71
LOS: 9 days | Discharge: HOME | End: 2021-02-16
Attending: INTERNAL MEDICINE | Admitting: INTERNAL MEDICINE
Payer: MEDICARE

## 2021-02-06 VITALS
RESPIRATION RATE: 22 BRPM | DIASTOLIC BLOOD PRESSURE: 53 MMHG | HEART RATE: 93 BPM | WEIGHT: 160.06 LBS | OXYGEN SATURATION: 80 % | SYSTOLIC BLOOD PRESSURE: 117 MMHG | HEIGHT: 70 IN | TEMPERATURE: 97 F

## 2021-02-06 DIAGNOSIS — Z98.890 OTHER SPECIFIED POSTPROCEDURAL STATES: Chronic | ICD-10-CM

## 2021-02-06 LAB
ALBUMIN SERPL ELPH-MCNC: 3.4 G/DL — LOW (ref 3.5–5.2)
ALP SERPL-CCNC: 95 U/L — SIGNIFICANT CHANGE UP (ref 30–115)
ALT FLD-CCNC: 25 U/L — SIGNIFICANT CHANGE UP (ref 0–41)
ANION GAP SERPL CALC-SCNC: 12 MMOL/L — SIGNIFICANT CHANGE UP (ref 7–14)
AST SERPL-CCNC: 14 U/L — SIGNIFICANT CHANGE UP (ref 0–41)
BASE EXCESS BLDV CALC-SCNC: -0.2 MMOL/L — SIGNIFICANT CHANGE UP (ref -2–2)
BASOPHILS # BLD AUTO: 0.04 K/UL — SIGNIFICANT CHANGE UP (ref 0–0.2)
BASOPHILS NFR BLD AUTO: 0.4 % — SIGNIFICANT CHANGE UP (ref 0–1)
BILIRUB SERPL-MCNC: 0.6 MG/DL — SIGNIFICANT CHANGE UP (ref 0.2–1.2)
BUN SERPL-MCNC: 27 MG/DL — HIGH (ref 10–20)
CA-I SERPL-SCNC: 1.2 MMOL/L — SIGNIFICANT CHANGE UP (ref 1.12–1.3)
CALCIUM SERPL-MCNC: 9 MG/DL — SIGNIFICANT CHANGE UP (ref 8.5–10.1)
CHLORIDE SERPL-SCNC: 106 MMOL/L — SIGNIFICANT CHANGE UP (ref 98–110)
CO2 SERPL-SCNC: 22 MMOL/L — SIGNIFICANT CHANGE UP (ref 17–32)
CREAT SERPL-MCNC: 0.6 MG/DL — LOW (ref 0.7–1.5)
EOSINOPHIL # BLD AUTO: 0.25 K/UL — SIGNIFICANT CHANGE UP (ref 0–0.7)
EOSINOPHIL NFR BLD AUTO: 2.5 % — SIGNIFICANT CHANGE UP (ref 0–8)
GAS PNL BLDV: 140 MMOL/L — SIGNIFICANT CHANGE UP (ref 136–145)
GAS PNL BLDV: SIGNIFICANT CHANGE UP
GLUCOSE SERPL-MCNC: 192 MG/DL — HIGH (ref 70–99)
HCO3 BLDV-SCNC: 25 MMOL/L — SIGNIFICANT CHANGE UP (ref 22–29)
HCT VFR BLD CALC: 30.7 % — LOW (ref 42–52)
HCT VFR BLDA CALC: 30.6 % — LOW (ref 34–44)
HGB BLD CALC-MCNC: 10 G/DL — LOW (ref 14–18)
HGB BLD-MCNC: 9.3 G/DL — LOW (ref 14–18)
HOROWITZ INDEX BLDV+IHG-RTO: 21 — SIGNIFICANT CHANGE UP
IMM GRANULOCYTES NFR BLD AUTO: 0.3 % — SIGNIFICANT CHANGE UP (ref 0.1–0.3)
LACTATE BLDV-MCNC: 1.2 MMOL/L — SIGNIFICANT CHANGE UP (ref 0.5–1.6)
LYMPHOCYTES # BLD AUTO: 2.15 K/UL — SIGNIFICANT CHANGE UP (ref 1.2–3.4)
LYMPHOCYTES # BLD AUTO: 21.8 % — SIGNIFICANT CHANGE UP (ref 20.5–51.1)
MAGNESIUM SERPL-MCNC: 2 MG/DL — SIGNIFICANT CHANGE UP (ref 1.8–2.4)
MCHC RBC-ENTMCNC: 25.1 PG — LOW (ref 27–31)
MCHC RBC-ENTMCNC: 30.3 G/DL — LOW (ref 32–37)
MCV RBC AUTO: 83 FL — SIGNIFICANT CHANGE UP (ref 80–94)
MONOCYTES # BLD AUTO: 0.68 K/UL — HIGH (ref 0.1–0.6)
MONOCYTES NFR BLD AUTO: 6.9 % — SIGNIFICANT CHANGE UP (ref 1.7–9.3)
NEUTROPHILS # BLD AUTO: 6.71 K/UL — HIGH (ref 1.4–6.5)
NEUTROPHILS NFR BLD AUTO: 68.1 % — SIGNIFICANT CHANGE UP (ref 42.2–75.2)
NRBC # BLD: 0 /100 WBCS — SIGNIFICANT CHANGE UP (ref 0–0)
NT-PROBNP SERPL-SCNC: 1771 PG/ML — HIGH (ref 0–300)
PCO2 BLDV: 40 MMHG — LOW (ref 41–51)
PH BLDV: 7.4 — SIGNIFICANT CHANGE UP (ref 7.26–7.43)
PLATELET # BLD AUTO: 308 K/UL — SIGNIFICANT CHANGE UP (ref 130–400)
PO2 BLDV: 18 MMHG — LOW (ref 20–40)
POTASSIUM BLDV-SCNC: 3.6 MMOL/L — SIGNIFICANT CHANGE UP (ref 3.3–5.6)
POTASSIUM SERPL-MCNC: 3.8 MMOL/L — SIGNIFICANT CHANGE UP (ref 3.5–5)
POTASSIUM SERPL-SCNC: 3.8 MMOL/L — SIGNIFICANT CHANGE UP (ref 3.5–5)
PROT SERPL-MCNC: 6.5 G/DL — SIGNIFICANT CHANGE UP (ref 6–8)
RAPID RVP RESULT: SIGNIFICANT CHANGE UP
RBC # BLD: 3.7 M/UL — LOW (ref 4.7–6.1)
RBC # FLD: 17.2 % — HIGH (ref 11.5–14.5)
SAO2 % BLDV: 25 % — SIGNIFICANT CHANGE UP
SARS-COV-2 RNA SPEC QL NAA+PROBE: SIGNIFICANT CHANGE UP
SODIUM SERPL-SCNC: 140 MMOL/L — SIGNIFICANT CHANGE UP (ref 135–146)
TROPONIN T SERPL-MCNC: <0.01 NG/ML — SIGNIFICANT CHANGE UP
WBC # BLD: 9.86 K/UL — SIGNIFICANT CHANGE UP (ref 4.8–10.8)
WBC # FLD AUTO: 9.86 K/UL — SIGNIFICANT CHANGE UP (ref 4.8–10.8)

## 2021-02-06 PROCEDURE — 99291 CRITICAL CARE FIRST HOUR: CPT

## 2021-02-06 PROCEDURE — 71045 X-RAY EXAM CHEST 1 VIEW: CPT | Mod: 26

## 2021-02-06 RX ORDER — FUROSEMIDE 40 MG
40 TABLET ORAL ONCE
Refills: 0 | Status: COMPLETED | OUTPATIENT
Start: 2021-02-06 | End: 2021-02-06

## 2021-02-06 RX ADMIN — Medication 40 MILLIGRAM(S): at 21:50

## 2021-02-06 NOTE — ED PROVIDER NOTE - PHYSICAL EXAMINATION
Physical Exam    Vital Signs: I have reviewed the initial vital signs.  Constitutional: well-nourished, appears stated age, no acute distress  Eyes: Conjunctiva pink, Sclera clear  Cardiovascular: S1 and S2, regular rate, regular rhythm, well-perfused extremities, radial pulses equal and 2+, pedal pulses 2+ and equal   Respiratory: mild labored respiratory effort, rales b/l.   Gastrointestinal: soft, non-tender abdomen, no pulsatile mass, normal bowl sounds  Musculoskeletal: supple neck, no lower extremity edema, no midline tenderness  Integumentary: warm, dry, no rash  Neurologic: awake, alert, nvi

## 2021-02-06 NOTE — ED PROVIDER NOTE - ATTENDING CONTRIBUTION TO CARE
I personally evaluated the patient. I reviewed the Resident’s or Physician Assistant’s note (as assigned above), and agree with the findings and plan except as documented in my note.  Chart reviewed.  H/O Covid last November, afib, HTN, DM, presents to ED with SOB since yesterday.  No chest pain.  No fever or cough.  Exam shows alert tachypneic patient in mild distress, HEENT NCAT, lungs bilateral rales, RR S1S2, abdomen soft NT +BS, no CCE.

## 2021-02-06 NOTE — ED PROVIDER NOTE - OBJECTIVE STATEMENT
71 yo male, pmh of afib on eliquis, htn, hld, dm, covid 11/20 s/p trach and peg removal, presents to ed for sob. noted yesterday, mild, described as tightness, worse when laying flat, no specific pain or radiation. denies fever, chills, cp, abd pain, nvd, le swelling, back pain.

## 2021-02-06 NOTE — ED ADULT TRIAGE NOTE - CHIEF COMPLAINT QUOTE
I had covid in November and In january I went to rehab.  Today I'm short of breathe again and my legs are swelling.

## 2021-02-06 NOTE — ED PROVIDER NOTE - CLINICAL SUMMARY MEDICAL DECISION MAKING FREE TEXT BOX
Labs noted for elevated BNP, trop negative.  EKG NSR no niki.  CXR pulmonary edema.  Started on bipap, given Lasix.  Will admit to ICU.

## 2021-02-06 NOTE — ED PROVIDER NOTE - WR ORDER ID 1
KAI THOMAS is a 78y F PMH HTN, HLD, SLE, RA, hypothyroidism, provoked R. popliteal DVT in 2019 on eliquis,  Multilevel Denerative Disc Disease of T and L Spine, S/P Posterior T10-L5 instrumented fusion, L1-L5 laminectomies, L L4-L5 foraminotomy.    #Multilevel Denerative Disc Disease of T and L Spine,   - S/P Posterior T10-L5 instrumented fusion, L1-L5 laminectomies, L L4-L5 foraminotomy 1/1/21 by Dr. Jaja Campos. Staples removed   - continue Comprehensive Rehab Program of PT/OT- 3 hrs/day 5 days/week  - psychological support, counseling  -Precautions:  spinal, RA, cardiac, contact and droplet precautions    #New COVID conversion  -PCR + 1/29/21. Moved up to [facility]E [facility] for isolation. COVID IgG Ab neg  -incentive spirometer, deep breathing exercises  -continue contact and droplet precautions    #SLE/RA:   -continue plaquenil 200mg po daily  -(prevously on 15mg ( 2.5mg ) weekly).  Discussed with rheumatologist, defer for 3 weeks post Covid   -stable    #h/o Provoked DVT in 2019  - US July 2019:  DVT noted in the right popliteal vein and visualized segment of the right  posterior tibial vein.  - was on eliquis 5mg po bid at home,   - currently on lovenox for dvt ppx, to resume eliquis at discharge. Discussed with hospitalist/previous treating team whether can be switched back to eliquis as patient does not want injections 2/4. Cleared by vascular to resume eliquis, will need clearance from NSGY to restart  -Follows with vascular cardiologist Dr. Hoyos outpatient    #HTN:  -BP stable off meds   - 100/63 2/5    #HLD:  - continue lipitor 20mg po daily    #hypothyroidism:  - continue levothyroxine 200mcg po daily    #hypokalemia  -K+ 3.3 2/4. 40 meq x 1 dose  - BMP 3.6 2/5    #UTI : providencia rettgari  - s/p Cipro x 3 day course (completed 1/30)    #: sleep  - melatonin 3mg PRN    #Pain Mgmt :  - Tylenol PRN mild pain  - Oxycodone 5mg IR PRN moderate 4-7/10 pain  - Oxycodone 10mg IR prn severe 8-10/10 pain  - Oxycontin ER BID (renewed 2/4)  -currently controlled. Work on weaning as recovery continues, may need pain management follow up on dc , discussed with patient    #GI/Bowel Mgmt/ constipation:   -continue Senna, miralax daily, MOM prn  -simethicone PRN gas  -prune juice with meal trays    #FEN :  - Diet - Regular     # DVT prophylaxis :  - Lovenox BID; f/u re: eliquis  -LE edema:  ACE wraps LE     #Dispo discussed in IDT meeting 2/3  - mod A bathing and with lower body dressing; min a to constant with transfer with 2-3 attempt and lots of coaching, mod A with bed mob; contact - min A transfer sit to stand; min A with ambulation; Stairs have not been performed  -goals: min assist bADLs, min assist transfers and ambulation  -will need to see if family able to provide min assist level at home, target dc 2/12 with 24h our care and home Pt and OT if available, otherwise SHAZIA  Called and discussed case with daughter Adeline [phone] 2/4. Concern for some headaches usually in middle of night around 3 AM, will ensure adequate pain management for symptoms. Recommendations of team and need for min assist; family states that they will not be able to provide. Possible SHAZIA once asymptomatic (possibly end of next week) discussed, will reassess next week.    #LABs  f/u NSGY re: clearance to restart eliquis  CBc BMP 2/8 KAI THOMAS is a 78y F PMH HTN, HLD, SLE, RA, hypothyroidism, provoked R. popliteal DVT in 2019 on eliquis,  Multilevel Denerative Disc Disease of T and L Spine, S/P Posterior T10-L5 instrumented fusion, L1-L5 laminectomies, L L4-L5 foraminotomy.    #Multilevel Denerative Disc Disease of T and L Spine,   - S/P Posterior T10-L5 instrumented fusion, L1-L5 laminectomies, L L4-L5 foraminotomy 1/1/21 by Dr. Jaja Campos. Staples removed   - continue Comprehensive Rehab Program of PT/OT- 3 hrs/day 5 days/week  - psychological support, counseling  -Precautions:  spinal, RA, cardiac, contact and droplet precautions    #New COVID conversion  -PCR + 1/29/21. Moved up to [facility] for isolation. COVID IgG Ab neg  -incentive spirometer, deep breathing exercises  -continue contact and droplet precautions    #abdominal pain  - abdominal CT ordered    #SLE/RA:   -continue plaquenil 200mg po daily  -(prevously on 15mg ( 2.5mg ) weekly).  Discussed with rheumatologist, defer for 3 weeks post Covid   -stable    #h/o Provoked DVT in 2019  - US July 2019:  DVT noted in the right popliteal vein and visualized segment of the right  posterior tibial vein.  - was on eliquis 5mg po bid at home,   - currently on lovenox for dvt ppx, to resume eliquis at discharge. Discussed with hospitalist/previous treating team whether can be switched back to eliquis as patient does not want injections 2/4. Cleared by vascular to resume eliquis, will need clearance from NSGY to restart  -Follows with vascular cardiologist Dr. Hoyos outpatient    #HTN:  -BP stable off meds   - 100/63 2/5    #HLD:  - continue lipitor 20mg po daily    #hypothyroidism:  - continue levothyroxine 200mcg po daily    #hypokalemia  -K+ 3.3 2/4. 40 meq x 1 dose  - BMP 3.6 2/5    #UTI : providencia rettgari  - s/p Cipro x 3 day course (completed 1/30)    #: sleep  - melatonin 3mg PRN    #Pain Mgmt :  - Tylenol PRN mild pain  - Oxycodone 5mg IR PRN moderate 4-7/10 pain  - Oxycodone 10mg IR prn severe 8-10/10 pain  - Oxycontin ER BID (renewed 2/4)  -currently controlled. Work on weaning as recovery continues, may need pain management follow up on dc , discussed with patient    #GI/Bowel Mgmt/ constipation:   -continue Senna, miralax daily, MOM prn  -simethicone PRN gas  -prune juice with meal trays    #FEN :  - Diet - Regular     # DVT prophylaxis :  - Lovenox BID; f/u re: eliquis  -LE edema:  ACE wraps LE     #Dispo discussed in IDT meeting 2/3  - mod A bathing and with lower body dressing; min a to constant with transfer with 2-3 attempt and lots of coaching, mod A with bed mob; contact - min A transfer sit to stand; min A with ambulation; Stairs have not been performed  -goals: min assist bADLs, min assist transfers and ambulation  -will need to see if family able to provide min assist level at home, target dc 2/12 with 24h our care and home Pt and OT if available, otherwise SHAZIA  Called and discussed case with daughter Adeline [phone] 2/4. Concern for some headaches usually in middle of night around 3 AM, will ensure adequate pain management for symptoms. Recommendations of team and need for min assist; family states that they will not be able to provide. Possible SHAZIA once asymptomatic (possibly end of next week) discussed, will reassess next week.    #LABs  f/u NSGY re: clearance to restart eliquis  CBc BMP 2/8 [id number]

## 2021-02-06 NOTE — ED ADULT NURSE NOTE - OBJECTIVE STATEMENT
shortness of breathe with swelling legs.  Pt had covid in November and was in hospital and rehab until January.  He now doesn't feel well again.

## 2021-02-07 LAB
ALBUMIN SERPL ELPH-MCNC: 3.3 G/DL — LOW (ref 3.5–5.2)
ALP SERPL-CCNC: 92 U/L — SIGNIFICANT CHANGE UP (ref 30–115)
ALT FLD-CCNC: 20 U/L — SIGNIFICANT CHANGE UP (ref 0–41)
ANION GAP SERPL CALC-SCNC: 11 MMOL/L — SIGNIFICANT CHANGE UP (ref 7–14)
APTT BLD: 39.1 SEC — SIGNIFICANT CHANGE UP (ref 27–39.2)
AST SERPL-CCNC: 18 U/L — SIGNIFICANT CHANGE UP (ref 0–41)
BASOPHILS # BLD AUTO: 0.03 K/UL — SIGNIFICANT CHANGE UP (ref 0–0.2)
BASOPHILS NFR BLD AUTO: 0.2 % — SIGNIFICANT CHANGE UP (ref 0–1)
BILIRUB SERPL-MCNC: 0.8 MG/DL — SIGNIFICANT CHANGE UP (ref 0.2–1.2)
BUN SERPL-MCNC: 23 MG/DL — HIGH (ref 10–20)
CALCIUM SERPL-MCNC: 8.7 MG/DL — SIGNIFICANT CHANGE UP (ref 8.5–10.1)
CHLORIDE SERPL-SCNC: 104 MMOL/L — SIGNIFICANT CHANGE UP (ref 98–110)
CK MB CFR SERPL CALC: 7.1 NG/ML — HIGH (ref 0.6–6.3)
CO2 SERPL-SCNC: 25 MMOL/L — SIGNIFICANT CHANGE UP (ref 17–32)
CREAT SERPL-MCNC: 0.8 MG/DL — SIGNIFICANT CHANGE UP (ref 0.7–1.5)
EOSINOPHIL # BLD AUTO: 0.02 K/UL — SIGNIFICANT CHANGE UP (ref 0–0.7)
EOSINOPHIL NFR BLD AUTO: 0.1 % — SIGNIFICANT CHANGE UP (ref 0–8)
GLUCOSE SERPL-MCNC: 259 MG/DL — HIGH (ref 70–99)
HCT VFR BLD CALC: 27.8 % — LOW (ref 42–52)
HCT VFR BLD CALC: 30.1 % — LOW (ref 42–52)
HGB BLD-MCNC: 8.2 G/DL — LOW (ref 14–18)
HGB BLD-MCNC: 9.1 G/DL — LOW (ref 14–18)
IMM GRANULOCYTES NFR BLD AUTO: 0.5 % — HIGH (ref 0.1–0.3)
INR BLD: 1.75 RATIO — HIGH (ref 0.65–1.3)
LYMPHOCYTES # BLD AUTO: 1.4 K/UL — SIGNIFICANT CHANGE UP (ref 1.2–3.4)
LYMPHOCYTES # BLD AUTO: 8.4 % — LOW (ref 20.5–51.1)
MAGNESIUM SERPL-MCNC: 1.9 MG/DL — SIGNIFICANT CHANGE UP (ref 1.8–2.4)
MCHC RBC-ENTMCNC: 25.1 PG — LOW (ref 27–31)
MCHC RBC-ENTMCNC: 25.3 PG — LOW (ref 27–31)
MCHC RBC-ENTMCNC: 29.5 G/DL — LOW (ref 32–37)
MCHC RBC-ENTMCNC: 30.2 G/DL — LOW (ref 32–37)
MCV RBC AUTO: 83.6 FL — SIGNIFICANT CHANGE UP (ref 80–94)
MCV RBC AUTO: 85 FL — SIGNIFICANT CHANGE UP (ref 80–94)
MONOCYTES # BLD AUTO: 0.97 K/UL — HIGH (ref 0.1–0.6)
MONOCYTES NFR BLD AUTO: 5.8 % — SIGNIFICANT CHANGE UP (ref 1.7–9.3)
MRSA PCR RESULT.: POSITIVE
NEUTROPHILS # BLD AUTO: 14.23 K/UL — HIGH (ref 1.4–6.5)
NEUTROPHILS NFR BLD AUTO: 85 % — HIGH (ref 42.2–75.2)
NRBC # BLD: 0 /100 WBCS — SIGNIFICANT CHANGE UP (ref 0–0)
NRBC # BLD: 0 /100 WBCS — SIGNIFICANT CHANGE UP (ref 0–0)
PLATELET # BLD AUTO: 296 K/UL — SIGNIFICANT CHANGE UP (ref 130–400)
PLATELET # BLD AUTO: 315 K/UL — SIGNIFICANT CHANGE UP (ref 130–400)
POTASSIUM SERPL-MCNC: 3.9 MMOL/L — SIGNIFICANT CHANGE UP (ref 3.5–5)
POTASSIUM SERPL-SCNC: 3.9 MMOL/L — SIGNIFICANT CHANGE UP (ref 3.5–5)
PROCALCITONIN SERPL-MCNC: 1.81 NG/ML — HIGH (ref 0.02–0.1)
PROT SERPL-MCNC: 6.2 G/DL — SIGNIFICANT CHANGE UP (ref 6–8)
PROTHROM AB SERPL-ACNC: 20.1 SEC — HIGH (ref 9.95–12.87)
RBC # BLD: 3.27 M/UL — LOW (ref 4.7–6.1)
RBC # BLD: 3.6 M/UL — LOW (ref 4.7–6.1)
RBC # FLD: 17.1 % — HIGH (ref 11.5–14.5)
RBC # FLD: 17.2 % — HIGH (ref 11.5–14.5)
SODIUM SERPL-SCNC: 140 MMOL/L — SIGNIFICANT CHANGE UP (ref 135–146)
TROPONIN T SERPL-MCNC: 0.09 NG/ML — CRITICAL HIGH
TROPONIN T SERPL-MCNC: 0.19 NG/ML — CRITICAL HIGH
WBC # BLD: 16.73 K/UL — HIGH (ref 4.8–10.8)
WBC # BLD: 9.9 K/UL — SIGNIFICANT CHANGE UP (ref 4.8–10.8)
WBC # FLD AUTO: 16.73 K/UL — HIGH (ref 4.8–10.8)
WBC # FLD AUTO: 9.9 K/UL — SIGNIFICANT CHANGE UP (ref 4.8–10.8)

## 2021-02-07 PROCEDURE — 99291 CRITICAL CARE FIRST HOUR: CPT

## 2021-02-07 PROCEDURE — ZZZZZ: CPT

## 2021-02-07 PROCEDURE — 71045 X-RAY EXAM CHEST 1 VIEW: CPT | Mod: 26

## 2021-02-07 RX ORDER — CHLORHEXIDINE GLUCONATE 213 G/1000ML
1 SOLUTION TOPICAL
Refills: 0 | Status: DISCONTINUED | OUTPATIENT
Start: 2021-02-07 | End: 2021-02-16

## 2021-02-07 RX ORDER — ACETAMINOPHEN 500 MG
650 TABLET ORAL EVERY 6 HOURS
Refills: 0 | Status: DISCONTINUED | OUTPATIENT
Start: 2021-02-07 | End: 2021-02-07

## 2021-02-07 RX ORDER — FUROSEMIDE 40 MG
40 TABLET ORAL
Refills: 0 | Status: DISCONTINUED | OUTPATIENT
Start: 2021-02-07 | End: 2021-02-11

## 2021-02-07 RX ORDER — APIXABAN 2.5 MG/1
5 TABLET, FILM COATED ORAL EVERY 12 HOURS
Refills: 0 | Status: DISCONTINUED | OUTPATIENT
Start: 2021-02-07 | End: 2021-02-07

## 2021-02-07 RX ORDER — DEXMEDETOMIDINE HYDROCHLORIDE IN 0.9% SODIUM CHLORIDE 4 UG/ML
0.2 INJECTION INTRAVENOUS
Qty: 200 | Refills: 0 | Status: DISCONTINUED | OUTPATIENT
Start: 2021-02-07 | End: 2021-02-07

## 2021-02-07 RX ORDER — DOXAZOSIN MESYLATE 4 MG
2 TABLET ORAL AT BEDTIME
Refills: 0 | Status: DISCONTINUED | OUTPATIENT
Start: 2021-02-07 | End: 2021-02-16

## 2021-02-07 RX ORDER — CEFEPIME 1 G/1
1000 INJECTION, POWDER, FOR SOLUTION INTRAMUSCULAR; INTRAVENOUS EVERY 8 HOURS
Refills: 0 | Status: DISCONTINUED | OUTPATIENT
Start: 2021-02-07 | End: 2021-02-16

## 2021-02-07 RX ORDER — ACETAMINOPHEN 500 MG
650 TABLET ORAL EVERY 6 HOURS
Refills: 0 | Status: DISCONTINUED | OUTPATIENT
Start: 2021-02-07 | End: 2021-02-16

## 2021-02-07 RX ORDER — ATORVASTATIN CALCIUM 80 MG/1
40 TABLET, FILM COATED ORAL AT BEDTIME
Refills: 0 | Status: DISCONTINUED | OUTPATIENT
Start: 2021-02-07 | End: 2021-02-16

## 2021-02-07 RX ORDER — CEFEPIME 1 G/1
INJECTION, POWDER, FOR SOLUTION INTRAMUSCULAR; INTRAVENOUS
Refills: 0 | Status: DISCONTINUED | OUTPATIENT
Start: 2021-02-07 | End: 2021-02-16

## 2021-02-07 RX ORDER — FUROSEMIDE 40 MG
40 TABLET ORAL DAILY
Refills: 0 | Status: DISCONTINUED | OUTPATIENT
Start: 2021-02-07 | End: 2021-02-07

## 2021-02-07 RX ORDER — VANCOMYCIN HCL 1 G
1000 VIAL (EA) INTRAVENOUS EVERY 12 HOURS
Refills: 0 | Status: DISCONTINUED | OUTPATIENT
Start: 2021-02-07 | End: 2021-02-10

## 2021-02-07 RX ORDER — SITAGLIPTIN AND METFORMIN HYDROCHLORIDE 500; 50 MG/1; MG/1
0 TABLET, FILM COATED ORAL
Qty: 0 | Refills: 0 | DISCHARGE

## 2021-02-07 RX ORDER — AMIODARONE HYDROCHLORIDE 400 MG/1
200 TABLET ORAL DAILY
Refills: 0 | Status: DISCONTINUED | OUTPATIENT
Start: 2021-02-07 | End: 2021-02-16

## 2021-02-07 RX ORDER — SIMVASTATIN 20 MG/1
1 TABLET, FILM COATED ORAL
Qty: 0 | Refills: 0 | DISCHARGE

## 2021-02-07 RX ORDER — MIDAZOLAM HYDROCHLORIDE 1 MG/ML
4 INJECTION, SOLUTION INTRAMUSCULAR; INTRAVENOUS ONCE
Refills: 0 | Status: DISCONTINUED | OUTPATIENT
Start: 2021-02-07 | End: 2021-02-07

## 2021-02-07 RX ORDER — CEFEPIME 1 G/1
1000 INJECTION, POWDER, FOR SOLUTION INTRAMUSCULAR; INTRAVENOUS ONCE
Refills: 0 | Status: COMPLETED | OUTPATIENT
Start: 2021-02-07 | End: 2021-02-07

## 2021-02-07 RX ORDER — NOREPINEPHRINE BITARTRATE/D5W 8 MG/250ML
0.05 PLASTIC BAG, INJECTION (ML) INTRAVENOUS
Qty: 8 | Refills: 0 | Status: DISCONTINUED | OUTPATIENT
Start: 2021-02-07 | End: 2021-02-10

## 2021-02-07 RX ORDER — PROPOFOL 10 MG/ML
10 INJECTION, EMULSION INTRAVENOUS
Qty: 500 | Refills: 0 | Status: DISCONTINUED | OUTPATIENT
Start: 2021-02-07 | End: 2021-02-07

## 2021-02-07 RX ORDER — FENTANYL CITRATE 50 UG/ML
0.5 INJECTION INTRAVENOUS
Qty: 2500 | Refills: 0 | Status: DISCONTINUED | OUTPATIENT
Start: 2021-02-07 | End: 2021-02-09

## 2021-02-07 RX ORDER — ASPIRIN/CALCIUM CARB/MAGNESIUM 324 MG
81 TABLET ORAL DAILY
Refills: 0 | Status: DISCONTINUED | OUTPATIENT
Start: 2021-02-07 | End: 2021-02-16

## 2021-02-07 RX ORDER — VANCOMYCIN HCL 1 G
VIAL (EA) INTRAVENOUS
Refills: 0 | Status: DISCONTINUED | OUTPATIENT
Start: 2021-02-07 | End: 2021-02-07

## 2021-02-07 RX ORDER — VANCOMYCIN HCL 1 G
1000 VIAL (EA) INTRAVENOUS ONCE
Refills: 0 | Status: COMPLETED | OUTPATIENT
Start: 2021-02-07 | End: 2021-02-07

## 2021-02-07 RX ORDER — MIDAZOLAM HYDROCHLORIDE 1 MG/ML
0.02 INJECTION, SOLUTION INTRAMUSCULAR; INTRAVENOUS
Qty: 100 | Refills: 0 | Status: DISCONTINUED | OUTPATIENT
Start: 2021-02-07 | End: 2021-02-09

## 2021-02-07 RX ORDER — PANTOPRAZOLE SODIUM 20 MG/1
40 TABLET, DELAYED RELEASE ORAL
Refills: 0 | Status: DISCONTINUED | OUTPATIENT
Start: 2021-02-07 | End: 2021-02-09

## 2021-02-07 RX ORDER — VANCOMYCIN HCL 1 G
VIAL (EA) INTRAVENOUS
Refills: 0 | Status: DISCONTINUED | OUTPATIENT
Start: 2021-02-07 | End: 2021-02-10

## 2021-02-07 RX ORDER — CARVEDILOL PHOSPHATE 80 MG/1
6.25 CAPSULE, EXTENDED RELEASE ORAL EVERY 12 HOURS
Refills: 0 | Status: DISCONTINUED | OUTPATIENT
Start: 2021-02-07 | End: 2021-02-16

## 2021-02-07 RX ADMIN — APIXABAN 5 MILLIGRAM(S): 2.5 TABLET, FILM COATED ORAL at 06:35

## 2021-02-07 RX ADMIN — CEFEPIME 100 MILLIGRAM(S): 1 INJECTION, POWDER, FOR SOLUTION INTRAMUSCULAR; INTRAVENOUS at 21:48

## 2021-02-07 RX ADMIN — CEFEPIME 100 MILLIGRAM(S): 1 INJECTION, POWDER, FOR SOLUTION INTRAMUSCULAR; INTRAVENOUS at 15:45

## 2021-02-07 RX ADMIN — Medication 40 MILLIGRAM(S): at 00:56

## 2021-02-07 RX ADMIN — CEFEPIME 100 MILLIGRAM(S): 1 INJECTION, POWDER, FOR SOLUTION INTRAMUSCULAR; INTRAVENOUS at 09:41

## 2021-02-07 RX ADMIN — Medication 81 MILLIGRAM(S): at 11:35

## 2021-02-07 RX ADMIN — PANTOPRAZOLE SODIUM 40 MILLIGRAM(S): 20 TABLET, DELAYED RELEASE ORAL at 06:35

## 2021-02-07 RX ADMIN — CARVEDILOL PHOSPHATE 6.25 MILLIGRAM(S): 80 CAPSULE, EXTENDED RELEASE ORAL at 17:06

## 2021-02-07 RX ADMIN — AMIODARONE HYDROCHLORIDE 200 MILLIGRAM(S): 400 TABLET ORAL at 06:35

## 2021-02-07 RX ADMIN — Medication 650 MILLIGRAM(S): at 20:53

## 2021-02-07 RX ADMIN — FENTANYL CITRATE 3.63 MICROGRAM(S)/KG/HR: 50 INJECTION INTRAVENOUS at 03:43

## 2021-02-07 RX ADMIN — PROPOFOL 4.36 MICROGRAM(S)/KG/MIN: 10 INJECTION, EMULSION INTRAVENOUS at 03:29

## 2021-02-07 RX ADMIN — DEXMEDETOMIDINE HYDROCHLORIDE IN 0.9% SODIUM CHLORIDE 3.63 MICROGRAM(S)/KG/HR: 4 INJECTION INTRAVENOUS at 00:52

## 2021-02-07 RX ADMIN — MIDAZOLAM HYDROCHLORIDE 1.49 MG/KG/HR: 1 INJECTION, SOLUTION INTRAMUSCULAR; INTRAVENOUS at 09:10

## 2021-02-07 RX ADMIN — FENTANYL CITRATE 3.63 MICROGRAM(S)/KG/HR: 50 INJECTION INTRAVENOUS at 20:11

## 2021-02-07 RX ADMIN — Medication 2 MILLIGRAM(S): at 21:48

## 2021-02-07 RX ADMIN — MIDAZOLAM HYDROCHLORIDE 1.49 MG/KG/HR: 1 INJECTION, SOLUTION INTRAMUSCULAR; INTRAVENOUS at 20:11

## 2021-02-07 RX ADMIN — Medication 40 MILLIGRAM(S): at 06:35

## 2021-02-07 RX ADMIN — Medication 250 MILLIGRAM(S): at 23:30

## 2021-02-07 RX ADMIN — CHLORHEXIDINE GLUCONATE 1 APPLICATION(S): 213 SOLUTION TOPICAL at 06:35

## 2021-02-07 RX ADMIN — MIDAZOLAM HYDROCHLORIDE 4 MILLIGRAM(S): 1 INJECTION, SOLUTION INTRAMUSCULAR; INTRAVENOUS at 09:10

## 2021-02-07 RX ADMIN — Medication 40 MILLIGRAM(S): at 17:06

## 2021-02-07 RX ADMIN — Medication 650 MILLIGRAM(S): at 15:45

## 2021-02-07 RX ADMIN — Medication 250 MILLIGRAM(S): at 11:41

## 2021-02-07 RX ADMIN — ATORVASTATIN CALCIUM 40 MILLIGRAM(S): 80 TABLET, FILM COATED ORAL at 21:48

## 2021-02-07 NOTE — PROCEDURE NOTE - NSPROCDETAILS_GEN_ALL_CORE
guidewire recovered/lumen(s) aspirated and flushed/sterile dressing applied/ultrasound guidance with use of sterile gel and probe cove

## 2021-02-07 NOTE — H&P ADULT - NSHPPHYSICALEXAM_GEN_ALL_CORE
PHYSICAL EXAM:  GENERAL: NAD, anxious male on NIV  HEAD:  Atraumatic, Normocephalic  EYES: EOMI, PERRLA, conjunctiva and sclera clear  NECK: Supple, No JVD  CHEST/LUNG: Crackles +ve  HEART: Regular rate and rhythm; No murmurs, rubs, or gallops  ABDOMEN: Soft, Nontender, Nondistended; Bowel sounds present  EXTREMITIES:  2+ Peripheral Pulses, No clubbing, cyanosis.   PSYCH: AAOx3  NEUROLOGY: non-focal  SKIN: No rashes or lesions

## 2021-02-07 NOTE — H&P ADULT - ASSESSMENT
69 yo male with PMH of Afib on eliquis, HTN, HLD, DM?, Recent dx with COVID in Nov 20 s/p trach and peg removal presents to ed for Shortness of breath.       IMPRESSION:  Acute Hypoxic Resp Failure 2/2 CHF exacerbation- 2/2 r/o ACS  h/o A fib on eliquis  h/o HTN  Recent COVID in Nov 2020    PLAN:    1.CNS : Precedex for now.     2. CVS : Keep Negative balance, start on lasix BID. Check CE, echo.     3. PULMONARY : CXR in AM, NIV ( on AVAPs) , HOB at 45    4. INFECTIOUS DISEASE : monitor off abx. No source of infection.     5. GI : Po feed as tolerated.     6. RENAL and acid base : place santana, monitor urine output. Monitor lytes and correct as needed.     7. Endocrine : Insulin protocol if needed.     8. Hematology : DVT ppx with eliquis.     Dispo       71 yo male with PMH of Afib on eliquis, HTN, HLD, DM?, Recent dx with COVID in Nov 20 s/p trach and peg removal presents to ed for Shortness of breath.       IMPRESSION:  Acute Hypoxic Resp Failure 2/2 CHF exacerbation- 2/2 r/o ACS s/p Intubation.   h/o A fib on eliquis  h/o HTN  Recent COVID in Nov 2020    PLAN:    1.CNS : SAT in AM. Sedate with propofol and fentanyl.     2. CVS : Keep Negative balance, start on lasix BID. Check CE, echo.     3. PULMONARY : CXR in AM , HOB at 45,  ABG post intubation.     4. INFECTIOUS DISEASE : monitor off abx. No source of infection. check procal.     5. GI : OG feed in AM.     6. RENAL and acid base : place santana, monitor urine output. Monitor lytes and correct as needed.     7. Endocrine : Insulin protocol if needed.     8. Hematology : DVT ppx with eliquis.     Dispo ICU

## 2021-02-07 NOTE — PROGRESS NOTE ADULT - ASSESSMENT
IMPRESSION:  ARF hypoxic ARDS  PNA         PLAN:    CNS: keep sedation versed , fentanyl     HEENT: giselle    PULMONARY: peep increased to 12   ABG in 2 hrs     CARDIOVASCULAR: echo keep is = os   pressors if needed       GI: GI prophylaxis. NG Feeding     RENAL:follow is and OS follow lytes     INFECTIOUS DISEASE: continue abx for prolcatinon pan cx   id consult     HEMATOLOGICAL:  DVT prophylaxis. hold eliquis  start heparin at night   keep ptt around 50     ENDOCRINE:  Follow up FS.  Insulin protocol if needed.    MUSCULOSKELETAL:        CRITICAL CARE TIME SPENT: *** IMPRESSION:  ARF hypoxic ARDS  PNA         PLAN:    CNS: keep sedation versed , fentanyl     HEENT: giselle    PULMONARY: peep increased to 12   ABG in 2 hrs     CARDIOVASCULAR: echo keep is = os   pressors if needed       GI: GI prophylaxis. NG Feeding     RENAL:follow is and OS follow lytes     INFECTIOUS DISEASE: continue abx for prolcatinon pan cx   id consult   DTA  HEMATOLOGICAL:  DVT prophylaxis. hold eliquis  start heparin at night if h/h is stable   keep ptt around 50     ENDOCRINE:  Follow up FS.  Insulin protocol if needed.    MUSCULOSKELETAL:        CRITICAL CARE TIME SPENT: ***

## 2021-02-07 NOTE — PROGRESS NOTE ADULT - SUBJECTIVE AND OBJECTIVE BOX
Patient is a 70y old  Male who presents with a chief complaint of Shortness of breath (07 Feb 2021 01:02)      HPI:  Pt is a 71 yo male with PMH of Afib on eliquis, HTN, HLD, DM?, Recent dx with COVID in Nov 20 s/p trach and peg removal presents to ed for Shortness of breath. Pt states that his SOB started  yesterday, initially mild and described as tightness, worse when laying flat, no specific pain or radiation. denies fever, chills, cp, abd pain, nvd, le swelling, back pain.  Pt placed on NIV in the ED with improvement.   Admission labs showed BNP 1771. CXR with vascular congestion. Received lasix in the ED.     Pt then started to desat on NIV even after increasing PEEP. Pt was intubated in the ED and central Line was placed.  (07 Feb 2021 01:02)  patient now intubated on sedation    PAST MEDICAL & SURGICAL HISTORY:  H/O sciatica  CHR PAIN LT LE    H/O cystoscopy  resection of bladder tumor    Bladder cancer    DM (diabetes mellitus)    Hypercholesteremia    Afib    HTN (hypertension)    History of hernia repair      Allergies    No Known Allergies    Intolerances      Family history : no cardiovscular family history   Home Medications:  amiodarone 200 mg oral tablet: 1 tab(s) orally once a day (07 Feb 2021 01:07)  apixaban 5 mg oral tablet: 1 tab(s) orally every 12 hours (07 Feb 2021 01:07)  Aspir 81 oral delayed release tablet: 1 tab(s) orally once a day (07 Feb 2021 01:07)  carvedilol 6.25 mg oral tablet: 1 tab(s) orally 2 times a day (07 Feb 2021 01:07)  dilTIAZem 240 mg/24 hours oral capsule, extended release: 1 cap(s) orally once a day (07 Feb 2021 01:07)  doxazosin 2 mg oral tablet: 1 tab(s) orally once a day (at bedtime) (07 Feb 2021 01:07)  Eliquis: unknown dose (07 Feb 2021 01:07)  lactobacillus acidophilus oral capsule: 1 tab(s) orally once a day (07 Feb 2021 01:07)  Lipitor:  (07 Feb 2021 01:07)  pantoprazole 40 mg oral granule, delayed release: 1  orally once a day (07 Feb 2021 01:07)  polyethylene glycol 3350 oral powder for reconstitution: 17 gram(s) orally once a day (07 Feb 2021 01:07)    Occupation:  Alochol: Denied  Smoking: Denied  Drug Use: Denied  Marital Status:         ROS: as in HPI; All other systems reviewed are negative    ICU Vital Signs Last 24 Hrs  T(C): 38.3 (07 Feb 2021 07:15), Max: 38.3 (07 Feb 2021 07:15)  T(F): 100.9 (07 Feb 2021 07:15), Max: 100.9 (07 Feb 2021 07:15)  HR: 100 (07 Feb 2021 10:00) (88 - 115)  BP: 97/52 (07 Feb 2021 10:30) (67/46 - 171/77)  BP(mean): 70 (07 Feb 2021 10:30) (52 - 86)  ABP: --  ABP(mean): --  RR: 32 (07 Feb 2021 09:45) (20 - 63)  SpO2: 100% (07 Feb 2021 09:45) (76% - 100%)        Physical Examination:    General: on sedation     HEENT: Pupils equal, reactive to light.  Symmetric.    PULM: b/l crackles     CVS: Regular rate and rhythm, no murmurs, rubs, or gallops    ABD: Soft, nondistended, nontender, normoactive bowel sounds, no masses    EXT: No edema, nontender, no clubbing     SKIN: Warm and well perfused, no rashes noted.    Neurology : no motor or sensory deficit     Musculoskeletal : move all extremity     Lymphatic system: no Palpable node       Mode: Auto Mode: PRVC/ Volume Support  RR (machine): 24  TV (machine): 450  FiO2: 100  PEEP: 12  MAP: 14  PIP: 16      ABG - ( 07 Feb 2021 06:02 )  pH, Arterial: 7.32  pH, Blood: x     /  pCO2: 50    /  pO2: 67    / HCO3: 25    / Base Excess: -1.2  /  SaO2: 92                  I&O's Detail    06 Feb 2021 07:01  -  07 Feb 2021 07:00  --------------------------------------------------------  IN:    FentaNYL: 18 mL    Propofol: 16 mL  Total IN: 34 mL    OUT:    Indwelling Catheter - Urethral (mL): 200 mL  Total OUT: 200 mL    Total NET: -166 mL      07 Feb 2021 07:01  -  07 Feb 2021 11:35  --------------------------------------------------------  IN:    FentaNYL: 21 mL    Midazolam: 2 mL    Propofol: 20 mL  Total IN: 43 mL    OUT:    Indwelling Catheter - Urethral (mL): 370 mL  Total OUT: 370 mL    Total NET: -327 mL            LABS:                        9.1    16.73 )-----------( 315      ( 07 Feb 2021 06:24 )             30.1     07 Feb 2021 06:24    140    |  104    |  23     ----------------------------<  259    3.9     |  25     |  0.8      Ca    8.7        07 Feb 2021 06:24  Mg     1.9       07 Feb 2021 06:24    TPro  6.2    /  Alb  3.3    /  TBili  0.8    /  DBili  x      /  AST  18     /  ALT  20     /  AlkPhos  92     07 Feb 2021 06:24  Amylase x     lipase x          CARDIAC MARKERS ( 07 Feb 2021 06:24 )  x     / 0.09 ng/mL / x     / x     / 7.1 ng/mL  CARDIAC MARKERS ( 06 Feb 2021 21:13 )  x     / <0.01 ng/mL / x     / x     / x          CAPILLARY BLOOD GLUCOSE            Culture        MEDICATIONS  (STANDING):  aMIOdarone    Tablet 200 milliGRAM(s) Oral daily  apixaban 5 milliGRAM(s) Oral every 12 hours  aspirin enteric coated 81 milliGRAM(s) Oral daily  atorvastatin 40 milliGRAM(s) Oral at bedtime  carvedilol 6.25 milliGRAM(s) Oral every 12 hours  cefepime   IVPB 1000 milliGRAM(s) IV Intermittent every 8 hours  cefepime   IVPB      chlorhexidine 4% Liquid 1 Application(s) Topical <User Schedule>  doxazosin 2 milliGRAM(s) Oral at bedtime  fentaNYL   Infusion. 0.5 MICROgram(s)/kG/Hr (3.63 mL/Hr) IV Continuous <Continuous>  furosemide   Injectable 40 milliGRAM(s) IV Push two times a day  midazolam Infusion 0.02 mG/kG/Hr (1.49 mL/Hr) IV Continuous <Continuous>  pantoprazole    Tablet 40 milliGRAM(s) Oral before breakfast  propofol Infusion 10 MICROgram(s)/kG/Min (4.36 mL/Hr) IV Continuous <Continuous>  vancomycin  IVPB      vancomycin  IVPB 1000 milliGRAM(s) IV Intermittent once  vancomycin  IVPB 1000 milliGRAM(s) IV Intermittent every 12 hours    MEDICATIONS  (PRN):        RADIOLOGY: ***     CXR: b/l opacity   TLC:  OG:  ET tube:        CAM ICU:  ECHO:

## 2021-02-07 NOTE — H&P ADULT - NSHPLABSRESULTS_GEN_ALL_CORE
9.3    9.86  )-----------( 308      ( 06 Feb 2021 21:13 )             30.7       02-06    140  |  106  |  27<H>  ----------------------------<  192<H>  3.8   |  22  |  0.6<L>    Ca    9.0      06 Feb 2021 21:13  Mg     2.0     02-06    TPro  6.5  /  Alb  3.4<L>  /  TBili  0.6  /  DBili  x   /  AST  14  /  ALT  25  /  AlkPhos  95  02-06            CARDIAC MARKERS ( 06 Feb 2021 21:13 )  x     / <0.01 ng/mL / x     / x     / x

## 2021-02-08 LAB
ALBUMIN SERPL ELPH-MCNC: 3.1 G/DL — LOW (ref 3.5–5.2)
ALP SERPL-CCNC: 87 U/L — SIGNIFICANT CHANGE UP (ref 30–115)
ALT FLD-CCNC: 17 U/L — SIGNIFICANT CHANGE UP (ref 0–41)
ANION GAP SERPL CALC-SCNC: 10 MMOL/L — SIGNIFICANT CHANGE UP (ref 7–14)
ANION GAP SERPL CALC-SCNC: 11 MMOL/L — SIGNIFICANT CHANGE UP (ref 7–14)
AST SERPL-CCNC: 15 U/L — SIGNIFICANT CHANGE UP (ref 0–41)
BASE EXCESS BLDA CALC-SCNC: 0.5 MMOL/L — SIGNIFICANT CHANGE UP (ref -2–2)
BILIRUB SERPL-MCNC: 0.9 MG/DL — SIGNIFICANT CHANGE UP (ref 0.2–1.2)
BUN SERPL-MCNC: 30 MG/DL — HIGH (ref 10–20)
BUN SERPL-MCNC: 32 MG/DL — HIGH (ref 10–20)
CALCIUM SERPL-MCNC: 8.7 MG/DL — SIGNIFICANT CHANGE UP (ref 8.5–10.1)
CALCIUM SERPL-MCNC: 8.9 MG/DL — SIGNIFICANT CHANGE UP (ref 8.5–10.1)
CHLORIDE SERPL-SCNC: 101 MMOL/L — SIGNIFICANT CHANGE UP (ref 98–110)
CHLORIDE SERPL-SCNC: 102 MMOL/L — SIGNIFICANT CHANGE UP (ref 98–110)
CO2 SERPL-SCNC: 27 MMOL/L — SIGNIFICANT CHANGE UP (ref 17–32)
CO2 SERPL-SCNC: 27 MMOL/L — SIGNIFICANT CHANGE UP (ref 17–32)
CREAT SERPL-MCNC: 0.9 MG/DL — SIGNIFICANT CHANGE UP (ref 0.7–1.5)
CREAT SERPL-MCNC: 1 MG/DL — SIGNIFICANT CHANGE UP (ref 0.7–1.5)
GLUCOSE SERPL-MCNC: 163 MG/DL — HIGH (ref 70–99)
GLUCOSE SERPL-MCNC: 174 MG/DL — HIGH (ref 70–99)
HCO3 BLDA-SCNC: 26 MMOL/L — SIGNIFICANT CHANGE UP (ref 23–27)
HCT VFR BLD CALC: 26.7 % — LOW (ref 42–52)
HGB BLD-MCNC: 8 G/DL — LOW (ref 14–18)
HOROWITZ INDEX BLDA+IHG-RTO: 60 — SIGNIFICANT CHANGE UP
MCHC RBC-ENTMCNC: 25.6 PG — LOW (ref 27–31)
MCHC RBC-ENTMCNC: 30 G/DL — LOW (ref 32–37)
MCV RBC AUTO: 85.3 FL — SIGNIFICANT CHANGE UP (ref 80–94)
NRBC # BLD: 0 /100 WBCS — SIGNIFICANT CHANGE UP (ref 0–0)
PCO2 BLDA: 47 MMHG — HIGH (ref 38–42)
PH BLDA: 7.36 — LOW (ref 7.38–7.42)
PLATELET # BLD AUTO: 276 K/UL — SIGNIFICANT CHANGE UP (ref 130–400)
PO2 BLDA: 191 MMHG — HIGH (ref 78–95)
POTASSIUM SERPL-MCNC: 3.8 MMOL/L — SIGNIFICANT CHANGE UP (ref 3.5–5)
POTASSIUM SERPL-MCNC: 3.9 MMOL/L — SIGNIFICANT CHANGE UP (ref 3.5–5)
POTASSIUM SERPL-SCNC: 3.8 MMOL/L — SIGNIFICANT CHANGE UP (ref 3.5–5)
POTASSIUM SERPL-SCNC: 3.9 MMOL/L — SIGNIFICANT CHANGE UP (ref 3.5–5)
PROT SERPL-MCNC: 5.8 G/DL — LOW (ref 6–8)
RBC # BLD: 3.13 M/UL — LOW (ref 4.7–6.1)
RBC # FLD: 17.2 % — HIGH (ref 11.5–14.5)
SAO2 % BLDA: 100 % — HIGH (ref 94–98)
SARS-COV-2 IGG SERPL QL IA: POSITIVE
SARS-COV-2 IGM SERPL IA-ACNC: 123 INDEX — HIGH
SODIUM SERPL-SCNC: 139 MMOL/L — SIGNIFICANT CHANGE UP (ref 135–146)
SODIUM SERPL-SCNC: 139 MMOL/L — SIGNIFICANT CHANGE UP (ref 135–146)
TROPONIN T SERPL-MCNC: 0.19 NG/ML — CRITICAL HIGH
WBC # BLD: 8.64 K/UL — SIGNIFICANT CHANGE UP (ref 4.8–10.8)
WBC # FLD AUTO: 8.64 K/UL — SIGNIFICANT CHANGE UP (ref 4.8–10.8)

## 2021-02-08 PROCEDURE — 99233 SBSQ HOSP IP/OBS HIGH 50: CPT

## 2021-02-08 PROCEDURE — 71045 X-RAY EXAM CHEST 1 VIEW: CPT | Mod: 26

## 2021-02-08 RX ORDER — DEXMEDETOMIDINE HYDROCHLORIDE IN 0.9% SODIUM CHLORIDE 4 UG/ML
0.2 INJECTION INTRAVENOUS
Qty: 400 | Refills: 0 | Status: DISCONTINUED | OUTPATIENT
Start: 2021-02-08 | End: 2021-02-09

## 2021-02-08 RX ORDER — CHLORHEXIDINE GLUCONATE 213 G/1000ML
15 SOLUTION TOPICAL EVERY 12 HOURS
Refills: 0 | Status: DISCONTINUED | OUTPATIENT
Start: 2021-02-08 | End: 2021-02-16

## 2021-02-08 RX ADMIN — CHLORHEXIDINE GLUCONATE 15 MILLILITER(S): 213 SOLUTION TOPICAL at 06:16

## 2021-02-08 RX ADMIN — MIDAZOLAM HYDROCHLORIDE 1.49 MG/KG/HR: 1 INJECTION, SOLUTION INTRAMUSCULAR; INTRAVENOUS at 07:52

## 2021-02-08 RX ADMIN — AMIODARONE HYDROCHLORIDE 200 MILLIGRAM(S): 400 TABLET ORAL at 05:20

## 2021-02-08 RX ADMIN — FENTANYL CITRATE 3.63 MICROGRAM(S)/KG/HR: 50 INJECTION INTRAVENOUS at 21:15

## 2021-02-08 RX ADMIN — Medication 40 MILLIGRAM(S): at 17:35

## 2021-02-08 RX ADMIN — Medication 650 MILLIGRAM(S): at 18:17

## 2021-02-08 RX ADMIN — CEFEPIME 100 MILLIGRAM(S): 1 INJECTION, POWDER, FOR SOLUTION INTRAMUSCULAR; INTRAVENOUS at 21:14

## 2021-02-08 RX ADMIN — Medication 250 MILLIGRAM(S): at 17:36

## 2021-02-08 RX ADMIN — CARVEDILOL PHOSPHATE 6.25 MILLIGRAM(S): 80 CAPSULE, EXTENDED RELEASE ORAL at 17:35

## 2021-02-08 RX ADMIN — DEXMEDETOMIDINE HYDROCHLORIDE IN 0.9% SODIUM CHLORIDE 3.73 MICROGRAM(S)/KG/HR: 4 INJECTION INTRAVENOUS at 21:15

## 2021-02-08 RX ADMIN — FENTANYL CITRATE 3.63 MICROGRAM(S)/KG/HR: 50 INJECTION INTRAVENOUS at 03:19

## 2021-02-08 RX ADMIN — CEFEPIME 100 MILLIGRAM(S): 1 INJECTION, POWDER, FOR SOLUTION INTRAMUSCULAR; INTRAVENOUS at 13:51

## 2021-02-08 RX ADMIN — ATORVASTATIN CALCIUM 40 MILLIGRAM(S): 80 TABLET, FILM COATED ORAL at 21:14

## 2021-02-08 RX ADMIN — Medication 40 MILLIGRAM(S): at 05:20

## 2021-02-08 RX ADMIN — Medication 81 MILLIGRAM(S): at 11:13

## 2021-02-08 RX ADMIN — CEFEPIME 100 MILLIGRAM(S): 1 INJECTION, POWDER, FOR SOLUTION INTRAMUSCULAR; INTRAVENOUS at 05:21

## 2021-02-08 RX ADMIN — Medication 2 MILLIGRAM(S): at 21:14

## 2021-02-08 RX ADMIN — DEXMEDETOMIDINE HYDROCHLORIDE IN 0.9% SODIUM CHLORIDE 3.73 MICROGRAM(S)/KG/HR: 4 INJECTION INTRAVENOUS at 14:00

## 2021-02-08 RX ADMIN — FENTANYL CITRATE 3.63 MICROGRAM(S)/KG/HR: 50 INJECTION INTRAVENOUS at 17:34

## 2021-02-08 RX ADMIN — CHLORHEXIDINE GLUCONATE 15 MILLILITER(S): 213 SOLUTION TOPICAL at 17:35

## 2021-02-08 RX ADMIN — Medication 650 MILLIGRAM(S): at 03:19

## 2021-02-08 RX ADMIN — Medication 250 MILLIGRAM(S): at 05:21

## 2021-02-08 RX ADMIN — CHLORHEXIDINE GLUCONATE 1 APPLICATION(S): 213 SOLUTION TOPICAL at 05:21

## 2021-02-08 RX ADMIN — FENTANYL CITRATE 3.63 MICROGRAM(S)/KG/HR: 50 INJECTION INTRAVENOUS at 07:53

## 2021-02-08 NOTE — PROGRESS NOTE ADULT - ASSESSMENT
MAGGIE SAHNI 70y Male  MRN#: [id number]   CODE STATUS:full code       SUBJECTIVE  Patient is a 70y old Male who presents with a chief complaint of Shortness of breath (07 Feb 2021 11:35)  Currently admitted to medicine with the primary diagnosis of Pulmonary edema  Today is hospital day 1d, and this morning he is intubated and sedated on IV lasix and antibiotics and RN  reports noovernight events.       OBJECTIVE  PAST MEDICAL & SURGICAL HISTORY  H/O sciatica  CHR PAIN LT LE    H/O cystoscopy  resection of bladder tumor    Bladder cancer    DM (diabetes mellitus)    Hypercholesteremia    Afib    HTN (hypertension)    History of hernia repair      ALLERGIES:  No Known Allergies    MEDICATIONS:  STANDING MEDICATIONS  aMIOdarone    Tablet 200 milliGRAM(s) Oral daily  aspirin enteric coated 81 milliGRAM(s) Oral daily  atorvastatin 40 milliGRAM(s) Oral at bedtime  carvedilol 6.25 milliGRAM(s) Oral every 12 hours  cefepime   IVPB 1000 milliGRAM(s) IV Intermittent every 8 hours  cefepime   IVPB      chlorhexidine 0.12% Liquid 15 milliLiter(s) Oral Mucosa every 12 hours  chlorhexidine 4% Liquid 1 Application(s) Topical <User Schedule>  doxazosin 2 milliGRAM(s) Oral at bedtime  fentaNYL   Infusion. 0.5 MICROgram(s)/kG/Hr IV Continuous <Continuous>  furosemide   Injectable 40 milliGRAM(s) IV Push two times a day  midazolam Infusion 0.02 mG/kG/Hr IV Continuous <Continuous>  norepinephrine Infusion 0.05 MICROgram(s)/kG/Min IV Continuous <Continuous>  pantoprazole    Tablet 40 milliGRAM(s) Oral before breakfast  vancomycin  IVPB      vancomycin  IVPB 1000 milliGRAM(s) IV Intermittent every 12 hours    PRN MEDICATIONS  acetaminophen   Tablet .. 650 milliGRAM(s) Oral every 6 hours PRN      VITAL SIGNS: Last 24 Hours  T(C): 37.6 (08 Feb 2021 08:00), Max: 39.6 (07 Feb 2021 16:00)  T(F): 99.6 (08 Feb 2021 08:00), Max: 103.2 (07 Feb 2021 16:00)  HR: 85 (08 Feb 2021 08:02) (85 - 116)  BP: 97/53 (08 Feb 2021 08:02) (58/41 - 113/59)  BP(mean): 70 (08 Feb 2021 08:02) (46 - 81)  RR: 24 (08 Feb 2021 08:02) (22 - 32)  SpO2: 100% (08 Feb 2021 08:02) (97% - 100%)    LABS:                        8.0    8.64  )-----------( 276      ( 08 Feb 2021 05:54 )             26.7     02-08    139  |  102  |  32<H>  ----------------------------<  174<H>  3.9   |  27  |  1.0    Ca    8.9      08 Feb 2021 05:54  Mg     1.9     02-07    TPro  6.2  /  Alb  3.3<L>  /  TBili  0.8  /  DBili  x   /  AST  18  /  ALT  20  /  AlkPhos  92  02-07    PT/INR - ( 07 Feb 2021 21:49 )   PT: 20.10 sec;   INR: 1.75 ratio         PTT - ( 07 Feb 2021 21:49 )  PTT:39.1 sec    ABG - ( 08 Feb 2021 05:59 )  pH, Arterial: 7.36  pH, Blood: x     /  pCO2: 47    /  pO2: 191   / HCO3: 26    / Base Excess: 0.5   /  SaO2: 100               Troponin T, Serum: 0.19 ng/mL <HH> (02-08-21 @ 05:54)  Troponin T, Serum: 0.19 ng/mL <HH> (02-07-21 @ 11:52)      CARDIAC MARKERS ( 08 Feb 2021 05:54 )  x     / 0.19 ng/mL / x     / x     / x      CARDIAC MARKERS ( 07 Feb 2021 11:52 )  x     / 0.19 ng/mL / x     / x     / x      CARDIAC MARKERS ( 07 Feb 2021 06:24 )  x     / 0.09 ng/mL / x     / x     / 7.1 ng/mL  CARDIAC MARKERS ( 06 Feb 2021 21:13 )  x     / <0.01 ng/mL / x     / x     / x          RADIOLOGY:      PHYSICAL EXAM:    GENERAL: NAD, well-developed, AAOx3  HEENT:  Atraumatic, Normocephalic. EOMI, PERRLA, conjunctiva and sclera clear, No JVD  PULMONARY: Clear to auscultation bilaterally; No wheeze  CARDIOVASCULAR: Regular rate and rhythm; No murmurs, rubs, or gallops  GASTROINTESTINAL: Soft, Nontender, Nondistended; Bowel sounds present  MUSCULOSKELETAL:  2+ Peripheral Pulses, No clubbing, cyanosis, or edema  NEUROLOGY: non-focal  SKIN: No rashes or lesions    ASSESSMENT & PLAN         MAGGIE SAHNI 70y Male  MRN#: [id number]   CODE STATUS:full code       SUBJECTIVE  Patient is a 70y old Male who presents with a chief complaint of Shortness of breath (07 Feb 2021 11:35)  Currently admitted to medicine with the primary diagnosis of Pulmonary edema  Today is hospital day 1d, and this morning he is intubated and sedated on IV lasix and antibiotics and RN  reports noovernight events.       OBJECTIVE  PAST MEDICAL & SURGICAL HISTORY  H/O sciatica  CHR PAIN LT LE    H/O cystoscopy  resection of bladder tumor    Bladder cancer    DM (diabetes mellitus)    Hypercholesteremia    Afib    HTN (hypertension)    History of hernia repair      ALLERGIES:  No Known Allergies    MEDICATIONS:  STANDING MEDICATIONS  aMIOdarone    Tablet 200 milliGRAM(s) Oral daily  aspirin enteric coated 81 milliGRAM(s) Oral daily  atorvastatin 40 milliGRAM(s) Oral at bedtime  carvedilol 6.25 milliGRAM(s) Oral every 12 hours  cefepime   IVPB 1000 milliGRAM(s) IV Intermittent every 8 hours  cefepime   IVPB      chlorhexidine 0.12% Liquid 15 milliLiter(s) Oral Mucosa every 12 hours  chlorhexidine 4% Liquid 1 Application(s) Topical <User Schedule>  doxazosin 2 milliGRAM(s) Oral at bedtime  fentaNYL   Infusion. 0.5 MICROgram(s)/kG/Hr IV Continuous <Continuous>  furosemide   Injectable 40 milliGRAM(s) IV Push two times a day  midazolam Infusion 0.02 mG/kG/Hr IV Continuous <Continuous>  norepinephrine Infusion 0.05 MICROgram(s)/kG/Min IV Continuous <Continuous>  pantoprazole    Tablet 40 milliGRAM(s) Oral before breakfast  vancomycin  IVPB      vancomycin  IVPB 1000 milliGRAM(s) IV Intermittent every 12 hours    PRN MEDICATIONS  acetaminophen   Tablet .. 650 milliGRAM(s) Oral every 6 hours PRN      VITAL SIGNS: Last 24 Hours  T(C): 37.6 (08 Feb 2021 08:00), Max: 39.6 (07 Feb 2021 16:00)  T(F): 99.6 (08 Feb 2021 08:00), Max: 103.2 (07 Feb 2021 16:00)  HR: 85 (08 Feb 2021 08:02) (85 - 116)  BP: 97/53 (08 Feb 2021 08:02) (58/41 - 113/59)  BP(mean): 70 (08 Feb 2021 08:02) (46 - 81)  RR: 24 (08 Feb 2021 08:02) (22 - 32)  SpO2: 100% (08 Feb 2021 08:02) (97% - 100%)    LABS:                        8.0    8.64  )-----------( 276      ( 08 Feb 2021 05:54 )             26.7     02-08    139  |  102  |  32<H>  ----------------------------<  174<H>  3.9   |  27  |  1.0    Ca    8.9      08 Feb 2021 05:54  Mg     1.9     02-07    TPro  6.2  /  Alb  3.3<L>  /  TBili  0.8  /  DBili  x   /  AST  18  /  ALT  20  /  AlkPhos  92  02-07    PT/INR - ( 07 Feb 2021 21:49 )   PT: 20.10 sec;   INR: 1.75 ratio         PTT - ( 07 Feb 2021 21:49 )  PTT:39.1 sec    ABG - ( 08 Feb 2021 05:59 )  pH, Arterial: 7.36  pH, Blood: x     /  pCO2: 47    /  pO2: 191   / HCO3: 26    / Base Excess: 0.5   /  SaO2: 100               Troponin T, Serum: 0.19 ng/mL <HH> (02-08-21 @ 05:54)  Troponin T, Serum: 0.19 ng/mL <HH> (02-07-21 @ 11:52)      CARDIAC MARKERS ( 08 Feb 2021 05:54 )  x     / 0.19 ng/mL / x     / x     / x      CARDIAC MARKERS ( 07 Feb 2021 11:52 )  x     / 0.19 ng/mL / x     / x     / x      CARDIAC MARKERS ( 07 Feb 2021 06:24 )  x     / 0.09 ng/mL / x     / x     / 7.1 ng/mL  CARDIAC MARKERS ( 06 Feb 2021 21:13 )  x     / <0.01 ng/mL / x     / x     / x          RADIOLOGY:      PHYSICAL EXAM:    GENERAL: sedated intubated   HEENT:  Atraumatic, Normocephalic. EOMI, PERRLA  PULMONARY: Clear to auscultation bilaterally  CARDIOVASCULAR: normal s1 s2  GASTROINTESTINAL: Soft, Nontender, Nondistended  MUSCULOSKELETAL:  2+ Peripheral Pulses, No clubbing, cyanosis, or edema  NEUROLOGY: non-focal  SKIN: No rashes or lesions    ASSESSMENT & PLAN      PLAN:  Acute Hypoxic Respiratory failure secondary to PNA, pulmonary edema component       CNS: keep sedation versed , fentanyl   add precedex and taper off versed     HEENT: giselle    PULMONARY: lower fio2 to 40% PEEP to 10, repeat ABG    CARDIOVASCULAR: echo keep is < os   pressors if needed   keep Lasix given CXR improving, f/u BUN and UO    GI: GI prophylaxis. NG Feeding     RENAL:follow is and OS follow lytes     INFECTIOUS DISEASE: continue abx for prolcatinon pan cx   id consult   DTA  check vanco trouph blank AM  nasal mrsa positive     HEMATOLOGICAL:  DVT prophylaxis. hold eliquis  follow h/h been dropping, if CBC stable tomorrow resume AC      ENDOCRINE:  Follow up FS.  Insulin protocol if needed.    MUSCULOSKELETAL: bedrest         MICU monitoring

## 2021-02-08 NOTE — PROGRESS NOTE ADULT - SUBJECTIVE AND OBJECTIVE BOX
Patient is comfortably sedated on fentanyl and versed on ventilator       T(F): 100.1 (02-08-21 @ 15:05), Max: 101 (02-08-21 @ 03:00)  HR: 94 (02-08-21 @ 15:29)  BP: 118/58 (02-08-21 @ 15:29)  RR: 24 (02-08-21 @ 17:00)  SpO2: 99% (02-08-21 @ 17:00) (96% - 100%)    PHYSICAL EXAM:  GENERAL: NAD  HEAD:  Atraumatic, Normocephalic  NERVOUS SYSTEM: nonfocal   CHEST/LUNG:  bilateral rales  HEART: Regular rate and rhythm; No murmurs, rubs, or gallops  ABDOMEN: Soft, Nontender, Nondistended; Bowel sounds present  EXTREMITIES:  2+ Peripheral Pulses, No clubbing, cyanosis, or edema  LYMPH: No lymphadenopathy noted  SKIN: No rashes or lesions    LABS  02-08    139  |  101  |  30<H>  ----------------------------<  163<H>  3.8   |  27  |  0.9    Ca    8.7      08 Feb 2021 10:49  Mg     1.9     02-07    TPro  5.8<L>  /  Alb  3.1<L>  /  TBili  0.9  /  DBili  x   /  AST  15  /  ALT  17  /  AlkPhos  87  02-08                          8.0    8.64  )-----------( 276      ( 08 Feb 2021 05:54 )             26.7     PT/INR - ( 07 Feb 2021 21:49 )   PT: 20.10 sec;   INR: 1.75 ratio         PTT - ( 07 Feb 2021 21:49 )  PTT:39.1 sec  Mode: Auto Mode: PRVC/ Volume Support  RR (machine): 24  TV (machine): 450  FiO2: 40  PEEP: 10    CARDIAC ENZYMES    CKMB Units: 7.1 (02-07 @ 06:24)    Troponin T, Serum: 0.19 ng/mL (02-08-21 @ 05:54)  Troponin T, Serum: 0.19 ng/mL (02-07-21 @ 11:52)  Troponin T, Serum: 0.09 ng/mL (02-07-21 @ 06:24)  Troponin T, Serum: <0.01 ng/mL (02-06-21 @ 21:13)    < from: 12 Lead ECG (02.06.21 @ 20:56) >  Diagnosis Line Normal sinus rhythm  Nonspecific ST and T wave abnormality    < end of copied text >      RADIOLOGY  < from: Xray Chest 1 View- PORTABLE-Routine (Xray Chest 1 View- PORTABLE-Routine in AM.) (02.08.21 @ 07:38) >  Impression:    Decreasing bilateral pulmonary opacities.    < end of copied text >    MEDICATIONS  (STANDING):  aMIOdarone    Tablet 200 milliGRAM(s) Oral daily  aspirin enteric coated 81 milliGRAM(s) Oral daily  atorvastatin 40 milliGRAM(s) Oral at bedtime  carvedilol 6.25 milliGRAM(s) Oral every 12 hours  cefepime   IVPB 1000 milliGRAM(s) IV Intermittent every 8 hours  chlorhexidine 0.12% Liquid 15 milliLiter(s) Oral Mucosa every 12 hours  chlorhexidine 4% Liquid 1 Application(s) Topical <User Schedule>  dexMEDEtomidine Infusion 0.2 MICROgram(s)/kG/Hr (3.73 mL/Hr) IV Continuous <Continuous>  doxazosin 2 milliGRAM(s) Oral at bedtime  fentaNYL   Infusion. 0.5 MICROgram(s)/kG/Hr (3.63 mL/Hr) IV Continuous <Continuous>  furosemide   Injectable 40 milliGRAM(s) IV Push two times a day  midazolam Infusion 0.02 mG/kG/Hr (1.49 mL/Hr) IV Continuous <Continuous>  norepinephrine Infusion 0.05 MICROgram(s)/kG/Min (6.98 mL/Hr) IV Continuous <Continuous>  pantoprazole    Tablet 40 milliGRAM(s) Oral before breakfast   vancomycin  IVPB 1000 milliGRAM(s) IV Intermittent every 12 hours    MEDICATIONS  (PRN):  acetaminophen   Tablet .. 650 milliGRAM(s) Oral every 6 hours PRN Temp greater or equal to 38C (100.4F)

## 2021-02-08 NOTE — PROGRESS NOTE ADULT - ASSESSMENT
Pt is a 71 yo male with PMH of Afib on eliquis, HTN, HLD, DM, Recent dx with COVID in Nov 20 s/p trach and peg removal presents to ed for Shortness of breath. Pt placed on NIV in the ED and Received Lasix in the ED. Pt then started to desaturate on NIV and  was intubated in the ED.     Acute hypoxic respiratory failure / suspected gram negative pneumonia /  sepsis / type 2 NSTEMI     - continue IV antibiotics and follow cultures   - decrease dose of IV Lasix   - check echo   - aspirin, Coreg, Lipitor, amio   - IV heparin Eliquis on hold) Pt is a 69 yo male with PMH of Afib on eliquis, HTN, HLD, DM, Recent dx with COVID in Nov 20 s/p trach and peg removal presents to ed for Shortness of breath. Pt placed on NIV in the ED and Received Lasix in the ED. Pt then started to desaturate on NIV and  was intubated in the ED.     Acute hypoxic respiratory failure / suspected gram negative pneumonia /  sepsis / type 2 NSTEMI     - continue IV antibiotics and follow cultures   - decrease dose of IV Lasix   - check echo   - aspirin, Coreg, Lipitor, amio   - hold anticoagulation for now as H/H 10->8    - use insulin IV CC protocol if glucose is elevated   - consult cardiology   - DVT prophylaxis

## 2021-02-08 NOTE — PROGRESS NOTE ADULT - ASSESSMENT
IMPRESSION:  ARF hypoxic ARDS  PNA         PLAN:    CNS: keep sedation versed , fentanyl   add precedex and taper off versed   HEENT: giselle    PULMONARY: lower fio2 to 40%      CARDIOVASCULAR: echo keep is = os   pressors if needed   will hold on lasix     GI: GI prophylaxis. NG Feeding     RENAL:follow is and OS follow lytes     INFECTIOUS DISEASE: continue abx for prolcatinon pan cx   id consult   DTA  check vanco trouph blank  nasal mrsa positive     HEMATOLOGICAL:  DVT prophylaxis. hold eliquis  follow h/h been dropping       ENDOCRINE:  Follow up FS.  Insulin protocol if needed.    MUSCULOSKELETAL:        CRITICAL CARE TIME SPENT: *** IMPRESSION:  ARF hypoxic ARDS  PNA         PLAN:    CNS: keep sedation versed , fentanyl   add precedex and taper off versed   HEENT: giselle    PULMONARY: lower fio2 to 40%      CARDIOVASCULAR: echo keep is = os   pressors if needed   on lasix if bun / cr stable continue      GI: GI prophylaxis. NG Feeding     RENAL:follow is and OS follow lytes     INFECTIOUS DISEASE: continue abx for prolcatinon pan cx   id consult   DTA  check vanco trouph blank  nasal mrsa positive     HEMATOLOGICAL:  DVT prophylaxis. hold eliquis  follow h/h been dropping       ENDOCRINE:  Follow up FS.  Insulin protocol if needed.    MUSCULOSKELETAL:        CRITICAL CARE TIME SPENT: ***

## 2021-02-08 NOTE — PROGRESS NOTE ADULT - SUBJECTIVE AND OBJECTIVE BOX
Patient is a 70y old  Male who presents with a chief complaint of Shortness of breath (08 Feb 2021 08:32)      Over Night Events:  Patient seen and examined.   still vented on sedation spiking temp     ROS:  See HPI    PHYSICAL EXAM    ICU Vital Signs Last 24 Hrs  T(C): 37.6 (08 Feb 2021 08:00), Max: 39.6 (07 Feb 2021 16:00)  T(F): 99.6 (08 Feb 2021 08:00), Max: 103.2 (07 Feb 2021 16:00)  HR: 85 (08 Feb 2021 08:02) (85 - 116)  BP: 97/53 (08 Feb 2021 08:02) (58/41 - 113/59)  BP(mean): 70 (08 Feb 2021 08:02) (46 - 81)  ABP: --  ABP(mean): --  RR: 24 (08 Feb 2021 08:02) (22 - 32)  SpO2: 100% (08 Feb 2021 08:02) (97% - 100%)      General: on sedation   HEENT:      et tube           Lymph Nodes: NO cervical LN   Lungs: Bilateral BS  Cardiovascular: Regular   Abdomen: Soft, Positive BS  Extremities: No clubbing   Skin: warm   Neurological: positve gag   Musculoskeletal: move all ext     I&O's Detail    07 Feb 2021 07:01  -  08 Feb 2021 07:00  --------------------------------------------------------  IN:    FentaNYL: 301 mL    IV PiggyBack: 350 mL    Midazolam: 69 mL    Norepinephrine: 6 mL    Propofol: 20 mL  Total IN: 746 mL    OUT:    Indwelling Catheter - Urethral (mL): 875 mL  Total OUT: 875 mL    Total NET: -129 mL      08 Feb 2021 07:01  -  08 Feb 2021 08:52  --------------------------------------------------------  IN:  Total IN: 0 mL    OUT:    Indwelling Catheter - Urethral (mL): 140 mL  Total OUT: 140 mL    Total NET: -140 mL          LABS:                          8.0    8.64  )-----------( 276      ( 08 Feb 2021 05:54 )             26.7         08 Feb 2021 05:54    139    |  102    |  32     ----------------------------<  174    3.9     |  27     |  1.0      Ca    8.9        08 Feb 2021 05:54  Mg     1.9       07 Feb 2021 06:24                                               PT/INR - ( 07 Feb 2021 21:49 )   PT: 20.10 sec;   INR: 1.75 ratio         PTT - ( 07 Feb 2021 21:49 )  PTT:39.1 sec                                             CARDIAC MARKERS ( 08 Feb 2021 05:54 )  x     / 0.19 ng/mL / x     / x     / x      CARDIAC MARKERS ( 07 Feb 2021 11:52 )  x     / 0.19 ng/mL / x     / x     / x      CARDIAC MARKERS ( 07 Feb 2021 06:24 )  x     / 0.09 ng/mL / x     / x     / 7.1 ng/mL  CARDIAC MARKERS ( 06 Feb 2021 21:13 )  x     / <0.01 ng/mL / x     / x     / x                                                                                                         Mode: Auto Mode: PRVC/ Volume Support  RR (machine): 24  TV (machine): 450  FiO2: 50  PEEP: 10  MAP: 14  PIP: 26                                      ABG - ( 08 Feb 2021 05:59 )  pH, Arterial: 7.36  pH, Blood: x     /  pCO2: 47    /  pO2: 191   / HCO3: 26    / Base Excess: 0.5   /  SaO2: 100                 MEDICATIONS  (STANDING):  aMIOdarone    Tablet 200 milliGRAM(s) Oral daily  aspirin enteric coated 81 milliGRAM(s) Oral daily  atorvastatin 40 milliGRAM(s) Oral at bedtime  carvedilol 6.25 milliGRAM(s) Oral every 12 hours  cefepime   IVPB 1000 milliGRAM(s) IV Intermittent every 8 hours  cefepime   IVPB      chlorhexidine 0.12% Liquid 15 milliLiter(s) Oral Mucosa every 12 hours  chlorhexidine 4% Liquid 1 Application(s) Topical <User Schedule>  doxazosin 2 milliGRAM(s) Oral at bedtime  fentaNYL   Infusion. 0.5 MICROgram(s)/kG/Hr (3.63 mL/Hr) IV Continuous <Continuous>  furosemide   Injectable 40 milliGRAM(s) IV Push two times a day  midazolam Infusion 0.02 mG/kG/Hr (1.49 mL/Hr) IV Continuous <Continuous>  norepinephrine Infusion 0.05 MICROgram(s)/kG/Min (6.98 mL/Hr) IV Continuous <Continuous>  pantoprazole    Tablet 40 milliGRAM(s) Oral before breakfast  vancomycin  IVPB      vancomycin  IVPB 1000 milliGRAM(s) IV Intermittent every 12 hours    MEDICATIONS  (PRN):  acetaminophen   Tablet .. 650 milliGRAM(s) Oral every 6 hours PRN Temp greater or equal to 38C (100.4F)          Xrays:  TLC:  OG:  ET tube:                                                                                    decrease b/l opacity effusion    ECHO:  [facility] ICU:

## 2021-02-09 LAB
ALBUMIN SERPL ELPH-MCNC: 3.3 G/DL — LOW (ref 3.5–5.2)
ALP SERPL-CCNC: 91 U/L — SIGNIFICANT CHANGE UP (ref 30–115)
ALT FLD-CCNC: 15 U/L — SIGNIFICANT CHANGE UP (ref 0–41)
ANION GAP SERPL CALC-SCNC: 10 MMOL/L — SIGNIFICANT CHANGE UP (ref 7–14)
APTT BLD: 37.9 SEC — SIGNIFICANT CHANGE UP (ref 27–39.2)
APTT BLD: 39 SEC — SIGNIFICANT CHANGE UP (ref 27–39.2)
APTT BLD: 46 SEC — HIGH (ref 27–39.2)
AST SERPL-CCNC: 17 U/L — SIGNIFICANT CHANGE UP (ref 0–41)
BASOPHILS # BLD AUTO: 0.02 K/UL — SIGNIFICANT CHANGE UP (ref 0–0.2)
BASOPHILS NFR BLD AUTO: 0.2 % — SIGNIFICANT CHANGE UP (ref 0–1)
BILIRUB SERPL-MCNC: 0.5 MG/DL — SIGNIFICANT CHANGE UP (ref 0.2–1.2)
BLD GP AB SCN SERPL QL: SIGNIFICANT CHANGE UP
BUN SERPL-MCNC: 30 MG/DL — HIGH (ref 10–20)
CALCIUM SERPL-MCNC: 9.2 MG/DL — SIGNIFICANT CHANGE UP (ref 8.5–10.1)
CHLORIDE SERPL-SCNC: 101 MMOL/L — SIGNIFICANT CHANGE UP (ref 98–110)
CO2 SERPL-SCNC: 29 MMOL/L — SIGNIFICANT CHANGE UP (ref 17–32)
CREAT SERPL-MCNC: 0.7 MG/DL — SIGNIFICANT CHANGE UP (ref 0.7–1.5)
EOSINOPHIL # BLD AUTO: 0.85 K/UL — HIGH (ref 0–0.7)
EOSINOPHIL NFR BLD AUTO: 10.3 % — HIGH (ref 0–8)
GLUCOSE SERPL-MCNC: 193 MG/DL — HIGH (ref 70–99)
HCT VFR BLD CALC: 27.5 % — LOW (ref 42–52)
HGB BLD-MCNC: 8.3 G/DL — LOW (ref 14–18)
IMM GRANULOCYTES NFR BLD AUTO: 0.4 % — HIGH (ref 0.1–0.3)
LYMPHOCYTES # BLD AUTO: 1.64 K/UL — SIGNIFICANT CHANGE UP (ref 1.2–3.4)
LYMPHOCYTES # BLD AUTO: 20 % — LOW (ref 20.5–51.1)
MCHC RBC-ENTMCNC: 25.6 PG — LOW (ref 27–31)
MCHC RBC-ENTMCNC: 30.2 G/DL — LOW (ref 32–37)
MCV RBC AUTO: 84.9 FL — SIGNIFICANT CHANGE UP (ref 80–94)
MONOCYTES # BLD AUTO: 0.6 K/UL — SIGNIFICANT CHANGE UP (ref 0.1–0.6)
MONOCYTES NFR BLD AUTO: 7.3 % — SIGNIFICANT CHANGE UP (ref 1.7–9.3)
NEUTROPHILS # BLD AUTO: 5.08 K/UL — SIGNIFICANT CHANGE UP (ref 1.4–6.5)
NEUTROPHILS NFR BLD AUTO: 61.8 % — SIGNIFICANT CHANGE UP (ref 42.2–75.2)
NRBC # BLD: 0 /100 WBCS — SIGNIFICANT CHANGE UP (ref 0–0)
PLATELET # BLD AUTO: 296 K/UL — SIGNIFICANT CHANGE UP (ref 130–400)
POTASSIUM SERPL-MCNC: 3.8 MMOL/L — SIGNIFICANT CHANGE UP (ref 3.5–5)
POTASSIUM SERPL-SCNC: 3.8 MMOL/L — SIGNIFICANT CHANGE UP (ref 3.5–5)
PROT SERPL-MCNC: 6.2 G/DL — SIGNIFICANT CHANGE UP (ref 6–8)
RBC # BLD: 3.24 M/UL — LOW (ref 4.7–6.1)
RBC # FLD: 16.6 % — HIGH (ref 11.5–14.5)
SODIUM SERPL-SCNC: 140 MMOL/L — SIGNIFICANT CHANGE UP (ref 135–146)
TROPONIN T SERPL-MCNC: 0.11 NG/ML — CRITICAL HIGH
VANCOMYCIN TROUGH SERPL-MCNC: 7.8 UG/ML — SIGNIFICANT CHANGE UP (ref 5–10)
WBC # BLD: 8.22 K/UL — SIGNIFICANT CHANGE UP (ref 4.8–10.8)
WBC # FLD AUTO: 8.22 K/UL — SIGNIFICANT CHANGE UP (ref 4.8–10.8)

## 2021-02-09 PROCEDURE — 99233 SBSQ HOSP IP/OBS HIGH 50: CPT

## 2021-02-09 PROCEDURE — 71045 X-RAY EXAM CHEST 1 VIEW: CPT | Mod: 26

## 2021-02-09 RX ORDER — PANTOPRAZOLE SODIUM 20 MG/1
40 TABLET, DELAYED RELEASE ORAL DAILY
Refills: 0 | Status: DISCONTINUED | OUTPATIENT
Start: 2021-02-09 | End: 2021-02-12

## 2021-02-09 RX ORDER — HEPARIN SODIUM 5000 [USP'U]/ML
1000 INJECTION INTRAVENOUS; SUBCUTANEOUS
Qty: 25000 | Refills: 0 | Status: DISCONTINUED | OUTPATIENT
Start: 2021-02-09 | End: 2021-02-10

## 2021-02-09 RX ORDER — HEPARIN SODIUM 5000 [USP'U]/ML
800 INJECTION INTRAVENOUS; SUBCUTANEOUS
Qty: 25000 | Refills: 0 | Status: DISCONTINUED | OUTPATIENT
Start: 2021-02-09 | End: 2021-02-09

## 2021-02-09 RX ADMIN — HEPARIN SODIUM 10 UNIT(S)/HR: 5000 INJECTION INTRAVENOUS; SUBCUTANEOUS at 22:44

## 2021-02-09 RX ADMIN — Medication 6.98 MICROGRAM(S)/KG/MIN: at 08:00

## 2021-02-09 RX ADMIN — CHLORHEXIDINE GLUCONATE 15 MILLILITER(S): 213 SOLUTION TOPICAL at 17:30

## 2021-02-09 RX ADMIN — Medication 250 MILLIGRAM(S): at 17:30

## 2021-02-09 RX ADMIN — DEXMEDETOMIDINE HYDROCHLORIDE IN 0.9% SODIUM CHLORIDE 3.73 MICROGRAM(S)/KG/HR: 4 INJECTION INTRAVENOUS at 07:27

## 2021-02-09 RX ADMIN — CHLORHEXIDINE GLUCONATE 1 APPLICATION(S): 213 SOLUTION TOPICAL at 05:33

## 2021-02-09 RX ADMIN — AMIODARONE HYDROCHLORIDE 200 MILLIGRAM(S): 400 TABLET ORAL at 05:32

## 2021-02-09 RX ADMIN — CEFEPIME 100 MILLIGRAM(S): 1 INJECTION, POWDER, FOR SOLUTION INTRAMUSCULAR; INTRAVENOUS at 21:36

## 2021-02-09 RX ADMIN — CHLORHEXIDINE GLUCONATE 15 MILLILITER(S): 213 SOLUTION TOPICAL at 05:32

## 2021-02-09 RX ADMIN — Medication 650 MILLIGRAM(S): at 02:50

## 2021-02-09 RX ADMIN — Medication 40 MILLIGRAM(S): at 17:30

## 2021-02-09 RX ADMIN — HEPARIN SODIUM 8 UNIT(S)/HR: 5000 INJECTION INTRAVENOUS; SUBCUTANEOUS at 10:28

## 2021-02-09 RX ADMIN — Medication 650 MILLIGRAM(S): at 11:28

## 2021-02-09 RX ADMIN — Medication 40 MILLIGRAM(S): at 05:33

## 2021-02-09 RX ADMIN — CEFEPIME 100 MILLIGRAM(S): 1 INJECTION, POWDER, FOR SOLUTION INTRAMUSCULAR; INTRAVENOUS at 13:57

## 2021-02-09 RX ADMIN — CEFEPIME 100 MILLIGRAM(S): 1 INJECTION, POWDER, FOR SOLUTION INTRAMUSCULAR; INTRAVENOUS at 05:31

## 2021-02-09 RX ADMIN — Medication 250 MILLIGRAM(S): at 05:31

## 2021-02-09 RX ADMIN — PANTOPRAZOLE SODIUM 40 MILLIGRAM(S): 20 TABLET, DELAYED RELEASE ORAL at 11:28

## 2021-02-09 RX ADMIN — Medication 81 MILLIGRAM(S): at 11:28

## 2021-02-09 NOTE — DIETITIAN INITIAL EVALUATION ADULT. - OTHER CALCULATIONS
wt used: dosing 74.5 kg; Ve: 10.9, Tmax: 38.7; Kcal: ~2080 kcal/d (PSE 2003b) for intubation; Protein: 104-119 g/d (1.4-1.6 g/kg) same as above + wt loss considered; Fluid: per CCU team

## 2021-02-09 NOTE — PROGRESS NOTE ADULT - SUBJECTIVE AND OBJECTIVE BOX
Patient is a 70y old  Male who presents with a chief complaint of Shortness of breath (09 Feb 2021 08:11)      Over Night Events:  Patient seen and examined.   stillvented no pressors       ROS:  See HPI    PHYSICAL EXAM    ICU Vital Signs Last 24 Hrs  T(C): 37.3 (09 Feb 2021 07:01), Max: 38.7 (09 Feb 2021 02:26)  T(F): 99.1 (09 Feb 2021 07:01), Max: 101.6 (09 Feb 2021 02:26)  HR: 986 (09 Feb 2021 07:34) (82 - 986)  BP: 99/63 (09 Feb 2021 07:01) (76/44 - 133/67)  BP(mean): 77 (09 Feb 2021 07:01) (56 - 93)  ABP: --  ABP(mean): --  RR: 24 (09 Feb 2021 07:01) (20 - 25)  SpO2: 100% (09 Feb 2021 07:34) (96% - 100%)      General: on sedation   HEENT:     et tube            Lymph Nodes: NO cervical LN   Lungs: Bilateral BS  Cardiovascular: Regular   Abdomen: Soft, Positive BS  Extremities: No clubbing   Skin: warm   Neurological: no focal deficit   Musculoskeletal: move all ext     I&O's Detail    08 Feb 2021 07:01  -  09 Feb 2021 07:00  --------------------------------------------------------  IN:    Dexmedetomidine: 87.7 mL    Enteral Tube Flush: 180 mL    FentaNYL: 248 mL    Glucerna: 510 mL    IV PiggyBack: 650 mL    Midazolam: 26 mL  Total IN: 1701.7 mL    OUT:    Indwelling Catheter - Urethral (mL): 1520 mL  Total OUT: 1520 mL    Total NET: 181.7 mL      09 Feb 2021 07:01  -  09 Feb 2021 08:27  --------------------------------------------------------  IN:  Total IN: 0 mL    OUT:    Indwelling Catheter - Urethral (mL): 800 mL  Total OUT: 800 mL    Total NET: -800 mL          LABS:                          8.3    8.22  )-----------( 296      ( 09 Feb 2021 06:14 )             27.5         09 Feb 2021 06:14    140    |  101    |  30     ----------------------------<  193    3.8     |  29     |  0.7      Ca    9.2        09 Feb 2021 06:14    TPro  6.2    /  Alb  3.3    /  TBili  0.5    /  DBili  x      /  AST  17     /  ALT  15     /  AlkPhos  91     09 Feb 2021 06:14  Amylase x     lipase x                                                 PT/INR - ( 07 Feb 2021 21:49 )   PT: 20.10 sec;   INR: 1.75 ratio         PTT - ( 07 Feb 2021 21:49 )  PTT:39.1 sec                                             CARDIAC MARKERS ( 09 Feb 2021 06:14 )  x     / 0.11 ng/mL / x     / x     / x      CARDIAC MARKERS ( 08 Feb 2021 05:54 )  x     / 0.19 ng/mL / x     / x     / x      CARDIAC MARKERS ( 07 Feb 2021 11:52 )  x     / 0.19 ng/mL / x     / x     / x                                                            Culture - Blood (collected 07 Feb 2021 18:43)  Source: .Blood None  Preliminary Report (09 Feb 2021 03:08):    No growth to date.                                                   Mode: Auto Mode: PRVC/ Volume Support  RR (machine): 24  TV (machine): 450  FiO2: 40  PEEP: 10  MAP: 14  PIP: 27                                      ABG - ( 09 Feb 2021 03:53 )  pH, Arterial: x     pH, Blood: 7.41  /  pCO2: 46    /  pO2: 101   / HCO3: 29    / Base Excess: 3.7   /  SaO2: 99                  MEDICATIONS  (STANDING):  aMIOdarone    Tablet 200 milliGRAM(s) Oral daily  aspirin enteric coated 81 milliGRAM(s) Oral daily  atorvastatin 40 milliGRAM(s) Oral at bedtime  carvedilol 6.25 milliGRAM(s) Oral every 12 hours  cefepime   IVPB 1000 milliGRAM(s) IV Intermittent every 8 hours  cefepime   IVPB      chlorhexidine 0.12% Liquid 15 milliLiter(s) Oral Mucosa every 12 hours  chlorhexidine 4% Liquid 1 Application(s) Topical <User Schedule>  dexMEDEtomidine Infusion 0.2 MICROgram(s)/kG/Hr (3.73 mL/Hr) IV Continuous <Continuous>  doxazosin 2 milliGRAM(s) Oral at bedtime  fentaNYL   Infusion. 0.5 MICROgram(s)/kG/Hr (3.63 mL/Hr) IV Continuous <Continuous>  furosemide   Injectable 40 milliGRAM(s) IV Push two times a day  midazolam Infusion 0.02 mG/kG/Hr (1.49 mL/Hr) IV Continuous <Continuous>  norepinephrine Infusion 0.05 MICROgram(s)/kG/Min (6.98 mL/Hr) IV Continuous <Continuous>  pantoprazole   Suspension 40 milliGRAM(s) Oral daily  vancomycin  IVPB      vancomycin  IVPB 1000 milliGRAM(s) IV Intermittent every 12 hours    MEDICATIONS  (PRN):  acetaminophen   Tablet .. 650 milliGRAM(s) Oral every 6 hours PRN Temp greater or equal to 38C (100.4F)          Xrays:  TLC:  OG:  ET tube:                                                                                       ECHO:  [facility] ICU:    IMPRESSION:      PLAN:    CNS:    HEENT:    PULMONARY:    CARDIOVASCULAR:    GI: GI prophylaxis.  Feeding     RENAL:    INFECTIOUS DISEASE:    HEMATOLOGICAL:  DVT prophylaxis.    ENDOCRINE:  Follow up FS.  Insulin protocol if needed.    MUSCULOSKELETAL:

## 2021-02-09 NOTE — DIETITIAN INITIAL EVALUATION ADULT. - RD TO REMAIN AVAILABLE
INTERVENTION: EN, medical food supplements, coordination of care. ME: RD to monitor diet order, energy intake, body composition, NFPF, glucose/renal profiles/yes

## 2021-02-09 NOTE — DIETITIAN INITIAL EVALUATION ADULT. - OTHER INFO
Pt admitted d/t pulmonary edema. Pt has hx of trach + PEG sp removal. Pt presented with SOB; NIV + lasix administered in ED; pt desaturated on NIV and was therefore intubated in ED. CXR on 2/8 showed decreased b/l pulmonary opacities.

## 2021-02-09 NOTE — DIETITIAN INITIAL EVALUATION ADULT. - PHYSCIAL ASSESSMENT
Per previous admission records: pt wt 77.8 kg (Nov 2020) vs 74.5 kg (2/7/2021). 4% wt loss within 3m is not considered clinically significant given the time frame.     (2/8) +2 edema (L/R feet). Skin assessment on 2/8 shows ecchymosis. well nourished

## 2021-02-09 NOTE — DIETITIAN INITIAL EVALUATION ADULT. - FACTORS AFF FOOD INTAKE
Respiratory failure requiring intubation. No BM since admission on 2/7 per EMR; audible/active bowel sounds present (2/8) per nursing flow sheets. Current EN regimen is running @ goal rate as of this assessment./NPO

## 2021-02-09 NOTE — DIETITIAN INITIAL EVALUATION ADULT. - ADD RECOMMEND
adjust EN to the above regimen; maintain aspiration precautions, hold EN if MAP <65, monitor/replete electrolytes PRN

## 2021-02-09 NOTE — PROGRESS NOTE ADULT - ASSESSMENT
IMPRESSION:  ARF hypoxic ARDS  CHF   PNA   Hx afib       PLAN:    CNS precedex fentanyl     HEENT: giselle    PULMONARY: lower peep to 8 and then if pox remain stable lower to 5       CARDIOVASCULAR: echo keep is = os   pressors if needed   on lasix continue      GI: GI prophylaxis. NG Feeding     RENAL:follow is and OS follow lytes     INFECTIOUS DISEASE: continue abx for prolcatinon pan cx   id consult   check vanco trouph today please   nasal mrsa positive     HEMATOLOGICAL:  DVT prophylaxis. start heparin drip keep ptt 50   off eliquis       ENDOCRINE:  Follow up FS.  Insulin protocol if needed.    MUSCULOSKELETAL:        CRITICAL CARE TIME SPENT: ***

## 2021-02-09 NOTE — PROGRESS NOTE ADULT - ASSESSMENT
Pt is a 69 yo male with PMH of Afib on eliquis, HTN, HLD, DM, Recent dx with COVID in Nov 20 s/p trach and peg removal presents to ed for Shortness of breath. Pt placed on NIV in the ED and Received Lasix in the ED. Pt then started to desaturate on NIV and  was intubated in the ED.     Acute hypoxic respiratory failure / suspected gram negative pneumonia /  sepsis / type 2 NSTEMI     - continue IV antibiotics and follow cultures   - decrease dose of IV Lasix   - check echo   - aspirin, Coreg, Lipitor, amio   - start IV heparin follow ptt and H/H    - use insulin IV CC protocol if glucose is elevated   - consult cardiology

## 2021-02-09 NOTE — DIETITIAN INITIAL EVALUATION ADULT. - ENTERAL
Adjust EN to the following (the use of specialized formula is not recommended for intubated pt per ASPEN guidelines). Vital AF 1.2 total volume 1440 mL/d. Continuous rate of 60 mL/h. Add no carb prosource TF q24h (additional 40 kcal, 11 g protein). This regimen will provide: 1768 kcal, 119 g protein and 1166 mL free H2O. Additional fluid per LIP.

## 2021-02-09 NOTE — DIETITIAN INITIAL EVALUATION ADULT. - PERTINENT MEDS FT
heparin, peridex, maxipime, vancomycin, protonix, precedex, pacerone, lipitor, coreg, cardura, versed

## 2021-02-09 NOTE — DIETITIAN INITIAL EVALUATION ADULT. - ENERGY INTAKE
Receiving tube feeding and tolerating @ goal rate current EN regimen meets 83% est energy requirement and 72-83% estimated protein requirements

## 2021-02-09 NOTE — PROGRESS NOTE ADULT - SUBJECTIVE AND OBJECTIVE BOX
Patient is lightly sedated on ventilator       T(F): 99.5 (02-09-21 @ 13:00), Max: 101.6 (02-09-21 @ 02:26)  HR: 75 (02-09-21 @ 12:05)  BP: 110/53 (02-09-21 @ 12:05)  RR: 24 (02-09-21 @ 12:05)  SpO2: 100% (02-09-21 @ 12:05) (98% - 100%)    PHYSICAL EXAM:  GENERAL: NAD  HEAD:  Atraumatic, Normocephalic  NERVOUS SYSTEM:  no focal deficits   CHEST/LUNG:  bilateral rhonchi  HEART: Regular rate and rhythm; No murmurs, rubs, or gallops  ABDOMEN: Soft, Nontender, Nondistended; Bowel sounds present  EXTREMITIES:  2+ Peripheral Pulses, No clubbing, cyanosis, or edema  LYMPH: No lymphadenopathy noted  SKIN: No rashes or lesions    LABS  02-09    140  |  101  |  30<H>  ----------------------------<  193<H>  3.8   |  29  |  0.7    Ca    9.2      09 Feb 2021 06:14    TPro  6.2  /  Alb  3.3<L>  /  TBili  0.5  /  DBili  x   /  AST  17  /  ALT  15  /  AlkPhos  91  02-09                          8.3    8.22  )-----------( 296      ( 09 Feb 2021 06:14 )             27.5     PT/INR - ( 07 Feb 2021 21:49 )   PT: 20.10 sec;   INR: 1.75 ratio         PTT - ( 09 Feb 2021 11:14 )  PTT:37.9 sec  Mode: Auto Mode: PRVC/ Volume Support  RR (machine): 24  TV (machine): 450  FiO2: 40    CARDIAC ENZYMES    CKMB Units: 7.1 (02-07 @ 06:24)    Troponin T, Serum: 0.11 ng/mL (02-09-21 @ 06:14)  Troponin T, Serum: 0.19 ng/mL (02-08-21 @ 05:54)  Troponin T, Serum: 0.19 ng/mL (02-07-21 @ 11:52)  Troponin T, Serum: 0.09 ng/mL (02-07-21 @ 06:24)  Troponin T, Serum: <0.01 ng/mL (02-06-21 @ 21:13)      Culture Results:   No growth to date. (02-07-21)    RADIOLOGY  < from: Xray Chest 1 View- PORTABLE-Routine (Xray Chest 1 View- PORTABLE-Routine in AM.) (02.09.21 @ 06:38) >  Lung parenchyma/Pleura: No significant change in bilateral airspace opacities. No pneumothorax.    < end of copied text >    MEDICATIONS  (STANDING):  aMIOdarone    Tablet 200 milliGRAM(s) Oral daily  aspirin enteric coated 81 milliGRAM(s) Oral daily  atorvastatin 40 milliGRAM(s) Oral at bedtime  carvedilol 6.25 milliGRAM(s) Oral every 12 hours  cefepime   IVPB 1000 milliGRAM(s) IV Intermittent every 8 hours     chlorhexidine 0.12% Liquid 15 milliLiter(s) Oral Mucosa every 12 hours  chlorhexidine 4% Liquid 1 Application(s) Topical <User Schedule>  dexMEDEtomidine Infusion 0.2 MICROgram(s)/kG/Hr (3.73 mL/Hr) IV Continuous <Continuous>  doxazosin 2 milliGRAM(s) Oral at bedtime  fentaNYL   Infusion. 0.5 MICROgram(s)/kG/Hr (3.63 mL/Hr) IV Continuous <Continuous>  furosemide   Injectable 40 milliGRAM(s) IV Push two times a day  heparin  Infusion 800 Unit(s)/Hr (8 mL/Hr) IV Continuous <Continuous>  midazolam Infusion 0.02 mG/kG/Hr (1.49 mL/Hr) IV Continuous <Continuous>  norepinephrine Infusion 0.05 MICROgram(s)/kG/Min (6.98 mL/Hr) IV Continuous <Continuous>  pantoprazole   Suspension 40 milliGRAM(s) Oral daily  vancomycin  IVPB 1000 milliGRAM(s) IV Intermittent every 12 hours    MEDICATIONS  (PRN):  acetaminophen   Tablet .. 650 milliGRAM(s) Oral every 6 hours PRN Temp greater or equal to 38C (100.4F)

## 2021-02-09 NOTE — PROGRESS NOTE ADULT - ASSESSMENT
MAGGIE SAHNI 70y Male  MRN#: [id number]   CODE STATUS:full code       SUBJECTIVE  Patient is a 70y old Male who presents with a chief complaint of Shortness of breath (08 Feb 2021 17:39)  Currently admitted to medicine with the primary diagnosis of Pulmonary edema  Today is hospital day 2d, and this morning he is resting in bed, intubated and sedated and RN reports no overnight events. no pressors, sedated with precedex and fentanyl     OBJECTIVE  PAST MEDICAL & SURGICAL HISTORY  H/O sciatica  CHR PAIN LT LE    H/O cystoscopy  resection of bladder tumor    Bladder cancer    DM (diabetes mellitus)    Hypercholesteremia    Afib    HTN (hypertension)    History of hernia repair      ALLERGIES:  No Known Allergies    MEDICATIONS:  STANDING MEDICATIONS  aMIOdarone    Tablet 200 milliGRAM(s) Oral daily  aspirin enteric coated 81 milliGRAM(s) Oral daily  atorvastatin 40 milliGRAM(s) Oral at bedtime  carvedilol 6.25 milliGRAM(s) Oral every 12 hours  cefepime   IVPB 1000 milliGRAM(s) IV Intermittent every 8 hours  cefepime   IVPB      chlorhexidine 0.12% Liquid 15 milliLiter(s) Oral Mucosa every 12 hours  chlorhexidine 4% Liquid 1 Application(s) Topical <User Schedule>  dexMEDEtomidine Infusion 0.2 MICROgram(s)/kG/Hr IV Continuous <Continuous>  doxazosin 2 milliGRAM(s) Oral at bedtime  fentaNYL   Infusion. 0.5 MICROgram(s)/kG/Hr IV Continuous <Continuous>  furosemide   Injectable 40 milliGRAM(s) IV Push two times a day  midazolam Infusion 0.02 mG/kG/Hr IV Continuous <Continuous>  norepinephrine Infusion 0.05 MICROgram(s)/kG/Min IV Continuous <Continuous>  pantoprazole   Suspension 40 milliGRAM(s) Oral daily  vancomycin  IVPB      vancomycin  IVPB 1000 milliGRAM(s) IV Intermittent every 12 hours    PRN MEDICATIONS  acetaminophen   Tablet .. 650 milliGRAM(s) Oral every 6 hours PRN      VITAL SIGNS: Last 24 Hours  T(C): 37.3 (09 Feb 2021 07:01), Max: 38.7 (09 Feb 2021 02:26)  T(F): 99.1 (09 Feb 2021 07:01), Max: 101.6 (09 Feb 2021 02:26)  HR: 986 (09 Feb 2021 07:34) (82 - 986)  BP: 99/63 (09 Feb 2021 07:01) (76/44 - 133/67)  BP(mean): 77 (09 Feb 2021 07:01) (56 - 93)  RR: 24 (09 Feb 2021 07:01) (20 - 25)  SpO2: 100% (09 Feb 2021 07:34) (96% - 100%)    LABS:                        8.3    8.22  )-----------( 296      ( 09 Feb 2021 06:14 )             27.5     02-09    140  |  101  |  30<H>  ----------------------------<  193<H>  3.8   |  29  |  0.7    Ca    9.2      09 Feb 2021 06:14    TPro  6.2  /  Alb  3.3<L>  /  TBili  0.5  /  DBili  x   /  AST  17  /  ALT  15  /  AlkPhos  91  02-09    PT/INR - ( 07 Feb 2021 21:49 )   PT: 20.10 sec;   INR: 1.75 ratio         PTT - ( 07 Feb 2021 21:49 )  PTT:39.1 sec    ABG - ( 09 Feb 2021 03:53 )  pH, Arterial: x     pH, Blood: 7.41  /  pCO2: 46    /  pO2: 101   / HCO3: 29    / Base Excess: 3.7   /  SaO2: 99                Troponin T, Serum: 0.11 ng/mL <HH> (02-09-21 @ 06:14)      Culture - Blood (collected 07 Feb 2021 18:43)  Source: .Blood None  Preliminary Report (09 Feb 2021 03:08):    No growth to date.      CARDIAC MARKERS ( 09 Feb 2021 06:14 )  x     / 0.11 ng/mL / x     / x     / x      CARDIAC MARKERS ( 08 Feb 2021 05:54 )  x     / 0.19 ng/mL / x     / x     / x      CARDIAC MARKERS ( 07 Feb 2021 11:52 )  x     / 0.19 ng/mL / x     / x     / x          RADIOLOGY:      PHYSICAL EXAM:    GENERAL: sedated intubated   HEENT:  Atraumatic, Normocephalic. EOMI, PERRLA  PULMONARY: Clear to auscultation bilaterally  CARDIOVASCULAR: normal s1 s2  GASTROINTESTINAL: Soft, Nontender, Nondistended  MUSCULOSKELETAL:  2+ Peripheral Pulses, No clubbing, cyanosis, or edema  NEUROLOGY: non-focal  SKIN: No rashes or lesions    ASSESSMENT & PLAN        ASSESSMENT & PLAN      PLAN:  Acute Hypoxic Respiratory failure secondary to PNA, pulmonary edema component       CNS: keep sedation versed , fentanyl   add precedex and taper off versed     HEENT: giselle    PULMONARY: lower fio2 to 40% PEEP to 10, repeat ABG    CARDIOVASCULAR: echo keep is < os   pressors if needed   keep Lasix given CXR improving, f/u BUN and UO    GI: GI prophylaxis. NG Feeding     RENAL:follow is and OS follow lytes     INFECTIOUS DISEASE: continue abx for prolcatinon pan cx   id consult   DTA  check vanco trouph blank AM  nasal mrsa positive     HEMATOLOGICAL:  DVT prophylaxis. hold eliquis  follow h/h been dropping, if CBC stable tomorrow resume AC      ENDOCRINE:  Follow up FS.  Insulin protocol if needed.    MUSCULOSKELETAL: bedrest         MICU monitoring            MAGGIE SAHNI 70y Male  MRN#: [id number]   CODE STATUS:full code       SUBJECTIVE  Patient is a 70y old Male who presents with a chief complaint of Shortness of breath (08 Feb 2021 17:39)  Currently admitted to medicine with the primary diagnosis of Pulmonary edema  Today is hospital day 2d, and this morning he is resting in bed, intubated and sedated and RN reports no overnight events. no pressors, sedated with precedex and fentanyl     OBJECTIVE  PAST MEDICAL & SURGICAL HISTORY  H/O sciatica  CHR PAIN LT LE    H/O cystoscopy  resection of bladder tumor    Bladder cancer    DM (diabetes mellitus)    Hypercholesteremia    Afib    HTN (hypertension)    History of hernia repair      ALLERGIES:  No Known Allergies    MEDICATIONS:  STANDING MEDICATIONS  aMIOdarone    Tablet 200 milliGRAM(s) Oral daily  aspirin enteric coated 81 milliGRAM(s) Oral daily  atorvastatin 40 milliGRAM(s) Oral at bedtime  carvedilol 6.25 milliGRAM(s) Oral every 12 hours  cefepime   IVPB 1000 milliGRAM(s) IV Intermittent every 8 hours  cefepime   IVPB      chlorhexidine 0.12% Liquid 15 milliLiter(s) Oral Mucosa every 12 hours  chlorhexidine 4% Liquid 1 Application(s) Topical <User Schedule>  dexMEDEtomidine Infusion 0.2 MICROgram(s)/kG/Hr IV Continuous <Continuous>  doxazosin 2 milliGRAM(s) Oral at bedtime  fentaNYL   Infusion. 0.5 MICROgram(s)/kG/Hr IV Continuous <Continuous>  furosemide   Injectable 40 milliGRAM(s) IV Push two times a day  midazolam Infusion 0.02 mG/kG/Hr IV Continuous <Continuous>  norepinephrine Infusion 0.05 MICROgram(s)/kG/Min IV Continuous <Continuous>  pantoprazole   Suspension 40 milliGRAM(s) Oral daily  vancomycin  IVPB      vancomycin  IVPB 1000 milliGRAM(s) IV Intermittent every 12 hours    PRN MEDICATIONS  acetaminophen   Tablet .. 650 milliGRAM(s) Oral every 6 hours PRN      VITAL SIGNS: Last 24 Hours  T(C): 37.3 (09 Feb 2021 07:01), Max: 38.7 (09 Feb 2021 02:26)  T(F): 99.1 (09 Feb 2021 07:01), Max: 101.6 (09 Feb 2021 02:26)  HR: 986 (09 Feb 2021 07:34) (82 - 986)  BP: 99/63 (09 Feb 2021 07:01) (76/44 - 133/67)  BP(mean): 77 (09 Feb 2021 07:01) (56 - 93)  RR: 24 (09 Feb 2021 07:01) (20 - 25)  SpO2: 100% (09 Feb 2021 07:34) (96% - 100%)    LABS:                        8.3    8.22  )-----------( 296      ( 09 Feb 2021 06:14 )             27.5     02-09    140  |  101  |  30<H>  ----------------------------<  193<H>  3.8   |  29  |  0.7    Ca    9.2      09 Feb 2021 06:14    TPro  6.2  /  Alb  3.3<L>  /  TBili  0.5  /  DBili  x   /  AST  17  /  ALT  15  /  AlkPhos  91  02-09    PT/INR - ( 07 Feb 2021 21:49 )   PT: 20.10 sec;   INR: 1.75 ratio         PTT - ( 07 Feb 2021 21:49 )  PTT:39.1 sec    ABG - ( 09 Feb 2021 03:53 )  pH, Arterial: x     pH, Blood: 7.41  /  pCO2: 46    /  pO2: 101   / HCO3: 29    / Base Excess: 3.7   /  SaO2: 99                Troponin T, Serum: 0.11 ng/mL <HH> (02-09-21 @ 06:14)      Culture - Blood (collected 07 Feb 2021 18:43)  Source: .Blood None  Preliminary Report (09 Feb 2021 03:08):    No growth to date.      CARDIAC MARKERS ( 09 Feb 2021 06:14 )  x     / 0.11 ng/mL / x     / x     / x      CARDIAC MARKERS ( 08 Feb 2021 05:54 )  x     / 0.19 ng/mL / x     / x     / x      CARDIAC MARKERS ( 07 Feb 2021 11:52 )  x     / 0.19 ng/mL / x     / x     / x          RADIOLOGY:      PHYSICAL EXAM:    GENERAL: sedated intubated   HEENT:  Atraumatic, Normocephalic. EOMI, PERRLA  PULMONARY: Clear to auscultation bilaterally  CARDIOVASCULAR: normal s1 s2  GASTROINTESTINAL: Soft, Nontender, Nondistended  MUSCULOSKELETAL:  2+ Peripheral Pulses, No clubbing, cyanosis, or edema  NEUROLOGY: non-focal  SKIN: No rashes or lesions    ASSESSMENT & PLAN        ASSESSMENT & PLAN      PLAN:  Acute Hypoxic Respiratory failure secondary to PNA, pulmonary edema component       CNS: keep precedex and taper off fentanyl    HEENT: giselle    PULMONARY: lower fio2 to 40% PEEP to 5, repeat ABG in one hour, possible extubation    CARDIOVASCULAR: echo keep is < os   pressors if needed   keep Lasix given CXR improving, f/u BUN and UO    GI: GI prophylaxis. NG Feeding     RENAL:follow is and OS follow lytes     INFECTIOUS DISEASE: continue abx for prolcatinon pan cx   id consult   DTA  check vanco trouph blnak AM adjust accordingly   nasal mrsa positive     HEMATOLOGICAL:  DVT prophylaxis. on Eliquis at home, start heparin       ENDOCRINE:  Follow up FS.  Insulin protocol if needed.    MUSCULOSKELETAL: bedrest         MICU monitoring

## 2021-02-09 NOTE — DIETITIAN INITIAL EVALUATION ADULT. - REASON INDICATOR FOR ASSESSMENT
intubation x48h + new EN x24h noted upon RD screening.    Nutrition hx deferred d/t pt medical condition.

## 2021-02-09 NOTE — DIETITIAN INITIAL EVALUATION ADULT. - PERTINENT LABORATORY DATA
(2/9) RBC 3.24, H/H 8.3/27.5, BUN 30, glucose 193, mean non-invasive blood pressure 57-93; (12/4) A1c 8.1

## 2021-02-10 LAB
ALBUMIN SERPL ELPH-MCNC: 3.2 G/DL — LOW (ref 3.5–5.2)
ALP SERPL-CCNC: 97 U/L — SIGNIFICANT CHANGE UP (ref 30–115)
ALT FLD-CCNC: 13 U/L — SIGNIFICANT CHANGE UP (ref 0–41)
ANION GAP SERPL CALC-SCNC: 11 MMOL/L — SIGNIFICANT CHANGE UP (ref 7–14)
APTT BLD: 41.8 SEC — HIGH (ref 27–39.2)
APTT BLD: 43.9 SEC — HIGH (ref 27–39.2)
APTT BLD: 45 SEC — HIGH (ref 27–39.2)
AST SERPL-CCNC: 16 U/L — SIGNIFICANT CHANGE UP (ref 0–41)
BASOPHILS # BLD AUTO: 0.02 K/UL — SIGNIFICANT CHANGE UP (ref 0–0.2)
BASOPHILS NFR BLD AUTO: 0.3 % — SIGNIFICANT CHANGE UP (ref 0–1)
BILIRUB SERPL-MCNC: 0.6 MG/DL — SIGNIFICANT CHANGE UP (ref 0.2–1.2)
BUN SERPL-MCNC: 20 MG/DL — SIGNIFICANT CHANGE UP (ref 10–20)
CALCIUM SERPL-MCNC: 8.9 MG/DL — SIGNIFICANT CHANGE UP (ref 8.5–10.1)
CHLORIDE SERPL-SCNC: 97 MMOL/L — LOW (ref 98–110)
CO2 SERPL-SCNC: 33 MMOL/L — HIGH (ref 17–32)
CREAT SERPL-MCNC: 0.5 MG/DL — LOW (ref 0.7–1.5)
EOSINOPHIL # BLD AUTO: 0.54 K/UL — SIGNIFICANT CHANGE UP (ref 0–0.7)
EOSINOPHIL NFR BLD AUTO: 8.1 % — HIGH (ref 0–8)
GLUCOSE SERPL-MCNC: 140 MG/DL — HIGH (ref 70–99)
HCT VFR BLD CALC: 28 % — LOW (ref 42–52)
HGB BLD-MCNC: 8.3 G/DL — LOW (ref 14–18)
IMM GRANULOCYTES NFR BLD AUTO: 0.3 % — SIGNIFICANT CHANGE UP (ref 0.1–0.3)
LYMPHOCYTES # BLD AUTO: 1.62 K/UL — SIGNIFICANT CHANGE UP (ref 1.2–3.4)
LYMPHOCYTES # BLD AUTO: 24.3 % — SIGNIFICANT CHANGE UP (ref 20.5–51.1)
MCHC RBC-ENTMCNC: 24.6 PG — LOW (ref 27–31)
MCHC RBC-ENTMCNC: 29.6 G/DL — LOW (ref 32–37)
MCV RBC AUTO: 83.1 FL — SIGNIFICANT CHANGE UP (ref 80–94)
MONOCYTES # BLD AUTO: 0.57 K/UL — SIGNIFICANT CHANGE UP (ref 0.1–0.6)
MONOCYTES NFR BLD AUTO: 8.5 % — SIGNIFICANT CHANGE UP (ref 1.7–9.3)
NEUTROPHILS # BLD AUTO: 3.9 K/UL — SIGNIFICANT CHANGE UP (ref 1.4–6.5)
NEUTROPHILS NFR BLD AUTO: 58.5 % — SIGNIFICANT CHANGE UP (ref 42.2–75.2)
NRBC # BLD: 0 /100 WBCS — SIGNIFICANT CHANGE UP (ref 0–0)
PLATELET # BLD AUTO: 333 K/UL — SIGNIFICANT CHANGE UP (ref 130–400)
POTASSIUM SERPL-MCNC: 3.4 MMOL/L — LOW (ref 3.5–5)
POTASSIUM SERPL-SCNC: 3.4 MMOL/L — LOW (ref 3.5–5)
PROCALCITONIN SERPL-MCNC: 1.43 NG/ML — HIGH (ref 0.02–0.1)
PROT SERPL-MCNC: 6.4 G/DL — SIGNIFICANT CHANGE UP (ref 6–8)
RBC # BLD: 3.37 M/UL — LOW (ref 4.7–6.1)
RBC # FLD: 16.2 % — HIGH (ref 11.5–14.5)
SODIUM SERPL-SCNC: 141 MMOL/L — SIGNIFICANT CHANGE UP (ref 135–146)
TROPONIN T SERPL-MCNC: 0.09 NG/ML — CRITICAL HIGH
WBC # BLD: 6.67 K/UL — SIGNIFICANT CHANGE UP (ref 4.8–10.8)
WBC # FLD AUTO: 6.67 K/UL — SIGNIFICANT CHANGE UP (ref 4.8–10.8)

## 2021-02-10 PROCEDURE — 71045 X-RAY EXAM CHEST 1 VIEW: CPT | Mod: 26

## 2021-02-10 PROCEDURE — 99233 SBSQ HOSP IP/OBS HIGH 50: CPT

## 2021-02-10 RX ORDER — VANCOMYCIN HCL 1 G
1500 VIAL (EA) INTRAVENOUS EVERY 12 HOURS
Refills: 0 | Status: DISCONTINUED | OUTPATIENT
Start: 2021-02-10 | End: 2021-02-16

## 2021-02-10 RX ORDER — HEPARIN SODIUM 5000 [USP'U]/ML
1100 INJECTION INTRAVENOUS; SUBCUTANEOUS
Qty: 25000 | Refills: 0 | Status: DISCONTINUED | OUTPATIENT
Start: 2021-02-10 | End: 2021-02-11

## 2021-02-10 RX ORDER — POTASSIUM CHLORIDE 20 MEQ
20 PACKET (EA) ORAL ONCE
Refills: 0 | Status: COMPLETED | OUTPATIENT
Start: 2021-02-10 | End: 2021-02-10

## 2021-02-10 RX ADMIN — CHLORHEXIDINE GLUCONATE 1 APPLICATION(S): 213 SOLUTION TOPICAL at 05:29

## 2021-02-10 RX ADMIN — ATORVASTATIN CALCIUM 40 MILLIGRAM(S): 80 TABLET, FILM COATED ORAL at 21:22

## 2021-02-10 RX ADMIN — Medication 50 MILLIEQUIVALENT(S): at 13:15

## 2021-02-10 RX ADMIN — Medication 250 MILLIGRAM(S): at 05:29

## 2021-02-10 RX ADMIN — Medication 40 MILLIGRAM(S): at 05:30

## 2021-02-10 RX ADMIN — Medication 300 MILLIGRAM(S): at 16:46

## 2021-02-10 RX ADMIN — CARVEDILOL PHOSPHATE 6.25 MILLIGRAM(S): 80 CAPSULE, EXTENDED RELEASE ORAL at 18:31

## 2021-02-10 RX ADMIN — CEFEPIME 100 MILLIGRAM(S): 1 INJECTION, POWDER, FOR SOLUTION INTRAMUSCULAR; INTRAVENOUS at 05:29

## 2021-02-10 RX ADMIN — HEPARIN SODIUM 10 UNIT(S)/HR: 5000 INJECTION INTRAVENOUS; SUBCUTANEOUS at 07:00

## 2021-02-10 RX ADMIN — CEFEPIME 100 MILLIGRAM(S): 1 INJECTION, POWDER, FOR SOLUTION INTRAMUSCULAR; INTRAVENOUS at 16:36

## 2021-02-10 RX ADMIN — Medication 2 MILLIGRAM(S): at 21:22

## 2021-02-10 RX ADMIN — CEFEPIME 100 MILLIGRAM(S): 1 INJECTION, POWDER, FOR SOLUTION INTRAMUSCULAR; INTRAVENOUS at 21:22

## 2021-02-10 RX ADMIN — HEPARIN SODIUM 10 UNIT(S)/HR: 5000 INJECTION INTRAVENOUS; SUBCUTANEOUS at 19:30

## 2021-02-10 RX ADMIN — Medication 40 MILLIGRAM(S): at 16:37

## 2021-02-10 NOTE — PHYSICAL THERAPY INITIAL EVALUATION ADULT - GAIT DEVIATIONS NOTED, PT EVAL
stooped posture, dec heel strike/pushoff/decreased willy/decreased step length/decreased weight-shifting ability

## 2021-02-10 NOTE — SWALLOW BEDSIDE ASSESSMENT ADULT - COMMENTS
+ toleration observed without overt symptoms of penetration/aspiration  solid trial not administered as pt refused cookie without dentures Pt did not cough or present with signs of pharyngeal impairment when self fed + toleration observed without overt symptoms of penetration/aspiration Pt reported with good presentation and passed the dysphagia screen with DAGMAR Sanders and DAGMAR Durham this AM ; no pain indicated

## 2021-02-10 NOTE — PROGRESS NOTE ADULT - ASSESSMENT
MAGGIE SAHNI 70y Male  MRN#: [id number]   CODE STATUS:full code       SUBJECTIVE  Patient is a 70y old Male who presents with a chief complaint of Shortness of breath (10 Feb 2021 08:53)  Currently admitted to medicine with the primary diagnosis of Pulmonary edema  Today is hospital day 3d, and this morning he is resting in bed and reports no overnight events.     OBJECTIVE  PAST MEDICAL & SURGICAL HISTORY  H/O sciatica  CHR PAIN LT LE    H/O cystoscopy  resection of bladder tumor    Bladder cancer    DM (diabetes mellitus)    Hypercholesteremia    Afib    HTN (hypertension)    History of hernia repair      ALLERGIES:  No Known Allergies    MEDICATIONS:  STANDING MEDICATIONS  aMIOdarone    Tablet 200 milliGRAM(s) Oral daily  aspirin enteric coated 81 milliGRAM(s) Oral daily  atorvastatin 40 milliGRAM(s) Oral at bedtime  carvedilol 6.25 milliGRAM(s) Oral every 12 hours  cefepime   IVPB 1000 milliGRAM(s) IV Intermittent every 8 hours  cefepime   IVPB      chlorhexidine 0.12% Liquid 15 milliLiter(s) Oral Mucosa every 12 hours  chlorhexidine 4% Liquid 1 Application(s) Topical <User Schedule>  doxazosin 2 milliGRAM(s) Oral at bedtime  furosemide   Injectable 40 milliGRAM(s) IV Push two times a day  heparin  Infusion 1000 Unit(s)/Hr IV Continuous <Continuous>  norepinephrine Infusion 0.05 MICROgram(s)/kG/Min IV Continuous <Continuous>  pantoprazole   Suspension 40 milliGRAM(s) Oral daily  vancomycin  IVPB 1500 milliGRAM(s) IV Intermittent every 12 hours    PRN MEDICATIONS  acetaminophen   Tablet .. 650 milliGRAM(s) Oral every 6 hours PRN      VITAL SIGNS: Last 24 Hours  T(C): 36.9 (10 Feb 2021 07:01), Max: 38.3 (09 Feb 2021 11:00)  T(F): 98.5 (10 Feb 2021 07:01), Max: 100.9 (09 Feb 2021 11:00)  HR: 114 (10 Feb 2021 06:04) (72 - 114)  BP: 127/60 (10 Feb 2021 06:04) (82/47 - 146/68)  BP(mean): 86 (10 Feb 2021 06:04) (61 - 98)  RR: 16 (10 Feb 2021 07:01) (13 - 30)  SpO2: 99% (10 Feb 2021 06:04) (94% - 100%)    LABS:                        8.3    6.67  )-----------( 333      ( 10 Feb 2021 05:58 )             28.0     02-10    141  |  97<L>  |  20  ----------------------------<  140<H>  3.4<L>   |  33<H>  |  0.5<L>    Ca    8.9      10 Feb 2021 05:58    TPro  6.4  /  Alb  3.2<L>  /  TBili  0.6  /  DBili  x   /  AST  16  /  ALT  13  /  AlkPhos  97  02-10    PTT - ( 10 Feb 2021 05:58 )  PTT:43.9 sec    ABG - ( 09 Feb 2021 13:49 )  pH, Arterial: 7.46  pH, Blood: x     /  pCO2: 44    /  pO2: 126   / HCO3: 31    / Base Excess: 6.8   /  SaO2: 99                Troponin T, Serum: 0.09 ng/mL <HH> (02-10-21 @ 05:58)      Culture - Blood (collected 07 Feb 2021 18:43)  Source: .Blood None  Preliminary Report (09 Feb 2021 03:08):    No growth to date.      CARDIAC MARKERS ( 10 Feb 2021 05:58 )  x     / 0.09 ng/mL / x     / x     / x      CARDIAC MARKERS ( 09 Feb 2021 06:14 )  x     / 0.11 ng/mL / x     / x     / x          RADIOLOGY:      PHYSICAL EXAM:    HEAD:  Atraumatic, Normocephalic  NERVOUS SYSTEM:  no focal deficits   CHEST/LUNG:  bilateral rhonchi  HEART: Regular rate and rhythm; No murmurs, rubs, or gallops  ABDOMEN: Soft, Nontender, Nondistended; Bowel sounds present  EXTREMITIES:  2+ Peripheral Pulses, No clubbing, cyanosis, or edema  LYMPH: No lymphadenopathy noted  SKIN: No rashes or lesions    ASSESSMENT & PLAN          ASSESSMENT & PLAN      PLAN:  Acute Hypoxic Respiratory failure secondary to PNA, pulmonary edema component       CNS: avoid depressants     HEENT: giselle    PULMONARY: extubated yesterday, on 3 Ls NC, BIPAP at night keep IV lasix 40 mg BID    CARDIOVASCULAR: echo keep is < os   pressors if needed   keep Lasix given CXR improving, f/u BUN and UO    GI: GI prophylaxis. NG Feeding     RENAL:follow is and OS follow lytes     INFECTIOUS DISEASE: continue abx for prolcatinon pan cx   id consult   DTA  check vanco after 2 doses   nasal mrsa positive     HEMATOLOGICAL:  DVT prophylaxis. on Eliquis at home, start heparin       ENDOCRINE:  Follow up FS.  Insulin protocol if needed.    MUSCULOSKELETAL: PT/Rehab out of bed to chair       MICU monitoring   d/c max

## 2021-02-10 NOTE — PROGRESS NOTE ADULT - ASSESSMENT
Pt is a 71 yo male with PMH of Afib on eliquis, HTN, HLD, DM, Recent dx with COVID in Nov 20 s/p trach and peg removal presents to ed for Shortness of breath. Pt placed on NIV in the ED and Received Lasix in the ED. Pt then started to desaturate on NIV and  was intubated in the ED.     Acute hypoxic respiratory failure - resolved / suspected gram negative pneumonia /  sepsis / type 2 NSTEMI     - continue IV antibiotics and follow cultures   - decrease dose of IV Lasix   - check echo   - aspirin, Coreg, Lipitor, amio   - start IV heparin follow ptt and H/H    - use insulin IV CC protocol if glucose is elevated   - appreciate  cardiology

## 2021-02-10 NOTE — CONSULT NOTE ADULT - SUBJECTIVE AND OBJECTIVE BOX
CARDIOLOGY CONSULT NOTE     CHIEF COMPLAINT/REASON FOR CONSULT:    HPI:  Pt is a 71 yo male with PMH of Afib on eliquis, HTN, HLD, DM?, Recent dx with COVID in Nov 20 s/p trach and peg removal presents to ed for Shortness of breath. Pt states that his SOB , initially mild and described as tightness, worse when laying flat, no specific pain or radiation. denies fever, chills, cp, abd pain, nvd, le swelling, back pain.  Pt placed on NIV in the ED with improvement.   Admission labs showed BNP 1771. CXR with vascular congestion. Received lasix in the ED.     Pt then started to desat on NIV even after increasing PEEP. Pt was intubated in the ED and central Line was placed.  (07 Feb 2021 01:02)      PAST MEDICAL & SURGICAL HISTORY:  H/O sciatica  CHR PAIN LT LE    H/O cystoscopy  resection of bladder tumor    Bladder cancer    DM (diabetes mellitus)    Hypercholesteremia    Afib    HTN (hypertension)    History of hernia repair        Cardiac Risks:   [ x]HTN, [x ] DM, [ ] Smoking, [ ] FH,  [ x] Lipids        MEDICATIONS:  MEDICATIONS  (STANDING):  aMIOdarone    Tablet 200 milliGRAM(s) Oral daily  aspirin enteric coated 81 milliGRAM(s) Oral daily  atorvastatin 40 milliGRAM(s) Oral at bedtime  carvedilol 6.25 milliGRAM(s) Oral every 12 hours  cefepime   IVPB 1000 milliGRAM(s) IV Intermittent every 8 hours  cefepime   IVPB      chlorhexidine 0.12% Liquid 15 milliLiter(s) Oral Mucosa every 12 hours  chlorhexidine 4% Liquid 1 Application(s) Topical <User Schedule>  doxazosin 2 milliGRAM(s) Oral at bedtime  furosemide   Injectable 40 milliGRAM(s) IV Push two times a day  heparin  Infusion 1000 Unit(s)/Hr (10 mL/Hr) IV Continuous <Continuous>  norepinephrine Infusion 0.05 MICROgram(s)/kG/Min (6.98 mL/Hr) IV Continuous <Continuous>  pantoprazole   Suspension 40 milliGRAM(s) Oral daily  vancomycin  IVPB      vancomycin  IVPB 1000 milliGRAM(s) IV Intermittent every 12 hours      FAMILY HISTORY:      SOCIAL HISTORY:      [ ] Marital status   Allergies    No Known Allergies        	    REVIEW OF SYSTEMS:  CONSTITUTIONAL: No fever, weight loss, or fatigue  EYES: No eye pain, visual disturbances, or discharge  ENMT:  No difficulty hearing, tinnitus, vertigo; No sinus or throat pain  NECK: No pain or stiffness  RESPIRATORY: No cough, wheezing, chills or hemoptysis; No Shortness of Breath  CARDIOVASCULAR: No chest pain, palpitations, passing out, dizziness, or leg swelling  GASTROINTESTINAL: No abdominal or epigastric pain. No nausea, vomiting, or hematemesis; No diarrhea or constipation. No melena or hematochezia.  GENITOURINARY: No dysuria, frequency, hematuria, or incontinence  NEUROLOGICAL: No headaches, memory loss, loss of strength, numbness, or tremors  SKIN: No itching, burning, rashes, or lesions   	        PHYSICAL EXAM:  T(C): 36.9 (02-10-21 @ 07:01), Max: 38.3 (02-09-21 @ 11:00)  HR: 114 (02-10-21 @ 06:04) (72 - 114)  BP: 127/60 (02-10-21 @ 06:04) (82/47 - 146/68)  RR: 16 (02-10-21 @ 07:01) (13 - 30)  SpO2: 99% (02-10-21 @ 06:04) (94% - 100%)  Wt(kg): --  I&O's Summary    09 Feb 2021 07:01  -  10 Feb 2021 07:00  --------------------------------------------------------  IN: 1055.7 mL / OUT: 3030 mL / NET: -1974.3 mL    10 Feb 2021 07:01  -  10 Feb 2021 08:04  --------------------------------------------------------  IN: 0 mL / OUT: 400 mL / NET: -400 mL        Appearance: Normal	  Psychiatry: A & O x 3, Mood & affect appropriate  HEENT:   Normal oral mucosa, PERRL, EOMI	  Lymphatic: No lymphadenopathy  Cardiovascular: Normal S1 S2,RRR, No JVD, No murmurs  Respiratory: Lungs clear to auscultation	  Gastrointestinal:  Soft, Non-tender, + BS	  Skin: No rashes, No ecchymoses, No cyanosis	  Neurologic: Non-focal  Extremities: Normal range of motion, No clubbing, cyanosis or edema  Vascular: Peripheral pulses palpable 2+ bilaterally      ECG:  	  < from: 12 Lead ECG (02.06.21 @ 20:56) >    Diagnosis Line Normal sinus rhythm  Nonspecific ST and T wave abnormality      Confirmed by AMY DENNIS MD ([id number]) on 2/7/2021 10:07:26 AM    < end of copied text >    	  LABS:	 	    CARDIAC MARKERS:                                    8.3    6.67  )-----------( 333      ( 10 Feb 2021 05:58 )             28.0     02-09    140  |  101  |  30<H>  ----------------------------<  193<H>  3.8   |  29  |  0.7    Ca    9.2      09 Feb 2021 06:14    TPro  6.2  /  Alb  3.3<L>  /  TBili  0.5  /  DBili  x   /  AST  17  /  ALT  15  /  AlkPhos  91  02-09    PTT - ( 10 Feb 2021 05:58 )  PTT:43.9 sec              
MAGGIE SAHNI  70y, Male  Allergy: No Known Allergies      CHIEF COMPLAINT: Shortness of breath (10 Feb 2021 16:00)      LOS  3d    HPI:  Pt is a 69 yo male with PMH of Afib on eliquis, HTN, HLD, DM?, Recent dx with COVID in Nov 20 s/p trach and peg removal presents to ed for Shortness of breath. Pt states that his SOB started  yesterday, initially mild and described as tightness, worse when laying flat, no specific pain or radiation. denies fever, chills, cp, abd pain, nvd, le swelling, back pain.  Pt placed on NIV in the ED with improvement.   Admission labs showed BNP 1771. CXR with vascular congestion. Received lasix in the ED.     Pt then started to desat on NIV even after increasing PEEP. Pt was intubated in the ED and central Line was placed.  (07 Feb 2021 01:02)      INFECTIOUS DISEASE HISTORY:  History as above.  Acute onset shortness of breath.   Has dry cough.   Denies any plueritic chest pain.   Patient intubated, now extubated    PAST MEDICAL & SURGICAL HISTORY:  H/O sciatica  CHR PAIN LT LE    H/O cystoscopy  resection of bladder tumor    Bladder cancer    DM (diabetes mellitus)    Hypercholesteremia    Afib    HTN (hypertension)    History of hernia repair        FAMILY HISTORY      SOCIAL HISTORY  Social History:  denies alcohol / drug/ or tobacco use (28 Nov 2020 00:13)        ROS  General: Denies rigors, nightsweats  HEENT: Denies headache, rhinorrhea, sore throat, eye pain  CV: Denies CP, palpitations  PULM: Denies wheezing, hemoptysis  GI: Denies hematemesis, hematochezia, melena  : Denies discharge, hematuria  MSK: Denies arthralgias, myalgias  SKIN: Denies rash, lesions  NEURO: Denies paresthesias, weakness  PSYCH: Denies depression, anxiety    VITALS:  T(F): 97.7, Max: 99.3 (02-10-21 @ 11:00)  HR: 93  BP: 114/53  RR: 18Vital Signs Last 24 Hrs  T(C): 36.5 (10 Feb 2021 19:00), Max: 37.4 (10 Feb 2021 11:00)  T(F): 97.7 (10 Feb 2021 19:00), Max: 99.3 (10 Feb 2021 11:00)  HR: 93 (10 Feb 2021 18:25) (83 - 114)  BP: 114/53 (10 Feb 2021 18:25) (108/52 - 132/60)  BP(mean): 77 (10 Feb 2021 18:25) (75 - 89)  RR: 18 (10 Feb 2021 19:00) (11 - 28)  SpO2: 97% (10 Feb 2021 18:25) (92% - 100%)    PHYSICAL EXAM:  Gen: NAD, resting in bed  HEENT: Normocephalic, atraumatic  Neck: supple, no lymphadenopathy  CV: Regular rate & regular rhythm  Lungs: decreased BS at bases, no fremitus  Abdomen: Soft, BS present  Ext: Warm, well perfused  Neuro: non focal, awake  Skin: no rash, no erythema  Lines: no phlebitis    TESTS & MEASUREMENTS:                        8.3    6.67  )-----------( 333      ( 10 Feb 2021 05:58 )             28.0     02-10    141  |  97<L>  |  20  ----------------------------<  140<H>  3.4<L>   |  33<H>  |  0.5<L>    Ca    8.9      10 Feb 2021 05:58    TPro  6.4  /  Alb  3.2<L>  /  TBili  0.6  /  DBili  x   /  AST  16  /  ALT  13  /  AlkPhos  97  02-10    eGFR if Non African American: 111 mL/min/1.73M2 (02-10-21 @ 05:58)  eGFR if African American: 129 mL/min/1.73M2 (02-10-21 @ 05:58)    LIVER FUNCTIONS - ( 10 Feb 2021 05:58 )  Alb: 3.2 g/dL / Pro: 6.4 g/dL / ALK PHOS: 97 U/L / ALT: 13 U/L / AST: 16 U/L / GGT: x               Culture - Blood (collected 02-09-21 @ 06:14)  Source: .Blood None  Preliminary Report (02-10-21 @ 18:03):    No growth to date.    Culture - Blood (collected 02-07-21 @ 18:43)  Source: .Blood None  Preliminary Report (02-09-21 @ 03:08):    No growth to date.    Culture - Urine (collected 12-30-20 @ 12:38)  Source: .Urine Catheterized  Final Report (01-01-21 @ 10:14):    No growth    Culture - Urine (collected 12-24-20 @ 13:58)  Source: .Urine Catheterized  Final Report (12-26-20 @ 09:04):    No growth    Culture - Sputum (collected 12-24-20 @ 13:48)  Source: .Sputum Sputum  Gram Stain (12-24-20 @ 17:21):    No epithelial cells seen    Moderate WBC's    Rare Gram Positive Rods    Few Gram Negative Rods    Rare to few Yeast  Final Report (12-27-20 @ 08:56):    Numerous Enterobacter aerogenes    Numerous Stenotrophomonas maltophilia    Accompanied by normal respiratory pepper  Organism: Enterobacter aerogenes  Enterobacter aerogenes  Stenotrophomonas maltophilia  Stenotrophomonas maltophilia (12-27-20 @ 08:56)  Organism: Stenotrophomonas maltophilia (12-27-20 @ 08:56)      Method Type: KB  Organism: Stenotrophomonas maltophilia (12-27-20 @ 08:56)      -  Ceftazidime: R >16      -  Levofloxacin: S <=0.5      -  Trimethoprim/Sulfamethoxazole: S <=0.5/9.5      Method Type: GANGA  Organism: Enterobacter aerogenes (12-27-20 @ 08:56)      -  Piperacillin/Tazobactam: R >256      Method Type: ETEST  Organism: Enterobacter aerogenes (12-27-20 @ 08:56)      -  Ampicillin: R >16 These ampicillin results predict results for amoxicillin      -  Ampicillin/Sulbactam: R >16/8 Enterobacter, Citrobacter, and Serratia may develop resistance during prolonged therapy (3-4 days)      -  Cefazolin: R >16 Enterobacter, Citrobacter, and Serratia may develop resistance during prolonged therapy (3-4 days)      -  Cefepime: S <=2      -  Ceftriaxone: R 32 Enterobacter, Citrobacter, and Serratia may develop resistance during prolonged therapy      -  Ciprofloxacin: S <=0.25      -  Gentamicin: S <=2      -  Meropenem: S <=1      -  Tobramycin: S <=2      -  Trimethoprim/Sulfamethoxazole: S <=0.5/9.5      Method Type: GANGA    Culture - Blood (collected 12-24-20 @ 07:26)  Source: .Blood Blood  Final Report (12-29-20 @ 08:00):    No growth at 5 days.    Culture - Blood (collected 12-13-20 @ 20:58)  Source: .Blood Blood  Final Report (12-18-20 @ 21:01):    No growth at 5 days.    Culture - Blood (collected 12-13-20 @ 08:20)  Source: .Blood Blood  Final Report (12-18-20 @ 09:02):    No growth at 5 days.    Culture - Blood (collected 12-12-20 @ 07:01)  Source: .Blood Blood-Peripheral  Final Report (12-17-20 @ 08:00):    No growth at 5 days.    Culture - Sputum (collected 12-09-20 @ 19:22)  Source: .Sputum Sputum  Gram Stain (12-09-20 @ 19:36):    Rare epithelial cells    Moderate WBC's    Few Gram Positive Cocci in Clusters    Rare Gram Variable Rods    Rare Yeast  Final Report (12-11-20 @ 09:14):    Normal Respiratory Pepper present    Culture - Blood (collected 12-09-20 @ 05:30)  Source: .Blood Blood  Final Report (12-14-20 @ 06:01):    No growth at 5 days.    Culture - Blood (collected 12-09-20 @ 05:30)  Source: .Blood Blood  Final Report (12-14-20 @ 06:01):    No growth at 5 days.    Culture - Blood (collected 12-08-20 @ 12:18)  Source: .Blood None  Final Report (12-13-20 @ 23:01):    No Growth Final    Culture - Fungal, Blood (collected 12-08-20 @ 12:18)  Source: .Blood Blood-Peripheral  Final Report (01-06-21 @ 15:00):    No fungus isolated after 4 weeks.    Culture - Blood (collected 12-02-20 @ 17:35)  Source: .Blood None  Final Report (12-08-20 @ 02:55):    No Growth Final    Culture - Blood (collected 12-02-20 @ 17:35)  Source: .Blood None  Final Report (12-08-20 @ 02:55):    No Growth Final    Culture - Blood (collected 12-01-20 @ 23:15)  Source: .Blood Blood-Peripheral  Final Report (12-07-20 @ 07:00):    No Growth Final        Blood Gas Venous - Lactate: 1.2 mmoL/L (02-06-21 @ 20:39)      INFECTIOUS DISEASES TESTING  Procalcitonin, Serum: 1.43 ng/mL (02-09-21 @ 06:14)  MRSA PCR Result.: Positive (02-07-21 @ 09:00)  Procalcitonin, Serum: 1.81 ng/mL (02-07-21 @ 06:24)  COVID-19 PCR: Negative (01-04-21 @ 12:52)  COVID-19 PCR: NotDetec (12-31-20 @ 15:21)  MRSA PCR Result.: Negative (12-24-20 @ 12:07)  COVID-19 PCR: NotDetec (12-14-20 @ 14:16)  MRSA PCR Result.: Negative (12-13-20 @ 12:39)  Procalcitonin, Serum: 0.14 ng/mL (12-13-20 @ 05:59)  Procalcitonin, Serum: 0.26 ng/mL (12-12-20 @ 04:59)  Procalcitonin, Serum: 0.24 ng/mL (12-10-20 @ 04:48)  Fungitell: 104 pg/mL (12-09-20 @ 22:54)  Procalcitonin, Serum: 0.16 ng/mL (12-09-20 @ 13:48)  Procalcitonin, Serum: 0.12 ng/mL (12-09-20 @ 06:00)  Aspergillus Galactomannan Antigen: <0.500 index (12-08-20 @ 12:18)  Fungitell: 55 pg/mL (12-08-20 @ 12:18)  Procalcitonin, Serum: 0.08 ng/mL (12-08-20 @ 12:18)  MRSA PCR Result.: Negative (12-08-20 @ 09:48)  Procalcitonin, Serum: 0.09 ng/mL (12-08-20 @ 04:41)  Procalcitonin, Serum: 0.07 ng/mL (12-07-20 @ 04:30)  Procalcitonin, Serum: 0.06 ng/mL (12-06-20 @ 05:01)  Procalcitonin, Serum: 0.07 ng/mL (12-05-20 @ 04:58)  Procalcitonin, Serum: 0.26 ng/mL (12-03-20 @ 07:32)  Procalcitonin, Serum: 0.52 ng/mL (12-02-20 @ 11:40)  MRSA PCR Result.: Negative (12-01-20 @ 14:30)  Procalcitonin, Serum: 0.61 ng/mL (11-30-20 @ 04:30)  Procalcitonin, Serum: 0.59 ng/mL (11-27-20 @ 19:15)      RADIOLOGY & ADDITIONAL TESTS:  I have personally reviewed the last Chest xray  CXR      CT      CARDIOLOGY TESTING  12 Lead ECG:   Ventricular Rate 109 BPM    Atrial Rate 94 BPM    QRS Duration 80 ms    Q-T Interval 410 ms    QTC Calculation(Bazett) 552 ms    R Axis -16 degrees    T Axis -7 degrees    Diagnosis Line Atrial fibrillation with rapid ventricular response  ST & T wave abnormality, consider anterolateral ischemia      Confirmed by AMY DENNIS MD ([id number]) on 2/10/2021 11:42:11 AM (02-10-21 @ 09:00)  12 Lead ECG:   Ventricular Rate 92 BPM    Atrial Rate 92 BPM    P-R Interval 200 ms    QRS Duration 78 ms    Q-T Interval 398 ms    QTC Calculation(Bazett) 492 ms    P Axis 63 degrees    R Axis 21 degrees    T Axis 59 degrees    Diagnosis Line Normal sinus rhythm  Nonspecific ST and T wave abnormality      Confirmed by AMY DENNIS MD ([id number]) on 2/7/2021 10:07:26 AM (02-06-21 @ 20:56)      MEDICATIONS  aMIOdarone    Tablet 200 Oral daily  aspirin enteric coated 81 Oral daily  atorvastatin 40 Oral at bedtime  carvedilol 6.25 Oral every 12 hours  cefepime   IVPB 1000 IV Intermittent every 8 hours  cefepime   IVPB     chlorhexidine 0.12% Liquid 15 Oral Mucosa every 12 hours  chlorhexidine 4% Liquid 1 Topical <User Schedule>  doxazosin 2 Oral at bedtime  furosemide   Injectable 40 IV Push two times a day  heparin  Infusion 1000 IV Continuous <Continuous>  norepinephrine Infusion 0.05 IV Continuous <Continuous>  pantoprazole   Suspension 40 Oral daily  vancomycin  IVPB 1500 IV Intermittent every 12 hours      Weight  Weight (kg): 74.5 (02-07-21 @ 04:13)    ANTIBIOTICS:  cefepime   IVPB 1000 milliGRAM(s) IV Intermittent every 8 hours  cefepime   IVPB      vancomycin  IVPB 1500 milliGRAM(s) IV Intermittent every 12 hours      ALLERGIES:  No Known Allergies

## 2021-02-10 NOTE — SWALLOW BEDSIDE ASSESSMENT ADULT - ASR SWALLOW DENTITION
pt has dentures at home but not for assessment/uses dentures to eat/edentulous, does not have dentures

## 2021-02-10 NOTE — PROGRESS NOTE ADULT - SUBJECTIVE AND OBJECTIVE BOX
Patient is a 70y old  Male who presents with a chief complaint of Shortness of breath (10 Feb 2021 10:09)      T(F): 98.2 (02-10-21 @ 15:00), Max: 99.3 (02-10-21 @ 11:00)  HR: 87 (02-10-21 @ 14:20)  BP: 132/60 (02-10-21 @ 14:20)  RR: 18 (02-10-21 @ 15:00)  SpO2: 99% (02-10-21 @ 14:20) (92% - 99%)    PHYSICAL EXAM:  GENERAL: NAD  HEAD:  Atraumatic, Normocephalic  NERVOUS SYSTEM:  no focal deficits   CHEST/LUNG:  bilateral rhonchi  HEART: Regular rate and rhythm; No murmurs, rubs, or gallops  ABDOMEN: Soft, Nontender, Nondistended; Bowel sounds present  EXTREMITIES:  2+ Peripheral Pulses, No clubbing, cyanosis, or edema  LYMPH: No lymphadenopathy noted  SKIN: No rashes or lesions    LABS  02-10    141  |  97<L>  |  20  ----------------------------<  140<H>  3.4<L>   |  33<H>  |  0.5<L>    Ca    8.9      10 Feb 2021 05:58    TPro  6.4  /  Alb  3.2<L>  /  TBili  0.6  /  DBili  x   /  AST  16  /  ALT  13  /  AlkPhos  97  02-10                          8.3    6.67  )-----------( 333      ( 10 Feb 2021 05:58 )             28.0     PTT - ( 10 Feb 2021 05:58 )  PTT:43.9 sec    CARDIAC ENZYMES      Troponin T, Serum: 0.09 ng/mL (02-10-21 @ 05:58)  Troponin T, Serum: 0.11 ng/mL (02-09-21 @ 06:14)  Troponin T, Serum: 0.19 ng/mL (02-08-21 @ 05:54)      Culture Results:   No growth to date. (02-07-21)    RADIOLOGY  < from: Xray Chest 1 View- PORTABLE-Routine (Xray Chest 1 View- PORTABLE-Routine in AM.) (02.10.21 @ 05:12) >  Impression:    Stable bilateral lung opacities    < end of copied text >    MEDICATIONS  (STANDING):  aMIOdarone    Tablet 200 milliGRAM(s) Oral daily  aspirin enteric coated 81 milliGRAM(s) Oral daily  atorvastatin 40 milliGRAM(s) Oral at bedtime  carvedilol 6.25 milliGRAM(s) Oral every 12 hours  cefepime   IVPB 1000 milliGRAM(s) IV Intermittent every 8 hours  cefepime   IVPB      chlorhexidine 0.12% Liquid 15 milliLiter(s) Oral Mucosa every 12 hours  chlorhexidine 4% Liquid 1 Application(s) Topical <User Schedule>  doxazosin 2 milliGRAM(s) Oral at bedtime  furosemide   Injectable 40 milliGRAM(s) IV Push two times a day  heparin  Infusion 1000 Unit(s)/Hr (10 mL/Hr) IV Continuous <Continuous>  norepinephrine Infusion 0.05 MICROgram(s)/kG/Min (6.98 mL/Hr) IV Continuous <Continuous>  pantoprazole   Suspension 40 milliGRAM(s) Oral daily  vancomycin  IVPB 1500 milliGRAM(s) IV Intermittent every 12 hours    MEDICATIONS  (PRN):  acetaminophen   Tablet .. 650 milliGRAM(s) Oral every 6 hours PRN Temp greater or equal to 38C (100.4F)      Patient is a 70y old  Male who presents with a chief complaint of Shortness of breath (10 Feb 2021 10:09)      T(F): 98.2 (02-10-21 @ 15:00), Max: 99.3 (02-10-21 @ 11:00)  HR: 87 (02-10-21 @ 14:20)  BP: 132/60 (02-10-21 @ 14:20)  RR: 18 (02-10-21 @ 15:00)  SpO2: 99% (02-10-21 @ 14:20) (92% - 99%)    PHYSICAL EXAM:  GENERAL: NAD  HEAD:  Atraumatic, Normocephalic  NERVOUS SYSTEM:  no focal deficits   CHEST/LUNG:  bilateral rhonchi  HEART: Regular rate and rhythm; No murmurs, rubs, or gallops  ABDOMEN: Soft, Nontender, Nondistended; Bowel sounds present  EXTREMITIES:  2+ Peripheral Pulses, No clubbing, cyanosis, or edema  LYMPH: No lymphadenopathy noted  SKIN: No rashes or lesions    LABS  02-10    141  |  97<L>  |  20  ----------------------------<  140<H>  3.4<L>   |  33<H>  |  0.5<L>    Ca    8.9      10 Feb 2021 05:58    TPro  6.4  /  Alb  3.2<L>  /  TBili  0.6  /  DBili  x   /  AST  16  /  ALT  13  /  AlkPhos  97  02-10                          8.3    6.67  )-----------( 333      ( 10 Feb 2021 05:58 )             28.0     PTT - ( 10 Feb 2021 05:58 )  PTT:43.9 sec    CARDIAC ENZYMES      Troponin T, Serum: 0.09 ng/mL (02-10-21 @ 05:58)  Troponin T, Serum: 0.11 ng/mL (02-09-21 @ 06:14)  Troponin T, Serum: 0.19 ng/mL (02-08-21 @ 05:54)      Culture Results:   No growth to date. (02-07-21)    RADIOLOGY  < from: Xray Chest 1 View- PORTABLE-Routine (Xray Chest 1 View- PORTABLE-Routine in AM.) (02.10.21 @ 05:12) >  Impression:    Stable bilateral lung opacities    < end of copied text >    MEDICATIONS  (STANDING):  aMIOdarone    Tablet 200 milliGRAM(s) Oral daily  aspirin enteric coated 81 milliGRAM(s) Oral daily  atorvastatin 40 milliGRAM(s) Oral at bedtime  carvedilol 6.25 milliGRAM(s) Oral every 12 hours  cefepime   IVPB 1000 milliGRAM(s) IV Intermittent every 8 hours  chlorhexidine 0.12% Liquid 15 milliLiter(s) Oral Mucosa every 12 hours  chlorhexidine 4% Liquid 1 Application(s) Topical <User Schedule>  doxazosin 2 milliGRAM(s) Oral at bedtime  furosemide   Injectable 40 milliGRAM(s) IV Push two times a day  heparin  Infusion 1000 Unit(s)/Hr (10 mL/Hr) IV Continuous <Continuous>  norepinephrine Infusion 0.05 MICROgram(s)/kG/Min (6.98 mL/Hr) IV Continuous <Continuous>  pantoprazole   Suspension 40 milliGRAM(s) Oral daily  vancomycin  IVPB 1500 milliGRAM(s) IV Intermittent every 12 hours    MEDICATIONS  (PRN):  acetaminophen   Tablet .. 650 milliGRAM(s) Oral every 6 hours PRN Temp greater or equal to 38C (100.4F)

## 2021-02-10 NOTE — SWALLOW BEDSIDE ASSESSMENT ADULT - SWALLOW EVAL: RECOMMENDED DIET
Dysphagia diet I puree consistency (because he has no dentures and refused chewable trials) with thin liquids

## 2021-02-10 NOTE — PHYSICAL THERAPY INITIAL EVALUATION ADULT - GENERAL OBSERVATIONS, REHAB EVAL
10:00-10:25 Chart reviewed. Pt encountered semireclined in bed, may be seen by Physical Therapist as confirmed with Nurse. Patient denied pain at rest and ready to get up now; tele; +(L) TLC

## 2021-02-10 NOTE — PHARMACOTHERAPY INTERVENTION NOTE - COMMENTS
Pt was on eliquis for AF. MD would like to start on heparin gtt. Rec to start at 800 units/hr
Rec vanco trough tomorrow 0500. Pt MRSA nasal pos
Vanco trough was subtherapeutic around 8. MRSA nasal +. Day 4. Rec to go up to 1500 mg q12hr.
Last aPTT was 43- goal would be around 50-70 range for AF unless MD concerned about possible bleed. Rec to go up on heparin infusion to 1050 units/hr

## 2021-02-10 NOTE — PROGRESS NOTE ADULT - SUBJECTIVE AND OBJECTIVE BOX
Patient is a 70y old  Male who presents with a chief complaint of Shortness of breath (10 Feb 2021 08:03)      Over Night Events:  Patient seen and examined.   s/p extubation did well     ROS:  See HPI    PHYSICAL EXAM    ICU Vital Signs Last 24 Hrs  T(C): 36.9 (10 Feb 2021 07:01), Max: 38.3 (09 Feb 2021 11:00)  T(F): 98.5 (10 Feb 2021 07:01), Max: 100.9 (09 Feb 2021 11:00)  HR: 114 (10 Feb 2021 06:04) (72 - 114)  BP: 127/60 (10 Feb 2021 06:04) (82/47 - 146/68)  BP(mean): 86 (10 Feb 2021 06:04) (61 - 98)  ABP: --  ABP(mean): --  RR: 16 (10 Feb 2021 07:01) (13 - 30)  SpO2: 99% (10 Feb 2021 06:04) (94% - 100%)      General: awake follow command   HEENT:  giselle              Lymph Nodes: NO cervical LN   Lungs: Bilateral BS  Cardiovascular: Regular   Abdomen: Soft, Positive BS  Extremities: No clubbing   Skin: warm   Neurological: no focal   Musculoskeletal: move all ext     I&O's Detail    09 Feb 2021 07:01  -  10 Feb 2021 07:00  --------------------------------------------------------  IN:    Dexmedetomidine: 119.7 mL    FentaNYL: 27 mL    Glucerna: 60 mL    Heparin: 110 mL    Heparin: 72 mL    IV PiggyBack: 650 mL    Norepinephrine: 17 mL  Total IN: 1055.7 mL    OUT:    Indwelling Catheter - Urethral (mL): 3030 mL  Total OUT: 3030 mL    Total NET: -1974.3 mL      10 Feb 2021 07:01  -  10 Feb 2021 08:53  --------------------------------------------------------  IN:  Total IN: 0 mL    OUT:    Indwelling Catheter - Urethral (mL): 400 mL  Total OUT: 400 mL    Total NET: -400 mL          LABS:                          8.3    6.67  )-----------( 333      ( 10 Feb 2021 05:58 )             28.0         10 Feb 2021 05:58    141    |  97     |  20     ----------------------------<  140    3.4     |  33     |  0.5      Ca    8.9        10 Feb 2021 05:58    TPro  6.4    /  Alb  3.2    /  TBili  0.6    /  DBili  x      /  AST  16     /  ALT  13     /  AlkPhos  97     10 Feb 2021 05:58  Amylase x     lipase x                                                 PTT - ( 10 Feb 2021 05:58 )  PTT:43.9 sec                                             CARDIAC MARKERS ( 10 Feb 2021 05:58 )  x     / 0.09 ng/mL / x     / x     / x      CARDIAC MARKERS ( 09 Feb 2021 06:14 )  x     / 0.11 ng/mL / x     / x     / x                                                            Culture - Blood (collected 07 Feb 2021 18:43)  Source: .Blood None  Preliminary Report (09 Feb 2021 03:08):    No growth to date.                                                   Mode: standby                                      ABG - ( 09 Feb 2021 13:49 )  pH, Arterial: 7.46  pH, Blood: x     /  pCO2: 44    /  pO2: 126   / HCO3: 31    / Base Excess: 6.8   /  SaO2: 99                  MEDICATIONS  (STANDING):  aMIOdarone    Tablet 200 milliGRAM(s) Oral daily  aspirin enteric coated 81 milliGRAM(s) Oral daily  atorvastatin 40 milliGRAM(s) Oral at bedtime  carvedilol 6.25 milliGRAM(s) Oral every 12 hours  cefepime   IVPB 1000 milliGRAM(s) IV Intermittent every 8 hours  cefepime   IVPB      chlorhexidine 0.12% Liquid 15 milliLiter(s) Oral Mucosa every 12 hours  chlorhexidine 4% Liquid 1 Application(s) Topical <User Schedule>  doxazosin 2 milliGRAM(s) Oral at bedtime  furosemide   Injectable 40 milliGRAM(s) IV Push two times a day  heparin  Infusion 1000 Unit(s)/Hr (10 mL/Hr) IV Continuous <Continuous>  norepinephrine Infusion 0.05 MICROgram(s)/kG/Min (6.98 mL/Hr) IV Continuous <Continuous>  pantoprazole   Suspension 40 milliGRAM(s) Oral daily  vancomycin  IVPB 1500 milliGRAM(s) IV Intermittent every 12 hours    MEDICATIONS  (PRN):  acetaminophen   Tablet .. 650 milliGRAM(s) Oral every 6 hours PRN Temp greater or equal to 38C (100.4F)          Xrays:  TLC:  OG:  ET tube:                                                                                    mild b/l effusion and congestion    ECHO:  [facility] ICU:

## 2021-02-10 NOTE — PHYSICAL THERAPY INITIAL EVALUATION ADULT - ADDITIONAL COMMENTS
Per patient, has one step without rail to enter home, then has 2 more flights of 13 steps each inside home with (L) handrail

## 2021-02-10 NOTE — SWALLOW BEDSIDE ASSESSMENT ADULT - ASR SWALLOW ASPIRATION MONITOR
change of breathing pattern/oral hygiene/position upright (90Y)/cough/fever/pneumonia/throat clearing/upper respiratory infection

## 2021-02-10 NOTE — SWALLOW BEDSIDE ASSESSMENT ADULT - SLP PERTINENT HISTORY OF CURRENT PROBLEM
69 yo male with PMH of Afib on eliquis, HTN, HLD, DM?, Recent dx with COVID in Nov 20 s/p trach and peg removal presents to ed for Shortness of breath. Pt states that his SOB started 2/6. Pt started to desat on NIV even after increasing PEEP. Pt was intubated in the ED on 2/7 and central Line was placed, extubated on 2/9.

## 2021-02-10 NOTE — SWALLOW BEDSIDE ASSESSMENT ADULT - ORAL PREPARATORY PHASE
Within functional limits patient missed mouth and had reduced awareness for containment./Anterior loss of bolus

## 2021-02-10 NOTE — CONSULT NOTE ADULT - ASSESSMENT
ASSESSMENT  71 yo male with PMH of Afib on eliquis, HTN, HLD, DM?, Recent dx with COVID in Nov 20 s/p trach and peg removal presents to ed for Shortness of breath.    IMPRESSION  #Acute Hypoxic Respiratory failure - s/p extubation  - Bilateral infiltrates -- fluid congestion vs. possible superimposed pneumonia  - MRSA Nares positive    #Atrial Fibrillation  #Hx of COVID 11/2020  #Obesity BMI (kg/m2): 22.3  #Abx allergy: NKDA    RECOMMENDATIONS  - continue vancomycin 1500 mg q 12 hours, please obtain trough prior to 4th dose  - continue cefepime 1g q 8 hours  - please obtain sputum cultures if able   - would repeat procalcitonin on 2/12    Please call or message on [employer] Teams if with any questions.  Spectra [id number]    
The patient has  above history. Now extubated. No complaints.. Echo lv normal size function. When able take po. Resume out patient meds. Troponin mildly increased . Continue beta . Asa and statin. When stable consider  outpatient dobutamine SE.

## 2021-02-10 NOTE — PROGRESS NOTE ADULT - ASSESSMENT
IMPRESSION:  ARF hypoxic ARDS  CHF   PNA   Hx afib       PLAN:    CNS no sedation     HEENT: giselle    PULMONARY:       CARDIOVASCULAR:  keep is <= os   follow cardiology   on lasix continue      GI: GI prophylaxis. speech and swallow for feed     RENAL:follow is and OS follow lytes     INFECTIOUS DISEASE: continue abx for prolcatinon pan cx   id consult   check vanco trouph today again   nasal mrsa positive     HEMATOLOGICAL:  DVT prophylaxis. heparin drip keep ptt 50   off eliquis       ENDOCRINE:  Follow up FS.  Insulin protocol if needed.    d/c max         CRITICAL CARE TIME SPENT: ***

## 2021-02-10 NOTE — SWALLOW BEDSIDE ASSESSMENT ADULT - SWALLOW EVAL: DIAGNOSIS
Mild oral impairment and suspected pharyngeal impairment when fed by clinician, no signs of pharyngeal impairment when self fed + toleration observed without overt symptoms of penetration/aspiration

## 2021-02-11 LAB
ALBUMIN SERPL ELPH-MCNC: 3.2 G/DL — LOW (ref 3.5–5.2)
ALP SERPL-CCNC: 88 U/L — SIGNIFICANT CHANGE UP (ref 30–115)
ALT FLD-CCNC: 15 U/L — SIGNIFICANT CHANGE UP (ref 0–41)
ANION GAP SERPL CALC-SCNC: 8 MMOL/L — SIGNIFICANT CHANGE UP (ref 7–14)
APTT BLD: 44.9 SEC — HIGH (ref 27–39.2)
AST SERPL-CCNC: 20 U/L — SIGNIFICANT CHANGE UP (ref 0–41)
BASOPHILS # BLD AUTO: 0.05 K/UL — SIGNIFICANT CHANGE UP (ref 0–0.2)
BASOPHILS NFR BLD AUTO: 0.9 % — SIGNIFICANT CHANGE UP (ref 0–1)
BILIRUB SERPL-MCNC: 0.5 MG/DL — SIGNIFICANT CHANGE UP (ref 0.2–1.2)
BUN SERPL-MCNC: 16 MG/DL — SIGNIFICANT CHANGE UP (ref 10–20)
CALCIUM SERPL-MCNC: 9 MG/DL — SIGNIFICANT CHANGE UP (ref 8.5–10.1)
CHLORIDE SERPL-SCNC: 94 MMOL/L — LOW (ref 98–110)
CO2 SERPL-SCNC: 36 MMOL/L — HIGH (ref 17–32)
CREAT SERPL-MCNC: 0.5 MG/DL — LOW (ref 0.7–1.5)
EOSINOPHIL # BLD AUTO: 0.72 K/UL — HIGH (ref 0–0.7)
EOSINOPHIL NFR BLD AUTO: 12.4 % — HIGH (ref 0–8)
GLUCOSE SERPL-MCNC: 142 MG/DL — HIGH (ref 70–99)
HCT VFR BLD CALC: 28 % — LOW (ref 42–52)
HGB BLD-MCNC: 8.4 G/DL — LOW (ref 14–18)
IMM GRANULOCYTES NFR BLD AUTO: 0.3 % — SIGNIFICANT CHANGE UP (ref 0.1–0.3)
LYMPHOCYTES # BLD AUTO: 1.9 K/UL — SIGNIFICANT CHANGE UP (ref 1.2–3.4)
LYMPHOCYTES # BLD AUTO: 32.7 % — SIGNIFICANT CHANGE UP (ref 20.5–51.1)
MCHC RBC-ENTMCNC: 24.8 PG — LOW (ref 27–31)
MCHC RBC-ENTMCNC: 30 G/DL — LOW (ref 32–37)
MCV RBC AUTO: 82.6 FL — SIGNIFICANT CHANGE UP (ref 80–94)
MONOCYTES # BLD AUTO: 0.53 K/UL — SIGNIFICANT CHANGE UP (ref 0.1–0.6)
MONOCYTES NFR BLD AUTO: 9.1 % — SIGNIFICANT CHANGE UP (ref 1.7–9.3)
NEUTROPHILS # BLD AUTO: 2.59 K/UL — SIGNIFICANT CHANGE UP (ref 1.4–6.5)
NEUTROPHILS NFR BLD AUTO: 44.6 % — SIGNIFICANT CHANGE UP (ref 42.2–75.2)
NRBC # BLD: 0 /100 WBCS — SIGNIFICANT CHANGE UP (ref 0–0)
PLATELET # BLD AUTO: 365 K/UL — SIGNIFICANT CHANGE UP (ref 130–400)
POTASSIUM SERPL-MCNC: 3.3 MMOL/L — LOW (ref 3.5–5)
POTASSIUM SERPL-SCNC: 3.3 MMOL/L — LOW (ref 3.5–5)
PROCALCITONIN SERPL-MCNC: 0.48 NG/ML — HIGH (ref 0.02–0.1)
PROT SERPL-MCNC: 6 G/DL — SIGNIFICANT CHANGE UP (ref 6–8)
RBC # BLD: 3.39 M/UL — LOW (ref 4.7–6.1)
RBC # FLD: 15.9 % — HIGH (ref 11.5–14.5)
SODIUM SERPL-SCNC: 138 MMOL/L — SIGNIFICANT CHANGE UP (ref 135–146)
VANCOMYCIN TROUGH SERPL-MCNC: 14.7 UG/ML — HIGH (ref 5–10)
WBC # BLD: 5.81 K/UL — SIGNIFICANT CHANGE UP (ref 4.8–10.8)
WBC # FLD AUTO: 5.81 K/UL — SIGNIFICANT CHANGE UP (ref 4.8–10.8)

## 2021-02-11 PROCEDURE — 71045 X-RAY EXAM CHEST 1 VIEW: CPT | Mod: 26

## 2021-02-11 PROCEDURE — 99233 SBSQ HOSP IP/OBS HIGH 50: CPT

## 2021-02-11 RX ORDER — APIXABAN 2.5 MG/1
5 TABLET, FILM COATED ORAL
Refills: 0 | Status: DISCONTINUED | OUTPATIENT
Start: 2021-02-11 | End: 2021-02-11

## 2021-02-11 RX ORDER — APIXABAN 2.5 MG/1
5 TABLET, FILM COATED ORAL
Refills: 0 | Status: DISCONTINUED | OUTPATIENT
Start: 2021-02-11 | End: 2021-02-16

## 2021-02-11 RX ORDER — POTASSIUM CHLORIDE 20 MEQ
20 PACKET (EA) ORAL
Refills: 0 | Status: COMPLETED | OUTPATIENT
Start: 2021-02-11 | End: 2021-02-11

## 2021-02-11 RX ADMIN — ATORVASTATIN CALCIUM 40 MILLIGRAM(S): 80 TABLET, FILM COATED ORAL at 21:54

## 2021-02-11 RX ADMIN — Medication 300 MILLIGRAM(S): at 06:23

## 2021-02-11 RX ADMIN — CEFEPIME 100 MILLIGRAM(S): 1 INJECTION, POWDER, FOR SOLUTION INTRAMUSCULAR; INTRAVENOUS at 21:54

## 2021-02-11 RX ADMIN — APIXABAN 5 MILLIGRAM(S): 2.5 TABLET, FILM COATED ORAL at 10:10

## 2021-02-11 RX ADMIN — Medication 300 MILLIGRAM(S): at 18:05

## 2021-02-11 RX ADMIN — HEPARIN SODIUM 11 UNIT(S)/HR: 5000 INJECTION INTRAVENOUS; SUBCUTANEOUS at 00:00

## 2021-02-11 RX ADMIN — PANTOPRAZOLE SODIUM 40 MILLIGRAM(S): 20 TABLET, DELAYED RELEASE ORAL at 13:06

## 2021-02-11 RX ADMIN — APIXABAN 5 MILLIGRAM(S): 2.5 TABLET, FILM COATED ORAL at 18:06

## 2021-02-11 RX ADMIN — Medication 20 MILLIEQUIVALENT(S): at 18:05

## 2021-02-11 RX ADMIN — CARVEDILOL PHOSPHATE 6.25 MILLIGRAM(S): 80 CAPSULE, EXTENDED RELEASE ORAL at 18:05

## 2021-02-11 RX ADMIN — CEFEPIME 100 MILLIGRAM(S): 1 INJECTION, POWDER, FOR SOLUTION INTRAMUSCULAR; INTRAVENOUS at 06:06

## 2021-02-11 RX ADMIN — CARVEDILOL PHOSPHATE 6.25 MILLIGRAM(S): 80 CAPSULE, EXTENDED RELEASE ORAL at 06:06

## 2021-02-11 RX ADMIN — Medication 2 MILLIGRAM(S): at 21:54

## 2021-02-11 RX ADMIN — HEPARIN SODIUM 11 UNIT(S)/HR: 5000 INJECTION INTRAVENOUS; SUBCUTANEOUS at 06:59

## 2021-02-11 RX ADMIN — Medication 20 MILLIEQUIVALENT(S): at 13:07

## 2021-02-11 RX ADMIN — Medication 40 MILLIGRAM(S): at 06:06

## 2021-02-11 RX ADMIN — AMIODARONE HYDROCHLORIDE 200 MILLIGRAM(S): 400 TABLET ORAL at 06:06

## 2021-02-11 RX ADMIN — Medication 81 MILLIGRAM(S): at 13:06

## 2021-02-11 RX ADMIN — CHLORHEXIDINE GLUCONATE 1 APPLICATION(S): 213 SOLUTION TOPICAL at 06:06

## 2021-02-11 RX ADMIN — CEFEPIME 100 MILLIGRAM(S): 1 INJECTION, POWDER, FOR SOLUTION INTRAMUSCULAR; INTRAVENOUS at 13:46

## 2021-02-11 NOTE — PROGRESS NOTE ADULT - SUBJECTIVE AND OBJECTIVE BOX
Patient is seen and examined at the bed side, is afebrile.        REVIEW OF SYSTEMS: All other review systems are negative      ALLERGIES: No Known Allergies        ICU Vital Signs Last 24 Hrs  T(C): 36.6 (11 Feb 2021 11:27), Max: 36.8 (10 Feb 2021 15:00)  T(F): 97.8 (11 Feb 2021 11:27), Max: 98.2 (10 Feb 2021 15:00)  HR: 71 (11 Feb 2021 11:20) (71 - 101)  BP: 103/53 (11 Feb 2021 11:20) (90/52 - 134/63)  BP(mean): 77 (11 Feb 2021 11:20) (68 - 91)  ABP: --  ABP(mean): --  RR: 20 (11 Feb 2021 11:27) (12 - 28)  SpO2: 99% (11 Feb 2021 11:20) (87% - 100%)        PHYSICAL EXAM:  GENERAL: Not in distress   CHEST/LUNG:  Aire ntry bilaterally  HEART: s1 and s2 present  ABDOMEN:  Nontender and  Nondistended  EXTREMITIES: No pedal  edema  CNS: Awake and Alert      LABS:                        8.4    5.81  )-----------( 365      ( 11 Feb 2021 06:04 )             28.0     02-11    138  |  94<L>  |  16  ----------------------------<  142<H>  3.3<L>   |  36<H>  |  0.5<L>    Ca    9.0      11 Feb 2021 06:04    TPro  6.0  /  Alb  3.2<L>  /  TBili  0.5  /  DBili  x   /  AST  20  /  ALT  15  /  AlkPhos  88  02-11    PTT - ( 11 Feb 2021 06:04 )  PTT:44.9 sec    CAPILLARY BLOOD GLUCOSE        ABG - ( 09 Feb 2021 13:49 )  pH, Arterial: 7.46  pH, Blood: x     /  pCO2: 44    /  pO2: 126   / HCO3: 31    / Base Excess: 6.8   /  SaO2: 99          MEDICATIONS  (STANDING):  aMIOdarone    Tablet 200 milliGRAM(s) Oral daily  apixaban 5 milliGRAM(s) Oral two times a day  aspirin enteric coated 81 milliGRAM(s) Oral daily  atorvastatin 40 milliGRAM(s) Oral at bedtime  carvedilol 6.25 milliGRAM(s) Oral every 12 hours  cefepime   IVPB 1000 milliGRAM(s) IV Intermittent every 8 hours  cefepime   IVPB      chlorhexidine 0.12% Liquid 15 milliLiter(s) Oral Mucosa every 12 hours  chlorhexidine 4% Liquid 1 Application(s) Topical <User Schedule>  doxazosin 2 milliGRAM(s) Oral at bedtime  pantoprazole   Suspension 40 milliGRAM(s) Oral daily  potassium chloride    Tablet ER 20 milliEquivalent(s) Oral every 2 hours  vancomycin  IVPB 1500 milliGRAM(s) IV Intermittent every 12 hours    MEDICATIONS  (PRN):  acetaminophen   Tablet .. 650 milliGRAM(s) Oral every 6 hours PRN Temp greater or equal to 38C (100.4F)          RADIOLOGY & ADDITIONAL TESTS:               Patient is seen and examined at the bed side, is afebrile. He mentioned feeling better,       REVIEW OF SYSTEMS: All other review systems are negative      ALLERGIES: No Known Allergies        ICU Vital Signs Last 24 Hrs  T(C): 36.6 (11 Feb 2021 11:27), Max: 36.8 (10 Feb 2021 15:00)  T(F): 97.8 (11 Feb 2021 11:27), Max: 98.2 (10 Feb 2021 15:00)  HR: 71 (11 Feb 2021 11:20) (71 - 101)  BP: 103/53 (11 Feb 2021 11:20) (90/52 - 134/63)  BP(mean): 77 (11 Feb 2021 11:20) (68 - 91)  ABP: --  ABP(mean): --  RR: 20 (11 Feb 2021 11:27) (12 - 28)  SpO2: 99% (11 Feb 2021 11:20) (87% - 100%)        PHYSICAL EXAM:  GENERAL: Not in distress   CHEST/LUNG: Not using accessory muscles   HEART: s1 and s2 present  ABDOMEN:  Nontender and  Nondistended  EXTREMITIES: No pedal  edema  CNS: Awake and Alert      LABS:                        8.4    5.81  )-----------( 365      ( 11 Feb 2021 06:04 )             28.0     02-11    138  |  94<L>  |  16  ----------------------------<  142<H>  3.3<L>   |  36<H>  |  0.5<L>    Ca    9.0      11 Feb 2021 06:04    TPro  6.0  /  Alb  3.2<L>  /  TBili  0.5  /  DBili  x   /  AST  20  /  ALT  15  /  AlkPhos  88  02-11    PTT - ( 11 Feb 2021 06:04 )  PTT:44.9 sec        ABG - ( 09 Feb 2021 13:49 )  pH, Arterial: 7.46  pH, Blood: x     /  pCO2: 44    /  pO2: 126   / HCO3: 31    / Base Excess: 6.8   /  SaO2: 99          MEDICATIONS  (STANDING):  aMIOdarone    Tablet 200 milliGRAM(s) Oral daily  apixaban 5 milliGRAM(s) Oral two times a day  aspirin enteric coated 81 milliGRAM(s) Oral daily  atorvastatin 40 milliGRAM(s) Oral at bedtime  carvedilol 6.25 milliGRAM(s) Oral every 12 hours  cefepime   IVPB 1000 milliGRAM(s) IV Intermittent every 8 hours  cefepime   IVPB      chlorhexidine 0.12% Liquid 15 milliLiter(s) Oral Mucosa every 12 hours  chlorhexidine 4% Liquid 1 Application(s) Topical <User Schedule>  doxazosin 2 milliGRAM(s) Oral at bedtime  pantoprazole   Suspension 40 milliGRAM(s) Oral daily  potassium chloride    Tablet ER 20 milliEquivalent(s) Oral every 2 hours  vancomycin  IVPB 1500 milliGRAM(s) IV Intermittent every 12 hours    MEDICATIONS  (PRN):  acetaminophen   Tablet .. 650 milliGRAM(s) Oral every 6 hours PRN Temp greater or equal to 38C (100.4F)        RADIOLOGY & ADDITIONAL TESTS:    < from: Xray Chest 1 View- PORTABLE-Routine (Xray Chest 1 View- PORTABLE-Routine in AM.) (02.11.21 @ 05:58) >  Bibasilar airspaceopacities have slightly improved since one day prior.      < from: Xray Chest 1 View- PORTABLE-Routine (Xray Chest 1 View- PORTABLE-Routine in AM.) (02.09.21 @ 06:38) >  Support devices: Endotracheal tube, left IJ line and enteric tube are in satisfactory position.    Cardiac/mediastinum/hilum: Stable.    Lung parenchyma/Pleura: No significant change in bilateral airspace opacities. No pneumothorax.    Skeleton/soft tissues: Stable.        MICROBIOLOGY DATA:    Culture - Blood in AM (02.09.21 @ 06:14)   Specimen Source: .Blood None   Culture Results: No growth to date.     Culture - Blood (02.07.21 @ 18:43)   Specimen Source: .Blood None   Culture Results: No growth to date.     MRSA/MSSA PCR (02.07.21 @ 09:00)   MRSA PCR Result.: Positive:     Respiratory Viral Panel with COVID-19 by KENDRICK (02.06.21 @ 20:34)   Rapid RVP Result: NotDetec   SARS-CoV-2: NotDetec:

## 2021-02-11 NOTE — PROGRESS NOTE ADULT - SUBJECTIVE AND OBJECTIVE BOX
Patient is a 70y old  Male who presents with a chief complaint of Shortness of breath (11 Feb 2021 07:21)      Over Night Events:  Patient seen and examined.   feel good  not on distress   no fever     ROS:  See HPI    PHYSICAL EXAM    ICU Vital Signs Last 24 Hrs  T(C): 36.7 (11 Feb 2021 03:30), Max: 37.4 (10 Feb 2021 11:00)  T(F): 98 (11 Feb 2021 03:30), Max: 99.3 (10 Feb 2021 11:00)  HR: 85 (11 Feb 2021 06:20) (82 - 112)  BP: 134/63 (11 Feb 2021 06:20) (90/52 - 134/63)  BP(mean): 91 (11 Feb 2021 06:20) (68 - 91)  ABP: --  ABP(mean): --  RR: 19 (11 Feb 2021 06:20) (11 - 28)  SpO2: 98% (11 Feb 2021 06:20) (87% - 100%)      General: AOX3  HEENT:    giselle            Lymph Nodes: NO cervical LN   Lungs: Bilateral BS  Cardiovascular: Regular   Abdomen: Soft, Positive BS  Extremities: No clubbing   Skin: warm   Neurological:  nof coal   Musculoskeletal: move all ext     I&O's Detail    10 Feb 2021 07:01  -  11 Feb 2021 07:00  --------------------------------------------------------  IN:    Heparin: 140 mL    Heparin: 77 mL    IV PiggyBack: 1200 mL    Oral Fluid: 120 mL  Total IN: 1537 mL    OUT:    Indwelling Catheter - Urethral (mL): 400 mL    Voided (mL): 1600 mL  Total OUT: 2000 mL    Total NET: -463 mL      11 Feb 2021 07:01  -  11 Feb 2021 08:05  --------------------------------------------------------  IN:  Total IN: 0 mL    OUT:    Voided (mL): 150 mL  Total OUT: 150 mL    Total NET: -150 mL          LABS:                          8.4    5.81  )-----------( 365      ( 11 Feb 2021 06:04 )             28.0         11 Feb 2021 06:04    138    |  94     |  16     ----------------------------<  142    3.3     |  36     |  0.5      Ca    9.0        11 Feb 2021 06:04    TPro  6.0    /  Alb  3.2    /  TBili  0.5    /  DBili  x      /  AST  20     /  ALT  15     /  AlkPhos  88     11 Feb 2021 06:04  Amylase x     lipase x                                                 PTT - ( 11 Feb 2021 06:04 )  PTT:44.9 sec                                             CARDIAC MARKERS ( 10 Feb 2021 05:58 )  x     / 0.09 ng/mL / x     / x     / x                                                            Culture - Blood (collected 09 Feb 2021 06:14)  Source: .Blood None  Preliminary Report (10 Feb 2021 18:03):    No growth to date.                                                                                       ABG - ( 09 Feb 2021 13:49 )  pH, Arterial: 7.46  pH, Blood: x     /  pCO2: 44    /  pO2: 126   / HCO3: 31    / Base Excess: 6.8   /  SaO2: 99                  MEDICATIONS  (STANDING):  aMIOdarone    Tablet 200 milliGRAM(s) Oral daily  aspirin enteric coated 81 milliGRAM(s) Oral daily  atorvastatin 40 milliGRAM(s) Oral at bedtime  carvedilol 6.25 milliGRAM(s) Oral every 12 hours  cefepime   IVPB 1000 milliGRAM(s) IV Intermittent every 8 hours  cefepime   IVPB      chlorhexidine 0.12% Liquid 15 milliLiter(s) Oral Mucosa every 12 hours  chlorhexidine 4% Liquid 1 Application(s) Topical <User Schedule>  doxazosin 2 milliGRAM(s) Oral at bedtime  furosemide   Injectable 40 milliGRAM(s) IV Push two times a day  heparin  Infusion 1100 Unit(s)/Hr (11 mL/Hr) IV Continuous <Continuous>  pantoprazole   Suspension 40 milliGRAM(s) Oral daily  vancomycin  IVPB 1500 milliGRAM(s) IV Intermittent every 12 hours    MEDICATIONS  (PRN):  acetaminophen   Tablet .. 650 milliGRAM(s) Oral every 6 hours PRN Temp greater or equal to 38C (100.4F)          Xrays:  TLC:  OG:  ET tube:                                                                                    mild opacity    ECHO:  [facility] ICU:

## 2021-02-11 NOTE — PROGRESS NOTE ADULT - ASSESSMENT
Patient feels better. Not sob. NSR PAC. Anticoagulate. PO lasix soon. PO AC when stable. OOB to chair> Correct k if needed

## 2021-02-11 NOTE — PROGRESS NOTE ADULT - ASSESSMENT
MAGGIE SAHNI 70y Male  MRN#: [id number]   CODE STATUS:full code       SUBJECTIVE  Patient is a 70y old Male who presents with a chief complaint of Shortness of breath (11 Feb 2021 08:04)  Currently admitted to medicine with the primary diagnosis of Pulmonary edema  Today is hospital day 4d, and this morning he is resting in bed and reports no overnight events.       OBJECTIVE  PAST MEDICAL & SURGICAL HISTORY  H/O sciatica  CHR PAIN LT LE    H/O cystoscopy  resection of bladder tumor    Bladder cancer    DM (diabetes mellitus)    Hypercholesteremia    Afib    HTN (hypertension)    History of hernia repair      ALLERGIES:  No Known Allergies    MEDICATIONS:  STANDING MEDICATIONS  aMIOdarone    Tablet 200 milliGRAM(s) Oral daily  aspirin enteric coated 81 milliGRAM(s) Oral daily  atorvastatin 40 milliGRAM(s) Oral at bedtime  carvedilol 6.25 milliGRAM(s) Oral every 12 hours  cefepime   IVPB 1000 milliGRAM(s) IV Intermittent every 8 hours  cefepime   IVPB      chlorhexidine 0.12% Liquid 15 milliLiter(s) Oral Mucosa every 12 hours  chlorhexidine 4% Liquid 1 Application(s) Topical <User Schedule>  doxazosin 2 milliGRAM(s) Oral at bedtime  heparin  Infusion 1100 Unit(s)/Hr IV Continuous <Continuous>  pantoprazole   Suspension 40 milliGRAM(s) Oral daily  vancomycin  IVPB 1500 milliGRAM(s) IV Intermittent every 12 hours    PRN MEDICATIONS  acetaminophen   Tablet .. 650 milliGRAM(s) Oral every 6 hours PRN      VITAL SIGNS: Last 24 Hours  T(C): 36.6 (11 Feb 2021 07:30), Max: 37.4 (10 Feb 2021 11:00)  T(F): 97.9 (11 Feb 2021 07:30), Max: 99.3 (10 Feb 2021 11:00)  HR: 85 (11 Feb 2021 06:20) (82 - 112)  BP: 134/63 (11 Feb 2021 06:20) (90/52 - 134/63)  BP(mean): 91 (11 Feb 2021 06:20) (68 - 91)  RR: 20 (11 Feb 2021 07:30) (11 - 28)  SpO2: 98% (11 Feb 2021 06:20) (87% - 100%)    LABS:                        8.4    5.81  )-----------( 365      ( 11 Feb 2021 06:04 )             28.0     02-11    138  |  94<L>  |  16  ----------------------------<  142<H>  3.3<L>   |  36<H>  |  0.5<L>    Ca    9.0      11 Feb 2021 06:04    TPro  6.0  /  Alb  3.2<L>  /  TBili  0.5  /  DBili  x   /  AST  20  /  ALT  15  /  AlkPhos  88  02-11    PTT - ( 11 Feb 2021 06:04 )  PTT:44.9 sec    ABG - ( 09 Feb 2021 13:49 )  pH, Arterial: 7.46  pH, Blood: x     /  pCO2: 44    /  pO2: 126   / HCO3: 31    / Base Excess: 6.8   /  SaO2: 99                    Culture - Blood (collected 09 Feb 2021 06:14)  Source: .Blood None  Preliminary Report (10 Feb 2021 18:03):    No growth to date.      CARDIAC MARKERS ( 10 Feb 2021 05:58 )  x     / 0.09 ng/mL / x     / x     / x          RADIOLOGY:    PHYSICAL EXAM:  HEAD:  Atraumatic, Normocephalic  NERVOUS SYSTEM:  no focal deficits   CHEST/LUNG:  bilateral rhonchi  HEART: Regular rate and rhythm; No murmurs, rubs, or gallops  ABDOMEN: Soft, Nontender, Nondistended; Bowel sounds present  EXTREMITIES:  2+ Peripheral Pulses, No clubbing, cyanosis, or edema  LYMPH: No lymphadenopathy noted  SKIN: No rashes or lesions  ASSESSMENT & PLAN    ARF hypoxic ARDS  CHF   PNA   Hx afib       PLAN:    CNS no sedation     HEENT: giselle    PULMONARY: keep pox > 92 %   cxr blank       CARDIOVASCULAR:  keep is = os   follow cardiology   hold lasix elevated bicarb will give as needed     out patient follow up with cardiology     GI: GI prophylaxis.  feed     RENAL:follow is and OS follow lytes     INFECTIOUS DISEASE: continue abx   repeat procal blank    id follow up    check vanco trouph today again   nasal mrsa positive     HEMATOLOGICAL:  DVT prophylaxis. resume eliquis d/c heparin     ENDOCRINE:  Follow up FS.  Insulin protocol if needed.    d/c TLC

## 2021-02-11 NOTE — PROGRESS NOTE ADULT - ASSESSMENT
IMPRESSION:  ARF hypoxic ARDS  CHF   PNA   Hx afib       PLAN:    CNS no sedation     HEENT: giselle    PULMONARY: keep pox > 92 %   cxr blank       CARDIOVASCULAR:  keep is = os   follow cardiology   hold lasix elevated bicarb will give as needed       GI: GI prophylaxis.  feed     RENAL:follow is and OS follow lytes     INFECTIOUS DISEASE: continue abx   repeat procal blank    id follow up    check vanco trouph today again   nasal mrsa positive     HEMATOLOGICAL:  DVT prophylaxis. resume eliquis d/c heparin   off eliquis       ENDOCRINE:  Follow up FS.  Insulin protocol if needed.    d/c TLC          CRITICAL CARE TIME SPENT: ***

## 2021-02-11 NOTE — PROGRESS NOTE ADULT - ASSESSMENT
69 yo male with PMH of Afib on eliquis, HTN, HLD, DM?, Recent dx with COVID in Nov 20 s/p trach and peg removal presents to ed for Shortness of breath.    IMPRESSION  #Acute Hypoxic Respiratory failure - s/p extubation  - Bilateral infiltrates -- fluid congestion vs. possible superimposed pneumonia  - MRSA Nares positive    #Atrial Fibrillation  #Hx of COVID 11/2020  #Obesity BMI (kg/m2): 22.3  #Abx allergy: NKDA    RECOMMENDATIONS  - continue vancomycin 1500 mg q 12 hours, please obtain trough prior to 4th dose  - continue cefepime 1g q 8 hours  - please obtain sputum cultures if able   - would repeat procalcitonin on 2/12      Attending Attestation:   35 minutes spent on total encounter; more than 50% of the visit was spent counseling and/or coordinating care by the attending physician.     71 yo male with PMH of Afib on eliquis, HTN, HLD, DM?, Recent dx with COVID in Nov 20 s/p trach and peg removal presents to ed for Shortness of breath.    IMPRESSION  #Acute Hypoxic Respiratory failure - s/p extubation  - Bilateral infiltrates -- fluid congestion vs. possible superimposed pneumonia  - MRSA Nares positive  #Hx of COVID 11/2020    would recommend:  1. Continue vancomycin 1500 mg q 12 hourss and keep level between 15 to 20  2. Continue cefepime 1g q 8 hours  3. Aspiration precaution     d/w ICU/ CCU team     Attending Attestation:     45 minutes spent on total encounter; more than 50% of the visit was spent counseling and/or coordinating care by the attending physician.

## 2021-02-11 NOTE — PROGRESS NOTE ADULT - ASSESSMENT
Pt is a 71 yo male with PMH of Afib on eliquis, HTN, HLD, DM, Recent dx with COVID in Nov 20 s/p trach and peg removal presents to ed for Shortness of breath. Pt placed on NIV in the ED and Received Lasix in the ED. Pt then started to desaturate on NIV and  was intubated in the ED.     Acute hypoxic respiratory failure - resolved / suspected gram negative pneumonia /  sepsis / type 2 NSTEMI     - continue IV antibiotics and follow cultures   - DC  Lasix   - aspirin, Coreg, Lipitor, amio   - Eliquis resumed    - use insulin IV CC protocol if glucose is elevated   - appreciate  cardiology   - PT Rehab

## 2021-02-11 NOTE — PROGRESS NOTE ADULT - SUBJECTIVE AND OBJECTIVE BOX
Patient is a 70y old  Male who presents with a chief complaint of Shortness of breath (10 Feb 2021 19:40)      T(F): 98 (02-11-21 @ 03:30), Max: 99.3 (02-10-21 @ 11:00)  HR: 85 (02-11-21 @ 06:20)  BP: 134/63 (02-11-21 @ 06:20)  RR: 19 (02-11-21 @ 06:20)  SpO2: 98% (02-11-21 @ 06:20) (87% - 100%)    PHYSICAL EXAM:  GENERAL: NAD, well-groomed, well-developed  HEAD:  Atraumatic, Normocephalic  EYES: EOMI, PERRLA, conjunctiva and sclera clear  ENMT: No tonsillar erythema, exudates, or enlargement; Moist mucous membranes, Good dentition, No lesions  NECK: Supple, No JVD, Normal thyroid  NERVOUS SYSTEM:  Alert & Oriented X3,  Motor Strength 5/5 B/L upper and lower extremities  CHEST/LUNG: Clear to percussion bilaterally; No rales, rhonchi, wheezing, or rubs  HEART: Regular rate and rhythm; No murmurs, rubs, or gallops  ABDOMEN: Soft, Nontender, Nondistended; Bowel sounds present  EXTREMITIES:   No clubbing, cyanosis, or edema  LYMPH: No lymphadenopathy noted  SKIN: No rashes or lesions    labs  02-10    141  |  97<L>  |  20  ----------------------------<  140<H>  3.4<L>   |  33<H>  |  0.5<L>    Ca    8.9      10 Feb 2021 05:58    TPro  6.4  /  Alb  3.2<L>  /  TBili  0.6  /  DBili  x   /  AST  16  /  ALT  13  /  AlkPhos  97  02-10                          8.4    5.81  )-----------( 365      ( 11 Feb 2021 06:04 )             28.0       Culture - Blood (collected 09 Feb 2021 06:14)  Source: .Blood None  Preliminary Report (10 Feb 2021 18:03):    No growth to date.      PTT - ( 11 Feb 2021 06:04 )  PTT:44.9 sec        acetaminophen   Tablet .. 650 milliGRAM(s) Oral every 6 hours PRN  aMIOdarone    Tablet 200 milliGRAM(s) Oral daily  aspirin enteric coated 81 milliGRAM(s) Oral daily  atorvastatin 40 milliGRAM(s) Oral at bedtime  carvedilol 6.25 milliGRAM(s) Oral every 12 hours  cefepime   IVPB 1000 milliGRAM(s) IV Intermittent every 8 hours  cefepime   IVPB      chlorhexidine 0.12% Liquid 15 milliLiter(s) Oral Mucosa every 12 hours  chlorhexidine 4% Liquid 1 Application(s) Topical <User Schedule>  doxazosin 2 milliGRAM(s) Oral at bedtime  furosemide   Injectable 40 milliGRAM(s) IV Push two times a day  heparin  Infusion 1100 Unit(s)/Hr IV Continuous <Continuous>  pantoprazole   Suspension 40 milliGRAM(s) Oral daily  vancomycin  IVPB 1500 milliGRAM(s) IV Intermittent every 12 hours

## 2021-02-12 LAB
ALBUMIN SERPL ELPH-MCNC: 3.3 G/DL — LOW (ref 3.5–5.2)
ALP SERPL-CCNC: 88 U/L — SIGNIFICANT CHANGE UP (ref 30–115)
ALT FLD-CCNC: 16 U/L — SIGNIFICANT CHANGE UP (ref 0–41)
ANION GAP SERPL CALC-SCNC: 11 MMOL/L — SIGNIFICANT CHANGE UP (ref 7–14)
AST SERPL-CCNC: 19 U/L — SIGNIFICANT CHANGE UP (ref 0–41)
BASOPHILS # BLD AUTO: 0.04 K/UL — SIGNIFICANT CHANGE UP (ref 0–0.2)
BASOPHILS NFR BLD AUTO: 0.7 % — SIGNIFICANT CHANGE UP (ref 0–1)
BILIRUB SERPL-MCNC: 0.4 MG/DL — SIGNIFICANT CHANGE UP (ref 0.2–1.2)
BUN SERPL-MCNC: 15 MG/DL — SIGNIFICANT CHANGE UP (ref 10–20)
CALCIUM SERPL-MCNC: 9.2 MG/DL — SIGNIFICANT CHANGE UP (ref 8.5–10.1)
CHLORIDE SERPL-SCNC: 99 MMOL/L — SIGNIFICANT CHANGE UP (ref 98–110)
CO2 SERPL-SCNC: 32 MMOL/L — SIGNIFICANT CHANGE UP (ref 17–32)
CREAT SERPL-MCNC: 0.7 MG/DL — SIGNIFICANT CHANGE UP (ref 0.7–1.5)
EOSINOPHIL # BLD AUTO: 0.56 K/UL — SIGNIFICANT CHANGE UP (ref 0–0.7)
EOSINOPHIL NFR BLD AUTO: 9.4 % — HIGH (ref 0–8)
GLUCOSE SERPL-MCNC: 136 MG/DL — HIGH (ref 70–99)
HCT VFR BLD CALC: 29.2 % — LOW (ref 42–52)
HGB BLD-MCNC: 8.8 G/DL — LOW (ref 14–18)
IMM GRANULOCYTES NFR BLD AUTO: 0.3 % — SIGNIFICANT CHANGE UP (ref 0.1–0.3)
LYMPHOCYTES # BLD AUTO: 1.68 K/UL — SIGNIFICANT CHANGE UP (ref 1.2–3.4)
LYMPHOCYTES # BLD AUTO: 28.1 % — SIGNIFICANT CHANGE UP (ref 20.5–51.1)
MCHC RBC-ENTMCNC: 24.6 PG — LOW (ref 27–31)
MCHC RBC-ENTMCNC: 30.1 G/DL — LOW (ref 32–37)
MCV RBC AUTO: 81.8 FL — SIGNIFICANT CHANGE UP (ref 80–94)
MONOCYTES # BLD AUTO: 0.61 K/UL — HIGH (ref 0.1–0.6)
MONOCYTES NFR BLD AUTO: 10.2 % — HIGH (ref 1.7–9.3)
NEUTROPHILS # BLD AUTO: 3.06 K/UL — SIGNIFICANT CHANGE UP (ref 1.4–6.5)
NEUTROPHILS NFR BLD AUTO: 51.3 % — SIGNIFICANT CHANGE UP (ref 42.2–75.2)
NRBC # BLD: 0 /100 WBCS — SIGNIFICANT CHANGE UP (ref 0–0)
PLATELET # BLD AUTO: 369 K/UL — SIGNIFICANT CHANGE UP (ref 130–400)
POTASSIUM SERPL-MCNC: 4 MMOL/L — SIGNIFICANT CHANGE UP (ref 3.5–5)
POTASSIUM SERPL-SCNC: 4 MMOL/L — SIGNIFICANT CHANGE UP (ref 3.5–5)
PROCALCITONIN SERPL-MCNC: 0.31 NG/ML — HIGH (ref 0.02–0.1)
PROT SERPL-MCNC: 6.1 G/DL — SIGNIFICANT CHANGE UP (ref 6–8)
RBC # BLD: 3.57 M/UL — LOW (ref 4.7–6.1)
RBC # FLD: 15.7 % — HIGH (ref 11.5–14.5)
SODIUM SERPL-SCNC: 142 MMOL/L — SIGNIFICANT CHANGE UP (ref 135–146)
WBC # BLD: 5.97 K/UL — SIGNIFICANT CHANGE UP (ref 4.8–10.8)
WBC # FLD AUTO: 5.97 K/UL — SIGNIFICANT CHANGE UP (ref 4.8–10.8)

## 2021-02-12 PROCEDURE — 71045 X-RAY EXAM CHEST 1 VIEW: CPT | Mod: 26

## 2021-02-12 PROCEDURE — 99233 SBSQ HOSP IP/OBS HIGH 50: CPT

## 2021-02-12 RX ORDER — PANTOPRAZOLE SODIUM 20 MG/1
40 TABLET, DELAYED RELEASE ORAL
Refills: 0 | Status: DISCONTINUED | OUTPATIENT
Start: 2021-02-12 | End: 2021-02-16

## 2021-02-12 RX ORDER — FUROSEMIDE 40 MG
20 TABLET ORAL DAILY
Refills: 0 | Status: DISCONTINUED | OUTPATIENT
Start: 2021-02-12 | End: 2021-02-16

## 2021-02-12 RX ADMIN — Medication 81 MILLIGRAM(S): at 11:16

## 2021-02-12 RX ADMIN — AMIODARONE HYDROCHLORIDE 200 MILLIGRAM(S): 400 TABLET ORAL at 06:08

## 2021-02-12 RX ADMIN — Medication 300 MILLIGRAM(S): at 06:08

## 2021-02-12 RX ADMIN — CARVEDILOL PHOSPHATE 6.25 MILLIGRAM(S): 80 CAPSULE, EXTENDED RELEASE ORAL at 06:08

## 2021-02-12 RX ADMIN — ATORVASTATIN CALCIUM 40 MILLIGRAM(S): 80 TABLET, FILM COATED ORAL at 21:27

## 2021-02-12 RX ADMIN — CEFEPIME 100 MILLIGRAM(S): 1 INJECTION, POWDER, FOR SOLUTION INTRAMUSCULAR; INTRAVENOUS at 15:18

## 2021-02-12 RX ADMIN — CEFEPIME 100 MILLIGRAM(S): 1 INJECTION, POWDER, FOR SOLUTION INTRAMUSCULAR; INTRAVENOUS at 06:08

## 2021-02-12 RX ADMIN — Medication 300 MILLIGRAM(S): at 19:46

## 2021-02-12 RX ADMIN — CARVEDILOL PHOSPHATE 6.25 MILLIGRAM(S): 80 CAPSULE, EXTENDED RELEASE ORAL at 17:25

## 2021-02-12 RX ADMIN — CHLORHEXIDINE GLUCONATE 15 MILLILITER(S): 213 SOLUTION TOPICAL at 17:25

## 2021-02-12 RX ADMIN — APIXABAN 5 MILLIGRAM(S): 2.5 TABLET, FILM COATED ORAL at 06:37

## 2021-02-12 RX ADMIN — APIXABAN 5 MILLIGRAM(S): 2.5 TABLET, FILM COATED ORAL at 17:24

## 2021-02-12 RX ADMIN — Medication 20 MILLIGRAM(S): at 11:16

## 2021-02-12 RX ADMIN — PANTOPRAZOLE SODIUM 40 MILLIGRAM(S): 20 TABLET, DELAYED RELEASE ORAL at 11:16

## 2021-02-12 RX ADMIN — CHLORHEXIDINE GLUCONATE 1 APPLICATION(S): 213 SOLUTION TOPICAL at 06:08

## 2021-02-12 RX ADMIN — CEFEPIME 100 MILLIGRAM(S): 1 INJECTION, POWDER, FOR SOLUTION INTRAMUSCULAR; INTRAVENOUS at 21:27

## 2021-02-12 RX ADMIN — Medication 2 MILLIGRAM(S): at 21:28

## 2021-02-12 NOTE — PROGRESS NOTE ADULT - SUBJECTIVE AND OBJECTIVE BOX
Patient is a 70y old  Male who presents with a chief complaint of Shortness of breath (12 Feb 2021 07:23)        SUBJECTIVE: No acute events overnight      REVIEW OF SYSTEMS:  See HPI    PHYSICAL EXAM  Vital Signs Last 24 Hrs  T(C): 36.1 (12 Feb 2021 07:10), Max: 36.6 (11 Feb 2021 11:27)  T(F): 97 (12 Feb 2021 07:10), Max: 97.8 (11 Feb 2021 11:27)  HR: 70 (12 Feb 2021 07:20) (66 - 83)  BP: 116/56 (12 Feb 2021 07:20) (99/55 - 120/56)  BP(mean): 81 (12 Feb 2021 07:20) (72 - 81)  RR: 22 (12 Feb 2021 07:20) (13 - 23)  SpO2: 99% (12 Feb 2021 07:20) (94% - 100%)    General: In NAD  HEENT: JL               Respiratory: Tonio BS  Cardiovascular: irregular  Gastrointestinal: Soft. + BS  Musculoskeletal: No clubbing.  moves all extremities.  Skin: Warm.  Intact  Neurological: Non focal      02-11-21 @ 07:01  -  02-12-21 @ 07:00  --------------------------------------------------------  IN:    Heparin: 11 mL    IV PiggyBack: 1150 mL    Oral Fluid: 800 mL  Total IN: 1961 mL    OUT:    Voided (mL): 2150 mL  Total OUT: 2150 mL    Total NET: -189 mL          LABS:                          8.8    5.97  )-----------( 369      ( 12 Feb 2021 06:10 )             29.2                                               02-12    142  |  99  |  15  ----------------------------<  136<H>  4.0   |  32  |  0.7    Ca    9.2      12 Feb 2021 06:10    TPro  6.1  /  Alb  3.3<L>  /  TBili  0.4  /  DBili  x   /  AST  19  /  ALT  16  /  AlkPhos  88  02-12      PTT - ( 11 Feb 2021 06:04 )  PTT:44.9 sec                                                                                     LIVER FUNCTIONS - ( 12 Feb 2021 06:10 )  Alb: 3.3 g/dL / Pro: 6.1 g/dL / ALK PHOS: 88 U/L / ALT: 16 U/L / AST: 19 U/L / GGT: x                                                                                                MEDICATIONS  (STANDING):  aMIOdarone    Tablet 200 milliGRAM(s) Oral daily  apixaban 5 milliGRAM(s) Oral two times a day  aspirin enteric coated 81 milliGRAM(s) Oral daily  atorvastatin 40 milliGRAM(s) Oral at bedtime  carvedilol 6.25 milliGRAM(s) Oral every 12 hours  cefepime   IVPB 1000 milliGRAM(s) IV Intermittent every 8 hours  cefepime   IVPB      chlorhexidine 0.12% Liquid 15 milliLiter(s) Oral Mucosa every 12 hours  chlorhexidine 4% Liquid 1 Application(s) Topical <User Schedule>  doxazosin 2 milliGRAM(s) Oral at bedtime  pantoprazole   Suspension 40 milliGRAM(s) Oral daily  vancomycin  IVPB 1500 milliGRAM(s) IV Intermittent every 12 hours    MEDICATIONS  (PRN):  acetaminophen   Tablet .. 650 milliGRAM(s) Oral every 6 hours PRN Temp greater or equal to 38C (100.4F)    < from: Xray Chest 1 View- PORTABLE-Routine (Xray Chest 1 View- PORTABLE-Routine in AM.) (02.11.21 @ 05:58) >  Bibasilar airspaceopacities have slightly improved since one day prior.        < end of copied text >

## 2021-02-12 NOTE — PROGRESS NOTE ADULT - SUBJECTIVE AND OBJECTIVE BOX
Patient is a 70y old  Male who presents with a chief complaint of Shortness of breath (11 Feb 2021 14:52)      T(F): 97.6 (02-12-21 @ 05:32), Max: 97.9 (02-11-21 @ 07:30)  HR: 66 (02-12-21 @ 05:32)  BP: 104/53 (02-12-21 @ 05:32)  RR: 13 (02-12-21 @ 05:32)  SpO2: 97% (02-12-21 @ 05:32) (94% - 100%)    PHYSICAL EXAM:  GENERAL: NAD, well-groomed, well-developed  HEAD:  Atraumatic, Normocephalic  EYES: EOMI, PERRLA, conjunctiva and sclera clear  ENMT: No tonsillar erythema, exudates, or enlargement; Moist mucous membranes, Good dentition, No lesions  NECK: Supple, No JVD, Normal thyroid  NERVOUS SYSTEM:  Alert & Oriented X3,  Motor Strength 5/5 B/L upper and lower extremities  CHEST/LUNG: Clear to percussion bilaterally; No rales, rhonchi, wheezing, or rubs  HEART: Regular rate and rhythm; No murmurs, rubs, or gallops  ABDOMEN: Soft, Nontender, Nondistended; Bowel sounds present  EXTREMITIES:   No clubbing, cyanosis, or edema  LYMPH: No lymphadenopathy noted  SKIN: No rashes or lesions    labs  02-11    138  |  94<L>  |  16  ----------------------------<  142<H>  3.3<L>   |  36<H>  |  0.5<L>    Ca    9.0      11 Feb 2021 06:04    TPro  6.0  /  Alb  3.2<L>  /  TBili  0.5  /  DBili  x   /  AST  20  /  ALT  15  /  AlkPhos  88  02-11                          8.8    5.97  )-----------( 369      ( 12 Feb 2021 06:10 )             29.2       PTT - ( 11 Feb 2021 06:04 )  PTT:44.9 sec        acetaminophen   Tablet .. 650 milliGRAM(s) Oral every 6 hours PRN  aMIOdarone    Tablet 200 milliGRAM(s) Oral daily  apixaban 5 milliGRAM(s) Oral two times a day  aspirin enteric coated 81 milliGRAM(s) Oral daily  atorvastatin 40 milliGRAM(s) Oral at bedtime  carvedilol 6.25 milliGRAM(s) Oral every 12 hours  cefepime   IVPB 1000 milliGRAM(s) IV Intermittent every 8 hours  cefepime   IVPB      chlorhexidine 0.12% Liquid 15 milliLiter(s) Oral Mucosa every 12 hours  chlorhexidine 4% Liquid 1 Application(s) Topical <User Schedule>  doxazosin 2 milliGRAM(s) Oral at bedtime  pantoprazole   Suspension 40 milliGRAM(s) Oral daily  vancomycin  IVPB 1500 milliGRAM(s) IV Intermittent every 12 hours

## 2021-02-12 NOTE — PROGRESS NOTE ADULT - ASSESSMENT
IMPRESSION:  Acute Hypoxemic Respiratory Failure  ARDS  Pulmonary edema/ volume overload   PNA   Hx afib       PLAN:    CNS no sedation     HEENT: PERRLA    PULMONARY: keep pox > 92 % , incentive spirometry. HOB elevation 45 degrees    CARDIOVASCULAR:  keep is = os , follow cardiology can restart lasix - Once a day PO     GI: GI prophylaxis.  feeding as tolerated    RENAL:follow is and OS follow lytes     INFECTIOUS DISEASE: continue abx , repeat procal reviewed. id follow up  , F/U vanc trough    HEMATOLOGICAL:  DVT prophylaxis. c/w  eliquis    ENDOCRINE:  Follow up FS.  Insulin protocol if needed.    Disposition : downgrade to floor, if okay with hospitalist and cardiology

## 2021-02-12 NOTE — CHART NOTE - NSCHARTNOTEFT_GEN_A_CORE
ICU DOWNGRADE NOTE:    70y Male transferred to floor from ICU    Patient is a 70y old Male who presents with a chief complaint of Shortness of breath (12 Feb 2021 08:46)    The patient is currently admitted for the primary diagnosis of Pulmonary edema      The patient was admitted to the unit for (5) Days.    The patient was  intubated for 3 days and was briefly  on pressors  Disposition: medical floor     Code Status: full code     ICU COURSE OF EVENTS:  -------------------------------------------------------------------------------------------  Pt is a 69 yo male with PMH of Afib on eliquis, HTN, HLD, DM, Recent dx with COVID in Nov 20 s/p trach and peg removal presents to ed for Shortness of breath. Pt placed on NIV in the ED and Received Lasix in the ED. Pt then started to desaturate on NIV and  was intubated in the ED. remained intubated for three days, responded very well to IV lasix, respiratory failure was mainly 2/2 pulmonary edema, kept on antibiotics given positive procal and leukocytosis, currently on vanc and cefepime per ID, vanc trough at target, remains on 2 Ls nasal canula, needs STR and may need home O2  ------------------------------------------------------------------------------------------    Current workup in progress:    - c/w Vancomycin and cefepime until early next week per ID  - f/u cultures   - c/w PO lasix 20 mg daily and monitor electrolytes   - wean off nasal canula   - PT/ Rehab/ may need home O2  - out patient follow up with cardiology, may need a stress test   - avoid volume overload

## 2021-02-12 NOTE — CHART NOTE - NSCHARTNOTEFT_GEN_A_CORE
Registered Dietitian Follow-Up     Patient Profile Reviewed                           Yes [x]   No []     Nutrition History Previously Obtained        Yes []  No [x]  obtained at bedside via pt; pt was intubated during initial RD assessment     Pertinent Subjective Information: Pt states that he is tolerating current diet order. He reports that his appetite has been pretty good. Per EMR documentation, po intake recorded as 50%. Pt agreeable to receive oral nutrition supplement in addition to meals to optimized protein/energy intake. No nausea/abdominal pain with meals. Pt reports that he hasn't had a BM in at least 4 days + agreeable to stool softener.     NUTRITION HX: Pt reports that he normally has a decent appetite pta. He reports that he drinks a protein shake 1x/d. No food preference r/t culture/[employer]. NKFA/intolerances. No vitamins pta. Pt states that he wears dentures at baseline and can tolerate all textures of food as long as he is wearing them. Pt endorses significant wt loss x1-2 months. He attributes this to a prolonged hospitalization @ [facility]. He states that his UBW is ~180 lbs. Per previous admission records-- pt wt 77.8 kg/171.5 lbs (2020) vs current dosing wt 74.5 kg/164 lbs. This indicates a 7lbs/4% wt loss within 3 months which is not considered clinically significant given the time frame. I attempted to take pt wt using bed scale, but it was not working. Will continue to monitor wt trends. Pt does not allow RD to perform NFPE upon attempt. Would suspect that there is possibly muscle wasting/fat loss present. Will attempt again upon f/u.     Pertinent Medical Interventions: Acute hypoxic respiratory failure- resolved. Suspected gram negative pneumonia/sepsis/type 2 NSTEMI/ Pt sp extubation on . CXR on  shows b/l airspace opacities slightly improved. Pt remains on abx per ID recs. Pt seen by SLP on 2/10 sp extubation-- see below for further details.      Diet order: Soft    Anthropometrics:  Height (cm): 182.9 (21 @ 04:13)  Weight (kg): 74.5 (21 @ 04:13)  BMI (kg/m2): 22.3 (21 @ 04:13)  IBW: 80.9 kg    Daily Weight in k.4 (), Weight in k.7 (02-10), Weight in k.4 (), Weight in k.2 ()  % Weight Change     MEDICATIONS  (STANDING):  aMIOdarone    Tablet 200 milliGRAM(s) Oral daily  apixaban 5 milliGRAM(s) Oral two times a day  aspirin enteric coated 81 milliGRAM(s) Oral daily  atorvastatin 40 milliGRAM(s) Oral at bedtime  carvedilol 6.25 milliGRAM(s) Oral every 12 hours  cefepime   IVPB 1000 milliGRAM(s) IV Intermittent every 8 hours  cefepime   IVPB      chlorhexidine 0.12% Liquid 15 milliLiter(s) Oral Mucosa every 12 hours  chlorhexidine 4% Liquid 1 Application(s) Topical <User Schedule>  doxazosin 2 milliGRAM(s) Oral at bedtime  furosemide    Tablet 20 milliGRAM(s) Oral daily  pantoprazole   Suspension 40 milliGRAM(s) Oral daily  vancomycin  IVPB 1500 milliGRAM(s) IV Intermittent every 12 hours    MEDICATIONS  (PRN):  acetaminophen   Tablet .. 650 milliGRAM(s) Oral every 6 hours PRN Temp greater or equal to 38C (100.4F)    Pertinent Labs: : RBC 3.57, H/H 8.8/29.2, glucose 136    Physical Findings:  - Appearance: well nourished; no edema noted  - GI function: last BM at least 4 days ago per pt; pt is not currently on a bowel regimen.   - Tubes: NA  - Oral/Mouth cavity: per SLP on 2/10 "Mild oral impairment and suspected pharyngeal impairment when fed by clinician, no signs of pharyngeal impairment when self fed + toleration observed without overt symptoms of penetration/aspiration" recommended pureed diet/thin liquids because pt did not have his dentures + refused chewable trials at time of assessment.  - Skin: WDL ()     Nutrition Requirements:  Weight Used: dosing 74.5 kg (adjusted today, sp extubation)     Estimated Energy Needs   Adjust [x] 7675-2602 kcal/d (MSJ x 1.2-1.4 AF) wt loss considered     Estimated Protein Needs      Adjust [x] 89-97g (1.2-1.3 g/kg) same as above     Estimated Fluid Needs        Continue [x] per CCU team     Nutrient Intake: ~50%, fair      [x] Previous Nutrition Diagnosis: Inadequate protein/energy intake (improving)            [x] Ongoing               Nutrition Intervention meals and snacks, medical food supplements, coordination of care     Goal/Expected Outcome: pt to maintain po intake >50% and CBW +/- 2 lbs within the next 4 days     Indicator/Monitoring: RD to monitor diet order, energy intake, body composition, NFPF, glucose profile    Recommendation: diet order per SLP recs, order glucerna supplement BID (no chocolate), encourage po intake, provide assistance with meals PRN. Registered Dietitian Follow-Up     Patient Profile Reviewed                           Yes [x]   No []     Nutrition History Previously Obtained        Yes []  No [x]  obtained at bedside via pt; pt was intubated during initial RD assessment     Pertinent Subjective Information: Pt states that he is tolerating current diet order. He reports that his appetite has been pretty good. Per EMR documentation, po intake recorded as 50%. Pt agreeable to receive oral nutrition supplement in addition to meals to optimized protein/energy intake. No nausea/abdominal pain with meals. Pt reports that he hasn't had a BM in at least 4 days + agreeable to stool softener.     NUTRITION HX: Pt reports that he normally has a decent appetite pta. He reports that he drinks a protein shake 1x/d. No food preference r/t culture/[employer]. NKFA/intolerances. No vitamins pta. Pt states that he wears dentures at baseline and can tolerate all textures of food as long as he is wearing them. Pt endorses significant wt loss x1-2 months. He attributes this to a prolonged hospitalization @ [facility]. He states that his UBW is ~180 lbs. Per previous admission records-- pt wt 77.8 kg/171.5 lbs (2020) vs current dosing wt 74.5 kg/164 lbs. This indicates a 7lbs/4% wt loss within 3 months which is not considered clinically significant given the time frame. I attempted to take pt wt using bed scale, but it was not working. Will continue to monitor wt trends. Pt does not allow RD to perform NFPE upon attempt. Would suspect that there is possibly muscle wasting/fat loss present. Will attempt again upon f/u.     Pertinent Medical Interventions: Acute hypoxic respiratory failure- resolved. Suspected gram negative pneumonia/sepsis/type 2 NSTEMI/ Pt sp extubation on . CXR on  shows b/l airspace opacities slightly improved. Pt remains on abx per ID recs. Pt seen by SLP on 2/10 sp extubation-- see below for further details.      Diet order: Soft    Anthropometrics:  Height (cm): 182.9 (21 @ 04:13)  Weight (kg): 74.5 (21 @ 04:13)  BMI (kg/m2): 22.3 (21 @ 04:13)  IBW: 80.9 kg    Daily Weight in k.4 (), Weight in k.7 (02-10), Weight in k.4 (), Weight in k.2 ()  % Weight Change     MEDICATIONS  (STANDING):  aMIOdarone    Tablet 200 milliGRAM(s) Oral daily  apixaban 5 milliGRAM(s) Oral two times a day  aspirin enteric coated 81 milliGRAM(s) Oral daily  atorvastatin 40 milliGRAM(s) Oral at bedtime  carvedilol 6.25 milliGRAM(s) Oral every 12 hours  cefepime   IVPB 1000 milliGRAM(s) IV Intermittent every 8 hours  cefepime   IVPB      chlorhexidine 0.12% Liquid 15 milliLiter(s) Oral Mucosa every 12 hours  chlorhexidine 4% Liquid 1 Application(s) Topical <User Schedule>  doxazosin 2 milliGRAM(s) Oral at bedtime  furosemide    Tablet 20 milliGRAM(s) Oral daily  pantoprazole   Suspension 40 milliGRAM(s) Oral daily  vancomycin  IVPB 1500 milliGRAM(s) IV Intermittent every 12 hours    MEDICATIONS  (PRN):  acetaminophen   Tablet .. 650 milliGRAM(s) Oral every 6 hours PRN Temp greater or equal to 38C (100.4F)    Pertinent Labs: : RBC 3.57, H/H 8.8/29.2, glucose 136    Physical Findings:  - Appearance: well nourished; no edema noted  - GI function: last BM at least 4 days ago per pt; pt is not currently on a bowel regimen.   - Tubes: NA  - Oral/Mouth cavity: per SLP on 2/10 "Mild oral impairment and suspected pharyngeal impairment when fed by clinician, no signs of pharyngeal impairment when self fed + toleration observed without overt symptoms of penetration/aspiration" recommended pureed diet/thin liquids because pt did not have his dentures + refused chewable trials at time of assessment.  - Skin: WDL ()     Nutrition Requirements:  Weight Used: dosing 74.5 kg (adjusted today, sp extubation)     Estimated Energy Needs   Adjust [x] 5401-8726 kcal/d (MSJ x 1.2-1.4 AF) wt loss considered     Estimated Protein Needs      Adjust [x] 89-97g (1.2-1.3 g/kg) same as above     Estimated Fluid Needs        Continue [x] per CCU team     Nutrient Intake: ~50%, fair      [x] Previous Nutrition Diagnosis: Inadequate protein/energy intake (improving)            [x] Ongoing               Nutrition Intervention meals and snacks, medical food supplements, coordination of care     Goal/Expected Outcome: pt to maintain po intake >50% and CBW +/- 2 lbs within the next 4 days     Indicator/Monitoring: RD to monitor diet order, energy intake, body composition, NFPF, glucose profile    Recommendation: diet order per SLP recs, order glucerna supplement BID (no chocolate), encourage po intake, provide assistance with meals PRN. Multiple attempts made to discuss recs with covering PA [phone]- no response x2.

## 2021-02-12 NOTE — PROGRESS NOTE ADULT - ASSESSMENT
ASSESSMENT  69 yo male with PMH of Afib on eliquis, HTN, HLD, DM?, Recent dx with COVID in Nov 20 s/p trach and peg removal presents to ed for Shortness of breath.    IMPRESSION  #Acute Hypoxic Respiratory failure - s/p extubation  - Bilateral infiltrates -- fluid congestion vs. possible superimposed pneumonia  - MRSA Nares positive    #Atrial Fibrillation  #Hx of COVID 11/2020  #Obesity BMI (kg/m2): 22.3  #Abx allergy: NKDA    RECOMMENDATIONS  - continue vancomycin 1500 mg q 12 hours  - continue cefepime 1g q 8 hours  - Procal 2/10 0.48 -- can repeat again on 2/13, final course pending procalcitonin, will likely stop early next week     Please call or message on [employer] Teams if with any questions.  Spectra [phone]

## 2021-02-12 NOTE — PROGRESS NOTE ADULT - ASSESSMENT
Patient feels better. Not sob. NSR PAC. On apixaban.  Off lasix. watch for chf. OOB to chair. Ambulate if stable.  Correct k if needed

## 2021-02-12 NOTE — PROGRESS NOTE ADULT - SUBJECTIVE AND OBJECTIVE BOX
MAGGIE SAHNI  70y, Male  Allergy: No Known Allergies      LOS  5d    CHIEF COMPLAINT: Shortness of breath (12 Feb 2021 07:23)      INTERVAL EVENTS/HPI  - No acute events overnight  - T(F): , Max: 97.8 (02-11-21 @ 11:27)  - Denies any worsening symptoms; denies any coughing, shrotness of breath   - Tolerating medication  - WBC Count: 5.97 (02-12-21 @ 06:10)  WBC Count: 5.81 (02-11-21 @ 06:04)     - Creatinine, Serum: 0.7 (02-12-21 @ 06:10)  Creatinine, Serum: 0.5 (02-11-21 @ 06:04)       ROS  General: Denies rigors, nightsweats  HEENT: Denies headache, rhinorrhea, sore throat, eye pain  CV: Denies CP, palpitations  PULM: Denies wheezing, hemoptysis  GI: Denies hematemesis, hematochezia, melena  : Denies discharge, hematuria  MSK: Denies arthralgias, myalgias  SKIN: Denies rash, lesions  NEURO: Denies paresthesias, weakness  PSYCH: Denies depression, anxiety    VITALS:  T(F): 97, Max: 97.8 (02-11-21 @ 11:27)  HR: 70  BP: 116/56  RR: 22Vital Signs Last 24 Hrs  T(C): 36.1 (12 Feb 2021 07:10), Max: 36.6 (11 Feb 2021 11:27)  T(F): 97 (12 Feb 2021 07:10), Max: 97.8 (11 Feb 2021 11:27)  HR: 70 (12 Feb 2021 07:20) (66 - 83)  BP: 116/56 (12 Feb 2021 07:20) (99/55 - 120/56)  BP(mean): 81 (12 Feb 2021 07:20) (72 - 81)  RR: 22 (12 Feb 2021 07:20) (13 - 23)  SpO2: 99% (12 Feb 2021 07:20) (94% - 100%)    PHYSICAL EXAM:  Gen: NAD, resting in bed  HEENT: Normocephalic, atraumatic  Neck: supple, no lymphadenopathy  CV: Regular rate & regular rhythm  Lungs: decreased BS at bases, no fremitus  Abdomen: Soft, BS present  Ext: Warm, well perfused  Neuro: non focal, awake  Skin: no rash, no erythema  Lines: no phlebitis    FH: Non-contributory  Social Hx: Non-contributory    TESTS & MEASUREMENTS:                        8.8    5.97  )-----------( 369      ( 12 Feb 2021 06:10 )             29.2     02-12    142  |  99  |  15  ----------------------------<  136<H>  4.0   |  32  |  0.7    Ca    9.2      12 Feb 2021 06:10    TPro  6.1  /  Alb  3.3<L>  /  TBili  0.4  /  DBili  x   /  AST  19  /  ALT  16  /  AlkPhos  88  02-12    eGFR if Non African American: 97 mL/min/1.73M2 (02-12-21 @ 06:10)  eGFR if : 112 mL/min/1.73M2 (02-12-21 @ 06:10)    LIVER FUNCTIONS - ( 12 Feb 2021 06:10 )  Alb: 3.3 g/dL / Pro: 6.1 g/dL / ALK PHOS: 88 U/L / ALT: 16 U/L / AST: 19 U/L / GGT: x               Culture - Blood (collected 02-09-21 @ 06:14)  Source: .Blood None  Preliminary Report (02-10-21 @ 18:03):    No growth to date.    Culture - Blood (collected 02-07-21 @ 18:43)  Source: .Blood None  Preliminary Report (02-09-21 @ 03:08):    No growth to date.            INFECTIOUS DISEASES TESTING  Procalcitonin, Serum: 0.48 (02-11-21 @ 06:04)  Procalcitonin, Serum: 1.43 (02-09-21 @ 06:14)  MRSA PCR Result.: Positive (02-07-21 @ 09:00)  Procalcitonin, Serum: 1.81 (02-07-21 @ 06:24)  Rapid RVP Result: NotDetec (02-06-21 @ 20:34)  COVID-19 PCR: Negative (01-04-21 @ 12:52)  COVID-19 PCR: NotDetec (12-31-20 @ 15:21)  MRSA PCR Result.: Negative (12-24-20 @ 12:07)  COVID-19 PCR: NotDetec (12-14-20 @ 14:16)  MRSA PCR Result.: Negative (12-13-20 @ 12:39)  Procalcitonin, Serum: 0.14 (12-13-20 @ 05:59)  Procalcitonin, Serum: 0.26 (12-12-20 @ 04:59)  Procalcitonin, Serum: 0.24 (12-10-20 @ 04:48)  Fungitell: 104 (12-09-20 @ 22:54)  Procalcitonin, Serum: 0.16 (12-09-20 @ 13:48)  Procalcitonin, Serum: 0.12 (12-09-20 @ 06:00)  Aspergillus Galactomannan Antigen: <0.500 (12-08-20 @ 12:18)  Fungitell: 55 (12-08-20 @ 12:18)  Procalcitonin, Serum: 0.08 (12-08-20 @ 12:18)  MRSA PCR Result.: Negative (12-08-20 @ 09:48)  Procalcitonin, Serum: 0.09 (12-08-20 @ 04:41)  Procalcitonin, Serum: 0.07 (12-07-20 @ 04:30)  Procalcitonin, Serum: 0.06 (12-06-20 @ 05:01)  Procalcitonin, Serum: 0.07 (12-05-20 @ 04:58)  Procalcitonin, Serum: 0.26 (12-03-20 @ 07:32)  Procalcitonin, Serum: 0.52 (12-02-20 @ 11:40)  MRSA PCR Result.: Negative (12-01-20 @ 14:30)  Procalcitonin, Serum: 0.61 (11-30-20 @ 04:30)  Rapid RVP Result: Detected (11-27-20 @ 19:30)  Procalcitonin, Serum: 0.59 (11-27-20 @ 19:15)      INFLAMMATORY MARKERS  C-Reactive Protein, Serum: 1.36 mg/dL (01-04-21 @ 06:34)  C-Reactive Protein, Serum: 7.41 mg/dL (12-28-20 @ 04:08)  C-Reactive Protein, Serum: 4.60 mg/dL (12-27-20 @ 03:27)  C-Reactive Protein, Serum: 1.58 mg/dL (12-26-20 @ 06:18)      RADIOLOGY & ADDITIONAL TESTS:  I have personally reviewed the last available Chest xray  CXR      CT      CARDIOLOGY TESTING  12 Lead ECG:   Ventricular Rate 96 BPM    Atrial Rate 227 BPM    QRS Duration 84 ms    Q-T Interval 404 ms    QTC Calculation(Bazett) 510 ms    R Axis -15 degrees    T Axis -53 degrees    Diagnosis Line Atrial flutter with variable A-V block  ST & T wave abnormality, consider anterolateral ischemia  Abnormal ECG    Confirmed by AMY DENNIS MD ([id number]) on 2/11/2021 11:31:09 AM (02-11-21 @ 09:53)  12 Lead ECG:   Ventricular Rate 109 BPM    Atrial Rate 94 BPM    QRS Duration 80 ms    Q-T Interval 410 ms    QTC Calculation(Bazett) 552 ms    R Axis -16 degrees    T Axis -7 degrees    Diagnosis Line Atrial fibrillation with rapid ventricular response  ST & T wave abnormality, consider anterolateral ischemia      Confirmed by AMY DENNIS MD ([id number]) on 2/10/2021 11:42:11 AM (02-10-21 @ 09:00)      MEDICATIONS  aMIOdarone    Tablet 200 Oral daily  apixaban 5 Oral two times a day  aspirin enteric coated 81 Oral daily  atorvastatin 40 Oral at bedtime  carvedilol 6.25 Oral every 12 hours  cefepime   IVPB 1000 IV Intermittent every 8 hours  cefepime   IVPB     chlorhexidine 0.12% Liquid 15 Oral Mucosa every 12 hours  chlorhexidine 4% Liquid 1 Topical <User Schedule>  doxazosin 2 Oral at bedtime  pantoprazole   Suspension 40 Oral daily  vancomycin  IVPB 1500 IV Intermittent every 12 hours      WEIGHT  Weight (kg): 74.5 (02-07-21 @ 04:13)  Creatinine, Serum: 0.7 mg/dL (02-12-21 @ 06:10)      ANTIBIOTICS:  cefepime   IVPB 1000 milliGRAM(s) IV Intermittent every 8 hours  cefepime   IVPB      vancomycin  IVPB 1500 milliGRAM(s) IV Intermittent every 12 hours      All available historical records have been reviewed

## 2021-02-12 NOTE — PROGRESS NOTE ADULT - ASSESSMENT
IMPRESSION:  Acute Hypoxemic Respiratory Failure  ARDS  Pulmonary edema/ volume overload   PNA   Hx afib       PLAN:    CNS no sedation     HEENT: PERRLA    PULMONARY: keep pox > 92 % , CXR reviewed today      CARDIOVASCULAR:  keep is = os , follow cardiology can restart lasix - Once a day PO ,     GI: GI prophylaxis.  feeding as tolerated    RENAL:follow is and OS follow lytes     INFECTIOUS DISEASE: continue abx f/u  procal blank    id follow up  , F/U vanc trough      HEMATOLOGICAL:  DVT prophylaxis. c/w  eliquis      ENDOCRINE:  Follow up FS.  Insulin protocol if needed.    d/c TLC

## 2021-02-12 NOTE — PROGRESS NOTE ADULT - SUBJECTIVE AND OBJECTIVE BOX
Patient is sitting in chair, feels good, no complaints       T(F): 98.1 (02-12-21 @ 11:00), Max: 98.1 (02-12-21 @ 11:00)  HR: 74 (02-12-21 @ 09:01)  BP: 107/55 (02-12-21 @ 09:01)  RR: 20  SpO2: 100% (02-12-21 @ 09:01) (97% - 100%)    PHYSICAL EXAM:  GENERAL: NAD  HEAD:  Atraumatic, Normocephalic  NERVOUS SYSTEM:  no focal deficits   CHEST/LUNG:  bilateral rhonchi  HEART: Regular rate and rhythm; No murmurs, rubs, or gallops  ABDOMEN: Soft, Nontender, Nondistended; Bowel sounds present  EXTREMITIES:  2+ Peripheral Pulses, No clubbing, cyanosis, or edema  LYMPH: No lymphadenopathy noted  SKIN: No rashes or lesions    LABS  02-12    142  |  99  |  15  ----------------------------<  136<H>  4.0   |  32  |  0.7    Ca    9.2      12 Feb 2021 06:10    TPro  6.1  /  Alb  3.3<L>  /  TBili  0.4  /  DBili  x   /  AST  19  /  ALT  16  /  AlkPhos  88  02-12                          8.8    5.97  )-----------( 369      ( 12 Feb 2021 06:10 )             29.2     PTT - ( 11 Feb 2021 06:04 )  PTT:44.9 sec    CARDIAC ENZYMES    Troponin T, Serum: 0.09 ng/mL (02-10-21 @ 05:58)    Culture Results:   No growth to date. (02-09-21)  Culture Results:   No growth to date. (02-07-21)    RADIOLOGY  < from: Xray Chest 1 View- PORTABLE-Routine (Xray Chest 1 View- PORTABLE-Routine in AM.) (02.12.21 @ 05:58) >    Impression:    Stable bilateral lung opacities    < end of copied text >    MEDICATIONS  (STANDING):  aMIOdarone    Tablet 200 milliGRAM(s) Oral daily  apixaban 5 milliGRAM(s) Oral two times a day  aspirin enteric coated 81 milliGRAM(s) Oral daily  atorvastatin 40 milliGRAM(s) Oral at bedtime  carvedilol 6.25 milliGRAM(s) Oral every 12 hours  cefepime   IVPB 1000 milliGRAM(s) IV Intermittent every 8 hours  chlorhexidine 0.12% Liquid 15 milliLiter(s) Oral Mucosa every 12 hours  chlorhexidine 4% Liquid 1 Application(s) Topical <User Schedule>  doxazosin 2 milliGRAM(s) Oral at bedtime  furosemide    Tablet 20 milliGRAM(s) Oral daily  pantoprazole    Tablet 40 milliGRAM(s) Oral before breakfast  vancomycin  IVPB 1500 milliGRAM(s) IV Intermittent every 12 hours    MEDICATIONS  (PRN):  acetaminophen   Tablet .. 650 milliGRAM(s) Oral every 6 hours PRN Temp greater or equal to 38C (100.4F)

## 2021-02-12 NOTE — PROGRESS NOTE ADULT - ATTENDING COMMENTS
patient seen and examined agree above note   lasix once a day   follow BMP   follow ID regarding switch to oral abx   follow cardiology

## 2021-02-12 NOTE — PROGRESS NOTE ADULT - ASSESSMENT
Pt is a 71 yo male with PMH of Afib on eliquis, HTN, HLD, DM, Recent dx with COVID in Nov 20 s/p trach and peg removal presents to ed for Shortness of breath. Pt placed on NIV and  then started to desaturate on NIV and  was intubated in the ED.     Acute hypoxic respiratory failure - resolved / suspected gram negative pneumonia /  sepsis / type 2 NSTEMI     - continue Vanco and Cefepime as per ID   - check repeat procalcitonin level in am   - oral  Lasix 20mg daily   - aspirin, Coreg, Lipitor, amio   - Eliquis resumed    - use insulin IV CC protocol if glucose is elevated   - appreciate  cardiology   - continue PT  - Rehab

## 2021-02-12 NOTE — PROGRESS NOTE ADULT - SUBJECTIVE AND OBJECTIVE BOX
Patient is a 70y old  Male who presents with a chief complaint of Shortness of breath (12 Feb 2021 08:46)        SUBJECTIVE: No acute events overnight      REVIEW OF SYSTEMS:  See HPI    PHYSICAL EXAM  Vital Signs Last 24 Hrs  T(C): 36.1 (12 Feb 2021 07:10), Max: 36.6 (11 Feb 2021 11:27)  T(F): 97 (12 Feb 2021 07:10), Max: 97.8 (11 Feb 2021 11:27)  HR: 70 (12 Feb 2021 07:20) (66 - 82)  BP: 116/56 (12 Feb 2021 07:20) (99/55 - 120/56)  BP(mean): 81 (12 Feb 2021 07:20) (72 - 81)  RR: 15 (12 Feb 2021 09:00) (13 - 23)  SpO2: 99% (12 Feb 2021 07:20) (94% - 100%)    General: In NAD  HEENT: JL                 Respiratory: Tonio BS  Cardiovascular: Regular  Gastrointestinal: Soft. + BS  Musculoskeletal: No clubbing.  moves all extremities.    Skin: Warm.    Neurological: Non focal      02-11-21 @ 07:01  -  02-12-21 @ 07:00  --------------------------------------------------------  IN:    Heparin: 11 mL    IV PiggyBack: 1150 mL    Oral Fluid: 800 mL  Total IN: 1961 mL    OUT:    Voided (mL): 2150 mL  Total OUT: 2150 mL    Total NET: -189 mL          LABS:                          8.8    5.97  )-----------( 369      ( 12 Feb 2021 06:10 )             29.2                                               02-12    142  |  99  |  15  ----------------------------<  136<H>  4.0   |  32  |  0.7    Ca    9.2      12 Feb 2021 06:10    TPro  6.1  /  Alb  3.3<L>  /  TBili  0.4  /  DBili  x   /  AST  19  /  ALT  16  /  AlkPhos  88  02-12      PTT - ( 11 Feb 2021 06:04 )  PTT:44.9 sec                                                                                     LIVER FUNCTIONS - ( 12 Feb 2021 06:10 )  Alb: 3.3 g/dL / Pro: 6.1 g/dL / ALK PHOS: 88 U/L / ALT: 16 U/L / AST: 19 U/L / GGT: x                                                                                                MEDICATIONS  (STANDING):  aMIOdarone    Tablet 200 milliGRAM(s) Oral daily  apixaban 5 milliGRAM(s) Oral two times a day  aspirin enteric coated 81 milliGRAM(s) Oral daily  atorvastatin 40 milliGRAM(s) Oral at bedtime  carvedilol 6.25 milliGRAM(s) Oral every 12 hours  cefepime   IVPB 1000 milliGRAM(s) IV Intermittent every 8 hours  cefepime   IVPB      chlorhexidine 0.12% Liquid 15 milliLiter(s) Oral Mucosa every 12 hours  chlorhexidine 4% Liquid 1 Application(s) Topical <User Schedule>  doxazosin 2 milliGRAM(s) Oral at bedtime  furosemide    Tablet 20 milliGRAM(s) Oral daily  pantoprazole   Suspension 40 milliGRAM(s) Oral daily  vancomycin  IVPB 1500 milliGRAM(s) IV Intermittent every 12 hours    MEDICATIONS  (PRN):  acetaminophen   Tablet .. 650 milliGRAM(s) Oral every 6 hours PRN Temp greater or equal to 38C (100.4F)      CXR- Improved bibasilar opacities

## 2021-02-13 LAB
ALBUMIN SERPL ELPH-MCNC: 3 G/DL — LOW (ref 3.5–5.2)
ALP SERPL-CCNC: 92 U/L — SIGNIFICANT CHANGE UP (ref 30–115)
ALT FLD-CCNC: 16 U/L — SIGNIFICANT CHANGE UP (ref 0–41)
ANION GAP SERPL CALC-SCNC: 12 MMOL/L — SIGNIFICANT CHANGE UP (ref 7–14)
AST SERPL-CCNC: 17 U/L — SIGNIFICANT CHANGE UP (ref 0–41)
BASOPHILS # BLD AUTO: 0.03 K/UL — SIGNIFICANT CHANGE UP (ref 0–0.2)
BASOPHILS NFR BLD AUTO: 0.4 % — SIGNIFICANT CHANGE UP (ref 0–1)
BILIRUB SERPL-MCNC: 0.3 MG/DL — SIGNIFICANT CHANGE UP (ref 0.2–1.2)
BUN SERPL-MCNC: 13 MG/DL — SIGNIFICANT CHANGE UP (ref 10–20)
CALCIUM SERPL-MCNC: 8.8 MG/DL — SIGNIFICANT CHANGE UP (ref 8.5–10.1)
CHLORIDE SERPL-SCNC: 100 MMOL/L — SIGNIFICANT CHANGE UP (ref 98–110)
CO2 SERPL-SCNC: 29 MMOL/L — SIGNIFICANT CHANGE UP (ref 17–32)
CREAT SERPL-MCNC: 0.8 MG/DL — SIGNIFICANT CHANGE UP (ref 0.7–1.5)
CULTURE RESULTS: SIGNIFICANT CHANGE UP
EOSINOPHIL # BLD AUTO: 0.54 K/UL — SIGNIFICANT CHANGE UP (ref 0–0.7)
EOSINOPHIL NFR BLD AUTO: 6.9 % — SIGNIFICANT CHANGE UP (ref 0–8)
GLUCOSE SERPL-MCNC: 194 MG/DL — HIGH (ref 70–99)
HCT VFR BLD CALC: 27.6 % — LOW (ref 42–52)
HGB BLD-MCNC: 8.5 G/DL — LOW (ref 14–18)
IMM GRANULOCYTES NFR BLD AUTO: 0.3 % — SIGNIFICANT CHANGE UP (ref 0.1–0.3)
LYMPHOCYTES # BLD AUTO: 1.95 K/UL — SIGNIFICANT CHANGE UP (ref 1.2–3.4)
LYMPHOCYTES # BLD AUTO: 25 % — SIGNIFICANT CHANGE UP (ref 20.5–51.1)
MCHC RBC-ENTMCNC: 25.2 PG — LOW (ref 27–31)
MCHC RBC-ENTMCNC: 30.8 G/DL — LOW (ref 32–37)
MCV RBC AUTO: 81.9 FL — SIGNIFICANT CHANGE UP (ref 80–94)
MONOCYTES # BLD AUTO: 0.65 K/UL — HIGH (ref 0.1–0.6)
MONOCYTES NFR BLD AUTO: 8.3 % — SIGNIFICANT CHANGE UP (ref 1.7–9.3)
NEUTROPHILS # BLD AUTO: 4.62 K/UL — SIGNIFICANT CHANGE UP (ref 1.4–6.5)
NEUTROPHILS NFR BLD AUTO: 59.1 % — SIGNIFICANT CHANGE UP (ref 42.2–75.2)
NRBC # BLD: 0 /100 WBCS — SIGNIFICANT CHANGE UP (ref 0–0)
PLATELET # BLD AUTO: 398 K/UL — SIGNIFICANT CHANGE UP (ref 130–400)
POTASSIUM SERPL-MCNC: 3.7 MMOL/L — SIGNIFICANT CHANGE UP (ref 3.5–5)
POTASSIUM SERPL-SCNC: 3.7 MMOL/L — SIGNIFICANT CHANGE UP (ref 3.5–5)
PROCALCITONIN SERPL-MCNC: 0.18 NG/ML — HIGH (ref 0.02–0.1)
PROT SERPL-MCNC: 5.8 G/DL — LOW (ref 6–8)
RBC # BLD: 3.37 M/UL — LOW (ref 4.7–6.1)
RBC # FLD: 15.9 % — HIGH (ref 11.5–14.5)
SODIUM SERPL-SCNC: 141 MMOL/L — SIGNIFICANT CHANGE UP (ref 135–146)
SPECIMEN SOURCE: SIGNIFICANT CHANGE UP
WBC # BLD: 7.81 K/UL — SIGNIFICANT CHANGE UP (ref 4.8–10.8)
WBC # FLD AUTO: 7.81 K/UL — SIGNIFICANT CHANGE UP (ref 4.8–10.8)

## 2021-02-13 PROCEDURE — 99233 SBSQ HOSP IP/OBS HIGH 50: CPT

## 2021-02-13 PROCEDURE — 71045 X-RAY EXAM CHEST 1 VIEW: CPT | Mod: 26

## 2021-02-13 RX ADMIN — CEFEPIME 100 MILLIGRAM(S): 1 INJECTION, POWDER, FOR SOLUTION INTRAMUSCULAR; INTRAVENOUS at 04:31

## 2021-02-13 RX ADMIN — APIXABAN 5 MILLIGRAM(S): 2.5 TABLET, FILM COATED ORAL at 17:17

## 2021-02-13 RX ADMIN — CARVEDILOL PHOSPHATE 6.25 MILLIGRAM(S): 80 CAPSULE, EXTENDED RELEASE ORAL at 09:58

## 2021-02-13 RX ADMIN — APIXABAN 5 MILLIGRAM(S): 2.5 TABLET, FILM COATED ORAL at 09:58

## 2021-02-13 RX ADMIN — CHLORHEXIDINE GLUCONATE 1 APPLICATION(S): 213 SOLUTION TOPICAL at 09:59

## 2021-02-13 RX ADMIN — CHLORHEXIDINE GLUCONATE 15 MILLILITER(S): 213 SOLUTION TOPICAL at 09:59

## 2021-02-13 RX ADMIN — Medication 20 MILLIGRAM(S): at 10:00

## 2021-02-13 RX ADMIN — CARVEDILOL PHOSPHATE 6.25 MILLIGRAM(S): 80 CAPSULE, EXTENDED RELEASE ORAL at 17:17

## 2021-02-13 RX ADMIN — CEFEPIME 100 MILLIGRAM(S): 1 INJECTION, POWDER, FOR SOLUTION INTRAMUSCULAR; INTRAVENOUS at 12:08

## 2021-02-13 RX ADMIN — Medication 81 MILLIGRAM(S): at 11:23

## 2021-02-13 RX ADMIN — AMIODARONE HYDROCHLORIDE 200 MILLIGRAM(S): 400 TABLET ORAL at 09:57

## 2021-02-13 RX ADMIN — PANTOPRAZOLE SODIUM 40 MILLIGRAM(S): 20 TABLET, DELAYED RELEASE ORAL at 10:00

## 2021-02-13 RX ADMIN — Medication 2 MILLIGRAM(S): at 22:14

## 2021-02-13 RX ADMIN — Medication 300 MILLIGRAM(S): at 17:17

## 2021-02-13 RX ADMIN — CEFEPIME 100 MILLIGRAM(S): 1 INJECTION, POWDER, FOR SOLUTION INTRAMUSCULAR; INTRAVENOUS at 22:00

## 2021-02-13 RX ADMIN — Medication 300 MILLIGRAM(S): at 10:00

## 2021-02-13 RX ADMIN — ATORVASTATIN CALCIUM 40 MILLIGRAM(S): 80 TABLET, FILM COATED ORAL at 22:14

## 2021-02-13 NOTE — PROGRESS NOTE ADULT - ASSESSMENT
69 yo male with PMH of Afib on eliquis, HTN, HLD, DM, Recent dx with COVID in Nov 20 s/p trach and peg removal presents to ed for acute onset shortness of breath. Hospital course c/b ICU stay requiring intubation, currently extubated and downgrade to floor status.        # Acute hypoxic respiratory failure   # Sepsis  - acute hypoxic respiratory failure multifactorial; PNA vs vascular congestion  - suspected gram negative pneumonia   - CXR on 2/12 with stable opacities  - currently afebrile and HDS  - leukocytosis resolved  - ECHO with normal LV size and function  - continue furosemide    Tablet 20 milliGRAM(s) Oral daily  - will need outpatient dobutamine stress test  - continue IV abxs: vancomycin  IVPB 1500 milliGRAM(s) IV Intermittent every 12 hours and cefepime   IVPB 1000 milliGRAM(s) IV Intermittent every 8 hours  - ID following; recs appreciated          - Procal 2/10 0.48 -- can repeat again on 2/13, final course pending procalcitonin, will likely stop early next week     # Type 2 NSTEMI, RESOLVED  - 0.19-->0.11  - likely 2/2 to demand ischemia in the setting of sepsis      # HLD  aspirin enteric coated 81 milliGRAM(s) Oral daily  atorvastatin 40 milliGRAM(s) Oral at bedtime    # BPH  doxazosin 2 milliGRAM(s) Oral at bedtime      # Chronic Afib  - aMIOdarone    Tablet 200 milliGRAM(s) Oral daily  - apixaban 5 milliGRAM(s) Oral two times a day  - carvedilol 6.25 milliGRAM(s) Oral every 12 hours      # Elevated BMI  - counseled on life style modifications      # GERD  pantoprazole    Tablet 40 milliGRAM(s) Oral before breakfast      diet: dash diet  vte ppx: apixaban as above  Code status: FULL    #Progress Note Handoff  Pending (specify):  Consults__ ID, clinically stability  Family discussion: patient aware of plan. all questions answered  Disposition: Unknown at this time________     69 yo male with PMH of Afib on eliquis, HTN, HLD, DM, Recent dx with COVID in Nov 20 s/p trach and peg removal presents to ed for acute onset shortness of breath. Hospital course c/b ICU stay requiring intubation, currently extubated and downgrade to floor status.        # Acute hypoxic respiratory failure   # Sepsis  - acute hypoxic respiratory failure multifactorial; PNA vs vascular congestion  - suspected gram negative pneumonia   - CXR on 2/12 with stable opacities  - currently afebrile and HDS, satting well on 2L  - leukocytosis resolved  - ECHO with normal LV size and function  - continue furosemide    Tablet 20 milliGRAM(s) Oral daily  - will need outpatient dobutamine stress test  - continue IV abxs: vancomycin  IVPB 1500 milliGRAM(s) IV Intermittent every 12 hours and cefepime   IVPB 1000 milliGRAM(s) IV Intermittent every 8 hours  - wean 02 as appropriate  - ID following; recs appreciated          - Procal 2/10 0.48 -->0.31; final course pending procalcitonin, will likely stop early next week       # Type 2 NSTEMI, RESOLVED  - 0.19-->0.11  - likely 2/2 to demand ischemia in the setting of sepsis      # HLD  aspirin enteric coated 81 milliGRAM(s) Oral daily  atorvastatin 40 milliGRAM(s) Oral at bedtime    # BPH  doxazosin 2 milliGRAM(s) Oral at bedtime      # Chronic Afib  - aMIOdarone    Tablet 200 milliGRAM(s) Oral daily  - apixaban 5 milliGRAM(s) Oral two times a day  - carvedilol 6.25 milliGRAM(s) Oral every 12 hours      # Elevated BMI  - counseled on life style modifications      # GERD  pantoprazole    Tablet 40 milliGRAM(s) Oral before breakfast      diet: dash diet  vte ppx: apixaban as above  Code status: FULL    #Progress Note Handoff  Pending (specify):  Consults__ ID, clinically stability, PT  Family discussion: patient aware of plan. all questions answered  Disposition: Unknown at this time________

## 2021-02-13 NOTE — PROGRESS NOTE ADULT - SUBJECTIVE AND OBJECTIVE BOX
MAGGIE SAHNI  70y  Male      Patient is a 70y old  Male who presents with a chief complaint of Shortness of breath (12 Feb 2021 11:37)      INTERVAL HPI/OVERNIGHT EVENTS:       VITALS  T(C): 36.4 (02-13-21 @ 05:45), Max: 36.7 (02-12-21 @ 11:00)  HR: 83 (02-13-21 @ 05:45) (74 - 83)  BP: 121/57 (02-13-21 @ 05:45) (107/55 - 125/59)  RR: 18 (02-13-21 @ 05:45) (15 - 21)  SpO2: 97% (02-12-21 @ 11:01) (97% - 100%)  Wt(kg): --Vital Signs Last 24 Hrs  T(C): 36.4 (13 Feb 2021 05:45), Max: 36.7 (12 Feb 2021 11:00)  T(F): 97.5 (13 Feb 2021 05:45), Max: 98.1 (12 Feb 2021 11:00)  HR: 83 (13 Feb 2021 05:45) (74 - 83)  BP: 121/57 (13 Feb 2021 05:45) (107/55 - 125/59)  BP(mean): 78 (12 Feb 2021 11:01) (78 - 79)  RR: 18 (13 Feb 2021 05:45) (15 - 21)  SpO2: 97% (12 Feb 2021 11:01) (97% - 100%)      02-12-21 @ 07:01  -  02-13-21 @ 07:00  --------------------------------------------------------  IN: 0 mL / OUT: 550 mL / NET: -550 mL        PHYSICAL EXAM:  GENERAL: NAD, well-groomed, well-developed  PSYCH: no agitation, baseline mentation  NERVOUS SYSTEM:  Alert & Oriented X3, Motor Strength 5/5 B/L upper and lower extremities; Sensory intact; FTN WNL  PULMONARY: Clear to percussion bilaterally; No rales, rhonchi, wheezing, or rubs  CARDIOVASCULAR: Regular rate and rhythm; No murmurs, rubs, or gallops  GI: Soft, Nontender, Nondistended; Bowel sounds present  EXTREMITIES:  2+ Peripheral Pulses, No clubbing, cyanosis, or edema  LYMPH: No lymphadenopathy noted  SKIN: No rashes or lesions    Consultant(s) Notes Reviewed:  [x ] YES  [ ] NO    Discussed with Consultants/Other Providers [ x] YES     LABS                          8.8    5.97  )-----------( 369      ( 12 Feb 2021 06:10 )             29.2     02-12    142  |  99  |  15  ----------------------------<  136<H>  4.0   |  32  |  0.7    Ca    9.2      12 Feb 2021 06:10    TPro  6.1  /  Alb  3.3<L>  /  TBili  0.4  /  DBili  x   /  AST  19  /  ALT  16  /  AlkPhos  88  02-12      RADIOLOGY & ADDITIONAL TESTS:  Xray Chest 1 View- PORTABLE-Routine (Xray Chest 1 View- PORTABLE-Routine in AM.) (02.13.21 @ 05:01) >  Impression:  Bilateral opacities, stable    MEDICATIONS  (STANDING):  aMIOdarone    Tablet 200 milliGRAM(s) Oral daily  apixaban 5 milliGRAM(s) Oral two times a day  aspirin enteric coated 81 milliGRAM(s) Oral daily  atorvastatin 40 milliGRAM(s) Oral at bedtime  carvedilol 6.25 milliGRAM(s) Oral every 12 hours  cefepime   IVPB 1000 milliGRAM(s) IV Intermittent every 8 hours  cefepime   IVPB      chlorhexidine 0.12% Liquid 15 milliLiter(s) Oral Mucosa every 12 hours  chlorhexidine 4% Liquid 1 Application(s) Topical <User Schedule>  doxazosin 2 milliGRAM(s) Oral at bedtime  furosemide    Tablet 20 milliGRAM(s) Oral daily  pantoprazole    Tablet 40 milliGRAM(s) Oral before breakfast  vancomycin  IVPB 1500 milliGRAM(s) IV Intermittent every 12 hours    MEDICATIONS  (PRN):  acetaminophen   Tablet .. 650 milliGRAM(s) Oral every 6 hours PRN Temp greater or equal to 38C (100.4F)     MAGGIE SAHNI  70y  Male      Patient is a 70y old  Male who presents with a chief complaint of Shortness of breath (12 Feb 2021 11:37)      INTERVAL HPI/OVERNIGHT EVENTS:  no acute events overnight. no nursing concerns. patient reports he is doing "good". ambulating, passing gas. last BM yesterday. Fair po intake. denies any fevers, chills, n/v, chest pain, sob, abdominal pain.      VITALS  T(C): 36.4 (02-13-21 @ 05:45), Max: 36.7 (02-12-21 @ 11:00)  HR: 83 (02-13-21 @ 05:45) (74 - 83)  BP: 121/57 (02-13-21 @ 05:45) (107/55 - 125/59)  RR: 18 (02-13-21 @ 05:45) (15 - 21)  SpO2: 97% (02-12-21 @ 11:01) (97% - 100%)  Wt(kg): --Vital Signs Last 24 Hrs  T(C): 36.4 (13 Feb 2021 05:45), Max: 36.7 (12 Feb 2021 11:00)  T(F): 97.5 (13 Feb 2021 05:45), Max: 98.1 (12 Feb 2021 11:00)  HR: 83 (13 Feb 2021 05:45) (74 - 83)  BP: 121/57 (13 Feb 2021 05:45) (107/55 - 125/59)  BP(mean): 78 (12 Feb 2021 11:01) (78 - 79)  RR: 18 (13 Feb 2021 05:45) (15 - 21)  SpO2: 97% (12 Feb 2021 11:01) (97% - 100%)      02-12-21 @ 07:01  -  02-13-21 @ 07:00  --------------------------------------------------------  IN: 0 mL / OUT: 550 mL / NET: -550 mL        PHYSICAL EXAM:  GENERAL: elderly M, NAD, non toxic appearing  HEENT: NCAT, anicteric sclera ,non injected conjunctiva, MMM  Neck: no jvp or thyroid nodules appreciated  PSYCH: no agitation, baseline mentation  NERVOUS SYSTEM:  Alert & Oriented X3, CN 2-12 grossly intact  PULMONARY: Clear to percussion bilaterally; No rales, rhonchi, wheezing, or rubs  CARDIOVASCULAR: Regular rate and rhythm; No murmurs, rubs, or gallops  GI: Soft, Nontender, Nondistended; Bowel sounds present  EXTREMITIES:  2+ Peripheral Pulses, No clubbing, cyanosis, or edema  LYMPH: No lymphadenopathy noted  SKIN: No rashes or lesions    Consultant(s) Notes Reviewed:  [x ] YES  [ ] NO    Discussed with Consultants/Other Providers [ x] YES     LABS                          8.8    5.97  )-----------( 369      ( 12 Feb 2021 06:10 )             29.2     02-12    142  |  99  |  15  ----------------------------<  136<H>  4.0   |  32  |  0.7    Ca    9.2      12 Feb 2021 06:10    TPro  6.1  /  Alb  3.3<L>  /  TBili  0.4  /  DBili  x   /  AST  19  /  ALT  16  /  AlkPhos  88  02-12      RADIOLOGY & ADDITIONAL TESTS:  Xray Chest 1 View- PORTABLE-Routine (Xray Chest 1 View- PORTABLE-Routine in AM.) (02.13.21 @ 05:01) >  Impression:  Bilateral opacities, stable    MEDICATIONS  (STANDING):  aMIOdarone    Tablet 200 milliGRAM(s) Oral daily  apixaban 5 milliGRAM(s) Oral two times a day  aspirin enteric coated 81 milliGRAM(s) Oral daily  atorvastatin 40 milliGRAM(s) Oral at bedtime  carvedilol 6.25 milliGRAM(s) Oral every 12 hours  cefepime   IVPB 1000 milliGRAM(s) IV Intermittent every 8 hours  cefepime   IVPB      chlorhexidine 0.12% Liquid 15 milliLiter(s) Oral Mucosa every 12 hours  chlorhexidine 4% Liquid 1 Application(s) Topical <User Schedule>  doxazosin 2 milliGRAM(s) Oral at bedtime  furosemide    Tablet 20 milliGRAM(s) Oral daily  pantoprazole    Tablet 40 milliGRAM(s) Oral before breakfast  vancomycin  IVPB 1500 milliGRAM(s) IV Intermittent every 12 hours    MEDICATIONS  (PRN):  acetaminophen   Tablet .. 650 milliGRAM(s) Oral every 6 hours PRN Temp greater or equal to 38C (100.4F)

## 2021-02-14 LAB
ANION GAP SERPL CALC-SCNC: 12 MMOL/L — SIGNIFICANT CHANGE UP (ref 7–14)
BUN SERPL-MCNC: 11 MG/DL — SIGNIFICANT CHANGE UP (ref 10–20)
CALCIUM SERPL-MCNC: 9.3 MG/DL — SIGNIFICANT CHANGE UP (ref 8.5–10.1)
CHLORIDE SERPL-SCNC: 100 MMOL/L — SIGNIFICANT CHANGE UP (ref 98–110)
CO2 SERPL-SCNC: 29 MMOL/L — SIGNIFICANT CHANGE UP (ref 17–32)
CREAT SERPL-MCNC: 0.9 MG/DL — SIGNIFICANT CHANGE UP (ref 0.7–1.5)
CULTURE RESULTS: SIGNIFICANT CHANGE UP
GLUCOSE BLDC GLUCOMTR-MCNC: 203 MG/DL — HIGH (ref 70–99)
GLUCOSE BLDC GLUCOMTR-MCNC: 310 MG/DL — HIGH (ref 70–99)
GLUCOSE SERPL-MCNC: 222 MG/DL — HIGH (ref 70–99)
HCT VFR BLD CALC: 30.9 % — LOW (ref 42–52)
HGB BLD-MCNC: 9.2 G/DL — LOW (ref 14–18)
MCHC RBC-ENTMCNC: 24.7 PG — LOW (ref 27–31)
MCHC RBC-ENTMCNC: 29.8 G/DL — LOW (ref 32–37)
MCV RBC AUTO: 82.8 FL — SIGNIFICANT CHANGE UP (ref 80–94)
NRBC # BLD: 0 /100 WBCS — SIGNIFICANT CHANGE UP (ref 0–0)
PLATELET # BLD AUTO: 459 K/UL — HIGH (ref 130–400)
POTASSIUM SERPL-MCNC: 4.3 MMOL/L — SIGNIFICANT CHANGE UP (ref 3.5–5)
POTASSIUM SERPL-SCNC: 4.3 MMOL/L — SIGNIFICANT CHANGE UP (ref 3.5–5)
RBC # BLD: 3.73 M/UL — LOW (ref 4.7–6.1)
RBC # FLD: 15.7 % — HIGH (ref 11.5–14.5)
SODIUM SERPL-SCNC: 141 MMOL/L — SIGNIFICANT CHANGE UP (ref 135–146)
SPECIMEN SOURCE: SIGNIFICANT CHANGE UP
WBC # BLD: 9.17 K/UL — SIGNIFICANT CHANGE UP (ref 4.8–10.8)
WBC # FLD AUTO: 9.17 K/UL — SIGNIFICANT CHANGE UP (ref 4.8–10.8)

## 2021-02-14 PROCEDURE — 99233 SBSQ HOSP IP/OBS HIGH 50: CPT

## 2021-02-14 RX ORDER — INSULIN LISPRO 100/ML
6 VIAL (ML) SUBCUTANEOUS ONCE
Refills: 0 | Status: COMPLETED | OUTPATIENT
Start: 2021-02-14 | End: 2021-02-14

## 2021-02-14 RX ADMIN — Medication 6 UNIT(S): at 21:49

## 2021-02-14 RX ADMIN — Medication 300 MILLIGRAM(S): at 06:06

## 2021-02-14 RX ADMIN — Medication 300 MILLIGRAM(S): at 18:24

## 2021-02-14 RX ADMIN — AMIODARONE HYDROCHLORIDE 200 MILLIGRAM(S): 400 TABLET ORAL at 08:57

## 2021-02-14 RX ADMIN — CEFEPIME 100 MILLIGRAM(S): 1 INJECTION, POWDER, FOR SOLUTION INTRAMUSCULAR; INTRAVENOUS at 21:49

## 2021-02-14 RX ADMIN — PANTOPRAZOLE SODIUM 40 MILLIGRAM(S): 20 TABLET, DELAYED RELEASE ORAL at 06:06

## 2021-02-14 RX ADMIN — CHLORHEXIDINE GLUCONATE 15 MILLILITER(S): 213 SOLUTION TOPICAL at 06:00

## 2021-02-14 RX ADMIN — CARVEDILOL PHOSPHATE 6.25 MILLIGRAM(S): 80 CAPSULE, EXTENDED RELEASE ORAL at 06:06

## 2021-02-14 RX ADMIN — CHLORHEXIDINE GLUCONATE 1 APPLICATION(S): 213 SOLUTION TOPICAL at 06:00

## 2021-02-14 RX ADMIN — Medication 20 MILLIGRAM(S): at 06:06

## 2021-02-14 RX ADMIN — Medication 2 MILLIGRAM(S): at 21:49

## 2021-02-14 RX ADMIN — CEFEPIME 100 MILLIGRAM(S): 1 INJECTION, POWDER, FOR SOLUTION INTRAMUSCULAR; INTRAVENOUS at 13:34

## 2021-02-14 RX ADMIN — CARVEDILOL PHOSPHATE 6.25 MILLIGRAM(S): 80 CAPSULE, EXTENDED RELEASE ORAL at 18:24

## 2021-02-14 RX ADMIN — Medication 81 MILLIGRAM(S): at 11:05

## 2021-02-14 RX ADMIN — CEFEPIME 100 MILLIGRAM(S): 1 INJECTION, POWDER, FOR SOLUTION INTRAMUSCULAR; INTRAVENOUS at 06:00

## 2021-02-14 RX ADMIN — APIXABAN 5 MILLIGRAM(S): 2.5 TABLET, FILM COATED ORAL at 06:06

## 2021-02-14 RX ADMIN — ATORVASTATIN CALCIUM 40 MILLIGRAM(S): 80 TABLET, FILM COATED ORAL at 21:49

## 2021-02-14 RX ADMIN — APIXABAN 5 MILLIGRAM(S): 2.5 TABLET, FILM COATED ORAL at 18:24

## 2021-02-14 NOTE — PROGRESS NOTE ADULT - ASSESSMENT
Pt is a 69 yo male with PMH of Afib on eliquis, HTN, HLD, DM, Recent dx with COVID in Nov 20 s/p trach and peg removal presents to ed for Shortness of breath. Pt placed on NIV and  then started to desaturate on NIV and  was intubated in the ED.     Acute hypoxic respiratory failure - resolved / suspected gram negative pneumonia /  sepsis / type 2 NSTEMI     - continue Vanco and Cefepime as per ID   - procalcitonin level has improved   - oral  Lasix 20mg daily   - aspirin, Coreg, Lipitor, amio   - Eliquis resumed    - use insulin IV CC protocol if glucose is elevated   - outpt cardiac stress test as per cardiology   - continue PT  - Rehab   - DC planning

## 2021-02-14 NOTE — PROGRESS NOTE ADULT - SUBJECTIVE AND OBJECTIVE BOX
Patient feels good, tolerating oral diet, no complaints       T(F): 98.6 (02-14-21 @ 04:30), Max: 98.6 (02-14-21 @ 04:30)  HR: 83 (02-14-21 @ 08:57)  BP: 128/61 (02-14-21 @ 08:57)  RR: 16 (02-14-21 @ 04:30)  SpO2: 95% (02-13-21 @ 12:20) (91% - 97%)    PHYSICAL EXAM:  GENERAL: NAD  HEAD:  Atraumatic, Normocephalic  EYES: EOMI, PERRLA, conjunctiva and sclera tom  NECK: Supple, No JVD, Normal thyroid  NERVOUS SYSTEM:  Alert & Oriented X3, no focal deficits   CHEST/LUNG: B/L rhonchi  HEART: Regular rate and rhythm; No murmurs, rubs, or gallops  ABDOMEN: Soft, Nontender, Nondistended; Bowel sounds present  EXTREMITIES:  2+ Peripheral Pulses, No clubbing, cyanosis, or edema  LYMPH: No lymphadenopathy noted  SKIN: No rashes or lesions    LABS  02-14    141  |  100  |  11  ----------------------------<  222<H>  4.3   |  29  |  0.9    Ca    9.3      14 Feb 2021 07:39    TPro  5.8<L>  /  Alb  3.0<L>  /  TBili  0.3  /  DBili  x   /  AST  17  /  ALT  16  /  AlkPhos  92  02-13                          9.2    9.17  )-----------( 459      ( 14 Feb 2021 07:39 )             30.9     Procalcitonin - .48 ->.31 -> .18    Culture Results:   No growth to date. (02-09-21)  Culture Results:   No Growth Final (02-07-21)    RADIOLOGY  < from: Xray Chest 1 View- PORTABLE-Routine (Xray Chest 1 View- PORTABLE-Routine in AM.) (02.13.21 @ 05:01) >  Impression:    Bilateral opacities, stable      < end of copied text >    MEDICATIONS  (STANDING):  aMIOdarone    Tablet 200 milliGRAM(s) Oral daily  apixaban 5 milliGRAM(s) Oral two times a day  aspirin enteric coated 81 milliGRAM(s) Oral daily  atorvastatin 40 milliGRAM(s) Oral at bedtime  carvedilol 6.25 milliGRAM(s) Oral every 12 hours  cefepime   IVPB 1000 milliGRAM(s) IV Intermittent every 8 hours  chlorhexidine 0.12% Liquid 15 milliLiter(s) Oral Mucosa every 12 hours  chlorhexidine 4% Liquid 1 Application(s) Topical <User Schedule>  doxazosin 2 milliGRAM(s) Oral at bedtime  furosemide    Tablet 20 milliGRAM(s) Oral daily  pantoprazole    Tablet 40 milliGRAM(s) Oral before breakfast  vancomycin  IVPB 1500 milliGRAM(s) IV Intermittent every 12 hours    MEDICATIONS  (PRN):  acetaminophen   Tablet .. 650 milliGRAM(s) Oral every 6 hours PRN Temp greater or equal to 38C (100.4F)

## 2021-02-14 NOTE — PROGRESS NOTE ADULT - SUBJECTIVE AND OBJECTIVE BOX
Patient is seen and examined at the bed side, is afebrile. He has transferred out of ICU, doing better.       REVIEW OF SYSTEMS: All other review systems are negative      ALLERGIES: No Known Allergies      Vital Signs Last 24 Hrs  T(C): 36.8 (14 Feb 2021 14:13), Max: 37 (14 Feb 2021 04:30)  T(F): 98.2 (14 Feb 2021 14:13), Max: 98.6 (14 Feb 2021 04:30)  HR: 90 (14 Feb 2021 14:13) (82 - 90)  BP: 127/71 (14 Feb 2021 14:13) (99/61 - 139/63)  BP(mean): --  RR: 18 (14 Feb 2021 14:13) (16 - 18)  SpO2: 94% (14 Feb 2021 11:07) (94% - 94%)      PHYSICAL EXAM:  GENERAL: Not in distress   CHEST/LUNG: Not using accessory muscles   HEART: s1 and s2 present  ABDOMEN:  Nontender and  Nondistended  EXTREMITIES: No pedal  edema  CNS: Awake and Alert      LABS:                        9.2    9.17  )-----------( 459      ( 14 Feb 2021 07:39 )             30.9                           8.4    5.81  )-----------( 365      ( 11 Feb 2021 06:04 )             28.0       02-14    141  |  100  |  11  ----------------------------<  222<H>  4.3   |  29  |  0.9    Ca    9.3      14 Feb 2021 07:39    TPro  5.8<L>  /  Alb  3.0<L>  /  TBili  0.3  /  DBili  x   /  AST  17  /  ALT  16  /  AlkPhos  92  02-13    02-11    138  |  94<L>  |  16  ----------------------------<  142<H>  3.3<L>   |  36<H>  |  0.5<L>    Ca    9.0      11 Feb 2021 06:04    TPro  6.0  /  Alb  3.2<L>  /  TBili  0.5  /  DBili  x   /  AST  20  /  ALT  15  /  AlkPhos  88  02-11    PTT - ( 11 Feb 2021 06:04 )  PTT:44.9 sec        ABG - ( 09 Feb 2021 13:49 )  pH, Arterial: 7.46  pH, Blood: x     /  pCO2: 44    /  pO2: 126   / HCO3: 31    / Base Excess: 6.8   /  SaO2: 99          MEDICATIONS  (STANDING):    aMIOdarone    Tablet 200 milliGRAM(s) Oral daily  apixaban 5 milliGRAM(s) Oral two times a day  aspirin enteric coated 81 milliGRAM(s) Oral daily  atorvastatin 40 milliGRAM(s) Oral at bedtime  carvedilol 6.25 milliGRAM(s) Oral every 12 hours  cefepime   IVPB      cefepime   IVPB 1000 milliGRAM(s) IV Intermittent every 8 hours  chlorhexidine 0.12% Liquid 15 milliLiter(s) Oral Mucosa every 12 hours  chlorhexidine 4% Liquid 1 Application(s) Topical <User Schedule>  doxazosin 2 milliGRAM(s) Oral at bedtime  furosemide    Tablet 20 milliGRAM(s) Oral daily  pantoprazole    Tablet 40 milliGRAM(s) Oral before breakfast  vancomycin  IVPB 1500 milliGRAM(s) IV Intermittent every 12 hours        RADIOLOGY & ADDITIONAL TESTS:    < from: Xray Chest 1 View- PORTABLE-Routine (Xray Chest 1 View- PORTABLE-Routine in AM.) (02.11.21 @ 05:58) >  Bibasilar airspaceopacities have slightly improved since one day prior.      < from: Xray Chest 1 View- PORTABLE-Routine (Xray Chest 1 View- PORTABLE-Routine in AM.) (02.09.21 @ 06:38) >  Support devices: Endotracheal tube, left IJ line and enteric tube are in satisfactory position.    Cardiac/mediastinum/hilum: Stable.    Lung parenchyma/Pleura: No significant change in bilateral airspace opacities. No pneumothorax.    Skeleton/soft tissues: Stable.        MICROBIOLOGY DATA:    Culture - Blood in AM (02.09.21 @ 06:14)   Specimen Source: .Blood None   Culture Results: No growth to date.     Culture - Blood (02.07.21 @ 18:43)   Specimen Source: .Blood None   Culture Results: No growth to date.     MRSA/MSSA PCR (02.07.21 @ 09:00)   MRSA PCR Result.: Positive:     Respiratory Viral Panel with COVID-19 by KENDRICK (02.06.21 @ 20:34)   Rapid RVP Result: NotDetec   SARS-CoV-2: NotDetec:              Patient is seen and examined at the bed side, is afebrile. He has transferred out of ICU, doing better.       REVIEW OF SYSTEMS: All other review systems are negative      ALLERGIES: No Known Allergies      Vital Signs Last 24 Hrs  T(C): 36.8 (14 Feb 2021 14:13), Max: 37 (14 Feb 2021 04:30)  T(F): 98.2 (14 Feb 2021 14:13), Max: 98.6 (14 Feb 2021 04:30)  HR: 90 (14 Feb 2021 14:13) (82 - 90)  BP: 127/71 (14 Feb 2021 14:13) (99/61 - 139/63)  BP(mean): --  RR: 18 (14 Feb 2021 14:13) (16 - 18)  SpO2: 94% (14 Feb 2021 11:07) (94% - 94%)      PHYSICAL EXAM:  GENERAL: Not in distress   CHEST/LUNG: Not using accessory muscles   HEART: s1 and s2 present  ABDOMEN:  Nontender and  Nondistended  EXTREMITIES: No pedal  edema  CNS: Awake and Alert      LABS:                        9.2    9.17  )-----------( 459      ( 14 Feb 2021 07:39 )             30.9                           8.4    5.81  )-----------( 365      ( 11 Feb 2021 06:04 )             28.0       02-14    141  |  100  |  11  ----------------------------<  222<H>  4.3   |  29  |  0.9    Ca    9.3      14 Feb 2021 07:39    TPro  5.8<L>  /  Alb  3.0<L>  /  TBili  0.3  /  DBili  x   /  AST  17  /  ALT  16  /  AlkPhos  92  02-13    02-11    138  |  94<L>  |  16  ----------------------------<  142<H>  3.3<L>   |  36<H>  |  0.5<L>    Ca    9.0      11 Feb 2021 06:04    TPro  6.0  /  Alb  3.2<L>  /  TBili  0.5  /  DBili  x   /  AST  20  /  ALT  15  /  AlkPhos  88  02-11    PTT - ( 11 Feb 2021 06:04 )  PTT:44.9 sec        ABG - ( 09 Feb 2021 13:49 )  pH, Arterial: 7.46  pH, Blood: x     /  pCO2: 44    /  pO2: 126   / HCO3: 31    / Base Excess: 6.8   /  SaO2: 99          MEDICATIONS  (STANDING):    aMIOdarone    Tablet 200 milliGRAM(s) Oral daily  apixaban 5 milliGRAM(s) Oral two times a day  aspirin enteric coated 81 milliGRAM(s) Oral daily  atorvastatin 40 milliGRAM(s) Oral at bedtime  carvedilol 6.25 milliGRAM(s) Oral every 12 hours  cefepime   IVPB      cefepime   IVPB 1000 milliGRAM(s) IV Intermittent every 8 hours  chlorhexidine 0.12% Liquid 15 milliLiter(s) Oral Mucosa every 12 hours  chlorhexidine 4% Liquid 1 Application(s) Topical <User Schedule>  doxazosin 2 milliGRAM(s) Oral at bedtime  furosemide    Tablet 20 milliGRAM(s) Oral daily  pantoprazole    Tablet 40 milliGRAM(s) Oral before breakfast  vancomycin  IVPB 1500 milliGRAM(s) IV Intermittent every 12 hours        RADIOLOGY & ADDITIONAL TESTS:    < from: Xray Chest 1 View- PORTABLE-Routine (Xray Chest 1 View- PORTABLE-Routine in AM.) (02.11.21 @ 05:58) >  Bibasilar airspaceopacities have slightly improved since one day prior.      < from: Xray Chest 1 View- PORTABLE-Routine (Xray Chest 1 View- PORTABLE-Routine in AM.) (02.09.21 @ 06:38) >  Support devices: Endotracheal tube, left IJ line and enteric tube are in satisfactory position.    Cardiac/mediastinum/hilum: Stable.    Lung parenchyma/Pleura: No significant change in bilateral airspace opacities. No pneumothorax.    Skeleton/soft tissues: Stable.        MICROBIOLOGY DATA:    Culture - Blood in AM (02.09.21 @ 06:14)   Specimen Source: .Blood None   Culture Results: No growth to date.     Culture - Blood (02.07.21 @ 18:43)   Specimen Source: .Blood None   Culture Results: No growth to date.     MRSA/MSSA PCR (02.07.21 @ 09:00)   MRSA PCR Result.: Positive:     Respiratory Viral Panel with COVID-19 by KENDRICK (02.06.21 @ 20:34)   Rapid RVP Result: NotDetec   SARS-CoV-2: NotDetec:

## 2021-02-14 NOTE — PROGRESS NOTE ADULT - ASSESSMENT
69 yo male with PMH of Afib on eliquis, HTN, HLD, DM?, Recent dx with COVID in Nov 20 s/p trach and peg removal presents to ed for Shortness of breath.    IMPRESSION  #Acute Hypoxic Respiratory failure - s/p extubation  - Bilateral infiltrates -- fluid congestion vs. possible superimposed pneumonia  - MRSA Nares positive  #Hx of COVID 11/2020    would recommend:  1. Continue vancomycin 1500 mg q 12 hourss and keep level between 15 to 20  2. Continue cefepime 1g q 8 hours  3. Aspiration precaution       Attending Attestation:     45 minutes spent on total encounter; more than 50% of the visit was spent counseling and/or coordinating care by the attending physician.

## 2021-02-15 LAB
GLUCOSE BLDC GLUCOMTR-MCNC: 173 MG/DL — HIGH (ref 70–99)
GLUCOSE BLDC GLUCOMTR-MCNC: 187 MG/DL — HIGH (ref 70–99)
GLUCOSE BLDC GLUCOMTR-MCNC: 189 MG/DL — HIGH (ref 70–99)
GLUCOSE BLDC GLUCOMTR-MCNC: 201 MG/DL — HIGH (ref 70–99)
VANCOMYCIN TROUGH SERPL-MCNC: 32.5 UG/ML — HIGH (ref 5–10)

## 2021-02-15 PROCEDURE — 99233 SBSQ HOSP IP/OBS HIGH 50: CPT

## 2021-02-15 RX ORDER — INSULIN LISPRO 100/ML
VIAL (ML) SUBCUTANEOUS
Refills: 0 | Status: DISCONTINUED | OUTPATIENT
Start: 2021-02-15 | End: 2021-02-16

## 2021-02-15 RX ORDER — DEXTROSE 50 % IN WATER 50 %
25 SYRINGE (ML) INTRAVENOUS ONCE
Refills: 0 | Status: DISCONTINUED | OUTPATIENT
Start: 2021-02-15 | End: 2021-02-16

## 2021-02-15 RX ORDER — SODIUM CHLORIDE 9 MG/ML
1000 INJECTION, SOLUTION INTRAVENOUS
Refills: 0 | Status: DISCONTINUED | OUTPATIENT
Start: 2021-02-15 | End: 2021-02-16

## 2021-02-15 RX ORDER — INSULIN GLARGINE 100 [IU]/ML
10 INJECTION, SOLUTION SUBCUTANEOUS AT BEDTIME
Refills: 0 | Status: DISCONTINUED | OUTPATIENT
Start: 2021-02-15 | End: 2021-02-16

## 2021-02-15 RX ORDER — INSULIN LISPRO 100/ML
3 VIAL (ML) SUBCUTANEOUS
Refills: 0 | Status: DISCONTINUED | OUTPATIENT
Start: 2021-02-15 | End: 2021-02-16

## 2021-02-15 RX ORDER — GLUCAGON INJECTION, SOLUTION 0.5 MG/.1ML
1 INJECTION, SOLUTION SUBCUTANEOUS ONCE
Refills: 0 | Status: DISCONTINUED | OUTPATIENT
Start: 2021-02-15 | End: 2021-02-16

## 2021-02-15 RX ORDER — DEXTROSE 50 % IN WATER 50 %
15 SYRINGE (ML) INTRAVENOUS ONCE
Refills: 0 | Status: DISCONTINUED | OUTPATIENT
Start: 2021-02-15 | End: 2021-02-16

## 2021-02-15 RX ORDER — DEXTROSE 50 % IN WATER 50 %
12.5 SYRINGE (ML) INTRAVENOUS ONCE
Refills: 0 | Status: DISCONTINUED | OUTPATIENT
Start: 2021-02-15 | End: 2021-02-16

## 2021-02-15 RX ADMIN — INSULIN GLARGINE 10 UNIT(S): 100 INJECTION, SOLUTION SUBCUTANEOUS at 20:51

## 2021-02-15 RX ADMIN — Medication 20 MILLIGRAM(S): at 04:57

## 2021-02-15 RX ADMIN — Medication 1: at 18:07

## 2021-02-15 RX ADMIN — PANTOPRAZOLE SODIUM 40 MILLIGRAM(S): 20 TABLET, DELAYED RELEASE ORAL at 04:58

## 2021-02-15 RX ADMIN — Medication 1: at 11:48

## 2021-02-15 RX ADMIN — CARVEDILOL PHOSPHATE 6.25 MILLIGRAM(S): 80 CAPSULE, EXTENDED RELEASE ORAL at 04:57

## 2021-02-15 RX ADMIN — APIXABAN 5 MILLIGRAM(S): 2.5 TABLET, FILM COATED ORAL at 18:08

## 2021-02-15 RX ADMIN — CEFEPIME 100 MILLIGRAM(S): 1 INJECTION, POWDER, FOR SOLUTION INTRAMUSCULAR; INTRAVENOUS at 15:27

## 2021-02-15 RX ADMIN — CEFEPIME 100 MILLIGRAM(S): 1 INJECTION, POWDER, FOR SOLUTION INTRAMUSCULAR; INTRAVENOUS at 04:57

## 2021-02-15 RX ADMIN — Medication 3 UNIT(S): at 11:48

## 2021-02-15 RX ADMIN — CEFEPIME 100 MILLIGRAM(S): 1 INJECTION, POWDER, FOR SOLUTION INTRAMUSCULAR; INTRAVENOUS at 21:51

## 2021-02-15 RX ADMIN — Medication 81 MILLIGRAM(S): at 11:49

## 2021-02-15 RX ADMIN — Medication 300 MILLIGRAM(S): at 04:56

## 2021-02-15 RX ADMIN — Medication 2 MILLIGRAM(S): at 21:51

## 2021-02-15 RX ADMIN — CARVEDILOL PHOSPHATE 6.25 MILLIGRAM(S): 80 CAPSULE, EXTENDED RELEASE ORAL at 18:08

## 2021-02-15 RX ADMIN — ATORVASTATIN CALCIUM 40 MILLIGRAM(S): 80 TABLET, FILM COATED ORAL at 21:51

## 2021-02-15 RX ADMIN — Medication 3 UNIT(S): at 18:07

## 2021-02-15 RX ADMIN — AMIODARONE HYDROCHLORIDE 200 MILLIGRAM(S): 400 TABLET ORAL at 04:57

## 2021-02-15 RX ADMIN — CHLORHEXIDINE GLUCONATE 15 MILLILITER(S): 213 SOLUTION TOPICAL at 04:58

## 2021-02-15 RX ADMIN — APIXABAN 5 MILLIGRAM(S): 2.5 TABLET, FILM COATED ORAL at 04:57

## 2021-02-15 NOTE — PROGRESS NOTE ADULT - ASSESSMENT
Pt is a 69 yo male with PMH of Afib on eliquis, HTN, HLD, DM, Recent dx with COVID in Nov 20 s/p trach and peg removal presents to ed for Shortness of breath. Pt placed on NIV and  then started to desaturate on NIV and  was intubated in the ED.     Acute hypoxic respiratory failure - resolved / suspected gram negative pneumonia /  sepsis / type 2 NSTEMI     - continue Vanco and Cefepime as per ID   - procalcitonin level has improved   - oral  Lasix 20mg daily   - aspirin, Coreg, Lipitor, amio   - Eliquis resumed    - start  insulin [facility]  protocol    - outpt cardiac stress test as per cardiology   - continue PT  - Rehab   - DC planning

## 2021-02-15 NOTE — PROGRESS NOTE ADULT - SUBJECTIVE AND OBJECTIVE BOX
Patient feels good, no complaints       T(F): 98.6 (02-15-21 @ 05:24), Max: 98.6 (02-14-21 @ 21:03)  HR: 85 (02-15-21 @ 05:24)  BP: 137/65 (02-15-21 @ 05:24)  RR: 16 (02-15-21 @ 05:24)  SpO2: 94% (02-15-21 @ 05:42) (94% - 94%)    PHYSICAL EXAM:  GENERAL: NAD  HEAD:  Atraumatic, Normocephalic  NERVOUS SYSTEM:  Alert & Oriented X3, no focal deficits   CHEST/LUNG: mild bilateral rhonchi  HEART: Regular rate and rhythm; No murmurs, rubs, or gallops  ABDOMEN: Soft, Nontender, Nondistended; Bowel sounds present  EXTREMITIES:  2+ Peripheral Pulses, No clubbing, cyanosis, or edema  LYMPH: No lymphadenopathy noted  SKIN: No rashes or lesions    LABS  02-14    141  |  100  |  11  ----------------------------<  222<H>  4.3   |  29  |  0.9    Ca    9.3      14 Feb 2021 07:39                            9.2    9.17  )-----------( 459      ( 14 Feb 2021 07:39 )             30.9       Culture Results:   No Growth Final (02-09-21)  Culture Results:   No Growth Final (02-07-21)    RADIOLOGY  < from: Xray Chest 1 View- PORTABLE-Routine (Xray Chest 1 View- PORTABLE-Routine in AM.) (02.13.21 @ 05:01) >  Impression:    Bilateral opacities, stable    < end of copied text >    MEDICATIONS  (STANDING):  aMIOdarone    Tablet 200 milliGRAM(s) Oral daily  apixaban 5 milliGRAM(s) Oral two times a day  aspirin enteric coated 81 milliGRAM(s) Oral daily  atorvastatin 40 milliGRAM(s) Oral at bedtime  carvedilol 6.25 milliGRAM(s) Oral every 12 hours  cefepime   IVPB 1000 milliGRAM(s) IV Intermittent every 8 hours  chlorhexidine 0.12% Liquid 15 milliLiter(s) Oral Mucosa every 12 hours  chlorhexidine 4% Liquid 1 Application(s) Topical <User Schedule>  doxazosin 2 milliGRAM(s) Oral at bedtime  furosemide    Tablet 20 milliGRAM(s) Oral daily  glucagon  Injectable 1 milliGRAM(s) IntraMuscular once  insulin glargine Injectable (LANTUS) 10 Unit(s) SubCutaneous at bedtime  insulin lispro (ADMELOG) corrective regimen sliding scale   SubCutaneous three times a day before meals  insulin lispro Injectable (ADMELOG) 3 Unit(s) SubCutaneous before breakfast  insulin lispro Injectable (ADMELOG) 3 Unit(s) SubCutaneous before lunch  insulin lispro Injectable (ADMELOG) 3 Unit(s) SubCutaneous before dinner  pantoprazole    Tablet 40 milliGRAM(s) Oral before breakfast  vancomycin  IVPB 1500 milliGRAM(s) IV Intermittent every 12 hours    MEDICATIONS  (PRN):  acetaminophen   Tablet .. 650 milliGRAM(s) Oral every 6 hours PRN Temp greater or equal to 38C (100.4F)

## 2021-02-16 ENCOUNTER — TRANSCRIPTION ENCOUNTER (OUTPATIENT)
Age: 71
End: 2021-02-16

## 2021-02-16 VITALS — SYSTOLIC BLOOD PRESSURE: 137 MMHG | DIASTOLIC BLOOD PRESSURE: 63 MMHG

## 2021-02-16 LAB
A1C WITH ESTIMATED AVERAGE GLUCOSE RESULT: 6.9 % — HIGH (ref 4–5.6)
ANION GAP SERPL CALC-SCNC: 11 MMOL/L — SIGNIFICANT CHANGE UP (ref 7–14)
BUN SERPL-MCNC: 15 MG/DL — SIGNIFICANT CHANGE UP (ref 10–20)
CALCIUM SERPL-MCNC: 9.2 MG/DL — SIGNIFICANT CHANGE UP (ref 8.5–10.1)
CHLORIDE SERPL-SCNC: 105 MMOL/L — SIGNIFICANT CHANGE UP (ref 98–110)
CO2 SERPL-SCNC: 24 MMOL/L — SIGNIFICANT CHANGE UP (ref 17–32)
CREAT SERPL-MCNC: 0.9 MG/DL — SIGNIFICANT CHANGE UP (ref 0.7–1.5)
ESTIMATED AVERAGE GLUCOSE: 151 MG/DL — HIGH (ref 68–114)
GLUCOSE BLDC GLUCOMTR-MCNC: 135 MG/DL — HIGH (ref 70–99)
GLUCOSE BLDC GLUCOMTR-MCNC: 249 MG/DL — HIGH (ref 70–99)
GLUCOSE SERPL-MCNC: 217 MG/DL — HIGH (ref 70–99)
HCT VFR BLD CALC: 31.3 % — LOW (ref 42–52)
HGB BLD-MCNC: 9.2 G/DL — LOW (ref 14–18)
MCHC RBC-ENTMCNC: 24.2 PG — LOW (ref 27–31)
MCHC RBC-ENTMCNC: 29.4 G/DL — LOW (ref 32–37)
MCV RBC AUTO: 82.4 FL — SIGNIFICANT CHANGE UP (ref 80–94)
NRBC # BLD: 0 /100 WBCS — SIGNIFICANT CHANGE UP (ref 0–0)
PLATELET # BLD AUTO: 542 K/UL — HIGH (ref 130–400)
POTASSIUM SERPL-MCNC: 4.6 MMOL/L — SIGNIFICANT CHANGE UP (ref 3.5–5)
POTASSIUM SERPL-SCNC: 4.6 MMOL/L — SIGNIFICANT CHANGE UP (ref 3.5–5)
RBC # BLD: 3.8 M/UL — LOW (ref 4.7–6.1)
RBC # FLD: 15.8 % — HIGH (ref 11.5–14.5)
SODIUM SERPL-SCNC: 140 MMOL/L — SIGNIFICANT CHANGE UP (ref 135–146)
WBC # BLD: 9.91 K/UL — SIGNIFICANT CHANGE UP (ref 4.8–10.8)
WBC # FLD AUTO: 9.91 K/UL — SIGNIFICANT CHANGE UP (ref 4.8–10.8)

## 2021-02-16 PROCEDURE — 99239 HOSP IP/OBS DSCHRG MGMT >30: CPT

## 2021-02-16 RX ORDER — FUROSEMIDE 40 MG
1 TABLET ORAL
Qty: 14 | Refills: 0
Start: 2021-02-16 | End: 2021-03-01

## 2021-02-16 RX ORDER — METFORMIN HYDROCHLORIDE 850 MG/1
1 TABLET ORAL
Qty: 28 | Refills: 0
Start: 2021-02-16 | End: 2021-03-01

## 2021-02-16 RX ADMIN — CHLORHEXIDINE GLUCONATE 15 MILLILITER(S): 213 SOLUTION TOPICAL at 05:42

## 2021-02-16 RX ADMIN — CEFEPIME 100 MILLIGRAM(S): 1 INJECTION, POWDER, FOR SOLUTION INTRAMUSCULAR; INTRAVENOUS at 13:07

## 2021-02-16 RX ADMIN — Medication 3 UNIT(S): at 12:05

## 2021-02-16 RX ADMIN — Medication 2: at 12:05

## 2021-02-16 RX ADMIN — Medication 81 MILLIGRAM(S): at 11:19

## 2021-02-16 RX ADMIN — CEFEPIME 100 MILLIGRAM(S): 1 INJECTION, POWDER, FOR SOLUTION INTRAMUSCULAR; INTRAVENOUS at 05:42

## 2021-02-16 RX ADMIN — PANTOPRAZOLE SODIUM 40 MILLIGRAM(S): 20 TABLET, DELAYED RELEASE ORAL at 05:41

## 2021-02-16 RX ADMIN — AMIODARONE HYDROCHLORIDE 200 MILLIGRAM(S): 400 TABLET ORAL at 05:41

## 2021-02-16 RX ADMIN — APIXABAN 5 MILLIGRAM(S): 2.5 TABLET, FILM COATED ORAL at 05:41

## 2021-02-16 RX ADMIN — Medication 3 UNIT(S): at 08:22

## 2021-02-16 NOTE — CHART NOTE - NSCHARTNOTEFT_GEN_A_CORE
Registered Dietitian Follow-Up     Patient Profile Reviewed                           Yes [x]   No []     Nutrition History Previously Obtained        Yes [x]  No []       Pertinent Subjective Information: Patient reports that his appetite has been improving, PO intake between % of meals recently.      Pertinent Medical Interventions:   Per attending note,   --- Acute hypoxic respiratory failure - resolved / suspected gram negative pneumonia /  sepsis / type 2 NSTEMI  ---DC planning     Diet order: Diet, Consistent Carbohydrate w/Evening Snack (02-15-21 @ 09:10) [Active]    Anthropometrics:  Height (cm): 182.9   Weight (kg): 74.5 -- dosing weight. Weight trending down, will continue to monitor patients weight trend.  2/8: 72.2 kg  2/9: 71.4 kg   2/10: 73.7 kg  2/12: 70.4 kg  2/15: 69.5 kg   Weight Change :  BMI (kg/m2): 22.3- using dosing weight    IBW: 80.9 kg      MEDICATIONS  (STANDING):  aMIOdarone    Tablet 200 milliGRAM(s) Oral daily  aspirin enteric coated 81 milliGRAM(s) Oral daily  atorvastatin 40 milliGRAM(s) Oral at bedtime  carvedilol 6.25 milliGRAM(s) Oral every 12 hours  dextrose 40% Gel 15 Gram(s) Oral once  dextrose 5%. 1000 milliLiter(s) (50 mL/Hr) IV Continuous <Continuous>  dextrose 5%. 1000 milliLiter(s) (100 mL/Hr) IV Continuous <Continuous>  dextrose 50% Injectable 25 Gram(s) IV Push once  dextrose 50% Injectable 12.5 Gram(s) IV Push once  dextrose 50% Injectable 25 Gram(s) IV Push once  furosemide    Tablet 20 milliGRAM(s) Oral daily  glucagon  Injectable 1 milliGRAM(s) IntraMuscular once  insulin glargine Injectable (LANTUS) 10 Unit(s) SubCutaneous at bedtime  insulin lispro (ADMELOG) corrective regimen sliding scale   SubCutaneous three times a day before meals  insulin lispro Injectable (ADMELOG) 3 Unit(s) SubCutaneous before breakfast  insulin lispro Injectable (ADMELOG) 3 Unit(s) SubCutaneous before lunch  insulin lispro Injectable (ADMELOG) 3 Unit(s) SubCutaneous before dinner  pantoprazole    Tablet 40 milliGRAM(s) Oral before breakfast      Pertinent Labs: 02-14: RBC 3.73, H/H 9.2/30.9, glucose 222  CAPILLARY BLOOD GLUCOSE  POCT Blood Glucose.: 187 mg/dL (15 Feb 2021 20:08)  POCT Blood Glucose.: 173 mg/dL (15 Feb 2021 16:35)  POCT Blood Glucose.: 189 mg/dL (15 Feb 2021 11:18)  POCT Blood Glucose.: 201 mg/dL (15 Feb 2021 07:41)    12/4/2020: HbA1c: 8.1    Physical Findings:  - Appearance: well nourished; no edema noted. Patient continues to refuse Nutrition focused physical exam   - GI function: Patient unable to have bowel movement, reports constipation, not on bowel regimen at this time   - Tubes: NA  - Oral/Mouth cavity: per SLP on 2/10 "Mild oral impairment and suspected pharyngeal impairment when fed by clinician, no signs of pharyngeal impairment when self fed + toleration observed without overt symptoms of penetration/aspiration" recommended pureed diet/thin liquids because pt did not have his dentures + refused chewable trials at time of assessment.  - Skin: WDL per RN flow sheets      Nutrition Requirements:  Weight Used: dosing 74.5 kg from last dietitian assessment from 2/12     Estimated Energy Needs   Adjust [x] 1971-4399 kcal/d (MSJ x 1.2-1.4 AF) wt loss considered     Estimated Protein Needs      Adjust [x] 89-97g (1.2-1.3 g/kg) same as above     Estimated Fluid Needs        Continue [x] per CCU team     Nutrient Intake: Patient noted with % PO intake recently, and reports appetite is improving.      [x] Previous Nutrition Diagnosis: Inadequate protein/energy intake (improving)            [x] Ongoing               Nutrition Intervention meals and snacks, medical food supplements, coordination of care     Goal/Expected Outcome: pt to maintain po intake ~% and CBW +/- 2 lbs within the next 7 days     Indicator/Monitoring: RD to monitor diet order, energy intake, body composition, NFPF, glucose profile. not at risk will f/u in 7 days. Discussed with LIP     Recommendation:     1) Diet order per SLP recommendations: SLP recommended Pureed/thin liquids patient is on GI soft diet.   2) Order Glucerna supplement q24h (no chocolate) to optimize energy/protein intake since pt. has had recent weight loss.  3) Encourage PO intake, provide assistance with meals PRN.

## 2021-02-16 NOTE — DISCHARGE NOTE PROVIDER - CARE PROVIDER_API CALL
Antelmo Baltazar)  Cardiovascular Disease; Nuclear Cardiology  [address] [place] [address], [address]  [place] [address]  Phone: [phone]  Fax: [phone]  Scheduled Appointment: 02/18/2021 12:00 PM    Radha Duran  INTERNAL MEDICINE  [address] [place] [address]  Phone: [phone]  Fax: [phone]  Follow Up Time: 1 week

## 2021-02-16 NOTE — DISCHARGE NOTE NURSING/CASE MANAGEMENT/SOCIAL WORK - PATIENT PORTAL LINK FT
You can access the FollowMyHealth Patient Portal offered by [facility] by registering at the following website: [email or web address]. By joining [employer]’s FollowMyHealth portal, you will also be able to view your health information using other applications (apps) compatible with our system.

## 2021-02-16 NOTE — PROGRESS NOTE ADULT - ASSESSMENT
Pt is a 69 yo male with PMH of Afib on eliquis, HTN, HLD, DM, Recent dx with COVID in Nov 20 s/p trach and peg removal presents to ed for Shortness of breath. Pt placed on NIV and  then started to desaturate on NIV and  was intubated in the ED.     Acute hypoxic respiratory failure - resolved / suspected gram negative pneumonia /  sepsis / type 2 NSTEMI     - completed antibiotic course   - procalcitonin level has improved   - continue oral   Lasix 20mg daily (new med)   - do not resume outpt Cardizem   - continue home aspirin, Coreg, Lipitor and  amiodarone   - Eliquis     - resume home oral metformin   - outpt Dobutamine stress echo as per cardiology   - DC home today    - 34min spent on DC

## 2021-02-16 NOTE — PROGRESS NOTE ADULT - SUBJECTIVE AND OBJECTIVE BOX
MAGGIE SAHNI  70y, Male  Allergy: No Known Allergies      LOS  9d    CHIEF COMPLAINT: Shortness of breath (16 Feb 2021 11:19)      INTERVAL EVENTS/HPI  - No acute events overnight  - T(F): , Max: 98.2 (02-16-21 @ 05:49)  - Denies any worsening symptoms  - Tolerating medication  - WBC Count: 9.91 (02-16-21 @ 09:34)  WBC Count: 9.17 (02-14-21 @ 07:39)     - Creatinine, Serum: 0.9 (02-16-21 @ 09:34)       ROS  General: Denies rigors, nightsweats  HEENT: Denies headache, rhinorrhea, sore throat, eye pain  CV: Denies CP, palpitations  PULM: Denies wheezing, hemoptysis  GI: Denies hematemesis, hematochezia, melena  : Denies discharge, hematuria  MSK: Denies arthralgias, myalgias  SKIN: Denies rash, lesions  NEURO: Denies paresthesias, weakness  PSYCH: Denies depression, anxiety    VITALS:  T(F): 97.1, Max: 98.2 (02-16-21 @ 05:49)  HR: 86  BP: 137/63  RR: 16Vital Signs Last 24 Hrs  T(C): 36.2 (16 Feb 2021 12:24), Max: 36.8 (16 Feb 2021 05:49)  T(F): 97.1 (16 Feb 2021 12:24), Max: 98.2 (16 Feb 2021 05:49)  HR: 86 (16 Feb 2021 12:24) (78 - 86)  BP: 137/63 (16 Feb 2021 12:26) (111/58 - 142/83)  BP(mean): --  RR: 16 (16 Feb 2021 12:24) (16 - 16)  SpO2: 98% (16 Feb 2021 12:24) (98% - 98%)    PHYSICAL EXAM:  Gen: NAD, resting in bed  HEENT: Normocephalic, atraumatic  Neck: supple, no lymphadenopathy  CV: Regular rate & regular rhythm  Lungs: decreased BS at bases, no fremitus  Abdomen: Soft, BS present  Ext: Warm, well perfused  Neuro: non focal, awake  Skin: no rash, no erythema  Lines: no phlebitis    FH: Non-contributory  Social Hx: Non-contributory    TESTS & MEASUREMENTS:                        9.2    9.91  )-----------( 542      ( 16 Feb 2021 09:34 )             31.3     02-16    140  |  105  |  15  ----------------------------<  217<H>  4.6   |  24  |  0.9    Ca    9.2      16 Feb 2021 09:34      eGFR if Non African American: 87 mL/min/1.73M2 (02-16-21 @ 09:34)  eGFR if : 101 mL/min/1.73M2 (02-16-21 @ 09:34)          Culture - Blood (collected 02-09-21 @ 06:14)  Source: .Blood None  Final Report (02-14-21 @ 18:01):    No Growth Final    Culture - Blood (collected 02-07-21 @ 18:43)  Source: .Blood None  Final Report (02-13-21 @ 03:01):    No Growth Final            INFECTIOUS DISEASES TESTING  Procalcitonin, Serum: 0.18 (02-13-21 @ 08:05)  Procalcitonin, Serum: 0.31 (02-12-21 @ 06:10)  Procalcitonin, Serum: 0.48 (02-11-21 @ 06:04)  Procalcitonin, Serum: 1.43 (02-09-21 @ 06:14)  MRSA PCR Result.: Positive (02-07-21 @ 09:00)  Procalcitonin, Serum: 1.81 (02-07-21 @ 06:24)  Rapid RVP Result: NotDetec (02-06-21 @ 20:34)  COVID-19 PCR: Negative (01-04-21 @ 12:52)  COVID-19 PCR: NotDetec (12-31-20 @ 15:21)  MRSA PCR Result.: Negative (12-24-20 @ 12:07)  COVID-19 PCR: NotDetec (12-14-20 @ 14:16)  MRSA PCR Result.: Negative (12-13-20 @ 12:39)  Procalcitonin, Serum: 0.14 (12-13-20 @ 05:59)  Procalcitonin, Serum: 0.26 (12-12-20 @ 04:59)  Procalcitonin, Serum: 0.24 (12-10-20 @ 04:48)  Fungitell: 104 (12-09-20 @ 22:54)  Procalcitonin, Serum: 0.16 (12-09-20 @ 13:48)  Procalcitonin, Serum: 0.12 (12-09-20 @ 06:00)  Aspergillus Galactomannan Antigen: <0.500 (12-08-20 @ 12:18)  Fungitell: 55 (12-08-20 @ 12:18)  Procalcitonin, Serum: 0.08 (12-08-20 @ 12:18)  MRSA PCR Result.: Negative (12-08-20 @ 09:48)  Procalcitonin, Serum: 0.09 (12-08-20 @ 04:41)  Procalcitonin, Serum: 0.07 (12-07-20 @ 04:30)  Procalcitonin, Serum: 0.06 (12-06-20 @ 05:01)  Procalcitonin, Serum: 0.07 (12-05-20 @ 04:58)  Procalcitonin, Serum: 0.26 (12-03-20 @ 07:32)  Procalcitonin, Serum: 0.52 (12-02-20 @ 11:40)  MRSA PCR Result.: Negative (12-01-20 @ 14:30)  Procalcitonin, Serum: 0.61 (11-30-20 @ 04:30)  Rapid RVP Result: Detected (11-27-20 @ 19:30)  Procalcitonin, Serum: 0.59 (11-27-20 @ 19:15)      INFLAMMATORY MARKERS  C-Reactive Protein, Serum: 1.36 mg/dL (01-04-21 @ 06:34)  C-Reactive Protein, Serum: 7.41 mg/dL (12-28-20 @ 04:08)  C-Reactive Protein, Serum: 4.60 mg/dL (12-27-20 @ 03:27)  C-Reactive Protein, Serum: 1.58 mg/dL (12-26-20 @ 06:18)      RADIOLOGY & ADDITIONAL TESTS:  I have personally reviewed the last available Chest xray  CXR      CT      CARDIOLOGY TESTING  12 Lead ECG:   Ventricular Rate 82 BPM    Atrial Rate 82 BPM    P-R Interval 198 ms    QRS Duration 84 ms    Q-T Interval 442 ms    QTC Calculation(Bazett) 516 ms    P Axis 51 degrees    R Axis -11 degrees    T Axis 17 degrees    Diagnosis Line Normal sinus rhythm  Nonspecific ST abnormality    Confirmed by AMY DENNIS MD ([id number]) on 2/13/2021 11:02:28 AM (02-13-21 @ 07:13)  12 Lead ECG:   Ventricular Rate 80 BPM    Atrial Rate 80 BPM    P-R Interval 192 ms    QRS Duration 78 ms    Q-T Interval 440 ms    QTC Calculation(Bazett) 507 ms    P Axis 49 degrees    R Axis -9 degrees    T Axis 4 degrees    Diagnosis Line Normal sinus rhythm  Minimal voltage criteria for LVH, may be normal variant    Confirmed by AMY DENNIS MD ([id number]) on 2/13/2021 11:02:18 AM (02-12-21 @ 16:09)      MEDICATIONS  aMIOdarone    Tablet 200 Oral daily  apixaban 5 Oral two times a day  aspirin enteric coated 81 Oral daily  atorvastatin 40 Oral at bedtime  carvedilol 6.25 Oral every 12 hours  cefepime   IVPB 1000 IV Intermittent every 8 hours  cefepime   IVPB     chlorhexidine 0.12% Liquid 15 Oral Mucosa every 12 hours  chlorhexidine 4% Liquid 1 Topical <User Schedule>  dextrose 40% Gel 15 Oral once  dextrose 5%. 1000 IV Continuous <Continuous>  dextrose 5%. 1000 IV Continuous <Continuous>  dextrose 50% Injectable 25 IV Push once  dextrose 50% Injectable 12.5 IV Push once  dextrose 50% Injectable 25 IV Push once  doxazosin 2 Oral at bedtime  furosemide    Tablet 20 Oral daily  glucagon  Injectable 1 IntraMuscular once  insulin glargine Injectable (LANTUS) 10 SubCutaneous at bedtime  insulin lispro (ADMELOG) corrective regimen sliding scale  SubCutaneous three times a day before meals  insulin lispro Injectable (ADMELOG) 3 SubCutaneous before breakfast  insulin lispro Injectable (ADMELOG) 3 SubCutaneous before lunch  insulin lispro Injectable (ADMELOG) 3 SubCutaneous before dinner  pantoprazole    Tablet 40 Oral before breakfast      WEIGHT  Weight (kg): 74.5 (02-07-21 @ 04:13)  Creatinine, Serum: 0.9 mg/dL (02-16-21 @ 09:34)      ANTIBIOTICS:  cefepime   IVPB 1000 milliGRAM(s) IV Intermittent every 8 hours  cefepime   IVPB          All available historical records have been reviewed

## 2021-02-16 NOTE — DISCHARGE NOTE PROVIDER - PROVIDER TOKENS
PROVIDER:[TOKEN:[[id number]],SCHEDULEDAPPT:[02/18/2021],SCHEDULEDAPPTTIME:[12:00 PM]],PROVIDER:[TOKEN:[[id number]],FOLLOWUP:[1 week]]

## 2021-02-16 NOTE — DISCHARGE NOTE PROVIDER - HOSPITAL COURSE
Pt is a 71 yo male with PMH of Afib on eliquis, HTN, HLD, DM, Recent dx with COVID in Nov 20 s/p trach and peg removal presents to ed for Shortness of breath. Pt placed on NIV in the ED and Received Lasix in the ED. Pt then started to desaturate on NIV and  was intubated in the ED. remained intubated for three days, responded very well to IV lasix, respiratory failure was mainly 2/2 pulmonary edema, kept on antibiotics given positive procal and leukocytosis, s/p vanc and cefepime.    Patient medically stable for dc. Patient has outpatient follow up appointment with cardiology Dr. Baltazar on Thursday 2/18/21 at 12 pm . Patient needs nuclear stress test.  Follow up PMD in 1-2 weeks. Stop cardizem. Begin Lasix 20 mg daily. Resume metformin 1g bid.

## 2021-02-16 NOTE — DISCHARGE NOTE PROVIDER - NSDCMRMEDTOKEN_GEN_ALL_CORE_FT
amiodarone 200 mg oral tablet: 1 tab(s) orally once a day  apixaban 5 mg oral tablet: 1 tab(s) orally every 12 hours  Aspir 81 oral delayed release tablet: 1 tab(s) orally once a day  carvedilol 6.25 mg oral tablet: 1 tab(s) orally 2 times a day  doxazosin 2 mg oral tablet: 1 tab(s) orally once a day (at bedtime)  Eliquis: unknown dose  furosemide 20 mg oral tablet: 1 tab(s) orally once a day  lactobacillus acidophilus oral capsule: 1 tab(s) orally once a day  Lipitor:   pantoprazole 40 mg oral granule, delayed release: 1  orally once a day  polyethylene glycol 3350 oral powder for reconstitution: 17 gram(s) orally once a day

## 2021-02-16 NOTE — PROGRESS NOTE ADULT - SUBJECTIVE AND OBJECTIVE BOX
Patient is a 70y old  Male who presents with a chief complaint of Shortness of breath (15 Feb 2021 10:33)      T(F): 98.2 (02-16-21 @ 05:49), Max: 98.2 (02-16-21 @ 05:49)  HR: 78 (02-16-21 @ 05:49)  BP: 111/58 (02-16-21 @ 05:49)  RR: 16 (02-16-21 @ 05:49)  SpO2: --    PHYSICAL EXAM:  GENERAL: NAD, well-groomed, well-developed  HEAD:  Atraumatic, Normocephalic  EYES: EOMI, PERRLA, conjunctiva and sclera clear  ENMT: No tonsillar erythema, exudates, or enlargement; Moist mucous membranes, Good dentition, No lesions  NECK: Supple, No JVD, Normal thyroid  NERVOUS SYSTEM:  Alert & Oriented X3, Good concentration; Motor Strength 5/5 B/L upper and lower extremities; DTRs 2+ intact and symmetric  CHEST/LUNG: Clear to percussion bilaterally; No rales, rhonchi, wheezing, or rubs  HEART: Regular rate and rhythm; No murmurs, rubs, or gallops  ABDOMEN: Soft, Nontender, Nondistended; Bowel sounds present  EXTREMITIES:  2+ Peripheral Pulses, No clubbing, cyanosis, or edema  LYMPH: No lymphadenopathy noted  SKIN: No rashes or lesions                        9.2    9.91  )-----------( 542      ( 16 Feb 2021 09:34 )             31.3       Culture Results:   No Growth Final (02-09-21)  Culture Results:   No Growth Final (02-07-21)    RADIOLOGY  < from: Xray Chest 1 View- PORTABLE-Routine (Xray Chest 1 View- PORTABLE-Routine in AM.) (02.13.21 @ 05:01) >  Impression:    Bilateral opacities, stable    < end of copied text >    MEDICATIONS  (STANDING):  aMIOdarone    Tablet 200 milliGRAM(s) Oral daily  apixaban 5 milliGRAM(s) Oral two times a day  aspirin enteric coated 81 milliGRAM(s) Oral daily  atorvastatin 40 milliGRAM(s) Oral at bedtime  carvedilol 6.25 milliGRAM(s) Oral every 12 hours  cefepime   IVPB 1000 milliGRAM(s) IV Intermittent every 8 hours      chlorhexidine 0.12% Liquid 15 milliLiter(s) Oral Mucosa every 12 hours  chlorhexidine 4% Liquid 1 Application(s) Topical <User Schedule>  doxazosin 2 milliGRAM(s) Oral at bedtime  furosemide    Tablet 20 milliGRAM(s) Oral daily  glucagon  Injectable 1 milliGRAM(s) IntraMuscular once  insulin glargine Injectable (LANTUS) 10 Unit(s) SubCutaneous at bedtime  insulin lispro (ADMELOG) corrective regimen sliding scale   SubCutaneous three times a day before meals  insulin lispro Injectable (ADMELOG) 3 Unit(s) SubCutaneous before breakfast  insulin lispro Injectable (ADMELOG) 3 Unit(s) SubCutaneous before lunch  insulin lispro Injectable (ADMELOG) 3 Unit(s) SubCutaneous before dinner  pantoprazole    Tablet 40 milliGRAM(s) Oral before breakfast    MEDICATIONS  (PRN):  acetaminophen   Tablet .. 650 milliGRAM(s) Oral every 6 hours PRN Temp greater or equal to 38C (100.4F)      Patient ambulated with me in the hallway independently without any difficulty.       T(F): 98.2 (02-16-21 @ 05:49), Max: 98.2 (02-16-21 @ 05:49)  HR: 78 (02-16-21 @ 05:49)  BP: 111/58 (02-16-21 @ 05:49)  RR: 16 (02-16-21 @ 05:49)      PHYSICAL EXAM:  GENERAL: NAD  HEAD:  Atraumatic, Normocephalic  NERVOUS SYSTEM:  Alert & Oriented X3, no focal deficits   CHEST/LUNG: Clear to percussion bilaterally; No rales, rhonchi, wheezing, or rubs  HEART: Regular rate and rhythm; No murmurs, rubs, or gallops  ABDOMEN: Soft, Nontender, Nondistended; Bowel sounds present  EXTREMITIES:  2+ Peripheral Pulses, No clubbing, cyanosis, or edema  LYMPH: No lymphadenopathy noted  SKIN: No rashes or lesions                        9.2    9.91  )-----------( 542      ( 16 Feb 2021 09:34 )             31.3     02-16    140  |  105  |  15  ----------------------------<  217<H>  4.6   |  24  |  0.9    Ca    9.2      16 Feb 2021 09:34      Culture Results:   No Growth Final (02-09-21)  Culture Results:   No Growth Final (02-07-21)    RADIOLOGY  < from: Xray Chest 1 View- PORTABLE-Routine (Xray Chest 1 View- PORTABLE-Routine in AM.) (02.13.21 @ 05:01) >  Impression:    Bilateral opacities, stable    < end of copied text >    MEDICATIONS  (STANDING):  aMIOdarone    Tablet 200 milliGRAM(s) Oral daily  apixaban 5 milliGRAM(s) Oral two times a day  aspirin enteric coated 81 milliGRAM(s) Oral daily  atorvastatin 40 milliGRAM(s) Oral at bedtime  carvedilol 6.25 milliGRAM(s) Oral every 12 hours  cefepime   IVPB 1000 milliGRAM(s) IV Intermittent every 8 hours      chlorhexidine 0.12% Liquid 15 milliLiter(s) Oral Mucosa every 12 hours  chlorhexidine 4% Liquid 1 Application(s) Topical <User Schedule>  doxazosin 2 milliGRAM(s) Oral at bedtime  furosemide    Tablet 20 milliGRAM(s) Oral daily  glucagon  Injectable 1 milliGRAM(s) IntraMuscular once  insulin glargine Injectable (LANTUS) 10 Unit(s) SubCutaneous at bedtime  insulin lispro (ADMELOG) corrective regimen sliding scale   SubCutaneous three times a day before meals  insulin lispro Injectable (ADMELOG) 3 Unit(s) SubCutaneous before breakfast  insulin lispro Injectable (ADMELOG) 3 Unit(s) SubCutaneous before lunch  insulin lispro Injectable (ADMELOG) 3 Unit(s) SubCutaneous before dinner  pantoprazole    Tablet 40 milliGRAM(s) Oral before breakfast    MEDICATIONS  (PRN):  acetaminophen   Tablet .. 650 milliGRAM(s) Oral every 6 hours PRN Temp greater or equal to 38C (100.4F)

## 2021-02-16 NOTE — PROGRESS NOTE ADULT - PROVIDER SPECIALTY LIST ADULT
Cardiology
Hospitalist
Infectious Disease
Infectious Disease
Internal Medicine
Internal Medicine
Pulmonology
Critical Care
Critical Care
Hospitalist
Infectious Disease
Infectious Disease
Pulmonology
Cardiology
Hospitalist
Pulmonology

## 2021-02-16 NOTE — PROGRESS NOTE ADULT - ASSESSMENT
ASSESSMENT  69 yo male with PMH of Afib on eliquis, HTN, HLD, DM?, Recent dx with COVID in Nov 20 s/p trach and peg removal presents to ed for Shortness of breath.    IMPRESSION  #Acute Hypoxic Respiratory failure - s/p extubation  - Bilateral infiltrates -- fluid congestion vs. possible superimposed pneumonia  - MRSA Nares positive    #Atrial Fibrillation  #Hx of COVID 11/2020  #Obesity BMI (kg/m2): 22.3  #Abx allergy: NKDA    RECOMMENDATIONS  - can stop antibiotics today, completed >7 day course    Please call or message on Microsoft Teams if with any questions.  Spectra [phone]

## 2021-02-16 NOTE — DISCHARGE NOTE PROVIDER - NSDCCPCAREPLAN_GEN_ALL_CORE_FT
PRINCIPAL DISCHARGE DIAGNOSIS  Diagnosis: Pulmonary edema  Assessment and Plan of Treatment: currently on room air and stable  begin taking Lasix 20mg by mouth daily  stop taking cardizem  follow up with Dr. Baltazar this Thursday, needs nuclear stress test  follow up with PMD in 1-2 weeks

## 2021-02-18 ENCOUNTER — APPOINTMENT (OUTPATIENT)
Dept: CARDIOLOGY | Facility: CLINIC | Age: 71
End: 2021-02-18
Payer: COMMERCIAL

## 2021-02-18 VITALS
DIASTOLIC BLOOD PRESSURE: 68 MMHG | WEIGHT: 158 LBS | BODY MASS INDEX: 22.62 KG/M2 | HEIGHT: 70 IN | SYSTOLIC BLOOD PRESSURE: 118 MMHG

## 2021-02-18 PROCEDURE — 99072 ADDL SUPL MATRL&STAF TM PHE: CPT

## 2021-02-18 PROCEDURE — 99214 OFFICE O/P EST MOD 30 MIN: CPT

## 2021-02-18 PROCEDURE — 93000 ELECTROCARDIOGRAM COMPLETE: CPT

## 2021-02-18 PROCEDURE — 93306 TTE W/DOPPLER COMPLETE: CPT

## 2021-02-18 RX ORDER — CARVEDILOL 6.25 MG/1
6.25 TABLET, FILM COATED ORAL TWICE DAILY
Refills: 0 | Status: DISCONTINUED | COMMUNITY
End: 2021-02-18

## 2021-02-18 RX ORDER — RIVAROXABAN 20 MG/1
20 TABLET, FILM COATED ORAL
Qty: 30 | Refills: 5 | Status: DISCONTINUED | COMMUNITY
End: 2021-02-18

## 2021-02-18 RX ORDER — DAPAGLIFLOZIN 10 MG/1
10 TABLET, FILM COATED ORAL DAILY
Refills: 0 | Status: DISCONTINUED | COMMUNITY
End: 2021-02-18

## 2021-02-18 RX ORDER — SITAGLIPTIN AND METFORMIN HYDROCHLORIDE 50; 1000 MG/1; MG/1
TABLET, FILM COATED ORAL
Refills: 0 | Status: DISCONTINUED | COMMUNITY
End: 2021-02-18

## 2021-02-18 RX ORDER — METFORMIN HYDROCHLORIDE 1000 MG/1
1000 TABLET, COATED ORAL
Qty: 180 | Refills: 0 | Status: DISCONTINUED | COMMUNITY
End: 2021-02-18

## 2021-02-18 RX ORDER — LOSARTAN POTASSIUM AND HYDROCHLOROTHIAZIDE 25; 100 MG/1; MG/1
100-25 TABLET ORAL
Refills: 0 | Status: DISCONTINUED | COMMUNITY
End: 2021-02-18

## 2021-02-18 RX ORDER — FUROSEMIDE 20 MG/1
20 TABLET ORAL DAILY
Refills: 0 | Status: ACTIVE | COMMUNITY

## 2021-02-18 NOTE — ED ADULT TRIAGE NOTE - DOMESTIC TRAVEL HIGH RISK QUESTION
;      [facility] Emergency Department  [address], [place] [address]  [phone]     Clinical Summary     PERSON INFORMATION   Name BERE DE LOS SANTOS Age  24 Years  1993 12:00 AM   Acct# [id number] Sex Female Phone [phone]   Dispo Type Home - (i.e. Home on oxygen, DME)-  Arrival 10/09/2018 6:04 PM Checkout 10/09/2018 7:30 PM    Address: [facility] [address] [facility] [address]      Visit Reason WEAKNESS NAUSEA     ED Physician Note      ED Time Seen By Provider Entered On:  10/9/2018 18:18     Performed On:  10/9/2018 18:18  by Teresa Hernandez NP               Time Seen By Provider   Time Seen by Provider :   10/9/2018 18:18    David BENNETT, Teresa Arriaza - 10/9/2018 18:18           VITALS INFORMATION  Vitals/Ht/Wt  Peripheral Pulse Rate:  72 bpm  Respiratory Rate:  20 br/min  Oxygen Saturation:  99 %  Oxygen Therapy:  Room air  Systolic Blood Pressure:  100 mmHg  Diastolic Blood Pressure:  68 mmHg  Mean Arterial Pressure:  79 mmHg  Height:  150 cm  Weight:  74.2 kg  ED Hand-Off Communication  ED Hand-Off Reason:  Shift Report  ED Hand-Off Reason / In Hospital:  SBAR reviewed during report  Patient on Cardiac Monitor:  No  Sitter Present:  No  Potential Core Measure:  N/A  Hand-off Report Given to:  Kelli Velasco       MEDICAL INFORMATION   Allergy Info:          NKA             Prescriptions:            DISCHARGE INFORMATION   Discharge Disposition: Home - (i.e. Home on oxygen, DME)-      PATIENT EDUCATION INFORMATION   Instructions:       Food Choices to Help Relieve Diarrhea, Adult; Diarrhea, Easy-to-Read   Follow up:                  With: Address: When:   Follow up with primary care provider     Comments:   Call for follow up appointment   Follow-Up As Directed   Return if symptoms worsen            DIAGNOSIS  Diarrhea        
No

## 2021-02-19 NOTE — HISTORY OF PRESENT ILLNESS
[FreeTextEntry1] : 70-yo male with h/o DM, HTN, hyperlipidemia. Patient is referred for evaluation of PAF. He was found to have severe carotid disease (occluded right ICA and severe left ICA stenosis). S/p left CEA, uncomplicated. Quit smoking.\par \par Severe Covid infection since last visit, intubated for > 20 days. Recurrent pleural effusion while undergoing rehab, admitted again and treated with antibiotics.\par \par Denies CP, SOB, palpitations now. Still feels very weak, walks with a walker.\par \par Stress ECHO 05/29/19:\par 4.6 METs, no ischemia\par Mild global LV hypokinesis.

## 2021-02-19 NOTE — ASSESSMENT
[FreeTextEntry1] : PAF, SR now. [id number] Vasc score 3.\par Recovering from severe Covid infection.\par HTN.\par Carotid stenosis, s/p left CEA.\par Borderline LV function in the past.\par \par Plan:\par Continue treatment.\par 2D ECHO.\par Carotid duplex.\par Will schedule  blood work with PMD.\par F/u in 1 month.\par \par Antelmo Baltazar MD\par

## 2021-02-19 NOTE — REVIEW OF SYSTEMS
[Feeling Fatigued] : feeling fatigued [Blurry Vision] : no blurred vision [Seeing Double (Diplopia)] : no diplopia [see HPI] : see HPI [Lower Ext Edema] : no extremity edema [Leg Claudication] : no intermittent leg claudication [Skin: A Rash] : no rash: [Anxiety] : no anxiety [Easy Bleeding] : no tendency for easy bleeding [Easy Bruising] : no tendency for easy bruising [Negative] : Heme/Lymph

## 2021-02-22 DIAGNOSIS — A41.9 SEPSIS, UNSPECIFIED ORGANISM: ICD-10-CM

## 2021-02-22 DIAGNOSIS — Z86.16 PERSONAL HISTORY OF COVID-19: ICD-10-CM

## 2021-02-22 DIAGNOSIS — R06.02 SHORTNESS OF BREATH: ICD-10-CM

## 2021-02-22 DIAGNOSIS — I21.A1 MYOCARDIAL INFARCTION TYPE 2: ICD-10-CM

## 2021-02-22 DIAGNOSIS — N40.0 BENIGN PROSTATIC HYPERPLASIA WITHOUT LOWER URINARY TRACT SYMPTOMS: ICD-10-CM

## 2021-02-22 DIAGNOSIS — E11.9 TYPE 2 DIABETES MELLITUS WITHOUT COMPLICATIONS: ICD-10-CM

## 2021-02-22 DIAGNOSIS — B95.62 METHICILLIN RESISTANT STAPHYLOCOCCUS AUREUS INFECTION AS THE CAUSE OF DISEASES CLASSIFIED ELSEWHERE: ICD-10-CM

## 2021-02-22 DIAGNOSIS — I11.0 HYPERTENSIVE HEART DISEASE WITH HEART FAILURE: ICD-10-CM

## 2021-02-22 DIAGNOSIS — K21.9 GASTRO-ESOPHAGEAL REFLUX DISEASE WITHOUT ESOPHAGITIS: ICD-10-CM

## 2021-02-22 DIAGNOSIS — M79.662 PAIN IN LEFT LOWER LEG: ICD-10-CM

## 2021-02-22 DIAGNOSIS — E78.5 HYPERLIPIDEMIA, UNSPECIFIED: ICD-10-CM

## 2021-02-22 DIAGNOSIS — Z90.6 ACQUIRED ABSENCE OF OTHER PARTS OF URINARY TRACT: ICD-10-CM

## 2021-02-22 DIAGNOSIS — J96.01 ACUTE RESPIRATORY FAILURE WITH HYPOXIA: ICD-10-CM

## 2021-02-22 DIAGNOSIS — E66.9 OBESITY, UNSPECIFIED: ICD-10-CM

## 2021-02-22 DIAGNOSIS — I50.9 HEART FAILURE, UNSPECIFIED: ICD-10-CM

## 2021-02-22 DIAGNOSIS — M54.30 SCIATICA, UNSPECIFIED SIDE: ICD-10-CM

## 2021-02-22 DIAGNOSIS — R94.31 ABNORMAL ELECTROCARDIOGRAM [ECG] [EKG]: ICD-10-CM

## 2021-02-22 DIAGNOSIS — Z79.01 LONG TERM (CURRENT) USE OF ANTICOAGULANTS: ICD-10-CM

## 2021-02-22 DIAGNOSIS — Z85.51 PERSONAL HISTORY OF MALIGNANT NEOPLASM OF BLADDER: ICD-10-CM

## 2021-02-22 DIAGNOSIS — I48.20 CHRONIC ATRIAL FIBRILLATION, UNSPECIFIED: ICD-10-CM

## 2021-02-22 DIAGNOSIS — J15.6 PNEUMONIA DUE TO OTHER GRAM-NEGATIVE BACTERIA: ICD-10-CM

## 2021-02-22 DIAGNOSIS — Z79.84 LONG TERM (CURRENT) USE OF ORAL HYPOGLYCEMIC DRUGS: ICD-10-CM

## 2021-03-03 NOTE — SPEAKING VALVE EVALUATION - H & P REVIEW
[facility]  Cardiology Consultation        Krystian Arriaga, male  : 1967  PCP: Dean Randall MD  Attending/Consulting Provider: Mishel Álvarez MD     Consults    Reason for Consultation:  Chief Complaint   Patient presents with   • Shortness of Breath   • Chest Pain Adult   • Hypotension       SUBJECTIVE:     History of Present Illness:  Krystian Arriaga is a 53 year oldmale with PMHx of CAD s/p 4v CABG 21, L pontine CVA w/ residual R hemiparesis, chronic resp failure s/p trach (decannulated), HTN who is sent from [facility] for b/l saddle PE. At [facility] pt had been complaining of CP & SOB at ~10a that was acute in onset, with subsequent hypotension SBP 90s and was sent for STAT CT which showed \"bilateral saddle embolus with asymmetric dilation of the RV, RA and engorgement of the hepatic veins \" (CareEverywhere). He was receiving ppx lovenox and this was changed to FD today after CT resulted. Due to his CABG 6 weeks ago he was not given systemic tPA and was transferred to [facility] for possible catheter directed therapy.     PMHx: CAD s/p 4v CABG,  L pontine CVA w/ residual R hemiparesis, chronic resp failure s/p trach (decannulated), HTN     PSHx: 4v CABG 21 (LIMA to LAD, SVG to OM to RPL to PDA)      Family Hx:  No family history on file.    Social Hx:  Social History     Tobacco Use   • Smoking status: Never Smoker   • Smokeless tobacco: Never Used   Substance Use Topics   • Alcohol use: Yes       Allergies:  ALLERGIES:  No Known Allergies    Medications:  Prior to Admission medications    Medication Sig Start Date End Date Taking? Authorizing Provider   aspirin 325 MG tablet Take 1 tablet by mouth daily. 2/15/21   Neli Milton, DO   atorvastatin (LIPITOR) 40 MG tablet Take 1 tablet by mouth nightly. 2/15/21 3/17/21  Neli Milton, DO   chlorhexidine gluconate (PERIDEX) 0.12 % solution Swish and spit 15 mLs every 12 hours. 2/15/21   Neli Milton,  DO   docusate sodium-sennosides (SENOKOT S) 50-8.6 MG per tablet Take 2 tablets by mouth nightly as needed for Constipation. 2/15/21   Neli Milton, DO   enoxaparin (LOVENOX) 40 MG/0.4ML injectable solution Inject 0.4 mLs into the skin daily. 2/15/21   Neli Milton, DO   furosemide (LASIX) 20 MG tablet Take 1 tablet by mouth daily. 2/15/21 3/17/21  Neli Milton, DO   ipratropium-albuterol (DUONEB) 0.5-2.5 (3) MG/3ML nebulizer solution Take 3 mLs by nebulization Every 4 hours as needed for Shortness of Breath or Wheezing. 2/15/21   Neli Milton, DO   pantoprazole (PROTONIX) 40 MG/20ML (compounded) suspension Take 20 mLs by mouth daily. 2/15/21 3/17/21  Neli Milton, DO   acetaminophen (TYLENOL) 650 MG suppository Place 1 suppository rectally every 6 hours as needed for Fever. 2/15/21   Halie A Cynthia, CNS   HYDROcodone-acetaminophen (NORCO)  MG per tablet Take 1 tablet by mouth every 4 hours as needed for Pain. 2/15/21   Halie A Cynthia, CNS   metoPROLOL succinate (TOPROL-XL) 25 MG 24 hr tablet Take 1 tablet by mouth nightly. 2/15/21   Halie A Cynthia, CNS   traMADol (ULTRAM) 50 MG tablet Take 1 tablet by mouth every 6 hours as needed for Pain. 2/15/21   Halie A Cynthia, CNS   lidocaine (XYLOCAINE) 2 % jelly Apply topically to periwound area of coccyx/R buttock wounds during dressing changes. 2/15/21   Neli Milton, DO       Current Facility-Administered Medications   Medication Dose Route Frequency Provider Last Rate Last Admin   • sodium chloride (NORMAL SALINE) 0.9 % bolus 1,000 mL  1,000 mL Intravenous Once Betsy Manley, DO         Current Outpatient Medications   Medication Sig Dispense Refill   • aspirin 325 MG tablet Take 1 tablet by mouth daily. 30 tablet 0   • atorvastatin (LIPITOR) 40 MG tablet Take 1 tablet by mouth nightly. 30 tablet 0   • chlorhexidine gluconate (PERIDEX) 0.12 % solution Swish and spit 15 mLs every 12 hours. 473 mL 0   • docusate  sodium-sennosides (SENOKOT S) 50-8.6 MG per tablet Take 2 tablets by mouth nightly as needed for Constipation. 30 tablet 0   • enoxaparin (LOVENOX) 40 MG/0.4ML injectable solution Inject 0.4 mLs into the skin daily. 30 Syringe 0   • furosemide (LASIX) 20 MG tablet Take 1 tablet by mouth daily. 30 tablet 0   • ipratropium-albuterol (DUONEB) 0.5-2.5 (3) MG/3ML nebulizer solution Take 3 mLs by nebulization Every 4 hours as needed for Shortness of Breath or Wheezing. 30 mL 0   • pantoprazole (PROTONIX) 40 MG/20ML (compounded) suspension Take 20 mLs by mouth daily. 600 mL 0   • acetaminophen (TYLENOL) 650 MG suppository Place 1 suppository rectally every 6 hours as needed for Fever. 30 each 0   • HYDROcodone-acetaminophen (NORCO)  MG per tablet Take 1 tablet by mouth every 4 hours as needed for Pain. 20 tablet 0   • metoPROLOL succinate (TOPROL-XL) 25 MG 24 hr tablet Take 1 tablet by mouth nightly. 30 tablet 0   • traMADol (ULTRAM) 50 MG tablet Take 1 tablet by mouth every 6 hours as needed for Pain. 20 tablet 0   • lidocaine (XYLOCAINE) 2 % jelly Apply topically to periwound area of coccyx/R buttock wounds during dressing changes. 30 mL 0       Review of Systems:  Review of Systems   Unable to perform ROS: acuity of condition         OBJECTIVE:       Vital Last Value 24 Hour Range   Temperature 98.6 °F (37 °C) (03/03/21 1430) Temp  Min: 98.6 °F (37 °C)  Max: 98.6 °F (37 °C)   Pulse (!) 140 (03/03/21 1430) Pulse  Min: 140  Max: 140   Respiratory (!) 38 (03/03/21 1430) Resp  Min: 38  Max: 38   Non-Invasive  Blood Pressure 101/69 (03/03/21 1430) BP  Min: 101/69  Max: 101/69   Pulse Oximetry 96 % (03/03/21 1452) SpO2  Min: 95 %  Max: 96 %   Arterial   Blood Pressure   No data recorded      No intake or output data in the 24 hours ending 03/03/21 1456     Wt Readings from Last 3 Encounters:   03/03/21 91.2 kg (201 lb 1 oz)   02/15/21 91.2 kg (201 lb 1 oz)             PHYSICAL EXAMINATION:  Physical Exam  Vitals signs  and nursing note reviewed.   Constitutional:       General: He is in acute distress.      Appearance: He is ill-appearing.   HENT:      Head: Normocephalic and atraumatic.   Eyes:      Extraocular Movements: Extraocular movements intact.      Pupils: Pupils are equal, round, and reactive to light.   Cardiovascular:      Rate and Rhythm: Regular rhythm. Tachycardia present.      Heart sounds: Normal heart sounds. No murmur. No friction rub. No gallop.    Pulmonary:      Effort: Tachypnea and respiratory distress present.      Breath sounds: Normal breath sounds. No wheezing or rales.   Abdominal:      General: Abdomen is flat.      Palpations: Abdomen is soft.   Musculoskeletal: Normal range of motion.   Skin:     General: Skin is warm and dry.   Neurological:      Mental Status: He is alert. He is disoriented.           DIAGNOSTIC STUDIES:     Labs:  CBC:   Recent Labs   Lab 03/03/21  1438 02/15/21  0535 02/14/21  0439 02/13/21  0536 02/12/21  0458  02/09/21  0616   WBC 15.7* 11.8* 11.1* 12.8* 12.9*   < > 13.7*   RBC 4.05*  --   --  3.74* 3.78*  --  3.42*   HGB 11.6* 10.2* 10.3* 10.6* 10.8*   < > 10.0*   HCT 36.8*  --   --  33.8* 34.4*  --  31.4*   MCV 90.9  --   --  90.4 91.0  --  91.8   MCHC 31.5*  --   --  31.4* 31.4*  --  31.8*   RDW-CV 14.6  --   --  14.0 14.1  --  14.6    256 263 257 309   < > 339   Lymphocytes, Percent 13  --   --  23 19  --  16    < > = values in this interval not displayed.     CMP:No results found    Invalid input(s): GFR, CAPTH  COAGULATION STUDIES: No results found  TSH:    Lab Results   Component Value Date    TSH 0.594 01/18/2021    TSH 2.890 01/17/2021     HbA1c:   Hemoglobin A1C (%)   Date Value   01/19/2021 5.5     LIPID PANEL: No results found  NT-PRONBP:   Recent Labs   Lab 02/10/21  0551 02/05/21  0331   NT-proBNP 389* 1,108*     Troponin: No results found    Data:  Encounter Date: 01/17/21   Electrocardiogram 12-Lead   Result Value    Ventricular Rate EKG/Min (BPM) 82     Atrial Rate (BPM) 82    OR-Interval (MSEC) 148    QRS-Interval (MSEC) 92    QT-Interval (MSEC) 448    QTc 523    P Axis (Degrees) 40    R Axis (Degrees) 50    T Axis (Degrees) 72    REPORT TEXT      Normal sinus rhythm  Nonspecific T wave abnormality  Prolonged QT  Abnormal ECG  Confirmed by MARLO ZARAGOZA DO ([id number]) on 2/13/2021 6:17:29 AM           ASSESSMENT AND PLAN:     Impression: 53 year oldmale with PMHx of CAD s/p 4v CABG 1/20/21, L pontine CVA w/ residual R hemiparesis, chronic resp failure s/p trach (decannulated), HTN who is sent from [facility] for b/l saddle PE.     Assessment:   1. Obstructive Shock 2/2 Massive PE  2. CAD s/p 4v CABG 1/20/21 (LIMA to LAD, SVG to OM, SVG to RPL, SVG to PDA)  3. L pontine CVA w/ residual R hemiparesis  4. Chronic resp failure s/p trach decannulation  5. HTN      Plan:  • Start heparin gtt when timing is appropriate based on last lovenox dose  • Pt is not a candidate for systemic tPA given he has had an ischemic CVA within the last 3 months (absolute contraindication)  • We will refer him to our interventional cardiology colleagues for EKOS today  • Formal ECHO, trop, and NT proBNP pending; present during portion of his ECHO confirms RV dilation c/w pressure overload  • IVF bolus to increase pre-load. If not responsive start Levophed for MAP goal >65  • Admit to MICU      D/w Dr. Jameel Leone D.O.  Cardiology Fellow PGY4      Cardiology attending:  Patient seen and examined with cardiology fellow. Findings independently confirmed. Patient with a recent coronary bypass surgery complicated by CVA. Patient had actually been in a long-term rehab facility. Today there was noted to have sudden onset of dyspnea. CTA showed a large saddle pulmonary embolism. Patient was transferred to [facility] for further evaluation. In the ER the patient was relatively hypotensive and tachycardic. An urgent echocardiogram demonstrated a dilated severely hypokinetic right ventricle.  The septum is moving with the RV. There is increased PA pressures with an estimated RV systolic pressure of about 85 mmHg. The patient is moaning in discomfort on examination. Neck veins are distended. Heart S1-S2 are tachycardic.    I think the patient is a candidate for EKOS. He is not a candidate for systemic thrombolytic therapy given his recent CVA. The presence of chronic pulmonary hypertension is somewhat concerning. However I believe EKOS can be potentially life-saving form. He is being transferred urgently to the Cath Lab.    Thank you for allowing us to participate in the patient's care.   yes

## 2021-04-01 ENCOUNTER — APPOINTMENT (OUTPATIENT)
Dept: CARDIOLOGY | Facility: CLINIC | Age: 71
End: 2021-04-01
Payer: COMMERCIAL

## 2021-04-01 VITALS
BODY MASS INDEX: 23.48 KG/M2 | SYSTOLIC BLOOD PRESSURE: 120 MMHG | DIASTOLIC BLOOD PRESSURE: 62 MMHG | HEIGHT: 70 IN | WEIGHT: 164 LBS | TEMPERATURE: 97.9 F | HEART RATE: 74 BPM

## 2021-04-01 DIAGNOSIS — Z00.00 ENCOUNTER FOR GENERAL ADULT MEDICAL EXAMINATION W/OUT ABNORMAL FINDINGS: ICD-10-CM

## 2021-04-01 PROCEDURE — 99072 ADDL SUPL MATRL&STAF TM PHE: CPT

## 2021-04-01 PROCEDURE — 93000 ELECTROCARDIOGRAM COMPLETE: CPT

## 2021-04-01 PROCEDURE — 99214 OFFICE O/P EST MOD 30 MIN: CPT

## 2021-04-01 RX ORDER — METFORMIN HYDROCHLORIDE 1000 MG/1
1000 TABLET, COATED ORAL
Refills: 0 | Status: ACTIVE | COMMUNITY

## 2021-04-01 NOTE — HISTORY OF PRESENT ILLNESS
[FreeTextEntry1] : 70-yo male with h/o DM, HTN, hyperlipidemia. Patient is referred for evaluation of PAF. He was found to have severe carotid disease (occluded right ICA and severe left ICA stenosis). S/p left CEA, uncomplicated. Quit smoking.\par \par Severe Covid infection in 2020, intubated for > 20 days. Recurrent pleural effusion while undergoing rehab, admitted again and treated with antibiotics.\par \par Denies CP, SOB, palpitations now. Patient feels stronger and walks better.\par \par 2D ECHO 02/18/21:\par Mild global LV hypokinesis, LVEF 52%\par Mild MR.\par \par GFR 83.\par LDL 84.

## 2021-04-01 NOTE — ASSESSMENT
[FreeTextEntry1] : PAF, SR now. [id number] Vasc score 3.\par Recovering from severe Covid infection.\par HTN.\par Carotid stenosis, s/p left CEA.\par Borderline LV function stable.\par \par Plan:\par Continue treatment.\par Carotid duplex.\par Repeat blood work scheduled with PMD.\par F/u in 3 month.\par \par Antlemo Baltazar MD\par

## 2021-04-01 NOTE — REVIEW OF SYSTEMS
[see HPI] : see HPI [Negative] : Heme/Lymph [Blurry Vision] : no blurred vision [Seeing Double (Diplopia)] : no diplopia [Lower Ext Edema] : no extremity edema [Leg Claudication] : no intermittent leg claudication [Skin: A Rash] : no rash: [Anxiety] : no anxiety [Easy Bleeding] : no tendency for easy bleeding [Easy Bruising] : no tendency for easy bruising

## 2021-04-30 ENCOUNTER — EMERGENCY (EMERGENCY)
Facility: HOSPITAL | Age: 71
LOS: 0 days | Discharge: HOME | End: 2021-04-30
Attending: EMERGENCY MEDICINE | Admitting: EMERGENCY MEDICINE
Payer: MEDICARE

## 2021-04-30 VITALS
OXYGEN SATURATION: 98 % | TEMPERATURE: 97 F | RESPIRATION RATE: 18 BRPM | HEART RATE: 80 BPM | SYSTOLIC BLOOD PRESSURE: 209 MMHG | DIASTOLIC BLOOD PRESSURE: 93 MMHG

## 2021-04-30 VITALS
HEIGHT: 70 IN | DIASTOLIC BLOOD PRESSURE: 91 MMHG | WEIGHT: 164.91 LBS | HEART RATE: 81 BPM | OXYGEN SATURATION: 98 % | SYSTOLIC BLOOD PRESSURE: 214 MMHG | RESPIRATION RATE: 18 BRPM

## 2021-04-30 DIAGNOSIS — Z79.84 LONG TERM (CURRENT) USE OF ORAL HYPOGLYCEMIC DRUGS: ICD-10-CM

## 2021-04-30 DIAGNOSIS — I11.0 HYPERTENSIVE HEART DISEASE WITH HEART FAILURE: ICD-10-CM

## 2021-04-30 DIAGNOSIS — I25.10 ATHEROSCLEROTIC HEART DISEASE OF NATIVE CORONARY ARTERY WITHOUT ANGINA PECTORIS: ICD-10-CM

## 2021-04-30 DIAGNOSIS — Z98.890 OTHER SPECIFIED POSTPROCEDURAL STATES: Chronic | ICD-10-CM

## 2021-04-30 DIAGNOSIS — Z86.16 PERSONAL HISTORY OF COVID-19: ICD-10-CM

## 2021-04-30 DIAGNOSIS — I48.91 UNSPECIFIED ATRIAL FIBRILLATION: ICD-10-CM

## 2021-04-30 DIAGNOSIS — Z48.02 ENCOUNTER FOR REMOVAL OF SUTURES: ICD-10-CM

## 2021-04-30 DIAGNOSIS — E78.5 HYPERLIPIDEMIA, UNSPECIFIED: ICD-10-CM

## 2021-04-30 DIAGNOSIS — Z18.89 OTHER SPECIFIED RETAINED FOREIGN BODY FRAGMENTS: ICD-10-CM

## 2021-04-30 DIAGNOSIS — I50.9 HEART FAILURE, UNSPECIFIED: ICD-10-CM

## 2021-04-30 DIAGNOSIS — Z79.01 LONG TERM (CURRENT) USE OF ANTICOAGULANTS: ICD-10-CM

## 2021-04-30 PROCEDURE — L9995: CPT

## 2021-04-30 NOTE — ED ADULT NURSE NOTE - NSIMPLEMENTINTERV_GEN_ALL_ED
Implemented All Universal Safety Interventions:  [place] to call system. Call bell, personal items and telephone within reach. Instruct patient to call for assistance. Room bathroom lighting operational. Non-slip footwear when patient is off stretcher. Physically safe environment: no spills, clutter or unnecessary equipment. Stretcher in lowest position, wheels locked, appropriate side rails in place.

## 2021-04-30 NOTE — ED PROVIDER NOTE - ATTENDING CONTRIBUTION TO CARE
Pt is a 69yo male who comes in for retained suture for several months from trach removal site.  No fever or discharge but it is irritating him.    Exam: retained suture with scar tissue, no tenderness or warmth, no expressibel discharge  Plan: local wound care, f/u ENT

## 2021-04-30 NOTE — ED ADULT TRIAGE NOTE - CHIEF COMPLAINT QUOTE
pt has hx of having a trach after being intubated for covid in Nov 2020 and there is still s suture in his neck

## 2021-04-30 NOTE — ED PROVIDER NOTE - CARE PROVIDER_API CALL
Marian Alonzo)  Otolaryngology  [address] [place] [address], [address] [place] [address]  Phone: [phone]  Fax: [phone]  Follow Up Time: 1-3 Days

## 2021-04-30 NOTE — ED PROVIDER NOTE - OBJECTIVE STATEMENT
70 year old male w hx of COVID in November s/p trach and PEG, afib on Eliquis, HTN, HLD, CAD, CHF presents to the ED requesting suture removal. Pt states he has a suture to his anterior neck s/p trach removal several months ago. Over the last week patient has noted pain and mild erythema to the area. denies bleeding, purulent drainage, fevers/chills, cp, shortness of breath, difficulty swallowing, swelling.

## 2021-04-30 NOTE — ED PROVIDER NOTE - PATIENT PORTAL LINK FT
You can access the FollowMyHealth Patient Portal offered by [facility] by registering at the following website: [email or web address]. By joining [employer]’s FollowMyHealth portal, you will also be able to view your health information using other applications (apps) compatible with our system.

## 2021-04-30 NOTE — ED PROVIDER NOTE - PHYSICAL EXAMINATION
VITALS:  I have reviewed the initial vital signs.  GENERAL: Well-developed, well-nourished, in no acute distress. Nontoxic.  HEENT: EOMI, PERRLA. MMM.   NECK: supple w FROM. Tails of suture to previous trach site, no purulent drainage, erythema, induration fluctuance, or ttp. Knot appears to be buried with scar tissue.  CARDIO: RRR, nl S1 and S2. No murmurs, rubs, or gallops.  PULM: Normal effort. CTA b/l without wheezes, rales, or rhonchi.  SKIN: Warm, dry.   NEURO: A&Ox3. Speech clear. No focal deficits.  PSYCH: Calm and cooperative.

## 2021-04-30 NOTE — ED PROVIDER NOTE - NS ED ROS FT
CONSTITUTIONAL: (-) fevers, (-) chills, (-) decreased appetite  ENT: see HPI  NECK: (-) neck pain, (-) neck stiffness  CARDIO: (-) chest pain, (-) palpitations, (-) edema  PULM: (-) cough, (-) sputum, (-) chest tightness  GI: (-) nausea, (-) vomiting  SKIN: see HPI    *all other systems negative except as documented above and in the HPI*

## 2021-05-04 ENCOUNTER — APPOINTMENT (OUTPATIENT)
Dept: CARDIOLOGY | Facility: CLINIC | Age: 71
End: 2021-05-04
Payer: MEDICARE

## 2021-05-04 PROCEDURE — 99072 ADDL SUPL MATRL&STAF TM PHE: CPT

## 2021-05-04 PROCEDURE — 93880 EXTRACRANIAL BILAT STUDY: CPT

## 2021-05-07 ENCOUNTER — APPOINTMENT (OUTPATIENT)
Dept: OTOLARYNGOLOGY | Facility: CLINIC | Age: 71
End: 2021-05-07
Payer: MEDICARE

## 2021-05-07 VITALS — HEIGHT: 70 IN | WEIGHT: 160 LBS | TEMPERATURE: 98.7 F | BODY MASS INDEX: 22.9 KG/M2

## 2021-05-07 DIAGNOSIS — L90.5 SCAR CONDITIONS AND FIBROSIS OF SKIN: ICD-10-CM

## 2021-05-07 DIAGNOSIS — Z86.16 PERSONAL HISTORY OF COVID-19: ICD-10-CM

## 2021-05-07 DIAGNOSIS — Z98.890 OTHER SPECIFIED POSTPROCEDURAL STATES: ICD-10-CM

## 2021-05-07 PROCEDURE — 99072 ADDL SUPL MATRL&STAF TM PHE: CPT

## 2021-05-07 PROCEDURE — 69210 REMOVE IMPACTED EAR WAX UNI: CPT

## 2021-05-07 PROCEDURE — 31575 DIAGNOSTIC LARYNGOSCOPY: CPT

## 2021-05-07 PROCEDURE — 99214 OFFICE O/P EST MOD 30 MIN: CPT | Mod: 25

## 2021-05-07 NOTE — HISTORY OF PRESENT ILLNESS
[de-identified] : Patient presents today with c/o retained suture and tracheostomy site. . Patient states positive for COVID in 11/2020. He was intubated twice. He then had a tracheostomy placed.  He states a few days started having pain at wound site. . Denies dysphagia or horseness.

## 2021-05-07 NOTE — PHYSICAL EXAM
[Midline] : trachea located in midline position [Normal] : no rashes [de-identified] : suture removed from tracheostomy site. c/d/i [de-identified] : bilateral cerumen impaction removed with curette

## 2021-05-07 NOTE — ASSESSMENT
[FreeTextEntry1] : healing trach site. No dehiscence.\par Local care explained. \par I also discussed surgery down the road for the scar only if it bothers him.\par \par RTC in 2M.

## 2021-07-01 ENCOUNTER — APPOINTMENT (OUTPATIENT)
Dept: CARDIOLOGY | Facility: CLINIC | Age: 71
End: 2021-07-01
Payer: MEDICARE

## 2021-07-01 ENCOUNTER — RESULT CHARGE (OUTPATIENT)
Age: 71
End: 2021-07-01

## 2021-07-01 VITALS
WEIGHT: 175 LBS | BODY MASS INDEX: 25.05 KG/M2 | HEIGHT: 70 IN | DIASTOLIC BLOOD PRESSURE: 70 MMHG | SYSTOLIC BLOOD PRESSURE: 130 MMHG

## 2021-07-01 PROCEDURE — 99072 ADDL SUPL MATRL&STAF TM PHE: CPT

## 2021-07-01 PROCEDURE — 99213 OFFICE O/P EST LOW 20 MIN: CPT

## 2021-07-01 PROCEDURE — 93000 ELECTROCARDIOGRAM COMPLETE: CPT

## 2021-07-01 RX ORDER — DAPAGLIFLOZIN 5 MG/1
5 TABLET, FILM COATED ORAL DAILY
Refills: 0 | Status: ACTIVE | COMMUNITY

## 2021-07-01 NOTE — PHYSICAL EXAM
[Well Developed] : well developed [Well Nourished] : well nourished [No Acute Distress] : no acute distress [Normal Conjunctiva] : normal conjunctiva [Normal Venous Pressure] : normal venous pressure [No Carotid Bruit] : no carotid bruit [Normal S1, S2] : normal S1, S2 [No Murmur] : no murmur [No Rub] : no rub [S4] : S4 [Clear Lung Fields] : clear lung fields [Good Air Entry] : good air entry [No Respiratory Distress] : no respiratory distress  [Soft] : abdomen soft [Non Tender] : non-tender [Normal Bowel Sounds] : normal bowel sounds [Normal Gait] : normal gait [No Edema] : no edema [No Cyanosis] : no cyanosis [No Clubbing] : no clubbing [No Varicosities] : no varicosities [Diminished Pedal Pulses ___] : diminished pedal pulses [unfilled] [No Rash] : no rash [No Skin Lesions] : no skin lesions [Moves all extremities] : moves all extremities [Normal Speech] : normal speech [Alert and Oriented] : alert and oriented [Normal memory] : normal memory

## 2021-07-01 NOTE — ASSESSMENT
[FreeTextEntry1] : PAF, SR now. [id number] Vasc score 3.\par Recovered from severe Covid infection.\par HTN.\par S/p left CEA. Occluded right ICA.\par Borderline LV function stable.\par \par Plan:\par Continue treatment.\par Increase daily walking.\par Repeat blood work scheduled with PMD.\par F/u in 4 month.\par \par Antelmo Baltazar MD\par

## 2021-07-01 NOTE — REVIEW OF SYSTEMS
[Rash] : no rash [Anxiety] : no anxiety [Easy Bleeding] : no tendency for easy bleeding [Easy Bruising] : no tendency for easy bruising [Negative] : Neurological [FreeTextEntry9] : muscle weakness

## 2021-07-09 ENCOUNTER — APPOINTMENT (OUTPATIENT)
Dept: OTOLARYNGOLOGY | Facility: CLINIC | Age: 71
End: 2021-07-09

## 2021-08-16 ENCOUNTER — RX RENEWAL (OUTPATIENT)
Age: 71
End: 2021-08-16

## 2021-09-18 NOTE — ED ADULT TRIAGE NOTE - MEANS OF ARRIVAL
Boot applied and crutches given to pt and instructions on use     Ely Garcia, [place]  09/17/21 [id number] ambulatory

## 2021-10-09 NOTE — DISCHARGE NOTE PROVIDER - NSDCFUADDAPPT_GEN_ALL_CORE_FT
No Please follow up with your gastroenterology doctor provided above within 2 weeks of discharge. Also, please follow up with the ENT doctor Marva Otrez within 2 weeks of discharge, so that you can have your tracheostomy collar removed.

## 2021-12-09 ENCOUNTER — APPOINTMENT (OUTPATIENT)
Dept: CARDIOLOGY | Facility: CLINIC | Age: 71
End: 2021-12-09
Payer: MEDICARE

## 2021-12-09 VITALS
HEIGHT: 70 IN | SYSTOLIC BLOOD PRESSURE: 126 MMHG | BODY MASS INDEX: 26.05 KG/M2 | DIASTOLIC BLOOD PRESSURE: 64 MMHG | WEIGHT: 182 LBS

## 2021-12-09 PROCEDURE — 99213 OFFICE O/P EST LOW 20 MIN: CPT

## 2021-12-09 PROCEDURE — 93000 ELECTROCARDIOGRAM COMPLETE: CPT

## 2021-12-09 RX ORDER — APIXABAN 5 MG/1
5 TABLET, FILM COATED ORAL TWICE DAILY
Refills: 0 | Status: DISCONTINUED | COMMUNITY
End: 2021-12-09

## 2021-12-09 NOTE — ASSESSMENT
[FreeTextEntry1] : PAF, SR now. [id number] Vasc score 3.\par Recovered from severe Covid infection.\par HTN.\par S/p left CEA. Occluded right ICA.\par Borderline LV function stable.\par \par Plan:\par Continue treatment.\par Increase daily walking.\par Repeat blood work scheduled with PMD.\par F/u in 6 month.\par \par Antelmo Baltazar MD\par

## 2021-12-09 NOTE — HISTORY OF PRESENT ILLNESS
[FreeTextEntry1] : 70-yo male with h/o DM, HTN, hyperlipidemia. Patient is referred for evaluation of PAF. He was found to have severe carotid disease (occluded right ICA and severe left ICA stenosis). S/p left CEA, uncomplicated. Quit smoking.\par \par Severe Covid infection in 2020, intubated for > 20 days. Recurrent pleural effusion while undergoing rehab, admitted again and treated with antibiotics.\par \par Denies CP, SOB, palpitations now. Patient feels stronger and walks better.\par \par 2D ECHO 02/18/21:\par Mild global LV hypokinesis, LVEF 52%\par Mild MR.\par \par GFR 53\par LDL 83\par TSH 6.35\par Hb A1c.

## 2021-12-09 NOTE — CARDIOLOGY SUMMARY
[de-identified] : 12/07/21:\par SR, LVH, NSST changes. [de-identified] : Carotid duplex 05/04/21:\par Occluded right ICA.\par Patent left ICA CEA site.

## 2021-12-09 NOTE — REVIEW OF SYSTEMS
[Negative] : Neurological [Rash] : no rash [Anxiety] : no anxiety [Easy Bleeding] : no tendency for easy bleeding [Easy Bruising] : no tendency for easy bruising [FreeTextEntry9] : muscle weakness

## 2022-02-20 ENCOUNTER — RX RENEWAL (OUTPATIENT)
Age: 72
End: 2022-02-20

## 2022-06-15 NOTE — PHYSICAL THERAPY INITIAL EVALUATION ADULT - BED MOBILITY LIMITATIONS, REHAB EVAL
decreased ability to use arms for pushing/pulling/decreased ability to use legs for bridging/pushing/impaired ability to control trunk for mobility continue meds and monitor diet

## 2022-06-23 ENCOUNTER — APPOINTMENT (OUTPATIENT)
Dept: CARDIOLOGY | Facility: CLINIC | Age: 72
End: 2022-06-23

## 2022-09-16 ENCOUNTER — APPOINTMENT (OUTPATIENT)
Dept: CARDIOLOGY | Facility: CLINIC | Age: 72
End: 2022-09-16

## 2022-09-16 ENCOUNTER — RESULT CHARGE (OUTPATIENT)
Age: 72
End: 2022-09-16

## 2022-09-16 VITALS
SYSTOLIC BLOOD PRESSURE: 132 MMHG | HEIGHT: 70 IN | BODY MASS INDEX: 27.2 KG/M2 | RESPIRATION RATE: 16 BRPM | DIASTOLIC BLOOD PRESSURE: 74 MMHG | TEMPERATURE: 97.8 F | WEIGHT: 190 LBS

## 2022-09-16 DIAGNOSIS — I65.29 OCCLUSION AND STENOSIS OF UNSPECIFIED CAROTID ARTERY: ICD-10-CM

## 2022-09-16 PROCEDURE — 99214 OFFICE O/P EST MOD 30 MIN: CPT | Mod: 25

## 2022-09-16 PROCEDURE — 93000 ELECTROCARDIOGRAM COMPLETE: CPT

## 2022-09-16 RX ORDER — ATORVASTATIN CALCIUM 40 MG/1
40 TABLET, FILM COATED ORAL
Qty: 90 | Refills: 1 | Status: DISCONTINUED | COMMUNITY
Start: 2021-01-03 | End: 2022-09-16

## 2022-09-16 NOTE — CARDIOLOGY SUMMARY
[de-identified] : 09/16/22:\par SR, NSST changes. [de-identified] : Carotid duplex 05/04/21:\par Occluded right ICA.\par Patent left ICA CEA site.

## 2022-09-16 NOTE — ASSESSMENT
[FreeTextEntry1] : PAF, SR now. [id number] Vasc score 4.\par Recovered from severe Covid infection.\par HTN.\par S/p left CEA. Occluded right ICA.\par Borderline LV function .\par Hypothyroidism.\par \par Plan:\par Increase Atorvastatin to 20 mg daily.\par Increase daily walking.\par 2D ECHO. \par Carotid duplex.\par Repeat fasting blood work.\par F/u in 6 month.\par \par Antelmo Baltazar MD\par

## 2022-09-16 NOTE — HISTORY OF PRESENT ILLNESS
[FreeTextEntry1] : 72-yo male with h/o DM, HTN, hyperlipidemia. Patient is referred for evaluation of PAF. He was found to have severe carotid disease (occluded right ICA and severe left ICA stenosis). S/p left CEA, uncomplicated. Quit smoking.\par \par Severe Covid infection in 2020, intubated for > 20 days. Recurrent pleural effusion while undergoing rehab, admitted again and treated with antibiotics.\par \par Denies CP, SOB, palpitations now. Patient feels stronger and walks better.\par \par 2D ECHO 02/18/21:\par Mild global LV hypokinesis, LVEF 52%\par Mild MR.\par \par GFR 76\par \par TSH 13\par Hb A1c 5.8.

## 2022-09-28 NOTE — PATIENT PROFILE ADULT - DO YOU FEEL UNSAFE AT WORK?
[facility] 75  coding opportunities       Chart reviewed, no opportunity found: CHART REVIEWED, NO OPPORTUNITY FOUND        Patients Insurance        Commercial Insurance: Cely Cevallos
not applicable

## 2022-10-17 ENCOUNTER — APPOINTMENT (OUTPATIENT)
Dept: CARDIOLOGY | Facility: CLINIC | Age: 72
End: 2022-10-17
Payer: MEDICARE

## 2022-10-17 PROCEDURE — 93306 TTE W/DOPPLER COMPLETE: CPT

## 2022-10-17 PROCEDURE — 93880 EXTRACRANIAL BILAT STUDY: CPT

## 2023-03-21 NOTE — ED PROVIDER NOTE - CARE PROVIDERS DIRECT ADDRESSES
[email or web address] in the [email or web address] Complex Repair Preamble Text (Leave Blank If You Do Not Want): Extensive wide undermining was performed.

## 2023-03-27 ENCOUNTER — APPOINTMENT (OUTPATIENT)
Dept: CARDIOLOGY | Facility: CLINIC | Age: 73
End: 2023-03-27
Payer: MEDICARE

## 2023-03-27 VITALS
BODY MASS INDEX: 27.06 KG/M2 | HEIGHT: 70 IN | RESPIRATION RATE: 16 BRPM | HEART RATE: 62 BPM | DIASTOLIC BLOOD PRESSURE: 80 MMHG | SYSTOLIC BLOOD PRESSURE: 160 MMHG | WEIGHT: 189 LBS

## 2023-03-27 VITALS — SYSTOLIC BLOOD PRESSURE: 142 MMHG | DIASTOLIC BLOOD PRESSURE: 76 MMHG

## 2023-03-27 DIAGNOSIS — I42.8 OTHER CARDIOMYOPATHIES: ICD-10-CM

## 2023-03-27 PROCEDURE — 93000 ELECTROCARDIOGRAM COMPLETE: CPT

## 2023-03-27 PROCEDURE — 99214 OFFICE O/P EST MOD 30 MIN: CPT | Mod: 25

## 2023-03-27 RX ORDER — LEVOTHYROXINE SODIUM 112 MCG
112 TABLET ORAL DAILY
Refills: 0 | Status: ACTIVE | COMMUNITY

## 2023-03-27 RX ORDER — CARVEDILOL 6.25 MG/1
6.25 TABLET, FILM COATED ORAL
Qty: 180 | Refills: 1 | Status: ACTIVE | COMMUNITY
Start: 2021-01-03 | End: 1900-01-01

## 2023-03-27 NOTE — CARDIOLOGY SUMMARY
[de-identified] : 03/27/23:\par SR, , LVH, NSST changes. [de-identified] : Carotid duplex 05/04/21:\par Occluded right ICA.\par Patent left ICA CEA site.

## 2023-03-27 NOTE — HISTORY OF PRESENT ILLNESS
[FreeTextEntry1] : 72-yo male with h/o DM, HTN, hyperlipidemia. Patient is referred for evaluation of PAF. He was found to have severe carotid disease (occluded right ICA and severe left ICA stenosis). S/p left CEA, uncomplicated. Quit smoking.\par \par Severe Covid infection in 2020, intubated for > 20 days. Recurrent pleural effusion while undergoing rehab, admitted again and treated with antibiotics.\par \par Denies CP, SOB, palpitations now. Patient feels stronger and walks better.\par \par 2D ECHO 10/17/22:\par Normal LVF, LVEF 60%\par Mild MR.\par \par Carotid duplex:\par R ICA occluded\par Patent left ICA.\par \par GFR 69\par LDL 92\par Triglycerides 263\par TSH 0.96\par Hb A1c 6.1.

## 2023-03-28 ENCOUNTER — RESULT CHARGE (OUTPATIENT)
Age: 73
End: 2023-03-28

## 2023-04-06 NOTE — PROGRESS NOTE ADULT - SUBJECTIVE AND OBJECTIVE BOX
Patient is comfortable in chair, no complaints       T(F): 97.8 (02-11-21 @ 11:27), Max: 98.2 (02-10-21 @ 15:00)  HR: 82 (02-11-21 @ 13:30)  BP: 100/54 (02-11-21 @ 13:30)  RR: 20 (02-11-21 @ 13:30)  SpO2: 100% (02-11-21 @ 13:30) (87% - 100%)    PHYSICAL EXAM:  GENERAL: NAD  HEAD:  Atraumatic, Normocephalic  NERVOUS SYSTEM:  no focal deficits   CHEST/LUNG:  bilateral rhonchi  HEART: Regular rate and rhythm; No murmurs, rubs, or gallops  ABDOMEN: Soft, Nontender, Nondistended; Bowel sounds present  EXTREMITIES:  2+ Peripheral Pulses, No clubbing, cyanosis, or edema  LYMPH: No lymphadenopathy noted  SKIN: No rashes or lesions    LABS  02-11    138  |  94<L>  |  16  ----------------------------<  142<H>  3.3<L>   |  36<H>  |  0.5<L>    Ca    9.0      11 Feb 2021 06:04    TPro  6.0  /  Alb  3.2<L>  /  TBili  0.5  /  DBili  x   /  AST  20  /  ALT  15  /  AlkPhos  88  02-11                          8.4    5.81  )-----------( 365      ( 11 Feb 2021 06:04 )             28.0     PTT - ( 11 Feb 2021 06:04 )  PTT:44.9 sec    CARDIAC ENZYMES      Troponin T, Serum: 0.09 ng/mL (02-10-21 @ 05:58)  Troponin T, Serum: 0.11 ng/mL (02-09-21 @ 06:14)      Culture Results:   No growth to date. (02-09-21)  Culture Results:   No growth to date. (02-07-21)    RADIOLOGY  < from: Xray Chest 1 View- PORTABLE-Routine (Xray Chest 1 View- PORTABLE-Routine in AM.) (02.11.21 @ 05:58) >  Impression:    Bibasilar airspaceopacities have slightly improved since one day prior.    < end of copied text >    MEDICATIONS  (STANDING):  aMIOdarone    Tablet 200 milliGRAM(s) Oral daily  apixaban 5 milliGRAM(s) Oral two times a day  aspirin enteric coated 81 milliGRAM(s) Oral daily  atorvastatin 40 milliGRAM(s) Oral at bedtime  carvedilol 6.25 milliGRAM(s) Oral every 12 hours  cefepime   IVPB 1000 milliGRAM(s) IV Intermittent every 8 hours  chlorhexidine 0.12% Liquid 15 milliLiter(s) Oral Mucosa every 12 hours  chlorhexidine 4% Liquid 1 Application(s) Topical <User Schedule>  doxazosin 2 milliGRAM(s) Oral at bedtime  pantoprazole   Suspension 40 milliGRAM(s) Oral daily  potassium chloride    Tablet ER 20 milliEquivalent(s) Oral every 2 hours  vancomycin  IVPB 1500 milliGRAM(s) IV Intermittent every 12 hours    MEDICATIONS  (PRN):  acetaminophen   Tablet .. 650 milliGRAM(s) Oral every 6 hours PRN Temp greater or equal to 38C (100.4F)     Sarecycline Counseling: Patient advised regarding possible photosensitivity and discoloration of the teeth, skin, lips, tongue and gums.  Patient instructed to avoid sunlight, if possible.  When exposed to sunlight, patients should wear protective clothing, sunglasses, and sunscreen.  The patient was instructed to call the office immediately if the following severe adverse effects occur:  hearing changes, easy bruising/bleeding, severe headache, or vision changes.  The patient verbalized understanding of the proper use and possible adverse effects of sarecycline.  All of the patient's questions and concerns were addressed.

## 2023-09-19 ENCOUNTER — APPOINTMENT (OUTPATIENT)
Dept: CARDIOLOGY | Facility: CLINIC | Age: 73
End: 2023-09-19
Payer: MEDICARE

## 2023-09-19 VITALS
HEIGHT: 70 IN | WEIGHT: 189 LBS | DIASTOLIC BLOOD PRESSURE: 62 MMHG | SYSTOLIC BLOOD PRESSURE: 126 MMHG | HEART RATE: 61 BPM | BODY MASS INDEX: 27.06 KG/M2

## 2023-09-19 PROCEDURE — 93000 ELECTROCARDIOGRAM COMPLETE: CPT

## 2023-09-19 PROCEDURE — 99214 OFFICE O/P EST MOD 30 MIN: CPT | Mod: 25

## 2023-10-16 NOTE — PATIENT PROFILE ADULT - DOES PATIENT HAVE ADVANCE DIRECTIVE
ED Handoff Info      Note: Please review patient's handover report in Epic under Summary tab under ED to IP Handoff      ED Nurse: ISAEL RN   Extension:  (Main)      Precautions:    Fall      Violent Patient Behavior BPA   No data recorded      Mental Status: Alert and Oriented person/place/time calm & cooperative   Baseline? [x] Yes [] No      Cardiac/Tele on arrival: Normal Sinus Rhythm   Baseline? [x] Yes [] No      Respiratory needs on arrival: None   Baseline? [] Yes [] No      Medications pending and/or not given in ED: See MAR for details      Pending Labs/Tests to be done on Unit: Repeat Troponin and Repeat PT/PTT per Heparin gtt protocol      Additional Information:    Celeste Catheter: [] Yes [x] No []Not Applicable   Indication: Not applicable      Preferred Language:  English      Living Arrangement: With family      COMMENTS:              No

## 2023-10-24 ENCOUNTER — APPOINTMENT (OUTPATIENT)
Dept: CARDIOLOGY | Facility: CLINIC | Age: 73
End: 2023-10-24
Payer: MEDICARE

## 2023-10-24 PROCEDURE — 93925 LOWER EXTREMITY STUDY: CPT

## 2023-10-24 PROCEDURE — ZZZZZ: CPT

## 2023-10-27 ENCOUNTER — APPOINTMENT (OUTPATIENT)
Dept: CARDIOLOGY | Facility: CLINIC | Age: 73
End: 2023-10-27
Payer: MEDICARE

## 2023-10-27 VITALS
BODY MASS INDEX: 27.2 KG/M2 | HEIGHT: 70 IN | SYSTOLIC BLOOD PRESSURE: 125 MMHG | DIASTOLIC BLOOD PRESSURE: 72 MMHG | WEIGHT: 190 LBS

## 2023-10-27 PROCEDURE — 99214 OFFICE O/P EST MOD 30 MIN: CPT

## 2023-10-28 LAB
ANION GAP SERPL CALC-SCNC: 15 MMOL/L
BUN SERPL-MCNC: 26 MG/DL
CALCIUM SERPL-MCNC: 9.5 MG/DL
CHLORIDE SERPL-SCNC: 104 MMOL/L
CO2 SERPL-SCNC: 22 MMOL/L
CREAT SERPL-MCNC: 1.3 MG/DL
EGFR: 59 ML/MIN/1.73M2
GLUCOSE SERPL-MCNC: 134 MG/DL
HCT VFR BLD CALC: 46.3 %
HGB BLD-MCNC: 13.6 G/DL
INR PPP: 1.3 RATIO
MCHC RBC-ENTMCNC: 24.2 PG
MCHC RBC-ENTMCNC: 29.4 G/DL
MCV RBC AUTO: 82.2 FL
PLATELET # BLD AUTO: 239 K/UL
PMV BLD: 11.8 FL
POTASSIUM SERPL-SCNC: 5.4 MMOL/L
PT BLD: 14.9 SEC
RBC # BLD: 5.63 M/UL
RBC # FLD: 18.3 %
SODIUM SERPL-SCNC: 141 MMOL/L
WBC # FLD AUTO: 9.23 K/UL

## 2023-11-07 VITALS — OXYGEN SATURATION: 98 % | SYSTOLIC BLOOD PRESSURE: 179 MMHG | HEART RATE: 60 BPM | DIASTOLIC BLOOD PRESSURE: 79 MMHG

## 2023-11-07 RX ORDER — APIXABAN 2.5 MG/1
0 TABLET, FILM COATED ORAL
Qty: 0 | Refills: 0 | DISCHARGE

## 2023-11-07 RX ORDER — ATORVASTATIN CALCIUM 80 MG/1
0 TABLET, FILM COATED ORAL
Qty: 0 | Refills: 0 | DISCHARGE

## 2023-11-07 NOTE — H&P CARDIOLOGY - HISTORY OF PRESENT ILLNESS
73-yo male, smoker, with PMHx of DM, HTN, HLD, PAF (on Xarelto - last dose on ______), s/p left CEA, h/o severe Covid infection in 2020 (intubated for > 20 days), h/o recurrent pleural effusion -> treated with antibiotics, who presented to vascular cardiologist with c/o left leg/calf pain and numbness when he walks, relieved with rest.  Denies rest pain, open wounds/sores, or swelling.  CHICHI/PVR:  Left: severe iliofemoral disease.  Right 1.03 0.95  Left 0.61 0.51    LE arterial duplex:  Right: Diffuse LAVON disease with >75% stenosis in the proximal, mid and distal segments.  Left: EIA >75% stenosis.  Left: Proximal LAVON occlusion with reconsitution of flow in the DPA.    Pt is now referred for peripheral cath with possible intervention if clinically indicated.      Adjusted bleeding score: 1.5%  .  Echo: 2D ECHO 10/17/22:  Normal LVF, LVEF 60%  Mild MR.         73-yo male, smoker, with PMHx of DM, HTN, HLD, PAF (on Eliquis - last dose on 11/7 am ), s/p left CEA, h/o severe Covid infection in 2020 (intubated for > 20 days), h/o recurrent pleural effusion -> treated with antibiotics, who presented to vascular cardiologist with c/o left leg/calf pain and numbness when he walks, relieved with rest, worsening over past few months .  Denies rest pain, open wounds/sores, or swelling.  CHICHI/PVR:  Left: severe iliofemoral disease.  Right 1.03 0.95  Left 0.61 0.51    LE arterial duplex:  Right: Diffuse LAVON disease with >75% stenosis in the proximal, mid and distal segments.  Left: EIA >75% stenosis.  Left: Proximal LAVON occlusion with reconsitution of flow in the DPA.    Pt is now referred for peripheral cath for juli class III with possible intervention if clinically indicated.      Adjusted bleeding score: 1.5%   cc IV bolus x 1 over 1 hr precath hydration , then 75 cc hr until cath   Echo: 2D ECHO 10/17/22:  Normal LVF, LVEF 60%  Mild MR.

## 2023-11-09 ENCOUNTER — INPATIENT (INPATIENT)
Facility: HOSPITAL | Age: 73
LOS: 0 days | Discharge: HOME | DRG: 253 | End: 2023-11-10
Attending: STUDENT IN AN ORGANIZED HEALTH CARE EDUCATION/TRAINING PROGRAM | Admitting: STUDENT IN AN ORGANIZED HEALTH CARE EDUCATION/TRAINING PROGRAM
Payer: MEDICARE

## 2023-11-09 ENCOUNTER — TRANSCRIPTION ENCOUNTER (OUTPATIENT)
Age: 73
End: 2023-11-09

## 2023-11-09 DIAGNOSIS — Z98.890 OTHER SPECIFIED POSTPROCEDURAL STATES: Chronic | ICD-10-CM

## 2023-11-09 DIAGNOSIS — I73.9 PERIPHERAL VASCULAR DISEASE, UNSPECIFIED: ICD-10-CM

## 2023-11-09 LAB
GLUCOSE BLDC GLUCOMTR-MCNC: 101 MG/DL — HIGH (ref 70–99)
GLUCOSE BLDC GLUCOMTR-MCNC: 101 MG/DL — HIGH (ref 70–99)

## 2023-11-09 PROCEDURE — 76937 US GUIDE VASCULAR ACCESS: CPT

## 2023-11-09 PROCEDURE — 85027 COMPLETE CBC AUTOMATED: CPT

## 2023-11-09 PROCEDURE — 36415 COLL VENOUS BLD VENIPUNCTURE: CPT

## 2023-11-09 PROCEDURE — 82962 GLUCOSE BLOOD TEST: CPT

## 2023-11-09 PROCEDURE — 37223: CPT | Mod: LT

## 2023-11-09 PROCEDURE — 93010 ELECTROCARDIOGRAM REPORT: CPT

## 2023-11-09 PROCEDURE — 83735 ASSAY OF MAGNESIUM: CPT

## 2023-11-09 PROCEDURE — 37252 INTRVASC US NONCORONARY 1ST: CPT | Mod: LT

## 2023-11-09 PROCEDURE — 93005 ELECTROCARDIOGRAM TRACING: CPT

## 2023-11-09 PROCEDURE — 75625 CONTRAST EXAM ABDOMINL AORTA: CPT

## 2023-11-09 PROCEDURE — 37221: CPT | Mod: LT

## 2023-11-09 PROCEDURE — 80048 BASIC METABOLIC PNL TOTAL CA: CPT

## 2023-11-09 PROCEDURE — 75710 ARTERY X-RAYS ARM/LEG: CPT | Mod: 59

## 2023-11-09 RX ORDER — CLOPIDOGREL BISULFATE 75 MG/1
600 TABLET, FILM COATED ORAL ONCE
Refills: 0 | Status: COMPLETED | OUTPATIENT
Start: 2023-11-09 | End: 2023-11-09

## 2023-11-09 RX ORDER — DOXAZOSIN MESYLATE 4 MG
1 TABLET ORAL
Refills: 0 | DISCHARGE

## 2023-11-09 RX ORDER — CARVEDILOL PHOSPHATE 80 MG/1
1 CAPSULE, EXTENDED RELEASE ORAL
Qty: 0 | Refills: 0 | DISCHARGE

## 2023-11-09 RX ORDER — RIVAROXABAN 15 MG-20MG
1 KIT ORAL
Refills: 0 | DISCHARGE

## 2023-11-09 RX ORDER — LEVOTHYROXINE SODIUM 125 MCG
1 TABLET ORAL
Refills: 0 | DISCHARGE

## 2023-11-09 RX ORDER — LEVOTHYROXINE SODIUM 125 MCG
112 TABLET ORAL DAILY
Refills: 0 | Status: DISCONTINUED | OUTPATIENT
Start: 2023-11-09 | End: 2023-11-10

## 2023-11-09 RX ORDER — DAPAGLIFLOZIN 10 MG/1
1 TABLET, FILM COATED ORAL
Refills: 0 | DISCHARGE

## 2023-11-09 RX ORDER — ATORVASTATIN CALCIUM 80 MG/1
10 TABLET, FILM COATED ORAL AT BEDTIME
Refills: 0 | Status: DISCONTINUED | OUTPATIENT
Start: 2023-11-09 | End: 2023-11-10

## 2023-11-09 RX ORDER — FUROSEMIDE 40 MG
20 TABLET ORAL DAILY
Refills: 0 | Status: DISCONTINUED | OUTPATIENT
Start: 2023-11-09 | End: 2023-11-10

## 2023-11-09 RX ORDER — DAPAGLIFLOZIN 10 MG/1
5 TABLET, FILM COATED ORAL DAILY
Refills: 0 | Status: DISCONTINUED | OUTPATIENT
Start: 2023-11-09 | End: 2023-11-10

## 2023-11-09 RX ORDER — DOXAZOSIN MESYLATE 4 MG
2 TABLET ORAL AT BEDTIME
Refills: 0 | Status: DISCONTINUED | OUTPATIENT
Start: 2023-11-09 | End: 2023-11-10

## 2023-11-09 RX ORDER — ATORVASTATIN CALCIUM 80 MG/1
1 TABLET, FILM COATED ORAL
Refills: 0 | DISCHARGE

## 2023-11-09 RX ORDER — SODIUM CHLORIDE 9 MG/ML
1000 INJECTION INTRAMUSCULAR; INTRAVENOUS; SUBCUTANEOUS
Refills: 0 | Status: DISCONTINUED | OUTPATIENT
Start: 2023-11-09 | End: 2023-11-10

## 2023-11-09 RX ORDER — AMIODARONE HYDROCHLORIDE 400 MG/1
200 TABLET ORAL DAILY
Refills: 0 | Status: DISCONTINUED | OUTPATIENT
Start: 2023-11-09 | End: 2023-11-10

## 2023-11-09 RX ORDER — HYDRALAZINE HCL 50 MG
10 TABLET ORAL ONCE
Refills: 0 | Status: COMPLETED | OUTPATIENT
Start: 2023-11-09 | End: 2023-11-09

## 2023-11-09 RX ORDER — CLOPIDOGREL BISULFATE 75 MG/1
75 TABLET, FILM COATED ORAL DAILY
Refills: 0 | Status: DISCONTINUED | OUTPATIENT
Start: 2023-11-10 | End: 2023-11-10

## 2023-11-09 RX ORDER — ASPIRIN/CALCIUM CARB/MAGNESIUM 324 MG
81 TABLET ORAL DAILY
Refills: 0 | Status: DISCONTINUED | OUTPATIENT
Start: 2023-11-10 | End: 2023-11-10

## 2023-11-09 RX ORDER — ACETAMINOPHEN 500 MG
650 TABLET ORAL EVERY 6 HOURS
Refills: 0 | Status: DISCONTINUED | OUTPATIENT
Start: 2023-11-09 | End: 2023-11-10

## 2023-11-09 RX ORDER — CARVEDILOL PHOSPHATE 80 MG/1
6.25 CAPSULE, EXTENDED RELEASE ORAL EVERY 12 HOURS
Refills: 0 | Status: DISCONTINUED | OUTPATIENT
Start: 2023-11-09 | End: 2023-11-10

## 2023-11-09 RX ORDER — ASPIRIN/CALCIUM CARB/MAGNESIUM 324 MG
81 TABLET ORAL ONCE
Refills: 0 | Status: COMPLETED | OUTPATIENT
Start: 2023-11-09 | End: 2023-11-09

## 2023-11-09 RX ORDER — AMIODARONE HYDROCHLORIDE 400 MG/1
1 TABLET ORAL
Refills: 0 | DISCHARGE

## 2023-11-09 RX ADMIN — ATORVASTATIN CALCIUM 10 MILLIGRAM(S): 80 TABLET, FILM COATED ORAL at 21:28

## 2023-11-09 RX ADMIN — SODIUM CHLORIDE 75 MILLILITER(S): 9 INJECTION INTRAMUSCULAR; INTRAVENOUS; SUBCUTANEOUS at 19:34

## 2023-11-09 RX ADMIN — Medication 81 MILLIGRAM(S): at 12:32

## 2023-11-09 RX ADMIN — CLOPIDOGREL BISULFATE 600 MILLIGRAM(S): 75 TABLET, FILM COATED ORAL at 12:32

## 2023-11-09 RX ADMIN — Medication 10 MILLIGRAM(S): at 22:29

## 2023-11-09 RX ADMIN — Medication 2 MILLIGRAM(S): at 21:28

## 2023-11-09 RX ADMIN — CARVEDILOL PHOSPHATE 6.25 MILLIGRAM(S): 80 CAPSULE, EXTENDED RELEASE ORAL at 19:37

## 2023-11-09 NOTE — DISCHARGE NOTE PROVIDER - NSDCFUSCHEDAPPT_GEN_ALL_CORE_FT
Daniel Huff  [facility] Physician [facility]  PULMMED [address]  Scheduled Appointment: 12/15/2023    Antelmo Baltazar  [facility] Physician [facility]  CARDIOLOGY [address]  Scheduled Appointment: 01/19/2024

## 2023-11-09 NOTE — DISCHARGE NOTE PROVIDER - ATTENDING DISCHARGE PHYSICAL EXAMINATION:
Bilateral femoral arterial access sites and left radial arterial access site are clean, dry, and intact with no hematoma or tenderness.

## 2023-11-09 NOTE — PATIENT PROFILE ADULT - FALL HARM RISK - HARM RISK INTERVENTIONS

## 2023-11-09 NOTE — CHART NOTE - NSCHARTNOTEFT_GEN_A_CORE
PRE-OP DIAGNOSIS:                           Leandro class 4      PROCEDURE:     [ x] Peripheral Angiogram     [ x] Intervention (see below)         PHYSICIAN: Sharad    ASSISTANT: Reid Flores       PROCEDURE DESCRIPTION:     Consent:      [x] Patient     [] Family Member     []  Used        Anesthesia:     [] General     [x] Sedation     [] Local        Access & Closure:     [x]  8 Fr right Femoral Artery (perclose)  [x]  7 Fr left Femoral Artery (perclose)  [x] 6 Fr left radial artery (vasc band)      IV Contrast: 100 mL     FINDINGS/intervention:   Left common Iliac, successful PTA and RAMAN x 1  Left external Iliac, successful PTA and RAMAN x 2         ESTIMATED BLOOD LOSS: < 10 mL        CONDITION:     [x] Good     [] Fair     [] Critical        SPECIMEN REMOVED: N/A       POST-OP DIAGNOSIS:      [x] Peripheral vascular disease       PLAN OF CARE:     [x] Admit to 4T for observation     [x] Medications: ASA, Plavix, high intensity statin                         Restart Eliquis tomorrow 11/10 PM                         STOP ASA on discharge, c/w Eliquis/Plavix                         HOLD Metformin 48hrs post angiogram                 [x] IV Fluids: NS at 75ml/hr for 1 Liter    Bedrest x 4 hrs.    Admit to [facility] for wound care, hydration, and care for antegrade access and pedal access site.   Follow-up with Dr. Stephane Orourke in 2 weeks.

## 2023-11-09 NOTE — ASU PATIENT PROFILE, ADULT - FALL HARM RISK - UNIVERSAL INTERVENTIONS
Bed in lowest position, wheels locked, appropriate side rails in place/Call bell, personal items and telephone in reach/Instruct patient to call for assistance before getting out of bed or chair/Non-slip footwear when patient is out of bed/[place] to call system/Physically safe environment - no spills, clutter or unnecessary equipment/Purposeful Proactive Rounding/Room/bathroom lighting operational, light cord in reach

## 2023-11-09 NOTE — DISCHARGE NOTE PROVIDER - NSDCMRMEDTOKEN_GEN_ALL_CORE_FT
amiodarone 200 mg oral tablet: 1 tab(s) orally once a day  Aspir 81 oral delayed release tablet: 1 tab(s) orally once a day  atorvastatin 10 mg oral tablet: 1 tab(s) orally once a day (at bedtime)  carvedilol 6.25 mg oral tablet: 1 tab(s) orally 2 times a day  doxazosin 2 mg oral tablet: 1 tab(s) orally once a day (at bedtime)  Eliquis 5 mg oral tablet: 1 tab(s) orally 2 times a day  Farxiga 5 mg oral tablet: 1 tab(s) orally once a day  furosemide 20 mg oral tablet: 1 tab(s) orally once a day  levothyroxine 112 mcg (0.112 mg) oral tablet: 1 tab(s) orally once a day  metFORMIN 1000 mg oral tablet: 1 tab(s) orally 2 times a day    amiodarone 200 mg oral tablet: 1 tab(s) orally once a day  atorvastatin 40 mg oral tablet: 1 tab(s) orally once a day  carvedilol 6.25 mg oral tablet: 1 tab(s) orally 2 times a day  clopidogrel 75 mg oral tablet: 1 tab(s) orally once a day  doxazosin 2 mg oral tablet: 1 tab(s) orally once a day (at bedtime)  Eliquis 5 mg oral tablet: 1 tab(s) orally 2 times a day Please start 11/10 PM  Farxiga 5 mg oral tablet: 1 tab(s) orally once a day  furosemide 20 mg oral tablet: 1 tab(s) orally once a day  levothyroxine 112 mcg (0.112 mg) oral tablet: 1 tab(s) orally once a day  metFORMIN 1000 mg oral tablet: 1 tab(s) orally 2 times a day PLEASE RESUME ON 11/12

## 2023-11-09 NOTE — CHART NOTE - NSCHARTNOTESELECT_GEN_ALL_CORE
Brief op note: post PTA/Event Note Called patient let her know that we sent in the inhaler to [facility] pharmacy

## 2023-11-09 NOTE — ASU PREOP CHECKLIST - RESPIRATORY RATE (BREATHS/MIN)
K+ 2.2 today. Mg 1.6.  EKG 69 NSR with NSST and prolonged QTc 492. Denies GI symptoms. Likely from alcohol abuse.    - Replaced potasium and magnesium   - Repeat labs   14

## 2023-11-09 NOTE — DISCHARGE NOTE PROVIDER - CARE PROVIDER_API CALL
Stephane Orourke.  Interventional Cardiology  [address] [place] [id number]  Phone: [phone]  Fax: [phone]  Follow Up Time: 2 weeks

## 2023-11-09 NOTE — DISCHARGE NOTE PROVIDER - NSDCCPTREATMENT_GEN_ALL_CORE_FT
PRINCIPAL PROCEDURE  Procedure: Angiogram, peripheral, with PTA if indicated  Findings and Treatment: Discharge Instructions as follows:  - Continue taking your Eliquis twice a day and plavix daily.   - Continue medical regimen as prescribed to prevent chest pain.  - Continue dual anti-platelet therapy, beta blocker, Statin.   - Instructed to call 911 if chest pain, shortness of breath or bleeding from access site.  - No heavy lifting > 10lbs x 1 week.  - No driving x 24 hours.  - No baths, swimming pools x 1 week; may shower.  - Follow-up with Dr. Orourke in 2 weeks.     PRINCIPAL PROCEDURE  Procedure: Angiogram, peripheral, with PTA if indicated  Findings and Treatment: Discharge Instructions as follows:  - Continue taking your Eliquis twice a day and plavix daily.   - RESTART YOUR METFORMIN ON 11/12  - Continue medical regimen as prescribed to prevent chest pain.  - Continue dual anti-platelet therapy, beta blocker, Statin.   - Instructed to call 911 if chest pain, shortness of breath or bleeding from access site.  - No heavy lifting > 10lbs x 1 week.  - No driving x 24 hours.  - No baths, swimming pools x 1 week; may shower.  - Follow-up with Dr. Orourke in 2 weeks.

## 2023-11-09 NOTE — DISCHARGE NOTE PROVIDER - HOSPITAL COURSE
Patient is a 73y Male with PMHx of  DM, HTN, HLD, severe carotid disease with occluded right ICA, and severe left ICA stenosis s/p left CEA uncomplicated, covid infection in 2020, intubated for >20 days, recurrent pleural effusion while undergoing rehab,                                   who presented to [facility] for elective Cardiac peripheral angiogram. On (insert Date) patient undernoted LHC which revealed: LM-(free of significant disease) LAD-(non obstructive disease) LCX-(Non obstructive disease), RCA-(Non obstructive disease)(Replace non obstructive disease with any significant disease). (If Stents placed--> S/P PCI with -- List Size and type of stent --> to vessel. ie. S/P PCI with LING 3.5x22mm RAMAN to mLAD). Patient was monitored overnight. On POD 1 patient remains HD stable with no complaints. Patient remains in SR with no arrhythmias noted on tele. EKG performed showed no acute ST changes. Examination of (right radial artery/right common femoral artery) showed a C/DI site with no hematoma, erythema or bruit. Distal pulses are 2+ bilaterally. Renal function remains stable post cath. Patient will be discharged home on DAPT with ASA and (Plavix/Effient/Brilinta).  Patient is being DC home in stable condition. Patient is a 73y Male with PMHx of  DM, HTN, HLD, Paf (on Eliquis- last dose on 11/7) severe carotid disease with occluded right ICA, and severe left ICA stenosis s/p left CEA uncomplicated, covid infection in 2020, intubated for >20 days, recurrent pleural effusions who presented to [facility] for elective peripheral angiogram. On 11/9 patient undernoted peripheral angiogram which consisted of left common iliac, successful PTA and RAMAN x1, Left external iliac, successful PTA and RAMAN x 2 .Patient was monitored overnight. On POD 1 patient remains HD stable with no complaints. Patient remains in SR with no arrhythmias noted on tele. EKG performed showed no acute ST changes. Examination of BL groin and Left radial showed a C/DI site with no hematoma, erythema or bruit. Distal pulses are 2+ bilaterally. Renal function remains stable post cath. Patient will be discharged home on DAPT with eliquis and plavix.  Patient is being DC home in stable condition.

## 2023-11-10 ENCOUNTER — TRANSCRIPTION ENCOUNTER (OUTPATIENT)
Age: 73
End: 2023-11-10

## 2023-11-10 VITALS — SYSTOLIC BLOOD PRESSURE: 122 MMHG | HEART RATE: 72 BPM | DIASTOLIC BLOOD PRESSURE: 58 MMHG

## 2023-11-10 LAB
ANION GAP SERPL CALC-SCNC: 12 MMOL/L — SIGNIFICANT CHANGE UP (ref 7–14)
ANION GAP SERPL CALC-SCNC: 12 MMOL/L — SIGNIFICANT CHANGE UP (ref 7–14)
BUN SERPL-MCNC: 20 MG/DL — SIGNIFICANT CHANGE UP (ref 10–20)
BUN SERPL-MCNC: 20 MG/DL — SIGNIFICANT CHANGE UP (ref 10–20)
CALCIUM SERPL-MCNC: 9 MG/DL — SIGNIFICANT CHANGE UP (ref 8.4–10.4)
CALCIUM SERPL-MCNC: 9 MG/DL — SIGNIFICANT CHANGE UP (ref 8.4–10.4)
CHLORIDE SERPL-SCNC: 110 MMOL/L — SIGNIFICANT CHANGE UP (ref 98–110)
CHLORIDE SERPL-SCNC: 110 MMOL/L — SIGNIFICANT CHANGE UP (ref 98–110)
CO2 SERPL-SCNC: 20 MMOL/L — SIGNIFICANT CHANGE UP (ref 17–32)
CO2 SERPL-SCNC: 20 MMOL/L — SIGNIFICANT CHANGE UP (ref 17–32)
CREAT SERPL-MCNC: 1.1 MG/DL — SIGNIFICANT CHANGE UP (ref 0.7–1.5)
CREAT SERPL-MCNC: 1.1 MG/DL — SIGNIFICANT CHANGE UP (ref 0.7–1.5)
EGFR: 72 ML/MIN/1.73M2 — SIGNIFICANT CHANGE UP
EGFR: 72 ML/MIN/1.73M2 — SIGNIFICANT CHANGE UP
GLUCOSE BLDC GLUCOMTR-MCNC: 224 MG/DL — HIGH (ref 70–99)
GLUCOSE BLDC GLUCOMTR-MCNC: 224 MG/DL — HIGH (ref 70–99)
GLUCOSE SERPL-MCNC: 124 MG/DL — HIGH (ref 70–99)
GLUCOSE SERPL-MCNC: 124 MG/DL — HIGH (ref 70–99)
HCT VFR BLD CALC: 38.6 % — LOW (ref 42–52)
HCT VFR BLD CALC: 38.6 % — LOW (ref 42–52)
HGB BLD-MCNC: 11.7 G/DL — LOW (ref 14–18)
HGB BLD-MCNC: 11.7 G/DL — LOW (ref 14–18)
MAGNESIUM SERPL-MCNC: 2 MG/DL — SIGNIFICANT CHANGE UP (ref 1.8–2.4)
MAGNESIUM SERPL-MCNC: 2 MG/DL — SIGNIFICANT CHANGE UP (ref 1.8–2.4)
MCHC RBC-ENTMCNC: 23.9 PG — LOW (ref 27–31)
MCHC RBC-ENTMCNC: 23.9 PG — LOW (ref 27–31)
MCHC RBC-ENTMCNC: 30.3 G/DL — LOW (ref 32–37)
MCHC RBC-ENTMCNC: 30.3 G/DL — LOW (ref 32–37)
MCV RBC AUTO: 78.9 FL — LOW (ref 80–94)
MCV RBC AUTO: 78.9 FL — LOW (ref 80–94)
NRBC # BLD: 0 /100 WBCS — SIGNIFICANT CHANGE UP (ref 0–0)
NRBC # BLD: 0 /100 WBCS — SIGNIFICANT CHANGE UP (ref 0–0)
PLATELET # BLD AUTO: 209 K/UL — SIGNIFICANT CHANGE UP (ref 130–400)
PLATELET # BLD AUTO: 209 K/UL — SIGNIFICANT CHANGE UP (ref 130–400)
PMV BLD: 11.2 FL — HIGH (ref 7.4–10.4)
PMV BLD: 11.2 FL — HIGH (ref 7.4–10.4)
POTASSIUM SERPL-MCNC: 4.6 MMOL/L — SIGNIFICANT CHANGE UP (ref 3.5–5)
POTASSIUM SERPL-MCNC: 4.6 MMOL/L — SIGNIFICANT CHANGE UP (ref 3.5–5)
POTASSIUM SERPL-SCNC: 4.6 MMOL/L — SIGNIFICANT CHANGE UP (ref 3.5–5)
POTASSIUM SERPL-SCNC: 4.6 MMOL/L — SIGNIFICANT CHANGE UP (ref 3.5–5)
RBC # BLD: 4.89 M/UL — SIGNIFICANT CHANGE UP (ref 4.7–6.1)
RBC # BLD: 4.89 M/UL — SIGNIFICANT CHANGE UP (ref 4.7–6.1)
RBC # FLD: 17.9 % — HIGH (ref 11.5–14.5)
RBC # FLD: 17.9 % — HIGH (ref 11.5–14.5)
SODIUM SERPL-SCNC: 142 MMOL/L — SIGNIFICANT CHANGE UP (ref 135–146)
SODIUM SERPL-SCNC: 142 MMOL/L — SIGNIFICANT CHANGE UP (ref 135–146)
WBC # BLD: 8.56 K/UL — SIGNIFICANT CHANGE UP (ref 4.8–10.8)
WBC # BLD: 8.56 K/UL — SIGNIFICANT CHANGE UP (ref 4.8–10.8)
WBC # FLD AUTO: 8.56 K/UL — SIGNIFICANT CHANGE UP (ref 4.8–10.8)
WBC # FLD AUTO: 8.56 K/UL — SIGNIFICANT CHANGE UP (ref 4.8–10.8)

## 2023-11-10 PROCEDURE — 99239 HOSP IP/OBS DSCHRG MGMT >30: CPT

## 2023-11-10 RX ORDER — ATORVASTATIN CALCIUM 80 MG/1
1 TABLET, FILM COATED ORAL
Refills: 0 | DISCHARGE

## 2023-11-10 RX ORDER — METFORMIN HYDROCHLORIDE 850 MG/1
1 TABLET ORAL
Qty: 60 | Refills: 0
Start: 2023-11-10 | End: 2023-12-09

## 2023-11-10 RX ORDER — APIXABAN 2.5 MG/1
1 TABLET, FILM COATED ORAL
Qty: 0 | Refills: 0 | DISCHARGE

## 2023-11-10 RX ORDER — ASPIRIN/CALCIUM CARB/MAGNESIUM 324 MG
1 TABLET ORAL
Qty: 0 | Refills: 0 | DISCHARGE

## 2023-11-10 RX ORDER — ATORVASTATIN CALCIUM 80 MG/1
40 TABLET, FILM COATED ORAL AT BEDTIME
Refills: 0 | Status: DISCONTINUED | OUTPATIENT
Start: 2023-11-10 | End: 2023-11-10

## 2023-11-10 RX ORDER — CARVEDILOL PHOSPHATE 80 MG/1
6.25 CAPSULE, EXTENDED RELEASE ORAL ONCE
Refills: 0 | Status: COMPLETED | OUTPATIENT
Start: 2023-11-10 | End: 2023-11-10

## 2023-11-10 RX ORDER — APIXABAN 2.5 MG/1
5 TABLET, FILM COATED ORAL EVERY 12 HOURS
Refills: 0 | Status: DISCONTINUED | OUTPATIENT
Start: 2023-11-10 | End: 2023-11-10

## 2023-11-10 RX ORDER — ATORVASTATIN CALCIUM 80 MG/1
1 TABLET, FILM COATED ORAL
Qty: 30 | Refills: 0
Start: 2023-11-10 | End: 2023-12-09

## 2023-11-10 RX ORDER — CLOPIDOGREL BISULFATE 75 MG/1
1 TABLET, FILM COATED ORAL
Qty: 30 | Refills: 0
Start: 2023-11-10 | End: 2023-12-09

## 2023-11-10 RX ADMIN — Medication 20 MILLIGRAM(S): at 05:45

## 2023-11-10 RX ADMIN — CARVEDILOL PHOSPHATE 6.25 MILLIGRAM(S): 80 CAPSULE, EXTENDED RELEASE ORAL at 05:45

## 2023-11-10 RX ADMIN — DAPAGLIFLOZIN 5 MILLIGRAM(S): 10 TABLET, FILM COATED ORAL at 12:45

## 2023-11-10 RX ADMIN — CLOPIDOGREL BISULFATE 75 MILLIGRAM(S): 75 TABLET, FILM COATED ORAL at 12:45

## 2023-11-10 RX ADMIN — Medication 650 MILLIGRAM(S): at 08:40

## 2023-11-10 RX ADMIN — Medication 112 MICROGRAM(S): at 05:45

## 2023-11-10 RX ADMIN — Medication 650 MILLIGRAM(S): at 08:11

## 2023-11-10 RX ADMIN — CARVEDILOL PHOSPHATE 6.25 MILLIGRAM(S): 80 CAPSULE, EXTENDED RELEASE ORAL at 08:33

## 2023-11-10 RX ADMIN — AMIODARONE HYDROCHLORIDE 200 MILLIGRAM(S): 400 TABLET ORAL at 05:45

## 2023-11-10 RX ADMIN — Medication 81 MILLIGRAM(S): at 12:45

## 2023-11-10 NOTE — DISCHARGE NOTE NURSING/CASE MANAGEMENT/SOCIAL WORK - NSDCVIVACCINE_GEN_ALL_CORE_FT
Essential hypertension No Vaccines Administered. Type 2 diabetes mellitus with hyperglycemia, with long-term current use of insulin

## 2023-11-10 NOTE — DISCHARGE NOTE NURSING/CASE MANAGEMENT/SOCIAL WORK - NSDCPEFALRISK_GEN_ALL_CORE
For information on Fall & Injury Prevention, visit: [email or web address] OR  [email or web address] OR  https://www.cdc.gov/steadi/patient.html

## 2023-11-10 NOTE — DISCHARGE NOTE NURSING/CASE MANAGEMENT/SOCIAL WORK - FLU SEASON?
Yes... Pro Hoffman MD :Patient states that the pain happened after having a rough ride in the ambulance, denies any direct trauma to the chest wall or neck and states that the chest pain is lateral and tender to palpation and worse with taking a deep breaths.  Patient states that the neck pain is on the left side of her neck and feels like a sore muscle.  Patient presentation not concerning for ACS or any neurovascular injury.  Patient ordered for an x-ray was negative for any acute intrathoracic pathology and given the spirometry.  Patient amenable to discharge home.

## 2023-11-10 NOTE — DISCHARGE NOTE NURSING/CASE MANAGEMENT/SOCIAL WORK - PATIENT PORTAL LINK FT
You can access the FollowMyHealth Patient Portal offered by Health system by registering at the following website: [email or web address]. By joining [employer]’s FollowMyHealth portal, you will also be able to view your health information using other applications (apps) compatible with our system.

## 2023-11-14 DIAGNOSIS — E11.51 TYPE 2 DIABETES MELLITUS WITH DIABETIC PERIPHERAL ANGIOPATHY WITHOUT GANGRENE: ICD-10-CM

## 2023-11-14 DIAGNOSIS — Z86.16 PERSONAL HISTORY OF COVID-19: ICD-10-CM

## 2023-11-14 DIAGNOSIS — G89.29 OTHER CHRONIC PAIN: ICD-10-CM

## 2023-11-14 DIAGNOSIS — Z79.84 LONG TERM (CURRENT) USE OF ORAL HYPOGLYCEMIC DRUGS: ICD-10-CM

## 2023-11-14 DIAGNOSIS — I48.92 UNSPECIFIED ATRIAL FLUTTER: ICD-10-CM

## 2023-11-14 DIAGNOSIS — Z79.82 LONG TERM (CURRENT) USE OF ASPIRIN: ICD-10-CM

## 2023-11-14 DIAGNOSIS — I48.91 UNSPECIFIED ATRIAL FIBRILLATION: ICD-10-CM

## 2023-11-14 DIAGNOSIS — D41.4 NEOPLASM OF UNCERTAIN BEHAVIOR OF BLADDER: ICD-10-CM

## 2023-11-14 DIAGNOSIS — I70.92 CHRONIC TOTAL OCCLUSION OF ARTERY OF THE EXTREMITIES: ICD-10-CM

## 2023-11-14 DIAGNOSIS — Z87.891 PERSONAL HISTORY OF NICOTINE DEPENDENCE: ICD-10-CM

## 2023-11-14 DIAGNOSIS — I65.21 OCCLUSION AND STENOSIS OF RIGHT CAROTID ARTERY: ICD-10-CM

## 2023-11-14 DIAGNOSIS — I10 ESSENTIAL (PRIMARY) HYPERTENSION: ICD-10-CM

## 2023-11-14 DIAGNOSIS — Z79.01 LONG TERM (CURRENT) USE OF ANTICOAGULANTS: ICD-10-CM

## 2023-11-14 DIAGNOSIS — Z79.890 HORMONE REPLACEMENT THERAPY: ICD-10-CM

## 2023-11-14 DIAGNOSIS — M54.32 SCIATICA, LEFT SIDE: ICD-10-CM

## 2023-11-14 DIAGNOSIS — I70.212 ATHEROSCLEROSIS OF NATIVE ARTERIES OF EXTREMITIES WITH INTERMITTENT CLAUDICATION, LEFT LEG: ICD-10-CM

## 2023-11-14 DIAGNOSIS — E78.00 PURE HYPERCHOLESTEROLEMIA, UNSPECIFIED: ICD-10-CM

## 2023-12-01 ENCOUNTER — APPOINTMENT (OUTPATIENT)
Dept: CARDIOLOGY | Facility: CLINIC | Age: 73
End: 2023-12-01
Payer: MEDICARE

## 2023-12-01 VITALS
DIASTOLIC BLOOD PRESSURE: 74 MMHG | HEART RATE: 84 BPM | BODY MASS INDEX: 27.2 KG/M2 | HEIGHT: 70 IN | WEIGHT: 190 LBS | SYSTOLIC BLOOD PRESSURE: 122 MMHG | OXYGEN SATURATION: 97 %

## 2023-12-01 DIAGNOSIS — I73.9 PERIPHERAL VASCULAR DISEASE, UNSPECIFIED: ICD-10-CM

## 2023-12-01 DIAGNOSIS — I70.219 ATHEROSCLEROSIS OF NATIVE ARTERIES OF EXTREMITIES WITH INTERMITTENT CLAUDICATION, UNSPECIFIED EXTREMITY: ICD-10-CM

## 2023-12-01 PROCEDURE — 99214 OFFICE O/P EST MOD 30 MIN: CPT

## 2023-12-05 ENCOUNTER — APPOINTMENT (OUTPATIENT)
Dept: CARDIOLOGY | Facility: CLINIC | Age: 73
End: 2023-12-05

## 2023-12-15 ENCOUNTER — APPOINTMENT (OUTPATIENT)
Dept: PULMONOLOGY | Facility: CLINIC | Age: 73
End: 2023-12-15
Payer: MEDICARE

## 2023-12-15 VITALS
RESPIRATION RATE: 14 BRPM | WEIGHT: 188 LBS | HEIGHT: 70 IN | HEART RATE: 81 BPM | OXYGEN SATURATION: 95 % | SYSTOLIC BLOOD PRESSURE: 104 MMHG | DIASTOLIC BLOOD PRESSURE: 66 MMHG | BODY MASS INDEX: 26.92 KG/M2

## 2023-12-15 DIAGNOSIS — J98.4 COVID-19: ICD-10-CM

## 2023-12-15 DIAGNOSIS — J84.10 PULMONARY FIBROSIS, UNSPECIFIED: ICD-10-CM

## 2023-12-15 DIAGNOSIS — U07.1 COVID-19: ICD-10-CM

## 2023-12-15 DIAGNOSIS — Z87.891 PERSONAL HISTORY OF NICOTINE DEPENDENCE: ICD-10-CM

## 2023-12-15 PROCEDURE — 99213 OFFICE O/P EST LOW 20 MIN: CPT

## 2023-12-15 RX ORDER — RIVAROXABAN 20 MG/1
20 TABLET, FILM COATED ORAL DAILY
Refills: 0 | Status: COMPLETED | COMMUNITY
End: 2023-12-15

## 2023-12-15 NOTE — REASON FOR VISIT
[Initial] : an initial visit [Abnormal CXR/ Chest CT] : an abnormal CXR/ chest CT [TextBox_44] : The patient presents for the evaluation of an abnormal chest x-ray.  There is a concern given the fact that he is taking ambulatory.  Chest x-ray dated 9/8/2023 describes possible manifestation of early fibrosis.  Dedicated chest CAT scan is recommended.  The patient has a very complicated medical history.  He was very ill from severe COVID-pneumonia starting in November to December 2021.  He spent many weeks on the ventilator and ultimately had tracheostomy.  I have chest x-rays available going back to 2019 which was normal.  His first x-ray taken on presentation to the hospital in 2020 shows severe interstitial infiltrates.  He has had dozens of x-rays at that time showing varying degrees of these infiltrates.  His last chest x-ray taken before discharge continue to show these findings.  It appears that the patient now has chronic interstitial lung disease that is complication of the COVID-pneumonia.  The patient states that he is actually feeling much better.  He needed a vascular procedure performed recently and now he is up and about.  He states that he can walk about a mile or so before he starts to feel short of breath.  Patient was a smoker but quit many years ago.  He has ongoing cardiac issues with paroxysmal atrial fibrillation as well as a cardiomyopathy.

## 2023-12-15 NOTE — ASSESSMENT
[FreeTextEntry1] : The patient has postinflammatory interstitial fibrosis. It is highly likely that this is an ongoing complication of his COVID-19 pneumonia from 2020. I believe he has prominent interstitial changes to his lungs.  This is very common in patients who had this severity level of the COVID-pneumonia. However, there is absolutely no way to determine whether there is a contributing factor from amiodarone.   However, the patient states that his breathing is actually been improving over time.  This would not be happening if he had signs of amiodarone toxicity. At present I believe the patient would benefit by having a full pulmonary function testing. I do not feel that a CAT scan is necessarily needed at this time given his known diagnosis and multiple previous x-rays. I will review the PFT with the patient when it is available. If the cardiologist is easily able to switch him off of the amiodarone that would be preferable.  However, I do not see there is any need to absolutely do so if the patient is dependent on this medication. I will see him back after the PFT is available. pt independent sit-stand no AD x 3 steady but deconditioned

## 2023-12-15 NOTE — HISTORY OF PRESENT ILLNESS
[TextBox_4] : I personally reviewed the hospital record and I interpreted the most recent available chest films from the hospital. I reviewed and interpreted any  OP CXR or CT available in the files I discussed the results today with the patient.

## 2023-12-28 NOTE — PATIENT PROFILE ADULT - FUNCTIONAL ASSESSMENT - BASIC MOBILITY 3.
Unable contact letter mailed on 12/13/2023 for patient to schedule an Initial appointment with hepatology, referred by Yo Gonzalez MD. No response back as of 12/28/2023. Writer will remove order and make referring aware.  
3 = A little assistance

## 2023-12-29 NOTE — DIETITIAN INITIAL EVALUATION ADULT. - SIGNS/SYMPTOMS
[facility]  Emergency Department Provider Note    Patient: Antonia Ledesma  Age: 30 year old  Sex: female  MRN: [id number]  : 1993  Location:     Chief Complaint   Patient presents with    Anxiety     HISTORY OF PRESENT ILLNESS  This is a 30 year old female with a history of anxiety, currently pregnant (first trimester).  Here for evaluation after apparently presenting to triage unresponsive and foaming at the mouth.  Was pulled out of car and made code 44 activation, immediately brought up to room.  On arrival patient awake, looking around the room, gasping breaths, vital signs WNL.  Able to state her name, states she is having trouble breathing.  Not able to provide additional history at this time.    Additional history obtained from patient's boyfriend who arrived in the room shortly thereafter.  States she has a long history of anxiety and is currently on medications.  He reports multiple ED visits recently for same.  She woke this morning complaining of anxiety and states she needed to go to the hospital.  While in the car became progressively more anxious, stated her hands were going numb, spitting up, throwing up.  Per boyfriend patient is pregnant in her first trimester and recently found out about this.    REVIEW OF SYSTEMS  Negative except as documented in HPI.    PAST HISTORY  Past Medical History:   Diagnosis Date    Anxiety      No past surgical history on file.  No family history on file.  Social History     Tobacco Use    Smoking status: Never    Smokeless tobacco: Never     No Known Allergies    HOME MEDICATIONS  Reviewed in EMR.     TRIAGE VITALS  ED Triage Vitals   ED Triage Vitals Group      Temp 23 0436 98.3 °F (36.8 °C)      Heart Rate 23 0422 83      Resp 23 0422 (!) 20      BP 23 0422 (!) 126/94      SpO2 23 042 98 %      EtCO2 mmHg --       Height --       Weight --       Weight Scale Used --       BMI (Calculated) --       IBW/kg  (Calculated) --        PHYSICAL EXAMINATION  GEN: Patient is generally well-appearing, awake, alert, in no acute distress.  HEENT: Normocephalic, atraumatic, extraocular muscles grossly intact, moist mucous membranes.  CV: Regular rate and regular rhythm, no murmurs rubs or gallops, no extra heart sounds.  PULM: No increased work of breathing, lungs are clear to auscultation bilaterally. No wheezes, crackles, or rhonchi.   GI: Abdomen is soft, nontender, non-distended, no rigidity or guarding. No masses.  : No suprapubic tenderness. No CVA tenderness bilaterally.  MSK: No obvious bony deformities. No peripheral edema. Extremities warm and well perfused.   SKIN: No obvious lesions. No rash.  NEURO: Patient is spontaneously moving all limbs with no focal neurologic abnormalities.  PSYCH: Patient converses appropriately, displays anxious mood.    LABS  Personally reviewed.    IMAGING  XR CHEST AP OR PA   Final Result   Impression:      No acute cardiopulmonary process.      Electronically Signed by: BRANDO GONSALES M.D.    Signed on: 12/29/2023 6:59 AM    Workstation ID: [id number]          EKG  Encounter Date: 12/29/23   Electrocardiogram 12-Lead   Result Value    Ventricular Rate EKG/Min (BPM) 74    Atrial Rate (BPM) 68    QRS-Interval (MSEC) 80    QT-Interval (MSEC) 386    QTc 428    R Axis (Degrees) 82    T Axis (Degrees) 60    REPORT TEXT      Atrial fibrillation  Abnormal ECG  No previous ECGs available         MEDICAL DECISION MAKING  Antonia Ledesma is a 30 year old female presenting to the emergency department for evaluation. Available external records reviewed by me. Comprehensive H&P as above. New problems with additional workup needed. Patient is at moderate risk of significant complications, morbidity and/or mortality based on the above. IV was established.  Pulse oximetry on room air is interpreted as adequate.  Cardiac telemetry tracing by my interpretation: sinus rhythm.  On arrival, vitals addressed.  Differential diagnosis considered.  Exam as above. Any applicable labs/imaging/EKG reviewed and independently interpreted by me. Overall clinical presentation most consistent with AMS now improving ddx includes seizure, etoh, drug use, anxiety, intra-cranial process. Plan code 44 labs, cardiopulm monitoring, cxr, EKG,ed observation, re-eval. Dispo pending above.     ED Course as of 12/29/23 0710   Fri Dec 29, 2023   0708 HCG Quantitative(!): 20,801 [GO]   0708 Potassium(!): 3.2  Given PO potassium [GO]   0709 This is her third ED visit in 3 days and doesn't think she can make it until her outpatient follow up on Jan 11.  Denies SI/HI/AH/VH.    Workup largely unremarkable  Now talking and feeling much better  crisis eval in ED pending.  Psych consult ordered (urgent). [GO]   0709 [place] [facility] [place] lEa Lopez MD via [employer], will hospitalize patient for further evaluation and management.   [GO]      ED Course User Index  [GO] Nolan Connolly, DO         PROCEDURES  Procedures    CLINICAL IMPRESSION  ED Diagnosis   1. Panic attacks        2. Positive pregnancy test        3. History of anxiety             Summary of your Discharge Medications      You have not been prescribed any medications.          DISPOSITION  Admit 12/29/2023  7:08 AM  Telemetry Bed?: No  Admitting Physician: ELA LOPEZ [[id number]]  Is this a telephone or verbal order?: This is a telephone order from the admitting physician  Transferring Patient to? Only adjust for transfers between [facility] ([facility] [place] [facility]): [facility] [[id number]]    I participated in the care of this patient and this note provides additional information regarding their visit. Please refer to attending note for further details regarding history, physical exam, workup, medical decision making, and disposition. This note may have been created using voice dictation technology and may include inadvertent errors.      Nolan Connolly DO  Emergency Medicine   [facility]     Nolan Connolly DO  Resident  12/29/23 0748     TF meeting 77% est protein needs

## 2024-01-19 ENCOUNTER — APPOINTMENT (OUTPATIENT)
Dept: CARDIOLOGY | Facility: CLINIC | Age: 74
End: 2024-01-19

## 2024-01-22 DIAGNOSIS — I73.9 PERIPHERAL VASCULAR DISEASE, UNSPECIFIED: ICD-10-CM

## 2024-01-22 RX ORDER — CLOPIDOGREL BISULFATE 75 MG/1
75 TABLET, FILM COATED ORAL
Qty: 30 | Refills: 6 | Status: ACTIVE | COMMUNITY
Start: 2024-01-22 | End: 1900-01-01

## 2024-02-08 ENCOUNTER — APPOINTMENT (OUTPATIENT)
Dept: CARDIOLOGY | Facility: CLINIC | Age: 74
End: 2024-02-08
Payer: MEDICARE

## 2024-02-08 VITALS
BODY MASS INDEX: 25.34 KG/M2 | DIASTOLIC BLOOD PRESSURE: 70 MMHG | WEIGHT: 177 LBS | SYSTOLIC BLOOD PRESSURE: 124 MMHG | HEIGHT: 70 IN | HEART RATE: 75 BPM

## 2024-02-08 DIAGNOSIS — I10 ESSENTIAL (PRIMARY) HYPERTENSION: ICD-10-CM

## 2024-02-08 DIAGNOSIS — I65.23 OCCLUSION AND STENOSIS OF BILATERAL CAROTID ARTERIES: ICD-10-CM

## 2024-02-08 DIAGNOSIS — I48.0 PAROXYSMAL ATRIAL FIBRILLATION: ICD-10-CM

## 2024-02-08 DIAGNOSIS — E78.2 MIXED HYPERLIPIDEMIA: ICD-10-CM

## 2024-02-08 PROCEDURE — 99214 OFFICE O/P EST MOD 30 MIN: CPT | Mod: 25

## 2024-02-08 PROCEDURE — 93000 ELECTROCARDIOGRAM COMPLETE: CPT

## 2024-02-08 RX ORDER — APIXABAN 5 MG/1
5 TABLET, FILM COATED ORAL
Qty: 180 | Refills: 2 | Status: ACTIVE | COMMUNITY

## 2024-02-08 RX ORDER — ROSUVASTATIN CALCIUM 40 MG/1
40 TABLET, FILM COATED ORAL
Qty: 90 | Refills: 3 | Status: ACTIVE | COMMUNITY
Start: 2024-02-08 | End: 1900-01-01

## 2024-02-08 RX ORDER — ASPIRIN 81 MG
81 TABLET, DELAYED RELEASE (ENTERIC COATED) ORAL DAILY
Refills: 0 | Status: DISCONTINUED | COMMUNITY
End: 2024-02-08

## 2024-02-08 NOTE — REASON FOR VISIT
[CV Risk Factors and Non-Cardiac Disease] : CV risk factors and non-cardiac disease [Coronary Artery Disease] : coronary artery disease [Carotid, Aortic and Peripheral Vascular Disease] : carotid, aortic and peripheral vascular disease

## 2024-02-08 NOTE — REVIEW OF SYSTEMS
[Negative] : Neurological [Dyspnea on exertion] : not dyspnea during exertion [Chest Discomfort] : no chest discomfort [Leg Claudication] : no intermittent leg claudication [Palpitations] : no palpitations [Rash] : no rash [Anxiety] : no anxiety [Easy Bleeding] : no tendency for easy bleeding [Easy Bruising] : no tendency for easy bruising [FreeTextEntry9] : muscle weakness

## 2024-02-08 NOTE — CARDIOLOGY SUMMARY
[de-identified] : 02/8/23: NSR 75/min, 1 AVB, NSST changes. [de-identified] : 2D ECHO 10/17/22: Normal LVF, LVEF 60% Mild MR. [de-identified] : Carotid duplex 2022: R ICA occluded Patent left ICA.  Carotid duplex 05/04/21: Occluded right ICA. Patent left ICA CEA site.

## 2024-02-08 NOTE — ASSESSMENT
[FreeTextEntry1] : PAF, SR now. [id number] Vasc score 4. Recovered from severe Covid infection. HTN. S/p left SACHI and left EIA intervention on 11/23.  Hypothyroidism. Hyperlipidemia uncontrolled. PAF, SR today. CHADS Vasc score 3.  Plan: D/c Atorvastatin. Start Rosuvastatin 40 mg daily. Continue Eliquis. Low-fat diet and exercise program discussed. Vascular f/u scheduled. Repeat fasting blood work. F/u in 6 months.  Antelmo Baltazar MD.

## 2024-02-27 ENCOUNTER — OUTPATIENT (OUTPATIENT)
Dept: OUTPATIENT SERVICES | Facility: HOSPITAL | Age: 74
LOS: 1 days | End: 2024-02-27
Payer: MEDICARE

## 2024-02-27 ENCOUNTER — APPOINTMENT (OUTPATIENT)
Dept: PULMONOLOGY | Facility: HOSPITAL | Age: 74
End: 2024-02-27
Payer: MEDICARE

## 2024-02-27 DIAGNOSIS — Z98.890 OTHER SPECIFIED POSTPROCEDURAL STATES: Chronic | ICD-10-CM

## 2024-02-27 DIAGNOSIS — R06.02 SHORTNESS OF BREATH: ICD-10-CM

## 2024-02-27 PROCEDURE — 94729 DIFFUSING CAPACITY: CPT

## 2024-02-27 PROCEDURE — 94727 GAS DIL/WSHOT DETER LNG VOL: CPT

## 2024-02-27 PROCEDURE — 94070 EVALUATION OF WHEEZING: CPT

## 2024-02-27 PROCEDURE — 94729 DIFFUSING CAPACITY: CPT | Mod: 26

## 2024-02-27 PROCEDURE — 94727 GAS DIL/WSHOT DETER LNG VOL: CPT | Mod: 26

## 2024-02-27 PROCEDURE — 94060 EVALUATION OF WHEEZING: CPT | Mod: 26

## 2024-02-27 PROCEDURE — 94664 DEMO&/EVAL PT USE INHALER: CPT

## 2024-02-28 DIAGNOSIS — R06.02 SHORTNESS OF BREATH: ICD-10-CM

## 2024-03-23 NOTE — H&P ADULT - HISTORY OF PRESENT ILLNESS
Strep Culture ordered for patient.   Pt is a 69 yo male with PMH of Afib on eliquis, HTN, HLD, DM?, Recent dx with COVID in Nov 20 s/p trach and peg removal presents to ed for Shortness of breath. Pt states that his SOB started  yesterday, initially mild and described as tightness, worse when laying flat, no specific pain or radiation. denies fever, chills, cp, abd pain, nvd, le swelling, back pain.  Pt placed on NIV in the ED with improvement.   Admission labs showed BNP 1771. CXR with vascular congestion. Received lasix in the ED.  Pt is a 69 yo male with PMH of Afib on eliquis, HTN, HLD, DM?, Recent dx with COVID in Nov 20 s/p trach and peg removal presents to ed for Shortness of breath. Pt states that his SOB started  yesterday, initially mild and described as tightness, worse when laying flat, no specific pain or radiation. denies fever, chills, cp, abd pain, nvd, le swelling, back pain.  Pt placed on NIV in the ED with improvement.   Admission labs showed BNP 1771. CXR with vascular congestion. Received lasix in the ED.     Pt then started to desat on NIV even after increasing PEEP. Pt was intubated in the ED and central Line was placed.

## 2024-05-29 ENCOUNTER — RX RENEWAL (OUTPATIENT)
Age: 74
End: 2024-05-29

## 2024-06-03 ENCOUNTER — APPOINTMENT (OUTPATIENT)
Dept: CARDIOLOGY | Facility: CLINIC | Age: 74
End: 2024-06-03
Payer: MEDICARE

## 2024-06-03 VITALS
SYSTOLIC BLOOD PRESSURE: 114 MMHG | HEIGHT: 70 IN | OXYGEN SATURATION: 97 % | HEART RATE: 72 BPM | BODY MASS INDEX: 26.77 KG/M2 | DIASTOLIC BLOOD PRESSURE: 72 MMHG | WEIGHT: 187 LBS

## 2024-06-03 PROCEDURE — 99214 OFFICE O/P EST MOD 30 MIN: CPT

## 2024-06-03 RX ORDER — ATORVASTATIN CALCIUM 40 MG/1
40 TABLET, FILM COATED ORAL
Qty: 90 | Refills: 1 | Status: DISCONTINUED | COMMUNITY
End: 2024-06-03

## 2024-06-04 NOTE — DISCUSSION/SUMMARY
[FreeTextEntry1] : 74 y/o PAF, SR now. [id number] Vasc score 4. Recovered from severe Covid infection. HTN. S/p left CEA. Occluded right ICA. Hypothyroidism. Hyperlipidemia. Severe left iliac disease, limiting claudication. PAD s/p PCT intervention to the left common iliac and external iliacs with 2 BM - [place] class 0  Plan: Will obtain arterial duplex of the aorta and LEs  Will obtain CHICHI and PVRs  Follow up in 6 months and as needed

## 2024-06-04 NOTE — HISTORY OF PRESENT ILLNESS
[FreeTextEntry1] : 72 y/o male with h/o DM, HTN, hyperlipidemia. Patient is referred for evaluation of PAF. He was found to have severe carotid disease (occluded right ICA and severe left ICA stenosis). S/p left CEA, uncomplicated. Quit smoking. PAD Severe Covid infection in 2020, intubated for > 20 days. Recurrent pleural effusion while undergoing rehab, admitted again and treated with antibiotics.  Pt presents for a follow up visit post peripheral intervention, reports complete resolution of claudication. Very happy with results. He is able to walk 2 miles now   He denies claudicaiton however he endorses bilateral hip / thigh tightness that makes   ===Data Reviewed today ===  11/23 Cath: Left SACHI and left EIA intervention Vital signs CHICHI/PVR: Left: severe iliofemoral disease. LE arterial duplex: Right: Diffuse LAVON disease with >75% stenosis in the proximal, mid and distal segments. Left: EIA >75% stenosis. Left: Proximal LAVON occlusion with reconstitution of flow in the DPA.

## 2024-06-04 NOTE — PHYSICAL EXAM
[General Appearance - Well Developed] : well developed [Normal Appearance] : normal appearance [Well Groomed] : well groomed [Heart Sounds] : normal S1 and S2 [Heart Rate And Rhythm] : heart rate and rhythm were normal [Murmurs] : no murmurs present [Edema] : no peripheral edema present [Nail Clubbing] : no clubbing of the fingernails [Cyanosis, Localized] : no localized cyanosis [Skin Color & Pigmentation] : normal skin color and pigmentation [Skin Turgor] : normal skin turgor [No Venous Stasis] : no venous stasis [Skin Lesions] : no skin lesions [No Skin Ulcers] : no skin ulcer [Normal Oral Mucosa] : normal oral mucosa [] : no respiratory distress [Bowel Sounds] : normal bowel sounds [FreeTextEntry1] : normal pulses  [Oriented To Time, Place, And Person] : oriented to person, place, and time

## 2024-06-13 ENCOUNTER — APPOINTMENT (OUTPATIENT)
Dept: PULMONOLOGY | Facility: CLINIC | Age: 74
End: 2024-06-13

## 2024-06-28 ENCOUNTER — APPOINTMENT (OUTPATIENT)
Dept: CARDIOLOGY | Facility: CLINIC | Age: 74
End: 2024-06-28

## 2024-06-28 PROCEDURE — 93925 LOWER EXTREMITY STUDY: CPT

## 2024-06-28 PROCEDURE — 93978 VASCULAR STUDY: CPT

## 2024-06-28 PROCEDURE — ZZZZZ: CPT

## 2024-07-13 NOTE — SWALLOW BEDSIDE ASSESSMENT ADULT - SLP PRECAUTIONS/LIMITATIONS: HEARING
[facility]  Cardiology Consultation        Derek Ng, male  : 1954  PCP: Rod Chopra MD  Attending/Consulting Provider: Samina Ferrari MD     Inpatient consult to Cardiology  Consult performed by: Pablo Nix MD  Consult ordered by: Misha De La Paz MD  Reason for consult: DAPT recs          Reason for Consultation:  Chief Complaint   Patient presents with    Medical Problem         SUBJECTIVE:       History of Present Illness:  Derek Ng is a 70 year oldmale with PMHx of ESRD on HD, HTN, DM2 who, recent tracheostomy 2024 and PEG 2024 presents from nursing facility due to bleeding from the trach.  The belief is that this may have been due to suctioning and possibly intratracheal bleeding for which they are giving tranexamic acid.    This patient had 1 stent placed to the RCA on 2023 at an outside hospital and is on aspirin and Brilinta for this.  Cardiology was consulted for recommendations regarding dual antiplatelet therapy with fairly recent stenting in the setting of continued bleeding    PMHx:  Past Medical History:   Diagnosis Date    Diabetes mellitus  (CMD)     Tracheostomy in place  (CMD)     Initial tracheostomy tube placed 24       PSHx:  No past surgical history on file.    Family Hx:  No family history on file.    Social Hx:  Social History     Tobacco Use    Smoking status: Every Day    Smokeless tobacco: Not on file   Substance Use Topics    Alcohol use: Yes       Allergies:  ALLERGIES:  No Known Allergies    Medications:  Prior to Admission medications    Medication Sig Start Date End Date Taking? Authorizing Provider   midodrine (PROAMATINE) 10 MG tablet Take 10 mg by mouth 3 days a week. MW 7/10/24  Yes Ori Poe CNP   acetaminophen (TYLENOL) 325 MG tablet 650 mg by Per G Tube route every 4 hours as needed for Pain.   Yes Provider, Outside   albuterol (VENTOLIN) (2.5 MG/3ML) 0.083% nebulizer solution 3 mLs every 4 hours  as needed for Wheezing or Shortness of Breath. Via trach   Yes Provider, Outside   White Petrolatum-Mineral Oil (Artificial Tears) 83-15 % Ointment Place 1 Application into both eyes every 6 hours as needed (dry eyes).   Yes Provider, Outside   aspirin 81 MG chewable tablet 81 mg by Per G Tube route daily.   Yes Provider, Outside   atorvastatin (LIPITOR) 80 MG tablet 80 mg by Per G Tube route daily.   Yes Provider, Outside   ticagrelor (BRILINTA) 90 MG Tab 1 tablet by Per G Tube route every 12 hours.   Yes Provider, Outside   collagenase (SANTYL) 250 UNIT/GM ointment Apply 1 Application topically daily as needed (wound). Apply to sacrum.   Yes Provider, Outside   collagenase (SANTYL) 250 UNIT/GM ointment Apply 1 Application topically daily. Apply to sacrum   Yes Provider, Outside   folic acid (FOLATE) 1 MG tablet 1 mg by Per G Tube route daily.   Yes Provider, Outside   metoPROLOL tartrate (LOPRESSOR) 25 MG tablet 0.5 tablets by Per G Tube route every 8 hours. (0.5 tab = 12.5 mg)   Yes Provider, Outside   insulin NPH (NovoLIN N FlexPen) 100 UNIT/ML pen-injector Inject 2 Units into the skin every 12 hours. Prime 2 units before each dose.   Yes Provider, Outside   omeprazole (PriLOSEC) 40 MG capsule 40 mg daily. Via G tube   Yes Provider, Outside   epoetin john-epbx (Retacrit) 02193 UNIT/ML injection Inject 1 mL into the skin 3 days a week. On Mondays, Wednesdays, and Fridays   Yes Provider, Outside   sodium chloride 3 % nebulizer solution 1 vial via trach daily   Yes Provider, Outside   SODIUM THIOsulfate 25 % injection Inject 100 mLs into the vein as needed. To be given during hemodialysis on Mondays, Wednesdays, and Fridays. (100mL/hr)   Yes Provider, Outside   thiamine (VITAMIN B1) 100 MG tablet 100 mg by Per G Tube route daily.   Yes Provider, Outside       Current Facility-Administered Medications   Medication Dose Route Frequency Provider Last Rate Last Admin    thiamine (VITAMIN B1) tablet 100 mg  100 mg Per G  Tube Daily Mitchell Stubbs DO   100 mg at 07/13/24 0824    insulin lispro (ADMELOG,HumaLOG) - Correction Dose   Subcutaneous 4 times per day Mitchell Stubbs DO        tranexamic acid (CYKLOKAPRON) solution for inhalation 500 mg  500 mg Inhalation 3 times per day Mitchell Stubbs DO   500 mg at 07/13/24 0610    B complex-vitamin C-folic acid (NEPHRO-FERNANDA) tablet 0.8 mg  1 tablet Per PEG Tube Daily with dinner Samina Ferrari MD        docusate sodium-sennosides (SENOKOT S) 50-8.6 MG 2 tablet  2 tablet Per PEG Tube QHS Samina Ferrari MD        midodrine (PROAMATINE) tablet 10 mg  10 mg Per PEG Tube PRN Samina Ferrari MD        [START ON 7/15/2024] midodrine (PROAMATINE) tablet 10 mg  10 mg Per G Tube Once per day on Monday Wednesday Friday Samina Ferrari MD        aspirin chewable 81 mg  81 mg Oral Daily Misha De La Paz MD        sodium chloride (NORMAL SALINE) 0.9 % bolus 100 mL  100 mL Intravenous PRN Yoshi Goss MD        epoetin john-epbx (RETACRIT) 66146 UNIT/ML injection 20,000 Units  20,000 Units Intravenous Once Yoshi Goss MD        [START ON 7/15/2024] epoetin john-epbx (RETACRIT) 25658 UNIT/ML injection 20,000 Units  20,000 Units Intravenous Once per day on Monday Wednesday Friday Yoshi Goss MD        sodium chloride 0.9 % flush bag 25 mL  25 mL Intravenous PRN Mitchell Stubbs DO        sodium chloride 0.9 % injection 2 mL  2 mL Intracatheter 2 times per day Mitchell Stubbs DO   2 mL at 07/13/24 0824    sodium chloride 0.9% infusion   Intravenous Continuous PRN Mitchell Stubbs DO   Completed at 07/13/24 0046    sodium chloride 0.9% infusion   Intravenous Continuous PRN Mitchell Stubbs DO        sodium chloride (NORMAL SALINE) 0.9 % bolus 500 mL  500 mL Intravenous PRN Stubbs, Mitchell Davenport, DO        sodium chloride 0.9 % flush bag 25 mL  25 mL Intravenous PRN Enoc, Mitchell Davenport, DO        tigecycline (TYGACIL) 100 mg in sodium chloride 0.9  % 100 mL IVPB  100 mg Intravenous 2 times per day Mitchell Stubbs DO   Completed at 07/13/24 0926    cefepime (MAXIPIME) 1,000 mg in sodium chloride 0.9 % 100 mL IVPB  1,000 mg Intravenous Daily Mitchell Stubbs DO   Completed at 07/12/24 2139    chlorhexidine gluconate (PERIDEX) 0.12 % solution 15 mL  15 mL Swish & Spit 2 times per day Mitchell Stubbs DO   15 mL at 07/13/24 0821    And    chlorhexidine gluconate (PERIDEX) 0.12 % solution 15 mL  15 mL Swish & Spit PRN Mitchell Stubbs DO        polyethylene glycol (MIRALAX) packet 17 g  17 g Per NG Tube Daily PRN Mitchell Stubbs DO        Or    bisacodyl (DULCOLAX) suppository 10 mg  10 mg Rectal Daily PRN Mitchell Stubbs, DO        CARBOXYMethylcellulose (REFRESH TEARS) 0.5 % ophthalmic solution 1 drop  1 drop Both Eyes 6 times per day Mitchell Stubbs DO   1 drop at 07/13/24 1214    CARBOXYMethylcellulose (REFRESH TEARS) 0.5 % ophthalmic solution 1 drop  1 drop Both Eyes PRN Mitchell Stubbs DO        dextrose 50 % injection 25 g  25 g Intravenous PRN Mitchell Stubbs,         dextrose 50 % injection 12.5 g  12.5 g Intravenous PRN Mitchell Stubbs, DO        glucagon (GLUCAGEN) injection 1 mg  1 mg Intramuscular PRN Mitchell Stubbs DO        dextrose (GLUTOSE) 40 % gel 15 g  15 g Oral PRN Mitchell Stubbs, DO        dextrose (GLUTOSE) 40 % gel 30 g  30 g Oral PRN StubbsMitchell claire, DO        atorvastatin (LIPITOR) tablet 80 mg  80 mg Per G Tube Daily Mitchell Stubbs DO   80 mg at 07/13/24 0824    pantoprazole (PROTONIX) 40 MG/20ML (compounded) suspension 40 mg  40 mg Per G Tube QAM AC Mitchell Stubbs, DO   40 mg at 07/13/24 0622       Review of Systems:  Review of Systems   Unable to perform ROS: Patient nonverbal         OBJECTIVE:       Vital Last Value 24 Hour Range   Temperature 99.5 °F (37.5 °C) (07/13/24 1200) Temp  Min: 97.7 °F (36.5 °C)  Max: 99.5 °F (37.5 °C)   Pulse 93 (07/13/24 1224) Pulse  Min: 75  Max:  100   Respiratory (!) 21 (07/13/24 1200) Resp  Min: 12  Max: 40   Non-Invasive  Blood Pressure 131/73 (07/13/24 1200) BP  Min: 114/64  Max: 152/66   Pulse Oximetry 100 % (07/13/24 1224) SpO2  Min: 80 %  Max: 100 %   Arterial   Blood Pressure   No data recorded        Intake/Output Summary (Last 24 hours) at 7/13/2024 1331  Last data filed at 7/13/2024 1059  Gross per 24 hour   Intake 386.29 ml   Output --   Net 386.29 ml        Wt Readings from Last 3 Encounters:   07/13/24 65.4 kg (144 lb 2.9 oz)   07/08/24 67.8 kg (149 lb 7.6 oz)   07/03/14 69.4 kg (153 lb)       Tele: sinus    HENT:      Head: Normocephalic and atraumatic.      Right Ear: Tympanic membrane normal.      Left Ear: Tympanic membrane normal.      Nose: Nose normal.      Mouth/Throat:      Mouth: Mucous membranes are moist.   Cardiovascular:      Rate and Rhythm: Normal rate and regular rhythm.      Pulses: Normal pulses.      Heart sounds: Normal heart sounds.   Pulmonary:      Breath sounds: Normal breath sounds.      Comments: Trach in place  Abdominal:      General: There is no distension.      Palpations: Abdomen is soft.      Tenderness: There is no abdominal tenderness.   Musculoskeletal:      Right lower leg: No edema.      Left lower leg: No edema.   Skin:     General: Skin is warm.   Neurological:      Comments: AOx 0     DIAGNOSTIC STUDIES:     Labs:  CBC:   Recent Labs   Lab 07/13/24  0450 07/12/24  1706 07/12/24  1101 07/08/24  0708   WBC 9.5 11.0 11.7* 9.4   RBC 2.69* 2.82* 2.72* 2.85*   HGB 8.2* 8.8* 8.4* 8.7*   HCT 26.5* 27.8* 26.7* 27.0*   MCV 98.5 98.6 98.2 94.7   MCHC 30.9* 31.7* 31.5* 32.2   RDW-CV 19.0* 19.3* 19.1* 17.5*    298 324 310   Lymphocytes, Percent 9 4 8 10     CMP:  Recent Labs   Lab 07/13/24  0450 07/12/24  1101 07/08/24  0708 07/07/24  0606   SODIUM 130* 131* 131* 133*   POTASSIUM 4.3 3.9 3.9 3.7   CHLORIDE 95* 95* 93* 96*   CO2 26 28 24 27   BUN 45* 36* 51* 43*   CREATININE 5.07* 4.47* 5.12* 4.17*   GLUCOSE 77  110* 128* 103*   ALBUMIN 1.6* 1.7* 1.4* 1.4*   PHOS 4.7  --  4.3 3.8   AST 30 34 56* 35   GPT 30 34 40 32   ALKPT 150* 154* 138* 131*   BILIRUBIN 0.5 0.5 0.4 0.4   MG 2.8*  --  2.8* 2.6*     COAGULATION STUDIES:   Recent Labs   Lab 07/12/24  1706 07/12/24  1101   PT 15.0* 15.0*   PTT 35*  --    INR 1.4 1.4     TSH:    Lab Results   Component Value Date    TSH 2.531 07/03/2024     HbA1c: Last Lab A1C:  No results found for: \"HGBA1C\"    Last Point of Care A1C:  No results found for: \"GZZDOXDL8Q\", \"5GLYH\"  LIPID PANEL: No results found  NT-PRONBP:   Recent Labs   Lab 07/12/24  1101 07/04/24  0626 07/02/24  1007   NT-proBNP >70,000* >70,000* >70,000*     Troponin: No results found    Data:  Encounter Date: 07/12/24   Electrocardiogram 12-Lead   Result Value    Ventricular Rate EKG/Min (BPM) 89    Atrial Rate (BPM) 89    KY-Interval (MSEC) 196    QRS-Interval (MSEC) 106    QT-Interval (MSEC) 416    QTc 506    P Axis (Degrees) 51    R Axis (Degrees) -7    T Axis (Degrees) 176    REPORT TEXT      Normal sinus rhythm  Possible  Left atrial enlargement  Left ventricular hypertrophy  with repolarization abnormality  (  Justen product  )  Prolonged QT  Abnormal ECG  When compared with ECG of  02-JUL-2024 11:17,  PREVIOUS ECG IS PRESENT  Confirmed by fellow GURJIT CASIANO MD ([id number]) on 7/12/2024 2:40:42 PM         ASSESSMENT AND PLAN:     Impression: Derek Ng is a 70 year oldmale with PMHx of ESRD on HD, HTN, DM2 who, recent tracheostomy 6/11/2024 and PEG 6/12/2024 presents from nursing facility due to bleeding from the trach.  The belief is that this may have been due to suctioning and possibly intratracheal bleeding for which they are giving tranexamic acid.    This patient had 1 stent placed to the RCA on 12/11/2023 at an outside hospital and is on aspirin and Brilinta for this.  Cardiology was consulted for recommendations regarding dual antiplatelet therapy with fairly recent stenting in the setting of continued  bleeding    Assessment:   #CAD s/p RCA [place] on 12/11/2023 on aspirin and Brilinta  #Hemoptysis, bleeding from tracheostomy  #S/p tracheostomy and PEG in June 2024  #HTN  #HLD  #Chronic HFrEF 34%      Plan:  -This patient can be off DAPT safely.  Can discontinue Brilinta and continue aspirin 81 mg monotherapy indefinitely  -Not in decompensated heart failure.  -Continue atorvastatin 80 mg daily, home Lopressor 12.5 mg twice daily.  Other GDMT appears to be gated by blood pressure, he is on chronic midodrine  -Cardiology will sign off at this time, please call patient any further questions or concerns    Discussed with Dr. Boyd Osei D.O.   Cardiology Fellow, PGY 4  Contact via [employer]    7/13/20241:31 PM    Cardiology attending:  Patient seen and examined with cardiology fellow.  Findings independently confirmed.  Patient with a history of coronary artery disease status post intervention earlier this year.  Comes in now with bleeding either endobronchially or deeper.  We are asked about continuation of DAPT.  At this point since it has been at least 6 months since his intervention aspirin can be continued alone without double antiplatelet therapy.    Thank you for allowing us to participate in the patient's care.     within functional limits

## 2024-08-22 ENCOUNTER — APPOINTMENT (OUTPATIENT)
Dept: CARDIOLOGY | Facility: CLINIC | Age: 74
End: 2024-08-22
Payer: MEDICARE

## 2024-08-22 VITALS
SYSTOLIC BLOOD PRESSURE: 142 MMHG | BODY MASS INDEX: 26.48 KG/M2 | HEIGHT: 70 IN | DIASTOLIC BLOOD PRESSURE: 80 MMHG | WEIGHT: 185 LBS | HEART RATE: 64 BPM

## 2024-08-22 VITALS — DIASTOLIC BLOOD PRESSURE: 70 MMHG | SYSTOLIC BLOOD PRESSURE: 130 MMHG

## 2024-08-22 DIAGNOSIS — I10 ESSENTIAL (PRIMARY) HYPERTENSION: ICD-10-CM

## 2024-08-22 DIAGNOSIS — I48.0 PAROXYSMAL ATRIAL FIBRILLATION: ICD-10-CM

## 2024-08-22 DIAGNOSIS — E78.2 MIXED HYPERLIPIDEMIA: ICD-10-CM

## 2024-08-22 DIAGNOSIS — I70.219 ATHEROSCLEROSIS OF NATIVE ARTERIES OF EXTREMITIES WITH INTERMITTENT CLAUDICATION, UNSPECIFIED EXTREMITY: ICD-10-CM

## 2024-08-22 DIAGNOSIS — I65.23 OCCLUSION AND STENOSIS OF BILATERAL CAROTID ARTERIES: ICD-10-CM

## 2024-08-22 DIAGNOSIS — I42.8 OTHER CARDIOMYOPATHIES: ICD-10-CM

## 2024-08-22 PROCEDURE — 99214 OFFICE O/P EST MOD 30 MIN: CPT | Mod: 25

## 2024-08-22 PROCEDURE — 93000 ELECTROCARDIOGRAM COMPLETE: CPT

## 2024-08-22 NOTE — ASSESSMENT
[FreeTextEntry1] : Recovered from severe Covid infection. HTN. S/p left SACHI and left EIA intervention on 11/23.  Hypothyroidism. Hyperlipidemia at goal now PAF, SR today. SR now. CHADS Vasc score 3.  Plan: Continue Rosuvastatin 40 mg daily. Continue Eliquis and Clopidogrel. Low-fat diet and exercise program discussed. Vascular f/u Repeat fasting blood work. Carotid duplex. F/u in 6 months.  Antelmo Baltazar MD.

## 2024-08-22 NOTE — HISTORY OF PRESENT ILLNESS
[FreeTextEntry1] : 73-yo male with h/o DM, HTN, hyperlipidemia. Patient is referred for evaluation of PAF. He was found to have severe carotid disease (occluded right ICA and severe left ICA stenosis). S/p left CEA, uncomplicated. Quit smoking.  Severe Covid infection in 2020, intubated for > 20 days. Recurrent pleural effusion while undergoing rehab, admitted again and treated with antibiotics.  S/p left SACHI and left EIA intervention on 11/23. Resolved left leg/calf pain. S/p COVID in December 2023.   Pt became sick during vacation after overcoming Covid.  S/p Hospitalization on 01/12/24 due to SOB and URI at [facility].     Pt presents for a follow up visit. He denies chest pain or palpitations. No edema. Gets PALACIOS sometimes when he walks fast.   LDL 59 GFR 64 TSH 0.1 med was adjusted by PCP A1c 7

## 2024-08-22 NOTE — HISTORY OF PRESENT ILLNESS
[FreeTextEntry1] : 73-yo male with h/o DM, HTN, hyperlipidemia. Patient is referred for evaluation of PAF. He was found to have severe carotid disease (occluded right ICA and severe left ICA stenosis). S/p left CEA, uncomplicated. Quit smoking.  Severe Covid infection in 2020, intubated for > 20 days. Recurrent pleural effusion while undergoing rehab, admitted again and treated with antibiotics.  S/p left SACHI and left EIA intervention on 11/23. Resolved left leg/calf pain. S/p COVID in December 2023.   Pt became sick during vacation after overcoming Covid.  S/p Hospitalization on 01/12/24 due to SOB and URI at [facility].     Pt presents for a follow up visit. He denies chest pain or palpitations. No edema. Gets PALACIOS sometimes when he walks fast.   LDL 59 GFR 64 TSH 0.1 med was adjusted by PCP A1c 7

## 2024-08-22 NOTE — PHYSICAL EXAM
[Well Developed] : well developed [Well Nourished] : well nourished [No Acute Distress] : no acute distress [Normal Conjunctiva] : normal conjunctiva [Normal Venous Pressure] : normal venous pressure [No Carotid Bruit] : no carotid bruit [Normal S1, S2] : normal S1, S2 [No Murmur] : no murmur [No Rub] : no rub [S4] : S4 [Clear Lung Fields] : clear lung fields [Good Air Entry] : good air entry [No Respiratory Distress] : no respiratory distress  [Soft] : abdomen soft [Non Tender] : non-tender [Normal Bowel Sounds] : normal bowel sounds [Normal Gait] : normal gait [No Edema] : no edema [No Cyanosis] : no cyanosis [No Clubbing] : no clubbing [No Varicosities] : no varicosities [No Rash] : no rash [No Skin Lesions] : no skin lesions [Moves all extremities] : moves all extremities [Normal Speech] : normal speech [Alert and Oriented] : alert and oriented [Normal memory] : normal memory

## 2024-08-22 NOTE — CARDIOLOGY SUMMARY
[de-identified] : 8/22/24: SR, 1 AVB, LVH, NSST changes. [de-identified] : 2D ECHO 10/17/22: Normal LVF, LVEF 60% Mild MR. [de-identified] : Carotid duplex 2022: R ICA occluded Patent left ICA.  Carotid duplex 05/04/21: Occluded right ICA. Patent left ICA CEA site.

## 2024-08-22 NOTE — CARDIOLOGY SUMMARY
[de-identified] : 8/22/24: SR, 1 AVB, LVH, NSST changes. [de-identified] : 2D ECHO 10/17/22: Normal LVF, LVEF 60% Mild MR. [de-identified] : Carotid duplex 2022: R ICA occluded Patent left ICA.  Carotid duplex 05/04/21: Occluded right ICA. Patent left ICA CEA site.

## 2024-08-28 ENCOUNTER — APPOINTMENT (OUTPATIENT)
Dept: CARDIOLOGY | Facility: CLINIC | Age: 74
End: 2024-08-28
Payer: MEDICARE

## 2024-08-28 PROCEDURE — 93880 EXTRACRANIAL BILAT STUDY: CPT

## 2024-11-22 NOTE — H&P PST ADULT - SPO2 (%)
Caller: Anabelle Bridges    Relationship: Emergency Contact    Best call back number: [phone]  Who are you requesting to speak with (clinical staff, provider,  specific staff member): NURSE        What was the call regarding: PT IS HURTING ON LEFT SIDE AROUND BACK AND LEG HURT   96

## 2024-12-02 ENCOUNTER — APPOINTMENT (OUTPATIENT)
Dept: CARDIOLOGY | Facility: CLINIC | Age: 74
End: 2024-12-02

## 2025-01-13 ENCOUNTER — RX RENEWAL (OUTPATIENT)
Age: 75
End: 2025-01-13

## 2025-01-16 ENCOUNTER — APPOINTMENT (OUTPATIENT)
Dept: CARDIOLOGY | Facility: CLINIC | Age: 75
End: 2025-01-16
Payer: MEDICARE

## 2025-01-16 ENCOUNTER — NON-APPOINTMENT (OUTPATIENT)
Age: 75
End: 2025-01-16

## 2025-01-16 VITALS — SYSTOLIC BLOOD PRESSURE: 100 MMHG | DIASTOLIC BLOOD PRESSURE: 60 MMHG

## 2025-01-16 DIAGNOSIS — I10 ESSENTIAL (PRIMARY) HYPERTENSION: ICD-10-CM

## 2025-01-16 DIAGNOSIS — R94.31 ABNORMAL ELECTROCARDIOGRAM [ECG] [EKG]: ICD-10-CM

## 2025-01-16 DIAGNOSIS — I25.10 ATHEROSCLEROTIC HEART DISEASE OF NATIVE CORONARY ARTERY W/OUT ANGINA PECTORIS: ICD-10-CM

## 2025-01-16 DIAGNOSIS — Z95.1 PRESENCE OF AORTOCORONARY BYPASS GRAFT: ICD-10-CM

## 2025-01-16 DIAGNOSIS — I48.0 PAROXYSMAL ATRIAL FIBRILLATION: ICD-10-CM

## 2025-01-16 DIAGNOSIS — I65.23 OCCLUSION AND STENOSIS OF BILATERAL CAROTID ARTERIES: ICD-10-CM

## 2025-01-16 PROCEDURE — 93306 TTE W/DOPPLER COMPLETE: CPT

## 2025-01-16 PROCEDURE — 99214 OFFICE O/P EST MOD 30 MIN: CPT

## 2025-01-16 PROCEDURE — 93000 ELECTROCARDIOGRAM COMPLETE: CPT

## 2025-01-16 PROCEDURE — G2211 COMPLEX E/M VISIT ADD ON: CPT

## 2025-01-16 RX ORDER — METOPROLOL TARTRATE 25 MG/1
25 TABLET ORAL
Refills: 0 | Status: ACTIVE | COMMUNITY

## 2025-01-16 RX ORDER — ATORVASTATIN CALCIUM 40 MG/1
40 TABLET, FILM COATED ORAL DAILY
Refills: 0 | Status: ACTIVE | COMMUNITY

## 2025-01-16 RX ORDER — SPIRONOLACTONE 25 MG/1
25 TABLET ORAL DAILY
Refills: 0 | Status: ACTIVE | COMMUNITY

## 2025-01-16 RX ORDER — TORSEMIDE 20 MG/1
20 TABLET ORAL DAILY
Refills: 0 | Status: ACTIVE | COMMUNITY

## 2025-01-17 NOTE — DIETITIAN INITIAL EVALUATION ADULT. - CALCULATED FROM (G/KG)
Patient has fatigue, fever, cough that is keeping her up, and body aches/chills. OTC not helping with cough.    74.16

## 2025-02-20 ENCOUNTER — APPOINTMENT (OUTPATIENT)
Dept: CARDIOLOGY | Facility: CLINIC | Age: 75
End: 2025-02-20
Payer: MEDICARE

## 2025-02-20 VITALS
WEIGHT: 170 LBS | DIASTOLIC BLOOD PRESSURE: 52 MMHG | SYSTOLIC BLOOD PRESSURE: 122 MMHG | HEIGHT: 70 IN | HEART RATE: 63 BPM | BODY MASS INDEX: 24.34 KG/M2

## 2025-02-20 DIAGNOSIS — I65.23 OCCLUSION AND STENOSIS OF BILATERAL CAROTID ARTERIES: ICD-10-CM

## 2025-02-20 DIAGNOSIS — I25.10 ATHEROSCLEROTIC HEART DISEASE OF NATIVE CORONARY ARTERY W/OUT ANGINA PECTORIS: ICD-10-CM

## 2025-02-20 DIAGNOSIS — I48.0 PAROXYSMAL ATRIAL FIBRILLATION: ICD-10-CM

## 2025-02-20 DIAGNOSIS — J84.10 PULMONARY FIBROSIS, UNSPECIFIED: ICD-10-CM

## 2025-02-20 DIAGNOSIS — I65.29 OCCLUSION AND STENOSIS OF UNSPECIFIED CAROTID ARTERY: ICD-10-CM

## 2025-02-20 DIAGNOSIS — I70.219 ATHEROSCLEROSIS OF NATIVE ARTERIES OF EXTREMITIES WITH INTERMITTENT CLAUDICATION, UNSPECIFIED EXTREMITY: ICD-10-CM

## 2025-02-20 DIAGNOSIS — I73.9 PERIPHERAL VASCULAR DISEASE, UNSPECIFIED: ICD-10-CM

## 2025-02-20 DIAGNOSIS — I10 ESSENTIAL (PRIMARY) HYPERTENSION: ICD-10-CM

## 2025-02-20 DIAGNOSIS — E11.9 TYPE 2 DIABETES MELLITUS W/OUT COMPLICATIONS: ICD-10-CM

## 2025-02-20 DIAGNOSIS — E78.2 MIXED HYPERLIPIDEMIA: ICD-10-CM

## 2025-02-20 DIAGNOSIS — Z95.1 PRESENCE OF AORTOCORONARY BYPASS GRAFT: ICD-10-CM

## 2025-02-20 PROCEDURE — 99214 OFFICE O/P EST MOD 30 MIN: CPT

## 2025-02-20 PROCEDURE — G2211 COMPLEX E/M VISIT ADD ON: CPT

## 2025-02-20 PROCEDURE — 93000 ELECTROCARDIOGRAM COMPLETE: CPT

## 2025-02-20 RX ORDER — ASPIRIN 81 MG/1
81 TABLET, COATED ORAL
Qty: 90 | Refills: 3 | Status: ACTIVE | COMMUNITY
Start: 2025-02-20 | End: 1900-01-01

## 2025-02-27 ENCOUNTER — APPOINTMENT (OUTPATIENT)
Dept: CARDIOLOGY | Facility: CLINIC | Age: 75
End: 2025-02-27

## 2025-03-10 NOTE — DISCHARGE NOTE PROVIDER - NSDCQMPCI_CARD_ALL_CORE
Patient has been scheduled with Dr. Arevalo for 3/14 and 4/13.  Has also been scheduled with Darlene Wood for 3/20.   Comments: Gave pt after care instructions and samples were given No

## 2025-04-22 NOTE — ED ADULT NURSE NOTE - PMH
Afib    Bladder cancer    DM (diabetes mellitus)    H/O cystoscopy  resection of bladder tumor  H/O sciatica  CHR PAIN LT LE  HTN (hypertension)    Hypercholesteremia     Calm

## 2025-04-29 RX ORDER — ROSUVASTATIN CALCIUM 40 MG/1
40 TABLET, FILM COATED ORAL
Qty: 90 | Refills: 3 | Status: ACTIVE | COMMUNITY
Start: 2025-04-28 | End: 1900-01-01

## 2025-06-05 ENCOUNTER — APPOINTMENT (OUTPATIENT)
Dept: CARDIOLOGY | Facility: CLINIC | Age: 75
End: 2025-06-05
Payer: MEDICARE

## 2025-06-05 VITALS
BODY MASS INDEX: 23.62 KG/M2 | HEIGHT: 70 IN | DIASTOLIC BLOOD PRESSURE: 50 MMHG | SYSTOLIC BLOOD PRESSURE: 110 MMHG | WEIGHT: 165 LBS | HEART RATE: 56 BPM

## 2025-06-05 DIAGNOSIS — E78.2 MIXED HYPERLIPIDEMIA: ICD-10-CM

## 2025-06-05 DIAGNOSIS — I25.10 ATHEROSCLEROTIC HEART DISEASE OF NATIVE CORONARY ARTERY W/OUT ANGINA PECTORIS: ICD-10-CM

## 2025-06-05 DIAGNOSIS — I10 ESSENTIAL (PRIMARY) HYPERTENSION: ICD-10-CM

## 2025-06-05 DIAGNOSIS — Z95.1 PRESENCE OF AORTOCORONARY BYPASS GRAFT: ICD-10-CM

## 2025-06-05 DIAGNOSIS — I70.219 ATHEROSCLEROSIS OF NATIVE ARTERIES OF EXTREMITIES WITH INTERMITTENT CLAUDICATION, UNSPECIFIED EXTREMITY: ICD-10-CM

## 2025-06-05 DIAGNOSIS — I48.0 PAROXYSMAL ATRIAL FIBRILLATION: ICD-10-CM

## 2025-06-05 PROCEDURE — 93000 ELECTROCARDIOGRAM COMPLETE: CPT

## 2025-06-05 PROCEDURE — 99214 OFFICE O/P EST MOD 30 MIN: CPT | Mod: 25

## 2025-09-09 ENCOUNTER — APPOINTMENT (OUTPATIENT)
Dept: CARDIOLOGY | Facility: CLINIC | Age: 75
End: 2025-09-09